# Patient Record
Sex: MALE | Race: WHITE | NOT HISPANIC OR LATINO | Employment: PART TIME | URBAN - NONMETROPOLITAN AREA
[De-identification: names, ages, dates, MRNs, and addresses within clinical notes are randomized per-mention and may not be internally consistent; named-entity substitution may affect disease eponyms.]

---

## 2016-08-10 LAB
HBA1C MFR BLD: 7.6 % (ref 0–5.7)
LDLC SERPL CALC-MCNC: 59 MG/DL

## 2016-11-09 LAB — HBA1C MFR BLD: 7.1 % (ref 0–5.7)

## 2017-01-09 DIAGNOSIS — E10.65 TYPE 1 DIABETES MELLITUS WITH HYPERGLYCEMIA (H): Primary | ICD-10-CM

## 2017-01-09 NOTE — PROGRESS NOTES
Richard showed up to the clinic.      He needs a rx for his BG strips.  Rx sent.      Encouraged him to see me in the near future.      Macrina Dixon RN FNP-BC  Disease Management

## 2017-01-17 ENCOUNTER — TELEPHONE (OUTPATIENT)
Dept: WOUND CARE | Facility: OTHER | Age: 52
End: 2017-01-17

## 2017-01-17 DIAGNOSIS — E10.65 TYPE 1 DIABETES MELLITUS WITH HYPERGLYCEMIA (H): Primary | ICD-10-CM

## 2017-01-17 NOTE — TELEPHONE ENCOUNTER
Patient has an appointment with Diabetic Education. Needs a new referral, referral pending. Please sign.

## 2017-01-18 ENCOUNTER — OFFICE VISIT (OUTPATIENT)
Dept: WOUND CARE | Facility: OTHER | Age: 52
End: 2017-01-18
Attending: NURSE PRACTITIONER
Payer: COMMERCIAL

## 2017-01-18 VITALS
OXYGEN SATURATION: 92 % | WEIGHT: 203.2 LBS | DIASTOLIC BLOOD PRESSURE: 57 MMHG | HEIGHT: 62 IN | BODY MASS INDEX: 37.39 KG/M2 | SYSTOLIC BLOOD PRESSURE: 113 MMHG | HEART RATE: 78 BPM

## 2017-01-18 DIAGNOSIS — E10.65 TYPE 1 DIABETES MELLITUS WITH HYPERGLYCEMIA (H): ICD-10-CM

## 2017-01-18 DIAGNOSIS — E10.65 TYPE 1 DIABETES MELLITUS WITH HYPERGLYCEMIA (H): Primary | ICD-10-CM

## 2017-01-18 DIAGNOSIS — Z71.89 ACP (ADVANCE CARE PLANNING): Primary | Chronic | ICD-10-CM

## 2017-01-18 DIAGNOSIS — S90.821A BLISTER (NONTHERMAL), RIGHT FOOT, INITIAL ENCOUNTER: ICD-10-CM

## 2017-01-18 PROCEDURE — 99213 OFFICE O/P EST LOW 20 MIN: CPT | Performed by: NURSE PRACTITIONER

## 2017-01-18 ASSESSMENT — ANXIETY QUESTIONNAIRES
IF YOU CHECKED OFF ANY PROBLEMS ON THIS QUESTIONNAIRE, HOW DIFFICULT HAVE THESE PROBLEMS MADE IT FOR YOU TO DO YOUR WORK, TAKE CARE OF THINGS AT HOME, OR GET ALONG WITH OTHER PEOPLE: NOT DIFFICULT AT ALL
2. NOT BEING ABLE TO STOP OR CONTROL WORRYING: NOT AT ALL
7. FEELING AFRAID AS IF SOMETHING AWFUL MIGHT HAPPEN: NOT AT ALL
1. FEELING NERVOUS, ANXIOUS, OR ON EDGE: NOT AT ALL
6. BECOMING EASILY ANNOYED OR IRRITABLE: NOT AT ALL
GAD7 TOTAL SCORE: 0
5. BEING SO RESTLESS THAT IT IS HARD TO SIT STILL: NOT AT ALL
3. WORRYING TOO MUCH ABOUT DIFFERENT THINGS: NOT AT ALL

## 2017-01-18 ASSESSMENT — PAIN SCALES - GENERAL: PAINLEVEL: NO PAIN (0)

## 2017-01-18 ASSESSMENT — PATIENT HEALTH QUESTIONNAIRE - PHQ9: 5. POOR APPETITE OR OVEREATING: NOT AT ALL

## 2017-01-18 NOTE — NURSING NOTE
"Chief Complaint   Patient presents with     Diabetes     pump adjustment       Initial /57 mmHg  Pulse 78  Ht 5' 2\" (1.575 m)  Wt 203 lb 3.2 oz (92.171 kg)  BMI 37.16 kg/m2  SpO2 92% Estimated body mass index is 37.16 kg/(m^2) as calculated from the following:    Height as of this encounter: 5' 2\" (1.575 m).    Weight as of this encounter: 203 lb 3.2 oz (92.171 kg).  BP completed using cuff size: kelly Rocha      "

## 2017-01-18 NOTE — MR AVS SNAPSHOT
After Visit Summary   1/18/2017    Richard Harvey    MRN: [id number]           Patient Information     Date Of Birth          1965        Visit Information        Provider Department      1/18/2017 10:00 AM Macrina Dixon NP [facility]        Today's Diagnoses     ACP (advance care planning)    -  1     Type 1 diabetes mellitus with hyperglycemia (H)           Care Instructions    Continue working on healthy eating and exercising (start low and slow, work up to 30 min, 5x/week)    BG goals:  Fasting and before meals <130, >80  2 hour after eating <180    We only need 1/2 of these numbers to be within target then your A1c will be within target    Medication changes   Watch for continued lows    Follow up   One month    If needed, please use your temp basal at work    Call me sooner if any problems/concerns and/or questions develop including consistent low BGs <70 or consistent high BGs >200  [phone]    Wound care  Wash affected areas with mild soap and water, dry.    Cut to fit the duoderm and place on affected areas  You can leave it there for up to one week.  However, it might need to be changed after showering.    Please call if you need a prescription for duoderm.      Apply cream to calloused areas on right foot          Follow-ups after your visit        Who to contact     If you have questions or need follow up information about today's clinic visit or your schedule please contact [facility] directly at [phone].  Normal or non-critical lab and imaging results will be communicated to you by MyChart, letter or phone within 4 business days after the clinic has received the results. If you do not hear from us within 7 days, please contact the clinic through MyChart or phone. If you have a critical or abnormal lab result, we will notify you by phone as soon as possible.  Submit refill requests through [employer] or call your pharmacy and they will forward the  "refill request to us. Please allow 3 business days for your refill to be completed.          Additional Information About Your Visit        [employer] Information     [employer] lets you send messages to your doctor, view your test results, renew your prescriptions, schedule appointments and more. To sign up, go to [email or web address] . Click on \"Log in\" on the left side of the screen, which will take you to the Welcome page. Then click on \"Sign up Now\" on the right side of the page.     You will be asked to enter the access code listed below, as well as some personal information. Please follow the directions to create your username and password.     Your access code is: [email or web address]  Expires: 2017 10:48 AM     Your access code will  in 90 days. If you need help or a new code, please call your [place] clinic or [phone].        Care EveryWhere ID     This is your Care EveryWhere ID. This could be used by other organizations to access your [facility] medical records  [id number]        Your Vitals Were     Pulse Height BMI (Body Mass Index) Pulse Oximetry          78 5' 2\" (1.575 m) 37.16 kg/m2 92%         Blood Pressure from Last 3 Encounters:   17 113/57   16 133/74   12/07/15 114/72    Weight from Last 3 Encounters:   17 203 lb 3.2 oz (92.171 kg)   16 203 lb 8 oz (92.307 kg)   12/07/15 202 lb 14.4 oz (92.035 kg)              Today, you had the following     No orders found for display         Today's Medication Changes          These changes are accurate as of: 17 10:48 AM.  If you have any questions, ask your nurse or doctor.               These medicines have changed or have updated prescriptions.        Dose/Directions    * blood glucose monitoring test strip   Commonly known as:  [employer] CONTOUR NEXT   This may have changed:  Another medication with the same name was changed. Make sure you understand how and when to take each.   Used for:  Type 1 diabetes mellitus with " hyperglycemia (H)   Changed by:  Macrina Dixon NP        Use to test blood sugar 6 times daily or as directed. To be used with the Nanette linking meter and insulin pump   Quantity:  300 each   Refills:  3       * blood glucose monitoring test strip   Commonly known as:  NANETTE CONTOUR NEXT   This may have changed:  additional instructions   Used for:  Type 1 diabetes mellitus with hyperglycemia (H)   Changed by:  Macrina Dixon NP        6 times a day, to be used with the insulin pump   Quantity:  300 each   Refills:  11       * Notice:  This list has 2 medication(s) that are the same as other medications prescribed for you. Read the directions carefully, and ask your doctor or other care provider to review them with you.         Where to get your medicines      These medications were sent to [facility] [address] [address]     Phone:  [phone]    - blood glucose monitoring test strip             Primary Care Provider Office Phone # Fax #    Tony Obando [phone]       [address] [address]        Thank you!     Thank you for choosing [facility]  for your care. Our goal is always to provide you with excellent care. Hearing back from our patients is one way we can continue to improve our services. Please take a few minutes to complete the written survey that you may receive in the mail after your visit with us. Thank you!             Your Updated Medication List - Protect others around you: Learn how to safely use, store and throw away your medicines at www.disposemymeds.org.          This list is accurate as of: 1/18/17 10:48 AM.  Always use your most recent med list.                   Brand Name Dispense Instructions for use    acetone (Urine) test Strp     50 each    Use as directed       aspirin 325 MG tablet      Take by mouth daily       * blood glucose monitoring test  strip    NANETTE CONTOUR NEXT    300 each    Use to test blood sugar 6 times daily or as directed. To be used with the Nanette linking meter and insulin pump       * blood glucose monitoring test strip    NANETTE CONTOUR NEXT    300 each    6 times a day, to be used with the insulin pump       CALCIUM-VITAMIN D PO      Take  by mouth daily. 600 mg       glucagon 1 MG kit      1 mg once       GLUCOSAMINE-CHONDROITIN MAX ST PO      Take  by mouth daily.       insulin aspart 100 UNIT/ML injection    NovoLOG     See Admin Instructions. For use with insulin pump       insulin pump infusion      Date last updated:  6/23/15 [employer] Minimed: Model 751 BASAL RATES and times: 12   AM (midnight): 1.25 units/hour   2     AM: 1.3 units/hour  6     AM: 0.95 units/hour  6    PM: 1.2 units/hour  Basal Pattern A:  Richard Harvey will use when N/A. CARB RATIO and times: 12   AM (midnight): 12 1    PM:  10 6    PM:    9 Corection Factor (Sensitivity) and times: 12   AM (midnight): 40 mg/dL 6     PM: 45 mg/dL BLOOD GLUCOSE TARGET and times: 12   AM (midnight): 100 - 140 9     AM:  100 - 120 10   PM:  100 - 140 Active Insulin Time:  4 hours Sensor:  Yes: 12   AM (midnight): 70 - 240 Carelink / Diasend username:  jyoti ABA English / THE MELTsend Password:  [email or web address]       lisinopril 10 MG tablet    PRINIVIL/ZESTRIL     Take 10 mg by mouth daily       MOTRIN IB PO      Take 400 mg by mouth every 6 hours as needed for moderate pain       MULTI-VITAMIN DAILY PO      Take  by mouth daily.       NEURONTIN 300 MG capsule   Generic drug:  gabapentin      Take 300 mg by mouth 3 times daily       NITROSTAT 0.4 MG sublingual tablet   Generic drug:  nitroglycerin      Place under the tongue every 5 minutes as needed       omega-3 fatty acids 1200 MG capsule      Take 1 capsule by mouth daily.       simvastatin 40 MG tablet    ZOCOR     Take by mouth At Bedtime       URINE GLUCOSE-KETONES TEST VI          * Notice:  This list has 2 medication(s) that are  the same as other medications prescribed for you. Read the directions carefully, and ask your doctor or other care provider to review them with you.

## 2017-01-18 NOTE — PROGRESS NOTES
SUBJECTIVE:  Richard Harvey, 50 year old, male presents with the following Chief Complaint(s) with HPI to follow:  Chief Complaint   Patient presents with     Diabetes     pump adjustment        Diabetes Follow-up      Patient is checking blood sugars: 4.3x/day.  Results:  Overnight:   Breakfast:   Mid-mornin-361  Lunch: 77 and 313  Mid-afternoon: 59, 63, 67,   Dinner: 41,   Bedtime: 107-371      Symptoms of hypoglycemia (low blood sugar): yes, history of hypoglycemia unawareness    Paresthesias (numbness or burning in feet) or sores: Yes; [facility] at [facility] [employer]--got a new brace in Nov/Dec.; callous on right; nothing that's open or weeping    Diabetic eye exam within the last year: Yes ()    Breakfast eaten regularly: Yes    Patient counting carbs: trying       HPI: Richard's here today for the follow up regarding his Diabetes mellitus, Type 1.    A1C      7.1   2016  A1C      7.6   8/10/2016  A1C      7.2   2016  A1C      7.3   2015  A1C      6.9   8/15/2013    Current Diabetes medication:   1.  Insulin pump, uses Novolog; has a sensor, but hasn't been wearing it  ASA use: yes, 325 mg daily  Statin use: yes, simvastatin 40 mg at bedime  Denies any issues with the insulin pump.      Hasn't been wearing his sensor.    Continues to bolus before meals and snacks.      Having issues with low BGs.  Hasn't been setting a temp basal at work.    Richard is also having problems with blistering on his right foot from his brace.  He hasn't been using any topical treatments.    Has an appointment with orthotics to adjust it.           Patient Active Problem List   Diagnosis     DM type 1 (diabetes mellitus, type 1) (H)     HTN (hypertension)     Hyperlipidemia     ACP (advance care planning)       History reviewed. No pertinent past medical history.    History reviewed. No pertinent past surgical history.    History reviewed. No pertinent family history.    Social History    Substance Use Topics     Smoking status: Never Smoker      Smokeless tobacco: Never Used     Alcohol Use: No       Current Outpatient Prescriptions   Medication Sig Dispense Refill     blood glucose monitoring (NANETTE CONTOUR NEXT) test strip Use to test blood sugar 6 times daily or as directed.  To be used with the Nanette linking meter and insulin pump 300 each 3     URINE GLUCOSE-KETONES TEST VI        acetone, Urine, test STRP Use as directed 50 each 3     MOTRIN IB PO Take 400 mg by mouth every 6 hours as needed for moderate pain       INSULIN PUMP - OUTPATIENT Date last updated:  6/23/15  [employer] Minimed: Model 751  BASAL RATES and times:  12   AM (midnight): 1.25 units/hour    2     AM: 1.3 units/hour   6     AM: 0.95 units/hour   6    PM: 1.2 units/hour   Basal Pattern A:  Richard Harvey will use when N/A.  CARB RATIO and times:  12   AM (midnight): 12  1    PM:  10  6    PM:    9  Corection Factor (Sensitivity) and times:  12   AM (midnight): 40 mg/dL  6     PM: 45 mg/dL  BLOOD GLUCOSE TARGET and times:  12   AM (midnight): 100 - 140  9     AM:  100 - 120  10   PM:  100 - 140  Active Insulin Time:  4 hours  Sensor:  Yes: 12   AM (midnight): 70 - 240  Carelink / Diasend username:  jyoti  Carelink / Probiodrugsend Password:  [email or web address]       aspirin 325 MG tablet Take by mouth daily       gabapentin (NEURONTIN) 300 MG capsule Take 300 mg by mouth 3 times daily       glucagon 1 MG injection 1 mg once       Glucose Blood (NANETTE CONTOUR NEXT TEST VI) 4-6 times a day       lisinopril (PRINIVIL,ZESTRIL) 10 MG tablet Take 10 mg by mouth daily       nitroglycerin (NITROSTAT) 0.4 MG SL tablet Place under the tongue every 5 minutes as needed       simvastatin (ZOCOR) 40 MG tablet Take by mouth At Bedtime       CALCIUM-VITAMIN D PO Take  by mouth daily. 600 mg       omega-3 fatty acids (FISH OIL) 1200 MG capsule Take 1 capsule by mouth daily.       Glucosamine-Chondroit-Vit C-Mn (GLUCOSAMINE-CHONDROITIN MAX ST PO) Take   "by mouth daily.       Multiple Vitamin (MULTI-VITAMIN DAILY PO) Take  by mouth daily.       insulin aspart (NovoLOG) inject - to fill ambulatory pump See Admin Instructions. For use with insulin pump         No Known Allergies    REVIEW OF SYSTEMS  Skin: right foot   Eyes: negative  Ears/Nose/Throat: negative  Respiratory: No shortness of breath, dyspnea on exertion, cough, or hemoptysis  Cardiovascular: negative  Gastrointestinal: negative  Genitourinary: negative  Musculoskeletal: positive for arthritis (thumbs)  Neurologic: positive for numbness or tingling of hands (right hand--carpel tunnel) and numbness or tingling of feet  Psychiatric: negative  Hematologic/Lymphatic/Immunologic: negative  Endocrine: positive for diabetes    OBJECTIVE:  /57 mmHg  Pulse 78  Ht 5' 2\" (1.575 m)  Wt 203 lb 3.2 oz (92.171 kg)  BMI 37.16 kg/m2  SpO2 92%  Constitutional: healthy, alert and no distress  Neck: neck supple, no lymphadenopathy, trachea midline, thyroid symmetrical with out nodules noted. Carotid arteries without bruit  Cardiovascular: RRR. No murmurs, clicks gallops or rub  Respiratory: Good diaphragmatic excursion. Lungs clear  Skin: noted right foot has two deroofed blisters, dorsal and medial.    Washed with soap and water, dried.  Applied duoderm to these areas.    Gastrointestinal: Abdomen soft, non-tender. BS normal.   Psychiatric: mentation appears normal and affect normal/bright  Hematologic/Lymphatic/Immunologic: normal ant/post cervical nodes      ASSESSMENT / PLAN:  (Z71.89) ACP (advance care planning)  (primary encounter diagnosis)  Comment: noted  Plan: Please address with PCP    (E10.65) Type 1 diabetes mellitus with hyperglycemia (H)  Comment:   Richard is having a lot of low BGs.      Insulin pump information:   Average: 155 +/- 80  Basal/bolus ratio: 26.3/30   Testin.3x/day    Hypoglycemia: 6 (10%)    Adjustments:  Blood glucose target  0900 100-130 (new, 120)    Encouraged him to use his " temp basal at work.     (O44.696U) Blister (nonthermal), right foot, initial encounter  Comment: plan developed  Plan:   Please keep schedule appointment with orthotics to adjust brace.    Gave him sample of duoderm.    Encouraged to call if needs a rx.      Patient Instructions   Continue working on healthy eating and exercising (start low and slow, work up to 30 min, 5x/week)    BG goals:  Fasting and before meals <130, >80  2 hour after eating <180    We only need 1/2 of these numbers to be within target then your A1c will be within target    Medication changes   Watch for continued lows    Follow up   One month    If needed, please use your temp basal at work    Call me sooner if any problems/concerns and/or questions develop including consistent low BGs <70 or consistent high BGs >200  [phone]    Wound care  Wash affected areas with mild soap and water, dry.    Cut to fit the duoderm and place on affected areas  You can leave it there for up to one week.  However, it might need to be changed after showering.    Please call if you need a prescription for duoderm.      Apply cream to calloused areas on right foot        Time: 50 min  Barrier: none  Willingness to learn: accepting    Macrina Dixon RN FNP-BC  Disease Management    Cc: Dr. ALEJANDRA Obando      With the electronic record, we can now more quickly and easily track our patient diabetic goals. Our diabetes clinical review is in progress and these are the indicators we are monitoring for good diabetes health.     1.) HbA1C less than 7 (measurement of your average blood sugars)  2.) Blood Pressure less than 140/80  3.) LDL less than 100 (bad cholesterol)  4.) HbA1C is checked in the last 6 months and below 7% (more frequently if not at goal or adjusting medications)  5.) LDL is checked in the last 12 months (more frequently if not at goal or adjusting medications)  6.) Taking one baby aspirin daily (unless otherwise instructed)  7.) No tobacco use  8)  "Statin use     You have achieved 7 out of 8 of these and I am encouraging you to come in and get tuned up to achieve 8 out of 8!  Here is what you have achieved so far in my goals for you:  1.) HbA1C  less than 7:                              NO  Your last  HbA1C : A1C      7.1   11/9/2016  A1C      7.6   8/10/2016  A1C      7.2   2/2/2016  A1C      7.3   9/28/2015  A1C      6.9   8/15/2013  2.) Blood Pressure less than 140/80:       YES      Your last    BP Readings from Last 1 Encounters:       /57 mmHg  Pulse 78  Ht 5' 2\" (1.575 m)  Wt 203 lb 3.2 oz (92.171 kg)  BMI 37.16 kg/m2  SpO2 92%  3.) LDL less than 100:                              YES      Your last   LDL         LDL CHOLESTEROL CALCULATED   Date Value Ref Range Status   08/10/2016 59 mg/dL Final   ]  4.) Checked HbA1C in the past 6 months: YES      5.) Checked LDL in the past 12 months:    YES      6.) Taking one aspirin daily:                       YES     7.) No tobacco use:                                        YES      8.) Statin use      YES       "

## 2017-01-18 NOTE — PATIENT INSTRUCTIONS
Continue working on healthy eating and exercising (start low and slow, work up to 30 min, 5x/week)    BG goals:  Fasting and before meals <130, >80  2 hour after eating <180    We only need 1/2 of these numbers to be within target then your A1c will be within target    Medication changes   Watch for continued lows    Follow up   One month    If needed, please use your temp basal at work    Call me sooner if any problems/concerns and/or questions develop including consistent low BGs <70 or consistent high BGs >200  [phone]    Wound care  Wash affected areas with mild soap and water, dry.    Cut to fit the duoderm and place on affected areas  You can leave it there for up to one week.  However, it might need to be changed after showering.    Please call if you need a prescription for duoderm.      Apply cream to calloused areas on right foot

## 2017-01-18 NOTE — PROGRESS NOTES
Received a fax regarding the following:    He has new insurance.  His plan allows #200 per 30 days, needs a PA for #300.      Sent new rx for 200 to his pharmacy    Macrina Dixon RN FNP-BC  Disease Management

## 2017-01-19 ASSESSMENT — PATIENT HEALTH QUESTIONNAIRE - PHQ9: SUM OF ALL RESPONSES TO PHQ QUESTIONS 1-9: 1

## 2017-01-19 ASSESSMENT — ANXIETY QUESTIONNAIRES: GAD7 TOTAL SCORE: 0

## 2017-01-31 ENCOUNTER — TELEPHONE (OUTPATIENT)
Dept: WOUND CARE | Facility: OTHER | Age: 52
End: 2017-01-31

## 2017-01-31 NOTE — TELEPHONE ENCOUNTER
Patient is waiting on a prior auth for his contour next test strips and will run out. Samples given.

## 2017-02-10 ENCOUNTER — TELEPHONE (OUTPATIENT)
Dept: WOUND CARE | Facility: OTHER | Age: 52
End: 2017-02-10

## 2017-02-10 NOTE — TELEPHONE ENCOUNTER
Patient is waiting to hear back from Macrina regarding his Contour strips has she found out anything out and he is running out of strips.

## 2017-02-10 NOTE — TELEPHONE ENCOUNTER
Tried to reach patient by phone to find out how many test strips he has left. No answer. Will try again on Monday.

## 2017-02-10 NOTE — TELEPHONE ENCOUNTER
"Call [facility]fredy, who states that the \"correct\" information wasn't given.      Will dictate a letter.        Macrina Dixon RN FNP-BC  Disease Management    "

## 2017-02-13 ENCOUNTER — TELEPHONE (OUTPATIENT)
Dept: WOUND CARE | Facility: OTHER | Age: 52
End: 2017-02-13

## 2017-02-13 NOTE — TELEPHONE ENCOUNTER
"Called Richard.      Told him that we started the appeal process for his BG strips.  Letter dictated    He states that he just picked up one bottle of strips.  He should be \"good for a while'.      Told him to please let us know if he starts running out.      Macrina Dixon RN FNP-BC  Disease Management           "

## 2017-02-14 ENCOUNTER — OFFICE VISIT (OUTPATIENT)
Dept: WOUND CARE | Facility: OTHER | Age: 52
End: 2017-02-14
Attending: NURSE PRACTITIONER
Payer: COMMERCIAL

## 2017-02-14 VITALS
DIASTOLIC BLOOD PRESSURE: 58 MMHG | OXYGEN SATURATION: 90 % | WEIGHT: 201.5 LBS | SYSTOLIC BLOOD PRESSURE: 112 MMHG | HEIGHT: 62 IN | BODY MASS INDEX: 37.08 KG/M2 | HEART RATE: 77 BPM

## 2017-02-14 DIAGNOSIS — E10.65 TYPE 1 DIABETES MELLITUS WITH HYPERGLYCEMIA (H): Primary | ICD-10-CM

## 2017-02-14 DIAGNOSIS — S90.821A BLISTER (NONTHERMAL), RIGHT FOOT, INITIAL ENCOUNTER: ICD-10-CM

## 2017-02-14 PROCEDURE — 99213 OFFICE O/P EST LOW 20 MIN: CPT | Performed by: NURSE PRACTITIONER

## 2017-02-14 ASSESSMENT — PAIN SCALES - GENERAL: PAINLEVEL: NO PAIN (0)

## 2017-02-14 NOTE — MR AVS SNAPSHOT
After Visit Summary   2/14/2017    Richard Harvey    MRN: [id number]           Patient Information     Date Of Birth          1965        Visit Information        Provider Department      2/14/2017 3:30 PM Macrina Dixon NP [facility]        Today's Diagnoses     Type 1 diabetes mellitus with hyperglycemia (H)    -  1    Blister (nonthermal), right foot, initial encounter          Care Instructions    Continue working on healthy eating and exercising (start low and slow, work up to 30 min, 5x/week)    BG goals:  Fasting and before meals <130, >80  2 hour after eating <180    We only need 1/2 of these numbers to be within target then your A1c will be within target    Medication changes   Watch for continued lows    Follow up   One month    If needed, please use your temp basal at work  Please make sure to enter your bolus prior to eating.      Call me sooner if any problems/concerns and/or questions develop including consistent low BGs <70 or consistent high BGs >200  [phone] (Unit Coordinator)    [phone] (Nurse)  [phone] (Macrina's cell)    Wound care  Wash affected areas with mild soap and water, dry.    Cut to fit the duoderm and place on affected areas  You can leave it there for up to one week.  However, it might need to be changed after showering.    Please call if you need a prescription for duoderm.      Apply cream to calloused areas on right foot          Follow-ups after your visit        Follow-up notes from your care team     Return in about 4 weeks (around 3/14/2017), or if symptoms worsen or fail to improve.      Who to contact     If you have questions or need follow up information about today's clinic visit or your schedule please contact [facility] directly at [phone].  Normal or non-critical lab and imaging results will be communicated to you by MyChart, letter or phone within 4 business days after the clinic has received the results.  "If you do not hear from us within 7 days, please contact the clinic through [employer] or phone. If you have a critical or abnormal lab result, we will notify you by phone as soon as possible.  Submit refill requests through [employer] or call your pharmacy and they will forward the refill request to us. Please allow 3 business days for your refill to be completed.          Additional Information About Your Visit        [employer] Information     [employer] lets you send messages to your doctor, view your test results, renew your prescriptions, schedule appointments and more. To sign up, go to [email or web address] . Click on \"Log in\" on the left side of the screen, which will take you to the Welcome page. Then click on \"Sign up Now\" on the right side of the page.     You will be asked to enter the access code listed below, as well as some personal information. Please follow the directions to create your username and password.     Your access code is: [email or web address]  Expires: 2017 10:48 AM     Your access code will  in 90 days. If you need help or a new code, please call your [place] clinic or [phone].        Care EveryWhere ID     This is your Care EveryWhere ID. This could be used by other organizations to access your [place] medical records  [id number]        Your Vitals Were     Pulse Height Pulse Oximetry BMI (Body Mass Index)          77 5' 2\" (1.575 m) 90% 36.85 kg/m2         Blood Pressure from Last 3 Encounters:   17 112/58   17 113/57   16 133/74    Weight from Last 3 Encounters:   17 201 lb 8 oz (91.4 kg)   17 203 lb 3.2 oz (92.2 kg)   16 203 lb 8 oz (92.3 kg)              Today, you had the following     No orders found for display         Today's Medication Changes          These changes are accurate as of: 17  3:58 PM.  If you have any questions, ask your nurse or doctor.               Start taking these medicines.        Dose/Directions    order for DME "   Used for:  Blister (nonthermal), right foot, initial encounter   Started by:  Macrina Dixon, ERENDIRA        Equipment being ordered:   1.  Duoderm (4x4 in) Apply to affected area, change as needed Disp: 5 Refill: 5   Quantity:  5 each   Refills:  5            Where to get your medicines      Some of these will need a paper prescription and others can be bought over the counter.  Ask your nurse if you have questions.     Bring a paper prescription for each of these medications     order for DME                Primary Care Provider Office Phone # Fax #    Tony Obando [phone]       [facility] [address] [facility] [address]        Thank you!     Thank you for choosing [facility]  for your care. Our goal is always to provide you with excellent care. Hearing back from our patients is one way we can continue to improve our services. Please take a few minutes to complete the written survey that you may receive in the mail after your visit with us. Thank you!             Your Updated Medication List - Protect others around you: Learn how to safely use, store and throw away your medicines at www.disposemymeds.org.          This list is accurate as of: 2/14/17  3:58 PM.  Always use your most recent med list.                   Brand Name Dispense Instructions for use    acetone (Urine) test Strp     50 each    Use as directed       aspirin 325 MG tablet      Take by mouth daily       blood glucose monitoring test strip    MELISSA CONTOUR NEXT    200 each    Use to test blood sugar 6 times daily or as directed.       CALCIUM-VITAMIN D PO      Take  by mouth daily. 600 mg       glucagon 1 MG kit      1 mg once       GLUCOSAMINE-CHONDROITIN MAX ST PO      Take  by mouth daily.       insulin aspart 100 UNIT/ML injection    NovoLOG     See Admin Instructions. For use with insulin pump       insulin pump infusion      Date last updated:  6/23/15 [employer]: Model 751 BASAL RATES  and times: 12   AM (midnight): 1.25 units/hour   2     AM: 1.3 units/hour  6     AM: 0.95 units/hour  6    PM: 1.2 units/hour  Basal Pattern A:  Bailey Harvey will use when N/A. CARB RATIO and times: 12   AM (midnight): 12 1    PM:  10 6    PM:    9 Corection Factor (Sensitivity) and times: 12   AM (midnight): 40 mg/dL 6     PM: 45 mg/dL BLOOD GLUCOSE TARGET and times: 12   AM (midnight): 100 - 140 9     AM:  100 - 120 10   PM:  100 - 140 Active Insulin Time:  4 hours Sensor:  Yes: 12   AM (midnight): 70 - 240 Carelink / Diasend username:  [email or web address] Carelink / [email or web address] Password:  [email or web address]       lisinopril 10 MG tablet    PRINIVIL/ZESTRIL     Take 10 mg by mouth daily       MOTRIN IB PO      Take 400 mg by mouth every 6 hours as needed for moderate pain       MULTI-VITAMIN DAILY PO      Take  by mouth daily.       NEURONTIN 300 MG capsule   Generic drug:  gabapentin      Take 300 mg by mouth 3 times daily       NITROSTAT 0.4 MG sublingual tablet   Generic drug:  nitroglycerin      Place under the tongue every 5 minutes as needed       omega-3 fatty acids 1200 MG capsule      Take 1 capsule by mouth daily.       order for DME     5 each    Equipment being ordered:   1.  Duoderm (4x4 in) Apply to affected area, change as needed Disp: 5 Refill: 5       simvastatin 40 MG tablet    ZOCOR     Take by mouth At Bedtime       URINE GLUCOSE-KETONES TEST VI

## 2017-02-14 NOTE — PROGRESS NOTES
SUBJECTIVE:  Richard Harvey, 50 year old, male presents with the following Chief Complaint(s) with HPI to follow:  Chief Complaint   Patient presents with     Diabetes     pump adjustment        Diabetes Follow-up      Patient is checking blood sugars: 4.3x/day.  Results:  Overnight: 58,   Breakfast: 54, 65,   Mid-mornin  Lunch: no checks  Mid-afternoon: 55, 66, 73->400  Dinner:   Bedtime: 42,       Symptoms of hypoglycemia (low blood sugar): yes, history of hypoglycemia unawareness    Paresthesias (numbness or burning in feet) or sores: Yes; [facility] at [facility] [employer]--got a new brace in Nov/Dec.; callous on right; nothing that's open or weeping    Diabetic eye exam within the last year: Yes ()    Breakfast eaten regularly: Yes    Patient counting carbs: trying       HPI: Richard's here today for the follow up regarding his Diabetes mellitus, Type 1.    A1C      7.1   2016  A1C      7.6   8/10/2016  A1C      7.2   2016  A1C      7.3   2015  A1C      6.9   8/15/2013    Current Diabetes medication:   1.  Insulin pump, uses Novolog; has a sensor, but doesn't wear it  ASA use: yes, 325 mg daily  Statin use: yes, simvastatin 40 mg at bedime  Denies any issues with the insulin pump.      Continues not to wear his sensor.    Continues to bolus before meals and snacks.  Accidentally, Richard will miss a bolus or two       Can't explain the causes of his low BGS.  Hasn't been setting a temp basal at work.    Richard has been using the duoderm, which has been protecting his skin related to his brace.  He did have the brace recently adjusted.  Would like a refill of the duoderm.           Patient Active Problem List   Diagnosis     DM type 1 (diabetes mellitus, type 1) (H)     HTN (hypertension)     Hyperlipidemia     ACP (advance care planning)       History reviewed. No pertinent past medical history.    History reviewed. No pertinent past surgical history.    History reviewed. No  pertinent family history.    Social History   Substance Use Topics     Smoking status: Never Smoker     Smokeless tobacco: Never Used     Alcohol use No       Current Outpatient Prescriptions   Medication Sig Dispense Refill     blood glucose monitoring (MELISSA CONTOUR NEXT) test strip Use to test blood sugar 6 times daily or as directed. 200 each 11     URINE GLUCOSE-KETONES TEST VI        acetone, Urine, test STRP Use as directed 50 each 3     MOTRIN IB PO Take 400 mg by mouth every 6 hours as needed for moderate pain       INSULIN PUMP - OUTPATIENT Date last updated:  6/23/15  [employer] Min[employer]ed: Model 751  BASAL RATES and times:  12   AM (midnight): 1.25 units/hour    2     AM: 1.3 units/hour   6     AM: 0.95 units/hour   6    PM: 1.2 units/hour   Basal Pattern A:  Richard Harvey will use when N/A.  CARB RATIO and times:  12   AM (midnight): 12  1    PM:  10  6    PM:    9  Corection Factor (Sensitivity) and times:  12   AM (midnight): 40 mg/dL  6     PM: 45 mg/dL  BLOOD GLUCOSE TARGET and times:  12   AM (midnight): 100 - 140  9     AM:  100 - 120  10   PM:  100 - 140  Active Insulin Time:  4 hours  Sensor:  Yes: 12   AM (midnight): 70 - 240  Carelink / Diasend username:  [email or web address]  Carelink / Diasend Password:  [email or web address]       aspirin 325 MG tablet Take by mouth daily       gabapentin (NEURONTIN) 300 MG capsule Take 300 mg by mouth 3 times daily       glucagon 1 MG injection 1 mg once       lisinopril (PRINIVIL,ZESTRIL) 10 MG tablet Take 10 mg by mouth daily       nitroglycerin (NITROSTAT) 0.4 MG SL tablet Place under the tongue every 5 minutes as needed       simvastatin (ZOCOR) 40 MG tablet Take by mouth At Bedtime       CALCIUM-VITAMIN D PO Take  by mouth daily. 600 mg       omega-3 fatty acids (FISH OIL) 1200 MG capsule Take 1 capsule by mouth daily.       Glucosamine-Chondroit-Vit C-Mn (GLUCOSAMINE-CHONDROITIN MAX ST PO) Take  by mouth daily.       Multiple Vitamin (MULTI-VITAMIN DAILY PO) Take  by mouth  "daily.       insulin aspart (NovoLOG) inject - to fill ambulatory pump See Admin Instructions. For use with insulin pump         No Known Allergies    REVIEW OF SYSTEMS  Skin: right foot--improving   Eyes: negative  Ears/Nose/Throat: negative  Respiratory: No shortness of breath, dyspnea on exertion, cough, or hemoptysis  Cardiovascular: negative  Gastrointestinal: negative  Genitourinary: negative  Musculoskeletal: positive for arthritis (thumbs)--same  Neurologic: positive for numbness or tingling of hands (right hand--carpel tunnel) and numbness or tingling of feet--same  Psychiatric: negative  Hematologic/Lymphatic/Immunologic: negative  Endocrine: positive for diabetes    OBJECTIVE:  /58 (BP Location: Left arm, Patient Position: Chair, Cuff Size: Adult Large)  Pulse 77  Ht 5' 2\" (1.575 m)  Wt 201 lb 8 oz (91.4 kg)  SpO2 90%  BMI 36.85 kg/m2  Constitutional: healthy, alert and no distress  Neck: neck supple, no lymphadenopathy, trachea midline, thyroid symmetrical with out nodules noted. Carotid arteries without bruit  Cardiovascular: RRR. No murmurs, clicks gallops or rub  Respiratory: Good diaphragmatic excursion. Lungs clear  Skin: noted right foot has two deroofed blisters, dorsal and medial--resolving.    Gastrointestinal: Abdomen soft, non-tender. BS normal.   Psychiatric: mentation appears normal and affect normal/bright  Hematologic/Lymphatic/Immunologic: normal ant/post cervical nodes      ASSESSMENT / PLAN:  (E10.65) Type 1 diabetes mellitus with hyperglycemia (H)  (primary encounter diagnosis)  Comment: see below  Plan: Adjust carb ratio.    Friendly reminded, bolus before every carb consumption    (S90.821A) Blister (nonthermal), right foot, initial encounter  Comment: noted  Plan: order for DME        Order sent      Insulin pump information:   Average: 170 +/- 97  Basal/bolus ratio: 26.3 (47%)/29.9 (53%)   Testin.4x/day    Hypoglycemia: 6 (10%)    Adjustments:  Carb ratio  1300 11 (was " 12)  1700 9.5 (was 10)    Encouraged him to use his temp basal at work.     Patient Instructions   Continue working on healthy eating and exercising (start low and slow, work up to 30 min, 5x/week)    BG goals:  Fasting and before meals <130, >80  2 hour after eating <180    We only need 1/2 of these numbers to be within target then your A1c will be within target    Medication changes   Watch for continued lows    Follow up   One month    If needed, please use your temp basal at work  Please make sure to enter your bolus prior to eating.      Call me sooner if any problems/concerns and/or questions develop including consistent low BGs <70 or consistent high BGs >200  [phone] (Unit Coordinator)    [phone] (Nurse)  [phone] (Macrina's cell)    Wound care  Wash affected areas with mild soap and water, dry.    Cut to fit the duoderm and place on affected areas  You can leave it there for up to one week.  However, it might need to be changed after showering.    Please call if you need a prescription for duoderm.      Apply cream to calloused areas on right foot        Time: 40 min  Barrier: none  Willingness to learn: accepting    Macrina Dixon RN [facility]  Disease Management    Cc: Dr. ALEJANDRA Obando      With the electronic record, we can now more quickly and easily track our patient diabetic goals. Our diabetes clinical review is in progress and these are the indicators we are monitoring for good diabetes health.     1.) HbA1C less than 7 (measurement of your average blood sugars)  2.) Blood Pressure less than 140/80  3.) LDL less than 100 (bad cholesterol)  4.) HbA1C is checked in the last 6 months and below 7% (more frequently if not at goal or adjusting medications)  5.) LDL is checked in the last 12 months (more frequently if not at goal or adjusting medications)  6.) Taking one baby aspirin daily (unless otherwise instructed)  7.) No tobacco use  8) Statin use     You have achieved 7 out of 8 of these and  "I am encouraging you to come in and get tuned up to achieve 8 out of 8!  Here is what you have achieved so far in my goals for you:  1.) HbA1C  less than 7:                              NO  Your last  HbA1C : A1C      7.1   11/9/2016  A1C      7.6   8/10/2016  A1C      7.2   2/2/2016  A1C      7.3   9/28/2015  A1C      6.9   8/15/2013  2.) Blood Pressure less than 140/80:       YES      Your last    BP Readings from Last 1 Encounters:       /58 (BP Location: Left arm, Patient Position: Chair, Cuff Size: Adult Large)  Pulse 77  Ht 5' 2\" (1.575 m)  Wt 201 lb 8 oz (91.4 kg)  SpO2 90%  BMI 36.85 kg/m2  3.) LDL less than 100:                              YES      Your last   LDL         LDL Cholesterol Calculated   Date Value Ref Range Status   08/10/2016 59 mg/dL Final   ]  4.) Checked HbA1C in the past 6 months: YES      5.) Checked LDL in the past 12 months:    YES      6.) Taking one aspirin daily:                       YES     7.) No tobacco use:                                        YES      8.) Statin use      YES       "

## 2017-02-14 NOTE — PATIENT INSTRUCTIONS
Continue working on healthy eating and exercising (start low and slow, work up to 30 min, 5x/week)    BG goals:  Fasting and before meals <130, >80  2 hour after eating <180    We only need 1/2 of these numbers to be within target then your A1c will be within target    Medication changes   Watch for continued lows    Follow up   One month    If needed, please use your temp basal at work  Please make sure to enter your bolus prior to eating.      Call me sooner if any problems/concerns and/or questions develop including consistent low BGs <70 or consistent high BGs >200  [phone] (Unit Coordinator)    [phone] (Nurse)  [phone] (Macrina's cell)    Wound care  Wash affected areas with mild soap and water, dry.    Cut to fit the duoderm and place on affected areas  You can leave it there for up to one week.  However, it might need to be changed after showering.    Please call if you need a prescription for duoderm.      Apply cream to calloused areas on right foot

## 2017-03-13 ENCOUNTER — TRANSFERRED RECORDS (OUTPATIENT)
Dept: HEALTH INFORMATION MANAGEMENT | Facility: HOSPITAL | Age: 52
End: 2017-03-13

## 2017-03-13 LAB — HBA1C MFR BLD: 6.8 % (ref 0–5.7)

## 2017-03-14 ENCOUNTER — OFFICE VISIT (OUTPATIENT)
Dept: WOUND CARE | Facility: OTHER | Age: 52
End: 2017-03-14
Attending: NURSE PRACTITIONER
Payer: COMMERCIAL

## 2017-03-14 VITALS
HEIGHT: 62 IN | DIASTOLIC BLOOD PRESSURE: 60 MMHG | WEIGHT: 201.3 LBS | HEART RATE: 86 BPM | BODY MASS INDEX: 37.04 KG/M2 | SYSTOLIC BLOOD PRESSURE: 115 MMHG

## 2017-03-14 DIAGNOSIS — E10.65 TYPE 1 DIABETES MELLITUS WITH HYPERGLYCEMIA (H): Primary | ICD-10-CM

## 2017-03-14 PROCEDURE — 99213 OFFICE O/P EST LOW 20 MIN: CPT | Performed by: NURSE PRACTITIONER

## 2017-03-14 NOTE — MR AVS SNAPSHOT
After Visit Summary   3/14/2017    Richard Harvey    MRN: [id number]           Patient Information     Date Of Birth          1965        Visit Information        Provider Department      3/14/2017 3:00 PM Macrina Dixon NP [facility]        Today's Diagnoses     Type 1 diabetes mellitus with hyperglycemia (H)    -  1      Care Instructions    Continue working on healthy eating and exercising (start low and slow, work up to 30 min, 5x/week)    BG goals:  Fasting and before meals <130, >80  2 hour after eating <180    We only need 1/2 of these numbers to be within target then your A1c will be within target    Medication changes   Watch for continued lows    Please stop adding extra carbs to correct a high BGs.  Try using your temp basal (increase it to 150-200%, 2-3 hours)    Also, keep a food journal to see if there's any patterns with food and BGs.      Try adding handful of almonds when you eat your cereal or poptart      Follow up   Call me in one week.      Call me sooner if any problems/concerns and/or questions develop including consistent low BGs <70 or consistent high BGs >200  [phone] (Unit Coordinator)    [phone] (Nurse)  [phone] (Macrina's cell)    Wound care  Wash affected areas with mild soap and water, dry.    Cut to fit the duoderm and place on affected areas  You can leave it there for up to one week.  However, it might need to be changed after showering.    Please call if you need a prescription for duoderm.      Apply cream to calloused areas on right foot          Follow-ups after your visit        Who to contact     If you have questions or need follow up information about today's clinic visit or your schedule please contact [facility] directly at [phone].  Normal or non-critical lab and imaging results will be communicated to you by MyChart, letter or phone within 4 business days after the clinic has received the results. If you do  "not hear from us within 7 days, please contact the clinic through [employer] or phone. If you have a critical or abnormal lab result, we will notify you by phone as soon as possible.  Submit refill requests through [employer] or call your pharmacy and they will forward the refill request to us. Please allow 3 business days for your refill to be completed.          Additional Information About Your Visit        [employer] Information     [employer] lets you send messages to your doctor, view your test results, renew your prescriptions, schedule appointments and more. To sign up, go to [email or web address] . Click on \"Log in\" on the left side of the screen, which will take you to the Welcome page. Then click on \"Sign up Now\" on the right side of the page.     You will be asked to enter the access code listed below, as well as some personal information. Please follow the directions to create your username and password.     Your access code is: [email or web address]  Expires: 2017 11:48 AM     Your access code will  in 90 days. If you need help or a new code, please call your [facility] clinic or [phone].        Care EveryWhere ID     This is your Care EveryWhere ID. This could be used by other organizations to access your [facility] medical records  [id number]        Your Vitals Were     Pulse Height BMI (Body Mass Index)             86 5' 2\" (1.575 m) 36.82 kg/m2          Blood Pressure from Last 3 Encounters:   17 115/60   17 112/58   17 113/57    Weight from Last 3 Encounters:   17 201 lb 4.8 oz (91.3 kg)   17 201 lb 8 oz (91.4 kg)   17 203 lb 3.2 oz (92.2 kg)              We Performed the Following     Hemoglobin A1c        Primary Care Provider Office Phone # Fax #    Tony Obando [phone]       [facility] [address] [facility] [address]        Thank you!     Thank you for choosing [facility]  for your care. Our goal is always to provide " you with excellent care. Hearing back from our patients is one way we can continue to improve our services. Please take a few minutes to complete the written survey that you may receive in the mail after your visit with us. Thank you!             Your Updated Medication List - Protect others around you: Learn how to safely use, store and throw away your medicines at www.disposemymeds.org.          This list is accurate as of: 3/14/17  4:14 PM.  Always use your most recent med list.                   Brand Name Dispense Instructions for use    acetone (Urine) test Strp     50 each    Use as directed       aspirin 325 MG tablet      Take by mouth daily       blood glucose monitoring test strip    MELISSA CONTOUR NEXT    200 each    Use to test blood sugar 6 times daily or as directed.       CALCIUM-VITAMIN D PO      Take  by mouth daily. 600 mg       glucagon 1 MG kit      1 mg once       GLUCOSAMINE-CHONDROITIN MAX ST PO      Take  by mouth daily.       insulin aspart 100 UNIT/ML injection    NovoLOG     See Admin Instructions. For use with insulin pump       insulin pump infusion      Date last updated:  6/23/15 [employer]: Model 751 BASAL RATES and times: 12   AM (midnight): 1.25 units/hour   2     AM: 1.3 units/hour  6     AM: 0.95 units/hour  6    PM: 1.2 units/hour  Basal Pattern A:  Richard Harvey will use when N/A. CARB RATIO and times: 12   AM (midnight): 12 1    PM:  10 6    PM:    9 Corection Factor (Sensitivity) and times: 12   AM (midnight): 40 mg/dL 6     PM: 45 mg/dL BLOOD GLUCOSE TARGET and times: 12   AM (midnight): 100 - 140 9     AM:  100 - 120 10   PM:  100 - 140 Active Insulin Time:  4 hours Sensor:  Yes: 12   AM (midnight): 70 - 240 Carelink / Diasend username:  [email or web address] Carelink / [employer] Password:  [email or web address]       lisinopril 10 MG tablet    PRINIVIL/ZESTRIL     Take 10 mg by mouth daily       MOTRIN IB PO      Take 400 mg by mouth every 6 hours as needed for moderate pain        MULTI-VITAMIN DAILY PO      Take  by mouth daily.       NEURONTIN 300 MG capsule   Generic drug:  gabapentin      Take 300 mg by mouth 3 times daily       NITROSTAT 0.4 MG sublingual tablet   Generic drug:  nitroglycerin      Place under the tongue every 5 minutes as needed       omega-3 fatty acids 1200 MG capsule      Take 1 capsule by mouth daily.       order for DME     5 each    Equipment being ordered:   1.  Duoderm (4x4 in) Apply to affected area, change as needed Disp: 5 Refill: 5       simvastatin 40 MG tablet    ZOCOR     Take by mouth At Bedtime       URINE GLUCOSE-KETONES TEST VI

## 2017-03-14 NOTE — PATIENT INSTRUCTIONS
Continue working on healthy eating and exercising (start low and slow, work up to 30 min, 5x/week)    BG goals:  Fasting and before meals <130, >80  2 hour after eating <180    We only need 1/2 of these numbers to be within target then your A1c will be within target    Medication changes   Watch for continued lows    Please stop adding extra carbs to correct a high BGs.  Try using your temp basal (increase it to 150-200%, 2-3 hours)    Also, keep a food journal to see if there's any patterns with food and BGs.      Try adding handful of almonds when you eat your cereal or poptart      Follow up   Call me in one week.      Call me sooner if any problems/concerns and/or questions develop including consistent low BGs <70 or consistent high BGs >200  [phone] (Unit Coordinator)    [phone] (Nurse)  [phone] (Macrina's cell)    Wound care (if needed)  Wash affected areas with mild soap and water, dry.    Cut to fit the duoderm and place on affected areas  You can leave it there for up to one week.  However, it might need to be changed after showering.    Please call if you need a prescription for duoderm.      Apply cream to calloused areas on right foot

## 2017-03-14 NOTE — PROGRESS NOTES
"SUBJECTIVE:  Richard Harvey, 50 year old, male presents with the following Chief Complaint(s) with HPI to follow:  Chief Complaint   Patient presents with     Diabetes        Diabetes Follow-up      Patient is checking blood sugars: 4.9x/day.  Results:  Overnight: 59,   Breakfast:   Mid-morning: no checks  Lunch: 146-292  Mid-afternoon: 49, 55, 58, 58,   Dinner:   Bedtime: 60,       Symptoms of hypoglycemia (low blood sugar): yes, history of hypoglycemia unawareness    Paresthesias (numbness or burning in feet) or sores: Yes; [facility] at [facility]--got a new brace in Nov/Dec.; callous on right; nothing that's open or weeping    Diabetic eye exam within the last year: Yes (june/july)    Breakfast eaten regularly: Yes    Patient counting carbs: trying       HPI: Richard's here today for the follow up regarding his Diabetes mellitus, Type 1.    Lab Results   Component Value Date    A1C 6.8 03/13/2017    A1C 7.1 11/09/2016    A1C 7.6 08/10/2016    A1C 7.2 02/02/2016    A1C 7.3 09/28/2015     Current Diabetes medication:   1.  Insulin pump, uses Novolog; has a sensor, but doesn't wear it  ASA use: yes, 325 mg daily  Statin use: yes, simvastatin 40 mg at bedime  Denies any issues with the insulin pump.      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      Through conversations, it was discovered that Richard will add more carbs to his boluses to \"help\" bring his BGs within target.  This is probably why he's having these unexplained low BGs.       Denies any issues with his right foot after his brace was adjusted.            Patient Active Problem List   Diagnosis     DM type 1 (diabetes mellitus, type 1) (H)     HTN (hypertension)     Hyperlipidemia     ACP (advance care planning)       No past medical history on file.    No past surgical history on file.    No family history on file.    Social History   Substance Use Topics     Smoking " status: Never Smoker     Smokeless tobacco: Never Used     Alcohol use No       Current Outpatient Prescriptions   Medication Sig Dispense Refill     order for DME Equipment being ordered:     1.  Duoderm (4x4 in)  Apply to affected area, change as needed  Disp: 5  Refill: 5 5 each 5     blood glucose monitoring (MELISSA CONTOUR NEXT) test strip Use to test blood sugar 6 times daily or as directed. 200 each 11     URINE GLUCOSE-KETONES TEST VI        acetone, Urine, test STRP Use as directed 50 each 3     MOTRIN IB PO Take 400 mg by mouth every 6 hours as needed for moderate pain       INSULIN PUMP - OUTPATIENT Date last updated:  6/23/15  [employer]: Model 751  BASAL RATES and times:  12   AM (midnight): 1.25 units/hour    2     AM: 1.3 units/hour   6     AM: 0.95 units/hour   6    PM: 1.2 units/hour   Basal Pattern A:  Richard Harvey will use when N/A.  CARB RATIO and times:  12   AM (midnight): 12  1    PM:  10  6    PM:    9  Corection Factor (Sensitivity) and times:  12   AM (midnight): 40 mg/dL  6     PM: 45 mg/dL  BLOOD GLUCOSE TARGET and times:  12   AM (midnight): 100 - 140  9     AM:  100 - 120  10   PM:  100 - 140  Active Insulin Time:  4 hours  Sensor:  Yes: 12   AM (midnight): 70 - 240  Carelink / Diasend username:  [email or web address]  Carelink / Diasend Password:  [email or web address]       aspirin 325 MG tablet Take by mouth daily       gabapentin (NEURONTIN) 300 MG capsule Take 300 mg by mouth 3 times daily       glucagon 1 MG injection 1 mg once       lisinopril (PRINIVIL,ZESTRIL) 10 MG tablet Take 10 mg by mouth daily       nitroglycerin (NITROSTAT) 0.4 MG SL tablet Place under the tongue every 5 minutes as needed       simvastatin (ZOCOR) 40 MG tablet Take by mouth At Bedtime       CALCIUM-VITAMIN D PO Take  by mouth daily. 600 mg       omega-3 fatty acids (FISH OIL) 1200 MG capsule Take 1 capsule by mouth daily.       Glucosamine-Chondroit-Vit C-Mn (GLUCOSAMINE-CHONDROITIN MAX ST PO) Take  by mouth daily.    "    Multiple Vitamin (MULTI-VITAMIN DAILY PO) Take  by mouth daily.       insulin aspart (NovoLOG) inject - to fill ambulatory pump See Admin Instructions. For use with insulin pump         No Known Allergies    REVIEW OF SYSTEMS  Skin: negative  Eyes: negative  Ears/Nose/Throat: negative  Respiratory: No shortness of breath, dyspnea on exertion, cough, or hemoptysis  Cardiovascular: negative  Gastrointestinal: negative  Genitourinary: negative  Musculoskeletal: positive for arthritis (thumbs)--\"today's a good day\"  Neurologic: positive for numbness or tingling of hands (right hand--carpel tunnel surgery; left hand: carpel tunnel) and numbness or tingling of feet--same  Psychiatric: negative  Hematologic/Lymphatic/Immunologic: negative  Endocrine: positive for diabetes    OBJECTIVE:  /60  Pulse 86  Ht 5' 2\" (1.575 m)  Wt 201 lb 4.8 oz (91.3 kg)  BMI 36.82 kg/m2  Constitutional: healthy, alert and no distress  Neck: neck supple, no lymphadenopathy, trachea midline, thyroid symmetrical with out nodules noted. Carotid arteries without bruit  Cardiovascular: RRR. No murmurs, clicks gallops or rub  Respiratory: Good diaphragmatic excursion. Lungs clear  Gastrointestinal: Abdomen soft, non-tender. BS normal.   Psychiatric: mentation appears normal and affect normal/bright  Hematologic/Lymphatic/Immunologic: normal ant/post cervical nodes      ASSESSMENT / PLAN:  (E10.65) Type 1 diabetes mellitus with hyperglycemia (H)  (primary encounter diagnosis)  Comment:   As mentioned above, Richard will add more carbs to his boluses to help \"correct\" his BGs.      Insulin pump information:   Average: 162 +/- 81  Basal/bolus ratio: 26.2 (44%)/33.5 (56%)   Testin.9x/day    Hypoglycemia: 6 (9%)    Plan:   Had a long discussion about the hidden dangers of him \"adding more carbs\".    Highly encouraged him to use the insulin pump on how it was designed.      Adjustments:  none    Patient Instructions   Continue working on healthy " eating and exercising (start low and slow, work up to 30 min, 5x/week)    BG goals:  Fasting and before meals <130, >80  2 hour after eating <180    We only need 1/2 of these numbers to be within target then your A1c will be within target    Medication changes   Watch for continued lows    Please stop adding extra carbs to correct a high BGs.  Try using your temp basal (increase it to 150-200%, 2-3 hours)    Also, keep a food journal to see if there's any patterns with food and BGs.      Try adding handful of almonds when you eat your cereal or poptart      Follow up   Call me in one week.      Call me sooner if any problems/concerns and/or questions develop including consistent low BGs <70 or consistent high BGs >200  [phone] (Unit Coordinator)    [phone] (Nurse)  [phone] (Macrina's cell)    Wound care (if needed)  Wash affected areas with mild soap and water, dry.    Cut to fit the duoderm and place on affected areas  You can leave it there for up to one week.  However, it might need to be changed after showering.    Please call if you need a prescription for duoderm.      Apply cream to calloused areas on right foot        Time: 40 min  Barrier: none  Willingness to learn: accepting    Macrina Dixon RN [facility]  Disease Management    Cc: Dr. ALEJANDRA Obando    With the electronic record, we can now more quickly and easily track our patient diabetic goals. Our diabetes clinical review is in progress and these are the indicators we are monitoring for good diabetes health.     1.) HbA1C less than 7 (measurement of your average blood sugars)  2.) Blood Pressure less than 140/80  3.) LDL less than 100 (bad cholesterol)  4.) HbA1C is checked in the last 6 months and below 7% (more frequently if not at goal or adjusting medications)  5.) LDL is checked in the last 12 months (more frequently if not at goal or adjusting medications)  6.) Taking one baby aspirin daily (unless otherwise instructed)  7.) No tobacco  "use  8) Statin use     You have achieved 7 out of 8 of these and I am encouraging you to come in and get tuned up to achieve 8 out of 8!  Here is what you have achieved so far in my goals for you:  1.) HbA1C  less than 7:                              YES  Your last  HbA1C :   Lab Results   Component Value Date    A1C 6.8 03/13/2017    A1C 7.1 11/09/2016    A1C 7.6 08/10/2016    A1C 7.2 02/02/2016    A1C 7.3 09/28/2015       2.) Blood Pressure less than 140/80:       YES      Your last    BP Readings from Last 1 Encounters:       /60  Pulse 86  Ht 5' 2\" (1.575 m)  Wt 201 lb 4.8 oz (91.3 kg)  BMI 36.82 kg/m2  3.) LDL less than 100:                              YES      Your last   LDL         LDL Cholesterol Calculated   Date Value Ref Range Status   08/10/2016 59 mg/dL Final   ]  4.) Checked HbA1C in the past 6 months: YES      5.) Checked LDL in the past 12 months:    YES      6.) Taking one aspirin daily:                       YES     7.) No tobacco use:                                        YES      8.) Statin use      YES       "

## 2017-04-04 ENCOUNTER — TELEPHONE (OUTPATIENT)
Dept: SURGERY | Facility: OTHER | Age: 52
End: 2017-04-04

## 2017-04-04 NOTE — TELEPHONE ENCOUNTER
Patient has not returned any phone calls to schedule consult appointment with General surgery. Referral sent to scanning

## 2017-04-11 ENCOUNTER — OFFICE VISIT (OUTPATIENT)
Dept: WOUND CARE | Facility: OTHER | Age: 52
End: 2017-04-11
Attending: NURSE PRACTITIONER
Payer: MEDICAID

## 2017-04-11 VITALS
SYSTOLIC BLOOD PRESSURE: 107 MMHG | HEART RATE: 75 BPM | OXYGEN SATURATION: 93 % | BODY MASS INDEX: 37.34 KG/M2 | DIASTOLIC BLOOD PRESSURE: 57 MMHG | WEIGHT: 202.9 LBS | HEIGHT: 62 IN

## 2017-04-11 DIAGNOSIS — E10.65 TYPE 1 DIABETES MELLITUS WITH HYPERGLYCEMIA (H): Primary | ICD-10-CM

## 2017-04-11 PROCEDURE — 99213 OFFICE O/P EST LOW 20 MIN: CPT | Performed by: NURSE PRACTITIONER

## 2017-04-11 PROCEDURE — 99212 OFFICE O/P EST SF 10 MIN: CPT | Performed by: NURSE PRACTITIONER

## 2017-04-11 ASSESSMENT — PAIN SCALES - GENERAL: PAINLEVEL: NO PAIN (0)

## 2017-04-11 NOTE — MR AVS SNAPSHOT
After Visit Summary   4/11/2017    Richard Harvey    MRN: [id number]           Patient Information     Date Of Birth          1965        Visit Information        Provider Department      4/11/2017 4:00 PM Macrina Dixon NP [facility]        Today's Diagnoses     Type 1 diabetes mellitus with hyperglycemia (H)    -  1      Care Instructions    Continue working on healthy eating and exercising (start low and slow, work up to 30 min, 5x/week)    BG goals:  Fasting and before meals <130, >80  2 hour after eating <180    We only need 1/2 of these numbers to be within target then your A1c will be within target    Medication changes   Watch for continued lows    Keep eating almonds when you eat your cereal or poptart    Follow up   1 month    Call me sooner if any problems/concerns and/or questions develop including consistent low BGs <70 or consistent high BGs >200  [phone] (Unit Coordinator)    [phone] (Nurse)  [phone] (Macrina's cell)            Follow-ups after your visit        Who to contact     If you have questions or need follow up information about today's clinic visit or your schedule please contact [facility] directly at [phone].  Normal or non-critical lab and imaging results will be communicated to you by MyChart, letter or phone within 4 business days after the clinic has received the results. If you do not hear from us within 7 days, please contact the clinic through MyChart or phone. If you have a critical or abnormal lab result, we will notify you by phone as soon as possible.  Submit refill requests through [employer] or call your pharmacy and they will forward the refill request to us. Please allow 3 business days for your refill to be completed.          Additional Information About Your Visit        [facility] Information     [employer] lets you send messages to your doctor, view your test results, renew your prescriptions, schedule appointments  "and more. To sign up, go to [email or web address] . Click on \"Log in\" on the left side of the screen, which will take you to the Welcome page. Then click on \"Sign up Now\" on the right side of the page.     You will be asked to enter the access code listed below, as well as some personal information. Please follow the directions to create your username and password.     Your access code is: [email or web address]  Expires: 2017 11:48 AM     Your access code will  in 90 days. If you need help or a new code, please call your [place] clinic or [phone].        Care EveryWhere ID     This is your Care EveryWhere ID. This could be used by other organizations to access your [place] medical records  [id number]        Your Vitals Were     Pulse Height Pulse Oximetry BMI (Body Mass Index)          75 5' 2\" (1.575 m) 93% 37.11 kg/m2         Blood Pressure from Last 3 Encounters:   17 107/57   17 115/60   17 112/58    Weight from Last 3 Encounters:   17 202 lb 14.4 oz (92 kg)   17 201 lb 4.8 oz (91.3 kg)   17 201 lb 8 oz (91.4 kg)              Today, you had the following     No orders found for display       Primary Care Provider Office Phone # Fax #    Tony Obando [phone]       [place] [address] [place] [address]        Thank you!     Thank you for choosing [facility]  for your care. Our goal is always to provide you with excellent care. Hearing back from our patients is one way we can continue to improve our services. Please take a few minutes to complete the written survey that you may receive in the mail after your visit with us. Thank you!             Your Updated Medication List - Protect others around you: Learn how to safely use, store and throw away your medicines at www.disposemymeds.org.          This list is accurate as of: 17  4:33 PM.  Always use your most recent med list.                   Brand Name " Dispense Instructions for use    acetone (Urine) test Strp     50 each    Use as directed       aspirin 325 MG tablet      Take by mouth daily       blood glucose monitoring test strip    MELISSA CONTOUR NEXT    200 each    Use to test blood sugar 6 times daily or as directed.       CALCIUM-VITAMIN D PO      Take  by mouth daily. 600 mg       glucagon 1 MG kit      1 mg once       GLUCOSAMINE-CHONDROITIN MAX ST PO      Take  by mouth daily.       insulin aspart 100 UNIT/ML injection    NovoLOG     See Admin Instructions. For use with insulin pump       insulin pump infusion      Date last updated:  6/23/15 [employer]: Model 751 BASAL RATES and times: 12   AM (midnight): 1.25 units/hour   2     AM: 1.3 units/hour  6     AM: 0.95 units/hour  6    PM: 1.2 units/hour  Basal Pattern A:  Bailey Harvey will use when N/A. CARB RATIO and times: 12   AM (midnight): 12 1    PM:  10 6    PM:    9 Corection Factor (Sensitivity) and times: 12   AM (midnight): 40 mg/dL 6     PM: 45 mg/dL BLOOD GLUCOSE TARGET and times: 12   AM (midnight): 100 - 140 9     AM:  100 - 120 10   PM:  100 - 140 Active Insulin Time:  4 hours Sensor:  Yes: 12   AM (midnight): 70 - 240 Carelink / Diasend username:  [email or web address] Carelink / [employer]end Password:  [email or web address]       lisinopril 10 MG tablet    PRINIVIL/ZESTRIL     Take 10 mg by mouth daily       MOTRIN IB PO      Take 400 mg by mouth every 6 hours as needed for moderate pain       MULTI-VITAMIN DAILY PO      Take  by mouth daily.       NEURONTIN 300 MG capsule   Generic drug:  gabapentin      Take 300 mg by mouth 3 times daily       NITROSTAT 0.4 MG sublingual tablet   Generic drug:  nitroglycerin      Place under the tongue every 5 minutes as needed       omega-3 fatty acids 1200 MG capsule      Take 1 capsule by mouth daily.       order for DME     5 each    Equipment being ordered:   1.  Duoderm (4x4 in) Apply to affected area, change as needed Disp: 5 Refill: 5       simvastatin 40 MG tablet     ZOCOR     Take by mouth At Bedtime       URINE GLUCOSE-KETONES TEST VI

## 2017-04-11 NOTE — PROGRESS NOTES
SUBJECTIVE:  Richard Harvey, 51 year old, male presents with the following Chief Complaint(s) with HPI to follow:  Chief Complaint   Patient presents with     Diabetes     pump adjustment        Diabetes Follow-up      Patient is checking blood sugars: 4.8x/day.  Results:  Overnight:   Breakfast: 65,   Mid-morning: no checks  Lunch: 132-138  Mid-afternoon: 55, 81->400  Dinner: 55, 113-300  Bedtime:       Symptoms of hypoglycemia (low blood sugar): yes, history of hypoglycemia unawareness    Paresthesias (numbness or burning in feet) or sores: Yes (same); no    Diabetic eye exam within the last year: Yes (june/july)    Breakfast eaten regularly: Yes    Patient counting carbs: trying       HPI: Richard's here today for the follow up regarding his Diabetes mellitus, Type 1.    Lab Results   Component Value Date    A1C 6.8 03/13/2017    A1C 7.1 11/09/2016    A1C 7.6 08/10/2016    A1C 7.2 02/02/2016    A1C 7.3 09/28/2015     Current Diabetes medication:   1.  Insulin pump, uses Novolog; has a sensor, but doesn't wear it  ASA use: yes, 325 mg daily  Statin use: yes, simvastatin 40 mg at bedime  Denies any issues with the insulin pump.      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  He's tried various tapes and adhesive solutions.      Richard has been working on not entering more carbs to correct higher BGs.    Noted his highest BG (>400) was caused by a missed bolus. 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      Lower BGs (<70) are due to being at work and meals getting delayed.      Patient Active Problem List   Diagnosis     DM type 1 (diabetes mellitus, type 1) (H)     HTN (hypertension)     Hyperlipidemia     ACP (advance care planning)       History reviewed. No pertinent past medical history.    History reviewed. No pertinent surgical history.    History reviewed. No  pertinent family history.    Social History   Substance Use Topics     Smoking status: Never Smoker     Smokeless tobacco: Never Used     Alcohol use No       Current Outpatient Prescriptions   Medication Sig Dispense Refill     order for DME Equipment being ordered:     1.  Duoderm (4x4 in)  Apply to affected area, change as needed  Disp: 5  Refill: 5 5 each 5     blood glucose monitoring (MELISSA CONTOUR NEXT) test strip Use to test blood sugar 6 times daily or as directed. 200 each 11     URINE GLUCOSE-KETONES TEST VI        acetone, Urine, test STRP Use as directed 50 each 3     MOTRIN IB PO Take 400 mg by mouth every 6 hours as needed for moderate pain       INSULIN PUMP - OUTPATIENT Date last updated:  6/23/15  [email or web address] Minimed: Model 751  BASAL RATES and times:  12   AM (midnight): 1.25 units/hour    2     AM: 1.3 units/hour   6     AM: 0.95 units/hour   6    PM: 1.2 units/hour   Basal Pattern A:  Richard Harvey will use when N/A.  CARB RATIO and times:  12   AM (midnight): 12  1    PM:  10  6    PM:    9  Corection Factor (Sensitivity) and times:  12   AM (midnight): 40 mg/dL  6     PM: 45 mg/dL  BLOOD GLUCOSE TARGET and times:  12   AM (midnight): 100 - 140  9     AM:  100 - 120  10   PM:  100 - 140  Active Insulin Time:  4 hours  Sensor:  Yes: 12   AM (midnight): 70 - 240  Carelink / Diasend username:  [email or web address]  Carelink / Diasend Password:  [email or web address]       aspirin 325 MG tablet Take by mouth daily       gabapentin (NEURONTIN) 300 MG capsule Take 300 mg by mouth 3 times daily       glucagon 1 MG injection 1 mg once       lisinopril (PRINIVIL,ZESTRIL) 10 MG tablet Take 10 mg by mouth daily       nitroglycerin (NITROSTAT) 0.4 MG SL tablet Place under the tongue every 5 minutes as needed       simvastatin (ZOCOR) 40 MG tablet Take by mouth At Bedtime       CALCIUM-VITAMIN D PO Take  by mouth daily. 600 mg       omega-3 fatty acids (FISH OIL) 1200 MG capsule Take 1 capsule by mouth daily.        "Glucosamine-Chondroit-Vit C-Mn (GLUCOSAMINE-CHONDROITIN MAX ST PO) Take  by mouth daily.       Multiple Vitamin (MULTI-VITAMIN DAILY PO) Take  by mouth daily.       insulin aspart (NovoLOG) inject - to fill ambulatory pump See Admin Instructions. For use with insulin pump         No Known Allergies    REVIEW OF SYSTEMS  Skin: negative  Eyes: negative  Ears/Nose/Throat: negative  Respiratory: No shortness of breath, dyspnea on exertion, cough, or hemoptysis  Cardiovascular: negative  Gastrointestinal: negative  Genitourinary: negative  Musculoskeletal: positive for arthritis (thumbs)--\"worse with cold and damp\"  Neurologic: positive for numbness or tingling of hands (right hand--carpel tunnel surgery; left hand: carpel tunnel) and numbness or tingling of feet--same  Psychiatric: negative  Hematologic/Lymphatic/Immunologic: negative  Endocrine: positive for diabetes    OBJECTIVE:  /57 (BP Location: Left arm, Patient Position: Chair, Cuff Size: Adult Large)  Pulse 75  Ht 5' 2\" (1.575 m)  Wt 202 lb 14.4 oz (92 kg)  SpO2 93%  BMI 37.11 kg/m2  Constitutional: healthy, alert and no distress  Neck: neck supple, no lymphadenopathy, trachea midline, thyroid symmetrical with out nodules noted. Carotid arteries without bruit  Cardiovascular: RRR. No murmurs, clicks gallops or rub  Respiratory: Good diaphragmatic excursion. Lungs clear  Gastrointestinal: Abdomen soft, non-tender. BS normal.   Psychiatric: mentation appears normal and affect normal/bright  Hematologic/Lymphatic/Immunologic: normal ant/post cervical nodes      ASSESSMENT / PLAN:  (E10.65) Type 1 diabetes mellitus with hyperglycemia (H)  (primary encounter diagnosis)  Comment:   Insulin pump information:   Average: 156 +/- 78  Basal/bolus ratio: 26.3 (46%)/31.1 (54%)   Testin.8x/day    Hypoglycemia: 3 (5%)    Plan:   Discussed ways to prevent low BGs during increased activity:  1.  Eat a piece of fruit (without entering a bolus for it) prior to the " activity OR  2.  Only bolus for 1/2 the amount of carbs the meal before the activity OR  3.  Set a temp basal (decrease it)    Adjustments:  none    Patient Instructions   Continue working on healthy eating and exercising (start low and slow, work up to 30 min, 5x/week)    BG goals:  Fasting and before meals <130, >80  2 hour after eating <180    We only need 1/2 of these numbers to be within target then your A1c will be within target    Medication changes   Watch for continued lows    Keep eating almonds when you eat your cereal or poptart    Follow up   1 month    Call me sooner if any problems/concerns and/or questions develop including consistent low BGs <70 or consistent high BGs >200  [phone] (Unit Coordinator)    [phone] (Nurse)  [phone] (Macrina's cell)          Time: 40 min  Barrier: none  Willingness to learn: accepting    Macrina Dixon RN FN-BC  Disease Management    Cc: Dr. ALEJANDRA Obando    With the electronic record, we can now more quickly and easily track our patient diabetic goals. Our diabetes clinical review is in progress and these are the indicators we are monitoring for good diabetes health.     1.) HbA1C less than 7 (measurement of your average blood sugars)  2.) Blood Pressure less than 140/80  3.) LDL less than 100 (bad cholesterol)  4.) HbA1C is checked in the last 6 months and below 7% (more frequently if not at goal or adjusting medications)  5.) LDL is checked in the last 12 months (more frequently if not at goal or adjusting medications)  6.) Taking one baby aspirin daily (unless otherwise instructed)  7.) No tobacco use  8) Statin use     You have achieved 8 out of 8 of these and I am encouraging you to come in and get tuned up to achieve 8 out of 8!  Here is what you have achieved so far in my goals for you:  1.) HbA1C  less than 7:                              YES  Your last  HbA1C :   Lab Results   2Component Value Date    A1C 6.8 03/13/2017    A1C 7.1 11/09/2016    A1C  "7.6 08/10/2016    A1C 7.2 02/02/2016    A1C 7.3 09/28/2015       2.) Blood Pressure less than 140/80:       YES      Your last    BP Readings from Last 1 Encounters:       /57 (BP Location: Left arm, Patient Position: Chair, Cuff Size: Adult Large)  Pulse 75  Ht 5' 2\" (1.575 m)  Wt 202 lb 14.4 oz (92 kg)  SpO2 93%  BMI 37.11 kg/m2  3.) LDL less than 100:                              YES      Your last   LDL         LDL Cholesterol Calculated   Date Value Ref Range Status   08/10/2016 59 mg/dL Final   ]  4.) Checked HbA1C in the past 6 months: YES      5.) Checked LDL in the past 12 months:    YES      6.) Taking one aspirin daily:                       YES     7.) No tobacco use:                                        YES      8.) Statin use      YES       "

## 2017-04-11 NOTE — PATIENT INSTRUCTIONS
Continue working on healthy eating and exercising (start low and slow, work up to 30 min, 5x/week)    BG goals:  Fasting and before meals <130, >80  2 hour after eating <180    We only need 1/2 of these numbers to be within target then your A1c will be within target    Medication changes   Watch for continued lows    Keep eating almonds when you eat your cereal or poptart    Follow up   1 month    Call me sooner if any problems/concerns and/or questions develop including consistent low BGs <70 or consistent high BGs >200  [phone] (Unit Coordinator)    [phone] (Nurse)  [phone] (Macrina's cell)

## 2017-04-11 NOTE — PROGRESS NOTES
"Chief Complaint   Patient presents with     Diabetes     pump adjustment       Initial /57 (BP Location: Left arm, Patient Position: Chair, Cuff Size: Adult Large)  Pulse 75  Ht 5' 2\" (1.575 m)  Wt 202 lb 14.4 oz (92 kg)  SpO2 93%  BMI 37.11 kg/m2 Estimated body mass index is 37.11 kg/(m^2) as calculated from the following:    Height as of this encounter: 5' 2\" (1.575 m).    Weight as of this encounter: 202 lb 14.4 oz (92 kg).  Medication Reconciliation: complete   Janiya Rocha      "

## 2017-06-29 ENCOUNTER — TRANSFERRED RECORDS (OUTPATIENT)
Dept: HEALTH INFORMATION MANAGEMENT | Facility: HOSPITAL | Age: 52
End: 2017-06-29

## 2017-07-17 LAB
ALBUMIN URINE MG/G CR: 0.5 MG/G CREATININE
ALBUMIN URINE MG/SPEC: NORMAL
CREATININE URINE: NORMAL
HBA1C MFR BLD: 7.4 % (ref 0–5.7)

## 2017-08-10 LAB — LDLC SERPL CALC-MCNC: 59 MG/DL

## 2017-10-06 ENCOUNTER — OFFICE VISIT (OUTPATIENT)
Dept: WOUND CARE | Facility: OTHER | Age: 52
End: 2017-10-06
Attending: NURSE PRACTITIONER
Payer: COMMERCIAL

## 2017-10-06 VITALS
DIASTOLIC BLOOD PRESSURE: 66 MMHG | HEART RATE: 91 BPM | SYSTOLIC BLOOD PRESSURE: 111 MMHG | OXYGEN SATURATION: 92 % | HEIGHT: 62 IN | BODY MASS INDEX: 38.39 KG/M2 | WEIGHT: 208.6 LBS

## 2017-10-06 DIAGNOSIS — E10.65 TYPE 1 DIABETES MELLITUS WITH HYPERGLYCEMIA (H): Primary | ICD-10-CM

## 2017-10-06 PROCEDURE — 99213 OFFICE O/P EST LOW 20 MIN: CPT | Performed by: NURSE PRACTITIONER

## 2017-10-06 PROCEDURE — 95250 CONT GLUC MNTR PHYS/QHP EQP: CPT | Performed by: NURSE PRACTITIONER

## 2017-10-06 PROCEDURE — 99212 OFFICE O/P EST SF 10 MIN: CPT

## 2017-10-06 ASSESSMENT — PAIN SCALES - GENERAL: PAINLEVEL: NO PAIN (0)

## 2017-10-06 NOTE — PATIENT INSTRUCTIONS
Continue working on healthy eating and moving as best as you can (start low and slow, work up to 30 min, 5x/week)    BG goals:  Fasting and before meals <130, >80  2 hour after eating <180    We only need 1/2 of these numbers to be within target then your A1c will be within target    Medication changes   Watch for continued lows    Follow up   CGM study    Call me sooner if any problems/concerns and/or questions develop including consistent low BGs <70 or consistent high BGs >200  [phone] (Unit Coordinator)    [phone] (Nurse)  [phone] (Macrina's cell)

## 2017-10-06 NOTE — NURSING NOTE
"Chief Complaint   Patient presents with     Diabetes     Glucose Review       Initial /66 (BP Location: Left arm, Patient Position: Chair, Cuff Size: Adult Large)  Pulse 91  Ht 5' 2\" (1.575 m)  Wt 208 lb 9.6 oz (94.6 kg)  SpO2 92%  BMI 38.15 kg/m2 Estimated body mass index is 38.15 kg/(m^2) as calculated from the following:    Height as of this encounter: 5' 2\" (1.575 m).    Weight as of this encounter: 208 lb 9.6 oz (94.6 kg).  Medication Reconciliation: complete   Bessy Godoy LPN    "

## 2017-10-06 NOTE — MR AVS SNAPSHOT
"              After Visit Summary   10/6/2017    Richard Harvey    MRN: [id number]           Patient Information     Date Of Birth          1965        Visit Information        Provider Department      10/6/2017 3:00 PM Macrina Dixon NP [facility]        Care Instructions    Continue working on healthy eating and moving as best as you can (start low and slow, work up to 30 min, 5x/week)    BG goals:  Fasting and before meals <130, >80  2 hour after eating <180    We only need 1/2 of these numbers to be within target then your A1c will be within target    Medication changes   Watch for continued lows    Follow up   CGM study    Call me sooner if any problems/concerns and/or questions develop including consistent low BGs <70 or consistent high BGs >200  [phone] (Unit Coordinator)    [phone] (Nurse)  [phone] (Macrina's cell)              Follow-ups after your visit        Who to contact     If you have questions or need follow up information about today's clinic visit or your schedule please contact [facility] directly at [phone].  Normal or non-critical lab and imaging results will be communicated to you by [employer], letter or phone within 4 business days after the clinic has received the results. If you do not hear from us within 7 days, please contact the clinic through [employer] or phone. If you have a critical or abnormal lab result, we will notify you by phone as soon as possible.  Submit refill requests through [employer] or call your pharmacy and they will forward the refill request to us. Please allow 3 business days for your refill to be completed.          Additional Information About Your Visit        Sterio[employer]mehart Information     [employer] lets you send messages to your doctor, view your test results, renew your prescriptions, schedule appointments and more. To sign up, go to [email or web address] . Click on \"Log in\" on the left side of the screen, which will take " "you to the Welcome page. Then click on \"Sign up Now\" on the right side of the page.     You will be asked to enter the access code listed below, as well as some personal information. Please follow the directions to create your username and password.     Your access code is: [email or web address]  Expires: 2018  3:40 PM     Your access code will  in 90 days. If you need help or a new code, please call your [facility] clinic or [phone].        Care EveryWhere ID     This is your Care EveryWhere ID. This could be used by other organizations to access your [facility] medical records  [id number]        Your Vitals Were     Pulse Height Pulse Oximetry BMI (Body Mass Index)          91 5' 2\" (1.575 m) 92% 38.15 kg/m2         Blood Pressure from Last 3 Encounters:   10/06/17 111/66   17 107/57   17 115/60    Weight from Last 3 Encounters:   10/06/17 208 lb 9.6 oz (94.6 kg)   17 202 lb 14.4 oz (92 kg)   17 201 lb 4.8 oz (91.3 kg)              Today, you had the following     No orders found for display       Primary Care Provider Office Phone # Fax #    Tony Obando [phone]       [facility] [address] [facility] [address]        Equal Access to Services     [facility] AH: Hadii aad ku hadasho Soomaali, waaxda luqadaha, qaybta kaalmada adeegyada, sary hermosillo hayadriel rosario . So [facility] [phone].    ATENCIÓN: Si habla español, tiene a pendleton disposición servicios gratuitos de asistencia lingüística. Llame al [phone].    We comply with applicable federal civil rights laws and [place] laws. We do not discriminate on the basis of race, color, national origin, age, disability, sex, sexual orientation, or gender identity.            Thank you!     Thank you for choosing [facility]  for your care. Our goal is always to provide you with excellent care. Hearing back from our patients is one way we can continue to improve our services. Please " take a few minutes to complete the written survey that you may receive in the mail after your visit with us. Thank you!             Your Updated Medication List - Protect others around you: Learn how to safely use, store and throw away your medicines at www.disposemymeds.org.          This list is accurate as of: 10/6/17  3:40 PM.  Always use your most recent med list.                   Brand Name Dispense Instructions for use Diagnosis    acetone (Urine) test Strp     50 each    Use as directed    Type 1 diabetes mellitus with hyperglycemia (H)       aspirin 325 MG tablet      Take by mouth daily        blood glucose monitoring test strip    MELISSA CONTOUR NEXT    200 each    Use to test blood sugar 6 times daily or as directed.    Type 1 diabetes mellitus with hyperglycemia (H)       CALCIUM-VITAMIN D PO      Take  by mouth daily. 600 mg        glucagon 1 MG kit      1 mg once        GLUCOSAMINE-CHONDROITIN MAX ST PO      Take  by mouth daily.        insulin aspart 100 UNIT/ML injection    NovoLOG     See Admin Instructions. For use with insulin pump        insulin pump infusion      Date last updated:  6/23/15 [employer]: Model 751 BASAL RATES and times: 12   AM (midnight): 1.25 units/hour   2     AM: 1.3 units/hour  6     AM: 0.95 units/hour  6    PM: 1.2 units/hour  Basal Pattern A:  Richard Harvey will use when N/A. CARB RATIO and times: 12   AM (midnight): 12 1    PM:  10 6    PM:    9 Corection Factor (Sensitivity) and times: 12   AM (midnight): 40 mg/dL 6     PM: 45 mg/dL BLOOD GLUCOSE TARGET and times: 12   AM (midnight): 100 - 140 9     AM:  100 - 120 10   PM:  100 - 140 Active Insulin Time:  4 hours Sensor:  Yes: 12   AM (midnight): 70 - 240 Carelink / Diasend username:  [email or web address] Carelink / Diasend Password:  [email or web address]        lisinopril 10 MG tablet    PRINIVIL/ZESTRIL     Take 10 mg by mouth daily        MOTRIN IB PO      Take 400 mg by mouth every 6 hours as needed for moderate pain         MULTI-VITAMIN DAILY PO      Take  by mouth daily.        NEURONTIN 300 MG capsule   Generic drug:  gabapentin      Take 300 mg by mouth 3 times daily        NITROSTAT 0.4 MG sublingual tablet   Generic drug:  nitroGLYcerin      Place under the tongue every 5 minutes as needed        omega-3 fatty acids 1200 MG capsule      Take 1 capsule by mouth daily.        order for DME     5 each    Equipment being ordered:   1.  Duoderm (4x4 in) Apply to affected area, change as needed Disp: 5 Refill: 5    Blister (nonthermal), right foot, initial encounter       simvastatin 40 MG tablet    ZOCOR     Take by mouth At Bedtime        URINE GLUCOSE-KETONES TEST VI

## 2017-10-06 NOTE — PROGRESS NOTES
SUBJECTIVE:  Richard Harvey, 52 year old, male presents with the following Chief Complaint(s) with HPI to follow:  Chief Complaint   Patient presents with     Diabetes     Glucose Review        Diabetes Follow-up      Patient is checking blood sugars: 4.5x/day.  Results:  Overnight: 114-213  Breakfast: 45, 56,   Mid-mornin  Lunch: 68, 77->400  Mid-afternoon: 121 and 340  Dinner:   Bedtime: 135-335      Symptoms of hypoglycemia (low blood sugar): yes, history of hypoglycemia unawareness    Paresthesias (numbness or burning in feet) or sores: Yes (same); no    Diabetic eye exam within the last year: Yes ()    Breakfast eaten regularly: Yes    Patient counting carbs: trying       HPI: Richard's here today for the follow up regarding his Diabetes mellitus, Type 1.    Lab Results   Component Value Date    A1C 7.4 2017    A1C 6.8 2017    A1C 7.1 2016    A1C 7.6 08/10/2016    A1C 7.6 08/10/2016     Current Diabetes medication:   1.  Insulin pump, uses Novolog; has a sensor, but doesn't wear it  ASA use: yes, 325 mg daily  Statin use: yes, simvastatin 40 mg at bedime  Denies any issues with the insulin pump.      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  He's tried various tapes and adhesive solutions without much help.      Continue to enter carbs BEFORE he consumes them.       Denies any complaints    Patient Active Problem List   Diagnosis     DM type 1 (diabetes mellitus, type 1) (H)     HTN (hypertension)     Hyperlipidemia     ACP (advance care planning)       History reviewed. No pertinent past medical history.    History reviewed. No pertinent surgical history.    History reviewed. No pertinent family history.    Social History   Substance Use Topics     Smoking status: Never Smoker     Smokeless tobacco: Never Used     Alcohol use No       Current Outpatient  Prescriptions   Medication Sig Dispense Refill     order for DME Equipment being ordered:     1.  Duoderm (4x4 in)  Apply to affected area, change as needed  Disp: 5  Refill: 5 5 each 5     URINE GLUCOSE-KETONES TEST VI        acetone, Urine, test STRP Use as directed 50 each 3     MOTRIN IB PO Take 400 mg by mouth every 6 hours as needed for moderate pain       INSULIN PUMP - OUTPATIENT Date last updated:  6/23/15  Medtronic Minimed: Model 751  BASAL RATES and times:  12   AM (midnight): 1.25 units/hour    2     AM: 1.3 units/hour   6     AM: 0.95 units/hour   6    PM: 1.2 units/hour   Basal Pattern A:  Richard FLAHERTY Keanualie will use when N/A.  CARB RATIO and times:  12   AM (midnight): 12  1    PM:  10  6    PM:    9  Corection Factor (Sensitivity) and times:  12   AM (midnight): 40 mg/dL  6     PM: 45 mg/dL  BLOOD GLUCOSE TARGET and times:  12   AM (midnight): 100 - 140  9     AM:  100 - 120  10   PM:  100 - 140  Active Insulin Time:  4 hours  Sensor:  Yes: 12   AM (midnight): 70 - 240  Carelink / Diasend username:  [email or web address]  Carelink / Diasend Password:  [email or web address]       aspirin 325 MG tablet Take by mouth daily       gabapentin (NEURONTIN) 300 MG capsule Take 300 mg by mouth 3 times daily       glucagon 1 MG injection 1 mg once       lisinopril (PRINIVIL,ZESTRIL) 10 MG tablet Take 10 mg by mouth daily       nitroglycerin (NITROSTAT) 0.4 MG SL tablet Place under the tongue every 5 minutes as needed       simvastatin (ZOCOR) 40 MG tablet Take by mouth At Bedtime       CALCIUM-VITAMIN D PO Take  by mouth daily. 600 mg       omega-3 fatty acids (FISH OIL) 1200 MG capsule Take 1 capsule by mouth daily.       Glucosamine-Chondroit-Vit C-Mn (GLUCOSAMINE-CHONDROITIN MAX ST PO) Take  by mouth daily.       Multiple Vitamin (MULTI-VITAMIN DAILY PO) Take  by mouth daily.       insulin aspart (NovoLOG) inject - to fill ambulatory pump See Admin Instructions. For use with insulin pump       blood glucose monitoring (REH CONTOUR  "NEXT) test strip Use to test blood sugar 6 times daily or as directed. 200 each 11       No Known Allergies    REVIEW OF SYSTEMS  Skin: negative  Eyes: negative  Ears/Nose/Throat: negative  Respiratory: No shortness of breath, dyspnea on exertion, cough, or hemoptysis  Cardiovascular: negative  Gastrointestinal: negative  Genitourinary: negative  Musculoskeletal: positive for arthritis (thumbs)--same  Neurologic: positive for numbness or tingling of hands (right hand--carpel tunnel surgery; left hand: carpel tunnel) and numbness or tingling of feet--same  Psychiatric: negative  Hematologic/Lymphatic/Immunologic: negative  Endocrine: positive for diabetes    OBJECTIVE:  /66 (BP Location: Left arm, Patient Position: Chair, Cuff Size: Adult Large)  Pulse 91  Ht 5' 2\" (1.575 m)  Wt 208 lb 9.6 oz (94.6 kg)  SpO2 92%  BMI 38.15 kg/m2  Constitutional: healthy, alert and no distress  Neck: neck supple, no lymphadenopathy, trachea midline, thyroid symmetrical with out nodules noted. Cardiovascular: RRR. No murmurs, clicks gallops or rub  Respiratory: Good diaphragmatic excursion. Lungs clear  Psychiatric: mentation appears normal and affect normal/bright  Hematologic/Lymphatic/Immunologic: normal ant/post cervical nodes    LAB  Results for orders placed or performed in visit on 10/06/17   Hemoglobin A1c   Result Value Ref Range    Hemoglobin A1C 7.4 4 - 6 %   LDL cholesterol   Result Value Ref Range    LDL Cholesterol Calculated 59 mg/dL       ASSESSMENT / PLAN:  (E10.65) Type 1 diabetes mellitus with hyperglycemia (H)  (primary encounter diagnosis)  Comment:   Insulin pump information:   Average: 168 +/- 85  Basal/bolus ratio: 26.3 (46%)/30.5 (54%)   Testin.5x/day    Hypoglycemia: 3 (5%)    Plan: GLUCOSE MONITORING CONTINUOUS, >=72 HR  Adjustments:  Basal  0200 1.25 (was 1.3)    Please call me if you have continued low BGs.    Consider using the dual wave for meals that contain fat and carbs.    Highly " encouraged a CGM study in the near future.        Patient Instructions   Continue working on healthy eating and moving as best as you can (start low and slow, work up to 30 min, 5x/week)    BG goals:  Fasting and before meals <130, >80  2 hour after eating <180    We only need 1/2 of these numbers to be within target then your A1c will be within target    Medication changes   Watch for continued lows    Follow up   CGM study    Call me sooner if any problems/concerns and/or questions develop including consistent low BGs <70 or consistent high BGs >200  [phone] (Unit Coordinator)    [phone] (Nurse)  [phone] (Macrina's cell)          Time: 40 min  Barrier: none  Willingness to learn: accepting    Macrina Dixon RN FNP-BC  Disease Management    Cc: Dr. ALEJANDRA Obando    With the electronic record, we can now more quickly and easily track our patient diabetic goals. Our diabetes clinical review is in progress and these are the indicators we are monitoring for good diabetes health.     1.) HbA1C less than 7 (measurement of your average blood sugars)  2.) Blood Pressure less than 140/80  3.) LDL less than 100 (bad cholesterol)  4.) HbA1C is checked in the last 6 months and below 7% (more frequently if not at goal or adjusting medications)  5.) LDL is checked in the last 12 months (more frequently if not at goal or adjusting medications)  6.) Taking one baby aspirin daily (unless otherwise instructed)  7.) No tobacco use  8) Statin use     You have achieved 6 out of 8 of these and I am encouraging you to come in and get tuned up to achieve 8 out of 8!  Here is what you have achieved so far in my goals for you:  1.) HbA1C  less than 7:                              NO  Your last  HbA1C :   Lab Results   Component Value Date    A1C 7.4 07/17/2017    A1C 6.8 03/13/2017    A1C 7.1 11/09/2016    A1C 7.6 08/10/2016    A1C 7.6 08/10/2016     2.) Blood Pressure less than 140/80:       YES      Your last    /66 (BP  "Location: Left arm, Patient Position: Chair, Cuff Size: Adult Large)  Pulse 91  Ht 5' 2\" (1.575 m)  Wt 208 lb 9.6 oz (94.6 kg)  SpO2 92%  BMI 38.15 kg/m2    3.) LDL less than 100:                              YES      Your last   LDL         LDL Cholesterol Calculated   Date Value Ref Range Status   08/10/2016 59 mg/dL Final   08/10/2016 59 mg/dL Final   ]  4.) Checked HbA1C in the past 6 months: YES      5.) Checked LDL in the past 12 months:    NO      6.) Taking one aspirin daily:                       YES     7.) No tobacco use:                                        YES      8.) Statin use      YES       "

## 2017-10-20 ENCOUNTER — TELEPHONE (OUTPATIENT)
Dept: WOUND CARE | Facility: OTHER | Age: 52
End: 2017-10-20

## 2017-10-20 NOTE — TELEPHONE ENCOUNTER
----- Message from Macrina Dixon NP sent at 10/11/2017 11:25 PM CDT -----  Richard Tejada needs an appointment for a CGM study.      Thanks.      Macrina

## 2017-10-20 NOTE — TELEPHONE ENCOUNTER
Called patient to scheduled for a CGM hook-up per Macrina. No answer.  Per Macrina patient can come in anytime Qmm-[id number]-Lqx- or Thursday.

## 2017-10-24 ENCOUNTER — ALLIED HEALTH/NURSE VISIT (OUTPATIENT)
Dept: WOUND CARE | Facility: OTHER | Age: 52
End: 2017-10-24
Attending: NURSE PRACTITIONER
Payer: COMMERCIAL

## 2017-10-24 DIAGNOSIS — E10.9 DIABETES MELLITUS TYPE 1 (H): Primary | ICD-10-CM

## 2017-10-24 PROCEDURE — 95250 CONT GLUC MNTR PHYS/QHP EQP: CPT | Performed by: NURSE PRACTITIONER

## 2017-10-24 NOTE — MR AVS SNAPSHOT
"              After Visit Summary   10/24/2017    Richard Harvey    MRN: [id number]           Patient Information     Date Of Birth          1965        Visit Information        Provider Department      10/24/2017 4:15 PM Nurse, [facility]        Today's Diagnoses     Diabetes mellitus type 1 (H)    -  1       Follow-ups after your visit        Your next 10 appointments already scheduled     Oct 30, 2017  4:00 PM CDT   (Arrive by 3:45 PM)   Continuous Glucose Monitor Detach with Hc Dm Nurse   [facility] ([facility] )    [address] [id number] [phone]            Nov 03, 2017  3:00 PM CDT   (Arrive by 2:45 PM)   Continuous Glucose Monitor Evaluation with Macrina Dixon NP   [facility] ([facility] )    [address]  [facility] [address] [phone]              Who to contact     If you have questions or need follow up information about today's clinic visit or your schedule please contact [facility] directly at [phone].  Normal or non-critical lab and imaging results will be communicated to you by Yvolverhart, letter or phone within 4 business days after the clinic has received the results. If you do not hear from us within 7 days, please contact the clinic through Yvolverhart or phone. If you have a critical or abnormal lab result, we will notify you by phone as soon as possible.  Submit refill requests through [employer] or call your pharmacy and they will forward the refill request to us. Please allow 3 business days for your refill to be completed.          Additional Information About Your Visit        Yvolverhart Information     [employer] lets you send messages to your doctor, view your test results, renew your prescriptions, schedule appointments and more. To sign up, go to [email or web address] . Click on \"Log in\" on the left side of the screen, which will take you to the " "Welcome page. Then click on \"Sign up Now\" on the right side of the page.     You will be asked to enter the access code listed below, as well as some personal information. Please follow the directions to create your username and password.     Your access code is: [email or web address]  Expires: 2018  3:40 PM     Your access code will  in 90 days. If you need help or a new code, please call your [place] clinic or [phone].        Care EveryWhere ID     This is your Care EveryWhere ID. This could be used by other organizations to access your [place] medical records  [id number]         Blood Pressure from Last 3 Encounters:   10/06/17 111/66   17 107/57   17 115/60    Weight from Last 3 Encounters:   10/06/17 208 lb 9.6 oz (94.6 kg)   17 202 lb 14.4 oz (92 kg)   17 201 lb 4.8 oz (91.3 kg)              Today, you had the following     No orders found for display       Primary Care Provider Office Phone # Fax #    Tony Obando [phone]       [facility] [address] [facility] [address]        Equal Access to Services     [facility]: Hadii aad ku hadasho Soomaali, waaxda luqadaha, qaybta kaalmada adeegyada, waxay chevyin carmelina rosario . So [facility] [phone].    ATENCIÓN: Si habla español, tiene a pendleton disposición servicios gratuitos de asistencia lingüística. Llame al [phone].    We comply with applicable federal civil rights laws and [place] laws. We do not discriminate on the basis of race, color, national origin, age, disability, sex, sexual orientation, or gender identity.            Thank you!     Thank you for choosing [facility]  for your care. Our goal is always to provide you with excellent care. Hearing back from our patients is one way we can continue to improve our services. Please take a few minutes to complete the written survey that you may receive in the mail after your visit with us. Thank you!           "   Your Updated Medication List - Protect others around you: Learn how to safely use, store and throw away your medicines at www.disposemymeds.org.          This list is accurate as of: 10/24/17 11:59 PM.  Always use your most recent med list.                   Brand Name Dispense Instructions for use Diagnosis    acetone (Urine) test Strp     50 each    Use as directed    Type 1 diabetes mellitus with hyperglycemia (H)       aspirin 325 MG tablet      Take by mouth daily        blood glucose monitoring test strip    MELISSA CONTOUR NEXT    200 each    Use to test blood sugar 6 times daily or as directed.    Type 1 diabetes mellitus with hyperglycemia (H)       CALCIUM-VITAMIN D PO      Take  by mouth daily. 600 mg        glucagon 1 MG kit      1 mg once        GLUCOSAMINE-CHONDROITIN MAX ST PO      Take  by mouth daily.        insulin aspart 100 UNIT/ML injection    NovoLOG     See Admin Instructions. For use with insulin pump        insulin pump infusion      Date last updated:  6/23/15 "[employer]: Model 751 BASAL RATES and times: 12   AM (midnight): 1.25 units/hour   2     AM: 1.3 units/hour  6     AM: 0.95 units/hour  6    PM: 1.2 units/hour  Basal Pattern A:  Richard Harvey will use when N/A. CARB RATIO and times: 12   AM (midnight): 12 1    PM:  10 6    PM:    9 Corection Factor (Sensitivity) and times: 12   AM (midnight): 40 mg/dL 6     PM: 45 mg/dL BLOOD GLUCOSE TARGET and times: 12   AM (midnight): 100 - 140 9     AM:  100 - 120 10   PM:  100 - 140 Active Insulin Time:  4 hours Sensor:  Yes: 12   AM (midnight): 70 - 240 Carelink / Diasend username:  [email or web address] Carelink / Diasend Password:  [email or web address]        lisinopril 10 MG tablet    PRINIVIL/ZESTRIL     Take 10 mg by mouth daily        MOTRIN IB PO      Take 400 mg by mouth every 6 hours as needed for moderate pain        MULTI-VITAMIN DAILY PO      Take  by mouth daily.        NEURONTIN 300 MG capsule   Generic drug:  gabapentin      Take 300 mg by  mouth 3 times daily        NITROSTAT 0.4 MG sublingual tablet   Generic drug:  nitroGLYcerin      Place under the tongue every 5 minutes as needed        omega-3 fatty acids 1200 MG capsule      Take 1 capsule by mouth daily.        order for DME     5 each    Equipment being ordered:   1.  Duoderm (4x4 in) Apply to affected area, change as needed Disp: 5 Refill: 5    Blister (nonthermal), right foot, initial encounter       simvastatin 40 MG tablet    ZOCOR     Take by mouth At Bedtime        URINE GLUCOSE-KETONES TEST VI

## 2017-10-25 NOTE — PROGRESS NOTES
Richard Harvey is here for a glucose sensor insertion for a CGMS study. Sensor agreement signed.    Sensor attached to Right Flank. No redness, drainage or bleeding noted. Pt instructed on testing schedule, how to complete the patient log, restrictions during wear, and to remove if needs to have MRI, CT or x-ray. Pt will return on 10-30-17 for detach and 11-3-17 for evaluation.     Sensor Lot #: [id number]  Exp date: 10-  Transmitter S/N:  [id number]  Hook up time: 4:15 pm    First BG to be done at: 5:15 pm    Written instructions and patient log given to patient.  Janiya Rocha

## 2017-10-27 ENCOUNTER — APPOINTMENT (OUTPATIENT)
Dept: WOUND CARE | Facility: OTHER | Age: 52
End: 2017-10-27
Attending: NURSE PRACTITIONER
Payer: COMMERCIAL

## 2017-10-30 ENCOUNTER — ALLIED HEALTH/NURSE VISIT (OUTPATIENT)
Dept: WOUND CARE | Facility: OTHER | Age: 52
End: 2017-10-30
Attending: NURSE PRACTITIONER
Payer: COMMERCIAL

## 2017-10-30 DIAGNOSIS — E10.9 DIABETES MELLITUS TYPE 1 (H): Primary | ICD-10-CM

## 2017-10-30 NOTE — MR AVS SNAPSHOT
"              After Visit Summary   10/30/2017    Richard Harvey    MRN: [id number]           Patient Information     Date Of Birth          1965        Visit Information        Provider Department      10/30/2017 4:00 PM Nurse, Hc Dm [facility]        Today's Diagnoses     Diabetes mellitus type 1 (H)    -  1       Follow-ups after your visit        Your next 10 appointments already scheduled     Nov 03, 2017  3:00 PM CDT   (Arrive by 2:45 PM)   Continuous Glucose Monitor Evaluation with Macrina Dixon NP   [facility]    [address]  [place] [id number] [phone]              Who to contact     If you have questions or need follow up information about today's clinic visit or your schedule please contact [facility] directly at [phone].  Normal or non-critical lab and imaging results will be communicated to you by MyChart, letter or phone within 4 business days after the clinic has received the results. If you do not hear from us within 7 days, please contact the clinic through MyChart or phone. If you have a critical or abnormal lab result, we will notify you by phone as soon as possible.  Submit refill requests through [employer] or call your pharmacy and they will forward the refill request to us. Please allow 3 business days for your refill to be completed.          Additional Information About Your Visit        MyChart Information     [employer] lets you send messages to your doctor, view your test results, renew your prescriptions, schedule appointments and more. To sign up, go to [email or web address] . Click on \"Log in\" on the left side of the screen, which will take you to the Welcome page. Then click on \"Sign up Now\" on the right side of the page.     You will be asked to enter the access code listed below, as well as some personal information. Please follow the directions to create your username and password.   "   Your access code is: [email or web address]  Expires: 2018  3:40 PM     Your access code will  in 90 days. If you need help or a new code, please call your [place] clinic or [phone].        Care EveryWhere ID     This is your Care EveryWhere ID. This could be used by other organizations to access your [place] medical records  [id number]         Blood Pressure from Last 3 Encounters:   10/06/17 111/66   17 107/57   17 115/60    Weight from Last 3 Encounters:   10/06/17 208 lb 9.6 oz (94.6 kg)   17 202 lb 14.4 oz (92 kg)   17 201 lb 4.8 oz (91.3 kg)              Today, you had the following     No orders found for display       Primary Care Provider Office Phone # Fax #    Tony Obando [phone]       [facility] [address] [facility] [address]        Equal Access to Services     [facility]: Hadii aad ku hadasho Soomaali, waaxda luqadaha, qaybta kaalmada adeegyada, waxay jaimee rosario . So [facility] [phone].    ATENCIÓN: Si habla español, tiene a pendleton disposición servicios gratuitos de asistencia lingüística. Llame al [phone].    We comply with applicable federal civil rights laws and [place] laws. We do not discriminate on the basis of race, color, national origin, age, disability, sex, sexual orientation, or gender identity.            Thank you!     [facility]. Our goal is always to provide you with excellent care. Hearing back from our patients is one way we can continue to improve our services. Please take a few minutes to complete the written survey that you may receive in the mail after your visit with us. Thank you!             Your Updated Medication List - Protect others around you: Learn how to safely use, store and throw away your medicines at www.disposemymeds.org.          This list is accurate as of: 10/30/17 11:59 PM.  Always use your most recent med list.                    Brand Name Dispense Instructions for use Diagnosis    acetone (Urine) test Strp     50 each    Use as directed    Type 1 diabetes mellitus with hyperglycemia (H)       aspirin 325 MG tablet      Take by mouth daily        blood glucose monitoring test strip    MELISSA CONTOUR NEXT    200 each    Use to test blood sugar 6 times daily or as directed.    Type 1 diabetes mellitus with hyperglycemia (H)       CALCIUM-VITAMIN D PO      Take  by mouth daily. 600 mg        glucagon 1 MG kit      1 mg once        GLUCOSAMINE-CHONDROITIN MAX ST PO      Take  by mouth daily.        insulin aspart 100 UNIT/ML injection    NovoLOG     See Admin Instructions. For use with insulin pump        insulin pump infusion      Date last updated:  6/23/15 "[employer]: Model 751 BASAL RATES and times: 12   AM (midnight): 1.25 units/hour   2     AM: 1.3 units/hour  6     AM: 0.95 units/hour  6    PM: 1.2 units/hour  Basal Pattern A:  Richard Harvey will use when N/A. CARB RATIO and times: 12   AM (midnight): 12 1    PM:  10 6    PM:    9 Corection Factor (Sensitivity) and times: 12   AM (midnight): 40 mg/dL 6     PM: 45 mg/dL BLOOD GLUCOSE TARGET and times: 12   AM (midnight): 100 - 140 9     AM:  100 - 120 10   PM:  100 - 140 Active Insulin Time:  4 hours Sensor:  Yes: 12   AM (midnight): 70 - 240 Carelink / Diasend username:  jyoti Carelink / Diasend Password:  [email or web address]        lisinopril 10 MG tablet    PRINIVIL/ZESTRIL     Take 10 mg by mouth daily        MOTRIN IB PO      Take 400 mg by mouth every 6 hours as needed for moderate pain        MULTI-VITAMIN DAILY PO      Take  by mouth daily.        NEURONTIN 300 MG capsule   Generic drug:  gabapentin      Take 300 mg by mouth 3 times daily        NITROSTAT 0.4 MG sublingual tablet   Generic drug:  nitroGLYcerin      Place under the tongue every 5 minutes as needed        omega-3 fatty acids 1200 MG capsule      Take 1 capsule by mouth daily.        order for DME      5 each    Equipment being ordered:   1.  Duoderm (4x4 in) Apply to affected area, change as needed Disp: 5 Refill: 5    Blister (nonthermal), right foot, initial encounter       simvastatin 40 MG tablet    ZOCOR     Take by mouth At Bedtime        URINE GLUCOSE-KETONES TEST VI

## 2017-11-03 ENCOUNTER — ALLIED HEALTH/NURSE VISIT (OUTPATIENT)
Dept: WOUND CARE | Facility: OTHER | Age: 52
End: 2017-11-03
Attending: NURSE PRACTITIONER
Payer: COMMERCIAL

## 2017-11-03 VITALS
BODY MASS INDEX: 38.44 KG/M2 | HEIGHT: 62 IN | HEART RATE: 91 BPM | OXYGEN SATURATION: 94 % | SYSTOLIC BLOOD PRESSURE: 127 MMHG | DIASTOLIC BLOOD PRESSURE: 73 MMHG | WEIGHT: 208.9 LBS

## 2017-11-03 DIAGNOSIS — E10.65 TYPE 1 DIABETES MELLITUS WITH HYPERGLYCEMIA (H): Primary | ICD-10-CM

## 2017-11-03 PROCEDURE — 95250 CONT GLUC MNTR PHYS/QHP EQP: CPT | Performed by: NURSE PRACTITIONER

## 2017-11-03 PROCEDURE — 99214 OFFICE O/P EST MOD 30 MIN: CPT | Mod: 25 | Performed by: NURSE PRACTITIONER

## 2017-11-03 PROCEDURE — 95251 CONT GLUC MNTR ANALYSIS I&R: CPT | Performed by: NURSE PRACTITIONER

## 2017-11-03 PROCEDURE — 99212 OFFICE O/P EST SF 10 MIN: CPT

## 2017-11-03 ASSESSMENT — PAIN SCALES - GENERAL: PAINLEVEL: NO PAIN (0)

## 2017-11-03 NOTE — PROGRESS NOTES
SUBJECTIVE:  Richard Harvey, 52 year old, male presents with the following Chief Complaint(s) with HPI to follow:  Chief Complaint   Patient presents with     Diabetes     CGM results        Diabetes Follow-up      Patient is checking blood sugars: 5x/day.  Results:  Overnight: 47, 57,   Breakfast: 66, 68,   Mid-mornin-256  Lunch: 69, 103-310  Mid-afternoon: 61 and 240  Dinner: 58, 78->400  Bedtime: <40, 57, 58,        Symptoms of hypoglycemia (low blood sugar): yes, history of hypoglycemia unawareness    Paresthesias (numbness or burning in feet) or sores: Yes (same); no    Diabetic eye exam within the last year: Yes ()    Breakfast eaten regularly: Yes    Patient counting carbs: trying       HPI: Richard's here today for the follow up regarding his Diabetes mellitus, Type 1.    Lab Results   Component Value Date    A1C 7.4 2017    A1C 6.8 2017    A1C 7.1 2016    A1C 7.6 08/10/2016    A1C 7.6 08/10/2016     Current Diabetes medication:   1.  Insulin pump, uses Novolog; has a sensor, but doesn't wear it  ASA use: yes, 325 mg daily  Statin use: yes, simvastatin 40 mg at bedime  Denies any issues with the insulin pump.      Richard's here for the evaluation of his CGM study.    Continue to enter carbs BEFORE he consumes them.       Denies any complaints.      Patient Active Problem List   Diagnosis     DM type 1 (diabetes mellitus, type 1) (H)     HTN (hypertension)     Hyperlipidemia     ACP (advance care planning)       History reviewed. No pertinent past medical history.    History reviewed. No pertinent surgical history.    History reviewed. No pertinent family history.    Social History   Substance Use Topics     Smoking status: Never Smoker     Smokeless tobacco: Never Used     Alcohol use No       Current Outpatient Prescriptions   Medication Sig Dispense Refill     order for DME Equipment being ordered:     1.  Duoderm (4x4 in)  Apply to affected area, change as  needed  Disp: 5  Refill: 5 5 each 5     blood glucose monitoring (MELISSA CONTOUR NEXT) test strip Use to test blood sugar 6 times daily or as directed. 200 each 11     URINE GLUCOSE-KETONES TEST VI        acetone, Urine, test STRP Use as directed 50 each 3     MOTRIN IB PO Take 400 mg by mouth every 6 hours as needed for moderate pain       INSULIN PUMP - OUTPATIENT Date last updated:  6/23/15  Medtronic Minimed: Model 751  BASAL RATES and times:  12   AM (midnight): 1.25 units/hour    2     AM: 1.3 units/hour   6     AM: 0.95 units/hour   6    PM: 1.2 units/hour   Basal Pattern A:  Richard Harvey will use when N/A.  CARB RATIO and times:  12   AM (midnight): 12  1    PM:  10  6    PM:    9  Corection Factor (Sensitivity) and times:  12   AM (midnight): 40 mg/dL  6     PM: 45 mg/dL  BLOOD GLUCOSE TARGET and times:  12   AM (midnight): 100 - 140  9     AM:  100 - 120  10   PM:  100 - 140  Active Insulin Time:  4 hours  Sensor:  Yes: 12   AM (midnight): 70 - 240  Carelink / Diasend username:  [email or web address]  Carelink / Diasend Password:  [email or web address]       aspirin 325 MG tablet Take by mouth daily       gabapentin (NEURONTIN) 300 MG capsule Take 300 mg by mouth 3 times daily       glucagon 1 MG injection 1 mg once       lisinopril (PRINIVIL,ZESTRIL) 10 MG tablet Take 10 mg by mouth daily       nitroglycerin (NITROSTAT) 0.4 MG SL tablet Place under the tongue every 5 minutes as needed       simvastatin (ZOCOR) 40 MG tablet Take by mouth At Bedtime       CALCIUM-VITAMIN D PO Take  by mouth daily. 600 mg       omega-3 fatty acids (FISH OIL) 1200 MG capsule Take 1 capsule by mouth daily.       Glucosamine-Chondroit-Vit C-Mn (GLUCOSAMINE-CHONDROITIN MAX ST PO) Take  by mouth daily.       Multiple Vitamin (MULTI-VITAMIN DAILY PO) Take  by mouth daily.       insulin aspart (NovoLOG) inject - to fill ambulatory pump See Admin Instructions. For use with insulin pump         No Known Allergies    REVIEW OF SYSTEMS  Skin:  "negative  Eyes: negative  Ears/Nose/Throat: negative  Respiratory: No shortness of breath, dyspnea on exertion, cough, or hemoptysis  Cardiovascular: negative  Gastrointestinal: negative  Genitourinary: negative  Musculoskeletal: positive for arthritis (thumbs)--same  Neurologic: positive for numbness or tingling of hands (right hand--carpel tunnel surgery; left hand: carpel tunnel) and numbness or tingling of feet--same  Psychiatric: negative  Hematologic/Lymphatic/Immunologic: negative  Endocrine: positive for diabetes    OBJECTIVE:  /73  Pulse 91  Ht 5' 2\" (1.575 m)  Wt 208 lb 14.4 oz (94.8 kg)  SpO2 94%  BMI 38.21 kg/m2  Constitutional: healthy, alert and no distress  Neck: neck supple, no lymphadenopathy, trachea midline, thyroid symmetrical with out nodules noted. Cardiovascular: RRR. No murmurs, clicks gallops or rub  Respiratory: Good diaphragmatic excursion. Lungs clear  Psychiatric: mentation appears normal and affect normal/bright  Hematologic/Lymphatic/Immunologic: normal ant/post cervical nodes    LAB  Results for orders placed or performed in visit on 10/06/17   Hemoglobin A1c   Result Value Ref Range    Hemoglobin A1C 7.4 4 - 6 %   LDL cholesterol   Result Value Ref Range    LDL Cholesterol Calculated 59 mg/dL       ASSESSMENT / PLAN:  (E10.65) Type 1 diabetes mellitus with hyperglycemia (H)  (primary encounter diagnosis)  Comment:   Findings:  Ave. S  Est. A1c: 7.9%  Date: 10/24/17 Lowest: 160 Highest: 400  Date: 10/25/17 Lowest: 40 Highest: 398  Date: 10/26/17 Lowest: 91 Highest: 312  Date: 10/27/17 Lowest: 70 Highest: 294  Date: 10/28/17 Lowest: 49 Highest: 336  Date: 10/29/17 Lowest: 48 Highest: 341  Date: 10/30/17 Lowest: 50 Highest: 397    Distribution Data:  Percentage above 140: 52%  Percentage within : 34%  Percentage below 70: 14%    Nocturnal glucose control:  Lowest: 40  Highest: 397    Post-prandial glucose control:  After breakfast:  Lowest: 117  Highest: " 398    After lunch:  Lowest: 131  Highest 381    After supper:  Lowest: 77  Highest: 400    Hypoglycemia incidence:  Yes, Richard's BGs are EVERYWHERE    Food choices/Carbohydrate counting:  Breakfast:  poptarts with peanut butter  Banana bread    Lunch:  Pizza   Stromboli    Dinner  varies    Exercise:  Works as a  Monday through Friday--very active job    Recommendations:  See note    Insulin pump information:   Average: 178 +/- 107  Basal/bolus ratio: 26.1 (45%)/31.6 (55%)   Testinx/day    Hypoglycemia: 10 (15%)    Plan:   Adjustments:  Carb ratio  1700 10 (was 9.5)    Please call me if you have continued low BGs.    Consider using the dual wave for meals that contain fat and carbs--even the poptarts.    Remember to enter the bolus BEFORE consuming carbs.    Try not to override the insulin pump--it can lead to overcorrections which can lead to low BGs.          Patient Instructions   Continue working on healthy eating and moving as best as you can (start low and slow, work up to 30 min, 5x/week)    BG goals:  Fasting and before meals <130, >80  2 hour after eating <180    We only need 1/2 of these numbers to be within target then your A1c will be within target    Medication changes   Watch for continued lows    Start working on way to prevent low BGs after working    Follow up   One month    Call me sooner if any problems/concerns and/or questions develop including consistent low BGs <70 or consistent high BGs >200  [phone] (Unit Coordinator)    [phone] (Nurse)  [phone] (Macrina's cell)        Time: 55 min  Barrier: none  Willingness to learn: accepting    Macrina Dixon RN [facility]  Disease Management    Cc: Dr. ALEJANDRA Obando    55 minutes was spent with patient.    Over 50%  of this time was spent on counseling patient regarding illness, medication and/or treatment options, coordinating further cares and follow ups that are needed along with resource material that will be helpful in the  "treatment of these issues.       With the electronic record, we can now more quickly and easily track our patient diabetic goals. Our diabetes clinical review is in progress and these are the indicators we are monitoring for good diabetes health.     1.) HbA1C less than 7 (measurement of your average blood sugars)  2.) Blood Pressure less than 140/80  3.) LDL less than 100 (bad cholesterol)  4.) HbA1C is checked in the last 6 months and below 7% (more frequently if not at goal or adjusting medications)  5.) LDL is checked in the last 12 months (more frequently if not at goal or adjusting medications)  6.) Taking one baby aspirin daily (unless otherwise instructed)  7.) No tobacco use  8) Statin use     You have achieved 6 out of 8 of these and I am encouraging you to come in and get tuned up to achieve 8 out of 8!  Here is what you have achieved so far in my goals for you:  1.) HbA1C  less than 7:                              NO  Your last  HbA1C :   Lab Results   Component Value Date    A1C 7.4 07/17/2017    A1C 6.8 03/13/2017    A1C 7.1 11/09/2016    A1C 7.6 08/10/2016    A1C 7.6 08/10/2016     2.) Blood Pressure less than 140/80:       YES      Your last    /73  Pulse 91  Ht 5' 2\" (1.575 m)  Wt 208 lb 14.4 oz (94.8 kg)  SpO2 94%  BMI 38.21 kg/m2    3.) LDL less than 100:                              YES      Your last   LDL         LDL Cholesterol Calculated   Date Value Ref Range Status   08/10/2016 59 mg/dL Final   08/10/2016 59 mg/dL Final   ]  4.) Checked HbA1C in the past 6 months: YES      5.) Checked LDL in the past 12 months:    NO      6.) Taking one aspirin daily:                       YES     7.) No tobacco use:                                        YES      8.) Statin use      YES             "

## 2017-11-03 NOTE — MR AVS SNAPSHOT
After Visit Summary   11/3/2017    Richard Harvey    MRN: [id number]           Patient Information     Date Of Birth          1965        Visit Information        Provider Department      11/3/2017 3:00 PM Macrina Dixon NP [facility]        Today's Diagnoses     Type 1 diabetes mellitus with hyperglycemia (H)    -  1      Care Instructions    Continue working on healthy eating and moving as best as you can (start low and slow, work up to 30 min, 5x/week)    BG goals:  Fasting and before meals <130, >80  2 hour after eating <180    We only need 1/2 of these numbers to be within target then your A1c will be within target    Medication changes   Watch for continued lows    Start working on way to prevent low BGs after working    Follow up   One month    Call me sooner if any problems/concerns and/or questions develop including consistent low BGs <70 or consistent high BGs >200  [phone] (Unit Coordinator)    [phone] (Nurse)  [phone] (Macrina's cell)              Follow-ups after your visit        Your next 10 appointments already scheduled     Dec 06, 2017  3:00 PM CST   (Arrive by 2:45 PM)   Continuous Glucose Monitor Evaluation with Macrina Dixon NP   [facility] [place] ([facility] )    [address]  [place] [address] [phone]              Who to contact     If you have questions or need follow up information about today's clinic visit or your schedule please contact [facility] directly at [phone].  Normal or non-critical lab and imaging results will be communicated to you by MyChart, letter or phone within 4 business days after the clinic has received the results. If you do not hear from us within 7 days, please contact the clinic through MyChart or phone. If you have a critical or abnormal lab result, we will notify you by phone as soon as possible.  Submit refill requests through [employer] or call your  "pharmacy and they will forward the refill request to us. Please allow 3 business days for your refill to be completed.          Additional Information About Your Visit        MyChart Information     [employer] lets you send messages to your doctor, view your test results, renew your prescriptions, schedule appointments and more. To sign up, go to [email or web address] . Click on \"Log in\" on the left side of the screen, which will take you to the Welcome page. Then click on \"Sign up Now\" on the right side of the page.     You will be asked to enter the access code listed below, as well as some personal information. Please follow the directions to create your username and password.     Your access code is: [email or web address]  Expires: 2018  2:40 PM     Your access code will  in 90 days. If you need help or a new code, please call your [place] clinic or [phone].        Care EveryWhere ID     This is your Care EveryWhere ID. This could be used by other organizations to access your [place] medical records  [id number]        Your Vitals Were     Pulse Height Pulse Oximetry BMI (Body Mass Index)          91 5' 2\" (1.575 m) 94% 38.21 kg/m2         Blood Pressure from Last 3 Encounters:   17 127/73   10/06/17 111/66   17 107/57    Weight from Last 3 Encounters:   17 208 lb 14.4 oz (94.8 kg)   10/06/17 208 lb 9.6 oz (94.6 kg)   17 202 lb 14.4 oz (92 kg)              Today, you had the following     No orders found for display       Primary Care Provider Office Phone # Fax #    Tony Obando [phone]       [facility] [address] [facility] [address]        Equal Access to Services     [facility] Center[facility]ANNY : Leno Davis, walivia luqadaha, qaybta kaalmada vargas, sary vega. [facility] [phone].    ATENCIÓN: Si habla español, tiene a pendleton disposición servicios gratuitos de asistencia lingüística. Llame al " [phone].    We comply with applicable federal civil rights laws and [place] laws. We do not discriminate on the basis of race, color, national origin, age, disability, sex, sexual orientation, or gender identity.            Thank you!     Thank you for choosing [facility]  for your care. Our goal is always to provide you with excellent care. Hearing back from our patients is one way we can continue to improve our services. Please take a few minutes to complete the written survey that you may receive in the mail after your visit with us. Thank you!             Your Updated Medication List - Protect others around you: Learn how to safely use, store and throw away your medicines at www.disposemymeds.org.          This list is accurate as of: 11/3/17 11:59 PM.  Always use your most recent med list.                   Brand Name Dispense Instructions for use Diagnosis    acetone (Urine) test Strp     50 each    Use as directed    Type 1 diabetes mellitus with hyperglycemia (H)       aspirin 325 MG tablet      Take by mouth daily        blood glucose monitoring test strip    MELISSA CONTOUR NEXT    200 each    Use to test blood sugar 6 times daily or as directed.    Type 1 diabetes mellitus with hyperglycemia (H)       CALCIUM-VITAMIN D PO      Take  by mouth daily. 600 mg        glucagon 1 MG kit      1 mg once        GLUCOSAMINE-CHONDROITIN MAX ST PO      Take  by mouth daily.        insulin aspart 100 UNIT/ML injection    NovoLOG     See Admin Instructions. For use with insulin pump        insulin pump infusion      Date last updated:  6/23/15 [employer] Minimed: Model 751 BASAL RATES and times: 12   AM (midnight): 1.25 units/hour   2     AM: 1.3 units/hour  6     AM: 0.95 units/hour  6    PM: 1.2 units/hour  Basal Pattern A:  Richard Harvey will use when N/A. CARB RATIO and times: 12   AM (midnight): 12 1    PM:  10 6    PM:    9 Corection Factor (Sensitivity) and times: 12   AM (midnight): 40 mg/dL 6      PM: 45 mg/dL BLOOD GLUCOSE TARGET and times: 12   AM (midnight): 100 - 140 9     AM:  100 - 120 10   PM:  100 - 140 Active Insulin Time:  4 hours Sensor:  Yes: 12   AM (midnight): 70 - 240 Carelink / Diasend username:  [email or web address] Carelink / Diasend Password:  [email or web address]        lisinopril 10 MG tablet    PRINIVIL/ZESTRIL     Take 10 mg by mouth daily        MOTRIN IB PO      Take 400 mg by mouth every 6 hours as needed for moderate pain        MULTI-VITAMIN DAILY PO      Take  by mouth daily.        NEURONTIN 300 MG capsule   Generic drug:  gabapentin      Take 300 mg by mouth 3 times daily        NITROSTAT 0.4 MG sublingual tablet   Generic drug:  nitroGLYcerin      Place under the tongue every 5 minutes as needed        omega-3 fatty acids 1200 MG capsule      Take 1 capsule by mouth daily.        order for DME     5 each    Equipment being ordered:   1.  Duoderm (4x4 in) Apply to affected area, change as needed Disp: 5 Refill: 5    Blister (nonthermal), right foot, initial encounter       simvastatin 40 MG tablet    ZOCOR     Take by mouth At Bedtime        URINE GLUCOSE-KETONES TEST VI

## 2017-11-03 NOTE — PROGRESS NOTES
"Chief Complaint   Patient presents with     Diabetes     CGM results       Initial /73  Pulse 91  Ht 5' 2\" (1.575 m)  Wt 208 lb 14.4 oz (94.8 kg)  SpO2 94%  BMI 38.21 kg/m2 Estimated body mass index is 38.21 kg/(m^2) as calculated from the following:    Height as of this encounter: 5' 2\" (1.575 m).    Weight as of this encounter: 208 lb 14.4 oz (94.8 kg).  Medication Reconciliation: complete   Janiya Rocha      "

## 2017-11-05 NOTE — PATIENT INSTRUCTIONS
Continue working on healthy eating and moving as best as you can (start low and slow, work up to 30 min, 5x/week)    BG goals:  Fasting and before meals <130, >80  2 hour after eating <180    We only need 1/2 of these numbers to be within target then your A1c will be within target    Medication changes   Watch for continued lows    Start working on way to prevent low BGs after working    Follow up   One month    Call me sooner if any problems/concerns and/or questions develop including consistent low BGs <70 or consistent high BGs >200  [phone] (Unit Coordinator)    [phone] (Nurse)  [phone] (Macrina's cell)

## 2017-11-24 ENCOUNTER — TRANSFERRED RECORDS (OUTPATIENT)
Dept: HEALTH INFORMATION MANAGEMENT | Facility: CLINIC | Age: 52
End: 2017-11-24

## 2017-11-24 LAB
CREAT SERPL-MCNC: 1.05 MG/DL (ref 0.7–1.2)
GLUCOSE SERPL-MCNC: 181 MG/DL (ref 70–100)
HBA1C MFR BLD: 7.2 % (ref 0–5.7)
LDLC SERPL CALC-MCNC: 62 MG/DL
POTASSIUM SERPL-SCNC: 4.4 MEQ/L (ref 3.4–5.1)

## 2017-12-06 ENCOUNTER — ALLIED HEALTH/NURSE VISIT (OUTPATIENT)
Dept: WOUND CARE | Facility: OTHER | Age: 52
End: 2017-12-06
Attending: NURSE PRACTITIONER
Payer: COMMERCIAL

## 2017-12-06 DIAGNOSIS — E10.65 TYPE 1 DIABETES MELLITUS WITH HYPERGLYCEMIA (H): Primary | ICD-10-CM

## 2017-12-06 LAB — TSH SERPL DL<=0.005 MIU/L-ACNC: 2 MU/L (ref 0.4–4)

## 2017-12-06 PROCEDURE — 84443 ASSAY THYROID STIM HORMONE: CPT | Mod: ZL | Performed by: NURSE PRACTITIONER

## 2017-12-06 PROCEDURE — 36415 COLL VENOUS BLD VENIPUNCTURE: CPT | Mod: ZL | Performed by: NURSE PRACTITIONER

## 2017-12-06 PROCEDURE — 99212 OFFICE O/P EST SF 10 MIN: CPT | Mod: 25 | Performed by: NURSE PRACTITIONER

## 2017-12-06 PROCEDURE — 99213 OFFICE O/P EST LOW 20 MIN: CPT | Performed by: NURSE PRACTITIONER

## 2017-12-06 NOTE — PROGRESS NOTES
SUBJECTIVE:  Richard Harvey, 52 year old, male presents with the following Chief Complaint(s) with HPI to follow:  Chief Complaint   Patient presents with     Diabetes     insulin pump download        Diabetes Follow-up      Patient is checking blood sugars: 5x/day.  Results:  Overnight:   Breakfast:   Mid-mornin  Lunch:   Mid-afternoon:   Dinner: 60, 68,   Bedtime: 67,        Symptoms of hypoglycemia (low blood sugar): yes, history of hypoglycemia unawareness    Paresthesias (numbness or burning in feet) or sores: Yes (same); no    Diabetic eye exam within the last year: Yes ()    Breakfast eaten regularly: Yes    Patient counting carbs: trying       HPI: Richard's here today for the follow up regarding his Diabetes mellitus, Type 1.    Lab Results   Component Value Date    A1C 7.2 2017    A1C 7.4 2017    A1C 6.8 2017    A1C 7.1 2016    A1C 7.6 08/10/2016    A1C 7.6 08/10/2016     Current Diabetes medication:   1.  Insulin pump, uses Novolog; has a sensor, but doesn't wear it  ASA use: yes, 325 mg daily  Statin use: yes, simvastatin 40 mg at bedime  Denies any issues with the insulin pump.      Richard's here for the follow up from recent insulin adjust.   Denies any complaints.    Noted last A1c with his PCP, Dr. Obando.       Patient Active Problem List   Diagnosis     DM type 1 (diabetes mellitus, type 1) (H)     HTN (hypertension)     Hyperlipidemia     ACP (advance care planning)       No past medical history on file.    No past surgical history on file.    No family history on file.    Social History   Substance Use Topics     Smoking status: Never Smoker     Smokeless tobacco: Never Used     Alcohol use No       Current Outpatient Prescriptions   Medication Sig Dispense Refill     order for DME Equipment being ordered:     1.  Duoderm (4x4 in)  Apply to affected area, change as needed  Disp: 5  Refill: 5 5 each 5     blood glucose  monitoring (MELISSA CONTOUR NEXT) test strip Use to test blood sugar 6 times daily or as directed. 200 each 11     URINE GLUCOSE-KETONES TEST VI        acetone, Urine, test STRP Use as directed 50 each 3     MOTRIN IB PO Take 400 mg by mouth every 6 hours as needed for moderate pain       INSULIN PUMP - OUTPATIENT Date last updated:  6/23/15  Medtronic Minimed: Model 751  BASAL RATES and times:  12   AM (midnight): 1.25 units/hour    2     AM: 1.3 units/hour   6     AM: 0.95 units/hour   6    PM: 1.2 units/hour   Basal Pattern A:  Richard Colungaalie will use when N/A.  CARB RATIO and times:  12   AM (midnight): 12  1    PM:  10  6    PM:    9  Corection Factor (Sensitivity) and times:  12   AM (midnight): 40 mg/dL  6     PM: 45 mg/dL  BLOOD GLUCOSE TARGET and times:  12   AM (midnight): 100 - 140  9     AM:  100 - 120  10   PM:  100 - 140  Active Insulin Time:  4 hours  Sensor:  Yes: 12   AM (midnight): 70 - 240  Carelink / Diasend username:  [email or web address]  Carelink / SensibleSelfsend Password:  [email or web address]       aspirin 325 MG tablet Take by mouth daily       gabapentin (NEURONTIN) 300 MG capsule Take 300 mg by mouth 3 times daily       glucagon 1 MG injection 1 mg once       lisinopril (PRINIVIL,ZESTRIL) 10 MG tablet Take 10 mg by mouth daily       nitroglycerin (NITROSTAT) 0.4 MG SL tablet Place under the tongue every 5 minutes as needed       simvastatin (ZOCOR) 40 MG tablet Take by mouth At Bedtime       CALCIUM-VITAMIN D PO Take  by mouth daily. 600 mg       omega-3 fatty acids (FISH OIL) 1200 MG capsule Take 1 capsule by mouth daily.       Glucosamine-Chondroit-Vit C-Mn (GLUCOSAMINE-CHONDROITIN MAX ST PO) Take  by mouth daily.       Multiple Vitamin (MULTI-VITAMIN DAILY PO) Take  by mouth daily.       insulin aspart (NovoLOG) inject - to fill ambulatory pump See Admin Instructions. For use with insulin pump         No Known Allergies    REVIEW OF SYSTEMS  Skin: negative  Eyes: negative  Ears/Nose/Throat: negative  Respiratory:  No shortness of breath, dyspnea on exertion, cough, or hemoptysis  Cardiovascular: negative  Gastrointestinal: negative  Genitourinary: negative  Musculoskeletal: positive for arthritis (thumbs)--same  Neurologic: positive for numbness or tingling of hands (right hand--carpel tunnel surgery; left hand: carpel tunnel) and numbness or tingling of feet--same  Psychiatric: negative  Hematologic/Lymphatic/Immunologic: negative  Endocrine: positive for diabetes    OBJECTIVE:  /74  Constitutional: healthy, alert and no distress  Neck: neck supple, no lymphadenopathy, trachea midline, thyroid symmetrical with out nodules noted. Cardiovascular: RRR. No murmurs, clicks gallops or rub  Respiratory: Good diaphragmatic excursion. Lungs clear  Psychiatric: mentation appears normal and affect normal/bright  Hematologic/Lymphatic/Immunologic: normal ant/post cervical nodes  Diabetic foot exam: normal DP and PT pulses, no trophic changes or ulcerative lesions.  Left foot: normal monofilament exam; right foot: unable to feel monofilament due to history of accident as a kid, wears a brace      LAB  Results for orders placed or performed in visit on 12/06/17   C FOOT EXAM  NO CHARGE    Narrative    Diabetic foot exam: normal DP and PT pulses, no trophic changes or ulcerative lesions.  Left foot: normal monofilament exam; right foot: unable to feel monofilament due to history of accident as a kid, wears a brace   TSH with free T4 reflex   Result Value Ref Range    TSH 2.00 0.40 - 4.00 mU/L   Hemoglobin A1c   Result Value Ref Range    Hemoglobin A1C 7.2 4 - 6 %   Albumin Random Urine Quantitative with Creat Ratio   Result Value Ref Range    Albumin Urine mg/spec      Albumin Urine mg/g Cr 0.5 MG/G Creatinine    Creatinine Urine     LDL cholesterol   Result Value Ref Range    LDL Cholesterol Calculated 62 <100 mg/dL       ASSESSMENT / PLAN:.  (E10.65) Type 1 diabetes mellitus with hyperglycemia (H)  (primary encounter  diagnosis)  Comment:   Noted last A1c--good.    Noted low BGs due to increase activity     Insulin pump information:   Average: 158 +/- 70 (last visit: 178 +/- 107)  Basal/bolus ratio: 26.1 (46%)/30.2 (54%)   Testinx/day    Hypoglycemia: 4 (6%) (last visit: 10 (15%))    Plan: TSH with free T4 reflex, C FOOT EXAM  NO CHARGE          Noted no pattern.    Discussed way to prevent afternoon low BGs due to increase activity at work.      Please call me if you have continued low BGs.      Reminded Richard of the followin.  use the dual wave for meals that contain fat and carbs--even the poptarts.    2.  enter the bolus BEFORE consuming carbs.            Patient Instructions   Continue working on healthy eating and moving as best as you can (start low and slow, work up to 30 min, 5x/week)    BG goals:  Fasting and before meals <130, >80  2 hour after eating <180    We only need 1/2 of these numbers to be within target then your A1c will be within target    Medication changes   Watch for continued lows    Try using the dual wave with supper (especially if you are consuming fats and carbs)    If continued low BG mid-afternoon, try reducing your carb amount before lunch    Follow up   Dexcom CGM study mid-January     Call me sooner if any problems/concerns and/or questions develop including consistent low BGs <70 or consistent high BGs >200  [phone] (Unit Coordinator)    [phone] (Nurse)  [phone] (Macrina's cell)        Time: 40 min  Barrier: none  Willingness to learn: accepting    Macrina Dixon RN [facility]  Disease Management    Cc: Dr. ALEJANDRA Obando    With the electronic record, we can now more quickly and easily track our patient diabetic goals. Our diabetes clinical review is in progress and these are the indicators we are monitoring for good diabetes health.     1.) HbA1C less than 7 (measurement of your average blood sugars)  2.) Blood Pressure less than 140/80  3.) LDL less than 100 (bad  cholesterol)  4.) HbA1C is checked in the last 6 months and below 7% (more frequently if not at goal or adjusting medications)  5.) LDL is checked in the last 12 months (more frequently if not at goal or adjusting medications)  6.) Taking one baby aspirin daily (unless otherwise instructed)  7.) No tobacco use  8) Statin use     You have achieved 7 out of 8 of these and I am encouraging you to come in and get tuned up to achieve 8 out of 8!  Here is what you have achieved so far in my goals for you:  1.) HbA1C  less than 7:                              NO  Your last  HbA1C :   Lab Results   Component Value Date    A1C 7.2 11/24/2017    A1C 7.4 07/17/2017    A1C 6.8 03/13/2017    A1C 7.1 11/09/2016    A1C 7.6 08/10/2016    A1C 7.6 08/10/2016       2.) Blood Pressure less than 140/80:       YES      Your last    /74    3.) LDL less than 100:                              YES      Your last   LDL         LDL Cholesterol Calculated   Date Value Ref Range Status   11/24/2017 62 <100 mg/dL Final   ]    4.) Checked HbA1C in the past 6 months: YES      5.) Checked LDL in the past 12 months:    YES      6.) Taking one aspirin daily:                       YES     7.) No tobacco use:                                        YES      8.) Statin use      YES

## 2017-12-06 NOTE — MR AVS SNAPSHOT
After Visit Summary   12/6/2017    Richard Harvey    MRN: [id number]           Patient Information     Date Of Birth          1965        Visit Information        Provider Department      12/6/2017 3:00 PM Macrina Dixon NP [facility]        Today's Diagnoses     Type 1 diabetes mellitus with hyperglycemia (H)    -  1      Care Instructions    Continue working on healthy eating and moving as best as you can (start low and slow, work up to 30 min, 5x/week)    BG goals:  Fasting and before meals <130, >80  2 hour after eating <180    We only need 1/2 of these numbers to be within target then your A1c will be within target    Medication changes   Watch for continued lows    Try using the dual wave with supper (especially if you are consuming fats and carbs)    If continued low BG mid-afternoon, try reducing your carb amount before lunch    Follow up   Dexcom CGM study mid-January     Call me sooner if any problems/concerns and/or questions develop including consistent low BGs <70 or consistent high BGs >200  [phone] (Unit Coordinator)    [phone] (Nurse)  [phone] (Macrina's cell)              Follow-ups after your visit        Who to contact     If you have questions or need follow up information about today's clinic visit or your schedule please contact [facility] directly at [phone].  Normal or non-critical lab and imaging results will be communicated to you by MyChart, letter or phone within 4 business days after the clinic has received the results. If you do not hear from us within 7 days, please contact the clinic through MyChart or phone. If you have a critical or abnormal lab result, we will notify you by phone as soon as possible.  Submit refill requests through [employer] or call your pharmacy and they will forward the refill request to us. Please allow 3 business days for your refill to be completed.          Additional Information About Your Visit    "     MyChart Information     [employer] lets you send messages to your doctor, view your test results, renew your prescriptions, schedule appointments and more. To sign up, go to [email or web address] . Click on \"Log in\" on the left side of the screen, which will take you to the Welcome page. Then click on \"Sign up Now\" on the right side of the page.     You will be asked to enter the access code listed below, as well as some personal information. Please follow the directions to create your username and password.     Your access code is: [email or web address]  Expires: 2018  2:40 PM     Your access code will  in 90 days. If you need help or a new code, please call your [facility] clinic or [phone].        Care EveryWhere ID     This is your Care EveryWhere ID. This could be used by other organizations to access your [facility] medical records  [id number]         Blood Pressure from Last 3 Encounters:   17 127/73   10/06/17 111/66   17 107/57    Weight from Last 3 Encounters:   17 208 lb 14.4 oz (94.8 kg)   10/06/17 208 lb 9.6 oz (94.6 kg)   17 202 lb 14.4 oz (92 kg)              We Performed the Following     TSH with free T4 reflex        Primary Care Provider Office Phone # Fax #    Tony Obando [phone]       [facility] [address] [facility] [address]        Equal Access to Services     [facility] AH: Hadii aad ku hadasho Soomaali, waaxda luqadaha, qaybta kaalmada adeegyada, sary vega. So [facility] [phone].    ATENCIÓN: Si habla español, tiene a pendleton disposición servicios gratuitos de asistencia lingüística. Llame al [phone].    We comply with applicable federal civil rights laws and [place] laws. We do not discriminate on the basis of race, color, national origin, age, disability, sex, sexual orientation, or gender identity.            Thank you!     Thank you for choosing [facility]  for your care. Our " goal is always to provide you with excellent care. Hearing back from our patients is one way we can continue to improve our services. Please take a few minutes to complete the written survey that you may receive in the mail after your visit with us. Thank you!             Your Updated Medication List - Protect others around you: Learn how to safely use, store and throw away your medicines at www.disposemymeds.org.          This list is accurate as of: 12/6/17  3:49 PM.  Always use your most recent med list.                   Brand Name Dispense Instructions for use Diagnosis    acetone (Urine) test Strp     50 each    Use as directed    Type 1 diabetes mellitus with hyperglycemia (H)       aspirin 325 MG tablet      Take by mouth daily        blood glucose monitoring test strip    MELISSA CONTOUR NEXT    200 each    Use to test blood sugar 6 times daily or as directed.    Type 1 diabetes mellitus with hyperglycemia (H)       CALCIUM-VITAMIN D PO      Take  by mouth daily. 600 mg        glucagon 1 MG kit      1 mg once        GLUCOSAMINE-CHONDROITIN MAX ST PO      Take  by mouth daily.        insulin aspart 100 UNIT/ML injection    NovoLOG     See Admin Instructions. For use with insulin pump        insulin pump infusion      Date last updated:  6/23/15 [employer] Minimed: Model 751 BASAL RATES and times: 12   AM (midnight): 1.25 units/hour   2     AM: 1.3 units/hour  6     AM: 0.95 units/hour  6    PM: 1.2 units/hour  Basal Pattern A:  Richard Harvey will use when N/A. CARB RATIO and times: 12   AM (midnight): 12 1    PM:  10 6    PM:    9 Corection Factor (Sensitivity) and times: 12   AM (midnight): 40 mg/dL 6     PM: 45 mg/dL BLOOD GLUCOSE TARGET and times: 12   AM (midnight): 100 - 140 9     AM:  100 - 120 10   PM:  100 - 140 Active Insulin Time:  4 hours Sensor:  Yes: 12   AM (midnight): 70 - 240 Carelink / Diasend username:  [email or web address] Carelink / Diasend Password:  [email or web address]        lisinopril 10 MG tablet     PRINIVIL/ZESTRIL     Take 10 mg by mouth daily        MOTRIN IB PO      Take 400 mg by mouth every 6 hours as needed for moderate pain        MULTI-VITAMIN DAILY PO      Take  by mouth daily.        NEURONTIN 300 MG capsule   Generic drug:  gabapentin      Take 300 mg by mouth 3 times daily        NITROSTAT 0.4 MG sublingual tablet   Generic drug:  nitroGLYcerin      Place under the tongue every 5 minutes as needed        omega-3 fatty acids 1200 MG capsule      Take 1 capsule by mouth daily.        order for DME     5 each    Equipment being ordered:   1.  Duoderm (4x4 in) Apply to affected area, change as needed Disp: 5 Refill: 5    Blister (nonthermal), right foot, initial encounter       simvastatin 40 MG tablet    ZOCOR     Take by mouth At Bedtime        URINE GLUCOSE-KETONES TEST VI

## 2017-12-06 NOTE — PATIENT INSTRUCTIONS
Continue working on healthy eating and moving as best as you can (start low and slow, work up to 30 min, 5x/week)    BG goals:  Fasting and before meals <130, >80  2 hour after eating <180    We only need 1/2 of these numbers to be within target then your A1c will be within target    Medication changes   Watch for continued lows    Try using the dual wave with supper (especially if you are consuming fats and carbs)    If continued low BG mid-afternoon, try reducing your carb amount before lunch    Follow up   Dexcom CGM study mid-January     Call me sooner if any problems/concerns and/or questions develop including consistent low BGs <70 or consistent high BGs >200  [phone] (Unit Coordinator)    [phone] (Nurse)  [phone] (Macrina's cell)

## 2017-12-12 VITALS — SYSTOLIC BLOOD PRESSURE: 118 MMHG | DIASTOLIC BLOOD PRESSURE: 74 MMHG

## 2018-01-26 DIAGNOSIS — E10.65 TYPE 1 DIABETES MELLITUS WITH HYPERGLYCEMIA (H): ICD-10-CM

## 2018-02-06 ENCOUNTER — OFFICE VISIT (OUTPATIENT)
Dept: WOUND CARE | Facility: OTHER | Age: 53
End: 2018-02-06
Attending: NURSE PRACTITIONER
Payer: COMMERCIAL

## 2018-02-06 ENCOUNTER — TELEPHONE (OUTPATIENT)
Dept: EDUCATION SERVICES | Facility: HOSPITAL | Age: 53
End: 2018-02-06

## 2018-02-06 VITALS
SYSTOLIC BLOOD PRESSURE: 122 MMHG | HEIGHT: 62 IN | BODY MASS INDEX: 38.22 KG/M2 | DIASTOLIC BLOOD PRESSURE: 71 MMHG | HEART RATE: 73 BPM | WEIGHT: 207.7 LBS | OXYGEN SATURATION: 96 %

## 2018-02-06 DIAGNOSIS — E10.65 TYPE 1 DIABETES MELLITUS WITH HYPERGLYCEMIA (H): ICD-10-CM

## 2018-02-06 DIAGNOSIS — E10.9 DM TYPE 1 (DIABETES MELLITUS, TYPE 1) (H): Primary | ICD-10-CM

## 2018-02-06 DIAGNOSIS — S91.101A OPEN WOUND OF GREAT TOE, RIGHT, INITIAL ENCOUNTER: Primary | ICD-10-CM

## 2018-02-06 PROCEDURE — G0463 HOSPITAL OUTPT CLINIC VISIT: HCPCS

## 2018-02-06 PROCEDURE — G0463 HOSPITAL OUTPT CLINIC VISIT: HCPCS | Mod: 25

## 2018-02-06 PROCEDURE — 97597 DBRDMT OPN WND 1ST 20 CM/<: CPT | Performed by: NURSE PRACTITIONER

## 2018-02-06 PROCEDURE — 99214 OFFICE O/P EST MOD 30 MIN: CPT | Mod: 25 | Performed by: NURSE PRACTITIONER

## 2018-02-06 ASSESSMENT — PAIN SCALES - GENERAL: PAINLEVEL: SEVERE PAIN (6)

## 2018-02-06 NOTE — PATIENT INSTRUCTIONS
Continue working on healthy eating and moving as best as you can (start low and slow, work up to 30 min, 5x/week)    BG goals:  Fasting and before meals <130, >80  2 hour after eating <180    We only need 1/2 of these numbers to be within target then your A1c will be within target    Medication changes   Watch for continued lows    Try using the dual wave with supper (especially if you are consuming fats and carbs)    If continued low BG mid-afternoon, try reducing your carb amount before lunch    Follow up   One week    Call me sooner if any problems/concerns and/or questions develop including consistent low BGs <70 or consistent high BGs >200  [phone] (Unit Coordinator)    [phone] (Nurse)  [phone] (Macrina's cell)        Wound Care  Wash hands prior to dressing change.   Clean instruments as appropriate    Wash wound with mild soap and water, dry  Apply silver foam (cut to fit, remove plastic backing)--sticky side down over wound  Secure with medipore tape (up and down, not around)  Change every day or as needed    Please call if any questions/concerns and/or problems ([phone])

## 2018-02-06 NOTE — PROGRESS NOTES
"SUBJECTIVE:  Richard Harvey, 52 year old, male presents with the following Chief Complaint(s) with HPI to follow:  Chief Complaint   Patient presents with     Diabetes     pump adjustment        Diabetes Follow-up      Patient is checking blood sugars: 5.7x/day.  Results:  Overnight:   Breakfast: 60,   Mid-mornin  Lunch:   Mid-afternoon: no checks  Dinner: 40-62,   Bedtime: 145-294      Symptoms of hypoglycemia (low blood sugar): yes, history of hypoglycemia unawareness    Paresthesias (numbness or burning in feet) or sores: Yes (same); no    Diabetic eye exam within the last year: Yes ()    Breakfast eaten regularly: Yes    Patient counting carbs: trying       HPI: Richard's here today for the follow up regarding his Diabetes mellitus, Type 1.    Lab Results   Component Value Date    A1C 7.2 2017    A1C 7.4 2017    A1C 6.8 2017    A1C 7.1 2016    A1C 7.6 08/10/2016    A1C 7.6 08/10/2016     Current Diabetes medication:   1.  Insulin pump, uses Novolog; has a sensor, but doesn't wear it  ASA use: yes, 325 mg daily  Statin use: yes, simvastatin 40 mg at bedime  Denies any issues with the insulin pump.      Richard's here for the follow up from recent insulin adjust.   Continues to have low BGs.    Hasn't been using his temp basal after work or exercising.     Richard does report the following:  Right great toe hurts.  Started about 1-2 weeks ago.   No open wound, area is just sore.   Reports it feels \"rough\" at the end of it.  He can't visualize it.    No drainage.   No fevers and/or chills.   Wears a brace on that foot, but it has an open toe.    Denies another concerns.      Patient Active Problem List   Diagnosis     DM type 1 (diabetes mellitus, type 1) (H)     HTN (hypertension)     Hyperlipidemia     ACP (advance care planning)       History reviewed. No pertinent past medical history.    History reviewed. No pertinent surgical history.    History reviewed. " No pertinent family history.    Social History   Substance Use Topics     Smoking status: Never Smoker     Smokeless tobacco: Never Used     Alcohol use No       Current Outpatient Prescriptions   Medication Sig Dispense Refill     blood glucose monitoring (MELISSA CONTOUR NEXT) test strip Use to test blood sugar 6 times daily or as directed. 200 each 11     order for DME Equipment being ordered:     1.  Duoderm (4x4 in)  Apply to affected area, change as needed  Disp: 5  Refill: 5 5 each 5     URINE GLUCOSE-KETONES TEST VI        acetone, Urine, test STRP Use as directed 50 each 3     MOTRIN IB PO Take 400 mg by mouth every 6 hours as needed for moderate pain       INSULIN PUMP - OUTPATIENT Date last updated:  6/23/15  MedVolar Video Minimed: Model 751  BASAL RATES and times:  12   AM (midnight): 1.25 units/hour    2     AM: 1.3 units/hour   6     AM: 0.95 units/hour   6    PM: 1.2 units/hour   Basal Pattern A:  Richard Harvey will use when N/A.  CARB RATIO and times:  12   AM (midnight): 12  1    PM:  10  6    PM:    9  Corection Factor (Sensitivity) and times:  12   AM (midnight): 40 mg/dL  6     PM: 45 mg/dL  BLOOD GLUCOSE TARGET and times:  12   AM (midnight): 100 - 140  9     AM:  100 - 120  10   PM:  100 - 140  Active Insulin Time:  4 hours  Sensor:  Yes: 12   AM (midnight): 70 - 240  Carelink / Diasend username:  [email or web address]  Carelink / Diasend Password:  [email or web address]       aspirin 325 MG tablet Take by mouth daily       gabapentin (NEURONTIN) 300 MG capsule Take 300 mg by mouth 3 times daily       glucagon 1 MG injection 1 mg once       lisinopril (PRINIVIL,ZESTRIL) 10 MG tablet Take 10 mg by mouth daily       nitroglycerin (NITROSTAT) 0.4 MG SL tablet Place under the tongue every 5 minutes as needed       simvastatin (ZOCOR) 40 MG tablet Take by mouth At Bedtime       CALCIUM-VITAMIN D PO Take  by mouth daily. 600 mg       omega-3 fatty acids (FISH OIL) 1200 MG capsule Take 1 capsule by mouth daily.        "Glucosamine-Chondroit-Vit C-Mn (GLUCOSAMINE-CHONDROITIN MAX ST PO) Take  by mouth daily.       Multiple Vitamin (MULTI-VITAMIN DAILY PO) Take  by mouth daily.       insulin aspart (NovoLOG) inject - to fill ambulatory pump See Admin Instructions. For use with insulin pump         No Known Allergies    REVIEW OF SYSTEMS  Skin: as noted above  Eyes: negative  Ears/Nose/Throat: negative  Respiratory: No shortness of breath, dyspnea on exertion, cough, or hemoptysis  Cardiovascular: negative  Gastrointestinal: negative  Genitourinary: negative  Musculoskeletal: positive for upper back \"tightness\" that changes with position; history of arthritis (thumbs)--same  Neurologic: positive for numbness or tingling of hands (right hand--carpel tunnel surgery; left hand: carpel tunnel) and numbness or tingling of feet--same  Psychiatric: negative  Hematologic/Lymphatic/Immunologic: negative  Endocrine: positive for diabetes    OBJECTIVE:  /71  Pulse 73  Ht 5' 2\" (1.575 m)  Wt 207 lb 11.2 oz (94.2 kg)  SpO2 96%  BMI 37.99 kg/m2  Constitutional: healthy, alert and no distress  Neck: neck supple, no lymphadenopathy, trachea midline, thyroid symmetrical with out nodules noted.   Cardiovascular: RRR. No murmurs, clicks gallops or rub  Respiratory: Good diaphragmatic excursion. Lungs clear  Skin:   Wound description: Type of Wound- diabetic toe ulcer; Location- right foot, great toe (dorsal/tip), Drainage amount-none, Drainage color-none, Odor- nond; Wound bed-calloused cap (after debridement, 100% pink), Surrounding skin-callouse.  Measurements: 0.2 x 0.2 cm  Dressing change: Wound cleansed with soap and water, dried, debrided, dressed with silver foam + medipore tape.     Psychiatric: mentation appears normal and affect normal/bright  Hematologic/Lymphatic/Immunologic: normal ant/post cervical nodes    Washed wound with soap and water.  Dried.    Noted as mentioned above   Conservative sharps debridement:  2 patient " identifiers used and betadine applied prior to debridement.  Explained risks and benefits of procedure.  Patient consents to continue.    I removed <20 sqcm of nonviable tissue from on top of wound using a sharp, sterile scalpel (10).    No bleeding, no pain.  Rinsed with normal saline.    Wound care includes as noted above    LAB  Results for orders placed or performed in visit on 17   C FOOT EXAM  NO CHARGE    Narrative    Diabetic foot exam: normal DP and PT pulses, no trophic changes or ulcerative lesions.  Left foot: normal monofilament exam; right foot: unable to feel monofilament due to history of accident as a kid, wears a brace   TSH with free T4 reflex   Result Value Ref Range    TSH 2.00 0.40 - 4.00 mU/L   Hemoglobin A1c   Result Value Ref Range    Hemoglobin A1C 7.2 4 - 6 %   Albumin Random Urine Quantitative with Creat Ratio   Result Value Ref Range    Albumin Urine mg/spec      Albumin Urine mg/g Cr 0.5 MG/G Creatinine    Creatinine Urine     LDL cholesterol   Result Value Ref Range    LDL Cholesterol Calculated 62 <100 mg/dL       ASSESSMENT / PLAN:.  (S91.101A) Open wound of great toe, right, initial encounter  (primary encounter diagnosis)  Comment: noted and plan developed  Plan: daily dressing changes using silver foam; follow up in one week    (E10.9) DM type 1 (diabetes mellitus, type 1) (H)  Comment:   Continues to have low BGs due to increase activity     Insulin pump information:   Average: 149 +/- 85 (last visit: 158 +/- 70)  Basal/bolus ratio: 26.1 (46%)/30.5 (54%)   Testin.7x/day    Hypoglycemia: 11 (14%)    Plan:   Adjustments:  Bolus   1300 12 (was 11)  1700 11 (was 10)    Insulin sensitivity  1200 (new) 45 (was 43)    Review ways to prevent afternoon low BGs due to increase activity at work.      Please call me if you have continued low BGs.      Reminded Richard of the followin.  Work on using those recommendations to help prevent low BGs after exercising/work  2.  Keep  using the dual wave for meals that contain fat and carbs--even the poptarts.    3.  Keep working on entering the bolus BEFORE consuming carbs.            Patient Instructions   Continue working on healthy eating and moving as best as you can (start low and slow, work up to 30 min, 5x/week)    BG goals:  Fasting and before meals <130, >80  2 hour after eating <180    We only need 1/2 of these numbers to be within target then your A1c will be within target    Medication changes   Watch for continued lows    Try using the dual wave with supper (especially if you are consuming fats and carbs)    If continued low BG mid-afternoon, try reducing your carb amount before lunch    Follow up   One week    Call me sooner if any problems/concerns and/or questions develop including consistent low BGs <70 or consistent high BGs >200  [phone] (Unit Coordinator)    [phone] (Nurse)  [phone] (Macrina's cell)        Wound Care  Wash hands prior to dressing change.   Clean instruments as appropriate    Wash wound with mild soap and water, dry  Apply silver foam (cut to fit, remove plastic backing)--sticky side down over wound  Secure with medipore tape (up and down, not around)  Change every day or as needed    Please call if any questions/concerns and/or problems ([phone])           Time: 45 min  Barrier: none  Willingness to learn: accepting    Macrina Dixon RN [facility]  Disease Management    Cc: Dr. ALEJANDRA Obando    With the electronic record, we can now more quickly and easily track our patient diabetic goals. Our diabetes clinical review is in progress and these are the indicators we are monitoring for good diabetes health.     1.) HbA1C less than 7 (measurement of your average blood sugars)  2.) Blood Pressure less than 140/80  3.) LDL less than 100 (bad cholesterol)  4.) HbA1C is checked in the last 6 months and below 7% (more frequently if not at goal or adjusting medications)  5.) LDL is checked in the last 12  "months (more frequently if not at goal or adjusting medications)  6.) Taking one baby aspirin daily (unless otherwise instructed)  7.) No tobacco use  8) Statin use     You have achieved 7 out of 8 of these and I am encouraging you to come in and get tuned up to achieve 8 out of 8!  Here is what you have achieved so far in my goals for you:  1.) HbA1C  less than 7:                              NO  Your last  HbA1C :   Lab Results   Component Value Date    A1C 7.2 11/24/2017    A1C 7.4 07/17/2017    A1C 6.8 03/13/2017    A1C 7.1 11/09/2016    A1C 7.6 08/10/2016    A1C 7.6 08/10/2016       2.) Blood Pressure less than 140/80:       YES      Your last    /71  Pulse 73  Ht 5' 2\" (1.575 m)  Wt 207 lb 11.2 oz (94.2 kg)  SpO2 96%  BMI 37.99 kg/m2    3.) LDL less than 100:                              YES      Your last   LDL         LDL Cholesterol Calculated   Date Value Ref Range Status   11/24/2017 62 <100 mg/dL Final   ]    4.) Checked HbA1C in the past 6 months: YES      5.) Checked LDL in the past 12 months:    YES      6.) Taking one aspirin daily:                       YES     7.) No tobacco use:                                        YES      8.) Statin use      YES             "

## 2018-02-06 NOTE — MR AVS SNAPSHOT
After Visit Summary   2/6/2018    Richard Harvey    MRN: [id number]           Patient Information     Date Of Birth          1965        Visit Information        Provider Department      2/6/2018 3:30 PM Macrina Dixon NP [facility]        Today's Diagnoses     Open wound of great toe, right, initial encounter    -  1    DM type 1 (diabetes mellitus, type 1) (H)          Care Instructions    Continue working on healthy eating and moving as best as you can (start low and slow, work up to 30 min, 5x/week)    BG goals:  Fasting and before meals <130, >80  2 hour after eating <180    We only need 1/2 of these numbers to be within target then your A1c will be within target    Medication changes   Watch for continued lows    Try using the dual wave with supper (especially if you are consuming fats and carbs)    If continued low BG mid-afternoon, try reducing your carb amount before lunch    Follow up   One week    Call me sooner if any problems/concerns and/or questions develop including consistent low BGs <70 or consistent high BGs >200  [phone] (Unit Coordinator)    [phone] (Nurse)  [phone] (Marcina's cell)        Wound Care  Wash hands prior to dressing change.   Clean instruments as appropriate    Wash wound with mild soap and water, dry  Apply silver foam (cut to fit, remove plastic backing)--sticky side down over wound  Secure with medipore tape (up and down, not around)  Change every day or as needed    Please call if any questions/concerns and/or problems ([phone])                 Follow-ups after your visit        Who to contact     If you have questions or need follow up information about today's clinic visit or your schedule please contact [facility] directly at [phone].  Normal or non-critical lab and imaging results will be communicated to you by MyChart, letter or phone within 4 business days after the clinic has received the results. If  "you do not hear from us within 7 days, please contact the clinic through [employer] or phone. If you have a critical or abnormal lab result, we will notify you by phone as soon as possible.  Submit refill requests through [employer] or call your pharmacy and they will forward the refill request to us. Please allow 3 business days for your refill to be completed.          Additional Information About Your Visit        Third Screen [address] Information     [employer] lets you send messages to your doctor, view your test results, renew your prescriptions, schedule appointments and more. To sign up, go to [email or web address] . Click on \"Log in\" on the left side of the screen, which will take you to the Welcome page. Then click on \"Sign up Now\" on the right side of the page.     You will be asked to enter the access code listed below, as well as some personal information. Please follow the directions to create your username and password.     Your access code is: [email or web address]  Expires: 2018  4:34 PM     Your access code will  in 90 days. If you need help or a new code, please call your [facility] clinic or [phone].        Care EveryWhere ID     This is your Care EveryWhere ID. This could be used by other organizations to access your [facility] medical records  [id number]        Your Vitals Were     Pulse Height Pulse Oximetry BMI (Body Mass Index)          73 5' 2\" (1.575 m) 96% 37.99 kg/m2         Blood Pressure from Last 3 Encounters:   18 122/71   17 118/74   17 127/73    Weight from Last 3 Encounters:   18 207 lb 11.2 oz (94.2 kg)   17 208 lb 14.4 oz (94.8 kg)   10/06/17 208 lb 9.6 oz (94.6 kg)              Today, you had the following     No orders found for display       Primary Care Provider Office Phone # Fax #    Tony Obando [phone]       [facility] [address] [facility] [address]        Equal Access to Services     CHELSY BARILLAS AH: Leno hi " Kimberleelyudmila, he lualfonsotara, giana kaisabela kaye, sary sidhu paolaina cuetofortino gary. So [facility] [place] [facility] [phone].    ATENCIÓN: Si pato crowe, tiene a pendleton disposición servicios gratuitos de asistencia lingüística. Eliseo al [phone].    We comply with applicable federal civil rights laws and [place] laws. We do not discriminate on the basis of race, color, national origin, age, disability, sex, sexual orientation, or gender identity.            Thank you!     [facility] you [facility]  [facility]. Our goal is always to provide you with excellent care. Hearing back from our patients is one way we can continue to improve our services. Please take a few minutes to complete the written survey that you may receive in the mail after your visit with us. Thank you!             Your Updated Medication List - Protect others around you: Learn how to safely use, store and throw away your medicines at www.disposemymeds.org.          This list is accurate as of 2/6/18  4:34 PM.  Always use your most recent med list.                   Brand Name Dispense Instructions for use Diagnosis    acetone (Urine) test Strp     50 each    Use as directed    Type 1 diabetes mellitus with hyperglycemia (H)       aspirin 325 MG tablet      Take by mouth daily        blood glucose monitoring test strip    MELISSA CONTOUR NEXT    200 each    Use to test blood sugar 6 times daily or as directed.    Type 1 diabetes mellitus with hyperglycemia (H)       CALCIUM-VITAMIN D PO      Take  by mouth daily. 600 mg        glucagon 1 MG kit      1 mg once        GLUCOSAMINE-CHONDROITIN MAX ST PO      Take  by mouth daily.        insulin aspart 100 UNIT/ML injection    NovoLOG     See Admin Instructions. For use with insulin pump        insulin pump infusion      Date last updated:  6/23/15 MedSyndiant: Model 751 BASAL RATES and times: 12   AM (midnight): 1.25 units/hour   2     AM: 1.3 units/hour  6     AM: 0.95  units/hour  6    PM: 1.2 units/hour  Basal Pattern A:  Richard Harvey will use when N/A. CARB RATIO and times: 12   AM (midnight): 12 1    PM:  10 6    PM:    9 Corection Factor (Sensitivity) and times: 12   AM (midnight): 40 mg/dL 6     PM: 45 mg/dL BLOOD GLUCOSE TARGET and times: 12   AM (midnight): 100 - 140 9     AM:  100 - 120 10   PM:  100 - 140 Active Insulin Time:  4 hours Sensor:  Yes: 12   AM (midnight): 70 - 240 Carelink / Diasend username:  [email or web address] Carelink / Diasend Password:  [email or web address]        lisinopril 10 MG tablet    PRINIVIL/ZESTRIL     Take 10 mg by mouth daily        MOTRIN IB PO      Take 400 mg by mouth every 6 hours as needed for moderate pain        MULTI-VITAMIN DAILY PO      Take  by mouth daily.        NEURONTIN 300 MG capsule   Generic drug:  gabapentin      Take 300 mg by mouth 3 times daily        NITROSTAT 0.4 MG sublingual tablet   Generic drug:  nitroGLYcerin      Place under the tongue every 5 minutes as needed        omega-3 fatty acids 1200 MG capsule      Take 1 capsule by mouth daily.        order for DME     5 each    Equipment being ordered:   1.  Duoderm (4x4 in) Apply to affected area, change as needed Disp: 5 Refill: 5    Blister (nonthermal), right foot, initial encounter       simvastatin 40 MG tablet    ZOCOR     Take by mouth At Bedtime        URINE GLUCOSE-KETONES TEST VI

## 2018-02-13 ENCOUNTER — OFFICE VISIT (OUTPATIENT)
Dept: WOUND CARE | Facility: OTHER | Age: 53
End: 2018-02-13
Attending: NURSE PRACTITIONER
Payer: COMMERCIAL

## 2018-02-13 VITALS
HEIGHT: 62 IN | SYSTOLIC BLOOD PRESSURE: 128 MMHG | OXYGEN SATURATION: 95 % | HEART RATE: 66 BPM | BODY MASS INDEX: 38.28 KG/M2 | WEIGHT: 208 LBS | DIASTOLIC BLOOD PRESSURE: 67 MMHG

## 2018-02-13 DIAGNOSIS — S91.101D OPEN WOUND OF GREAT TOE, RIGHT, SUBSEQUENT ENCOUNTER: ICD-10-CM

## 2018-02-13 DIAGNOSIS — E10.65 TYPE 1 DIABETES MELLITUS WITH HYPERGLYCEMIA (H): Primary | ICD-10-CM

## 2018-02-13 PROCEDURE — G0463 HOSPITAL OUTPT CLINIC VISIT: HCPCS

## 2018-02-13 PROCEDURE — 99214 OFFICE O/P EST MOD 30 MIN: CPT | Performed by: NURSE PRACTITIONER

## 2018-02-13 ASSESSMENT — PAIN SCALES - GENERAL: PAINLEVEL: NO PAIN (0)

## 2018-02-13 NOTE — NURSING NOTE
"Chief Complaint   Patient presents with     Diabetes     follow up       Initial /67  Pulse 66  Ht 5' 2\" (1.575 m)  Wt 208 lb (94.3 kg)  SpO2 95%  BMI 38.04 kg/m2 Estimated body mass index is 38.04 kg/(m^2) as calculated from the following:    Height as of this encounter: 5' 2\" (1.575 m).    Weight as of this encounter: 208 lb (94.3 kg).  Medication Reconciliation: complete   Janiya Rocha      "

## 2018-02-13 NOTE — MR AVS SNAPSHOT
After Visit Summary   2/13/2018    Richard Harvey    MRN: [id number]           Patient Information     Date Of Birth          1965        Visit Information        Provider Department      2/13/2018 3:30 PM Macrina Dixon NP [facility]        Today's Diagnoses     Type 1 diabetes mellitus with hyperglycemia (H)    -  1    Open wound of great toe, right, subsequent encounter          Care Instructions    Continue working on healthy eating and moving as best as you can (start low and slow, work up to 30 min, 5x/week)    BG goals:  Fasting and before meals <130, >80  2 hour after eating <180    We only need 1/2 of these numbers to be within target then your A1c will be within target    Medication changes   Watch for continued lows    Try using the dual wave with supper (especially if you are consuming fats and carbs)    If continued low BG mid-afternoon, try reducing your carb amount before lunch    Follow up   3 weeks    Call me sooner if any problems/concerns and/or questions develop including consistent low BGs <70 or consistent high BGs >200  [phone] (Unit Coordinator)    [phone] (Nurse)  [phone] (Macrina's cell)        Wound Care  Wash hands prior to dressing change.   Clean instruments as appropriate    Wash wound with mild soap and water, dry  Apply silver foam (cut to fit, remove plastic backing)--sticky side down over wound  Secure with medipore tape (up and down, not around)  Change every day or as needed    Please call if any questions/concerns and/or problems ([phone])                 Follow-ups after your visit        Follow-up notes from your care team     Return in about 3 weeks (around 3/6/2018).      Your next 10 appointments already scheduled     Mar 06, 2018  4:00 PM CST   (Arrive by 3:30 PM)   Return Visit with Macrina Dixon NP   [facility] ([facility] )    [address] [place] [address] [phone]  "             Who to contact     If you have questions or need follow up information about today's clinic visit or your schedule please contact [facility] directly at [phone].  Normal or non-critical lab and imaging results will be communicated to you by MyChart, letter or phone within 4 business days after the clinic has received the results. If you do not hear from us within 7 days, please contact the clinic through MyChart or phone. If you have a critical or abnormal lab result, we will notify you by phone as soon as possible.  Submit refill requests through [employer] or call your pharmacy and they will forward the refill request to us. Please allow 3 business days for your refill to be completed.          Additional Information About Your Visit        [facility] [employer] Information     [employer] lets you send messages to your doctor, view your test results, renew your prescriptions, schedule appointments and more. To sign up, go to [email or web address] . Click on \"Log in\" on the left side of the screen, which will take you to the Welcome page. Then click on \"Sign up Now\" on the right side of the page.     You will be asked to enter the access code listed below, as well as some personal information. Please follow the directions to create your username and password.     Your access code is: [email or web address]  Expires: 2018  4:34 PM     Your access code will  in 90 days. If you need help or a new code, please call your [facility] clinic or [phone].        Care EveryWhere ID     This is your Care EveryWhere ID. This could be used by other organizations to access your [facility] medical records  [id number]        Your Vitals Were     Pulse Height Pulse Oximetry BMI (Body Mass Index)          66 5' 2\" (1.575 m) 95% 38.04 kg/m2         Blood Pressure from Last 3 Encounters:   18 128/67   18 122/71   17 118/74    Weight from Last 3 Encounters:   18 208 lb (94.3 kg)   18 207 lb " 11.2 oz (94.2 kg)   11/03/17 208 lb 14.4 oz (94.8 kg)              Today, you had the following     No orders found for display       Primary Care Provider Office Phone # Fax #    Tony Obando [phone]       [facility] [address] [facility] [address]        Equal Access to Services     [facility]: Hadii aad ku hadasho Soomaali, waaxda luqadaha, qaybta kaalmada adeegyada, waxay idiin hayaan adeeg kharash laLamaraan . So [facility] [phone].    ATENCIÓN: Si habla español, tiene a pendleton disposición servicios gratuitos de asistencia lingüística. Llame al [phone].    We comply with applicable federal civil rights laws and [place] laws. We do not discriminate on the basis of race, color, national origin, age, disability, sex, sexual orientation, or gender identity.            Thank you!     Thank you for choosing [facility]  for your care. Our goal is always to provide you with excellent care. Hearing back from our patients is one way we can continue to improve our services. Please take a few minutes to complete the written survey that you may receive in the mail after your visit with us. Thank you!             Your Updated Medication List - Protect others around you: Learn how to safely use, store and throw away your medicines at www.disposemymeds.org.          This list is accurate as of 2/13/18 11:59 PM.  Always use your most recent med list.                   Brand Name Dispense Instructions for use Diagnosis    acetone (Urine) test Strp     50 each    Use as directed    Type 1 diabetes mellitus with hyperglycemia (H)       aspirin 325 MG tablet      Take by mouth daily        blood glucose monitoring test strip    MELISSA CONTOUR NEXT    200 each    Use to test blood sugar 6 times daily or as directed.    Type 1 diabetes mellitus with hyperglycemia (H)       CALCIUM-VITAMIN D PO      Take  by mouth daily. 600 mg        glucagon 1 MG kit      1 mg once         GLUCOSAMINE-CHONDROITIN MAX ST PO      Take  by mouth daily.        insulin aspart 100 UNIT/ML injection    NovoLOG     See Admin Instructions. For use with insulin pump        insulin pump infusion      Date last updated:  6/23/15 Medtronic Minimed: Model 751 BASAL RATES and times: 12   AM (midnight): 1.25 units/hour   2     AM: 1.3 units/hour  6     AM: 0.95 units/hour  6    PM: 1.2 units/hour  Basal Pattern A:  Richard Harvey will use when N/A. CARB RATIO and times: 12   AM (midnight): 12 1    PM:  10 6    PM:    9 Corection Factor (Sensitivity) and times: 12   AM (midnight): 40 mg/dL 6     PM: 45 mg/dL BLOOD GLUCOSE TARGET and times: 12   AM (midnight): 100 - 140 9     AM:  100 - 120 10   PM:  100 - 140 Active Insulin Time:  4 hours Sensor:  Yes: 12   AM (midnight): 70 - 240 Carelink / Diasend username:  [email or web address] Carelink / Diasend Password:  [email or web address]        lisinopril 10 MG tablet    PRINIVIL/ZESTRIL     Take 10 mg by mouth daily        MOTRIN IB PO      Take 400 mg by mouth every 6 hours as needed for moderate pain        MULTI-VITAMIN DAILY PO      Take  by mouth daily.        NEURONTIN 300 MG capsule   Generic drug:  gabapentin      Take 300 mg by mouth 3 times daily        NITROSTAT 0.4 MG sublingual tablet   Generic drug:  nitroGLYcerin      Place under the tongue every 5 minutes as needed        omega-3 fatty acids 1200 MG capsule      Take 1 capsule by mouth daily.        order for DME     5 each    Equipment being ordered:   1.  Duoderm (4x4 in) Apply to affected area, change as needed Disp: 5 Refill: 5    Blister (nonthermal), right foot, initial encounter       simvastatin 40 MG tablet    ZOCOR     Take by mouth At Bedtime        URINE GLUCOSE-KETONES TEST VI

## 2018-02-13 NOTE — PROGRESS NOTES
"SUBJECTIVE:  Richard Harvey, 52 year old, male presents with the following Chief Complaint(s) with HPI to follow:  Chief Complaint   Patient presents with     Diabetes     follow up        Diabetes Follow-up      Patient is checking blood sugars: 5.5x/day.  Results:  Overnight: 45,   Breakfast: 60,   Mid-morning: no checks  Lunch:   Mid-afternoon:   Dinner: <40-62,   Bedtime:       Symptoms of hypoglycemia (low blood sugar): yes--not sure what happened, history of hypoglycemia unawareness    Paresthesias (numbness or burning in feet) or sores: Yes (same); no    Diabetic eye exam within the last year: Yes (July/August)    Breakfast eaten regularly: Yes    Patient counting carbs: trying       HPI: Richard's here today for the follow up regarding his Diabetes mellitus, Type 1.    Lab Results   Component Value Date    A1C 7.2 11/24/2017    A1C 7.4 07/17/2017    A1C 6.8 03/13/2017    A1C 7.1 11/09/2016    A1C 7.6 08/10/2016    A1C 7.6 08/10/2016     Current Diabetes medication:   1.  Insulin pump, uses Novolog; has a sensor, but doesn't wear it  ASA use: yes, 325 mg daily  Statin use: yes, simvastatin 40 mg at bedime  Denies any issues with the insulin pump.      Richard's here for the follow up from recent insulin adjust.   Continues to have low BGs.    Hasn't been using his temp basal after work or exercising.     Richard's also here for follow up regarding his right great toe.    Back story:   Started about 2-3 weeks ago.   Reports it feels \"rough\" at the end of it.  He can't visualize it.    Not sure how it started.   Denies trauma  No fevers and/or chills.   Wears a brace on that foot, but it has an open toe.    Denies another concerns.      Patient Active Problem List   Diagnosis     DM type 1 (diabetes mellitus, type 1) (H)     HTN (hypertension)     Hyperlipidemia     ACP (advance care planning)       History reviewed. No pertinent past medical history.    History reviewed. No pertinent " surgical history.    History reviewed. No pertinent family history.    Social History   Substance Use Topics     Smoking status: Never Smoker     Smokeless tobacco: Never Used     Alcohol use No       Current Outpatient Prescriptions   Medication Sig Dispense Refill     blood glucose monitoring (MELISSA CONTOUR NEXT) test strip Use to test blood sugar 6 times daily or as directed. 200 each 11     order for DME Equipment being ordered:     1.  Duoderm (4x4 in)  Apply to affected area, change as needed  Disp: 5  Refill: 5 5 each 5     URINE GLUCOSE-KETONES TEST VI        acetone, Urine, test STRP Use as directed 50 each 3     MOTRIN IB PO Take 400 mg by mouth every 6 hours as needed for moderate pain       INSULIN PUMP - OUTPATIENT Date last updated:  6/23/15  Med[employer] Minimed: Model 751  BASAL RATES and times:  12   AM (midnight): 1.25 units/hour    2     AM: 1.3 units/hour   6     AM: 0.95 units/hour   6    PM: 1.2 units/hour   Basal Pattern A:  Richard Harvey will use when N/A.  CARB RATIO and times:  12   AM (midnight): 12  1    PM:  10  6    PM:    9  Corection Factor (Sensitivity) and times:  12   AM (midnight): 40 mg/dL  6     PM: 45 mg/dL  BLOOD GLUCOSE TARGET and times:  12   AM (midnight): 100 - 140  9     AM:  100 - 120  10   PM:  100 - 140  Active Insulin Time:  4 hours  Sensor:  Yes: 12   AM (midnight): 70 - 240  Carelink / Diasend username:  [email or web address]  Carelink / Diasend Password:  [email or web address]       aspirin 325 MG tablet Take by mouth daily       gabapentin (NEURONTIN) 300 MG capsule Take 300 mg by mouth 3 times daily       glucagon 1 MG injection 1 mg once       lisinopril (PRINIVIL,ZESTRIL) 10 MG tablet Take 10 mg by mouth daily       nitroglycerin (NITROSTAT) 0.4 MG SL tablet Place under the tongue every 5 minutes as needed       simvastatin (ZOCOR) 40 MG tablet Take by mouth At Bedtime       CALCIUM-VITAMIN D PO Take  by mouth daily. 600 mg       omega-3 fatty acids (FISH OIL) 1200 MG capsule Take 1  "capsule by mouth daily.       Glucosamine-Chondroit-Vit C-Mn (GLUCOSAMINE-CHONDROITIN MAX ST PO) Take  by mouth daily.       Multiple Vitamin (MULTI-VITAMIN DAILY PO) Take  by mouth daily.       insulin aspart (NovoLOG) inject - to fill ambulatory pump See Admin Instructions. For use with insulin pump         No Known Allergies    REVIEW OF SYSTEMS  Skin: as noted above  Eyes: negative  Ears/Nose/Throat: negative  Respiratory: No shortness of breath, dyspnea on exertion, cough, or hemoptysis  Cardiovascular: negative  Gastrointestinal: negative  Genitourinary: negative  Musculoskeletal: positive for upper back \"tightness\" that changes with position; history of arthritis (thumbs)--same  Neurologic: positive for numbness or tingling of hands (right hand--carpel tunnel surgery; left hand: carpel tunnel) and numbness or tingling of feet--same  Psychiatric: negative  Hematologic/Lymphatic/Immunologic: negative  Endocrine: positive for diabetes    OBJECTIVE:  /67  Pulse 66  Ht 5' 2\" (1.575 m)  Wt 208 lb (94.3 kg)  SpO2 95%  BMI 38.04 kg/m2  Constitutional: healthy, alert and no distress  Neck: neck supple, no lymphadenopathy, trachea midline, thyroid symmetrical with out nodules noted.   Cardiovascular: RRR. No murmurs, clicks gallops or rub  Respiratory: Good diaphragmatic excursion. Lungs clear  Skin:   Wound description: Type of Wound- diabetic toe ulcer; Location- right foot, great toe (dorsal/tip), Drainage amount-none, Drainage color-none, Odor- nond; Wound bed-calloused cap (after debridement, 100% pink), Surrounding skin-calloused.  Measurements: 0.1 x 0.1 cm  Dressing change: Wound cleansed with soap and water, dried, debrided, dressed with silver foam + medipore tape.     Psychiatric: mentation appears normal and affect normal/bright  Hematologic/Lymphatic/Immunologic: normal ant/post cervical nodes    Washed wound with soap and water.  Dried.    Noted as mentioned above   Conservative sharps " debridement:  2 patient identifiers used and betadine applied prior to debridement.  Explained risks and benefits of procedure.  Patient consents to continue.    I removed <20 sqcm of nonviable tissue from on top of wound using a sharp, sterile scalpel (10).    No bleeding, no pain.  Rinsed with normal saline.    Wound care includes as noted above    LAB  Results for orders placed or performed in visit on 17   C FOOT EXAM  NO CHARGE    Narrative    Diabetic foot exam: normal DP and PT pulses, no trophic changes or ulcerative lesions.  Left foot: normal monofilament exam; right foot: unable to feel monofilament due to history of accident as a kid, wears a brace   TSH with free T4 reflex   Result Value Ref Range    TSH 2.00 0.40 - 4.00 mU/L   Hemoglobin A1c   Result Value Ref Range    Hemoglobin A1C 7.2 4 - 6 %   Albumin Random Urine Quantitative with Creat Ratio   Result Value Ref Range    Albumin Urine mg/spec      Albumin Urine mg/g Cr 0.5 MG/G Creatinine    Creatinine Urine     LDL cholesterol   Result Value Ref Range    LDL Cholesterol Calculated 62 <100 mg/dL       ASSESSMENT / PLAN:.  (S91.101D) Open wound of great toe, right, subsequent encounter  Comment: improving  Plan: continue with current wound care plan    Treatment goal: prevent infection, moisture balance    (E10.65) Type 1 diabetes mellitus with hyperglycemia (H)  (primary encounter diagnosis)  Comment:   Insulin pump information:   Average: 159 +/- 89 (last visit: 149 +/- 85)  Basal/bolus ratio: 26.1 (48%)/28.2 (52%)   Testin.5x/day    Hypoglycemia: 10 (14%)    Plan:   Adjustments:  Bolus   1700 12 (was 11)    Insulin sensitivity  1800 50 (was 48)    Recommendations:  1.  Work on using the temp basal and/or entering less carbs the meal before to help prevent low BGs after exercising/work  2.  Keep using the dual wave for meals that contain fat and carbs--even the poptarts.    3.  Keep working on entering the bolus BEFORE consuming carbs.             Patient Instructions   Continue working on healthy eating and moving as best as you can (start low and slow, work up to 30 min, 5x/week)    BG goals:  Fasting and before meals <130, >80  2 hour after eating <180    We only need 1/2 of these numbers to be within target then your A1c will be within target    Medication changes   Watch for continued lows    Try using the dual wave with supper (especially if you are consuming fats and carbs)    If continued low BG mid-afternoon, try reducing your carb amount before lunch    Follow up   3 weeks    Call me sooner if any problems/concerns and/or questions develop including consistent low BGs <70 or consistent high BGs >200  [phone] (Unit Coordinator)    [phone] (Nurse)  [phone] (Macrina's cell)        Wound Care  Wash hands prior to dressing change.   Clean instruments as appropriate    Wash wound with mild soap and water, dry  Apply silver foam (cut to fit, remove plastic backing)--sticky side down over wound  Secure with medipore tape (up and down, not around)  Change every day or as needed    Please call if any questions/concerns and/or problems ([phone])           Time: 45 min  Barrier: none  Willingness to learn: accepting    Macrina Dixon RN FNP-BC  Disease Management    Cc: Dr. ALEJANDRA Obando    With the electronic record, we can now more quickly and easily track our patient diabetic goals. Our diabetes clinical review is in progress and these are the indicators we are monitoring for good diabetes health.     1.) HbA1C less than 7 (measurement of your average blood sugars)  2.) Blood Pressure less than 140/80  3.) LDL less than 100 (bad cholesterol)  4.) HbA1C is checked in the last 6 months and below 7% (more frequently if not at goal or adjusting medications)  5.) LDL is checked in the last 12 months (more frequently if not at goal or adjusting medications)  6.) Taking one baby aspirin daily (unless otherwise instructed)  7.) No tobacco  "use  8) Statin use     You have achieved 7 out of 8 of these and I am encouraging you to come in and get tuned up to achieve 8 out of 8!  Here is what you have achieved so far in my goals for you:  1.) HbA1C  less than 7:                              NO  Your last  HbA1C :   Lab Results   Component Value Date    A1C 7.2 11/24/2017    A1C 7.4 07/17/2017    A1C 6.8 03/13/2017    A1C 7.1 11/09/2016    A1C 7.6 08/10/2016    A1C 7.6 08/10/2016       2.) Blood Pressure less than 140/80:       YES      Your last    /67  Pulse 66  Ht 5' 2\" (1.575 m)  Wt 208 lb (94.3 kg)  SpO2 95%  BMI 38.04 kg/m2    3.) LDL less than 100:                              YES      Your last   LDL         LDL Cholesterol Calculated   Date Value Ref Range Status   11/24/2017 62 <100 mg/dL Final   ]    4.) Checked HbA1C in the past 6 months: YES      5.) Checked LDL in the past 12 months:    YES      6.) Taking one aspirin daily:                       YES     7.) No tobacco use:                                        YES      8.) Statin use      YES              "

## 2018-02-14 NOTE — PATIENT INSTRUCTIONS
Continue working on healthy eating and moving as best as you can (start low and slow, work up to 30 min, 5x/week)    BG goals:  Fasting and before meals <130, >80  2 hour after eating <180    We only need 1/2 of these numbers to be within target then your A1c will be within target    Medication changes   Watch for continued lows    Try using the dual wave with supper (especially if you are consuming fats and carbs)    If continued low BG mid-afternoon, try reducing your carb amount before lunch    Follow up   3 weeks    Call me sooner if any problems/concerns and/or questions develop including consistent low BGs <70 or consistent high BGs >200  [phone] (Unit Coordinator)    [phone] (Nurse)  [phone] (Macrina's cell)        Wound Care  Wash hands prior to dressing change.   Clean instruments as appropriate    Wash wound with mild soap and water, dry  Apply silver foam (cut to fit, remove plastic backing)--sticky side down over wound  Secure with medipore tape (up and down, not around)  Change every day or as needed    Please call if any questions/concerns and/or problems ([phone])

## 2018-03-06 ENCOUNTER — OFFICE VISIT (OUTPATIENT)
Dept: WOUND CARE | Facility: OTHER | Age: 53
End: 2018-03-06
Attending: NURSE PRACTITIONER
Payer: COMMERCIAL

## 2018-03-06 VITALS
DIASTOLIC BLOOD PRESSURE: 79 MMHG | OXYGEN SATURATION: 95 % | RESPIRATION RATE: 12 BRPM | BODY MASS INDEX: 38.28 KG/M2 | WEIGHT: 208 LBS | HEART RATE: 82 BPM | SYSTOLIC BLOOD PRESSURE: 125 MMHG | HEIGHT: 62 IN

## 2018-03-06 DIAGNOSIS — S91.101D OPEN WOUND OF GREAT TOE, RIGHT, SUBSEQUENT ENCOUNTER: Primary | ICD-10-CM

## 2018-03-06 DIAGNOSIS — E10.65 TYPE 1 DIABETES MELLITUS WITH HYPERGLYCEMIA (H): ICD-10-CM

## 2018-03-06 PROCEDURE — 97597 DBRDMT OPN WND 1ST 20 CM/<: CPT | Performed by: NURSE PRACTITIONER

## 2018-03-06 PROCEDURE — 99213 OFFICE O/P EST LOW 20 MIN: CPT | Mod: 25 | Performed by: NURSE PRACTITIONER

## 2018-03-06 PROCEDURE — G0463 HOSPITAL OUTPT CLINIC VISIT: HCPCS | Mod: 25

## 2018-03-06 PROCEDURE — G0463 HOSPITAL OUTPT CLINIC VISIT: HCPCS

## 2018-03-06 RX ORDER — UREA 8.5 G/85G
CREAM TOPICAL PRN
Qty: 142 G | Refills: 3 | Status: SHIPPED | OUTPATIENT
Start: 2018-03-06 | End: 2018-09-19

## 2018-03-06 ASSESSMENT — PAIN SCALES - GENERAL: PAINLEVEL: NO PAIN (0)

## 2018-03-06 NOTE — PROGRESS NOTES
"SUBJECTIVE:  Richard Harvey, 52 year old, male presents with the following Chief Complaint(s) with HPI to follow:  Chief Complaint   Patient presents with     Diabetes     follow up        Diabetes Follow-up      Patient is checking blood sugars: 4.5x/day.  Results:  Overnight: 62, 232  Breakfast: 63-69,   Mid-mornin  Lunch: 47, 51, 58, 108->400  Mid-afternoon: 121  Dinner: 52-66, 127-316  Bedtime:       Symptoms of hypoglycemia (low blood sugar): yes--not sure what happened, history of hypoglycemia unawareness    Paresthesias (numbness or burning in feet) or sores: Yes (same); yes--right great toe    Diabetic eye exam within the last year: Yes ()    Breakfast eaten regularly: Yes    Patient counting carbs: trying       HPI: Richard's here today for the follow up regarding his Diabetes mellitus, Type 1.    Lab Results   Component Value Date    A1C 7.2 2017    A1C 7.4 2017    A1C 6.8 2017    A1C 7.1 2016    A1C 7.6 08/10/2016    A1C 7.6 08/10/2016     Current Diabetes medication:   1.  Insulin pump, uses Novolog; has a sensor, but doesn't wear it  ASA use: yes, 325 mg daily  Statin use: yes, simvastatin 40 mg at bedime  Denies any issues with the insulin pump.      Richard's here for the follow up from recent insulin adjust.   Continues to have a lot low BGs.    Hasn't been using his temp basal after work or exercising.     Richard's also here for follow up regarding his right great toe.    Back story:   Started about a month ago.   Reports it feels \"rough\" at the end of it.  He can't visualize it.    Not sure how it started.   Denies trauma  No fevers and/or chills.   Wears a brace on that foot, but it has an open toe.    Current treatment: silver foam + medipore tape, changed daily      Patient Active Problem List   Diagnosis     DM type 1 (diabetes mellitus, type 1) (H)     HTN (hypertension)     Hyperlipidemia     ACP (advance care planning)       History reviewed. No " pertinent past medical history.    History reviewed. No pertinent surgical history.    History reviewed. No pertinent family history.    Social History   Substance Use Topics     Smoking status: Never Smoker     Smokeless tobacco: Never Used     Alcohol use No       Current Outpatient Prescriptions   Medication Sig Dispense Refill     blood glucose monitoring (MELISSA CONTOUR NEXT MONITOR) meter device kit 1 each       Foot Care Products (ROLLING FOOT MASSAGER) MISC        blood glucose monitoring (MELISSA CONTOUR NEXT) test strip TEST 5-6 TIMES DAILY AS DIRECTED.       urea (CARMOL) 10 % cream Apply topically as needed for dry skin 142 g 3     blood glucose monitoring (MELISSA CONTOUR NEXT) test strip Use to test blood sugar 6 times daily or as directed. 200 each 11     order for DME Equipment being ordered:     1.  Duoderm (4x4 in)  Apply to affected area, change as needed  Disp: 5  Refill: 5 5 each 5     URINE GLUCOSE-KETONES TEST VI        acetone, Urine, test STRP Use as directed 50 each 3     MOTRIN IB PO Take 400 mg by mouth every 6 hours as needed for moderate pain       INSULIN PUMP - OUTPATIENT Date last updated:  6/23/15  Medtronic Minimed: Model 751  BASAL RATES and times:  12   AM (midnight): 1.25 units/hour    2     AM: 1.3 units/hour   6     AM: 0.95 units/hour   6    PM: 1.2 units/hour   Basal Pattern A:  Richard Harvey will use when N/A.  CARB RATIO and times:  12   AM (midnight): 12  1    PM:  10  6    PM:    9  Corection Factor (Sensitivity) and times:  12   AM (midnight): 40 mg/dL  6     PM: 45 mg/dL  BLOOD GLUCOSE TARGET and times:  12   AM (midnight): 100 - 140  9     AM:  100 - 120  10   PM:  100 - 140  Active Insulin Time:  4 hours  Sensor:  Yes: 12   AM (midnight): 70 - 240  Carelink / Diasend username:  [email or web address]  Carelink / Diasend Password:  [email or web address]       aspirin 325 MG tablet Take by mouth daily       gabapentin (NEURONTIN) 300 MG capsule Take 300 mg by mouth 3 times daily       glucagon 1  "MG injection 1 mg once       lisinopril (PRINIVIL,ZESTRIL) 10 MG tablet Take 10 mg by mouth daily       nitroglycerin (NITROSTAT) 0.4 MG SL tablet Place under the tongue every 5 minutes as needed       simvastatin (ZOCOR) 40 MG tablet Take by mouth At Bedtime       CALCIUM-VITAMIN D PO Take  by mouth daily. 600 mg       omega-3 fatty acids (FISH OIL) 1200 MG capsule Take 1 capsule by mouth daily.       Glucosamine-Chondroit-Vit C-Mn (GLUCOSAMINE-CHONDROITIN MAX ST PO) Take  by mouth daily.       Multiple Vitamin (MULTI-VITAMIN DAILY PO) Take  by mouth daily.       insulin aspart (NovoLOG) inject - to fill ambulatory pump See Admin Instructions. For use with insulin pump         No Known Allergies    REVIEW OF SYSTEMS  Skin: as noted above  Eyes: negative  Ears/Nose/Throat: negative  Respiratory: No shortness of breath, dyspnea on exertion, cough, or hemoptysis  Cardiovascular: negative  Gastrointestinal: negative  Genitourinary: negative  Musculoskeletal: positive for upper back \"tightness\" that changes with position; history of arthritis (thumbs)--same  Neurologic: positive for numbness or tingling of hands (right hand--carpel tunnel surgery; left hand: carpel tunnel) and numbness or tingling of feet--same  Psychiatric: negative  Hematologic/Lymphatic/Immunologic: negative  Endocrine: positive for diabetes    OBJECTIVE:  /79 (BP Location: Right arm, Patient Position: Sitting, Cuff Size: Adult Large)  Pulse 82  Resp 12  Ht 5' 2\" (1.575 m)  Wt 208 lb (94.3 kg)  SpO2 95%  BMI 38.04 kg/m2  Constitutional: healthy, alert and no distress  Neck: neck supple, no lymphadenopathy, trachea midline, thyroid symmetrical with out nodules noted.   Cardiovascular: RRR. No murmurs, clicks gallops or rub  Respiratory: Good diaphragmatic excursion. Lungs clear  Skin:   Wound description: Type of Wound- diabetic toe ulcer; Location- right foot, great toe (dorsal/tip), Drainage amount-none, Drainage color-none, Odor- none; " Wound bed-calloused cap (after debridement, 100% healed), Surrounding skin-calloused.  Measurements: 100% epithelialized   Dressing change: Wound cleansed with soap and water, dried, debrided and left open to air.     Psychiatric: mentation appears normal and affect normal/bright  Hematologic/Lymphatic/Immunologic: normal ant/post cervical nodes    Washed wound with soap and water.  Dried.    Noted as mentioned above   Conservative sharps debridement:  2 patient identifiers used and betadine applied prior to debridement.  Explained risks and benefits of procedure.  Patient consents to continue.    I removed <20 sqcm of nonviable tissue from on top of wound using a sharp, sterile scalpel (10).    No bleeding, no pain.  Rinsed with normal saline.    Wound care includes as noted above    LAB  Results for orders placed or performed in visit on 12/06/17   C FOOT EXAM  NO CHARGE    Narrative    Diabetic foot exam: normal DP and PT pulses, no trophic changes or ulcerative lesions.  Left foot: normal monofilament exam; right foot: unable to feel monofilament due to history of accident as a kid, wears a brace   TSH with free T4 reflex   Result Value Ref Range    TSH 2.00 0.40 - 4.00 mU/L   Hemoglobin A1c   Result Value Ref Range    Hemoglobin A1C 7.2 4 - 6 %   Albumin Random Urine Quantitative with Creat Ratio   Result Value Ref Range    Albumin Urine mg/spec      Albumin Urine mg/g Cr 0.5 MG/G Creatinine    Creatinine Urine     LDL cholesterol   Result Value Ref Range    LDL Cholesterol Calculated 62 <100 mg/dL       ASSESSMENT / PLAN:.  (S91.101D) Open wound of great toe, right, subsequent encounter  (primary encounter diagnosis)  Comment: 100% healed   Plan: urea (CARMOL) 10 % cream          Ordered cream to help keep calloused areas soft.      (E10.65) Type 1 diabetes mellitus with hyperglycemia (H)  Comment:   Insulin pump information:   Average: 175 +/- 96 (last visit: 159 +/- 89)  Basal/bolus ratio: 26.1 (47%)/28.9  (53%)   Testin.5x/day    Hypoglycemia: 13 (13%)    Plan:   Adjustments:  Basal  0200 1.2 (was 1.25)  0600 0.925 (was 0.95)    Richard's aware that he might need to start using the dual wave for supper as he does have a combination of both low and high BGs upon waking.      Recommendations:  1.  Work on using the temp basal and/or entering less carbs the meal before to help prevent low BGs after exercising/work  2.  Start using the dual wave for supper.    3.  Keep working on entering the bolus BEFORE consuming carbs.            Patient Instructions   Continue working on healthy eating and moving as best as you can (start low and slow, work up to 30 min, 5x/week)    BG goals:  Fasting and before meals <130, >80  2 hour after eating <180    We only need 1/2 of these numbers to be within target then your A1c will be within target    Medication changes   Watch for continued lows    Try using the dual wave with supper, set duration for at least 2-3 hours    Follow up   4 weeks    Call me sooner if any problems/concerns and/or questions develop including consistent low BGs <70 or consistent high BGs >200  [phone] (Unit Coordinator)    [phone] (Nurse)  [phone] (Macrina's cell)        Time: 40 min  Barrier: none  Willingness to learn: accepting    Macrina Dixon RN [facility]  Disease Management    Cc: Dr. ALEJANDRA Obando    With the electronic record, we can now more quickly and easily track our patient diabetic goals. Our diabetes clinical review is in progress and these are the indicators we are monitoring for good diabetes health.     1.) HbA1C less than 7 (measurement of your average blood sugars)  2.) Blood Pressure less than 140/80  3.) LDL less than 100 (bad cholesterol)  4.) HbA1C is checked in the last 6 months and below 7% (more frequently if not at goal or adjusting medications)  5.) LDL is checked in the last 12 months (more frequently if not at goal or adjusting medications)  6.) Taking one baby aspirin  "daily (unless otherwise instructed)  7.) No tobacco use  8) Statin use     You have achieved 7 out of 8 of these and I am encouraging you to come in and get tuned up to achieve 8 out of 8!  Here is what you have achieved so far in my goals for you:  1.) HbA1C  less than 7:                              NO  Your last  HbA1C :   Lab Results   Component Value Date    A1C 7.2 11/24/2017    A1C 7.4 07/17/2017    A1C 6.8 03/13/2017    A1C 7.1 11/09/2016    A1C 7.6 08/10/2016    A1C 7.6 08/10/2016     2.) Blood Pressure less than 140/80:       YES      Your last    /79 (BP Location: Right arm, Patient Position: Sitting, Cuff Size: Adult Large)  Pulse 82  Resp 12  Ht 5' 2\" (1.575 m)  Wt 208 lb (94.3 kg)  SpO2 95%  BMI 38.04 kg/m2    3.) LDL less than 100:                              YES      Your last   LDL         LDL Cholesterol Calculated   Date Value Ref Range Status   11/24/2017 62 <100 mg/dL Final   ]    4.) Checked HbA1C in the past 6 months: YES      5.) Checked LDL in the past 12 months:    YES      6.) Taking one aspirin daily:                       YES     7.) No tobacco use:                                        YES      8.) Statin use      YES              "

## 2018-03-06 NOTE — NURSING NOTE
"Chief Complaint   Patient presents with     Diabetes     follow up       Initial /79 (BP Location: Right arm, Patient Position: Sitting, Cuff Size: Adult Large)  Pulse 82  Resp 12  Ht 5' 2\" (1.575 m)  Wt 208 lb (94.3 kg)  SpO2 95%  BMI 38.04 kg/m2 Estimated body mass index is 38.04 kg/(m^2) as calculated from the following:    Height as of this encounter: 5' 2\" (1.575 m).    Weight as of this encounter: 208 lb (94.3 kg).  Medication Reconciliation: complete   Veronica Araya    "

## 2018-03-06 NOTE — PATIENT INSTRUCTIONS
Continue working on healthy eating and moving as best as you can (start low and slow, work up to 30 min, 5x/week)    BG goals:  Fasting and before meals <130, >80  2 hour after eating <180    We only need 1/2 of these numbers to be within target then your A1c will be within target    Medication changes   Watch for continued lows    Try using the dual wave with supper, set duration for at least 2-3 hours    Follow up   4 weeks    Call me sooner if any problems/concerns and/or questions develop including consistent low BGs <70 or consistent high BGs >200  [phone] (Unit Coordinator)    [phone] (Nurse)  [phone] (Marcina's cell)

## 2018-03-06 NOTE — MR AVS SNAPSHOT
After Visit Summary   3/6/2018    Richard Harvey    MRN: [id number]           Patient Information     Date Of Birth          1965        Visit Information        Provider Department      3/6/2018 4:00 PM Macrina Dixon NP [facility]        Today's Diagnoses     Open wound of great toe, right, subsequent encounter    -  1    Type 1 diabetes mellitus with hyperglycemia (H)          Care Instructions    Continue working on healthy eating and moving as best as you can (start low and slow, work up to 30 min, 5x/week)    BG goals:  Fasting and before meals <130, >80  2 hour after eating <180    We only need 1/2 of these numbers to be within target then your A1c will be within target    Medication changes   Watch for continued lows    Try using the dual wave with supper, set duration for at least 2-3 hours    Follow up   4 weeks    Call me sooner if any problems/concerns and/or questions develop including consistent low BGs <70 or consistent high BGs >200  [phone] (Unit Coordinator)    [phone] (Nurse)  [phone] (Macrina's cell)              Follow-ups after your visit        Who to contact     If you have questions or need follow up information about today's clinic visit or your schedule please contact [facility] directly at [phone].  Normal or non-critical lab and imaging results will be communicated to you by SmartStarthart, letter or phone within 4 business days after the clinic has received the results. If you do not hear from us within 7 days, please contact the clinic through SmartStarthart or phone. If you have a critical or abnormal lab result, we will notify you by phone as soon as possible.  Submit refill requests through [employer] [place] or call your pharmacy and they will forward the refill request to us. Please allow 3 business days for your refill to be completed.          Additional Information About Your Visit        SmartStartharBuyosphere Information     [employer] [place] lets you send  "messages to your doctor, view your test results, renew your prescriptions, schedule appointments and more. To sign up, go to [email or web address] . Click on \"Log in\" on the left side of the screen, which will take you to the Welcome page. Then click on \"Sign up Now\" on the right side of the page.     You will be asked to enter the access code listed below, as well as some personal information. Please follow the directions to create your username and password.     Your access code is: [email or web address]  Expires: 2018  4:34 PM     Your access code will  in 90 days. If you need help or a new code, please call your [facility] clinic or [phone].        Care EveryWhere ID     This is your Care EveryWhere ID. This could be used by other organizations to access your [facility] medical records  [id number]        Your Vitals Were     Pulse Respirations Height Pulse Oximetry BMI (Body Mass Index)       82 12 5' 2\" (1.575 m) 95% 38.04 kg/m2        Blood Pressure from Last 3 Encounters:   18 125/79   18 128/67   18 122/71    Weight from Last 3 Encounters:   18 208 lb (94.3 kg)   18 208 lb (94.3 kg)   18 207 lb 11.2 oz (94.2 kg)              Today, you had the following     No orders found for display         Today's Medication Changes          These changes are accurate as of 3/6/18  4:55 PM.  If you have any questions, ask your nurse or doctor.               Start taking these medicines.        Dose/Directions    urea 10 % cream   Commonly known as:  CARMOL   Used for:  Open wound of great toe, right, subsequent encounter, Type 1 diabetes mellitus with hyperglycemia (H)   Started by:  Macrina Dixon NP        Apply topically as needed for dry skin   Quantity:  142 g   Refills:  3            Where to get your medicines      These medications were sent to [facility] - [place], [place] - [address]  [id number] , [place] [address]     Phone:  [phone]     urea 10 % " cream                Primary Care Provider Office Phone # Fax #    Tnoy Obando [phone]       [facility] [address] [facility] [address]        Equal Access to Services     CHELSY BARILLAS : Hadii tyrese castillo arabellao Solyudmila, waaxda luqadaha, qaybta kaalmada vargas, sary park laLamaradriel vega. So [facility] [phone].    ATENCIÓN: Si habla español, tiene a pendleton disposición servicios gratuitos de asistencia lingüística. Llame al [phone].    We comply with applicable federal civil rights laws and [place] laws. We do not discriminate on the basis of race, color, national origin, age, disability, sex, sexual orientation, or gender identity.            Thank you!     Thank you for choosing [facility]  for your care. Our goal is always to provide you with excellent care. Hearing back from our patients is one way we can continue to improve our services. Please take a few minutes to complete the written survey that you may receive in the mail after your visit with us. Thank you!             Your Updated Medication List - Protect others around you: Learn how to safely use, store and throw away your medicines at www.disposemymeds.org.          This list is accurate as of 3/6/18  4:55 PM.  Always use your most recent med list.                   Brand Name Dispense Instructions for use Diagnosis    acetone (Urine) test Strp     50 each    Use as directed    Type 1 diabetes mellitus with hyperglycemia (H)       aspirin 325 MG tablet      Take by mouth daily        blood glucose monitoring meter device kit      1 each        * MELISSA CONTOUR NEXT test strip   Generic drug:  blood glucose monitoring      TEST 5-6 TIMES DAILY AS DIRECTED.        * blood glucose monitoring test strip    MELISSA CONTOUR NEXT    200 each    Use to test blood sugar 6 times daily or as directed.    Type 1 diabetes mellitus with hyperglycemia (H)       CALCIUM-VITAMIN D PO      Take  by mouth  daily. 600 mg        glucagon 1 MG kit      1 mg once        GLUCOSAMINE-CHONDROITIN MAX ST PO      Take  by mouth daily.        insulin aspart 100 UNIT/ML injection    NovoLOG     See Admin Instructions. For use with insulin pump        insulin pump infusion      Date last updated:  6/23/15 [email or web address] Minimed: Model 751 BASAL RATES and times: 12   AM (midnight): 1.25 units/hour   2     AM: 1.3 units/hour  6     AM: 0.95 units/hour  6    PM: 1.2 units/hour  Basal Pattern A:  Richard Harvey will use when N/A. CARB RATIO and times: 12   AM (midnight): 12 1    PM:  10 6    PM:    9 Corection Factor (Sensitivity) and times: 12   AM (midnight): 40 mg/dL 6     PM: 45 mg/dL BLOOD GLUCOSE TARGET and times: 12   AM (midnight): 100 - 140 9     AM:  100 - 120 10   PM:  100 - 140 Active Insulin Time:  4 hours Sensor:  Yes: 12   AM (midnight): 70 - 240 Carelink / Diasend username:  [email or web address] [employer] Password:  [email or web address]        lisinopril 10 MG tablet    PRINIVIL/ZESTRIL     Take 10 mg by mouth daily        MOTRIN IB PO      Take 400 mg by mouth every 6 hours as needed for moderate pain        MULTI-VITAMIN DAILY PO      Take  by mouth daily.        NEURONTIN 300 MG capsule   Generic drug:  gabapentin      Take 300 mg by mouth 3 times daily        NITROSTAT 0.4 MG sublingual tablet   Generic drug:  nitroGLYcerin      Place under the tongue every 5 minutes as needed        omega-3 fatty acids 1200 MG capsule      Take 1 capsule by mouth daily.        order for DME     5 each    Equipment being ordered:   1.  Duoderm (4x4 in) Apply to affected area, change as needed Disp: 5 Refill: 5    Blister (nonthermal), right foot, initial encounter       Rolling Foot Massager Misc           simvastatin 40 MG tablet    ZOCOR     Take by mouth At Bedtime        urea 10 % cream    CARMOL    142 g    Apply topically as needed for dry skin    Open wound of great toe, right, subsequent encounter, Type 1 diabetes mellitus with  hyperglycemia (H)       URINE GLUCOSE-KETONES TEST VI           * Notice:  This list has 2 medication(s) that are the same as other medications prescribed for you. Read the directions carefully, and ask your doctor or other care provider to review them with you.

## 2018-07-12 ENCOUNTER — TRANSFERRED RECORDS (OUTPATIENT)
Dept: HEALTH INFORMATION MANAGEMENT | Facility: CLINIC | Age: 53
End: 2018-07-12

## 2018-07-25 LAB — HBA1C MFR BLD: 7.7 % (ref 0–5.6)

## 2018-09-19 DIAGNOSIS — S91.101D OPEN WOUND OF GREAT TOE, RIGHT, SUBSEQUENT ENCOUNTER: ICD-10-CM

## 2018-09-19 DIAGNOSIS — E10.65 TYPE 1 DIABETES MELLITUS WITH HYPERGLYCEMIA (H): ICD-10-CM

## 2018-09-19 NOTE — TELEPHONE ENCOUNTER
carmol      Last Written Prescription Date:  3/6/18  Last Fill Quantity: 142 g,   # refills: 3  Last Office Visit: 3/6/18

## 2018-09-24 RX ORDER — UREA 8.5 G/85G
CREAM TOPICAL
Qty: 57 G | Refills: 3 | Status: SHIPPED | OUTPATIENT
Start: 2018-09-24 | End: 2019-01-03

## 2018-12-18 ENCOUNTER — HOSPITAL ENCOUNTER (OUTPATIENT)
Dept: MRI IMAGING | Facility: HOSPITAL | Age: 53
Discharge: HOME | End: 2018-12-18
Attending: FAMILY MEDICINE | Admitting: FAMILY MEDICINE
Payer: OTHER MISCELLANEOUS

## 2018-12-18 DIAGNOSIS — M25.561 ACUTE PAIN OF RIGHT KNEE: ICD-10-CM

## 2018-12-18 PROCEDURE — 73721 MRI JNT OF LWR EXTRE W/O DYE: CPT | Mod: TC,RT

## 2018-12-26 VITALS — DIASTOLIC BLOOD PRESSURE: 64 MMHG | SYSTOLIC BLOOD PRESSURE: 120 MMHG

## 2019-01-03 ENCOUNTER — ALLIED HEALTH/NURSE VISIT (OUTPATIENT)
Dept: EDUCATION SERVICES | Facility: OTHER | Age: 54
End: 2019-01-03
Attending: NURSE PRACTITIONER
Payer: COMMERCIAL

## 2019-01-03 ENCOUNTER — TELEPHONE (OUTPATIENT)
Dept: EDUCATION SERVICES | Facility: OTHER | Age: 54
End: 2019-01-03

## 2019-01-03 VITALS — SYSTOLIC BLOOD PRESSURE: 118 MMHG | HEART RATE: 87 BPM | DIASTOLIC BLOOD PRESSURE: 64 MMHG

## 2019-01-03 DIAGNOSIS — E10.65 TYPE 1 DIABETES MELLITUS WITH HYPERGLYCEMIA (H): Primary | ICD-10-CM

## 2019-01-03 PROCEDURE — 99213 OFFICE O/P EST LOW 20 MIN: CPT | Performed by: NURSE PRACTITIONER

## 2019-01-03 PROCEDURE — G0463 HOSPITAL OUTPT CLINIC VISIT: HCPCS

## 2019-01-03 RX ORDER — UREA 8.5 G/85G
CREAM TOPICAL
Qty: 85 G | Refills: 3 | Status: SHIPPED | OUTPATIENT
Start: 2019-01-03 | End: 2019-08-01

## 2019-01-03 ASSESSMENT — PAIN SCALES - GENERAL: PAINLEVEL: EXTREME PAIN (8)

## 2019-01-03 NOTE — PROGRESS NOTES
"SUBJECTIVE:  Richard Harvey, 53 year old, male presents with the following Chief Complaint(s) with HPI to follow:  Chief Complaint   Patient presents with     Diabetes     Pump adjust        Diabetes Follow-up      Patient is checking blood sugars: 4.1x/day.  Results:  Overnight:   Breakfast: 105, 283  Mid-mornin-172  Lunch: 117->400  Mid-afternoon: 206-365  Dinner: 112->400  Bedtime:       Symptoms of hypoglycemia (low blood sugar): none, history of hypoglycemia unawareness    Paresthesias (numbness or burning in feet) or sores: Yes (\"same\"); no    Diabetic eye exam within the last year: Yes ()    Breakfast eaten regularly: Yes    Patient counting carbs: trying       HPI: Richard's here today for the follow up regarding his Diabetes mellitus, Type 1.    Lab Results   Component Value Date    A1C 7.7 2018    A1C 7.2 2017    A1C 7.4 2017    A1C 6.8 2017    A1C 7.1 2016     Current Diabetes medication:   1.  Insulin pump, uses Novolog; has a sensor, but doesn't wear it  ASA use: yes, 325 mg daily  Statin use: yes, simvastatin 40 mg at bedime  Denies any issues with the insulin pump.      Richard's here for insulin pump download and possible adjust.  He reports the following:  He fractured his knee cap on 18.   Hasn't an appointment at orthopedics tomorrow.    Because of this, he isn't working or moving much.   Weight gain is unknown today.      Richard reports he's been adding more carbs to his insulin pump during carb boluses--5 to 10 more     Denies any other health concerns besides what was mentioned.         19 0739   General   Presents for Follow-up   Accompanied by Self   Diabetes education in the past 24mo Yes   Focus of Visit Taking Medication;Monitoring;Problem Solving   Diabetes type Type 1   Disease course Worsening   How confident are you filling out medical forms by yourself: Extremely   Transportation concerns No   Other concerns: None "   Symptoms   Blurred vision No   Fatigue No   Neuropathy No   Foot ulcerations No   Polydipsia No   Polyphagia No   Polyuria No   Visual change No   Weakness No   Weight loss No   Slow healing wounds No   Recent Infection(s) No   Other Yes  (broken knee cap)   Symptom course Worsening   Weight trend Fluctuating minimally   Diabetic Complications   Autonomic neuropathy No   CVA No   Heart disease Yes   Nephropathy No   Peripheral neuropathy Yes   Peripheral Vascular Disease No   Retinopathy No        01/07/19 0743   Healthy Eating   Healthy Eating Assessed Today Yes   Cultural/Samaritan diet restrictions? No   Patient on a regular basis Eats 3 meals a day;Counts carbohydrates;Has an inconsistent intake of carbohydrates   Meal planning Carbohydrate counting   Meals include Breakfast;Lunch;Dinner;Snacks   Beverages Water;Juice;Diet soda   Has patient met with a dietitian in the past? Yes   Being Active   Being Active Assessed Today No   Exercise: Unable to exercise   Barrier to exercise Physical limitation   Monitoring   Monitoring Assessed Today Yes   Did patient bring glucose meter to appointment?  Yes   Blood Glucose Meter ContourNext   Home Glucose (Sugar) Monitoring 3-4 times per day   Blood glucose trend Fluctuating dramtically   Low Glucose Range (mg/dL) 70-90   High Glucose Range (mg/dL) >200   Overall Range (mg/dL) >200   Taking Medication   Taking Medication Assessed Today Yes   Current Treatments Insulin Pump   Dose schedule pre-breakfast;pre-lunch;pre-dinner   Given by Patient   Injection/Infusion sites Abdomen   Diabetes medication side effects? No   Treatment Compliance Most of the time   Problem Solving   Problem Solving Assessed Today Yes   Hypoglycemia Frequency Rarely   Hypoglycemia Treatment Glucose (tablets or gel)   Patient carries a carbohydrate source Yes   DKA prevention plan? Yes   Hypoglycemia symptoms   Dizziness or Light-Headedness Yes   Tremors Yes   Hypoglycemia Complications   Blackouts  No   Hospitalization No   Nocturnal hypoglycemia No   Required assistance No   Required glucagon injection No   Seizures No   Reducing Risks   Reducing Risks Assessed Today Yes   Diabetes Risks Age over 45 years;Hypertriglyceridemia;Hyperlipidemia   CAD Risks Diabetes Mellitus;Dyslipidemia;Male sex;Sedentary lifestyle;Obesity   Has dilated eye exam at least once a year? Yes   Healthy Coping   Healthy Coping Assessed Today Yes   Emotional response to diabetes Ready to learn   Informal Support system: None   Difficulty affording diabetes management supplies? No   Support resources In-person Offerings        01/07/19 0740   Insulin Pump   Insulin Pump Type Medtronic Minimed 530G without sensor   Insulin Pump Review   Taking other diabetes medications? No   Patient has glucagon emergency kit Yes   Patient understands DKA prevention Yes   Patient has ketone test strips Yes   Patient has an insulin multiple daily injection back-up plan Yes   Patient would benefit from Change in carbohydrate ratio(s)   Changes made to pump settings Carb ratio   Education specific to insulin pump provided today Importance of changing infusion set every 3 days;How to use a dual/square wave or extended bolus;Importance of bolusing before meals;Benefits of post-meal blood glucose testing     Patient Active Problem List   Diagnosis     DM type 1 (diabetes mellitus, type 1) (H)     HTN (hypertension)     Hyperlipidemia     ACP (advance care planning)       No past medical history on file.    Past Surgical History:   Procedure Laterality Date     COLONOSCOPY - HIM SCAN N/A 09/19/2016    Procedure: COLONOSCOPY SCREENING; Surgeon: Rudy Rojo MD; Location: [facility]       No family history on file.    Social History     Tobacco Use     Smoking status: Never Smoker     Smokeless tobacco: Never Used   Substance Use Topics     Alcohol use: No     Alcohol/week: 0.0 oz       Current Outpatient Medications   Medication Sig Dispense Refill      acetone, Urine, test STRP Use as directed 50 each 3     aspirin 325 MG tablet Take by mouth daily       blood glucose monitoring (MELISSA CONTOUR NEXT MONITOR) meter device kit 1 each       blood glucose monitoring (MELISSA CONTOUR NEXT) test strip TEST 5-6 TIMES DAILY AS DIRECTED.       blood glucose monitoring (MELISSA CONTOUR NEXT) test strip Use to test blood sugar 6 times daily or as directed. 200 each 11     CALCIUM-VITAMIN D PO Take  by mouth daily. 600 mg       Foot Care Products (ROLLING FOOT MASSAGER) MISC        gabapentin (NEURONTIN) 300 MG capsule Take 300 mg by mouth 3 times daily       glucagon 1 MG injection 1 mg once       Glucosamine-Chondroit-Vit C-Mn (GLUCOSAMINE-CHONDROITIN MAX ST PO) Take  by mouth daily.       insulin aspart (NovoLOG) inject - to fill ambulatory pump See Admin Instructions. For use with insulin pump       INSULIN PUMP - OUTPATIENT Date last updated:  6/23/15  Medtronic Minimed: Model 751  BASAL RATES and times:  12   AM (midnight): 1.25 units/hour    2     AM: 1.3 units/hour   6     AM: 0.95 units/hour   6    PM: 1.2 units/hour   Basal Pattern A:  Richard Harvey will use when N/A.  CARB RATIO and times:  12   AM (midnight): 12  1    PM:  10  6    PM:    9  Corection Factor (Sensitivity) and times:  12   AM (midnight): 40 mg/dL  6     PM: 45 mg/dL  BLOOD GLUCOSE TARGET and times:  12   AM (midnight): 100 - 140  9     AM:  100 - 120  10   PM:  100 - 140  Active Insulin Time:  4 hours  Sensor:  Yes: 12   AM (midnight): 70 - 240  Carelink / Diasend username:  [email or web address]  Carelink / Diasend Password:  [email or web address]       lisinopril (PRINIVIL,ZESTRIL) 10 MG tablet Take 10 mg by mouth daily       MOTRIN IB PO Take 400 mg by mouth every 6 hours as needed for moderate pain       Multiple Vitamin (MULTI-VITAMIN DAILY PO) Take  by mouth daily.       nitroglycerin (NITROSTAT) 0.4 MG SL tablet Place under the tongue every 5 minutes as needed       omega-3 fatty acids (FISH OIL) 1200 MG capsule Take  "1 capsule by mouth daily.       order for DME Equipment being ordered:     1.  Duoderm (4x4 in)  Apply to affected area, change as needed  Disp: 5  Refill: 5 5 each 5     simvastatin (ZOCOR) 40 MG tablet Take by mouth At Bedtime       urea (CARMOL) 10 % external cream Apply cream topically as needed for calloused skin 85 g 3     URINE GLUCOSE-KETONES TEST VI          No Known Allergies    REVIEW OF SYSTEMS  Skin: negative  Eyes: negative  Ears/Nose/Throat: negative  Respiratory: No shortness of breath, dyspnea on exertion, cough, or hemoptysis  Cardiovascular: negative  Gastrointestinal: negative  Genitourinary: negative  Musculoskeletal: right knee fractured--sees ortho tomorrow; positive for upper back \"tightness\" that changes with position; history of arthritis (thumbs)--same  Neurologic: positive for numbness or tingling of hands (right hand--carpel tunnel surgery; left hand: carpel tunnel) and numbness or tingling of feet--same  Psychiatric: negative  Hematologic/Lymphatic/Immunologic: negative  Endocrine: positive for diabetes    OBJECTIVE:  /64   Pulse 87   Constitutional: healthy, alert and no distress  Neck: neck supple, no lymphadenopathy, trachea midline, thyroid symmetrical with out nodules noted.   Cardiovascular: RRR. No murmurs, clicks gallops or rub  Respiratory: Good diaphragmatic excursion. Lungs clear  Psychiatric: mentation appears normal and affect normal/bright  Hematologic/Lymphatic/Immunologic: normal ant/post cervical nodes      LAB  Results for orders placed or performed in visit on 19   Hemoglobin A1c   Result Value Ref Range    Hemoglobin A1C 7.7 (A) 0 - 5.6 %       ASSESSMENT / PLAN:.  (E10.65) Type 1 diabetes mellitus with hyperglycemia (H)  (primary encounter diagnosis)  Comment:   BG trend does increase after breakfast    Insulin pump information:   Average: 226 +/- 84  Basal/bolus ratio: 25.7 (45%)/31.2 (55%)   Testin.1x/day    Hypoglycemia: 0    Plan: urea (CARMOL) " 10 % external cream, Hemoglobin         A1c, Basic metabolic panel, Albumin Random         Urine Quantitative with Creat Ratio, Lipid         Profile (Chol, Trig, HDL, LDL calc)                Adjustments:   Bolus: 10    Recommendations:  1.  Start using the dual wave before every meal  2.  Change out insertion set every other day    3.  Keep working on entering the bolus BEFORE consuming carbs.            Patient Instructions   Continue working on healthy eating and moving as best as you can (start low and slow, work up to 30 min, 5x/week)    BG goals:  Fasting and before meals <130, >70  2 hour after eating <180    We only need 1/2 of these numbers to be within target then your A1c will be within target    Medication changes   Watch for lows BG due to changes    Recommendations:  1.  Change the insertion set every 3 days  2.  Dual wave (change duration to at least 2 hours) before every meal  3.  No more adding extra carbs  4.  After 3-4 days of the same insulin pump settings, if your BG is still spiking after meals--please stop in for a nurse only insulin pump adjustment (call first)    Follow up   2 weeks    Call me sooner if any problems/concerns and/or questions develop including consistent low BGs <70 or consistent high BGs >200  [phone] (Unit Coordinator)    [phone] (Nurse)  [phone] (Macrina's cell)        Time: 40 min  Barrier: none  Willingness to learn: accepting    Macrina Dixon RN [facility]  Disease Management    Cc: Dr. ALEJANDRA Obando    With the electronic record, we can now more quickly and easily track our patient diabetic goals. Our diabetes clinical review is in progress and these are the indicators we are monitoring for good diabetes health.     1.) HbA1C less than 7 (measurement of your average blood sugars)  2.) Blood Pressure less than 140/80  3.) LDL less than 100 (bad cholesterol)  4.) HbA1C is checked in the last 6 months and below 7% (more frequently if not at goal or adjusting  medications)  5.) LDL is checked in the last 12 months (more frequently if not at goal or adjusting medications)  6.) Taking one baby aspirin daily (unless otherwise instructed)  7.) No tobacco use  8) Statin use     You have achieved 6 out of 8 of these and I am encouraging you to come in and get tuned up to achieve 8 out of 8!  Here is what you have achieved so far in my goals for you:  1.) HbA1C  less than 7:                              NO  Your last  HbA1C :   Lab Results   Component Value Date    A1C 7.7 07/25/2018    A1C 7.2 11/24/2017    A1C 7.4 07/17/2017    A1C 6.8 03/13/2017    A1C 7.1 11/09/2016     2.) Blood Pressure less than 140/80:       YES      Your last    /64   Pulse 87     3.) LDL less than 100:                              YES      Your last   LDL         LDL Cholesterol Calculated   Date Value Ref Range Status   11/24/2017 62 <100 mg/dL Final   ]    4.) Checked HbA1C in the past 6 months: YES      5.) Checked LDL in the past 12 months:    NO      6.) Taking one aspirin daily:                       YES     7.) No tobacco use:                                        YES      8.) Statin use      YES

## 2019-01-03 NOTE — TELEPHONE ENCOUNTER
Received a PA through [facility] for Urea Cream. This was immediately DENIED. The medication is excluded from the patient's benefit plan. Drug is not covered. No alternatives were provided. Form scanned to Epic.

## 2019-01-03 NOTE — PATIENT INSTRUCTIONS
Continue working on healthy eating and moving as best as you can (start low and slow, work up to 30 min, 5x/week)    BG goals:  Fasting and before meals <130, >70  2 hour after eating <180    We only need 1/2 of these numbers to be within target then your A1c will be within target    Medication changes   Watch for lows BG due to changes    Recommendations:  1.  Change the insertion set every 3 days  2.  Dual wave (change duration to at least 2 hours) before every meal  3.  No more adding extra carbs  4.  After 3-4 days of the same insulin pump settings, if your BG is still spiking after meals--please stop in for a nurse only insulin pump adjustment (call first)    Follow up   2 weeks    Call me sooner if any problems/concerns and/or questions develop including consistent low BGs <70 or consistent high BGs >200  [phone] (Unit Coordinator)    [phone] (Nurse)  [phone] (Macrina's cell)

## 2019-01-21 LAB — HBA1C MFR BLD: 8 % (ref 0–5.6)

## 2019-01-23 ENCOUNTER — DOCUMENTATION ONLY (OUTPATIENT)
Dept: PODIATRY | Facility: OTHER | Age: 54
End: 2019-01-23

## 2019-01-23 ENCOUNTER — OFFICE VISIT (OUTPATIENT)
Dept: WOUND CARE | Facility: OTHER | Age: 54
End: 2019-01-23
Attending: NURSE PRACTITIONER
Payer: COMMERCIAL

## 2019-01-23 ENCOUNTER — OFFICE VISIT (OUTPATIENT)
Dept: PODIATRY | Facility: OTHER | Age: 54
End: 2019-01-23
Attending: NURSE PRACTITIONER
Payer: COMMERCIAL

## 2019-01-23 VITALS — DIASTOLIC BLOOD PRESSURE: 68 MMHG | TEMPERATURE: 98.3 F | SYSTOLIC BLOOD PRESSURE: 118 MMHG | HEART RATE: 75 BPM

## 2019-01-23 VITALS — TEMPERATURE: 98.3 F | SYSTOLIC BLOOD PRESSURE: 118 MMHG | DIASTOLIC BLOOD PRESSURE: 68 MMHG | HEART RATE: 75 BPM

## 2019-01-23 DIAGNOSIS — L08.9 BLISTER OF FOOT WITH INFECTION, RIGHT, INITIAL ENCOUNTER: Primary | ICD-10-CM

## 2019-01-23 DIAGNOSIS — E10.649 TYPE 1 DIABETES MELLITUS WITH HYPOGLYCEMIA AND WITHOUT COMA (H): Primary | ICD-10-CM

## 2019-01-23 DIAGNOSIS — E10.65 UNCONTROLLED TYPE 1 DIABETES MELLITUS WITH HYPERGLYCEMIA (H): ICD-10-CM

## 2019-01-23 DIAGNOSIS — S91.101D OPEN WOUND OF GREAT TOE, RIGHT, SUBSEQUENT ENCOUNTER: ICD-10-CM

## 2019-01-23 DIAGNOSIS — S90.821A BLISTER OF FOOT WITH INFECTION, RIGHT, INITIAL ENCOUNTER: Primary | ICD-10-CM

## 2019-01-23 DIAGNOSIS — E10.42 DIABETIC POLYNEUROPATHY ASSOCIATED WITH TYPE 1 DIABETES MELLITUS (H): ICD-10-CM

## 2019-01-23 DIAGNOSIS — L60.3 ONYCHODYSTROPHY: ICD-10-CM

## 2019-01-23 DIAGNOSIS — S90.121A: ICD-10-CM

## 2019-01-23 DIAGNOSIS — M21.371 FOOT DROP, RIGHT FOOT: ICD-10-CM

## 2019-01-23 PROCEDURE — G0463 HOSPITAL OUTPT CLINIC VISIT: HCPCS | Mod: 25

## 2019-01-23 PROCEDURE — 11721 DEBRIDE NAIL 6 OR MORE: CPT | Performed by: PODIATRIST

## 2019-01-23 PROCEDURE — G0463 HOSPITAL OUTPT CLINIC VISIT: HCPCS

## 2019-01-23 PROCEDURE — G0463 HOSPITAL OUTPT CLINIC VISIT: HCPCS | Mod: 25,27

## 2019-01-23 PROCEDURE — 99203 OFFICE O/P NEW LOW 30 MIN: CPT | Mod: 25 | Performed by: PODIATRIST

## 2019-01-23 PROCEDURE — 99214 OFFICE O/P EST MOD 30 MIN: CPT | Performed by: NURSE PRACTITIONER

## 2019-01-23 RX ORDER — AMOXICILLIN AND CLAVULANATE POTASSIUM 500; 125 MG/1; MG/1
1 TABLET, FILM COATED ORAL 2 TIMES DAILY
Qty: 20 TABLET | Refills: 0 | Status: SHIPPED | OUTPATIENT
Start: 2019-01-23 | End: 2019-02-28

## 2019-01-23 ASSESSMENT — PAIN SCALES - GENERAL
PAINLEVEL: EXTREME PAIN (8)
PAINLEVEL: EXTREME PAIN (8)
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
## 2019-01-23 NOTE — PATIENT INSTRUCTIONS
-Diabetic Foot Education provided. This includes checking the feet daily looking for new new blisters or wounds, wearing shoes at all times when walking including around the house, and avoiding lotion application between the toes. If you notice any signs of infection (redness, swelling, pain, purulence, fever, chills, nausea, vomiting) then return to podiatry or go to the Emergency Department as soon as possible.  -Apply a dry gauze over the right big toe every day with a bandage. Come to the clinic sooner if the blister opens.  -Wear your diabetic shoes.  -Call [facility] in [place] for a shoe adjustment.

## 2019-01-23 NOTE — PROGRESS NOTES
Called patient to let him know, an antibiotic was ordered for him to  at his pharmacy. [phone]
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
## 2019-01-25 RX ORDER — FLASH GLUCOSE SENSOR
1 KIT MISCELLANEOUS
Qty: 3 EACH | Refills: 11 | Status: SHIPPED | OUTPATIENT
Start: 2019-01-25 | End: 2019-07-11

## 2019-01-25 RX ORDER — FLASH GLUCOSE SCANNING READER
1 EACH MISCELLANEOUS
Qty: 1 DEVICE | Refills: 3 | Status: SHIPPED | OUTPATIENT
Start: 2019-01-25 | End: 2019-07-11

## 2019-01-29 DIAGNOSIS — E10.65 TYPE 1 DIABETES MELLITUS WITH HYPERGLYCEMIA (H): Primary | ICD-10-CM

## 2019-01-29 NOTE — TELEPHONE ENCOUNTER
Contour Next Strips      Last Written Prescription Date:  1/26/18  Last Fill Quantity: 200,   # refills: 1  Last Office Visit: 1/3/19  Future Office visit:    Next 5 appointments (look out 90 days)    Feb 01, 2019  1:30 PM CST  (Arrive by 1:15 PM)  Return Visit with Juliette Jarrett DPM  [facility] ([facility] ) [address] [place] [id number] [phone]           Routing refill request to provider for review/approval because:

## 2019-02-01 ENCOUNTER — OFFICE VISIT (OUTPATIENT)
Dept: PODIATRY | Facility: OTHER | Age: 54
End: 2019-02-01
Attending: PODIATRIST
Payer: COMMERCIAL

## 2019-02-01 VITALS
SYSTOLIC BLOOD PRESSURE: 111 MMHG | WEIGHT: 200 LBS | OXYGEN SATURATION: 97 % | DIASTOLIC BLOOD PRESSURE: 60 MMHG | HEART RATE: 84 BPM | TEMPERATURE: 97.7 F | BODY MASS INDEX: 36.58 KG/M2

## 2019-02-01 DIAGNOSIS — S90.821D: Primary | ICD-10-CM

## 2019-02-01 DIAGNOSIS — M21.371 FOOT DROP, RIGHT FOOT: ICD-10-CM

## 2019-02-01 DIAGNOSIS — L60.3 ONYCHODYSTROPHY: ICD-10-CM

## 2019-02-01 DIAGNOSIS — S90.121D: ICD-10-CM

## 2019-02-01 DIAGNOSIS — E10.65 UNCONTROLLED TYPE 1 DIABETES MELLITUS WITH HYPERGLYCEMIA (H): ICD-10-CM

## 2019-02-01 DIAGNOSIS — E10.42 DIABETIC POLYNEUROPATHY ASSOCIATED WITH TYPE 1 DIABETES MELLITUS (H): ICD-10-CM

## 2019-02-01 PROCEDURE — G0463 HOSPITAL OUTPT CLINIC VISIT: HCPCS

## 2019-02-01 PROCEDURE — 99213 OFFICE O/P EST LOW 20 MIN: CPT | Performed by: PODIATRIST

## 2019-02-01 ASSESSMENT — PAIN SCALES - GENERAL: PAINLEVEL: NO PAIN (0)

## 2019-02-01 NOTE — NURSING NOTE
"Chief Complaint   Patient presents with     Wound Check     right foot, great toe ulcer        Initial There were no vitals taken for this visit. Estimated body mass index is 38.04 kg/m  as calculated from the following:    Height as of 3/6/18: 1.575 m (5' 2\").    Weight as of 3/6/18: 94.3 kg (208 lb).  Medication Reconciliation: complete    Radha Richter LPN  "
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
## 2019-02-02 NOTE — PROGRESS NOTES
Called Richard.      He's to stop by on Tuesday at noon to set up his CGM    Macrina CHAMBERLAIN FNP-BC  Diabetes and Wound Care

## 2019-02-12 ENCOUNTER — ALLIED HEALTH/NURSE VISIT (OUTPATIENT)
Dept: EDUCATION SERVICES | Facility: OTHER | Age: 54
End: 2019-02-12
Attending: NURSE PRACTITIONER
Payer: COMMERCIAL

## 2019-02-12 NOTE — PROGRESS NOTES
Pt's instructions reviewed on pt's Jaren home application kit. Meter applied and started. Pt states he feels comfortable doing this at home and advised to come back when re-application due.  Lianna Brewster

## 2019-02-15 ENCOUNTER — OFFICE VISIT (OUTPATIENT)
Dept: PODIATRY | Facility: OTHER | Age: 54
End: 2019-02-15
Attending: PODIATRIST
Payer: COMMERCIAL

## 2019-02-15 VITALS
DIASTOLIC BLOOD PRESSURE: 64 MMHG | WEIGHT: 200 LBS | HEART RATE: 90 BPM | BODY MASS INDEX: 36.8 KG/M2 | TEMPERATURE: 98.6 F | HEIGHT: 62 IN | OXYGEN SATURATION: 98 % | SYSTOLIC BLOOD PRESSURE: 128 MMHG

## 2019-02-15 DIAGNOSIS — M21.371 FOOT DROP, RIGHT FOOT: ICD-10-CM

## 2019-02-15 DIAGNOSIS — L97.519 TYPE 1 DIABETES MELLITUS WITH RIGHT DIABETIC FOOT ULCER (H): Primary | ICD-10-CM

## 2019-02-15 DIAGNOSIS — E10.42 DIABETIC POLYNEUROPATHY ASSOCIATED WITH TYPE 1 DIABETES MELLITUS (H): ICD-10-CM

## 2019-02-15 DIAGNOSIS — L60.3 ONYCHODYSTROPHY: ICD-10-CM

## 2019-02-15 DIAGNOSIS — E10.621 TYPE 1 DIABETES MELLITUS WITH RIGHT DIABETIC FOOT ULCER (H): Primary | ICD-10-CM

## 2019-02-15 DIAGNOSIS — E10.65 UNCONTROLLED TYPE 1 DIABETES MELLITUS WITH HYPERGLYCEMIA (H): ICD-10-CM

## 2019-02-15 PROCEDURE — G0463 HOSPITAL OUTPT CLINIC VISIT: HCPCS

## 2019-02-15 PROCEDURE — 97597 DBRDMT OPN WND 1ST 20 CM/<: CPT | Performed by: PODIATRIST

## 2019-02-15 ASSESSMENT — PAIN SCALES - GENERAL: PAINLEVEL: SEVERE PAIN (6)

## 2019-02-15 ASSESSMENT — MIFFLIN-ST. JEOR: SCORE: 1626.44

## 2019-02-15 NOTE — NURSING NOTE
"Chief Complaint   Patient presents with     WOUND CARE     right foot great toe ulcer       Initial There were no vitals taken for this visit. Estimated body mass index is 36.58 kg/m  as calculated from the following:    Height as of 3/6/18: 1.575 m (5' 2\").    Weight as of 2/1/19: 90.7 kg (200 lb).  Medication Reconciliation: complete    Radha Richter LPN  "
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
## 2019-02-18 ENCOUNTER — TELEPHONE (OUTPATIENT)
Dept: EDUCATION SERVICES | Facility: OTHER | Age: 54
End: 2019-02-18

## 2019-02-18 NOTE — TELEPHONE ENCOUNTER
APPROVAL 2/18/19 Received PA request from [employer] for Contour Next Test Strips. Submitted through [facility] and received immediate approval. Approval dates 1/19/19 through 2/18/20. Pharmacy advised. Forms scanned to [employer].

## 2019-02-28 ENCOUNTER — ALLIED HEALTH/NURSE VISIT (OUTPATIENT)
Dept: EDUCATION SERVICES | Facility: OTHER | Age: 54
End: 2019-02-28
Attending: NURSE PRACTITIONER
Payer: COMMERCIAL

## 2019-02-28 ENCOUNTER — OFFICE VISIT (OUTPATIENT)
Dept: PODIATRY | Facility: OTHER | Age: 54
End: 2019-02-28
Attending: PODIATRIST
Payer: COMMERCIAL

## 2019-02-28 VITALS
HEIGHT: 60 IN | RESPIRATION RATE: 16 BRPM | BODY MASS INDEX: 40.05 KG/M2 | WEIGHT: 204 LBS | HEART RATE: 68 BPM | DIASTOLIC BLOOD PRESSURE: 63 MMHG | SYSTOLIC BLOOD PRESSURE: 118 MMHG | OXYGEN SATURATION: 95 %

## 2019-02-28 VITALS
HEART RATE: 80 BPM | TEMPERATURE: 97.2 F | BODY MASS INDEX: 36.58 KG/M2 | WEIGHT: 200 LBS | SYSTOLIC BLOOD PRESSURE: 119 MMHG | OXYGEN SATURATION: 95 % | DIASTOLIC BLOOD PRESSURE: 65 MMHG

## 2019-02-28 DIAGNOSIS — E10.42 DIABETIC POLYNEUROPATHY ASSOCIATED WITH TYPE 1 DIABETES MELLITUS (H): ICD-10-CM

## 2019-02-28 DIAGNOSIS — E10.621 TYPE 1 DIABETES MELLITUS WITH RIGHT DIABETIC FOOT ULCER (H): Primary | ICD-10-CM

## 2019-02-28 DIAGNOSIS — L97.519 TYPE 1 DIABETES MELLITUS WITH RIGHT DIABETIC FOOT ULCER (H): Primary | ICD-10-CM

## 2019-02-28 DIAGNOSIS — M21.371 FOOT DROP, RIGHT FOOT: ICD-10-CM

## 2019-02-28 DIAGNOSIS — L60.3 ONYCHODYSTROPHY: ICD-10-CM

## 2019-02-28 DIAGNOSIS — E10.65 TYPE 1 DIABETES MELLITUS WITH HYPERGLYCEMIA (H): Primary | ICD-10-CM

## 2019-02-28 PROCEDURE — G0463 HOSPITAL OUTPT CLINIC VISIT: HCPCS | Mod: 25

## 2019-02-28 PROCEDURE — 95251 CONT GLUC MNTR ANALYSIS I&R: CPT | Performed by: NURSE PRACTITIONER

## 2019-02-28 PROCEDURE — 99213 OFFICE O/P EST LOW 20 MIN: CPT | Mod: 25 | Performed by: PODIATRIST

## 2019-02-28 PROCEDURE — 97597 DBRDMT OPN WND 1ST 20 CM/<: CPT | Performed by: PODIATRIST

## 2019-02-28 PROCEDURE — 99213 OFFICE O/P EST LOW 20 MIN: CPT | Mod: 25 | Performed by: NURSE PRACTITIONER

## 2019-02-28 PROCEDURE — G0463 HOSPITAL OUTPT CLINIC VISIT: HCPCS

## 2019-02-28 ASSESSMENT — ANXIETY QUESTIONNAIRES
5. BEING SO RESTLESS THAT IT IS HARD TO SIT STILL: NOT AT ALL
3. WORRYING TOO MUCH ABOUT DIFFERENT THINGS: NOT AT ALL
2. NOT BEING ABLE TO STOP OR CONTROL WORRYING: NOT AT ALL
7. FEELING AFRAID AS IF SOMETHING AWFUL MIGHT HAPPEN: NOT AT ALL
1. FEELING NERVOUS, ANXIOUS, OR ON EDGE: NOT AT ALL
IF YOU CHECKED OFF ANY PROBLEMS ON THIS QUESTIONNAIRE, HOW DIFFICULT HAVE THESE PROBLEMS MADE IT FOR YOU TO DO YOUR WORK, TAKE CARE OF THINGS AT HOME, OR GET ALONG WITH OTHER PEOPLE: NOT DIFFICULT AT ALL
GAD7 TOTAL SCORE: 1
6. BECOMING EASILY ANNOYED OR IRRITABLE: SEVERAL DAYS

## 2019-02-28 ASSESSMENT — PATIENT HEALTH QUESTIONNAIRE - PHQ9
SUM OF ALL RESPONSES TO PHQ QUESTIONS 1-9: 0
5. POOR APPETITE OR OVEREATING: NOT AT ALL

## 2019-02-28 ASSESSMENT — PAIN SCALES - GENERAL
PAINLEVEL: SEVERE PAIN (7)
PAINLEVEL: MILD PAIN (3)

## 2019-02-28 ASSESSMENT — MIFFLIN-ST. JEOR: SCORE: 1616.81
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
## 2019-02-28 NOTE — PROGRESS NOTES
"Chief Complaint   Patient presents with     Diabetes       Initial Pulse 68   Resp 16   Wt 92.5 kg (204 lb)   SpO2 95%   BMI 37.31 kg/m   Estimated body mass index is 37.31 kg/m  as calculated from the following:    Height as of 2/15/19: 1.575 m (5' 2\").    Weight as of this encounter: 92.5 kg (204 lb).  Medication Reconciliation: complete    Lianna Brewster LPN    "

## 2019-02-28 NOTE — PROGRESS NOTES
"SUBJECTIVE:  Richard Harvey, 53 year old, male presents with the following Chief Complaint(s) with HPI to follow:  Chief Complaint   Patient presents with     Diabetes        Diabetes Follow-up      Patient is checking blood sugars: 5.2x/day.  Results:  Overnight: 48, 49, 185-259  Breakfast: 62,   Mid-morning: no checks  Lunch: 61, 93->400  Mid-afternoon: 62, 75, and 250  Dinner: <40-66, 72->400   Bedtime:       Symptoms of hypoglycemia (low blood sugar): yes, \"drunk feeling\", history of hypoglycemia unawareness    Paresthesias (numbness or burning in feet) or sores: Yes (\"same\"); yes--right great toe--followed by Dr. Jarrett    Diabetic eye exam within the last year: Yes (July/August)    Breakfast eaten regularly: Yes    Patient counting carbs: trying       HPI: Richard's here today for the follow up regarding his Diabetes mellitus, Type 1.    Lab Results   Component Value Date    A1C 8 01/21/2019    A1C 7.7 07/25/2018    A1C 7.2 11/24/2017    A1C 7.4 07/17/2017    A1C 6.8 03/13/2017     Current Diabetes medication:   1.  Insulin pump, uses Novolog; wearing his Freestyle william sensor  ASA use: yes, 325 mg daily  Statin use: yes, simvastatin 40 mg at bedime  Denies any issues with the insulin pump.      Richard's here for insulin pump download and possible adjust.  He reports the following:  Followed by Dr. Jarrett for his right great toe wound.   He's back to work, so he's noticing more low BGs due to the increase activity  Has an appointment tomorrow with his orthopedic specialist regarding his knee.      Denies any other health concerns besides what was mentioned.      Patient Active Problem List   Diagnosis     DM type 1 (diabetes mellitus, type 1) (H)     HTN (hypertension)     Hyperlipidemia     ACP (advance care planning)       No past medical history on file.    Past Surgical History:   Procedure Laterality Date     COLONOSCOPY - HIM SCAN N/A 09/19/2016    Procedure: COLONOSCOPY SCREENING; Surgeon: " Rudy Rojo MD; Location: [facility] OR       No family history on file.    Social History     Tobacco Use     Smoking status: Never Smoker     Smokeless tobacco: Never Used   Substance Use Topics     Alcohol use: No     Alcohol/week: 0.0 oz       Current Outpatient Medications   Medication Sig Dispense Refill     acetone, Urine, test STRP Use as directed 50 each 3     aspirin 325 MG tablet Take by mouth daily       blood glucose (MELISSA CONTOUR NEXT) test strip TEST 5-6 TIMES DAILY AS DIRECTED. 100 each 3     blood glucose monitoring (MELISSA CONTOUR NEXT MONITOR) meter device kit 1 each       blood glucose monitoring (MELISSA CONTOUR NEXT) test strip Use to test blood sugar 6 times daily or as directed. 200 each 11     CALCIUM-VITAMIN D PO Take  by mouth daily. 600 mg       Continuous Blood Gluc  (FREESTYLE CORNELIUS 14 DAY READER) DAYAN 1 each 4 times daily (before meals and nightly) 1 Device 3     Continuous Blood Gluc Sensor (FREESTYLE CORNELIUS 14 DAY SENSOR) MISC 1 each 4 times daily (before meals and nightly) 3 each 11     gabapentin (NEURONTIN) 300 MG capsule Take 300 mg by mouth 3 times daily       glucagon 1 MG injection 1 mg once       Glucosamine-Chondroit-Vit C-Mn (GLUCOSAMINE-CHONDROITIN MAX ST PO) Take  by mouth daily.       insulin aspart (NovoLOG) inject - to fill ambulatory pump See Admin Instructions. For use with insulin pump       INSULIN PUMP - OUTPATIENT Date last updated:  6/23/15  Kast Minimed: Model 751  BASAL RATES and times:  12   AM (midnight): 1.25 units/hour    2     AM: 1.3 units/hour   6     AM: 0.95 units/hour   6    PM: 1.2 units/hour   Basal Pattern A:  Richard Harvey will use when N/A.  CARB RATIO and times:  12   AM (midnight): 12  1    PM:  10  6    PM:    9  Corection Factor (Sensitivity) and times:  12   AM (midnight): 40 mg/dL  6     PM: 45 mg/dL  BLOOD GLUCOSE TARGET and times:  12   AM (midnight): 100 - 140  9     AM:  100 - 120  10   PM:  100 - 140  Active Insulin Time:   "4 hours  Sensor:  Yes: 12   AM (midnight): 70 - 240  Carelink / Nutriniasend username:  [email or web address]  Conatix / iQVCloudd Password:  [email or web address]       lisinopril (PRINIVIL,ZESTRIL) 10 MG tablet Take 10 mg by mouth daily       MOTRIN IB PO Take 400 mg by mouth every 6 hours as needed for moderate pain       Multiple Vitamin (MULTI-VITAMIN DAILY PO) Take  by mouth daily.       nitroglycerin (NITROSTAT) 0.4 MG SL tablet Place under the tongue every 5 minutes as needed       omega-3 fatty acids (FISH OIL) 1200 MG capsule Take 1 capsule by mouth daily.       simvastatin (ZOCOR) 40 MG tablet Take by mouth At Bedtime       urea (CARMOL) 10 % external cream Apply cream topically as needed for calloused skin 85 g 3     URINE GLUCOSE-KETONES TEST VI          No Known Allergies    REVIEW OF SYSTEMS  Skin: right great toe wound  Eyes: negative  Ears/Nose/Throat: negative  Respiratory: No shortness of breath, dyspnea on exertion, cough, or hemoptysis  Cardiovascular: negative  Gastrointestinal: negative  Genitourinary: negative  Musculoskeletal: right knee fractured--\"better\"; positive for upper back \"tightness\"--same; history of arthritis (thumbs)--same  Neurologic: positive for numbness or tingling of hands (right hand--carpel tunnel surgery; left hand: carpel tunnel) and numbness or tingling of feet--same  Psychiatric: negative  Hematologic/Lymphatic/Immunologic: negative  Endocrine: positive for diabetes    OBJECTIVE:  /63   Pulse 68   Resp 16   Ht 1.53 m (5' 0.25\")   Wt 92.5 kg (204 lb)   SpO2 95%   BMI 39.51 kg/m    Constitutional: healthy, alert and no distress  Neck: neck supple, no lymphadenopathy, trachea midline, thyroid symmetrical with out nodules noted.   Cardiovascular: RRR. No murmurs, clicks gallops or rub  Respiratory: Good diaphragmatic excursion. Lungs clear  Psychiatric: mentation appears normal and affect normal/bright  Hematologic/Lymphatic/Immunologic: normal ant/post cervical nodes      LAB  Results " for orders placed or performed in visit on 19   Hemoglobin A1c   Result Value Ref Range    Hemoglobin A1C 7.7 (A) 0 - 5.6 %       ASSESSMENT / PLAN:.  (E10.65) Type 1 diabetes mellitus with hyperglycemia (H)  (primary encounter diagnosis)  Comment:   Findings:  Ave. 170     Date: 2/15    Ave. Glucose: 414    Date:     Ave. Glucose: 335     Date:     Ave. Glucose: 146     Date:     Ave. Glucose: 142     Date:     Ave. Glucose: 249    Date:     Ave. Glucose: 148     Date:     Ave. Glucose: 170     Date:     Ave. Glucose: 104     Date:     Ave. Glucose: 166     Date:     Ave. Glucose: 147    Date:     Ave. Glucose: 145    Date:     Ave. Glucose: 118     Date:   Ave. Glucose: no data     Date:     Ave. Glucose: 98    Insulin pump information:   Average: 154 +/- 111  Basal/bolus ratio: 25.7 (41%)/37.6 (59%)   Testin.2x/day    Hypoglycemia: 13 (19%)    Plan:   Adjustments:   Basal  0000 1.2  0200 1.1    Carb ratio  0000 10  0800 8  1200 10    Insulin sensitivity   1200 46    Recommendations:  1.  Start using the dual wave before every meal  2.  Change out insertion set every other day    3.  Keep working on entering the bolus BEFORE consuming carbs.    Follow up as scheduled  Call me sooner if continues low BGs        Patient Instructions   Continue working on healthy eating and moving as best as you can (start low and slow, work up to 30 min, 5x/week)    BG goals:  Fasting and before meals <130, >70  2 hour after eating <180    We only need 1/2 of these numbers to be within target then your A1c will be within target    Medication changes   Watch for lows BG due to changes    Recommendations:  1.  Change the insertion set every 3 days  2.  Dual wave (change duration to at least 2 hours) before every meal    Follow up   3/15/19--pump and CGM download    Call me sooner if any problems/concerns and/or questions develop including consistent low BGs <70 or  "consistent high BGs >200  [phone] (Unit Coordinator)    [phone] (Nurse)  [phone] (Macrina's cell)        Time: 40 min  Barrier: none  Willingness to learn: accepting    Macrina Dixon RN [facility]  Disease Management    Cc: Dr. ALEJANDRA Obando    With the electronic record, we can now more quickly and easily track our patient diabetic goals. Our diabetes clinical review is in progress and these are the indicators we are monitoring for good diabetes health.     1.) HbA1C less than 7 (measurement of your average blood sugars)  2.) Blood Pressure less than 140/80  3.) LDL less than 100 (bad cholesterol)  4.) HbA1C is checked in the last 6 months and below 7% (more frequently if not at goal or adjusting medications)  5.) LDL is checked in the last 12 months (more frequently if not at goal or adjusting medications)  6.) Taking one baby aspirin daily (unless otherwise instructed)  7.) No tobacco use  8) Statin use     You have achieved 6 out of 8 of these and I am encouraging you to come in and get tuned up to achieve 8 out of 8!  Here is what you have achieved so far in my goals for you:  1.) HbA1C  less than 7:                              NO  Your last  HbA1C :   Lab Results   Component Value Date    A1C 8 01/21/2019    A1C 7.7 07/25/2018    A1C 7.2 11/24/2017    A1C 7.4 07/17/2017    A1C 6.8 03/13/2017     2.) Blood Pressure less than 140/80:       YES      Your last    /63   Pulse 68   Resp 16   Ht 1.53 m (5' 0.25\")   Wt 92.5 kg (204 lb)   SpO2 95%   BMI 39.51 kg/m      3.) LDL less than 100:                              YES      Your last   LDL         LDL Cholesterol Calculated   Date Value Ref Range Status   11/24/2017 62 <100 mg/dL Final   ]    4.) Checked HbA1C in the past 6 months: YES      5.) Checked LDL in the past 12 months:    NO      6.) Taking one aspirin daily:                       YES     7.) No tobacco use:                                        YES      8.) Statin use      YES   "

## 2019-02-28 NOTE — NURSING NOTE
"Chief Complaint   Patient presents with     Wound Check     right foot great toe ulcer       Initial There were no vitals taken for this visit. Estimated body mass index is 36.58 kg/m  as calculated from the following:    Height as of 2/15/19: 1.575 m (5' 2\").    Weight as of 2/15/19: 90.7 kg (200 lb).  Medication Reconciliation: complete    Radha Richter LPN  "

## 2019-03-01 ENCOUNTER — TELEPHONE (OUTPATIENT)
Dept: EDUCATION SERVICES | Facility: OTHER | Age: 54
End: 2019-03-01

## 2019-03-01 ASSESSMENT — ANXIETY QUESTIONNAIRES: GAD7 TOTAL SCORE: 1

## 2019-03-03 NOTE — PATIENT INSTRUCTIONS
Continue working on healthy eating and moving as best as you can (start low and slow, work up to 30 min, 5x/week)    BG goals:  Fasting and before meals <130, >70  2 hour after eating <180    We only need 1/2 of these numbers to be within target then your A1c will be within target    Medication changes   Watch for lows BG due to changes    Recommendations:  1.  Change the insertion set every 3 days  2.  Dual wave (change duration to at least 2 hours) before every meal    Follow up   3/15/19--pump and CGM download    Call me sooner if any problems/concerns and/or questions develop including consistent low BGs <70 or consistent high BGs >200  [phone] (Unit Coordinator)    [phone] (Nurse)  [phone] (Macrina's cell)

## 2019-03-05 NOTE — PATIENT INSTRUCTIONS
Continue working on healthy eating and moving as best as you can (start low and slow, work up to 30 min, 5x/week)    BG goals:  Fasting and before meals <130, >70  2 hour after eating <180    We only need 1/2 of these numbers to be within target then your A1c will be within target    Medication changes   Watch for lows BG due to changes    Recommendations:  1.  Change the insertion set every 3 days  2.  Dual wave (change duration to at least 2 hours) before every meal  3.  No more adding extra carbs  4.  After 3-4 days of the same insulin pump settings, if your BG is still spiking after meals--please stop in for a nurse only insulin pump adjustment (call first)    Follow up   2 weeks    Call me sooner if any problems/concerns and/or questions develop including consistent low BGs <70 or consistent high BGs >200  [phone] (Unit Coordinator)    [phone] (Nurse)  [phone] (Macrina's cell)    Wound Care  As directed by Dr. Jarrett

## 2019-03-07 ENCOUNTER — OFFICE VISIT (OUTPATIENT)
Dept: WOUND CARE | Facility: OTHER | Age: 54
End: 2019-03-07
Attending: NURSE PRACTITIONER
Payer: COMMERCIAL

## 2019-03-07 VITALS
RESPIRATION RATE: 16 BRPM | SYSTOLIC BLOOD PRESSURE: 123 MMHG | HEART RATE: 86 BPM | TEMPERATURE: 98.8 F | DIASTOLIC BLOOD PRESSURE: 65 MMHG

## 2019-03-07 DIAGNOSIS — L97.512 NEUROPATHIC ULCER OF TOE OF RIGHT FOOT WITH FAT LAYER EXPOSED (H): Primary | ICD-10-CM

## 2019-03-07 PROCEDURE — G0463 HOSPITAL OUTPT CLINIC VISIT: HCPCS | Mod: 25

## 2019-03-07 PROCEDURE — 99214 OFFICE O/P EST MOD 30 MIN: CPT | Performed by: NURSE PRACTITIONER

## 2019-03-07 PROCEDURE — G0463 HOSPITAL OUTPT CLINIC VISIT: HCPCS

## 2019-03-07 ASSESSMENT — PAIN SCALES - GENERAL: PAINLEVEL: SEVERE PAIN (7)

## 2019-03-07 NOTE — PATIENT INSTRUCTIONS
Wound Care Instructions:  1) Wash your hands and work space  2) Gather all your supplies: ointment, foam, and tape  3) Cleanse wound with (shower first)  4) Dress wound with some Iodosorb ointment in the wound bed, cover with a piece of foam and tape in place with Medipore tape   5) Change dressing daily after shower  6) Dispose of all dirty supplies  7) Wash hands and equipment    Please report any increase in pain, fevers, chills, changes in the drainage odor.   Please call if you have any questions or concerns or if any problems develop.   Follow up as scheduled with Dr. Burger

## 2019-03-07 NOTE — NURSING NOTE
"Chief Complaint   Patient presents with     WOUND CARE       Initial /65 (BP Location: Left arm, Patient Position: Sitting, Cuff Size: Adult Regular)   Pulse 86   Temp 98.8  F (37.1  C) (Tympanic)   Resp 16  Estimated body mass index is 39.51 kg/m  as calculated from the following:    Height as of 2/28/19: 1.53 m (5' 0.25\").    Weight as of 2/28/19: 92.5 kg (204 lb).  Medication Reconciliation: complete    Ernestina Whiting LPN  "

## 2019-03-07 NOTE — PROGRESS NOTES
SUBJECTIVE:  Richard Harvey, 53 year old, male presents with the following Chief Complaint(s) with HPI to follow:   Chief Complaint   Patient presents with     WOUND CARE      HPI:  Patient has been seeing Dr. Burger for an ulcer on his right hallux.  She last saw him on 2/28/19 and is scheduled to see her again on 3/15/19.  However, he noted what he thought was unusual prurulent drainage from his toe yesterday, along with redness of the toe.  He soaked his foot in a warm water bath with hydrogen peroxide added to it.  He denies fevers.   He was supposed to be dressing this ulcer with silver impregnated foam, but apparently did not understand this and instead has been spraying it with betadine and covering it with gauze.  His mother assists with the dressings.  Past hx: Patient first noticed his ulcer in the beginning of February.  His healing is compromised by his diabetes.      Patient Active Problem List   Diagnosis     DM type 1 (diabetes mellitus, type 1) (H)     HTN (hypertension)     Hyperlipidemia     ACP (advance care planning)       History reviewed. No pertinent past medical history.    Past Surgical History:   Procedure Laterality Date     COLONOSCOPY - HIM SCAN N/A 09/19/2016    Procedure: COLONOSCOPY SCREENING; Surgeon: Rudy Rojo MD; Location: Loc[facility] within [facility] OR       History reviewed. No pertinent family history.    Social History     Tobacco Use     Smoking status: Never Smoker     Smokeless tobacco: Never Used   Substance Use Topics     Alcohol use: No     Alcohol/week: 0.0 oz       Current Outpatient Medications   Medication Sig Dispense Refill     acetone, Urine, test STRP Use as directed 50 each 3     aspirin 325 MG tablet Take by mouth daily       blood glucose (MELISSA CONTOUR NEXT) test strip TEST 5-6 TIMES DAILY AS DIRECTED. 100 each 3     blood glucose monitoring (MELISSA CONTOUR NEXT MONITOR) meter device kit 1 each       blood glucose monitoring (MELISSA CONTOUR NEXT) test strip Use to test blood  sugar 6 times daily or as directed. 200 each 11     CALCIUM-VITAMIN D PO Take  by mouth daily. 600 mg       Continuous Blood Gluc  (FREESTYLE CORNELIUS 14 DAY READER) DAYAN 1 each 4 times daily (before meals and nightly) 1 Device 3     Continuous Blood Gluc Sensor (FREESTYLE CORNELIUS 14 DAY SENSOR) MISC 1 each 4 times daily (before meals and nightly) 3 each 11     gabapentin (NEURONTIN) 300 MG capsule Take 300 mg by mouth 3 times daily       glucagon 1 MG injection 1 mg once       Glucosamine-Chondroit-Vit C-Mn (GLUCOSAMINE-CHONDROITIN MAX ST PO) Take  by mouth daily.       insulin aspart (NovoLOG) inject - to fill ambulatory pump See Admin Instructions. For use with insulin pump       INSULIN PUMP - OUTPATIENT Date last updated:  6/23/15  MedAdRoll: Model 751  BASAL RATES and times:  12   AM (midnight): 1.25 units/hour    2     AM: 1.3 units/hour   6     AM: 0.95 units/hour   6    PM: 1.2 units/hour   Basal Pattern A:  Richard Harvey will use when N/A.  CARB RATIO and times:  12   AM (midnight): 12  1    PM:  10  6    PM:    9  Corection Factor (Sensitivity) and times:  12   AM (midnight): 40 mg/dL  6     PM: 45 mg/dL  BLOOD GLUCOSE TARGET and times:  12   AM (midnight): 100 - 140  9     AM:  100 - 120  10   PM:  100 - 140  Active Insulin Time:  4 hours  Sensor:  Yes: 12   AM (midnight): 70 - 240  Carelink / Diasend username:  [email or web address]  Carelink / Diasend Password:  [email or web address]       lisinopril (PRINIVIL,ZESTRIL) 10 MG tablet Take 10 mg by mouth daily       MOTRIN IB PO Take 400 mg by mouth every 6 hours as needed for moderate pain       Multiple Vitamin (MULTI-VITAMIN DAILY PO) Take  by mouth daily.       nitroglycerin (NITROSTAT) 0.4 MG SL tablet Place under the tongue every 5 minutes as needed       omega-3 fatty acids (FISH OIL) 1200 MG capsule Take 1 capsule by mouth daily.       simvastatin (ZOCOR) 40 MG tablet Take by mouth At Bedtime       urea (CARMOL) 10 % external cream Apply cream topically as  needed for calloused skin 85 g 3     URINE GLUCOSE-KETONES TEST VI          No Known Allergies    REVIEW OF SYSTEMS  Skin: as above  Eyes: glasses  Ears/Nose/Throat: negative  Respiratory: No shortness of breath, dyspnea on exertion, cough, or hemoptysis  Cardiovascular: negative  Gastrointestinal: negative  Genitourinary: negative  Musculoskeletal: arthritis and joint pain  Neurologic: numbness or tingling of right foot  Psychiatric: negative  Hematologic/Lymphatic/Immunologic: negative  Endocrine: diabetes    OBJECTIVE:    B/P: 123/65, T: 98.8, P: 86, R: 16, W: 0 lbs 0 oz, BMI: There is no height or weight on file to calculate BMI.  Constitutional: healthy, alert and no distress  Head: Normocephalic. No masses, lesions, tenderness or abnormalities  Cardiovascular: RRR. No murmurs, clicks gallops or rub  Respiratory:  Good diaphragmatic excursion. Lungs clear  Gastrointestinal: Abdomen soft, non-tender. BS normal. No masses, organomegaly  : Deferred  Musculoskeletal: extremities normal- no gross deformities noted, gait normal and normal muscle tone  Skin:        Wound description:  Type of Wound- Diabetic foot ulcer; Location- right great toe- tip of toe, Drainage amount-small, Drainage color-serosanguinous,  Odor- none; Wound bed-covered with whitish tissue, Surrounding skin-callused.       Measurements: 0.7 X 0.3 X 0.3.  (measures smaller)       Dressing change:  Wound cleansed with soap and water, dressed with Iodosorb gel and plain foam.    Neurologic: Gait uneven due to post op shoe. Sensation decreased in right foot, ,he also has neuropathic pain.  Psychiatric: mentation appears normal and affect normal/bright    Conservative Sharp Debridement -  Verbal consent obtained.  Verified name,  and site as part of time out done prior to procedure. Patient was informed of the risks and benefits and consented to the procedure.  Two identifiers were used and a time out was completed.    Conservative debridement  was completed with sterile pick ups and a sterile #15 scalpel  to remove callus from the tissue surrounding the wound bed, as well as white fibrin and slough from on the wound bed. (< 20 sq cm)   Patient response: good  Pain: none  Bleeding:scant      THERAPY GOAL: Heal  To closure.    ASSESSMENT / PLAN:  Patient will switch to Iodosorb gel and plain foam daily to wound.  I gave him written instructions and sent him home with some Iodosorb and foam.    Follow-up with Dr. Burger as previously scheduled,  or as needed for acute concerns.  I have explained that at this time his toe does not appear to be infected and an antibiotic is not warranted.    Tita Conroy  CNP, CWOCN  Wound Care

## 2019-03-07 NOTE — Clinical Note
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  I did add Iodosorb gel and plain foam because he really wants to use betadine, it was safer and I thought was not a bad choice.  He is scheduled to see you again on March 15.Thanks, Prince"

## 2019-03-07 NOTE — PROGRESS NOTES
Called Richard.     Unable to leave message.       Macrina CHAMBERLAIN FNP-BC  Diabetes and Wound Care

## 2019-03-08 NOTE — PROGRESS NOTES
Finally, got a hold of Richard.     He saw Prince Conroy NP yesterday (thank you)    Follow up as scheduled.     Macrina CHAMBERLAIN P-BC  Diabetes and Wound Care

## 2019-03-15 ENCOUNTER — OFFICE VISIT (OUTPATIENT)
Dept: PODIATRY | Facility: OTHER | Age: 54
End: 2019-03-15
Attending: PODIATRIST
Payer: COMMERCIAL

## 2019-03-15 ENCOUNTER — ALLIED HEALTH/NURSE VISIT (OUTPATIENT)
Dept: EDUCATION SERVICES | Facility: OTHER | Age: 54
End: 2019-03-15
Attending: NURSE PRACTITIONER
Payer: COMMERCIAL

## 2019-03-15 VITALS
TEMPERATURE: 98.6 F | OXYGEN SATURATION: 98 % | DIASTOLIC BLOOD PRESSURE: 64 MMHG | SYSTOLIC BLOOD PRESSURE: 122 MMHG | HEART RATE: 98 BPM

## 2019-03-15 DIAGNOSIS — E10.42 DIABETIC POLYNEUROPATHY ASSOCIATED WITH TYPE 1 DIABETES MELLITUS (H): ICD-10-CM

## 2019-03-15 DIAGNOSIS — M20.21 HALLUX RIGIDUS OF RIGHT FOOT: ICD-10-CM

## 2019-03-15 DIAGNOSIS — L97.522 DIABETIC ULCER OF TOE OF LEFT FOOT ASSOCIATED WITH TYPE 1 DIABETES MELLITUS, WITH FAT LAYER EXPOSED (H): ICD-10-CM

## 2019-03-15 DIAGNOSIS — E10.65 TYPE 1 DIABETES MELLITUS WITH HYPERGLYCEMIA (H): Primary | ICD-10-CM

## 2019-03-15 DIAGNOSIS — E10.621 DIABETIC ULCER OF TOE OF LEFT FOOT ASSOCIATED WITH TYPE 1 DIABETES MELLITUS, WITH FAT LAYER EXPOSED (H): ICD-10-CM

## 2019-03-15 DIAGNOSIS — M21.371 FOOT DROP, RIGHT FOOT: ICD-10-CM

## 2019-03-15 DIAGNOSIS — L60.3 ONYCHODYSTROPHY: ICD-10-CM

## 2019-03-15 PROCEDURE — 99213 OFFICE O/P EST LOW 20 MIN: CPT | Mod: 25 | Performed by: PODIATRIST

## 2019-03-15 PROCEDURE — G0463 HOSPITAL OUTPT CLINIC VISIT: HCPCS | Mod: 25

## 2019-03-15 PROCEDURE — G0463 HOSPITAL OUTPT CLINIC VISIT: HCPCS

## 2019-03-15 PROCEDURE — 97597 DBRDMT OPN WND 1ST 20 CM/<: CPT | Performed by: PODIATRIST
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
## 2019-03-15 NOTE — PROGRESS NOTES
Richard's here to see Dr. Jarrett for a podiatry appointment.      CGM glucose download    Average glucose: 142  Time in range:  >180: 26%  : 47%  <70: 27%    Findings:  Ave. 142     Date: 3/1    Ave. Glucose: 287    Date: 3/2    Ave. Glucose: 139     Date: 3/3    Ave. Glucose: 182     Date: 3/4    Ave. Glucose: 177     Date: 3/5    Ave. Glucose: 97    Date: 3/6    Ave. Glucose: 140     Date: 3/7    Ave. Glucose: 114     Date: 3/8    Ave. Glucose: 119     Date: 3/9    Ave. Glucose: 89     Date: 3/10    Ave. Glucose: no data    Date: 3/11    Ave. Glucose: 274    Date: 3/12    Ave. Glucose: 128     Date: 3/13  Ave. Glucose: 114     Date: 3/14    Ave. Glucose: 99      Plan:   Adjustments:   Basal  0000 1.1 (d)  0200 1.05 (d)  0600 0.975 (I)  1800 1.15 (d)     Carb ratio  0000 7.5 (tighten)  1200 10     Insulin sensitivity   0000 45 (backed off)  1200 50 (backed off)  1800 55 (backed off)    Follow up in 2 weeks when he sees Dr. Jarrett    Call sooner if any issues.      Will order c-peptide and FBS for new insulin pump  Told him to tell Katalina to send the rx to me for the new insuln pump.      Macrina CHAMBERLAIN [facility]  Diabetes and Wound Care

## 2019-03-21 ENCOUNTER — TRANSFERRED RECORDS (OUTPATIENT)
Dept: HEALTH INFORMATION MANAGEMENT | Facility: CLINIC | Age: 54
End: 2019-03-21

## 2019-03-21 ENCOUNTER — TRANSFERRED RECORDS (OUTPATIENT)
Dept: HEALTH INFORMATION MANAGEMENT | Facility: HOSPITAL | Age: 54
End: 2019-03-21

## 2019-03-21 LAB
ALBUMIN URINE MG/G CR: <6 MG/G CREATININE
ALBUMIN URINE MG/SPEC: NORMAL
C PEPTIDE: <0.1
CHOLEST SERPL-MCNC: 121 MG/DL (ref 114–200)
CREAT SERPL-MCNC: 0.98 MG/DL
CREATININE URINE: NORMAL
GFR SERPL CREATININE-BSD FRML MDRD: >60 ML/MIN/1.73M2
HBA1C MFR BLD: 7.3 % (ref 0–5.6)
HDLC SERPL-MCNC: 56 MG/DL (ref 40–60)
LDLC SERPL CALC-MCNC: 56 MG/DL
LDLC SERPL CALC-MCNC: 56 MG/DL
TRIGL SERPL-MCNC: 46 MG/DL (ref 10–200)

## 2019-03-28 ENCOUNTER — OFFICE VISIT (OUTPATIENT)
Dept: PODIATRY | Facility: OTHER | Age: 54
End: 2019-03-28
Attending: PODIATRIST
Payer: COMMERCIAL

## 2019-03-28 VITALS
DIASTOLIC BLOOD PRESSURE: 60 MMHG | SYSTOLIC BLOOD PRESSURE: 115 MMHG | HEART RATE: 76 BPM | WEIGHT: 200 LBS | TEMPERATURE: 98 F | BODY MASS INDEX: 38.74 KG/M2

## 2019-03-28 DIAGNOSIS — E10.42 DIABETIC POLYNEUROPATHY ASSOCIATED WITH TYPE 1 DIABETES MELLITUS (H): ICD-10-CM

## 2019-03-28 DIAGNOSIS — M21.371 FOOT DROP, RIGHT FOOT: ICD-10-CM

## 2019-03-28 DIAGNOSIS — L84 CALLUS OF HEEL: ICD-10-CM

## 2019-03-28 DIAGNOSIS — L97.522 DIABETIC ULCER OF TOE OF LEFT FOOT ASSOCIATED WITH TYPE 1 DIABETES MELLITUS, WITH FAT LAYER EXPOSED (H): Primary | ICD-10-CM

## 2019-03-28 DIAGNOSIS — L60.3 ONYCHODYSTROPHY: ICD-10-CM

## 2019-03-28 DIAGNOSIS — E10.621 DIABETIC ULCER OF TOE OF LEFT FOOT ASSOCIATED WITH TYPE 1 DIABETES MELLITUS, WITH FAT LAYER EXPOSED (H): Primary | ICD-10-CM

## 2019-03-28 PROCEDURE — 11055 PARING/CUTG B9 HYPRKER LES 1: CPT | Performed by: PODIATRIST

## 2019-03-28 PROCEDURE — 99212 OFFICE O/P EST SF 10 MIN: CPT | Mod: 25 | Performed by: PODIATRIST

## 2019-03-28 PROCEDURE — 97597 DBRDMT OPN WND 1ST 20 CM/<: CPT | Mod: 59 | Performed by: PODIATRIST

## 2019-03-28 PROCEDURE — G0463 HOSPITAL OUTPT CLINIC VISIT: HCPCS | Mod: 25

## 2019-03-28 ASSESSMENT — PAIN SCALES - GENERAL: PAINLEVEL: MODERATE PAIN (5)

## 2019-03-28 NOTE — NURSING NOTE
"Chief Complaint   Patient presents with     Follow Up     right foot ulcer       Initial /60 (BP Location: Left arm, Patient Position: Sitting, Cuff Size: Adult Large)   Pulse 76   Temp 98  F (36.7  C) (Tympanic)   Wt 90.7 kg (200 lb)   BMI 38.74 kg/m   Estimated body mass index is 38.74 kg/m  as calculated from the following:    Height as of 2/28/19: 1.53 m (5' 0.25\").    Weight as of this encounter: 90.7 kg (200 lb).  Medication Reconciliation: complete    ALPHONSO GEORGES LPN  "
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
## 2019-04-02 ENCOUNTER — TELEPHONE (OUTPATIENT)
Dept: EDUCATION SERVICES | Facility: OTHER | Age: 54
End: 2019-04-02

## 2019-04-02 NOTE — TELEPHONE ENCOUNTER
Did Macrina get a hold of Katalina at [employer] and got the numbers sent over? Please advise.

## 2019-04-03 NOTE — TELEPHONE ENCOUNTER
[facility] called [facility] at [facility] she need the result of the pt's C-peptide test. This was not done at this clinic. I advised her to contact the pt's primary clinic. I called the pt and notified the status.

## 2019-04-04 ENCOUNTER — HOSPITAL ENCOUNTER (OUTPATIENT)
Dept: WOUND CARE | Facility: HOSPITAL | Age: 54
Discharge: HOME | End: 2019-04-04
Attending: FAMILY MEDICINE | Admitting: FAMILY MEDICINE
Payer: COMMERCIAL

## 2019-04-04 VITALS
SYSTOLIC BLOOD PRESSURE: 120 MMHG | HEART RATE: 84 BPM | RESPIRATION RATE: 16 BRPM | DIASTOLIC BLOOD PRESSURE: 65 MMHG | TEMPERATURE: 98.2 F

## 2019-04-04 DIAGNOSIS — L89.892 PRESSURE ULCER OF RIGHT LEG, STAGE 2 (H): ICD-10-CM

## 2019-04-04 DIAGNOSIS — L97.519: Primary | ICD-10-CM

## 2019-04-04 DIAGNOSIS — E11.621: Primary | ICD-10-CM

## 2019-04-04 PROCEDURE — 97597 DBRDMT OPN WND 1ST 20 CM/<: CPT

## 2019-04-04 PROCEDURE — 97598 DBRDMT OPN WND ADDL 20CM/<: CPT

## 2019-04-04 NOTE — PROGRESS NOTES
Richard Harvey, 1965  Chief Complaint   Patient presents with     WOUND CARE     right foot ulcer; pressure injury right tibia       Patient Active Problem List   Diagnosis     DM type 1 (diabetes mellitus, type 1) (H)     HTN (hypertension)     Hyperlipidemia     ACP (advance care planning)       Concerns/Comments since last visits: Richard Harvey presents for assessment and treatment of right great toe diabetic foot ulcer and newly noticed pressure injury right shin.  TORB for orders from JESSICA Conroy CNP.  Richard has been seen previously by MARYSE Coffey, and JESSICA Conroy.    The history of his wound is documented in Dr Jarrett's 3/28/19 progress note.      Richard is waiting for new diabetic shoes.  He is wearing a CAM boot on his right foot.  I noticed an area on his right anterior shin that appears to be friction/pressure related where the patient tells me the boot rubs.  He is using a sock and tubigrip to try to pad the area.    No fever, chills, malodor, or increasing pain or redness.  Pain today is at a 5 - this is his norm.    Monitors blood glucose 5 - 6 X per day - running 90 - 140,      Current treatment - Mepilex AG to right great toe - change daily.  Richard needs a new Rx today.    Objective:   Location/Type:  Plantar right great toe, diabetic foot ulcer  Drainage - appearance/amount:  Scant, serosanguinous.  Odor:  none  Measurement:  0.7 x 0.3 x 0.3 cm post debridement  Undermining:  There was an area at 9:00 of stringy devitalized tissue causing approx 0.2 cm undermining.  This was removed with debridement  Edges:  Open, not attached   Base:  Pale stringy fibrin removed with debridement revealing pink base  Periwound skin/tissue: slight callous    Left plantar heel - slight callous that was debrided; no open area noted    Location/Type:  Pressure injury right anterior shin  Drainage - appearance/amount:  none  Odor:  none  Measurement:  Not measured - pea sized  Undermining:  none  Edges:  crusted  Base:   crusted  Periwound skin/tissue: blanchable erythema    Procedures:   Cleansed and assessed. Povidone iodine to right great toe and plantar heel  prior to debridement.  Lidocaine 4% to right great toe wound.    Conservative Sharp Debridement -  Verified name,  and site as part of time out done prior to procedure.  Conservative debridement - right great toe - iris scissors, pickups and scalpel  to remove fibrin, stringy devitalized tissue alone edge and rj wound callous.  Right plantar heel - scalpel to remove light callous to evaluate for open areas; none noted.  (Total area  (< 20 sq cm); scant bleeding that stopped spontaneously; no c/o pain after application of lidocaine    Treatment per plan of care    Assessments  Right great toe - slightly larger post debridement, there was undermining at 9 o'clock side,  Minimal callous buildup    Right plantar foot - minimal callous, no open area observed    Right shin - stage II pressure injury     Plan of care:   Continue with Mepilex AG to right great toe changed daily  Skin barrier prep and small exuderm hydrocolloid to anterior RLE pressure injury  Continue wearing CAM boot to offload  Appt with Dr Jarrett in 2 weeks    Treatment Goals:  Offloading, prevent infection,manage drainage    Supplies provided:  Rx to [facility] per Pt choice of vendor    Education:  Wound care instructions/education provided. Patient verbalized understanding of instruction/education.      CC: Tony Obando/Kolby Jean Baptiste

## 2019-04-04 NOTE — PROGRESS NOTES
"Chief Complaint   Patient presents with     WOUND CARE       Initial /65 (BP Location: Left arm)   Pulse 84   Temp 98.2  F (36.8  C) (Tympanic)   Resp 16  Estimated body mass index is 38.74 kg/m  as calculated from the following:    Height as of 2/28/19: 1.53 m (5' 0.25\").    Weight as of 3/28/19: 90.7 kg (200 lb).  Medication Reconciliation: complete    Ernestina Whiting LPN    "

## 2019-04-09 ENCOUNTER — ALLIED HEALTH/NURSE VISIT (OUTPATIENT)
Dept: EDUCATION SERVICES | Facility: OTHER | Age: 54
End: 2019-04-09
Attending: NURSE PRACTITIONER
Payer: COMMERCIAL

## 2019-04-09 DIAGNOSIS — E10.65 TYPE 1 DIABETES MELLITUS WITH HYPERGLYCEMIA (H): Primary | ICD-10-CM

## 2019-04-09 NOTE — PROGRESS NOTES
Pt came in for a pump download today. This was completed and given to Macrina Dixon as well as MATTHIEU to send to [employer].    Lianna Brewster

## 2019-04-09 NOTE — PROGRESS NOTES
Richard's here for a CGM download, which is as followed:    Insulin pump information:   Average: 148 +/- 91  Basal/bolus ratio: 24.5 (41%)/35 (59%)   Testin.2x/day    Hypoglycemia: 10(18%)    Richard left before I could adjust his insulin pump    He's been checking his BG 4x/day for 30 days    LM to use his temp basal until I can see him again      Macrina Dixon APRN FNP-BC  Diabetes and Wound Care

## 2019-04-10 ENCOUNTER — TELEPHONE (OUTPATIENT)
Dept: EDUCATION SERVICES | Facility: OTHER | Age: 54
End: 2019-04-10

## 2019-04-10 NOTE — TELEPHONE ENCOUNTER
Received [employer] pump downloads and a signed release from Tracey Brewster and sent the downloads and the office visit from 4/9/2019 with Macrina Dixon to [phone] natalie Zhu. Phone number for her is [phone] ext [phone]

## 2019-04-18 ENCOUNTER — OFFICE VISIT (OUTPATIENT)
Dept: PODIATRY | Facility: OTHER | Age: 54
End: 2019-04-18
Attending: PODIATRIST
Payer: COMMERCIAL

## 2019-04-18 VITALS
HEART RATE: 70 BPM | OXYGEN SATURATION: 95 % | RESPIRATION RATE: 16 BRPM | DIASTOLIC BLOOD PRESSURE: 64 MMHG | TEMPERATURE: 97.6 F | SYSTOLIC BLOOD PRESSURE: 126 MMHG

## 2019-04-18 DIAGNOSIS — M20.21 HALLUX RIGIDUS OF RIGHT FOOT: ICD-10-CM

## 2019-04-18 DIAGNOSIS — L84 CALLUS OF HEEL: ICD-10-CM

## 2019-04-18 DIAGNOSIS — E10.621 DIABETIC ULCER OF TOE OF LEFT FOOT ASSOCIATED WITH TYPE 1 DIABETES MELLITUS, WITH FAT LAYER EXPOSED (H): Primary | ICD-10-CM

## 2019-04-18 DIAGNOSIS — M21.371 FOOT DROP, RIGHT FOOT: ICD-10-CM

## 2019-04-18 DIAGNOSIS — L60.3 ONYCHODYSTROPHY: ICD-10-CM

## 2019-04-18 DIAGNOSIS — E10.42 DIABETIC POLYNEUROPATHY ASSOCIATED WITH TYPE 1 DIABETES MELLITUS (H): ICD-10-CM

## 2019-04-18 DIAGNOSIS — L97.522 DIABETIC ULCER OF TOE OF LEFT FOOT ASSOCIATED WITH TYPE 1 DIABETES MELLITUS, WITH FAT LAYER EXPOSED (H): Primary | ICD-10-CM

## 2019-04-18 PROCEDURE — 11055 PARING/CUTG B9 HYPRKER LES 1: CPT | Performed by: PODIATRIST

## 2019-04-18 PROCEDURE — G0463 HOSPITAL OUTPT CLINIC VISIT: HCPCS | Mod: 25

## 2019-04-18 PROCEDURE — 99213 OFFICE O/P EST LOW 20 MIN: CPT | Mod: 25 | Performed by: PODIATRIST

## 2019-04-18 PROCEDURE — 97597 DBRDMT OPN WND 1ST 20 CM/<: CPT | Mod: 59 | Performed by: PODIATRIST

## 2019-04-18 ASSESSMENT — PAIN SCALES - GENERAL: PAINLEVEL: MODERATE PAIN (5)

## 2019-04-18 NOTE — NURSING NOTE
"Chief Complaint   Patient presents with     Wound Check     diabetic foot ulcer, right foot       Initial /64 (BP Location: Left arm, Patient Position: Sitting, Cuff Size: Adult Regular)   Pulse 70   Temp 97.6  F (36.4  C) (Tympanic)   Resp 16   SpO2 95%  Estimated body mass index is 38.74 kg/m  as calculated from the following:    Height as of 2/28/19: 1.53 m (5' 0.25\").    Weight as of 3/28/19: 90.7 kg (200 lb).  Medication Reconciliation: complete    Radha Richter LPN  "
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
## 2019-04-25 ENCOUNTER — ALLIED HEALTH/NURSE VISIT (OUTPATIENT)
Dept: EDUCATION SERVICES | Facility: OTHER | Age: 54
End: 2019-04-25
Attending: NURSE PRACTITIONER
Payer: COMMERCIAL

## 2019-04-25 VITALS
OXYGEN SATURATION: 95 % | WEIGHT: 207.3 LBS | SYSTOLIC BLOOD PRESSURE: 117 MMHG | HEIGHT: 62 IN | BODY MASS INDEX: 38.15 KG/M2 | HEART RATE: 66 BPM | DIASTOLIC BLOOD PRESSURE: 68 MMHG | RESPIRATION RATE: 16 BRPM

## 2019-04-25 DIAGNOSIS — E10.65 TYPE 1 DIABETES MELLITUS WITH HYPERGLYCEMIA (H): Primary | ICD-10-CM

## 2019-04-25 PROCEDURE — 95251 CONT GLUC MNTR ANALYSIS I&R: CPT | Performed by: NURSE PRACTITIONER

## 2019-04-25 PROCEDURE — G0463 HOSPITAL OUTPT CLINIC VISIT: HCPCS | Mod: 25

## 2019-04-25 PROCEDURE — 99213 OFFICE O/P EST LOW 20 MIN: CPT | Mod: 25 | Performed by: NURSE PRACTITIONER

## 2019-04-25 PROCEDURE — G0463 HOSPITAL OUTPT CLINIC VISIT: HCPCS

## 2019-04-25 RX ORDER — ASPIRIN 81 MG/1
81 TABLET ORAL DAILY
COMMUNITY
End: 2020-11-30

## 2019-04-25 ASSESSMENT — PAIN SCALES - GENERAL: PAINLEVEL: MODERATE PAIN (5)

## 2019-04-25 ASSESSMENT — MIFFLIN-ST. JEOR: SCORE: 1659.56

## 2019-04-25 NOTE — PROGRESS NOTES
"SUBJECTIVE:  Richard Harvey, 53 year old, male presents with the following Chief Complaint(s) with HPI to follow:  Chief Complaint   Patient presents with     Diabetes     insulin pump        Diabetes Follow-up      Patient is checking blood sugars: 4.4x/day + personal CGM for at least 30 days.  Results:  Ave. S    Distribution Data:  Percentage above 180: 38%  Percentage within : 45%  Percentage below 70: 17%      Symptoms of hypoglycemia (low blood sugar): yes, SB <70, history of hypoglycemia unawareness    Paresthesias (numbness or burning in feet) or sores: Yes (\"same\"); yes--right great toe--followed by Dr. Jarrett    Diabetic eye exam within the last year: Yes ()    Breakfast eaten regularly: Yes    Patient counting carbs: trying       HPI: Richard's here today for the follow up regarding his Diabetes mellitus, Type 1.    Lab Results   Component Value Date    A1C 7.3 2019    A1C 8 2019    A1C 7.7 2018    A1C 7.2 2017    A1C 7.4 2017     Current Diabetes medication:   1.  Insulin pump, uses Novolog; wearing his personal Freestyle william sensor  ASA use: yes, 325 mg daily  Statin use: yes, simvastatin 40 mg at bedime  Denies any issues with the insulin pump.      Richard's here for insulin pump download and possible adjust. He reports the following:  Continued issues with low BGs.   Hasn't been using the temp basal, but has been using the dual wave with every meal.   Continues to eat poptarts majority of the time for breakfast--no added protein  Continues to see Dr. Jarrett every other week for his right great toe wound    Denies any other health concerns besides what was mentioned.      Needs paperwork completed so he can get his new insulin pump    Noted labs    Patient Active Problem List   Diagnosis     DM type 1 (diabetes mellitus, type 1) (H)     HTN (hypertension)     Hyperlipidemia     ACP (advance care planning)       No past medical history on file.    Past " Surgical History:   Procedure Laterality Date     COLONOSCOPY - HIM SCAN N/A 09/19/2016    Procedure: COLONOSCOPY SCREENING; Surgeon: Rudy Rojo MD; Location: [facility] OR       No family history on file.    Social History     Tobacco Use     Smoking status: Never Smoker     Smokeless tobacco: Never Used   Substance Use Topics     Alcohol use: No     Alcohol/week: 0.0 oz       Current Outpatient Medications   Medication Sig Dispense Refill     acetone, Urine, test STRP Use as directed 50 each 3     aspirin 81 MG EC tablet Take 81 mg by mouth daily       blood glucose (MELISSA CONTOUR NEXT) test strip TEST 5-6 TIMES DAILY AS DIRECTED. 100 each 3     blood glucose monitoring (MELISSA CONTOUR NEXT MONITOR) meter device kit 1 each       blood glucose monitoring (MELISSA CONTOUR NEXT) test strip Use to test blood sugar 6 times daily or as directed. 200 each 11     CALCIUM-VITAMIN D PO Take  by mouth daily. 600 mg       Continuous Blood Gluc  (FREESTYLE CORNELIUS 14 DAY READER) DAYAN 1 each 4 times daily (before meals and nightly) 1 Device 3     Continuous Blood Gluc Sensor (M2GSTYLE CORNELIUS 14 DAY SENSOR) MISC 1 each 4 times daily (before meals and nightly) 3 each 11     gabapentin (NEURONTIN) 300 MG capsule Take 300 mg by mouth 3 times daily       glucagon 1 MG injection 1 mg once       Glucosamine-Chondroit-Vit C-Mn (GLUCOSAMINE-CHONDROITIN MAX ST PO) Take  by mouth daily.       insulin aspart (NovoLOG) inject - to fill ambulatory pump See Admin Instructions. For use with insulin pump       INSULIN PUMP - OUTPATIENT Date last updated:  6/23/15  Medtronic Minimed: Model 751  BASAL RATES and times:  12   AM (midnight): 1.25 units/hour    2     AM: 1.3 units/hour   6     AM: 0.95 units/hour   6    PM: 1.2 units/hour   Basal Pattern A:  Richard Harvey will use when N/A.  CARB RATIO and times:  12   AM (midnight): 12  1    PM:  10  6    PM:    9  Corection Factor (Sensitivity) and times:  12   AM (midnight): 40 mg/dL  6      "PM: 45 mg/dL  BLOOD GLUCOSE TARGET and times:  12   AM (midnight): 100 - 140  9     AM:  100 - 120  10   PM:  100 - 140  Active Insulin Time:  4 hours  Sensor:  Yes: 12   AM (midnight): 70 - 240  Carelink / Diasend username:  [email or web address]  Carelink / Diasend Password:  [email or web address]       lisinopril (PRINIVIL,ZESTRIL) 10 MG tablet Take 10 mg by mouth daily       MOTRIN IB PO Take 400 mg by mouth every 6 hours as needed for moderate pain       Multiple Vitamin (MULTI-VITAMIN DAILY PO) Take  by mouth daily.       nitroglycerin (NITROSTAT) 0.4 MG SL tablet Place under the tongue every 5 minutes as needed       omega-3 fatty acids (FISH OIL) 1200 MG capsule Take 1 capsule by mouth daily.       order for DME Equipment being ordered: Mepilex AG non-bordered foam.  Do NOT substitute 2 each 1     order for DME Equipment being ordered: Please dispense a short leg CAM walker boot to the patient 1 Device 0     order for DME Equipment being ordered: Please dispense dressing supplies to patient:  -Two sheets of Mepilex Ag  -30 gauze 4x4\"  -2 rolls of Medipore tape 1 Device 0     simvastatin (ZOCOR) 40 MG tablet Take by mouth At Bedtime       urea (CARMOL) 10 % external cream Apply cream topically as needed for calloused skin 85 g 3     URINE GLUCOSE-KETONES TEST VI          No Known Allergies    REVIEW OF SYSTEMS  Skin: right great toe wound  Eyes: negative  Ears/Nose/Throat: negative  Respiratory: No shortness of breath, dyspnea on exertion, cough, or hemoptysis  Cardiovascular: negative  Gastrointestinal: negative  Genitourinary: negative  Musculoskeletal: right knee fractured--\"good\"; positive for upper back \"tightness\"--comes and goes; history of arthritis (thumbs)--same  Neurologic: positive for numbness or tingling of hands (right hand--carpel tunnel surgery; left hand: carpel tunnel) and numbness or tingling of feet--same  Psychiatric: negative  Hematologic/Lymphatic/Immunologic: negative  Endocrine: positive for " "diabetes    OBJECTIVE:  /68   Pulse 66   Resp 16   Ht 1.575 m (5' 2\")   Wt 94 kg (207 lb 4.8 oz)   SpO2 95%   BMI 37.92 kg/m    Constitutional: healthy, alert and no distress  Cardiovascular: RRR. No murmurs, clicks gallops or rub  Respiratory: Good diaphragmatic excursion. Lungs clear  Psychiatric: mentation appears normal and affect normal/bright      LAB  Results for orders placed or performed in visit on 19   Hemoglobin A1c   Result Value Ref Range    Hemoglobin A1C 7.3 (A) 0 - 5.6 %   LDL cholesterol   Result Value Ref Range    LDL Cholesterol Calculated 56 mg/dL   C-peptide   Result Value Ref Range    C Peptide <0.1    Creatinine   Result Value Ref Range    Creatinine 0.98 mg/dL    GFR Estimate >60 ml/min/1.73m2    GFR Estimate If Black  ml/min/1.73m2   Albumin Random Urine Quantitative with Creat Ratio   Result Value Ref Range    Albumin Urine mg/spec      Albumin Urine mg/g Cr <6 MG/G Creatinine    Creatinine Urine         ASSESSMENT / PLAN:.  (E10.65) Type 1 diabetes mellitus with hyperglycemia (H)  (primary encounter diagnosis)  Comment:   Findings:  Ave. 157     Date:     Ave. Glucose: 115    Date:     Ave. Glucose: 187     Date:     Ave. Glucose: 127     Date: 4/15    Ave. Glucose: 90     Date:     Ave. Glucose: no data    Date:     Ave. Glucose: 142     Date:     Ave. Glucose: 223     Date:     Ave. Glucose: 91     Date:     Ave. Glucose: 104     Date:     Ave. Glucose: 183    Date:     Ave. Glucose: 226    Date:     Ave. Glucose: 183     Date:   Ave. Glucose: 171     Date:     Ave. Glucose: 168    Insulin pump information:   Average: 133 +/- 70  Basal/bolus ratio: 24.5 (43%)/33.1 (57%)   Testin.4x/day    Hypoglycemia: 12 (21%)    Plan: GLUCOSE MONITOR, 72 HOUR, PHYS INTERP         Ideally, if bailey could find another option for breakfast besides poptarts--that would be great.  However, he's been doing this since I meet him 5 years " ago.    No adjustment today as his BGs are everywhere.  He needs to start using his insulin pumps advanced features to help manage his diabetes.      Recommendations  1.  Breakfast (and every meal + snack)  Add protein  If this doesn't help the noonish low BG, then still add protein but change the dual wave to a Higher percentage up front (60-70%) and a Lower percentage on the back side (40-30%)   If that doesn't help, then let me know    2.  Supper dual wave  If it's pasta, pizza or bigger meal (buffet), change that duration to at least 3-4 hours    Follow up as scheduled  Call me sooner if continues low BGs        Patient Instructions   Continue working on healthy eating and moving as best as you can (start low and slow, work up to 30 min, 5x/week)    BG goals:  Fasting and before meals <130, >70  2 hour after eating <180    We only need 1/2 of these numbers to be within target then your A1c will be within target    Medication changes   Watch for lows BG due to changes    Recommendations:  1.  Breakfast (and every meal)  Add protein  If this doesn't help the noonish low BG, then still add protein but change the dual wave to a Higher percentage up front (60-70%) and a Lower percentage on the back side (40-30%)   If that doesn't help, then let me know    2.  Supper dual wave  If it's pasta, pizza or bigger meal (buffet), change that duration to at least 3-4 hours    Follow up   As discussed      Call me sooner if any problems/concerns and/or questions develop including consistent low BGs <70 or consistent high BGs >200  [phone] (Unit Coordinator)    [phone] (Nurse)  [phone] (Macrina's cell)        Time: 40 min  Barrier: none  Willingness to learn: accepting    Macrina Dixon RN FNP-BC  Disease Management    Cc: Dr. ALEJANDRA Obando    With the electronic record, we can now more quickly and easily track our patient diabetic goals. Our diabetes clinical review is in progress and these are the indicators we are  "monitoring for good diabetes health.     1.) HbA1C less than 7 (measurement of your average blood sugars)  2.) Blood Pressure less than 140/80  3.) LDL less than 100 (bad cholesterol)  4.) HbA1C is checked in the last 6 months and below 7% (more frequently if not at goal or adjusting medications)  5.) LDL is checked in the last 12 months (more frequently if not at goal or adjusting medications)  6.) Taking one baby aspirin daily (unless otherwise instructed)  7.) No tobacco use  8) Statin use     You have achieved 7 out of 8 of these and I am encouraging you to come in and get tuned up to achieve 8 out of 8!  Here is what you have achieved so far in my goals for you:  1.) HbA1C  less than 7:                              NO  Your last  HbA1C :   Lab Results   Component Value Date    A1C 7.3 03/21/2019    A1C 8 01/21/2019    A1C 7.7 07/25/2018    A1C 7.2 11/24/2017    A1C 7.4 07/17/2017       2.) Blood Pressure less than 140/80:       YES      Your last    /68   Pulse 66   Resp 16   Ht 1.575 m (5' 2\")   Wt 94 kg (207 lb 4.8 oz)   SpO2 95%   BMI 37.92 kg/m      3.) LDL less than 100:                              YES      Your last   LDL         LDL Cholesterol Calculated   Date Value Ref Range Status   03/21/2019 56 mg/dL Final   ]    4.) Checked HbA1C in the past 6 months: YES      5.) Checked LDL in the past 12 months:    YES     6.) Taking one aspirin daily:                       YES     7.) No tobacco use:                                        YES      8.) Statin use      YES              "

## 2019-04-25 NOTE — PATIENT INSTRUCTIONS
Continue working on healthy eating and moving as best as you can (start low and slow, work up to 30 min, 5x/week)    BG goals:  Fasting and before meals <130, >70  2 hour after eating <180    We only need 1/2 of these numbers to be within target then your A1c will be within target    Medication changes   Watch for lows BG due to changes    Recommendations:  1.  Breakfast (and every meal)  Add protein  If this doesn't help the noonish low BG, then still add protein but change the dual wave to a Higher percentage up front (60-70%) and a Lower percentage on the back side (40-30%)   If that doesn't help, then let me know    2.  Supper dual wave  If it's pasta, pizza or bigger meal (buffet), change that duration to at least 3-4 hours    Follow up   As discussed      Call me sooner if any problems/concerns and/or questions develop including consistent low BGs <70 or consistent high BGs >200  [phone] (Unit Coordinator)    [phone] (Nurse)  [phone] (Macrina's cell)

## 2019-04-25 NOTE — PROGRESS NOTES
"Chief Complaint   Patient presents with     Diabetes       Initial /68   Pulse 66   Resp 16   Ht 1.575 m (5' 2\")   Wt 94 kg (207 lb 4.8 oz)   SpO2 95%   BMI 37.92 kg/m   Estimated body mass index is 37.92 kg/m  as calculated from the following:    Height as of this encounter: 1.575 m (5' 2\").    Weight as of this encounter: 94 kg (207 lb 4.8 oz).  Medication Reconciliation: complete    Lianna Brewster LPN    "

## 2019-04-29 ENCOUNTER — TELEPHONE (OUTPATIENT)
Dept: EDUCATION SERVICES | Facility: OTHER | Age: 54
End: 2019-04-29

## 2019-04-29 NOTE — TELEPHONE ENCOUNTER
FAXED OFFICE NOTES FROM 4/25/2019 ALONG WITH MEDTRONIC PUMP DOWNLOADS AND CGM DOWNLOADS TO [employer] [phone]

## 2019-05-02 ENCOUNTER — OFFICE VISIT (OUTPATIENT)
Dept: PODIATRY | Facility: OTHER | Age: 54
End: 2019-05-02
Attending: PODIATRIST
Payer: COMMERCIAL

## 2019-05-02 VITALS
HEIGHT: 62 IN | WEIGHT: 207 LBS | HEART RATE: 84 BPM | SYSTOLIC BLOOD PRESSURE: 124 MMHG | BODY MASS INDEX: 38.09 KG/M2 | DIASTOLIC BLOOD PRESSURE: 79 MMHG | TEMPERATURE: 97.6 F

## 2019-05-02 DIAGNOSIS — L60.3 ONYCHODYSTROPHY: ICD-10-CM

## 2019-05-02 DIAGNOSIS — L84 CALLUS OF HEEL: ICD-10-CM

## 2019-05-02 DIAGNOSIS — M20.21 HALLUX RIGIDUS OF RIGHT FOOT: ICD-10-CM

## 2019-05-02 DIAGNOSIS — M21.371 FOOT DROP, RIGHT FOOT: ICD-10-CM

## 2019-05-02 DIAGNOSIS — L97.522 DIABETIC ULCER OF TOE OF LEFT FOOT ASSOCIATED WITH TYPE 1 DIABETES MELLITUS, WITH FAT LAYER EXPOSED (H): Primary | ICD-10-CM

## 2019-05-02 DIAGNOSIS — E10.42 DIABETIC POLYNEUROPATHY ASSOCIATED WITH TYPE 1 DIABETES MELLITUS (H): ICD-10-CM

## 2019-05-02 DIAGNOSIS — E10.621 DIABETIC ULCER OF TOE OF LEFT FOOT ASSOCIATED WITH TYPE 1 DIABETES MELLITUS, WITH FAT LAYER EXPOSED (H): Primary | ICD-10-CM

## 2019-05-02 PROCEDURE — G0463 HOSPITAL OUTPT CLINIC VISIT: HCPCS | Performed by: COUNSELOR

## 2019-05-02 PROCEDURE — 99213 OFFICE O/P EST LOW 20 MIN: CPT | Mod: 25 | Performed by: PODIATRIST

## 2019-05-02 PROCEDURE — G0463 HOSPITAL OUTPT CLINIC VISIT: HCPCS | Mod: 25 | Performed by: COUNSELOR

## 2019-05-02 PROCEDURE — 97597 DBRDMT OPN WND 1ST 20 CM/<: CPT | Performed by: PODIATRIST

## 2019-05-02 ASSESSMENT — MIFFLIN-ST. JEOR: SCORE: 1653.2

## 2019-05-02 ASSESSMENT — PAIN SCALES - GENERAL: PAINLEVEL: NO PAIN (0)

## 2019-05-02 NOTE — PROGRESS NOTES
Richard's here for his personal CGM and insulin pump download, which is as followed:    Insulin pump information:   Average: 160 +/- 85  Basal/bolus ratio: 24.5 (41%)/34.8 (59%)   Testin.2x/day    Hypoglycemia: 7 (13%)    Personal CGM  Average glucose: 167  Time in range  >180: 43%  : 42%  <70: 15%    Plan:  Insulin pump adjustments as followed  1. Bolus  Carb ratio  0000 7--as he keeps spiking with breakfast postprandial.  Encouraged him to add protein to his meals    Insulin sensitivity  0000 50  1200 55  1800 60    Backed off his correction due to issues with low BGs      Macrina Dixon APRN FNP-BC  Diabetes and Wound Care
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
## 2019-05-02 NOTE — NURSING NOTE
"Chief Complaint   Patient presents with     WOUND CARE     Follow up left foot ulcer       Initial /79 (BP Location: Left arm, Patient Position: Sitting, Cuff Size: Adult Regular)   Pulse 84   Temp 97.6  F (36.4  C) (Tympanic)   Ht 1.575 m (5' 2\")   Wt 93.9 kg (207 lb)   BMI 37.86 kg/m   Estimated body mass index is 37.86 kg/m  as calculated from the following:    Height as of this encounter: 1.575 m (5' 2\").    Weight as of this encounter: 93.9 kg (207 lb).  Medication Reconciliation: complete    Mallory Motta LPN    "

## 2019-05-16 ENCOUNTER — MEDICAL CORRESPONDENCE (OUTPATIENT)
Dept: HEALTH INFORMATION MANAGEMENT | Facility: CLINIC | Age: 54
End: 2019-05-16

## 2019-05-20 ENCOUNTER — TELEPHONE (OUTPATIENT)
Dept: EDUCATION SERVICES | Facility: OTHER | Age: 54
End: 2019-05-20

## 2019-05-20 NOTE — TELEPHONE ENCOUNTER
Faxed office visit notes from 04/09/19 and 04/25/19 along with the certification for medical necessity form to [address] at [phone]

## 2019-06-04 ENCOUNTER — OFFICE VISIT (OUTPATIENT)
Dept: PODIATRY | Facility: OTHER | Age: 54
End: 2019-06-04
Attending: PODIATRIST
Payer: COMMERCIAL

## 2019-06-04 VITALS
SYSTOLIC BLOOD PRESSURE: 110 MMHG | HEART RATE: 76 BPM | HEIGHT: 62 IN | WEIGHT: 203 LBS | DIASTOLIC BLOOD PRESSURE: 68 MMHG | BODY MASS INDEX: 37.36 KG/M2 | TEMPERATURE: 98.3 F

## 2019-06-04 DIAGNOSIS — M21.371 FOOT DROP, RIGHT FOOT: ICD-10-CM

## 2019-06-04 DIAGNOSIS — L60.3 ONYCHODYSTROPHY: ICD-10-CM

## 2019-06-04 DIAGNOSIS — E10.621 DIABETIC ULCER OF TOE OF LEFT FOOT ASSOCIATED WITH TYPE 1 DIABETES MELLITUS, WITH FAT LAYER EXPOSED (H): Primary | ICD-10-CM

## 2019-06-04 DIAGNOSIS — M20.21 HALLUX RIGIDUS OF RIGHT FOOT: ICD-10-CM

## 2019-06-04 DIAGNOSIS — E10.42 DIABETIC POLYNEUROPATHY ASSOCIATED WITH TYPE 1 DIABETES MELLITUS (H): ICD-10-CM

## 2019-06-04 DIAGNOSIS — L84 CALLUS OF HEEL: ICD-10-CM

## 2019-06-04 DIAGNOSIS — L97.522 DIABETIC ULCER OF TOE OF LEFT FOOT ASSOCIATED WITH TYPE 1 DIABETES MELLITUS, WITH FAT LAYER EXPOSED (H): Primary | ICD-10-CM

## 2019-06-04 PROCEDURE — 99213 OFFICE O/P EST LOW 20 MIN: CPT | Mod: 25 | Performed by: PODIATRIST

## 2019-06-04 PROCEDURE — G0463 HOSPITAL OUTPT CLINIC VISIT: HCPCS | Mod: 25

## 2019-06-04 PROCEDURE — 11055 PARING/CUTG B9 HYPRKER LES 1: CPT | Performed by: PODIATRIST

## 2019-06-04 ASSESSMENT — PAIN SCALES - GENERAL: PAINLEVEL: NO PAIN (0)

## 2019-06-04 ASSESSMENT — MIFFLIN-ST. JEOR: SCORE: 1635.05

## 2019-06-04 NOTE — NURSING NOTE
"Chief Complaint   Patient presents with     Wound Check       Initial /68   Pulse 76   Temp 98.3  F (36.8  C)   Ht 1.575 m (5' 2\")   Wt 92.1 kg (203 lb)   BMI 37.13 kg/m   Estimated body mass index is 37.13 kg/m  as calculated from the following:    Height as of this encounter: 1.575 m (5' 2\").    Weight as of this encounter: 92.1 kg (203 lb).  Medication Reconciliation: complete    Katalina Perez LPN    "
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
## 2019-06-05 DIAGNOSIS — E10.65 TYPE 1 DIABETES MELLITUS WITH HYPERGLYCEMIA (H): Primary | ICD-10-CM

## 2019-06-05 PROBLEM — E10.42 DIABETIC POLYNEUROPATHY ASSOCIATED WITH TYPE 1 DIABETES MELLITUS (H): Status: ACTIVE | Noted: 2019-06-05

## 2019-06-05 PROBLEM — E66.812 CLASS 2 OBESITY IN ADULT: Status: ACTIVE | Noted: 2019-06-05

## 2019-06-06 ENCOUNTER — ALLIED HEALTH/NURSE VISIT (OUTPATIENT)
Dept: EDUCATION SERVICES | Facility: OTHER | Age: 54
End: 2019-06-06
Attending: NURSE PRACTITIONER
Payer: COMMERCIAL

## 2019-06-06 VITALS
OXYGEN SATURATION: 95 % | WEIGHT: 208.9 LBS | HEIGHT: 62 IN | RESPIRATION RATE: 16 BRPM | HEART RATE: 83 BPM | BODY MASS INDEX: 38.44 KG/M2 | SYSTOLIC BLOOD PRESSURE: 120 MMHG | DIASTOLIC BLOOD PRESSURE: 76 MMHG

## 2019-06-06 DIAGNOSIS — E10.65 TYPE 1 DIABETES MELLITUS WITH HYPERGLYCEMIA (H): ICD-10-CM

## 2019-06-06 DIAGNOSIS — R10.32 LEFT GROIN PAIN: Primary | ICD-10-CM

## 2019-06-06 PROCEDURE — 99213 OFFICE O/P EST LOW 20 MIN: CPT | Mod: 25 | Performed by: NURSE PRACTITIONER

## 2019-06-06 PROCEDURE — G0463 HOSPITAL OUTPT CLINIC VISIT: HCPCS

## 2019-06-06 PROCEDURE — 95251 CONT GLUC MNTR ANALYSIS I&R: CPT | Performed by: NURSE PRACTITIONER

## 2019-06-06 ASSESSMENT — PAIN SCALES - GENERAL: PAINLEVEL: MODERATE PAIN (4)

## 2019-06-06 ASSESSMENT — MIFFLIN-ST. JEOR: SCORE: 1661.81

## 2019-06-06 NOTE — PATIENT INSTRUCTIONS
Continue working on healthy eating and moving as best as you can (start low and slow, work up to 30 min, 5x/week)    BG goals:  Fasting and before meals <130, >70  2 hour after eating <180    We only need 1/2 of these numbers to be within target then your A1c will be within target    Medication changes   Watch for lows BG due to changes    Recommendations:  1.  Breakfast   Add the correct amount of carbs    2.  Lunch  If working, try entering only 1/2 the amount of carbs consumed to see if that helps with low BGs in the afternoon    2.  Supper dual wave  If it's pasta, pizza or bigger meal (buffet), change that duration to at least 3-4 hours    Follow up   One month  Stop in sooner if BGs are still spiking with breakfast and/or continued issues with low BGs (nurse only for an insulin pump download)      Call me sooner if any problems/concerns and/or questions develop including consistent low BGs <70 or consistent high BGs >200  [phone] (Unit Coordinator)    [phone] (Nurse)  [phone] (Macrina's cell)

## 2019-06-07 NOTE — PROGRESS NOTES
"SUBJECTIVE:  Richard Harvey, 54 year old, male presents with the following Chief Complaint(s) with HPI to follow:  Chief Complaint   Patient presents with     Diabetes     insulin pump and CGM download        Diabetes Follow-up      Patient is checking blood sugars: 6x/day + personal CGM for at least 30 days.  Results:  Ave. S    Distribution Data:  Percentage above 180: 50%  Percentage within : 43%  Percentage below 70: 7%      Symptoms of hypoglycemia (low blood sugar): yes, SB <70, history of hypoglycemia unawareness    Paresthesias (numbness or burning in feet) or sores: Yes (\"same\"); almost healed--followed by Dr. Jarrett    Diabetic eye exam within the last year: Yes ()    Breakfast eaten regularly: Yes    Patient counting carbs: trying       HPI: Richard's here today for the follow up regarding his Diabetes mellitus, Type 1.    Lab Results   Component Value Date    A1C 7.3 2019    A1C 8 2019    A1C 7.7 2018    A1C 7.2 2017    A1C 7.4 2017     Current Diabetes medication:   1.  Insulin pump, uses Novolog; wearing his personal Freestyle william sensor  ASA use: yes, 325 mg daily  Statin use: yes, simvastatin 40 mg at bedime  Denies any issues with the insulin pump.      Richard's here for insulin pump download and possible adjust. He reports the following:  Recently had training on his MiniMed 670G.    Continues to have issues with low BGs.   Hasn't been using the temp basal.    Asking if I can turn on his dual wave.   Has an appointment with Dr. Jarrett (podiatry) in . Continues to wear his boot  A1c check with Dr. Obando at the end of   He has an appointment with Richard, from [facility]    Richard does report pain in left groin area  Started when he was wearing his prescribed boot for his right foot wounds. States his legs are uneven.   Describes pain as being \"sore\", intermittent  Pain is worse when walking  Denies any associated symptoms  No lump.    OTC pain " medications help      Patient Active Problem List   Diagnosis     DM type 1 (diabetes mellitus, type 1) (H)     HTN (hypertension)     Hyperlipidemia     ACP (advance care planning)     Diabetic polyneuropathy associated with type 1 diabetes mellitus (H)     Class 2 obesity in adult       No past medical history on file.    Past Surgical History:   Procedure Laterality Date     COLONOSCOPY - HIM SCAN N/A 09/19/2016    Procedure: COLONOSCOPY SCREENING; Surgeon: Rudy Rojo MD; Location: [facility] OR       No family history on file.    Social History     Tobacco Use     Smoking status: Never Smoker     Smokeless tobacco: Never Used   Substance Use Topics     Alcohol use: No     Alcohol/week: 0.0 oz       Current Outpatient Medications   Medication Sig Dispense Refill     acetone, Urine, test STRP Use as directed 50 each 3     aspirin 81 MG EC tablet Take 81 mg by mouth daily       blood glucose (MELISSA CONTOUR NEXT) test strip TEST 6 TIMES DAILY AS DIRECTED. 300 each 11     blood glucose monitoring (Encoding.com CONTOUR NEXT MONITOR) meter device kit 1 each       blood glucose monitoring (MELISSA CONTOUR NEXT) test strip Use to test blood sugar 6 times daily or as directed. 200 each 11     CALCIUM-VITAMIN D PO Take  by mouth daily. 600 mg       Continuous Blood Gluc  (FREESTYLE CORNELIUS 14 DAY READER) DAYAN 1 each 4 times daily (before meals and nightly) 1 Device 3     Continuous Blood Gluc Sensor (FREESTYLE CORNELIUS 14 DAY SENSOR) MISC 1 each 4 times daily (before meals and nightly) 3 each 11     gabapentin (NEURONTIN) 300 MG capsule Take 300 mg by mouth 3 times daily       glucagon 1 MG injection 1 mg once       Glucosamine-Chondroit-Vit C-Mn (GLUCOSAMINE-CHONDROITIN MAX ST PO) Take  by mouth daily.       insulin aspart (NovoLOG) inject - to fill ambulatory pump See Admin Instructions. For use with insulin pump       INSULIN PUMP - OUTPATIENT Date last updated:  6/23/15  Medtronic Minimed: Model 751  BASAL RATES and  "times:  12   AM (midnight): 1.25 units/hour    2     AM: 1.3 units/hour   6     AM: 0.95 units/hour   6    PM: 1.2 units/hour   Basal Pattern A:  Richard Harvey will use when N/A.  CARB RATIO and times:  12   AM (midnight): 12  1    PM:  10  6    PM:    9  Corection Factor (Sensitivity) and times:  12   AM (midnight): 40 mg/dL  6     PM: 45 mg/dL  BLOOD GLUCOSE TARGET and times:  12   AM (midnight): 100 - 140  9     AM:  100 - 120  10   PM:  100 - 140  Active Insulin Time:  4 hours  Sensor:  Yes: 12   AM (midnight): 70 - 240  Carelink / Diasend username:  [email or web address]  Carelink / Diasend Password:  [email or web address]       lisinopril (PRINIVIL,ZESTRIL) 10 MG tablet Take 10 mg by mouth daily       MOTRIN IB PO Take 400 mg by mouth every 6 hours as needed for moderate pain       Multiple Vitamin (MULTI-VITAMIN DAILY PO) Take  by mouth daily.       nitroglycerin (NITROSTAT) 0.4 MG SL tablet Place under the tongue every 5 minutes as needed       order for DME Equipment being ordered:     1.  Medtronic insulin pump supplies--insertion sets and reserviors  Disp: 1 month  Refill: 11 months 30 days 11     order for DME Equipment being ordered: Mepilex AG non-bordered foam.  Do NOT substitute 2 each 1     order for DME Equipment being ordered: Please dispense a short leg CAM walker boot to the patient 1 Device 0     order for DME Equipment being ordered: Please dispense dressing supplies to patient:  -Two sheets of Mepilex Ag  -30 gauze 4x4\"  -2 rolls of Medipore tape 1 Device 0     simvastatin (ZOCOR) 40 MG tablet Take by mouth At Bedtime       urea (CARMOL) 10 % external cream Apply cream topically as needed for calloused skin 85 g 3     URINE GLUCOSE-KETONES TEST VI          No Known Allergies    REVIEW OF SYSTEMS  Skin: right great toe wound  Eyes: negative  Ears/Nose/Throat: negative  Respiratory: No shortness of breath, dyspnea on exertion, cough, or hemoptysis  Cardiovascular: negative  Gastrointestinal: negative  Genitourinary: " "negative  Musculoskeletal: left groin pain;  history of arthritis--back and thumbs  Neurologic: positive for numbness or tingling of hands (right hand--carpel tunnel surgery; left hand: carpel tunnel) and numbness or tingling of feet--same  Psychiatric: negative  Hematologic/Lymphatic/Immunologic: negative  Endocrine: positive for diabetes    OBJECTIVE:  /76   Pulse 83   Resp 16   Ht 1.575 m (5' 2\")   Wt 94.8 kg (208 lb 14.4 oz)   SpO2 95%   BMI 38.21 kg/m    Constitutional: healthy, alert and no distress  Cardiovascular: RRR. No murmurs, clicks gallops or rub  Respiratory: Good diaphragmatic excursion. Lungs clear  Psychiatric: mentation appears normal and affect normal/bright      LAB  Results for orders placed or performed in visit on 04/25/19   GLUCOSE MONITOR, 72 HOUR, PHYS INTERP    Narrative    Findings:  Ave. 157     Date: 4/12    Ave. Glucose: 115    Date: 4/13    Ave. Glucose: 187     Date: 4/14    Ave. Glucose: 127     Date: 4/15    Ave. Glucose: 90     Date: 4/16    Ave. Glucose: no data    Date: 4/17    Ave. Glucose: 142     Date: 4/18    Ave. Glucose: 223     Date: 4/19    Ave. Glucose: 91     Date: 4/20    Ave. Glucose: 104     Date: 4/21    Ave. Glucose: 183    Date: 4/22    Ave. Glucose: 226    Date: 4/23    Ave. Glucose: 183     Date: 4/24  Ave. Glucose: 171     Date: 4/25    Ave. Glucose: 168   Hemoglobin A1c   Result Value Ref Range    Hemoglobin A1C 7.3 (A) 0 - 5.6 %   LDL cholesterol   Result Value Ref Range    LDL Cholesterol Calculated 56 mg/dL   C-peptide   Result Value Ref Range    C Peptide <0.1    Creatinine   Result Value Ref Range    Creatinine 0.98 mg/dL    GFR Estimate >60 ml/min/1.73m2    GFR Estimate If Black  ml/min/1.73m2   Albumin Random Urine Quantitative with Creat Ratio   Result Value Ref Range    Albumin Urine mg/spec      Albumin Urine mg/g Cr <6 MG/G Creatinine    Creatinine Urine         ASSESSMENT / PLAN:.  (R10.32) Left groin pain  (primary encounter " diagnosis)  Comment: noted  Plan:   Can try adding heat and ice  Recommended talking to Richard to see if there's something to offset the unevenness of his feet.    If symptoms worsen, please follow up with PCP    (E10.65) Type 1 diabetes mellitus with hyperglycemia (H)  Comment:   BGs still spiking after breakfast. Will adjust accordingly    Insulin pump information:   SG average: 162 +/- 64  BG Average: 195 +/- 103  Basal/bolus ratio: 22 (37%)/37 (63%)   Testinx/day    Hypoglycemia: yes    Findings:  Ave. 195     Date:     Ave. Glucose: 176    Date:     Ave. Glucose: 172     Date:     Ave. Glucose: 162     Date:     Ave. Glucose: 181     Date:     Ave. Glucose: 188    Date:     Ave. Glucose: 145     Date:     Ave. Glucose: 176     Date:     Ave. Glucose: 181     Date:     Ave. Glucose: 172     Date:     Ave. Glucose: 283    Date: 6/3    Ave. Glucose: 292    Date:     Ave. Glucose: 209     Date:   Ave. Glucose: 225     Date:     Ave. Glucose: 106    Plan: blood glucose (MELISSA CONTOUR NEXT) test strip,         GLUCOSE MONITOR, 72 HOUR, PHYS INTERP          Insulin pump adjust  Bolus  0000 6.5         As mentioned in past notes, if Richard could find another option for breakfast besides poptarts--that would be great.  He's been doing this since I meet him 5 years ago.    Recommendations:  1.  Breakfast   Add the correct amount of carbs    2.  Lunch  If working, try entering only 1/2 the amount of carbs consumed to see if that helps with low BGs in the afternoon    2.  Supper dual wave  If it's pasta, pizza or bigger meal (buffet), change that duration to at least 3-4 hours      Follow up as scheduled  Call me sooner if continues low BGs      Keep scheduled training for the automode of his 670G pump. Reminded him to try to calibrate with BGs are stable AND between 100-300    Patient Instructions   Continue working on healthy eating and moving as best as you can (start low  and slow, work up to 30 min, 5x/week)    BG goals:  Fasting and before meals <130, >70  2 hour after eating <180    We only need 1/2 of these numbers to be within target then your A1c will be within target    Medication changes   Watch for lows BG due to changes    Recommendations:  1.  Breakfast   Add the correct amount of carbs    2.  Lunch  If working, try entering only 1/2 the amount of carbs consumed to see if that helps with low BGs in the afternoon    2.  Supper dual wave  If it's pasta, pizza or bigger meal (buffet), change that duration to at least 3-4 hours    Follow up   One month  Stop in sooner if BGs are still spiking with breakfast and/or continued issues with low BGs (nurse only for an insulin pump download)      Call me sooner if any problems/concerns and/or questions develop including consistent low BGs <70 or consistent high BGs >200  [phone] (Unit Coordinator)    [phone] (Nurse)  [phone] (Macrina's cell)        Time: 40 min  Barrier: none  Willingness to learn: accepting    Macrina Dixon RN [facility]  Disease Management    Cc: Dr. ALEJANDRA Obando    With the electronic record, we can now more quickly and easily track our patient diabetic goals. Our diabetes clinical review is in progress and these are the indicators we are monitoring for good diabetes health.     1.) HbA1C less than 7 (measurement of your average blood sugars)  2.) Blood Pressure less than 140/80  3.) LDL less than 100 (bad cholesterol)  4.) HbA1C is checked in the last 6 months and below 7% (more frequently if not at goal or adjusting medications)  5.) LDL is checked in the last 12 months (more frequently if not at goal or adjusting medications)  6.) Taking one baby aspirin daily (unless otherwise instructed)  7.) No tobacco use  8) Statin use     You have achieved 7 out of 8 of these and I am encouraging you to come in and get tuned up to achieve 8 out of 8!  Here is what you have achieved so far in my goals for you:  1.)  "HbA1C  less than 7:                              NO  Your last  HbA1C :   Lab Results   Component Value Date    A1C 7.3 03/21/2019    A1C 8 01/21/2019    A1C 7.7 07/25/2018    A1C 7.2 11/24/2017    A1C 7.4 07/17/2017       2.) Blood Pressure less than 140/80:       YES      Your last    /76   Pulse 83   Resp 16   Ht 1.575 m (5' 2\")   Wt 94.8 kg (208 lb 14.4 oz)   SpO2 95%   BMI 38.21 kg/m      3.) LDL less than 100:                              YES      Your last   LDL         LDL Cholesterol Calculated   Date Value Ref Range Status   03/21/2019 56 mg/dL Final   ]    4.) Checked HbA1C in the past 6 months: YES      5.) Checked LDL in the past 12 months:    YES     6.) Taking one aspirin daily:                       YES     7.) No tobacco use:                                        YES      8.) Statin use      YES              "

## 2019-06-25 ENCOUNTER — OFFICE VISIT (OUTPATIENT)
Dept: PODIATRY | Facility: OTHER | Age: 54
End: 2019-06-25
Attending: PODIATRIST
Payer: COMMERCIAL

## 2019-06-25 VITALS
HEIGHT: 62 IN | OXYGEN SATURATION: 93 % | SYSTOLIC BLOOD PRESSURE: 112 MMHG | DIASTOLIC BLOOD PRESSURE: 68 MMHG | BODY MASS INDEX: 37.73 KG/M2 | HEART RATE: 87 BPM | TEMPERATURE: 97.8 F | WEIGHT: 205 LBS

## 2019-06-25 DIAGNOSIS — M21.371 FOOT DROP, RIGHT FOOT: ICD-10-CM

## 2019-06-25 DIAGNOSIS — M62.462 GASTROCNEMIUS EQUINUS OF LEFT LOWER EXTREMITY: ICD-10-CM

## 2019-06-25 DIAGNOSIS — M20.21 HALLUX RIGIDUS OF RIGHT FOOT: ICD-10-CM

## 2019-06-25 DIAGNOSIS — L97.511 DIABETIC ULCER OF TOE OF RIGHT FOOT ASSOCIATED WITH TYPE 2 DIABETES MELLITUS, LIMITED TO BREAKDOWN OF SKIN (H): Primary | ICD-10-CM

## 2019-06-25 DIAGNOSIS — E10.42 DIABETIC POLYNEUROPATHY ASSOCIATED WITH TYPE 1 DIABETES MELLITUS (H): ICD-10-CM

## 2019-06-25 DIAGNOSIS — M62.461 GASTROCNEMIUS EQUINUS OF RIGHT LOWER EXTREMITY: ICD-10-CM

## 2019-06-25 DIAGNOSIS — L84 CALLUS OF FOOT: ICD-10-CM

## 2019-06-25 DIAGNOSIS — E11.621 DIABETIC ULCER OF TOE OF RIGHT FOOT ASSOCIATED WITH TYPE 2 DIABETES MELLITUS, LIMITED TO BREAKDOWN OF SKIN (H): Primary | ICD-10-CM

## 2019-06-25 DIAGNOSIS — L60.3 ONYCHODYSTROPHY: ICD-10-CM

## 2019-06-25 PROCEDURE — G0463 HOSPITAL OUTPT CLINIC VISIT: HCPCS | Mod: 25

## 2019-06-25 PROCEDURE — 99212 OFFICE O/P EST SF 10 MIN: CPT | Mod: 25 | Performed by: PODIATRIST

## 2019-06-25 PROCEDURE — 11055 PARING/CUTG B9 HYPRKER LES 1: CPT | Performed by: PODIATRIST

## 2019-06-25 ASSESSMENT — MIFFLIN-ST. JEOR: SCORE: 1644.12

## 2019-06-25 ASSESSMENT — PAIN SCALES - GENERAL: PAINLEVEL: MODERATE PAIN (4)
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
## 2019-06-25 NOTE — NURSING NOTE
"Chief Complaint   Patient presents with     Foot Pain       Initial /68 (BP Location: Left arm, Patient Position: Sitting, Cuff Size: Adult Regular)   Pulse 87   Temp 97.8  F (36.6  C) (Tympanic)   Ht 1.575 m (5' 2\")   Wt 93 kg (205 lb)   SpO2 93%   BMI 37.49 kg/m   Estimated body mass index is 37.49 kg/m  as calculated from the following:    Height as of this encounter: 1.575 m (5' 2\").    Weight as of this encounter: 93 kg (205 lb).  Medication Reconciliation: complete    Ashley A. Lechevalier, LPN       "

## 2019-07-03 ENCOUNTER — HOSPITAL ENCOUNTER (EMERGENCY)
Facility: HOSPITAL | Age: 54
Discharge: HOME | End: 2019-07-04
Attending: INTERNAL MEDICINE | Admitting: INTERNAL MEDICINE
Payer: COMMERCIAL

## 2019-07-03 DIAGNOSIS — K52.9 GASTROENTERITIS: ICD-10-CM

## 2019-07-03 LAB
ALBUMIN SERPL-MCNC: 3.6 G/DL (ref 3.4–5)
ALBUMIN UR-MCNC: 10 MG/DL
ALP SERPL-CCNC: 115 U/L (ref 40–150)
ALT SERPL W P-5'-P-CCNC: 35 U/L (ref 0–70)
ANION GAP SERPL CALCULATED.3IONS-SCNC: 5 MMOL/L (ref 3–14)
APPEARANCE UR: CLEAR
AST SERPL W P-5'-P-CCNC: 36 U/L (ref 0–45)
BACTERIA #/AREA URNS HPF: ABNORMAL /HPF
BASOPHILS # BLD AUTO: 0 10E9/L (ref 0–0.2)
BASOPHILS NFR BLD AUTO: 0.5 %
BILIRUB SERPL-MCNC: 0.3 MG/DL (ref 0.2–1.3)
BILIRUB UR QL STRIP: NEGATIVE
BUN SERPL-MCNC: 19 MG/DL (ref 7–30)
CALCIUM SERPL-MCNC: 9.1 MG/DL (ref 8.5–10.1)
CHLORIDE SERPL-SCNC: 102 MMOL/L (ref 94–109)
CO2 SERPL-SCNC: 29 MMOL/L (ref 20–32)
COLOR UR AUTO: ABNORMAL
CREAT SERPL-MCNC: 1.05 MG/DL (ref 0.66–1.25)
DIFFERENTIAL METHOD BLD: NORMAL
EOSINOPHIL # BLD AUTO: 0 10E9/L (ref 0–0.7)
EOSINOPHIL NFR BLD AUTO: 0.6 %
ERYTHROCYTE [DISTWIDTH] IN BLOOD BY AUTOMATED COUNT: 11.8 % (ref 10–15)
GFR SERPL CREATININE-BSD FRML MDRD: 79 ML/MIN/{1.73_M2}
GLUCOSE SERPL-MCNC: 180 MG/DL (ref 70–99)
GLUCOSE UR STRIP-MCNC: 300 MG/DL
HCT VFR BLD AUTO: 40 % (ref 40–53)
HGB BLD-MCNC: 13.6 G/DL (ref 13.3–17.7)
HGB UR QL STRIP: ABNORMAL
IMM GRANULOCYTES # BLD: 0 10E9/L (ref 0–0.4)
IMM GRANULOCYTES NFR BLD: 0.3 %
KETONES UR STRIP-MCNC: 5 MG/DL
LEUKOCYTE ESTERASE UR QL STRIP: NEGATIVE
LIPASE SERPL-CCNC: 61 U/L (ref 73–393)
LYMPHOCYTES # BLD AUTO: 1.1 10E9/L (ref 0.8–5.3)
LYMPHOCYTES NFR BLD AUTO: 17.3 %
MCH RBC QN AUTO: 30 PG (ref 26.5–33)
MCHC RBC AUTO-ENTMCNC: 34 G/DL (ref 31.5–36.5)
MCV RBC AUTO: 88 FL (ref 78–100)
MONOCYTES # BLD AUTO: 0.8 10E9/L (ref 0–1.3)
MONOCYTES NFR BLD AUTO: 11.7 %
MUCOUS THREADS #/AREA URNS LPF: PRESENT /LPF
NEUTROPHILS # BLD AUTO: 4.5 10E9/L (ref 1.6–8.3)
NEUTROPHILS NFR BLD AUTO: 69.6 %
NITRATE UR QL: NEGATIVE
NRBC # BLD AUTO: 0 10*3/UL
NRBC BLD AUTO-RTO: 0 /100
PH UR STRIP: 6 PH (ref 4.7–8)
PLATELET # BLD AUTO: 276 10E9/L (ref 150–450)
POTASSIUM SERPL-SCNC: 4 MMOL/L (ref 3.4–5.3)
PROT SERPL-MCNC: 7.5 G/DL (ref 6.8–8.8)
RBC # BLD AUTO: 4.54 10E12/L (ref 4.4–5.9)
RBC #/AREA URNS AUTO: 2 /HPF (ref 0–2)
SODIUM SERPL-SCNC: 136 MMOL/L (ref 133–144)
SOURCE: ABNORMAL
SP GR UR STRIP: 1.01 (ref 1–1.03)
UROBILINOGEN UR STRIP-MCNC: NORMAL MG/DL (ref 0–2)
WBC # BLD AUTO: 6.4 10E9/L (ref 4–11)
WBC #/AREA URNS AUTO: <1 /HPF (ref 0–5)

## 2019-07-03 PROCEDURE — 85025 COMPLETE CBC W/AUTO DIFF WBC: CPT | Performed by: INTERNAL MEDICINE

## 2019-07-03 PROCEDURE — 99284 EMERGENCY DEPT VISIT MOD MDM: CPT | Mod: Z6 | Performed by: INTERNAL MEDICINE

## 2019-07-03 PROCEDURE — 96361 HYDRATE IV INFUSION ADD-ON: CPT

## 2019-07-03 PROCEDURE — 25000128 H RX IP 250 OP 636: Performed by: INTERNAL MEDICINE

## 2019-07-03 PROCEDURE — 99284 EMERGENCY DEPT VISIT MOD MDM: CPT | Mod: 25

## 2019-07-03 PROCEDURE — 36415 COLL VENOUS BLD VENIPUNCTURE: CPT | Performed by: INTERNAL MEDICINE

## 2019-07-03 PROCEDURE — 80053 COMPREHEN METABOLIC PANEL: CPT | Performed by: INTERNAL MEDICINE

## 2019-07-03 PROCEDURE — 96374 THER/PROPH/DIAG INJ IV PUSH: CPT

## 2019-07-03 PROCEDURE — 83690 ASSAY OF LIPASE: CPT | Performed by: INTERNAL MEDICINE

## 2019-07-03 PROCEDURE — 81001 URINALYSIS AUTO W/SCOPE: CPT | Performed by: INTERNAL MEDICINE

## 2019-07-03 RX ORDER — ONDANSETRON 2 MG/ML
4 INJECTION INTRAMUSCULAR; INTRAVENOUS ONCE
Status: COMPLETED | OUTPATIENT
Start: 2019-07-03 | End: 2019-07-03

## 2019-07-03 RX ADMIN — SODIUM CHLORIDE 1000 ML: 9 INJECTION, SOLUTION INTRAVENOUS at 23:12

## 2019-07-03 RX ADMIN — ONDANSETRON 4 MG: 2 INJECTION, SOLUTION INTRAMUSCULAR; INTRAVENOUS at 23:12

## 2019-07-03 NOTE — ED AVS SNAPSHOT
[facility]  [address] [place] [id number]  Phone:  [phone]                                    Richard Harvey   MRN: [id number]    Department:  [facility]   Date of Visit:  7/3/2019           After Visit Summary Signature Page    I have received my discharge instructions, and my questions have been answered. I have discussed any challenges I see with this plan with the nurse or doctor.    ..........................................................................................................................................  Patient/Patient Representative Signature      ..........................................................................................................................................  Patient Representative Print Name and Relationship to Patient    ..................................................               ................................................  Date                                   Time    ..........................................................................................................................................  Reviewed by Signature/Title    ...................................................              ..............................................  Date                                               Time          22EPIC Rev 08/18

## 2019-07-04 VITALS
TEMPERATURE: 98 F | SYSTOLIC BLOOD PRESSURE: 120 MMHG | RESPIRATION RATE: 18 BRPM | OXYGEN SATURATION: 94 % | DIASTOLIC BLOOD PRESSURE: 68 MMHG

## 2019-07-05 ASSESSMENT — ENCOUNTER SYMPTOMS
DIFFICULTY URINATING: 0
SHORTNESS OF BREATH: 0
NAUSEA: 1
CONFUSION: 0
COLOR CHANGE: 0
FEVER: 0
DIARRHEA: 1
EYE REDNESS: 0
HEADACHES: 0
ABDOMINAL PAIN: 1
ARTHRALGIAS: 0
NECK STIFFNESS: 0

## 2019-07-05 NOTE — ED PROVIDER NOTES
History     Chief Complaint   Patient presents with     Abdominal Pain     c/o abd pain and diarrhea x 2 days     The history is provided by the patient.   Abdominal Pain   Pain location:  LLQ and RLQ  Pain quality: cramping    Pain radiates to:  Does not radiate  Pain severity:  Mild  Onset quality:  Gradual  Duration:  1 day  Chronicity:  New  Associated symptoms: diarrhea and nausea    Associated symptoms: no chest pain, no fever and no shortness of breath      Richard Harvey is a 54 year old male who     Allergies:  No Known Allergies    Problem List:    Patient Active Problem List    Diagnosis Date Noted     Diabetic polyneuropathy associated with type 1 diabetes mellitus (H) 06/05/2019     Priority: Medium     Class 2 obesity in adult 06/05/2019     Priority: Medium     ACP (advance care planning) 01/18/2017     Priority: Medium     Advance Care Planning 1/18/2017: ACP Review of Chart / Resources Provided:  Reviewed chart for advance care plan.  Richard Harvey has no plan or code status on file. Discussed available resources and provided with information. Confirmed code status reflects current choices pending further ACP discussions.  Confirmed/documented legally designated decision makers.  Added by Janiya Rocha           DM type 1 (diabetes mellitus, type 1) (H) 08/15/2013     Priority: Medium     HTN (hypertension) 08/15/2013     Priority: Medium     Hyperlipidemia 08/15/2013     Priority: Medium        Past Medical History:    No past medical history on file.    Past Surgical History:    Past Surgical History:   Procedure Laterality Date     COLONOSCOPY - HIM SCAN N/A 09/19/2016    Procedure: COLONOSCOPY SCREENING; Surgeon: Rudy Rojo MD; Location: [facility] OR       Family History:    No family history on file.    Social History:  Marital Status:  Single [1]  Social History     Tobacco Use     Smoking status: Never Smoker     Smokeless tobacco: Never Used   Substance Use Topics     Alcohol use: No      Alcohol/week: 0.0 oz     Drug use: No        Medications:      aspirin 81 MG EC tablet   blood glucose (MELISSA CONTOUR NEXT) test strip   Continuous Blood Gluc  (FREESTYLE CORNELIUS 14 DAY READER) DAYAN   gabapentin (NEURONTIN) 300 MG capsule   Glucosamine-Chondroit-Vit C-Mn (GLUCOSAMINE-CHONDROITIN MAX ST PO)   insulin aspart (NovoLOG) inject - to fill ambulatory pump   INSULIN PUMP - OUTPATIENT   lisinopril (PRINIVIL,ZESTRIL) 10 MG tablet   Multiple Vitamin (MULTI-VITAMIN DAILY PO)   simvastatin (ZOCOR) 40 MG tablet   acetone, Urine, test STRP   blood glucose monitoring (MELISSA CONTOUR NEXT MONITOR) meter device kit   blood glucose monitoring (MELISSA CONTOUR NEXT) test strip   CALCIUM-VITAMIN D PO   Continuous Blood Gluc Sensor (FREESTYLE CORNELIUS 14 DAY SENSOR) MISC   glucagon 1 MG injection   MOTRIN IB PO   nitroglycerin (NITROSTAT) 0.4 MG SL tablet   order for DME   order for DME   order for DME   order for DME   urea (CARMOL) 10 % external cream   URINE GLUCOSE-KETONES TEST VI         Review of Systems   Constitutional: Negative for fever.   HENT: Negative for congestion.    Eyes: Negative for redness.   Respiratory: Negative for shortness of breath.    Cardiovascular: Negative for chest pain.   Gastrointestinal: Positive for abdominal pain, diarrhea and nausea.   Genitourinary: Negative for difficulty urinating.   Musculoskeletal: Negative for arthralgias and neck stiffness.   Skin: Negative for color change.   Neurological: Negative for headaches.   Psychiatric/Behavioral: Negative for confusion.   All other systems reviewed and are negative.      Physical Exam   BP: 133/77  Heart Rate: 86  Temp: 98.9  F (37.2  C)  Resp: 16  SpO2: 94 %      Physical Exam   Constitutional: He is oriented to person, place, and time. He appears well-developed and well-nourished.   HENT:   Head: Normocephalic and atraumatic.   Eyes: Pupils are equal, round, and reactive to light. Conjunctivae are normal.   Neck: Normal range of  motion. Neck supple. No JVD present. No tracheal deviation present. No thyromegaly present.   Cardiovascular: Normal rate, regular rhythm, normal heart sounds and intact distal pulses. Exam reveals no gallop.   No murmur heard.  Pulmonary/Chest: Effort normal and breath sounds normal. No stridor. No respiratory distress. He has no wheezes. He has no rales. He exhibits no tenderness.   Abdominal: Soft. Bowel sounds are normal. He exhibits no distension and no mass. There is no tenderness. There is no rebound and no guarding.   Musculoskeletal: Normal range of motion. He exhibits no edema or tenderness.   Lymphadenopathy:     He has no cervical adenopathy.   Neurological: He is alert and oriented to person, place, and time.   Skin: Skin is warm. No rash noted. No erythema. No pallor.   Psychiatric: His behavior is normal.   Nursing note and vitals reviewed.      ED Course        Procedures                   No results found for this or any previous visit (from the past 24 hour(s)).    Medications   0.9% sodium chloride BOLUS (0 mLs Intravenous Stopped 7/3/19 5127)   ondansetron (ZOFRAN) injection 4 mg (4 mg Intravenous Given 7/3/19 2312)       Assessments & Plan (with Medical Decision Making)   Lower abdominal cramps, watery diarrhea( about 4 times since yesterday)  Feels nauseated  After receiving IVF, zofran his symptoms resolved   Labs reviewed: not significant  Pt tolerated po intake in Er, I advised oral hydration at home, if symptoms got worse return to ER  I have reviewed the nursing notes.    I have reviewed the findings, diagnosis, plan and need for follow up with the patient.         Medication List      There are no discharge medications for this visit.         Final diagnoses:   Gastroenteritis       7/3/2019   [facility]     Arturo Arredondo MD  07/05/19 7762

## 2019-07-11 ENCOUNTER — ALLIED HEALTH/NURSE VISIT (OUTPATIENT)
Dept: EDUCATION SERVICES | Facility: OTHER | Age: 54
End: 2019-07-11
Attending: NURSE PRACTITIONER
Payer: COMMERCIAL

## 2019-07-11 VITALS — WEIGHT: 200.7 LBS | BODY MASS INDEX: 36.93 KG/M2 | HEIGHT: 62 IN | RESPIRATION RATE: 16 BRPM

## 2019-07-11 DIAGNOSIS — E10.65 TYPE 1 DIABETES MELLITUS WITH HYPERGLYCEMIA (H): Primary | ICD-10-CM

## 2019-07-11 DIAGNOSIS — K31.84 GASTROPARESIS: ICD-10-CM

## 2019-07-11 PROCEDURE — 99213 OFFICE O/P EST LOW 20 MIN: CPT | Performed by: NURSE PRACTITIONER

## 2019-07-11 PROCEDURE — G0463 HOSPITAL OUTPT CLINIC VISIT: HCPCS

## 2019-07-11 RX ORDER — FLASH GLUCOSE SENSOR
1 KIT MISCELLANEOUS
Qty: 2 EACH | Refills: 11 | Status: SHIPPED | OUTPATIENT
Start: 2019-07-11 | End: 2020-01-16

## 2019-07-11 ASSESSMENT — PAIN SCALES - GENERAL: PAINLEVEL: NO PAIN (0)

## 2019-07-11 ASSESSMENT — MIFFLIN-ST. JEOR: SCORE: 1624.62

## 2019-07-11 NOTE — PATIENT INSTRUCTIONS
Patient Education     Gastroparesis     Gastroparesis means that food and fluids move too slowly out of the stomach into the duodenum.   Gastroparesis (also called delayed gastric emptying) happens when the stomach takes longer than normal to empty of food. This is due to a problem with motility (the movement of the muscles in the digestive tract). For many people, gastroparesis is a lifelong condition. But treatment can help relieve symptoms and prevent complications. Read on to learn more about gastroparesis and how it can be managed.  How gastroparesis develops  With normal motility, signals from nerves tell the stomach muscles when to contract. These muscles move food from the stomach into the duodenum (the first part of the small bowel). With gastroparesis, the nerves or muscles are damaged. This causes motility to slow down or stop completely. As a result, food cannot move from the stomach properly. This delayed emptying can cause nausea, vomiting, and other symptoms. Malnutrition can result. Bezoars (hardened lumps of food) can form in the stomach and cause other complications as well.  Causes of gastroparesis  Gastroparesis can be caused by any of the following:    Diabetes    Surgery involving any of the digestive organs, such as the stomach and bowels    Certain medicines, such as strong pain medicines (narcotics)    Certain conditions, such as systemic scleroderma, Parkinson disease, and thyroid disease    After a viral illness  In many cases, the cause of gastroparesis cannot be found.  Signs and symptoms of gastroparesis  These can include:    Nausea and vomiting    Feeling full quickly when eating    Belly pain    Heartburn    Belly bloating    Weight loss    Loss of appetite    High and low blood sugar levels (in people with diabetes)  Diagnosing gastroparesis  Your healthcare provider will ask about your symptoms and health history. You ll also be examined. In addition, blood tests and X-rays are  often done to check your health and rule out other problems. To confirm the problem, you may need other tests as well. These can include:    Upper endoscopy. This is done to see inside the stomach and duodenum. For the test, an endoscope is used. This is a thin, flexible tube with a tiny camera on the end. It s inserted through the mouth and down into the stomach and duodenum.    Upper gastrointestinal (GI) series. This is done to take X-rays of the upper GI tract from the mouth to the small bowel. For the test, a substance called barium is used. The barium coats the upper GI tract so that it will show up clearly on X-rays.    Gastric emptying scan. This is done to measure how quickly food leaves the stomach. For the test, a meal containing a harmless radioactive substance (tracer) is eaten. Then scans of the stomach are done. The tracer shows up clearly on the scans and shows the movement of the food through the stomach.    Antroduodenal manometry. This test gives pressure measurements of the stomach and small intestine to check how the contractions are working.    Newer tests. These are being created and include breath tests and wireless capsule studies   Treating gastroparesis  The goal of treatment is to help you manage your condition. Treatment may include one or more of the following:    Dietary changes. You may need to make changes to your eating habits and daily diet. For instance, your healthcare provider may instruct you to eat small meals throughout the day. Doing this can keep you from feeling full too quickly. You may be placed on a liquid or  soft  diet. This means you ll eat liquid foods or foods that are mashed or put through a . In addition, you may need to avoid foods high in fats and fiber. These can slow digestion. For more help with your diet, your healthcare provider can refer you to a dietitian. In severe cases, you may need a feeding tube. This sends liquid food or medicine directly to  your small bowel, bypassing the stomach.    Treating diabetes. If you are diabetic, it is important to control your blood sugar. High sugar levels worsen gastroparesis.     Medicines. These can help manage symptoms, such as nausea and vomiting. They can also improve motility. Each medicine has specific risks and side effects. Your doctor can tell you more about any medicine that is prescribed for you.    Surgery. You may need to have a tube surgically inserted into the stomach. The tube removes excess air and fluid. This can relieve severe symptoms of nausea and vomiting. In rare cases, other surgery may be needed on the stomach or small bowel. This is to create a new passageway for food to be emptied from the stomach.    Gastric electrical stimulation. This treatment is done less often and may not be available. Your healthcare provider can tell you more about this treatment if it is a choice for you.  Diabetes and gastroparesis  If you have diabetes, gastroparesis can make it harder to manage your blood sugar level. You ll need to take extra steps in your treatment to prevent complications. Work with your healthcare provider to learn what you can do to protect your health. For more information, contact the American Diabetes Association, www.diabetes.org.   Long-term concerns   With treatment, most people can manage their symptoms and maintain their usual routines. If your symptoms are moderate to severe, you may need to see your healthcare provider more often for checkups. Also, other treatments will likely be needed.  Date Last Reviewed: 7/14/2016 2000-2018 [employer]. [address] [place] [address], [place] [address]. All rights reserved. This information is not intended as a substitute for professional medical care. Always follow your healthcare professional's instructions.           Meal and Food Changes  Changing your eating habits can help you manage symptoms of gastroparesis. Your doctor or  dietitian may give you specific advice to improve your symptoms. It can be helpful to eat less food at one time. Other helpful tips are to eat slowly, sit upright after eating, and take a walk after meals.  The doctor may also recommend that you avoid high-fat and high-fiber foods. High fat foods take longer to digest and can increase symptoms from slowed digestion. Fiber can be difficult to digest and it may be possible that the undigested fiber can form bezoars (a solid mass of undigested food that gets stuck in the digestive tract (or gut) causing a blockage). Talk to your doctor or registered dietitian for more guidance that will meet your personal lifestyle and needs.      Continue working on healthy eating and moving as best as you can (start low and slow, work up to 30 min, 5x/week)    BG goals:  Fasting and before meals <130, >70  2 hour after eating <180    We only need 1/2 of these numbers to be within target then your A1c will be within target    Medication changes   Watch for lows BG due to changes    Recommendations:  1.  Dual wave EVERY TIME you eat   2.  Meet with LYUDMILA Cruz to discuss diabetic and gastroparesis diets  3.  Start wearing your Jaren sensor until you can focus on your insulin pump sensor    Follow up   Catherine--bring your pump plus your Jaren reader      Call me sooner if any problems/concerns and/or questions develop including consistent low BGs <70 or consistent high BGs >200  [phone] (Unit Coordinator)    [phone] (Nurse)  [phone] (Macrina's cell)

## 2019-07-11 NOTE — PROGRESS NOTES
"Chief Complaint   Patient presents with     Diabetes       Initial Resp 16   Ht 1.575 m (5' 2\")   Wt 91 kg (200 lb 11.2 oz)   BMI 36.71 kg/m   Estimated body mass index is 36.71 kg/m  as calculated from the following:    Height as of this encounter: 1.575 m (5' 2\").    Weight as of this encounter: 91 kg (200 lb 11.2 oz).  Medication Reconciliation: complete    "

## 2019-07-11 NOTE — PROGRESS NOTES
"SUBJECTIVE:  Richard Harvey, 54 year old, male presents with the following Chief Complaint(s) with HPI to follow:  Chief Complaint   Patient presents with     Diabetes        Diabetes Follow-up      Patient is checking blood sugars: 3.8x/day + personal CGM for at least 30 days.  Results:  Overnight: 155-329  Breakfast:   Mid-mornin and 223  Lunch:  Mid-afternoon: 309  Dinner:   Overnight: 54, 59, 61, 67,       Symptoms of hypoglycemia (low blood sugar): yes, SB <70, history of hypoglycemia unawareness    Paresthesias (numbness or burning in feet) or sores: Yes (\"same\"); almost healed--followed by Dr. Jarrett    Diabetic eye exam within the last year: Yes ()    Breakfast eaten regularly: Yes    Patient counting carbs: trying       HPI: Richard's here today for the follow up regarding his Diabetes mellitus, Type 1.    Lab Results   Component Value Date    A1C 7.3 2019    A1C 8 2019    A1C 7.7 2018    A1C 7.2 2017    A1C 7.4 2017     Current Diabetes medication:   1.  Insulin pump, uses Novolog; wearing his personal Freestyle william sensor  ASA use: yes, 325 mg daily  Statin use: yes, simvastatin 40 mg at bedime  Denies any issues with the insulin pump.      Richard's here for insulin pump download and possible adjust. He reports the following:  Hasn't been feeling well the past 2 weeks--nausea with some vomiting.  He saw his PCP, Dr. Obando, who prescribed him Zofran and GERD medication.   No gastroparesis studies.   He has a follow up next week with this PCP to address how the medications are working.      Richard reports because he hasn't been feeling well, he hasn't been wearing his personal CGM that connects to the insulin pump.  States \"it's too much, right now.\"    No other health concerns noted      Patient Active Problem List   Diagnosis     DM type 1 (diabetes mellitus, type 1) (H)     HTN (hypertension)     Hyperlipidemia     ACP (advance care " planning)     Diabetic polyneuropathy associated with type 1 diabetes mellitus (H)     Class 2 obesity in adult       No past medical history on file.    Past Surgical History:   Procedure Laterality Date     COLONOSCOPY - HIM SCAN N/A 09/19/2016    Procedure: COLONOSCOPY SCREENING; Surgeon: Rudy Rojo MD; Location: [facility] OR       No family history on file.    Social History     Tobacco Use     Smoking status: Never Smoker     Smokeless tobacco: Never Used   Substance Use Topics     Alcohol use: No     Alcohol/week: 0.0 oz       Current Outpatient Medications   Medication Sig Dispense Refill     acetone, Urine, test STRP Use as directed 50 each 3     aspirin 81 MG EC tablet Take 81 mg by mouth daily       blood glucose (MELISSA CONTOUR NEXT) test strip TEST 6 TIMES DAILY AS DIRECTED. 300 each 11     blood glucose monitoring (MELISSA CONTOUR NEXT MONITOR) meter device kit 1 each       blood glucose monitoring (MELISSA CONTOUR NEXT) test strip Use to test blood sugar 6 times daily or as directed. 200 each 11     CALCIUM-VITAMIN D PO Take  by mouth daily. 600 mg       Continuous Blood Gluc Sensor (LilyMediaSTYLE CORNELIUS 14 DAY SENSOR) MISC 1 each 4 times daily (before meals and nightly) 2 each 11     gabapentin (NEURONTIN) 300 MG capsule Take 300 mg by mouth 3 times daily       glucagon 1 MG injection 1 mg once       Glucosamine-Chondroit-Vit C-Mn (GLUCOSAMINE-CHONDROITIN MAX ST PO) Take  by mouth daily.       insulin aspart (NovoLOG) inject - to fill ambulatory pump See Admin Instructions. For use with insulin pump       INSULIN PUMP - OUTPATIENT Date last updated:  6/23/15  [employer] Minimed: Model 751  BASAL RATES and times:  12   AM (midnight): 1.25 units/hour    2     AM: 1.3 units/hour   6     AM: 0.95 units/hour   6    PM: 1.2 units/hour   Basal Pattern A:  Richard Harvey will use when N/A.  CARB RATIO and times:  12   AM (midnight): 12  1    PM:  10  6    PM:    9  Corection Factor (Sensitivity) and times:  12   AM  "(midnight): 40 mg/dL  6     PM: 45 mg/dL  BLOOD GLUCOSE TARGET and times:  12   AM (midnight): 100 - 140  9     AM:  100 - 120  10   PM:  100 - 140  Active Insulin Time:  4 hours  Sensor:  Yes: 12   AM (midnight): 70 - 240  Carelink / Diasend username:  [email or web address]  Carelink / Diasend Password:  [email or web address]       lisinopril (PRINIVIL,ZESTRIL) 10 MG tablet Take 10 mg by mouth daily       MOTRIN IB PO Take 400 mg by mouth every 6 hours as needed for moderate pain       Multiple Vitamin (MULTI-VITAMIN DAILY PO) Take  by mouth daily.       nitroglycerin (NITROSTAT) 0.4 MG SL tablet Place under the tongue every 5 minutes as needed       order for DME Equipment being ordered:     1.  "[employer] insulin pump supplies--insertion sets and reserviors  Disp: 1 month  Refill: 11 months 30 days 11     order for DME Equipment being ordered: Mepilex AG non-bordered foam.  Do NOT substitute 2 each 1     order for DME Equipment being ordered: Please dispense a short leg CAM walker boot to the patient 1 Device 0     order for DME Equipment being ordered: Please dispense dressing supplies to patient:  -Two sheets of Mepilex Ag  -30 gauze 4x4\"  -2 rolls of Medipore tape 1 Device 0     simvastatin (ZOCOR) 40 MG tablet Take by mouth At Bedtime       urea (CARMOL) 10 % external cream Apply cream topically as needed for calloused skin 85 g 3     URINE GLUCOSE-KETONES TEST VI          No Known Allergies    REVIEW OF SYSTEMS  Skin: right great toe wound--followed by Dr. Jarrett  Eyes: negative  Ears/Nose/Throat: negative  Respiratory: No shortness of breath, dyspnea on exertion, cough, or hemoptysis  Cardiovascular: negative  Gastrointestinal: as noted above  Genitourinary: negative  Musculoskeletal: left groin pain;  history of arthritis--back and thumbs  Neurologic: positive for numbness or tingling of hands (right hand--carpel tunnel surgery; left hand: carpel tunnel) and numbness or tingling of feet--same  Psychiatric: " "negative  Hematologic/Lymphatic/Immunologic: negative  Endocrine: positive for diabetes    OBJECTIVE:  Resp 16   Ht 1.575 m (5' 2\")   Wt 91 kg (200 lb 11.2 oz)   BMI 36.71 kg/m    Constitutional: healthy, alert and no distress  Cardiovascular: RRR. No murmurs, clicks gallops or rub  Respiratory: Good diaphragmatic excursion. Lungs clear  Psychiatric: mentation appears normal and affect normal/bright      LAB  Results for orders placed or performed during the hospital encounter of 07/03/19   CBC with platelets differential   Result Value Ref Range    WBC 6.4 4.0 - 11.0 10e9/L    RBC Count 4.54 4.4 - 5.9 10e12/L    Hemoglobin 13.6 13.3 - 17.7 g/dL    Hematocrit 40.0 40.0 - 53.0 %    MCV 88 78 - 100 fl    MCH 30.0 26.5 - 33.0 pg    MCHC 34.0 31.5 - 36.5 g/dL    RDW 11.8 10.0 - 15.0 %    Platelet Count 276 150 - 450 10e9/L    Diff Method Automated Method     % Neutrophils 69.6 %    % Lymphocytes 17.3 %    % Monocytes 11.7 %    % Eosinophils 0.6 %    % Basophils 0.5 %    % Immature Granulocytes 0.3 %    Nucleated RBCs 0 0 /100    Absolute Neutrophil 4.5 1.6 - 8.3 10e9/L    Absolute Lymphocytes 1.1 0.8 - 5.3 10e9/L    Absolute Monocytes 0.8 0.0 - 1.3 10e9/L    Absolute Eosinophils 0.0 0.0 - 0.7 10e9/L    Absolute Basophils 0.0 0.0 - 0.2 10e9/L    Abs Immature Granulocytes 0.0 0 - 0.4 10e9/L    Absolute Nucleated RBC 0.0    Comprehensive metabolic panel   Result Value Ref Range    Sodium 136 133 - 144 mmol/L    Potassium 4.0 3.4 - 5.3 mmol/L    Chloride 102 94 - 109 mmol/L    Carbon Dioxide 29 20 - 32 mmol/L    Anion Gap 5 3 - 14 mmol/L    Glucose 180 (H) 70 - 99 mg/dL    Urea Nitrogen 19 7 - 30 mg/dL    Creatinine 1.05 0.66 - 1.25 mg/dL    GFR Estimate 79 >60 mL/min/[1.73_m2]    GFR Estimate If Black >90 >60 mL/min/[1.73_m2]    Calcium 9.1 8.5 - 10.1 mg/dL    Bilirubin Total 0.3 0.2 - 1.3 mg/dL    Albumin 3.6 3.4 - 5.0 g/dL    Protein Total 7.5 6.8 - 8.8 g/dL    Alkaline Phosphatase 115 40 - 150 U/L    ALT 35 0 - 70 " U/L    AST 36 0 - 45 U/L   Lipase   Result Value Ref Range    Lipase 61 (L) 73 - 393 U/L   UA with Microscopic reflex to Culture   Result Value Ref Range    Color Urine Light Yellow     Appearance Urine Clear     Glucose Urine 300 (A) NEG^Negative mg/dL    Bilirubin Urine Negative NEG^Negative    Ketones Urine 5 (A) NEG^Negative mg/dL    Specific Gravity Urine 1.015 1.003 - 1.035    Blood Urine Trace (A) NEG^Negative    pH Urine 6.0 4.7 - 8.0 pH    Protein Albumin Urine 10 (A) NEG^Negative mg/dL    Urobilinogen mg/dL Normal 0.0 - 2.0 mg/dL    Nitrite Urine Negative NEG^Negative    Leukocyte Esterase Urine Negative NEG^Negative    Source Midstream Urine     WBC Urine <1 0 - 5 /HPF    RBC Urine 2 0 - 2 /HPF    Bacteria Urine None (A) NEG^Negative /HPF    Mucous Urine Present (A) NEG^Negative /LPF       ASSESSMENT / PLAN:.  (E10.65) Type 1 diabetes mellitus with hyperglycemia (H)  (primary encounter diagnosis)  Comment: noted and a lot of low BGs    Insulin pump information:   SG average: 165 +/- 76  BG Average: 172 +/- 85  Basal/bolus ratio: 23 (46%)/27 (54%)   Testing: 3.8/day    Hypoglycemia: yes      Plan: Continuous Blood Gluc Sensor (FREESTYLE CORNELIUS         14 DAY SENSOR) MISC    Due to his current stomach issues, Richard's appetite has been poor.   Will adjust his insulin pump accordingly    Highly encouraged him to wear his Cornelius CGM      Carb ratio  1600 12    Insulin sensitivity  1800 55           (K31.84) Gastroparesis  Comment: due to his symptoms and having diabetes for 40+ years, I'm assuming this is gastroparesis    Plan:   Highly encourage him to talk to Dr. Obando about getting a gastric emptying test.     Will have Richard meet with the dietician to discuss gastroparesis and diabetic diet    Follow up as discussed  Call if continue issues with low BGs  Start using the dual wave with every meal           Patient Instructions     Patient Education     Gastroparesis     Gastroparesis means that food and  fluids move too slowly out of the stomach into the duodenum.   Gastroparesis (also called delayed gastric emptying) happens when the stomach takes longer than normal to empty of food. This is due to a problem with motility (the movement of the muscles in the digestive tract). For many people, gastroparesis is a lifelong condition. But treatment can help relieve symptoms and prevent complications. Read on to learn more about gastroparesis and how it can be managed.  How gastroparesis develops  With normal motility, signals from nerves tell the stomach muscles when to contract. These muscles move food from the stomach into the duodenum (the first part of the small bowel). With gastroparesis, the nerves or muscles are damaged. This causes motility to slow down or stop completely. As a result, food cannot move from the stomach properly. This delayed emptying can cause nausea, vomiting, and other symptoms. Malnutrition can result. Bezoars (hardened lumps of food) can form in the stomach and cause other complications as well.  Causes of gastroparesis  Gastroparesis can be caused by any of the following:    Diabetes    Surgery involving any of the digestive organs, such as the stomach and bowels    Certain medicines, such as strong pain medicines (narcotics)    Certain conditions, such as systemic scleroderma, Parkinson disease, and thyroid disease    After a viral illness  In many cases, the cause of gastroparesis cannot be found.  Signs and symptoms of gastroparesis  These can include:    Nausea and vomiting    Feeling full quickly when eating    Belly pain    Heartburn    Belly bloating    Weight loss    Loss of appetite    High and low blood sugar levels (in people with diabetes)  Diagnosing gastroparesis  Your healthcare provider will ask about your symptoms and health history. You ll also be examined. In addition, blood tests and X-rays are often done to check your health and rule out other problems. To confirm the  problem, you may need other tests as well. These can include:    Upper endoscopy. This is done to see inside the stomach and duodenum. For the test, an endoscope is used. This is a thin, flexible tube with a tiny camera on the end. It s inserted through the mouth and down into the stomach and duodenum.    Upper gastrointestinal (GI) series. This is done to take X-rays of the upper GI tract from the mouth to the small bowel. For the test, a substance called barium is used. The barium coats the upper GI tract so that it will show up clearly on X-rays.    Gastric emptying scan. This is done to measure how quickly food leaves the stomach. For the test, a meal containing a harmless radioactive substance (tracer) is eaten. Then scans of the stomach are done. The tracer shows up clearly on the scans and shows the movement of the food through the stomach.    Antroduodenal manometry. This test gives pressure measurements of the stomach and small intestine to check how the contractions are working.    Newer tests. These are being created and include breath tests and wireless capsule studies   Treating gastroparesis  The goal of treatment is to help you manage your condition. Treatment may include one or more of the following:    Dietary changes. You may need to make changes to your eating habits and daily diet. For instance, your healthcare provider may instruct you to eat small meals throughout the day. Doing this can keep you from feeling full too quickly. You may be placed on a liquid or  soft  diet. This means you ll eat liquid foods or foods that are mashed or put through a . In addition, you may need to avoid foods high in fats and fiber. These can slow digestion. For more help with your diet, your healthcare provider can refer you to a dietitian. In severe cases, you may need a feeding tube. This sends liquid food or medicine directly to your small bowel, bypassing the stomach.    Treating diabetes. If you are  diabetic, it is important to control your blood sugar. High sugar levels worsen gastroparesis.     Medicines. These can help manage symptoms, such as nausea and vomiting. They can also improve motility. Each medicine has specific risks and side effects. Your doctor can tell you more about any medicine that is prescribed for you.    Surgery. You may need to have a tube surgically inserted into the stomach. The tube removes excess air and fluid. This can relieve severe symptoms of nausea and vomiting. In rare cases, other surgery may be needed on the stomach or small bowel. This is to create a new passageway for food to be emptied from the stomach.    Gastric electrical stimulation. This treatment is done less often and may not be available. Your healthcare provider can tell you more about this treatment if it is a choice for you.  Diabetes and gastroparesis  If you have diabetes, gastroparesis can make it harder to manage your blood sugar level. You ll need to take extra steps in your treatment to prevent complications. Work with your healthcare provider to learn what you can do to protect your health. For more information, contact the American Diabetes Association, www.diabetes.org.   Long-term concerns   With treatment, most people can manage their symptoms and maintain their usual routines. If your symptoms are moderate to severe, you may need to see your healthcare provider more often for checkups. Also, other treatments will likely be needed.  Date Last Reviewed: 7/14/2016 2000-2018 [employer]. [address] [place] [address], [place] [address]. All rights reserved. This information is not intended as a substitute for professional medical care. Always follow your healthcare professional's instructions.           Meal and Food Changes  Changing your eating habits can help you manage symptoms of gastroparesis. Your doctor or dietitian may give you specific advice to improve your symptoms. It can be  helpful to eat less food at one time. Other helpful tips are to eat slowly, sit upright after eating, and take a walk after meals.  The doctor may also recommend that you avoid high-fat and high-fiber foods. High fat foods take longer to digest and can increase symptoms from slowed digestion. Fiber can be difficult to digest and it may be possible that the undigested fiber can form bezoars (a solid mass of undigested food that gets stuck in the digestive tract (or gut) causing a blockage). Talk to your doctor or registered dietitian for more guidance that will meet your personal lifestyle and needs.      Continue working on healthy eating and moving as best as you can (start low and slow, work up to 30 min, 5x/week)    BG goals:  Fasting and before meals <130, >70  2 hour after eating <180    We only need 1/2 of these numbers to be within target then your A1c will be within target    Medication changes   Watch for lows BG due to changes    Recommendations:  1.  Dual wave EVERY TIME you eat   2.  Meet with LYUDMILA Cruz to discuss diabetic and gastroparesis diets  3.  Start wearing your Jaren sensor until you can focus on your insulin pump sensor    Follow up   Catherine--bring your pump plus your Jaren reader      Call me sooner if any problems/concerns and/or questions develop including consistent low BGs <70 or consistent high BGs >200  [phone] (Unit Coordinator)    [phone] (Nurse)  [phone] (Macrina's cell)        Time: 40 min  Barrier: none  Willingness to learn: accepting    Macrina Dixon RN [facility]  Disease Management    Cc: Dr. ALEJANDRA Obando    With the electronic record, we can now more quickly and easily track our patient diabetic goals. Our diabetes clinical review is in progress and these are the indicators we are monitoring for good diabetes health.     1.) HbA1C less than 7 (measurement of your average blood sugars)  2.) Blood Pressure less than 140/80  3.) LDL less than 100 (bad  "cholesterol)  4.) HbA1C is checked in the last 6 months and below 7% (more frequently if not at goal or adjusting medications)  5.) LDL is checked in the last 12 months (more frequently if not at goal or adjusting medications)  6.) Taking one baby aspirin daily (unless otherwise instructed)  7.) No tobacco use  8) Statin use     You have achieved 7 out of 8 of these and I am encouraging you to come in and get tuned up to achieve 8 out of 8!  Here is what you have achieved so far in my goals for you:  1.) HbA1C  less than 7:                              NO  Your last  HbA1C :   Lab Results   Component Value Date    A1C 7.3 03/21/2019    A1C 8 01/21/2019    A1C 7.7 07/25/2018    A1C 7.2 11/24/2017    A1C 7.4 07/17/2017       2.) Blood Pressure less than 140/80:       YES      Your last    Resp 16   Ht 1.575 m (5' 2\")   Wt 91 kg (200 lb 11.2 oz)   BMI 36.71 kg/m      3.) LDL less than 100:                              YES      Your last   LDL         LDL Cholesterol Calculated   Date Value Ref Range Status   03/21/2019 56 mg/dL Final   03/21/2019 56 <100 mg/dL Final   ]    4.) Checked HbA1C in the past 6 months: YES      5.) Checked LDL in the past 12 months:    YES     6.) Taking one aspirin daily:                       YES     7.) No tobacco use:                                        YES      8.) Statin use      YES              "

## 2019-07-17 ENCOUNTER — TRANSFERRED RECORDS (OUTPATIENT)
Dept: HEALTH INFORMATION MANAGEMENT | Facility: CLINIC | Age: 54
End: 2019-07-17

## 2019-07-17 LAB
RETINOPATHY: NEGATIVE
RETINOPATHY: NEGATIVE

## 2019-07-25 RX ORDER — ONDANSETRON 4 MG/1
4 TABLET, ORALLY DISINTEGRATING ORAL EVERY 8 HOURS PRN
OUTPATIENT
Start: 2019-07-25

## 2019-07-25 NOTE — TELEPHONE ENCOUNTER
Zofran  Last visit date with prescribing provider: no primary   Last refill date:  given 1 dose in ER 7-3-2019  Quantity: 1, Refills: 0    Has only seen  in our facility 6-    Natacha Poole      Next 5 appointments (look out 90 days)    Sep 25, 2019  8:30 AM CDT  (Arrive by 8:15 AM)  Return Visit with Juliette Jarrett DPM  [facility] ([facility] [place] ) [address] [place] [id number] [phone]

## 2019-08-01 DIAGNOSIS — E10.65 TYPE 1 DIABETES MELLITUS WITH HYPERGLYCEMIA (H): ICD-10-CM

## 2019-08-01 RX ORDER — UREA 10 %
LOTION (ML) TOPICAL
Qty: 85 G | Refills: 3 | Status: SHIPPED | OUTPATIENT
Start: 2019-08-01 | End: 2020-04-27

## 2019-08-01 NOTE — TELEPHONE ENCOUNTER
Urea 10%      Last Written Prescription Date:  1/3/2019  Last Fill Quantity: 85g,   # refills: 3  Last Office Visit: 7/11/2019  Future Office visit:    Next 5 appointments (look out 90 days)    Sep 25, 2019  8:30 AM CDT  (Arrive by 8:15 AM)  Return Visit with Juliette Jarrett DPM  [facility] ([facility] ) [address] [place] [id number] [phone]           Routing refill request to provider for review/approval because:  Drug not on the [facility], [facility] or  Health refill protocol or controlled substance

## 2019-08-08 DIAGNOSIS — K21.00 GASTROESOPHAGEAL REFLUX DISEASE WITH ESOPHAGITIS: Primary | ICD-10-CM

## 2019-08-08 DIAGNOSIS — E10.649 TYPE 1 DIABETES MELLITUS WITH HYPOGLYCEMIA AND WITHOUT COMA (H): ICD-10-CM

## 2019-08-08 RX ORDER — OMEPRAZOLE 40 MG/1
40 CAPSULE, DELAYED RELEASE ORAL DAILY
Qty: 90 CAPSULE | Refills: 1 | Status: SHIPPED | OUTPATIENT
Start: 2019-08-08 | End: 2020-02-03

## 2019-08-08 NOTE — PROGRESS NOTES
Order sent for omeprazole and insulin per Richard's request    Macrina Dixon APRN FNP-BC  Diabetes and Wound Care

## 2019-08-09 ENCOUNTER — ALLIED HEALTH/NURSE VISIT (OUTPATIENT)
Dept: EDUCATION SERVICES | Facility: OTHER | Age: 54
End: 2019-08-09
Attending: NURSE PRACTITIONER
Payer: COMMERCIAL

## 2019-08-09 VITALS
SYSTOLIC BLOOD PRESSURE: 119 MMHG | HEART RATE: 85 BPM | WEIGHT: 202.2 LBS | DIASTOLIC BLOOD PRESSURE: 75 MMHG | BODY MASS INDEX: 36.98 KG/M2

## 2019-08-09 DIAGNOSIS — R11.0 NAUSEA: ICD-10-CM

## 2019-08-09 DIAGNOSIS — E10.649 TYPE 1 DIABETES MELLITUS WITH HYPOGLYCEMIA AND WITHOUT COMA (H): Primary | ICD-10-CM

## 2019-08-09 PROCEDURE — 99213 OFFICE O/P EST LOW 20 MIN: CPT | Performed by: NURSE PRACTITIONER

## 2019-08-09 PROCEDURE — G0463 HOSPITAL OUTPT CLINIC VISIT: HCPCS | Performed by: NURSE PRACTITIONER

## 2019-08-09 RX ORDER — ONDANSETRON 4 MG/1
4 TABLET, FILM COATED ORAL EVERY 4 HOURS PRN
Qty: 30 TABLET | Refills: 3 | Status: SHIPPED | OUTPATIENT
Start: 2019-08-09 | End: 2019-11-03

## 2019-08-09 NOTE — PROGRESS NOTES
"SUBJECTIVE:  Richard Harvey, 54 year old, male presents with the following Chief Complaint(s) with HPI to follow:  Chief Complaint   Patient presents with     Diabetes     pump download        Diabetes Follow-up      Patient is checking blood sugars: 3.8x/day + personal CGM for at least 30 days.  Results:  Overnight: 155-329  Breakfast:   Mid-mornin and 223  Lunch:  Mid-afternoon: 309  Dinner:   Overnight: 54, 59, 61, 67,       Symptoms of hypoglycemia (low blood sugar): yes, SB <70, history of hypoglycemia unawareness    Paresthesias (numbness or burning in feet) or sores: Yes (\"same\"); almost healed--followed by Dr. Jarrett    Diabetic eye exam within the last year: Yes ()    Breakfast eaten regularly: Yes    Patient counting carbs: trying       HPI: Richard's here today for the follow up regarding his Diabetes mellitus, Type 1.    Lab Results   Component Value Date    A1C 7.3 2019    A1C 8 2019    A1C 7.7 2018    A1C 7.2 2017    A1C 7.4 2017     Current Diabetes medication:   1.  Insulin pump, uses Novolog; wearing his personal Freestyle william sensor  ASA use: yes, 325 mg daily  Statin use: yes, simvastatin 40 mg at bedime  Denies any issues with the insulin pump.      Richard's here for insulin pump download and possible adjust. He reports the following:        Patient Active Problem List   Diagnosis     DM type 1 (diabetes mellitus, type 1) (H)     HTN (hypertension)     Hyperlipidemia     ACP (advance care planning)     Diabetic polyneuropathy associated with type 1 diabetes mellitus (H)     Class 2 obesity in adult       No past medical history on file.    Past Surgical History:   Procedure Laterality Date     COLONOSCOPY - HIM SCAN N/A 2016    Procedure: COLONOSCOPY SCREENING; Surgeon: Rudy Rojo MD; Location: [facility] OR       No family history on file.    Social History     Tobacco Use     Smoking status: Never Smoker     Smokeless " tobacco: Never Used   Substance Use Topics     Alcohol use: No     Alcohol/week: 0.0 oz       Current Outpatient Medications   Medication Sig Dispense Refill     ondansetron (ZOFRAN) 4 MG tablet Take 1 tablet (4 mg) by mouth every 4 hours as needed for nausea 30 tablet 3     acetone, Urine, test STRP Use as directed 50 each 3     aspirin 81 MG EC tablet Take 81 mg by mouth daily       blood glucose (MELISSA CONTOUR NEXT) test strip TEST 6 TIMES DAILY AS DIRECTED. 300 each 11     blood glucose monitoring (MELISSA CONTOUR NEXT MONITOR) meter device kit 1 each       blood glucose monitoring (MELISSA CONTOUR NEXT) test strip Use to test blood sugar 6 times daily or as directed. 200 each 11     CALCIUM-VITAMIN D PO Take  by mouth daily. 600 mg       Continuous Blood Gluc Sensor (FREESTYLE CORNELIUS 14 DAY SENSOR) MISC 1 each 4 times daily (before meals and nightly) 2 each 11     gabapentin (NEURONTIN) 300 MG capsule Take 300 mg by mouth 3 times daily       glucagon 1 MG injection 1 mg once       Glucosamine-Chondroit-Vit C-Mn (GLUCOSAMINE-CHONDROITIN MAX ST PO) Take  by mouth daily.       insulin aspart (NovoLOG) inject - to fill ambulatory pump For use with insulin pump.  TDD: 70 units 30 mL 3     INSULIN PUMP - OUTPATIENT Date last updated:  6/23/15  Medtronic Minimed: Model 751  BASAL RATES and times:  12   AM (midnight): 1.25 units/hour    2     AM: 1.3 units/hour   6     AM: 0.95 units/hour   6    PM: 1.2 units/hour   Basal Pattern A:  Richard Harvey will use when N/A.  CARB RATIO and times:  12   AM (midnight): 12  1    PM:  10  6    PM:    9  Corection Factor (Sensitivity) and times:  12   AM (midnight): 40 mg/dL  6     PM: 45 mg/dL  BLOOD GLUCOSE TARGET and times:  12   AM (midnight): 100 - 140  9     AM:  100 - 120  10   PM:  100 - 140  Active Insulin Time:  4 hours  Sensor:  Yes: 12   AM (midnight): 70 - 240  Carelink / Diasend username:  [email or web address]  Carelink / Diasend Password:  [email or web address]       lisinopril  "(PRINIVIL,ZESTRIL) 10 MG tablet Take 10 mg by mouth daily       MOTRIN IB PO Take 400 mg by mouth every 6 hours as needed for moderate pain       Multiple Vitamin (MULTI-VITAMIN DAILY PO) Take  by mouth daily.       nitroglycerin (NITROSTAT) 0.4 MG SL tablet Place under the tongue every 5 minutes as needed       omeprazole (PRILOSEC) 40 MG DR capsule Take 1 capsule (40 mg) by mouth daily 90 capsule 1     order for DME Equipment being ordered:     1.  DeRev insulin pump supplies--insertion sets and reserviors  Disp: 1 month  Refill: 11 months 30 days 11     order for DME Equipment being ordered: Mepilex AG non-bordered foam.  Do NOT substitute 2 each 1     order for DME Equipment being ordered: Please dispense a short leg CAM walker boot to the patient 1 Device 0     order for DME Equipment being ordered: Please dispense dressing supplies to patient:  -Two sheets of Mepilex Ag  -30 gauze 4x4\"  -2 rolls of Medipore tape 1 Device 0     simvastatin (ZOCOR) 40 MG tablet Take by mouth At Bedtime       UREA 10 HYDRATING 10 % external cream APPLY CREAM TOPICALLY AS NEEDED FOR CALLOUSED SKIN 85 g 3     URINE GLUCOSE-KETONES TEST VI          No Known Allergies    REVIEW OF SYSTEMS  Skin: right great toe wound--followed by Dr. Jarrett  Eyes: negative  Ears/Nose/Throat: negative  Respiratory: No shortness of breath, dyspnea on exertion, cough, or hemoptysis  Cardiovascular: negative  Gastrointestinal: as noted above  Genitourinary: negative  Musculoskeletal: left groin pain;  history of arthritis--back and thumbs  Neurologic: positive for numbness or tingling of hands (right hand--carpel tunnel surgery; left hand: carpel tunnel) and numbness or tingling of feet--same  Psychiatric: negative  Hematologic/Lymphatic/Immunologic: negative  Endocrine: positive for diabetes    OBJECTIVE:  /75   Pulse 85   Constitutional: healthy, alert and no distress  Cardiovascular: RRR. No murmurs, clicks gallops or rub  Respiratory: " Good diaphragmatic excursion. Lungs clear  Psychiatric: mentation appears normal and affect normal/bright      LAB  Results for orders placed or performed during the hospital encounter of 07/03/19   CBC with platelets differential   Result Value Ref Range    WBC 6.4 4.0 - 11.0 10e9/L    RBC Count 4.54 4.4 - 5.9 10e12/L    Hemoglobin 13.6 13.3 - 17.7 g/dL    Hematocrit 40.0 40.0 - 53.0 %    MCV 88 78 - 100 fl    MCH 30.0 26.5 - 33.0 pg    MCHC 34.0 31.5 - 36.5 g/dL    RDW 11.8 10.0 - 15.0 %    Platelet Count 276 150 - 450 10e9/L    Diff Method Automated Method     % Neutrophils 69.6 %    % Lymphocytes 17.3 %    % Monocytes 11.7 %    % Eosinophils 0.6 %    % Basophils 0.5 %    % Immature Granulocytes 0.3 %    Nucleated RBCs 0 0 /100    Absolute Neutrophil 4.5 1.6 - 8.3 10e9/L    Absolute Lymphocytes 1.1 0.8 - 5.3 10e9/L    Absolute Monocytes 0.8 0.0 - 1.3 10e9/L    Absolute Eosinophils 0.0 0.0 - 0.7 10e9/L    Absolute Basophils 0.0 0.0 - 0.2 10e9/L    Abs Immature Granulocytes 0.0 0 - 0.4 10e9/L    Absolute Nucleated RBC 0.0    Comprehensive metabolic panel   Result Value Ref Range    Sodium 136 133 - 144 mmol/L    Potassium 4.0 3.4 - 5.3 mmol/L    Chloride 102 94 - 109 mmol/L    Carbon Dioxide 29 20 - 32 mmol/L    Anion Gap 5 3 - 14 mmol/L    Glucose 180 (H) 70 - 99 mg/dL    Urea Nitrogen 19 7 - 30 mg/dL    Creatinine 1.05 0.66 - 1.25 mg/dL    GFR Estimate 79 >60 mL/min/[1.73_m2]    GFR Estimate If Black >90 >60 mL/min/[1.73_m2]    Calcium 9.1 8.5 - 10.1 mg/dL    Bilirubin Total 0.3 0.2 - 1.3 mg/dL    Albumin 3.6 3.4 - 5.0 g/dL    Protein Total 7.5 6.8 - 8.8 g/dL    Alkaline Phosphatase 115 40 - 150 U/L    ALT 35 0 - 70 U/L    AST 36 0 - 45 U/L   Lipase   Result Value Ref Range    Lipase 61 (L) 73 - 393 U/L   UA with Microscopic reflex to Culture   Result Value Ref Range    Color Urine Light Yellow     Appearance Urine Clear     Glucose Urine 300 (A) NEG^Negative mg/dL    Bilirubin Urine Negative NEG^Negative     Ketones Urine 5 (A) NEG^Negative mg/dL    Specific Gravity Urine 1.015 1.003 - 1.035    Blood Urine Trace (A) NEG^Negative    pH Urine 6.0 4.7 - 8.0 pH    Protein Albumin Urine 10 (A) NEG^Negative mg/dL    Urobilinogen mg/dL Normal 0.0 - 2.0 mg/dL    Nitrite Urine Negative NEG^Negative    Leukocyte Esterase Urine Negative NEG^Negative    Source Midstream Urine     WBC Urine <1 0 - 5 /HPF    RBC Urine 2 0 - 2 /HPF    Bacteria Urine None (A) NEG^Negative /HPF    Mucous Urine Present (A) NEG^Negative /LPF       ASSESSMENT / PLAN:.  (E10.65) Type 1 diabetes mellitus with hyperglycemia (H)  (primary encounter diagnosis)  Comment: noted and a lot of low BGs    Insulin pump information:   SG average: 165 +/- 76  BG Average: 172 +/- 85  Basal/bolus ratio: 23 (46%)/27 (54%)   Testing: 3.8/day    Hypoglycemia: yes      Plan: Continuous Blood Gluc Sensor (FREESTYLE JAREN         14 DAY SENSOR) MISC    Due to his current stomach issues, Richard's appetite has been poor.   Will adjust his insulin pump accordingly    Highly encouraged him to wear his Jaren CGM      Carb ratio  1600 12    Insulin sensitivity  1800 55           (K31.84) Gastroparesis  Comment: due to his symptoms and having diabetes for 40+ years, I'm assuming this is gastroparesis    Plan:   Highly encourage him to talk to Dr. Obando about getting a gastric emptying test.     Will have Richard meet with the dietician to discuss gastroparesis and diabetic diet    Follow up as discussed  Call if continue issues with low BGs  Start using the dual wave with every meal           There are no Patient Instructions on file for this visit.  Time: 40 min  Barrier: none  Willingness to learn: accepting    Macrina Dixon RN FNP-BC  Disease Management    Cc: Dr. ALEJANDRA Obando    With the electronic record, we can now more quickly and easily track our patient diabetic goals. Our diabetes clinical review is in progress and these are the indicators we are monitoring for good diabetes  health.     1.) HbA1C less than 7 (measurement of your average blood sugars)  2.) Blood Pressure less than 140/80  3.) LDL less than 100 (bad cholesterol)  4.) HbA1C is checked in the last 6 months and below 7% (more frequently if not at goal or adjusting medications)  5.) LDL is checked in the last 12 months (more frequently if not at goal or adjusting medications)  6.) Taking one baby aspirin daily (unless otherwise instructed)  7.) No tobacco use  8) Statin use     You have achieved 7 out of 8 of these and I am encouraging you to come in and get tuned up to achieve 8 out of 8!  Here is what you have achieved so far in my goals for you:  1.) HbA1C  less than 7:                              NO  Your last  HbA1C :   Lab Results   Component Value Date    A1C 7.3 03/21/2019    A1C 8 01/21/2019    A1C 7.7 07/25/2018    A1C 7.2 11/24/2017    A1C 7.4 07/17/2017       2.) Blood Pressure less than 140/80:       YES      Your last    /75   Pulse 85     3.) LDL less than 100:                              YES      Your last   LDL         LDL Cholesterol Calculated   Date Value Ref Range Status   03/21/2019 56 mg/dL Final   03/21/2019 56 <100 mg/dL Final   ]    4.) Checked HbA1C in the past 6 months: YES      5.) Checked LDL in the past 12 months:    YES     6.) Taking one aspirin daily:                       YES     7.) No tobacco use:                                        YES      8.) Statin use      YES

## 2019-08-15 ENCOUNTER — ALLIED HEALTH/NURSE VISIT (OUTPATIENT)
Dept: EDUCATION SERVICES | Facility: OTHER | Age: 54
End: 2019-08-15
Attending: NURSE PRACTITIONER
Payer: COMMERCIAL

## 2019-08-15 ENCOUNTER — OFFICE VISIT (OUTPATIENT)
Dept: FAMILY MEDICINE | Facility: OTHER | Age: 54
End: 2019-08-15
Attending: FAMILY MEDICINE
Payer: COMMERCIAL

## 2019-08-15 VITALS
WEIGHT: 201 LBS | TEMPERATURE: 97.9 F | HEIGHT: 62 IN | BODY MASS INDEX: 36.99 KG/M2 | RESPIRATION RATE: 20 BRPM | OXYGEN SATURATION: 93 %

## 2019-08-15 DIAGNOSIS — Z00.00 ENCOUNTER FOR ROUTINE ADULT HEALTH EXAMINATION WITHOUT ABNORMAL FINDINGS: ICD-10-CM

## 2019-08-15 DIAGNOSIS — E66.09 CLASS 2 OBESITY DUE TO EXCESS CALORIES WITH BODY MASS INDEX (BMI) OF 36.0 TO 36.9 IN ADULT, UNSPECIFIED WHETHER SERIOUS COMORBIDITY PRESENT: Primary | ICD-10-CM

## 2019-08-15 DIAGNOSIS — I15.9 SECONDARY HYPERTENSION: ICD-10-CM

## 2019-08-15 DIAGNOSIS — E10.65 TYPE 1 DIABETES MELLITUS WITH HYPERGLYCEMIA (H): ICD-10-CM

## 2019-08-15 DIAGNOSIS — E10.65 TYPE 1 DIABETES MELLITUS WITH HYPERGLYCEMIA (H): Primary | ICD-10-CM

## 2019-08-15 DIAGNOSIS — E10.649 TYPE 1 DIABETES MELLITUS WITH HYPOGLYCEMIA AND WITHOUT COMA (H): Primary | ICD-10-CM

## 2019-08-15 DIAGNOSIS — E78.5 HYPERLIPIDEMIA, UNSPECIFIED HYPERLIPIDEMIA TYPE: ICD-10-CM

## 2019-08-15 DIAGNOSIS — E66.01 MORBID OBESITY (H): ICD-10-CM

## 2019-08-15 DIAGNOSIS — E10.42 DIABETIC POLYNEUROPATHY ASSOCIATED WITH TYPE 1 DIABETES MELLITUS (H): ICD-10-CM

## 2019-08-15 DIAGNOSIS — E66.812 CLASS 2 OBESITY DUE TO EXCESS CALORIES WITH BODY MASS INDEX (BMI) OF 36.0 TO 36.9 IN ADULT, UNSPECIFIED WHETHER SERIOUS COMORBIDITY PRESENT: Primary | ICD-10-CM

## 2019-08-15 LAB
EST. AVERAGE GLUCOSE BLD GHB EST-MCNC: 163 MG/DL
HBA1C MFR BLD: 7.3 % (ref 0–5.6)
PSA SERPL-MCNC: 0.8 UG/L (ref 0–4)
TSH SERPL DL<=0.005 MIU/L-ACNC: 1.86 MU/L (ref 0.4–4)

## 2019-08-15 PROCEDURE — 36415 COLL VENOUS BLD VENIPUNCTURE: CPT | Mod: ZL | Performed by: FAMILY MEDICINE

## 2019-08-15 PROCEDURE — 83036 HEMOGLOBIN GLYCOSYLATED A1C: CPT | Mod: ZL | Performed by: FAMILY MEDICINE

## 2019-08-15 PROCEDURE — 99214 OFFICE O/P EST MOD 30 MIN: CPT | Performed by: FAMILY MEDICINE

## 2019-08-15 PROCEDURE — 84443 ASSAY THYROID STIM HORMONE: CPT | Mod: ZL | Performed by: FAMILY MEDICINE

## 2019-08-15 PROCEDURE — 84153 ASSAY OF PSA TOTAL: CPT | Mod: ZL | Performed by: FAMILY MEDICINE

## 2019-08-15 PROCEDURE — 40000788 ZZHCL STATISTIC ESTIMATED AVERAGE GLUCOSE: Mod: ZL | Performed by: FAMILY MEDICINE

## 2019-08-15 PROCEDURE — G0463 HOSPITAL OUTPT CLINIC VISIT: HCPCS

## 2019-08-15 ASSESSMENT — PATIENT HEALTH QUESTIONNAIRE - PHQ9
5. POOR APPETITE OR OVEREATING: NOT AT ALL
SUM OF ALL RESPONSES TO PHQ QUESTIONS 1-9: 1

## 2019-08-15 ASSESSMENT — ENCOUNTER SYMPTOMS
DIZZINESS: 0
CHILLS: 0
NAUSEA: 0
COUGH: 0
PALPITATIONS: 0
MYALGIAS: 0
JOINT SWELLING: 0
CONSTIPATION: 0
HEADACHES: 0
DYSURIA: 0
HEMATOCHEZIA: 0
HEMATURIA: 0
NERVOUS/ANXIOUS: 0
EYE PAIN: 0
PARESTHESIAS: 0
DIARRHEA: 0
FREQUENCY: 0
ARTHRALGIAS: 0
HEARTBURN: 0
WEAKNESS: 0
SORE THROAT: 0
FEVER: 0
ABDOMINAL PAIN: 0
SHORTNESS OF BREATH: 0

## 2019-08-15 ASSESSMENT — ANXIETY QUESTIONNAIRES
GAD7 TOTAL SCORE: 0
7. FEELING AFRAID AS IF SOMETHING AWFUL MIGHT HAPPEN: NOT AT ALL
5. BEING SO RESTLESS THAT IT IS HARD TO SIT STILL: NOT AT ALL
1. FEELING NERVOUS, ANXIOUS, OR ON EDGE: NOT AT ALL
IF YOU CHECKED OFF ANY PROBLEMS ON THIS QUESTIONNAIRE, HOW DIFFICULT HAVE THESE PROBLEMS MADE IT FOR YOU TO DO YOUR WORK, TAKE CARE OF THINGS AT HOME, OR GET ALONG WITH OTHER PEOPLE: NOT DIFFICULT AT ALL
6. BECOMING EASILY ANNOYED OR IRRITABLE: NOT AT ALL
3. WORRYING TOO MUCH ABOUT DIFFERENT THINGS: NOT AT ALL
2. NOT BEING ABLE TO STOP OR CONTROL WORRYING: NOT AT ALL

## 2019-08-15 ASSESSMENT — PAIN SCALES - GENERAL: PAINLEVEL: NO PAIN (0)

## 2019-08-15 ASSESSMENT — MIFFLIN-ST. JEOR: SCORE: 1625.98

## 2019-08-15 NOTE — PATIENT INSTRUCTIONS
We will call you with your lab results of celiac panel, thyroid function, and PSA.  Return to clinic in 3 months for recheck

## 2019-08-15 NOTE — PROGRESS NOTES
Pt came in for a pump and Jaren monitor download today. This was completed and given to Macrina Dixon.    Lianna Brewster

## 2019-08-15 NOTE — PROGRESS NOTES
"Subjective     Richard KRYSTINA Harvey is a 54 year old male who presents to clinic today for the following health issues:    HPI   New Patient/Transfer of Care  Diabetes Follow-up      How often are you checking your blood sugar? Four or more times daily    What time of day are you checking your blood sugars (select all that apply)?  Before and after meals    Have you had any blood sugars above 200?  Yes     Have you had any blood sugars below 70?  Yes     What symptoms do you notice when your blood sugar is low?  Shaky, Dizzy, Weak, Lethargy, Blurred vision and Confusion    What concerns do you have today about your diabetes? None     Do you have any of these symptoms? (Select all that apply)  Numbness in feet     Have you had a diabetic eye exam in the last 12 months? Yes- Date of last eye exam: Julyl 2019    Diabetes Management Resources    Blood sugars are liable. Highest , Lowest 40s   Appetite decreased d/t \"stomach issues\"  Prilosec is helping, has been on it for 1 to 1.5 months  Macrina (NP for insulin pumps) will download pump today and make adjustments  Dx at age 11  Richard inquired regarding his EGD pathology results. Discussed one of the bx is concerning for celiac    Hyperlipidemia Follow-Up      Are you having any of the following symptoms? (Select all that apply)  No complaints of shortness of breath, chest pain or pressure.  No increased sweating or nausea with activity.  No left-sided neck or arm pain.  No complaints of pain in calves when walking 1-2 blocks.    Are you regularly taking any medication or supplement to lower your cholesterol?   No    Are you having muscle aches or other side effects that you think could be caused by your cholesterol lowering medication?  No    Hypertension Follow-up      Do you check your blood pressure regularly outside of the clinic? No     Are you following a low salt diet? No    Are your blood pressures ever more than 140 on the top number (systolic) OR more   than 90 on " the bottom number (diastolic), for example 140/90? No    BP Readings from Last 2 Encounters:   08/09/19 119/75   07/04/19 120/68     Hemoglobin A1C (%)   Date Value   08/15/2019 7.3 (H)   03/21/2019 7.3 (A)     LDL Cholesterol Calculated (mg/dL)   Date Value   03/21/2019 56   03/21/2019 56       Discussed risks and benefits of PSA testing and prostate exam. Richard would like to proceed.    Patient Active Problem List   Diagnosis     DM type 1 (diabetes mellitus, type 1) (H)     HTN (hypertension)     Hyperlipidemia     ACP (advance care planning)     Diabetic polyneuropathy associated with type 1 diabetes mellitus (H)     Class 2 obesity in adult     Obesity (BMI 35.0-39.9) with comorbidity (H)     Past Surgical History:   Procedure Laterality Date     COLONOSCOPY - HIM SCAN N/A 09/19/2016    Procedure: COLONOSCOPY SCREENING; Surgeon: Rudy Rojo MD; Location: Loc[facility] within [facility] OR     ESOPHAGOSCOPY, GASTROSCOPY, DUODENOSCOPY (EGD), COMBINED  08/13/2019       Social History     Tobacco Use     Smoking status: Never Smoker     Smokeless tobacco: Never Used   Substance Use Topics     Alcohol use: No     Alcohol/week: 0.0 oz     Family History   Problem Relation Age of Onset     No Known Problems Mother      Hypertension Father      Hypertension Brother          Current Outpatient Medications   Medication Sig Dispense Refill     acetone, Urine, test STRP Use as directed 50 each 3     aspirin 81 MG EC tablet Take 81 mg by mouth daily       blood glucose (MELISSA CONTOUR NEXT) test strip TEST 6 TIMES DAILY AS DIRECTED. 300 each 11     blood glucose monitoring (MELISSA CONTOUR NEXT MONITOR) meter device kit 1 each       blood glucose monitoring (MELISSA CONTOUR NEXT) test strip Use to test blood sugar 6 times daily or as directed. 200 each 11     CALCIUM-VITAMIN D PO Take  by mouth daily. 600 mg       Continuous Blood Gluc Sensor (FREESTYLE CORNELIUS 14 DAY SENSOR) MISC 1 each 4 times daily (before meals and nightly) 2 each 11      gabapentin (NEURONTIN) 300 MG capsule Take 300 mg by mouth 3 times daily       glucagon 1 MG injection 1 mg once       Glucosamine-Chondroit-Vit C-Mn (GLUCOSAMINE-CHONDROITIN MAX ST PO) Take  by mouth daily.       insulin aspart (NOVOLOG VIAL) 100 UNITS/ML vial  UNITS IN PUMP EVERY 3 DAYS       insulin aspart (NovoLOG) inject - to fill ambulatory pump For use with insulin pump.  TDD: 70 units 30 mL 3     INSULIN PUMP - OUTPATIENT Date last updated:  6/23/15  MedSnipd Minimed: Model 751  BASAL RATES and times:  12   AM (midnight): 1.25 units/hour    2     AM: 1.3 units/hour   6     AM: 0.95 units/hour   6    PM: 1.2 units/hour   Basal Pattern A:  Richard Harvey will use when N/A.  CARB RATIO and times:  12   AM (midnight): 12  1    PM:  10  6    PM:    9  Corection Factor (Sensitivity) and times:  12   AM (midnight): 40 mg/dL  6     PM: 45 mg/dL  BLOOD GLUCOSE TARGET and times:  12   AM (midnight): 100 - 140  9     AM:  100 - 120  10   PM:  100 - 140  Active Insulin Time:  4 hours  Sensor:  Yes: 12   AM (midnight): 70 - 240  Carelink / Diasend username:  jyoti  Carelink / Diasend Password:  [email or web address]       lisinopril (PRINIVIL,ZESTRIL) 10 MG tablet Take 10 mg by mouth daily       MOTRIN IB PO Take 400 mg by mouth every 6 hours as needed for moderate pain       Multiple Vitamin (MULTI-VITAMIN DAILY PO) Take  by mouth daily.       nitroglycerin (NITROSTAT) 0.4 MG SL tablet Place under the tongue every 5 minutes as needed       omeprazole (PRILOSEC) 40 MG DR capsule Take 1 capsule (40 mg) by mouth daily 90 capsule 1     ondansetron (ZOFRAN) 4 MG tablet Take 1 tablet (4 mg) by mouth every 4 hours as needed for nausea 30 tablet 3     order for DME Equipment being ordered:     1.  Medtronic insulin pump supplies--insertion sets and reserviors  Disp: 1 month  Refill: 11 months 30 days 11     order for DME Equipment being ordered: Mepilex AG non-bordered foam.  Do NOT substitute 2 each 1     order for DME  "Equipment being ordered: Please dispense a short leg CAM walker boot to the patient 1 Device 0     order for DME Equipment being ordered: Please dispense dressing supplies to patient:  -Two sheets of Mepilex Ag  -30 gauze 4x4\"  -2 rolls of Medipore tape 1 Device 0     simvastatin (ZOCOR) 40 MG tablet Take by mouth At Bedtime       UREA 10 HYDRATING 10 % external cream APPLY CREAM TOPICALLY AS NEEDED FOR CALLOUSED SKIN 85 g 3     URINE GLUCOSE-KETONES TEST VI        No Known Allergies  Recent Labs   Lab Test 08/15/19  0950 08/15/19  0845 07/03/19  2305 03/21/19 01/21/19 12/06/17  1551 11/24/17  08/10/16   A1C  --  7.3*  --  7.3* 8*   < >  --  7.2   < > 7.6*  7.6   LDL  --   --   --  56  56  --   --   --  62  --  59  59   HDL  --   --   --  56  --   --   --   --   --   --    TRIG  --   --   --  46  --   --   --   --   --   --    ALT  --   --  35  --   --   --   --   --   --  25   CR  --   --  1.05 0.98  0.98  --   --   --  1.05  --  1.13   GFRESTIMATED  --   --  79 >60  >60  --   --   --   --   --   --    GFRESTBLACK  --   --  >90  --   --   --   --   --   --   --    POTASSIUM  --   --  4.0 4.5  --   --   --  4.4  --  4.4   TSH 1.86  --   --   --   --   --  2.00  --   --   --     < > = values in this interval not displayed.      BP Readings from Last 3 Encounters:   08/09/19 119/75   07/04/19 120/68   06/25/19 112/68    Wt Readings from Last 3 Encounters:   08/15/19 91.2 kg (201 lb)   08/09/19 91.7 kg (202 lb 3.2 oz)   07/11/19 91 kg (200 lb 11.2 oz)          EGD pathology results (8/13/2019)   A.  Mucosa, duodenum, biopsies:  -Increased surface epithelial lymphocytes, without villous blunting (see comment).  B.  Mucosa, stomach, biopsies:  -Fragments of unremarkable antral and fundic/body-type gastric mucosa.  -No evidence of gastritis.  C.  Mucosa, esophagus at 36 cm, biopsies:  -Unremarkable squamous mucosa.  -Adjacent columnar cell-lined mucosa with chronic inflammation.  -No specialized intestinal " "metaplasia identified.     Comments Increased numbers of intraepithelial lymphocytes in architecturally normal small bowel may indicate celiac disease/sprue, but this feature is not entirely specific. The differential diagnosis of this histologic pattern also includes: peptic disease, viral gastroenteritis, allergy to non-gluten proteins, autoimmune disorders, and other rarer causes. Correlation with clinical features and serologic studies is suggested.        Reviewed and updated as needed this visit by Provider  Tobacco  Allergies  Med Hx  Surg Hx  Fam Hx  Soc Hx        Review of Systems   Constitutional: Negative for chills and fever.   HENT: Negative for congestion, ear pain, hearing loss and sore throat.    Eyes: Negative for pain and visual disturbance.   Respiratory: Negative for cough and shortness of breath.    Cardiovascular: Negative for chest pain, palpitations and peripheral edema.   Gastrointestinal: Negative for abdominal pain, constipation, diarrhea, heartburn, hematochezia and nausea.   Genitourinary: Negative for discharge, dysuria, frequency, genital sores, hematuria, impotence and urgency.   Musculoskeletal: Negative for arthralgias, joint swelling and myalgias.   Skin: Negative for rash.   Neurological: Negative for dizziness, weakness, headaches and paresthesias.   Psychiatric/Behavioral: Negative for mood changes. The patient is not nervous/anxious.           Objective    Temp 97.9  F (36.6  C) (Tympanic)   Resp 20   Ht 1.575 m (5' 2\")   Wt 91.2 kg (201 lb)   SpO2 93%   BMI 36.76 kg/m    Body mass index is 36.76 kg/m .  Physical Exam   Constitutional: He appears well-developed and well-nourished. No distress.   HENT:   Head: Normocephalic and atraumatic.   Right Ear: Tympanic membrane normal.   Left Ear: Tympanic membrane normal.   Mouth/Throat: Oropharynx is clear and moist.   Eyes: Pupils are equal, round, and reactive to light. Conjunctivae and EOM are normal.   Neck: Normal range " "of motion. Neck supple. No thyromegaly present.   Cardiovascular: Normal rate, regular rhythm and intact distal pulses.   No murmur heard.  Pulmonary/Chest: Effort normal. No respiratory distress. He has no wheezes. He has no rales.   Abdominal: Soft. Bowel sounds are normal. He exhibits no distension. There is no tenderness. There is no guarding.   Genitourinary: Rectum normal and prostate normal.   Musculoskeletal: Normal range of motion. He exhibits no edema.   Lymphadenopathy:     He has no cervical adenopathy.   Neurological: He is alert.   Skin: Skin is warm and dry.   Psychiatric: He has a normal mood and affect.   Exam chaperone by nursing     Diagnostic Test Results:  Results for orders placed or performed in visit on 08/15/19 (from the past 24 hour(s))   PSA Diagnostic   Result Value Ref Range    PSA 0.80 0 - 4 ug/L   TSH   Result Value Ref Range    TSH 1.86 0.40 - 4.00 mU/L           Assessment & Plan     # DM1 w/ neuropathy: on insulin pump. A1c 7.3  - pump managed by NP  - c/w gabapentin  - RTC in 3 months for diabetic visit  - d/t EGD pathology findings concerning for celiac, will obtain celiac panel along with TSH (d/t also autoimmune)    # HTN:  - c/w lisinopril    # HLD: lipid panel (3/21/2019) cholesterol 121, HDL 56, LDL 56, TG 46  - c/w simvastatin    # Decreased appetite: ddx includes GERD, celiac, gastroparesis d/t long h/o DM  - c/w omeprazole     # Wellness:  - PSA today wnl  - colonoscopy 2016 w/ repeat in 10 years     BMI:   Estimated body mass index is 36.76 kg/m  as calculated from the following:    Height as of this encounter: 1.575 m (5' 2\").    Weight as of this encounter: 91.2 kg (201 lb).   Weight management plan: Discussed healthy diet and exercise guidelines    See Patient Instructions    Return in about 3 months (around 11/15/2019) for Diabetic Visit.    Natacha Alvarez MD  [facility] - [place]        "

## 2019-08-15 NOTE — NURSING NOTE
"Chief Complaint   Patient presents with     [facility]       Initial Temp 97.9  F (36.6  C) (Tympanic)   Resp 20   Ht 1.575 m (5' 2\")   Wt 91.2 kg (201 lb)   SpO2 93%   BMI 36.76 kg/m   Estimated body mass index is 36.76 kg/m  as calculated from the following:    Height as of this encounter: 1.575 m (5' 2\").    Weight as of this encounter: 91.2 kg (201 lb).  Medication Reconciliation: complete   Kavita Pineda    "

## 2019-08-16 ASSESSMENT — ANXIETY QUESTIONNAIRES: GAD7 TOTAL SCORE: 0

## 2019-08-24 NOTE — PATIENT INSTRUCTIONS
Continue working on healthy eating and moving as best as you can (start low and slow, work up to 30 min, 5x/week)    BG goals:  Fasting and before meals <130, >70  2 hour after eating <180    We only need 1/2 of these numbers to be within target then your A1c will be within target    Medication changes   Watch for lows BG due to changes    Recommendations  Keep using advanced features of the insulin pump--dual wave with every meal BEFORE consuming carbs  Keep follow up with Dr. Alvarez    Follow up   One week--if continued low BGs  Stop in sooner if BGs are still spiking with breakfast and/or continued issues with low BGs (nurse only for an insulin pump download)    Call me sooner if any problems/concerns and/or questions develop including consistent low BGs <70 or consistent high BGs >200  [phone] (Unit Coordinator)    [phone] (Nurse)

## 2019-08-24 NOTE — PROGRESS NOTES
"SUBJECTIVE:  Richard Harvey, 54 year old, male presents with the following Chief Complaint(s) with HPI to follow:  Chief Complaint   Patient presents with     Diabetes     pump download        Diabetes Follow-up      Patient is checking blood sugars: 4.2x/day + personal CGM for at least 30 days.  Results:  Overnight: 174-284  Breakfast: 69,   Mid-mornin, 232  Lunch: <40-62 (5),   Mid-afternoon: 254  Dinner: 52, 67,   Overnight: 118-336      Symptoms of hypoglycemia (low blood sugar): yes, SB <70--due to nausea during eating;  history of hypoglycemia unawareness    Paresthesias (numbness or burning in feet) or sores: Yes (\"same\"); almost healed--followed by Dr. Jarrett    Diabetic eye exam within the last year: Yes ()    Breakfast eaten regularly: Yes    Patient counting carbs: trying       HPI: Richard's here today for the follow up regarding his Diabetes mellitus, Type 1.    Lab Results   Component Value Date    A1C 7.3 2019    A1C 8 2019    A1C 7.7 2018    A1C 7.2 2017    A1C 7.4 2017     Current Diabetes medication:   1.  Insulin pump, uses Novolog; wearing his personal Freestyle william sensor  ASA use: yes, 325 mg daily  Statin use: yes, simvastatin 40 mg at bedime  Denies any issues with the insulin pump.      Richard's here for insulin pump download and possible adjust. He reports the following:  Continues to have issues with low BGs due to his stomach problem.  Has an EGD next Tuesday.   Richard tells me that his PCP, Dr. Obando has left [facility].   When he called to ask what he should do, he was told that they are getting a new provider in September.      Denies any new health concerns besides, as mentioned, his stomach.    Due for his A1c    Asking for a refill on his Zofran.      Patient Active Problem List   Diagnosis     DM type 1 (diabetes mellitus, type 1) (H)     HTN (hypertension)     Hyperlipidemia     ACP (advance care planning)     Diabetic " polyneuropathy associated with type 1 diabetes mellitus (H)     Class 2 obesity in adult     Obesity (BMI 35.0-39.9) with comorbidity (H)       Past Medical History:   Diagnosis Date     Diabetes mellitus type 1, controlled (H)      HLD (hyperlipidemia)      HTN (hypertension)      Neuropathy      Type 1 diabetes (H)        Past Surgical History:   Procedure Laterality Date     COLONOSCOPY - HIM SCAN N/A 09/19/2016    Procedure: COLONOSCOPY SCREENING; Surgeon: Rudy Rojo MD; Location: [facility] OR     ESOPHAGOSCOPY, GASTROSCOPY, DUODENOSCOPY (EGD), COMBINED  08/13/2019       Family History   Problem Relation Age of Onset     No Known Problems Mother      Hypertension Father      Hypertension Brother        Social History     Tobacco Use     Smoking status: Never Smoker     Smokeless tobacco: Never Used   Substance Use Topics     Alcohol use: No     Alcohol/week: 0.0 oz       Current Outpatient Medications   Medication Sig Dispense Refill     ondansetron (ZOFRAN) 4 MG tablet Take 1 tablet (4 mg) by mouth every 4 hours as needed for nausea 30 tablet 3     acetone, Urine, test STRP Use as directed 50 each 3     aspirin 81 MG EC tablet Take 81 mg by mouth daily       blood glucose (MELISSA CONTOUR NEXT) test strip TEST 6 TIMES DAILY AS DIRECTED. 300 each 11     blood glucose monitoring (MELISSA CONTOUR NEXT MONITOR) meter device kit 1 each       blood glucose monitoring (MELISSA CONTOUR NEXT) test strip Use to test blood sugar 6 times daily or as directed. 200 each 11     CALCIUM-VITAMIN D PO Take  by mouth daily. 600 mg       Continuous Blood Gluc Sensor (FREESTYLE CORNELIUS 14 DAY SENSOR) MISC 1 each 4 times daily (before meals and nightly) 2 each 11     gabapentin (NEURONTIN) 300 MG capsule Take 300 mg by mouth 3 times daily       glucagon 1 MG injection 1 mg once       Glucosamine-Chondroit-Vit C-Mn (GLUCOSAMINE-CHONDROITIN MAX ST PO) Take  by mouth daily.       insulin aspart (NOVOLOG VIAL) 100 UNITS/ML vial   "UNITS IN PUMP EVERY 3 DAYS       insulin aspart (NovoLOG) inject - to fill ambulatory pump For use with insulin pump.  TDD: 70 units 30 mL 3     INSULIN PUMP - OUTPATIENT Date last updated:  6/23/15  Medtronic Minimed: Model 751  BASAL RATES and times:  12   AM (midnight): 1.25 units/hour    2     AM: 1.3 units/hour   6     AM: 0.95 units/hour   6    PM: 1.2 units/hour   Basal Pattern A:  Richard Harvey will use when N/A.  CARB RATIO and times:  12   AM (midnight): 12  1    PM:  10  6    PM:    9  Corection Factor (Sensitivity) and times:  12   AM (midnight): 40 mg/dL  6     PM: 45 mg/dL  BLOOD GLUCOSE TARGET and times:  12   AM (midnight): 100 - 140  9     AM:  100 - 120  10   PM:  100 - 140  Active Insulin Time:  4 hours  Sensor:  Yes: 12   AM (midnight): 70 - 240  Carelink / Diasend username:  [email or web address]  Carelink / Diasend Password:  [email or web address]       lisinopril (PRINIVIL,ZESTRIL) 10 MG tablet Take 10 mg by mouth daily       MOTRIN IB PO Take 400 mg by mouth every 6 hours as needed for moderate pain       Multiple Vitamin (MULTI-VITAMIN DAILY PO) Take  by mouth daily.       nitroglycerin (NITROSTAT) 0.4 MG SL tablet Place under the tongue every 5 minutes as needed       omeprazole (PRILOSEC) 40 MG DR capsule Take 1 capsule (40 mg) by mouth daily 90 capsule 1     order for DME Equipment being ordered:     1.  Medtronic insulin pump supplies--insertion sets and reserviors  Disp: 1 month  Refill: 11 months 30 days 11     order for DME Equipment being ordered: Mepilex AG non-bordered foam.  Do NOT substitute 2 each 1     order for DME Equipment being ordered: Please dispense a short leg CAM walker boot to the patient 1 Device 0     order for DME Equipment being ordered: Please dispense dressing supplies to patient:  -Two sheets of Mepilex Ag  -30 gauze 4x4\"  -2 rolls of Medipore tape 1 Device 0     simvastatin (ZOCOR) 40 MG tablet Take by mouth At Bedtime       UREA 10 HYDRATING 10 % external cream APPLY CREAM " TOPICALLY AS NEEDED FOR CALLOUSED SKIN 85 g 3     URINE GLUCOSE-KETONES TEST VI          No Known Allergies    REVIEW OF SYSTEMS  Skin: right great toe wound--followed by Dr. Jarrett  Eyes: negative  Ears/Nose/Throat: negative  Respiratory: No shortness of breath, dyspnea on exertion, cough, or hemoptysis  Cardiovascular: negative  Gastrointestinal: as noted above  Genitourinary: negative  Musculoskeletal: left groin pain;  history of arthritis--back and thumbs  Neurologic: positive for numbness or tingling of hands (right hand--carpel tunnel surgery; left hand: carpel tunnel) and numbness or tingling of feet--same  Psychiatric: negative  Hematologic/Lymphatic/Immunologic: negative  Endocrine: positive for diabetes    OBJECTIVE:  /75   Pulse 85   Wt 91.7 kg (202 lb 3.2 oz)   BMI 36.98 kg/m    Constitutional: healthy, alert and no distress  Cardiovascular: RRR. No murmurs, clicks gallops or rub  Respiratory: Good diaphragmatic excursion. Lungs clear  Psychiatric: mentation appears normal and affect normal/bright      LAB  Results for orders placed or performed during the hospital encounter of 07/03/19   CBC with platelets differential   Result Value Ref Range    WBC 6.4 4.0 - 11.0 10e9/L    RBC Count 4.54 4.4 - 5.9 10e12/L    Hemoglobin 13.6 13.3 - 17.7 g/dL    Hematocrit 40.0 40.0 - 53.0 %    MCV 88 78 - 100 fl    MCH 30.0 26.5 - 33.0 pg    MCHC 34.0 31.5 - 36.5 g/dL    RDW 11.8 10.0 - 15.0 %    Platelet Count 276 150 - 450 10e9/L    Diff Method Automated Method     % Neutrophils 69.6 %    % Lymphocytes 17.3 %    % Monocytes 11.7 %    % Eosinophils 0.6 %    % Basophils 0.5 %    % Immature Granulocytes 0.3 %    Nucleated RBCs 0 0 /100    Absolute Neutrophil 4.5 1.6 - 8.3 10e9/L    Absolute Lymphocytes 1.1 0.8 - 5.3 10e9/L    Absolute Monocytes 0.8 0.0 - 1.3 10e9/L    Absolute Eosinophils 0.0 0.0 - 0.7 10e9/L    Absolute Basophils 0.0 0.0 - 0.2 10e9/L    Abs Immature Granulocytes 0.0 0 - 0.4 10e9/L     Absolute Nucleated RBC 0.0    Comprehensive metabolic panel   Result Value Ref Range    Sodium 136 133 - 144 mmol/L    Potassium 4.0 3.4 - 5.3 mmol/L    Chloride 102 94 - 109 mmol/L    Carbon Dioxide 29 20 - 32 mmol/L    Anion Gap 5 3 - 14 mmol/L    Glucose 180 (H) 70 - 99 mg/dL    Urea Nitrogen 19 7 - 30 mg/dL    Creatinine 1.05 0.66 - 1.25 mg/dL    GFR Estimate 79 >60 mL/min/[1.73_m2]    GFR Estimate If Black >90 >60 mL/min/[1.73_m2]    Calcium 9.1 8.5 - 10.1 mg/dL    Bilirubin Total 0.3 0.2 - 1.3 mg/dL    Albumin 3.6 3.4 - 5.0 g/dL    Protein Total 7.5 6.8 - 8.8 g/dL    Alkaline Phosphatase 115 40 - 150 U/L    ALT 35 0 - 70 U/L    AST 36 0 - 45 U/L   Lipase   Result Value Ref Range    Lipase 61 (L) 73 - 393 U/L   UA with Microscopic reflex to Culture   Result Value Ref Range    Color Urine Light Yellow     Appearance Urine Clear     Glucose Urine 300 (A) NEG^Negative mg/dL    Bilirubin Urine Negative NEG^Negative    Ketones Urine 5 (A) NEG^Negative mg/dL    Specific Gravity Urine 1.015 1.003 - 1.035    Blood Urine Trace (A) NEG^Negative    pH Urine 6.0 4.7 - 8.0 pH    Protein Albumin Urine 10 (A) NEG^Negative mg/dL    Urobilinogen mg/dL Normal 0.0 - 2.0 mg/dL    Nitrite Urine Negative NEG^Negative    Leukocyte Esterase Urine Negative NEG^Negative    Source Midstream Urine     WBC Urine <1 0 - 5 /HPF    RBC Urine 2 0 - 2 /HPF    Bacteria Urine None (A) NEG^Negative /HPF    Mucous Urine Present (A) NEG^Negative /LPF       ASSESSMENT / PLAN:.  (E10.649) Type 1 diabetes mellitus with hypoglycemia and without coma (H)  (primary encounter diagnosis)  Comment:   Insulin pump information:   BG Average: 150 +/- 77  Basal/bolus ratio: 24 (44%)/31 (56%)   Testin.2/day    Hypoglycemia: yes    Plan:   Will adjust his insulin pump accordingly    Carb ratio  0000 71    Insulin sensitivity  0000 55   1200 60    (R11.0) Nausea  Comment: noted  Plan: ondansetron (ZOFRAN) 4 MG tablet          Rx sent.   Got him an appointment  with Dr. Alvarez to establish care    Follow up as discussed  Call if continue issues with low BGs  Start using the dual wave with every meal           Patient Instructions   Continue working on healthy eating and moving as best as you can (start low and slow, work up to 30 min, 5x/week)    BG goals:  Fasting and before meals <130, >70  2 hour after eating <180    We only need 1/2 of these numbers to be within target then your A1c will be within target    Medication changes   Watch for lows BG due to changes    Recommendations  Keep using advanced features of the insulin pump--dual wave with every meal BEFORE consuming carbs  Keep follow up with Dr. Alvarez    Follow up   One week--if continued low BGs  Stop in sooner if BGs are still spiking with breakfast and/or continued issues with low BGs (nurse only for an insulin pump download)    Call me sooner if any problems/concerns and/or questions develop including consistent low BGs <70 or consistent high BGs >200  [phone] (Unit Coordinator)    [phone] (Nurse)        Time: 40 min  Barrier: none  Willingness to learn: accepting    Macrina Dixon RN [facility]  Disease Management    With the electronic record, we can now more quickly and easily track our patient diabetic goals. Our diabetes clinical review is in progress and these are the indicators we are monitoring for good diabetes health.     1.) HbA1C less than 7 (measurement of your average blood sugars)  2.) Blood Pressure less than 140/80  3.) LDL less than 100 (bad cholesterol)  4.) HbA1C is checked in the last 6 months and below 7% (more frequently if not at goal or adjusting medications)  5.) LDL is checked in the last 12 months (more frequently if not at goal or adjusting medications)  6.) Taking one baby aspirin daily (unless otherwise instructed)  7.) No tobacco use  8) Statin use     You have achieved 7 out of 8 of these and I am encouraging you to come in and get tuned up to achieve 8 out of 8!  Here is  what you have achieved so far in my goals for you:  1.) HbA1C  less than 7:                              NO  Your last  HbA1C :   Lab Results   Component Value Date    A1C 7.3 03/21/2019    A1C 8 01/21/2019    A1C 7.7 07/25/2018    A1C 7.2 11/24/2017    A1C 7.4 07/17/2017       2.) Blood Pressure less than 140/80:       YES      Your last    /75   Pulse 85   Wt 91.7 kg (202 lb 3.2 oz)   BMI 36.98 kg/m      3.) LDL less than 100:                              YES      Your last   LDL         LDL Cholesterol Calculated   Date Value Ref Range Status   03/21/2019 56 mg/dL Final   03/21/2019 56 <100 mg/dL Final   ]    4.) Checked HbA1C in the past 6 months: YES      5.) Checked LDL in the past 12 months:    YES     6.) Taking one aspirin daily:                       YES     7.) No tobacco use:                                        YES      8.) Statin use      YES

## 2019-08-25 NOTE — PROGRESS NOTES
Insulin pump download:    Insulin pump information:   Average: 160 +/- 86  Basal/bolus ratio: 24 (45%)/ 29 (55%)   Testin.3x/day    Hypoglycemia: yes, 6.     Keep working on using your advanced features--temp basal for low BGs.     If continues low BGs--call sooner.     Macrina CHAMBERLAIN FNP-BC  Diabetes and Wound Care

## 2019-09-11 DIAGNOSIS — E10.42 DIABETIC POLYNEUROPATHY ASSOCIATED WITH TYPE 1 DIABETES MELLITUS (H): Primary | ICD-10-CM

## 2019-09-11 NOTE — TELEPHONE ENCOUNTER
gabapentin (NEURONTIN) 300 MG capsule  Last Written Prescription Date:  historical medication   Last Fill Quantity: 0,   # refills: 0  Last Office Visit: 8/15/19  Future Office visit:    Next 5 appointments (look out 90 days)    Sep 25, 2019  8:30 AM CDT  (Arrive by 8:15 AM)  Return Visit with Juliette Jarrett DPM  [facility] [address] [place] [phone]   Nov 15, 2019  8:50 AM CST  (Arrive by 8:35 AM)  SHORT with Natacha Alvarez MD  [facility] ([facility] ) [address] [place] [id number]  [phone]

## 2019-09-12 RX ORDER — GABAPENTIN 300 MG/1
300 CAPSULE ORAL 3 TIMES DAILY
Qty: 84 CAPSULE | Refills: 2 | Status: SHIPPED | OUTPATIENT
Start: 2019-10-10 | End: 2019-12-12

## 2019-09-12 NOTE — TELEPHONE ENCOUNTER
[place]  shows a fill of gabapentin on 9/10/2019 for 30 days. Will refill for start date of 10/10/2019 for 28 days, with 2 refills.    Natacha Alvarez MD

## 2019-10-14 ENCOUNTER — TELEPHONE (OUTPATIENT)
Dept: EDUCATION SERVICES | Facility: HOSPITAL | Age: 54
End: 2019-10-14

## 2019-10-15 ENCOUNTER — HOSPITAL ENCOUNTER (EMERGENCY)
Facility: HOSPITAL | Age: 54
Discharge: HOME | End: 2019-10-15
Attending: PHYSICIAN ASSISTANT | Admitting: PHYSICIAN ASSISTANT
Payer: COMMERCIAL

## 2019-10-15 ENCOUNTER — APPOINTMENT (OUTPATIENT)
Dept: CT IMAGING | Facility: HOSPITAL | Age: 54
End: 2019-10-15
Attending: PHYSICIAN ASSISTANT
Payer: COMMERCIAL

## 2019-10-15 VITALS
OXYGEN SATURATION: 94 % | SYSTOLIC BLOOD PRESSURE: 105 MMHG | HEART RATE: 81 BPM | DIASTOLIC BLOOD PRESSURE: 66 MMHG | RESPIRATION RATE: 18 BRPM | TEMPERATURE: 98.3 F

## 2019-10-15 DIAGNOSIS — R10.30 LOWER ABDOMINAL PAIN, UNSPECIFIED: ICD-10-CM

## 2019-10-15 DIAGNOSIS — K92.1 HEMATOCHEZIA: ICD-10-CM

## 2019-10-15 LAB
ALBUMIN SERPL-MCNC: 3.7 G/DL (ref 3.4–5)
ALP SERPL-CCNC: 94 U/L (ref 40–150)
ALT SERPL W P-5'-P-CCNC: 33 U/L (ref 0–70)
ANION GAP SERPL CALCULATED.3IONS-SCNC: 5 MMOL/L (ref 3–14)
AST SERPL W P-5'-P-CCNC: 23 U/L (ref 0–45)
BASOPHILS # BLD AUTO: 0 10E9/L (ref 0–0.2)
BASOPHILS NFR BLD AUTO: 0.4 %
BILIRUB SERPL-MCNC: 0.4 MG/DL (ref 0.2–1.3)
BUN SERPL-MCNC: 26 MG/DL (ref 7–30)
CALCIUM SERPL-MCNC: 8.8 MG/DL (ref 8.5–10.1)
CHLORIDE SERPL-SCNC: 104 MMOL/L (ref 94–109)
CO2 SERPL-SCNC: 27 MMOL/L (ref 20–32)
CREAT SERPL-MCNC: 1.06 MG/DL (ref 0.66–1.25)
DIFFERENTIAL METHOD BLD: ABNORMAL
EOSINOPHIL # BLD AUTO: 0 10E9/L (ref 0–0.7)
EOSINOPHIL NFR BLD AUTO: 0.4 %
ERYTHROCYTE [DISTWIDTH] IN BLOOD BY AUTOMATED COUNT: 12.7 % (ref 10–15)
GFR SERPL CREATININE-BSD FRML MDRD: 78 ML/MIN/{1.73_M2}
GLUCOSE SERPL-MCNC: 248 MG/DL (ref 70–99)
HCT VFR BLD AUTO: 39.5 % (ref 40–53)
HEMOCCULT SP1 STL QL: POSITIVE
HGB BLD-MCNC: 13 G/DL (ref 13.3–17.7)
IMM GRANULOCYTES # BLD: 0 10E9/L (ref 0–0.4)
IMM GRANULOCYTES NFR BLD: 0.3 %
LYMPHOCYTES # BLD AUTO: 1.4 10E9/L (ref 0.8–5.3)
LYMPHOCYTES NFR BLD AUTO: 14.6 %
MCH RBC QN AUTO: 29.1 PG (ref 26.5–33)
MCHC RBC AUTO-ENTMCNC: 32.9 G/DL (ref 31.5–36.5)
MCV RBC AUTO: 89 FL (ref 78–100)
MONOCYTES # BLD AUTO: 0.7 10E9/L (ref 0–1.3)
MONOCYTES NFR BLD AUTO: 7.1 %
NEUTROPHILS # BLD AUTO: 7.4 10E9/L (ref 1.6–8.3)
NEUTROPHILS NFR BLD AUTO: 77.2 %
NRBC # BLD AUTO: 0 10*3/UL
NRBC BLD AUTO-RTO: 0 /100
PLATELET # BLD AUTO: 307 10E9/L (ref 150–450)
POTASSIUM SERPL-SCNC: 4.2 MMOL/L (ref 3.4–5.3)
PROT SERPL-MCNC: 7 G/DL (ref 6.8–8.8)
RBC # BLD AUTO: 4.46 10E12/L (ref 4.4–5.9)
SODIUM SERPL-SCNC: 136 MMOL/L (ref 133–144)
WBC # BLD AUTO: 9.6 10E9/L (ref 4–11)

## 2019-10-15 PROCEDURE — 82274 ASSAY TEST FOR BLOOD FECAL: CPT | Performed by: PHYSICIAN ASSISTANT

## 2019-10-15 PROCEDURE — 99285 EMERGENCY DEPT VISIT HI MDM: CPT | Mod: 25

## 2019-10-15 PROCEDURE — 25500064 ZZH RX 255 OP 636: Performed by: PHYSICIAN ASSISTANT

## 2019-10-15 PROCEDURE — 85025 COMPLETE CBC W/AUTO DIFF WBC: CPT | Performed by: PHYSICIAN ASSISTANT

## 2019-10-15 PROCEDURE — 36415 COLL VENOUS BLD VENIPUNCTURE: CPT | Performed by: PHYSICIAN ASSISTANT

## 2019-10-15 PROCEDURE — 99284 EMERGENCY DEPT VISIT MOD MDM: CPT | Mod: Z6 | Performed by: PHYSICIAN ASSISTANT

## 2019-10-15 PROCEDURE — 74177 CT ABD & PELVIS W/CONTRAST: CPT | Mod: TC

## 2019-10-15 PROCEDURE — 80053 COMPREHEN METABOLIC PANEL: CPT | Performed by: PHYSICIAN ASSISTANT

## 2019-10-15 RX ORDER — IOPAMIDOL 612 MG/ML
100 INJECTION, SOLUTION INTRAVASCULAR ONCE
Status: COMPLETED | OUTPATIENT
Start: 2019-10-15 | End: 2019-10-15

## 2019-10-15 RX ADMIN — IOPAMIDOL 100 ML: 612 INJECTION, SOLUTION INTRAVENOUS at 16:58

## 2019-10-15 ASSESSMENT — ENCOUNTER SYMPTOMS
CHILLS: 0
LIGHT-HEADEDNESS: 0
ACTIVITY CHANGE: 0
WEAKNESS: 0
BRUISES/BLEEDS EASILY: 0
ABDOMINAL DISTENTION: 0
BLOOD IN STOOL: 1
SHORTNESS OF BREATH: 0
FEVER: 0
DIZZINESS: 0
FATIGUE: 0
DIARRHEA: 0
BACK PAIN: 0
APPETITE CHANGE: 0
ABDOMINAL PAIN: 1

## 2019-10-15 NOTE — ED PROVIDER NOTES
History     Chief Complaint   Patient presents with     Rectal Bleeding     throwing up yesterday and bright red blood in stool today. some abd pain. no dizziness or sob. no hematuria. denies hx of diverticulitis.      The history is provided by the patient.     Richard Harvey is a 54 year old male who presented to the emergency department ambulatory for evaluation of mild lower abdominal discomfort for 48 hours and new onset subjective hematochezia as well as nausea and vomiting yesterday.  Vomiting has resolved.  Had another episode of hematochezia today.  Denies any fevers or chills.  Denies any lower urinary tract symptoms.  Remote history of exploratory laparotomy secondary to trauma as a child.  He has all of his organs.  Colonoscopy in 2016.    Allergies:  No Known Allergies    Problem List:    Patient Active Problem List    Diagnosis Date Noted     Obesity (BMI 35.0-39.9) with comorbidity (H) 08/15/2019     Priority: Medium     Diabetic polyneuropathy associated with type 1 diabetes mellitus (H) 06/05/2019     Priority: Medium     Class 2 obesity in adult 06/05/2019     Priority: Medium     ACP (advance care planning) 01/18/2017     Priority: Medium     Advance Care Planning 1/18/2017: ACP Review of Chart / Resources Provided:  Reviewed chart for advance care plan.  Richard Harvey has no plan or code status on file. Discussed available resources and provided with information. Confirmed code status reflects current choices pending further ACP discussions.  Confirmed/documented legally designated decision makers.  Added by Janiya Rocha           DM type 1 (diabetes mellitus, type 1) (H) 08/15/2013     Priority: Medium     HTN (hypertension) 08/15/2013     Priority: Medium     Hyperlipidemia 08/15/2013     Priority: Medium        Past Medical History:    Past Medical History:   Diagnosis Date     Diabetes mellitus type 1, controlled (H)      HLD (hyperlipidemia)      HTN (hypertension)      Neuropathy       Type 1 diabetes (H)        Past Surgical History:    Past Surgical History:   Procedure Laterality Date     COLONOSCOPY - HIM SCAN N/A 09/19/2016    Procedure: COLONOSCOPY SCREENING; Surgeon: Rudy Rojo MD; Location: [facility] OR     ESOPHAGOSCOPY, GASTROSCOPY, DUODENOSCOPY (EGD), COMBINED  08/13/2019       Family History:    Family History   Problem Relation Age of Onset     No Known Problems Mother      Hypertension Father      Hypertension Brother        Social History:  Marital Status:  Single [1]  Social History     Tobacco Use     Smoking status: Never Smoker     Smokeless tobacco: Never Used   Substance Use Topics     Alcohol use: No     Alcohol/week: 0.0 standard drinks     Drug use: No        Medications:    acetone, Urine, test STRP  aspirin 81 MG EC tablet  blood glucose (MELISSA CONTOUR NEXT) test strip  blood glucose monitoring (MELISSA CONTOUR NEXT) test strip  CALCIUM-VITAMIN D PO  Continuous Blood Gluc Sensor (FREESTYLE CORNELIUS 14 DAY SENSOR) MISC  gabapentin (NEURONTIN) 300 MG capsule  glucagon 1 MG injection  Glucosamine-Chondroit-Vit C-Mn (GLUCOSAMINE-CHONDROITIN MAX ST PO)  insulin aspart (NOVOLOG VIAL) 100 UNITS/ML vial  insulin aspart (NovoLOG) inject - to fill ambulatory pump  INSULIN PUMP - OUTPATIENT  lisinopril (PRINIVIL,ZESTRIL) 10 MG tablet  MOTRIN IB PO  Multiple Vitamin (MULTI-VITAMIN DAILY PO)  omeprazole (PRILOSEC) 40 MG DR capsule  ondansetron (ZOFRAN) 4 MG tablet  simvastatin (ZOCOR) 40 MG tablet  blood glucose monitoring (MELISSA CONTOUR NEXT MONITOR) meter device kit  nitroglycerin (NITROSTAT) 0.4 MG SL tablet  order for DME  order for DME  order for DME  order for DME  UREA 10 HYDRATING 10 % external cream  URINE GLUCOSE-KETONES TEST VI          Review of Systems   Constitutional: Negative for activity change, appetite change, chills, fatigue and fever.   Respiratory: Negative for shortness of breath.    Cardiovascular: Negative for chest pain.   Gastrointestinal: Positive for  abdominal pain and blood in stool. Negative for abdominal distention and diarrhea.        Nausea and vomiting has resolved   Genitourinary: Negative.    Musculoskeletal: Negative for back pain.   Skin: Negative.    Neurological: Negative for dizziness, weakness and light-headedness.   Hematological: Does not bruise/bleed easily.       Physical Exam   BP: 110/66  Pulse: 81  Temp: 98.6  F (37  C)  Resp: 18  SpO2: 97 %      Physical Exam  Vitals signs and nursing note reviewed.   Constitutional:       General: He is not in acute distress.     Appearance: Normal appearance. He is obese. He is not ill-appearing, toxic-appearing or diaphoretic.      Comments: Pleasant and talkative   Cardiovascular:      Rate and Rhythm: Normal rate and regular rhythm.      Pulses: Normal pulses.   Pulmonary:      Effort: Pulmonary effort is normal.   Abdominal:      General: Abdomen is flat. There is no distension.      Tenderness: There is no tenderness. There is no guarding.      Comments: Palpation of the abdomen is relatively unremarkable.  There is some very mild increasing pain upon deep palpation of the suprapubic area.  No evidence of peritonitis.  There is no guarding.   Genitourinary:     Rectum: Normal.      Comments: Rectal exam shows no rl blood, pain, or evidence of masses.  Skin:     General: Skin is warm and dry.      Capillary Refill: Capillary refill takes less than 2 seconds.   Neurological:      General: No focal deficit present.      Mental Status: He is alert and oriented to person, place, and time.   Psychiatric:         Mood and Affect: Mood normal.         ED Course        Procedures               Critical Care time:  none               Results for orders placed or performed during the hospital encounter of 10/15/19 (from the past 24 hour(s))   Comprehensive metabolic panel   Result Value Ref Range    Sodium 136 133 - 144 mmol/L    Potassium 4.2 3.4 - 5.3 mmol/L    Chloride 104 94 - 109 mmol/L    Carbon  Dioxide 27 20 - 32 mmol/L    Anion Gap 5 3 - 14 mmol/L    Glucose 248 (H) 70 - 99 mg/dL    Urea Nitrogen 26 7 - 30 mg/dL    Creatinine 1.06 0.66 - 1.25 mg/dL    GFR Estimate 78 >60 mL/min/[1.73_m2]    GFR Estimate If Black >90 >60 mL/min/[1.73_m2]    Calcium 8.8 8.5 - 10.1 mg/dL    Bilirubin Total 0.4 0.2 - 1.3 mg/dL    Albumin 3.7 3.4 - 5.0 g/dL    Protein Total 7.0 6.8 - 8.8 g/dL    Alkaline Phosphatase 94 40 - 150 U/L    ALT 33 0 - 70 U/L    AST 23 0 - 45 U/L   CBC with platelets differential   Result Value Ref Range    WBC 9.6 4.0 - 11.0 10e9/L    RBC Count 4.46 4.4 - 5.9 10e12/L    Hemoglobin 13.0 (L) 13.3 - 17.7 g/dL    Hematocrit 39.5 (L) 40.0 - 53.0 %    MCV 89 78 - 100 fl    MCH 29.1 26.5 - 33.0 pg    MCHC 32.9 31.5 - 36.5 g/dL    RDW 12.7 10.0 - 15.0 %    Platelet Count 307 150 - 450 10e9/L    Diff Method Automated Method     % Neutrophils 77.2 %    % Lymphocytes 14.6 %    % Monocytes 7.1 %    % Eosinophils 0.4 %    % Basophils 0.4 %    % Immature Granulocytes 0.3 %    Nucleated RBCs 0 0 /100    Absolute Neutrophil 7.4 1.6 - 8.3 10e9/L    Absolute Lymphocytes 1.4 0.8 - 5.3 10e9/L    Absolute Monocytes 0.7 0.0 - 1.3 10e9/L    Absolute Eosinophils 0.0 0.0 - 0.7 10e9/L    Absolute Basophils 0.0 0.0 - 0.2 10e9/L    Abs Immature Granulocytes 0.0 0 - 0.4 10e9/L    Absolute Nucleated RBC 0.0    Immunos occult blood   Result Value Ref Range    Occult Blood Slide 1 Positive (A) NEG^Negative   CT Abdomen Pelvis w Contrast    Narrative    CT ABDOMEN PELVIS W CONTRAST    CLINICAL HISTORY: Male, age 54 years,  lower abdominal pain and  hematochezia;    Comparison:  None.    TECHNIQUE:  CT was performed of the abdomen and pelvis with IV  contrast. Sagittal, coronal and axial reconstructions were reviewed.     FINDINGS:    Abdomen/Pelvis CT:  Lung Bases:  Mild dependent and peripheral atelectasis.    Esophagus/stomach: Normal.    Liver:  Normal.  Portal venous system: Patent.  Gallbladder: Normal.    Spleen:  Normal.    Pancreas: Normal.    Adrenal Glands: Normal.    Kidneys: Normal.  Ureters: Normal.  Urinary bladder: Mild wall thickening with a number of small  diverticula.    Abdominal Aorta: Normal. Atherosclerotic calcifications in the left  internal iliac artery.  IVC: Normal.    Lymph Nodes: Mesenteric nodes are mildly numerous.   Large and Small Bowel: Mild mesenteric fat stranding involving a  number of loops of small bowel suggesting localized enteritis.  Questionable wall thickening of the distal colon, more likely related  to incomplete distention.    Appendix: Normal.    Pelvic Organs:  Prostate hypertrophy.  Peritoneum: Normal.  Bony structures: Healed fractures of the pelvis. Scattered  degenerative changes of the lumbar spine.    Other Findings:  Inguinal lymph nodes are normal.      Impression    IMPRESSION:   Findings suggesting localized enteritis involving a number the small  bowel loops in the left hemiabdomen.    Questionable localized colitis of the distal colon, felt to more  likely related to incomplete distention.    Heterogeneous wall thickening of the urinary bladder with a number of  small diverticula, likely related to prostate hypertrophy.    JANN TIJERINA MD       Medications   iopamidol (ISOVUE-300) IV solution 61% 100 mL (100 mLs Intravenous Given 10/15/19 1658)   sodium chloride (PF) 0.9% PF flush 60 mL (60 mLs Intravenous Given 10/15/19 1658)       Assessments & Plan (with Medical Decision Making)   Work-up as above.  He was scheduled to see Dr. Watkins tomorrow at 10:15 AM.  Rest and stay hydrated.  Return here for any other questions or concerns, or any worsening his condition whatsoever.    This document was prepared using a combination of typing and voice generated software.  While every attempt was made for accuracy, spelling and grammatical errors may exist.    I have reviewed the nursing notes.    I have reviewed the findings, diagnosis, plan and need for follow up with the  patient.       New Prescriptions    No medications on file       Final diagnoses:   Lower abdominal pain, unspecified   Hematochezia       10/15/2019   [facility]     La T[place]ano PA-C  10/15/19 2690

## 2019-10-15 NOTE — ED NOTES
Patient given verbal and written discharge instructions. Patient verbalized understanding of discharge instructions. Follow up appt with Dr. Watkins tomorrow. Rates abdominal pain 4/10.

## 2019-10-15 NOTE — ED AVS SNAPSHOT
[facility]  [address] [place] [id number]  Phone:  [phone]                                    Richard Harvey   MRN: [id number]    Department:  [facility]   Date of Visit:  10/15/2019           After Visit Summary Signature Page    I have received my discharge instructions, and my questions have been answered. I have discussed any challenges I see with this plan with the nurse or doctor.    ..........................................................................................................................................  Patient/Patient Representative Signature      ..........................................................................................................................................  Patient Representative Print Name and Relationship to Patient    ..................................................               ................................................  Date                                   Time    ..........................................................................................................................................  Reviewed by Signature/Title    ...................................................              ..............................................  Date                                               Time          22EPIC Rev 08/18

## 2019-10-15 NOTE — ED NOTES
Pt to the ED alone with N/V yesterday with no further nausea and and last night and today he had bright red blood in his stool.  States he took pepto bismal last night X1 dose.  Abd pain is constant surr umbilicus.  Last normal BM was Sunday morning. Assessment is complete.  Pt into gown and La PA at the bedside. Call light is given.

## 2019-10-15 NOTE — DISCHARGE INSTRUCTIONS
Rest and stay hydrated.     Please follow-up with Dr. Watkins in the general surgery clinic tomorrow 10/16 at 10:15 am.

## 2019-10-16 ENCOUNTER — OFFICE VISIT (OUTPATIENT)
Dept: SURGERY | Facility: OTHER | Age: 54
End: 2019-10-16
Attending: SURGERY
Payer: COMMERCIAL

## 2019-10-16 VITALS
TEMPERATURE: 97.7 F | SYSTOLIC BLOOD PRESSURE: 124 MMHG | DIASTOLIC BLOOD PRESSURE: 78 MMHG | HEART RATE: 84 BPM | BODY MASS INDEX: 37.17 KG/M2 | OXYGEN SATURATION: 96 % | WEIGHT: 202 LBS | HEIGHT: 62 IN

## 2019-10-16 DIAGNOSIS — K62.5 BRIGHT RED RECTAL BLEEDING: Primary | ICD-10-CM

## 2019-10-16 PROCEDURE — G0463 HOSPITAL OUTPT CLINIC VISIT: HCPCS

## 2019-10-16 PROCEDURE — 99203 OFFICE O/P NEW LOW 30 MIN: CPT | Performed by: SURGERY

## 2019-10-16 ASSESSMENT — MIFFLIN-ST. JEOR: SCORE: 1630.52

## 2019-10-16 NOTE — NURSING NOTE
"Chief Complaint   Patient presents with     Consult For     Rectal bleeding and abdominal pain.  ER referral from yesterday.  Last colonoscopy done in 2016-No polyps.  No family history of colon cancer.         Initial /78 (BP Location: Right arm, Patient Position: Chair, Cuff Size: Adult Regular)   Pulse 84   Temp 97.7  F (36.5  C) (Tympanic)   Ht 1.575 m (5' 2\")   Wt 91.6 kg (202 lb)   SpO2 96%   BMI 36.95 kg/m   Estimated body mass index is 36.95 kg/m  as calculated from the following:    Height as of this encounter: 1.575 m (5' 2\").    Weight as of this encounter: 91.6 kg (202 lb).  Medication Reconciliation: complete  DEANN KOWALSKI LPN    "

## 2019-10-16 NOTE — PATIENT INSTRUCTIONS
Thank you for allowing Dr. Watkins and our surgical team to participate in your care. Please call our health unit coordinator at [phone] with scheduling questions or the nurse at [phone] with any other questions or concerns.    Lab today.  Our office will contact you with results.

## 2019-10-17 DIAGNOSIS — K62.5 BRIGHT RED RECTAL BLEEDING: ICD-10-CM

## 2019-10-17 LAB — LACTOFERRIN STL QL IA: POSITIVE

## 2019-10-17 PROCEDURE — 87015 SPECIMEN INFECT AGNT CONCNTJ: CPT | Mod: ZL | Performed by: SURGERY

## 2019-10-17 PROCEDURE — 87177 OVA AND PARASITES SMEARS: CPT | Mod: ZL | Performed by: SURGERY

## 2019-10-17 PROCEDURE — 87046 STOOL CULTR AEROBIC BACT EA: CPT | Mod: ZL | Performed by: SURGERY

## 2019-10-17 PROCEDURE — 83630 LACTOFERRIN FECAL (QUAL): CPT | Mod: ZL | Performed by: SURGERY

## 2019-10-17 PROCEDURE — 87209 SMEAR COMPLEX STAIN: CPT | Mod: ZL | Performed by: SURGERY

## 2019-10-17 PROCEDURE — 87045 FECES CULTURE AEROBIC BACT: CPT | Mod: ZL | Performed by: SURGERY

## 2019-10-17 PROCEDURE — 87506 IADNA-DNA/RNA PROBE TQ 6-11: CPT | Mod: ZL | Performed by: SURGERY

## 2019-10-17 PROCEDURE — 87899 AGENT NOS ASSAY W/OPTIC: CPT | Mod: ZL | Performed by: SURGERY

## 2019-10-17 NOTE — PROGRESS NOTES
[facility] Surgery Consultation    CC:  Abdominal pain, rectal bleeding    HPI:  This 54 year old year old male is seen at the request of La Toscano for evaluation of abdominal pain/rectal bleeding.  The history is obtained from the patient, and reviewing the medical record.  He is good medical historian. He says that on Sunday he went out to eat at [employer] where he has fried rice. The following day he developed nausea, emesis and rectal bleeding. He says that the two episodes of rectal bleeding he has were small in amount and there was no passage of clots. He also had abdominal pain and cramping. He says that he has never had pain like this previously. He says that he has had no foreign travel recently. He had shrimp with the fried rice and no chicken or fish bones. He has no sick contacts at home. With the crampy colicky abdominal pain he came into the ER for evaluation. While in the ER he had a fecal occult positive study. His hemoglobin was stable. He had no tachycardia or hypotension. His CT scan demonstrated mild enteritis. With this he was sent to surgery for evaluation.    Past Medical History:   Diagnosis Date     Diabetes mellitus type 1, controlled (H)      HLD (hyperlipidemia)      HTN (hypertension)      Neuropathy      Type 1 diabetes (H)        Past Surgical History:   Procedure Laterality Date     COLONOSCOPY - HIM SCAN N/A 09/19/2016    Procedure: COLONOSCOPY SCREENING; Surgeon: Rudy Rojo MD; Location: [facility] OR     ESOPHAGOSCOPY, GASTROSCOPY, DUODENOSCOPY (EGD), COMBINED  08/13/2019       Family History   Problem Relation Age of Onset     No Known Problems Mother      Hypertension Father      Hypertension Brother        Social History     Tobacco Use     Smoking status: Never Smoker     Smokeless tobacco: Never Used   Substance Use Topics     Alcohol use: No     Alcohol/week: 0.0 standard drinks     Drug use: No       Prior to Admission medications    Medication Sig Start Date End  Date Taking? Authorizing Provider   acetone, Urine, test STRP Use as directed 3/3/16  Yes Macrina Dixon NP   aspirin 81 MG EC tablet Take 81 mg by mouth daily   Yes Reported, Patient   blood glucose (MELISSA CONTOUR NEXT) test strip TEST 6 TIMES DAILY AS DIRECTED. 6/6/19  Yes Macrina Dixon NP   blood glucose monitoring (MELISSA CONTOUR NEXT MONITOR) meter device kit 1 each 9/25/14  Yes Reported, Patient   blood glucose monitoring (MELISSA CONTOUR NEXT) test strip Use to test blood sugar 6 times daily or as directed. 1/26/18  Yes Macrina Dixon NP   CALCIUM-VITAMIN D PO Take  by mouth daily. 600 mg   Yes Reported, Patient   Continuous Blood Gluc Sensor (FREESTYLE CORNELIUS 14 DAY SENSOR) MISC 1 each 4 times daily (before meals and nightly) 7/11/19  Yes Macrina Dixon NP   gabapentin (NEURONTIN) 300 MG capsule Take 1 capsule (300 mg) by mouth 3 times daily for 28 days 10/10/19 11/7/19 Yes Natacha Alvarez MD   glucagon 1 MG injection 1 mg once   Yes Reported, Patient   Glucosamine-Chondroit-Vit C-Mn (GLUCOSAMINE-CHONDROITIN MAX ST PO) Take  by mouth daily.   Yes Reported, Patient   insulin aspart (NOVOLOG VIAL) 100 UNITS/ML vial  UNITS IN PUMP EVERY 3 DAYS 7/3/19  Yes Reported, Patient   insulin aspart (NovoLOG) inject - to fill ambulatory pump For use with insulin pump.  TDD: 70 units 8/8/19  Yes Macrina Dixon NP   INSULIN PUMP - OUTPATIENT Date last updated:  6/23/15  [employer] Minimed: Model 751  BASAL RATES and times:  12   AM (midnight): 1.25 units/hour    2     AM: 1.3 units/hour   6     AM: 0.95 units/hour   6    PM: 1.2 units/hour   Basal Pattern A:  Richard Harvey will use when N/A.  CARB RATIO and times:  12   AM (midnight): 12  1    PM:  10  6    PM:    9  Corection Factor (Sensitivity) and times:  12   AM (midnight): 40 mg/dL  6     PM: 45 mg/dL  BLOOD GLUCOSE TARGET and times:  12   AM (midnight): 100 - 140  9     AM:  100 - 120  10   PM:  100 - 140  Active Insulin Time:  4 hours  Sensor:   "Yes: 12   AM (midnight): 70 - 240  Carelink / Diasend username:  [email or web address]  Carelink / Diasend Password:  [email or web address]   Yes Reported, Patient   lisinopril (PRINIVIL,ZESTRIL) 10 MG tablet Take 10 mg by mouth daily   Yes Reported, Patient   MOTRIN IB PO Take 400 mg by mouth every 6 hours as needed for moderate pain   Yes Reported, Patient   Multiple Vitamin (MULTI-VITAMIN DAILY PO) Take  by mouth daily.   Yes Reported, Patient   nitroglycerin (NITROSTAT) 0.4 MG SL tablet Place under the tongue every 5 minutes as needed   Yes Reported, Patient   omeprazole (PRILOSEC) 40 MG DR capsule Take 1 capsule (40 mg) by mouth daily 8/8/19  Yes Macrina Dixon NP   ondansetron (ZOFRAN) 4 MG tablet Take 1 tablet (4 mg) by mouth every 4 hours as needed for nausea 8/9/19  Yes Macrina Dixon NP   order for DME Equipment being ordered:     1.  [employer] insulin pump supplies--insertion sets and reserviors  Disp: 1 month  Refill: 11 months 4/25/19  Yes Macrina Dixon NP   order for DME Equipment being ordered: Mepilex AG non-bordered foam.  Do NOT substitute 4/4/19  Yes Tita Conroy NP   order for DME Equipment being ordered: Please dispense a short leg CAM walker boot to the patient 3/15/19  Yes Juliette Jarrett DPM   order for DME Equipment being ordered: Please dispense dressing supplies to patient:  -Two sheets of Mepilex Ag  -30 gauze 4x4\"  -2 rolls of Medipore tape 3/15/19  Yes Juliette Jarrett DPM   simvastatin (ZOCOR) 40 MG tablet Take by mouth At Bedtime   Yes Reported, Patient   UREA 10 HYDRATING 10 % external cream APPLY CREAM TOPICALLY AS NEEDED FOR CALLOUSED SKIN 8/1/19  Yes Macrina Dixon NP   URINE GLUCOSE-KETONES TEST VI    Yes Reported, Patient       Pt denied problems with bleeding or anesthesia  No mood altering drug use.     No Known Allergies    REVIEW OF SYSTEMS:  Ten point review of systems negative except those mentioned in the HPI.     The patient denies sleep apnea, latex allergies or " "MRSA    OBJECTIVE:    /78 (BP Location: Right arm, Patient Position: Chair, Cuff Size: Adult Regular)   Pulse 84   Temp 97.7  F (36.5  C) (Tympanic)   Ht 1.575 m (5' 2\")   Wt 91.6 kg (202 lb)   SpO2 96%   BMI 36.95 kg/m      GENERAL: Generally appears well, in no distress with appropriate affect.  HEENT:   Sclerae anicteric - No cervical, supra/infraclavicular lymphadenopathy  Respiratory:  Lungs clear to ausculation bilaterally with good air excursion  Cardiovascular:  Regular Rate and Rhythm with no murmurs gallops or rubs, normal   Abdomen: soft, non-tender, non-distended, no rebound or guarding  :  deferred  Extremities:  Extremities normal. No deformities, edema, or skin discoloration.  Skin:  no suspicious lesions or rashes  Neurological: grossly intact  Psych:  Alert, oriented, affect appropriate with normal decision making ability.      IMPRESSION:  55 yo male with enteritis and rectal bleeding    PLAN:  I discussed with the patient that with his new onset rectal bleeding with associated enteritis we will evaluate for any bacterial enteritis. As he recently had fried rice he may have a bacterial enteritis. He has had no further episodes of rectal bleeding. As he has had a recent colonoscopy I suggest that we perform stool studies rather than performing a colonoscopy at this time. With his symptoms resolved this may also be a self limited viral gastroenteritis, however typically you may not see associated hematochezia. When we get the results of his stool studies I will discuss with the patient about any further treatment or observation. All questions and concerns were addressed with adequate time spent answering all concerns.        Thank you for allowing me to participate in the care of your patient.       Richard Watkins MD     10/17/2019  9:40 AM    cc:  Natacha Alvarez    "

## 2019-10-18 LAB
C COLI+JEJUNI+LARI FUSA STL QL NAA+PROBE: NOT DETECTED
EC STX1 GENE STL QL NAA+PROBE: NOT DETECTED
EC STX2 GENE STL QL NAA+PROBE: NOT DETECTED
ENTERIC PATHOGEN COMMENT: NORMAL
NOROV GI+II ORF1-ORF2 JNC STL QL NAA+PR: NOT DETECTED
O+P STL MICRO: NORMAL
O+P STL MICRO: NORMAL
RVA NSP5 STL QL NAA+PROBE: NOT DETECTED
SALMONELLA SP RPOD STL QL NAA+PROBE: NOT DETECTED
SHIGELLA SP+EIEC IPAH STL QL NAA+PROBE: NOT DETECTED
SPECIMEN SOURCE: NORMAL
V CHOL+PARA RFBL+TRKH+TNAA STL QL NAA+PR: NOT DETECTED
Y ENTERO RECN STL QL NAA+PROBE: NOT DETECTED

## 2019-10-20 LAB
BACTERIA SPEC CULT: NORMAL
E COLI SXT1+2 STL IA: NORMAL
SPECIMEN SOURCE: NORMAL

## 2019-11-03 DIAGNOSIS — R11.0 NAUSEA: ICD-10-CM

## 2019-11-05 RX ORDER — ONDANSETRON 4 MG/1
TABLET, FILM COATED ORAL
Qty: 30 TABLET | Refills: 3 | Status: SHIPPED | OUTPATIENT
Start: 2019-11-05 | End: 2019-12-19

## 2019-12-06 NOTE — PROGRESS NOTES
Subjective     Richard Harvey is a 54 year old male who presents to clinic today for the following health issues:    HPI     Richard presented to [facility] on 10/15/2019 with c/o rectal bleeding. He recently ate at a [employer] where he had fried rice. He was guaiac positive in the ED. Surgery evaluated him the following day, and d/t his recent colonoscopy, recommended stool studies instead of repeat colonoscopy. Stool studies were negative except for lactoferrin which would be consistent with gastroenteritis.   - no further episodes of diarrhea or blood    - previous EGD was concerning for celiac, will obtain labs today    Diabetes Follow-up    How often are you checking your blood sugar? Four or more times daily  Blood sugar testing frequency justification:  Uncontrolled diabetes  What time of day are you checking your blood sugars (select all that apply)?  Before meals and At bedtime  Have you had any blood sugars above 200?  Yes 300  Have you had any blood sugars below 70?  Yes 43    What symptoms do you notice when your blood sugar is low?  Shaky, Dizzy, Weak, Lethargy, Blurred vision and Confusion    What concerns do you have today about your diabetes? None and Low blood sugar     Do you have any of these symptoms? (Select all that apply)  Numbness in feet and Burning in feet     Have you had a diabetic eye exam in the last 12 months? Yes- Date of last eye exam: july 2019    BP Readings from Last 2 Encounters:   12/10/19 126/64   10/16/19 124/78     Hemoglobin A1C (%)   Date Value   08/15/2019 7.3 (H)   03/21/2019 7.3 (A)     LDL Cholesterol Calculated (mg/dL)   Date Value   03/21/2019 56   03/21/2019 56       Diabetes Management Resources    - Blood sugars: qAM 120s, noon 130s w/ lowest 43 at noon (working at the time), supper (working) lowest 60, avg 125, bedtime 130 to 140  - Diet: does not watch diet  - Exercise: working, active, walking dogs  - Insulin pump: no issues  - Currently on ASA, ACE, statin  -  gabapentin 300mg tid  - diabetic education on 12/12/2019      Hypertension Follow-up      Do you check your blood pressure regularly outside of the clinic? No     Are you following a low salt diet? No    Are your blood pressures ever more than 140 on the top number (systolic) OR more   than 90 on the bottom number (diastolic), for example 140/90? No      How many servings of fruits and vegetables do you eat daily?  2-3    On average, how many sweetened beverages do you drink each day (soda, juice, sweet tea, etc)?   1    How many days per week do you miss taking your medication? 0    - lisinopril 10mg  - no issues w/ lightheadness/dizziness    # HLD: no issues w/ medication  - simvastatin 40mg     # GERD: well controlled w/ prilosec. Will have symptoms if he does not take his medication   - prilosec    # Wellness  - Immunizations: will bring UTD today      Patient Active Problem List   Diagnosis     DM type 1 (diabetes mellitus, type 1) (H)     HTN (hypertension)     Hyperlipidemia     ACP (advance care planning)     Diabetic polyneuropathy associated with type 1 diabetes mellitus (H)     Class 2 obesity in adult     Obesity (BMI 35.0-39.9) with comorbidity (H)     Past Surgical History:   Procedure Laterality Date     COLONOSCOPY - HIM SCAN N/A 09/19/2016    Procedure: COLONOSCOPY SCREENING; Surgeon: Rudy Rojo MD; Location: [facility] OR     ESOPHAGOSCOPY, GASTROSCOPY, DUODENOSCOPY (EGD), COMBINED  08/13/2019       Social History     Tobacco Use     Smoking status: Never Smoker     Smokeless tobacco: Never Used   Substance Use Topics     Alcohol use: No     Alcohol/week: 0.0 standard drinks     Family History   Problem Relation Age of Onset     No Known Problems Mother      Hypertension Father      Hypertension Brother          Current Outpatient Medications   Medication Sig Dispense Refill     acetone, Urine, test STRP Use as directed 50 each 3     aspirin 81 MG EC tablet Take 81 mg by mouth daily       blood  glucose (MELISSA CONTOUR NEXT) test strip TEST 6 TIMES DAILY AS DIRECTED. 300 each 11     blood glucose monitoring (MELISSA CONTOUR NEXT MONITOR) meter device kit 1 each       blood glucose monitoring (MELISSA CONTOUR NEXT) test strip Use to test blood sugar 6 times daily or as directed. 200 each 11     CALCIUM-VITAMIN D PO Take  by mouth daily. 600 mg       Continuous Blood Gluc Sensor (FREESTYLE CORNELIUS 14 DAY SENSOR) MISC 1 each 4 times daily (before meals and nightly) 2 each 11     gabapentin (NEURONTIN) 300 MG capsule        glucagon 1 MG injection 1 mg once       Glucosamine-Chondroit-Vit C-Mn (GLUCOSAMINE-CHONDROITIN MAX ST PO) Take  by mouth daily.       insulin aspart (NOVOLOG VIAL) 100 UNITS/ML vial  UNITS IN PUMP EVERY 3 DAYS       insulin aspart (NovoLOG) inject - to fill ambulatory pump For use with insulin pump.  TDD: 70 units 30 mL 3     INSULIN PUMP - OUTPATIENT Date last updated:  6/23/15  Medtronic Minimed: Model 751  BASAL RATES and times:  12   AM (midnight): 1.25 units/hour    2     AM: 1.3 units/hour   6     AM: 0.95 units/hour   6    PM: 1.2 units/hour   Basal Pattern A:  Richard Harvey will use when N/A.  CARB RATIO and times:  12   AM (midnight): 12  1    PM:  10  6    PM:    9  Corection Factor (Sensitivity) and times:  12   AM (midnight): 40 mg/dL  6     PM: 45 mg/dL  BLOOD GLUCOSE TARGET and times:  12   AM (midnight): 100 - 140  9     AM:  100 - 120  10   PM:  100 - 140  Active Insulin Time:  4 hours  Sensor:  Yes: 12   AM (midnight): 70 - 240  Carelink / Diasend username:  [email or web address]  Carelink / Diasend Password:  [email or web address]       lisinopril (PRINIVIL,ZESTRIL) 10 MG tablet Take 10 mg by mouth daily       MOTRIN IB PO Take 400 mg by mouth every 6 hours as needed for moderate pain       Multiple Vitamin (MULTI-VITAMIN DAILY PO) Take  by mouth daily.       nitroglycerin (NITROSTAT) 0.4 MG SL tablet Place under the tongue every 5 minutes as needed       omeprazole (PRILOSEC) 40 MG DR capsule  "Take 1 capsule (40 mg) by mouth daily 90 capsule 1     ondansetron (ZOFRAN) 4 MG tablet TAKE 1 TABLET BY MOUTH EVERY 4 HOURS AS NEEDED FOR NAUSEA 30 tablet 3     order for DME Equipment being ordered:     1.  Medtronic insulin pump supplies--insertion sets and reserviors  Disp: 1 month  Refill: 11 months 30 days 11     order for DME Equipment being ordered: Mepilex AG non-bordered foam.  Do NOT substitute 2 each 1     order for DME Equipment being ordered: Please dispense a short leg CAM walker boot to the patient 1 Device 0     order for DME Equipment being ordered: Please dispense dressing supplies to patient:  -Two sheets of Mepilex Ag  -30 gauze 4x4\"  -2 rolls of Medipore tape 1 Device 0     simvastatin (ZOCOR) 40 MG tablet Take by mouth At Bedtime       UREA 10 HYDRATING 10 % external cream APPLY CREAM TOPICALLY AS NEEDED FOR CALLOUSED SKIN 85 g 3     URINE GLUCOSE-KETONES TEST VI        gabapentin (NEURONTIN) 300 MG capsule Take 1 capsule (300 mg) by mouth 3 times daily for 28 days 84 capsule 2     No Known Allergies  Recent Labs   Lab Test 10/15/19  1630 08/15/19  0950 08/15/19  0845 07/03/19  2305 03/21/19 01/21/19 12/06/17  1551 11/24/17  08/10/16   A1C  --   --  7.3*  --  7.3*  --  8*   < >  --  7.2   < > 7.6*  7.6   LDL  --   --   --   --  56  56  --   --   --   --  62  --  59  59   HDL  --   --   --   --  56  --   --   --   --   --   --   --    TRIG  --   --   --   --  46  --   --   --   --   --   --   --    ALT 33  --   --  35  --   --   --   --   --   --   --  25   CR 1.06  --   --  1.05 0.98  0.98  --   --   --   --  1.05  --  1.13   GFRESTIMATED 78  --   --  79 >60  >60   < >  --   --   --   --   --   --    GFRESTBLACK >90  --   --  >90  --   --   --   --   --   --   --   --    POTASSIUM 4.2  --   --  4.0 4.5  --   --   --   --  4.4  --  4.4   TSH  --  1.86  --   --   --   --   --   --  2.00  --   --   --     < > = values in this interval not displayed.      BP Readings from Last 3 Encounters: "   12/10/19 126/64   10/16/19 124/78   10/15/19 105/66    Wt Readings from Last 3 Encounters:   12/10/19 93.2 kg (205 lb 6.4 oz)   10/16/19 91.6 kg (202 lb)   08/15/19 91.2 kg (201 lb)                    Reviewed and updated as needed this visit by Provider  Tobacco  Allergies  Meds  Problems  Med Hx  Surg Hx  Fam Hx         Review of Systems   Constitutional: Negative for chills and fever.   HENT: Negative for congestion, ear pain, hearing loss and sore throat.    Eyes: Negative for pain and visual disturbance.   Respiratory: Negative for cough and shortness of breath.    Cardiovascular: Negative for chest pain, palpitations and peripheral edema.   Gastrointestinal: Negative for abdominal pain, constipation, diarrhea, heartburn, hematochezia and nausea.   Genitourinary: Negative for discharge, dysuria, frequency, genital sores, hematuria, impotence and urgency.   Musculoskeletal: Negative for arthralgias, joint swelling and myalgias.   Skin: Negative for rash.   Neurological: Negative for dizziness, weakness, headaches and paresthesias.   Psychiatric/Behavioral: Negative for mood changes. The patient is not nervous/anxious.       Objective    /64 (BP Location: Left arm, Patient Position: Chair, Cuff Size: Adult Regular)   Pulse 71   Temp 97.2  F (36.2  C) (Tympanic)   Resp 20   Wt 93.2 kg (205 lb 6.4 oz)   SpO2 94%   BMI 37.57 kg/m    Body mass index is 37.57 kg/m .  Physical Exam   Constitutional: He appears well-developed and well-nourished. No distress.   Cardiovascular: Normal rate, regular rhythm and intact distal pulses.   No murmur heard.  Pulmonary/Chest: Effort normal. No respiratory distress. He has no wheezes. He has no rales.   Abdominal: Soft. Bowel sounds are normal. He exhibits no distension. There is no tenderness. There is no guarding.   Musculoskeletal: Normal range of motion. He exhibits no edema.   Neurological: He is alert.   Skin: Skin is warm and dry.   Psychiatric: He has a  "normal mood and affect.     Diagnostic Test Results:  Hepatitis C pending  Celiac panel pending    A1c will be obtained with his diabetic visit.     Assessment & Plan     1. Diabetic polyneuropathy associated with type 1 diabetes mellitus (H)  A1c pending  - Insulin pump: no issues  - Currently on ASA, ACE, statin  - gabapentin 300mg tid  - diabetic education on 12/12/2019    2. Hyperlipidemia, unspecified hyperlipidemia type  - c/w simvastatin 40mg     3. Essential hypertension  Well controlled  - c/w lisinopril 10mg     4. Need for prophylactic vaccination and inoculation against influenza and pneumonia d/t DM1  - INFLUENZA VACCINE IM > 6 MONTHS VALENT IIV4 [72505]  - Vaccine Administration, Initial [89298]  - PPSV 23    5. Encounter for routine adult health examination without abnormal findings  - Hepatitis C Screen Reflex to HCV RNA Quant and Genotype     BMI:   Estimated body mass index is 36.95 kg/m  as calculated from the following:    Height as of 10/16/19: 1.575 m (5' 2\").    Weight as of 10/16/19: 91.6 kg (202 lb).   Weight management plan: Discussed healthy diet and exercise guidelines        See Patient Instructions    Return in about 3 months (around 3/10/2020).    Natacha Alvarez MD  [facility] [place]    "

## 2019-12-10 ENCOUNTER — OFFICE VISIT (OUTPATIENT)
Dept: FAMILY MEDICINE | Facility: OTHER | Age: 54
End: 2019-12-10
Attending: FAMILY MEDICINE
Payer: COMMERCIAL

## 2019-12-10 VITALS
DIASTOLIC BLOOD PRESSURE: 64 MMHG | SYSTOLIC BLOOD PRESSURE: 126 MMHG | OXYGEN SATURATION: 94 % | HEART RATE: 71 BPM | TEMPERATURE: 97.2 F | BODY MASS INDEX: 37.57 KG/M2 | WEIGHT: 205.4 LBS | RESPIRATION RATE: 20 BRPM

## 2019-12-10 DIAGNOSIS — Z23 NEED FOR PROPHYLACTIC VACCINATION AND INOCULATION AGAINST INFLUENZA: ICD-10-CM

## 2019-12-10 DIAGNOSIS — Z00.00 ENCOUNTER FOR ROUTINE ADULT HEALTH EXAMINATION WITHOUT ABNORMAL FINDINGS: ICD-10-CM

## 2019-12-10 DIAGNOSIS — E78.5 HYPERLIPIDEMIA, UNSPECIFIED HYPERLIPIDEMIA TYPE: ICD-10-CM

## 2019-12-10 DIAGNOSIS — I10 ESSENTIAL HYPERTENSION: ICD-10-CM

## 2019-12-10 DIAGNOSIS — E10.42 DIABETIC POLYNEUROPATHY ASSOCIATED WITH TYPE 1 DIABETES MELLITUS (H): Primary | ICD-10-CM

## 2019-12-10 PROCEDURE — G0463 HOSPITAL OUTPT CLINIC VISIT: HCPCS | Mod: 25

## 2019-12-10 PROCEDURE — 36415 COLL VENOUS BLD VENIPUNCTURE: CPT | Mod: ZL | Performed by: FAMILY MEDICINE

## 2019-12-10 PROCEDURE — G0463 HOSPITAL OUTPT CLINIC VISIT: HCPCS

## 2019-12-10 PROCEDURE — 83516 IMMUNOASSAY NONANTIBODY: CPT | Mod: ZL | Performed by: FAMILY MEDICINE

## 2019-12-10 PROCEDURE — 90732 PPSV23 VACC 2 YRS+ SUBQ/IM: CPT

## 2019-12-10 PROCEDURE — 99214 OFFICE O/P EST MOD 30 MIN: CPT | Performed by: FAMILY MEDICINE

## 2019-12-10 PROCEDURE — 90686 IIV4 VACC NO PRSV 0.5 ML IM: CPT

## 2019-12-10 PROCEDURE — 90472 IMMUNIZATION ADMIN EACH ADD: CPT | Performed by: FAMILY MEDICINE

## 2019-12-10 PROCEDURE — 86803 HEPATITIS C AB TEST: CPT | Mod: ZL | Performed by: FAMILY MEDICINE

## 2019-12-10 RX ORDER — GABAPENTIN 300 MG/1
300 CAPSULE ORAL 3 TIMES DAILY
COMMUNITY
Start: 2019-12-08 | End: 2020-01-16

## 2019-12-10 ASSESSMENT — PAIN SCALES - GENERAL: PAINLEVEL: NO PAIN (0)

## 2019-12-10 NOTE — NURSING NOTE
"Chief Complaint   Patient presents with     Diabetes       Initial /64 (BP Location: Left arm, Patient Position: Chair, Cuff Size: Adult Regular)   Pulse 71   Temp 97.2  F (36.2  C) (Tympanic)   Resp 20   Wt 93.2 kg (205 lb 6.4 oz)   SpO2 94%   BMI 37.57 kg/m   Estimated body mass index is 37.57 kg/m  as calculated from the following:    Height as of 10/16/19: 1.575 m (5' 2\").    Weight as of this encounter: 93.2 kg (205 lb 6.4 oz).  Medication Reconciliation: complete  Rosy Smith LPN    "

## 2019-12-10 NOTE — PATIENT INSTRUCTIONS
"We will call you with your lab results.     Check with your insurance or pharmacist about the new shingles vaccine (Shingrix) - if it is covered and you wish to return for it you can make a nurse only visit. Remember it is 2 shots, the first at time \"Zero\" and the second 2-6 months later.     "

## 2019-12-10 NOTE — LETTER
December 11, 2019      Richard Harvey  [address] [facility] [address]        Dear ,    We are writing to inform you of your test results.    Your test results fall within the expected range(s) or remain unchanged from previous results.  Please continue with current treatment plan.    Resulted Orders   Hepatitis C Screen Reflex to HCV RNA Quant and Genotype   Result Value Ref Range    Hepatitis C Antibody Nonreactive NR^Nonreactive      Comment:      Assay performance characteristics have not been established for newborns,   infants, and children     Tissue transglutaminase emerson IgA and IgG   Result Value Ref Range    Tissue Transglutaminase Antibody IgA <1 <7 U/mL      Comment:      Negative  The tTG-IgA assay has limited utility for patients with decreased levels of   IgA. Screening for celiac disease should include IgA testing to rule out   selective IgA deficiency and to guide selection and interpretation of   serological testing. tTG-IgG testing may be positive in celiac disease   patients with IgA deficiency.      Tissue Transglutaminase Emerson IgG 1 <7 U/mL      Comment:      Negative       If you have any questions or concerns, please call the clinic at the number listed above.       Sincerely,        Natacha Alvarez MD

## 2019-12-11 LAB
HCV AB SERPL QL IA: NONREACTIVE
TTG IGA SER-ACNC: <1 U/ML
TTG IGG SER-ACNC: 1 U/ML

## 2019-12-12 ENCOUNTER — ALLIED HEALTH/NURSE VISIT (OUTPATIENT)
Dept: EDUCATION SERVICES | Facility: OTHER | Age: 54
End: 2019-12-12
Attending: NURSE PRACTITIONER
Payer: COMMERCIAL

## 2019-12-12 VITALS
BODY MASS INDEX: 40.21 KG/M2 | WEIGHT: 204.8 LBS | SYSTOLIC BLOOD PRESSURE: 120 MMHG | HEART RATE: 85 BPM | RESPIRATION RATE: 16 BRPM | DIASTOLIC BLOOD PRESSURE: 59 MMHG | HEIGHT: 60 IN | OXYGEN SATURATION: 94 %

## 2019-12-12 DIAGNOSIS — E10.649 TYPE 1 DIABETES MELLITUS WITH HYPOGLYCEMIA UNAWARENESS (H): ICD-10-CM

## 2019-12-12 DIAGNOSIS — E10.649 TYPE 1 DIABETES MELLITUS WITH HYPOGLYCEMIA AND WITHOUT COMA (H): Primary | ICD-10-CM

## 2019-12-12 LAB — HBA1C MFR BLD: 7.2 % (ref 0–5.6)

## 2019-12-12 PROCEDURE — 83036 HEMOGLOBIN GLYCOSYLATED A1C: CPT | Mod: ZL | Performed by: NURSE PRACTITIONER

## 2019-12-12 PROCEDURE — 99213 OFFICE O/P EST LOW 20 MIN: CPT | Performed by: NURSE PRACTITIONER

## 2019-12-12 PROCEDURE — G0463 HOSPITAL OUTPT CLINIC VISIT: HCPCS

## 2019-12-12 RX ORDER — PROCHLORPERAZINE 25 MG/1
1 SUPPOSITORY RECTAL CONTINUOUS
Qty: 1 DEVICE | Refills: 0 | Status: SHIPPED | OUTPATIENT
Start: 2019-12-12 | End: 2021-07-15

## 2019-12-12 RX ORDER — PROCHLORPERAZINE 25 MG/1
1 SUPPOSITORY RECTAL CONTINUOUS
Qty: 1 EACH | Refills: 4 | Status: SHIPPED | OUTPATIENT
Start: 2019-12-12 | End: 2021-01-13

## 2019-12-12 RX ORDER — PROCHLORPERAZINE 25 MG/1
1 SUPPOSITORY RECTAL CONTINUOUS
Qty: 3 EACH | Refills: 11 | Status: SHIPPED | OUTPATIENT
Start: 2019-12-12 | End: 2021-01-13

## 2019-12-12 ASSESSMENT — MIFFLIN-ST. JEOR: SCORE: 1613.38

## 2019-12-12 ASSESSMENT — PAIN SCALES - GENERAL: PAINLEVEL: MODERATE PAIN (4)

## 2019-12-12 NOTE — PROGRESS NOTES
"SUBJECTIVE:  Richard Harvey, 54 year old, male presents with the following Chief Complaint(s) with HPI to follow:  Chief Complaint   Patient presents with     Diabetes        Diabetes Follow-up      Patient is checking blood sugars: 4x/day For at least 30 days.  Results:  Overnight:   Breakfast: 185-269  Mid-mornin-210  Lunch: 51, 63,   Mid-afternoon: 202  Dinner: 54, 55,   Overnight:       Symptoms of hypoglycemia (low blood sugar): yes, not sure why; history of hypoglycemia unawareness    Paresthesias (numbness or burning in feet) or sores: Yes (\"same\"); healed    Diabetic eye exam within the last year: Yes ()    Breakfast eaten regularly: Yes    Patient counting carbs: trying       HPI: Richard's here today for the follow up regarding his Diabetes mellitus, Type 1.    Lab Results   Component Value Date    A1C 7.2 2019    A1C 7.3 08/15/2019    A1C 7.3 2019    A1C 8 2019    A1C 7.7 2018     Current Diabetes medication:   1.  Insulin pump, uses Novolog; wearing his personal Freestyle william sensor  ASA use: yes, 325 mg daily  Statin use: yes, simvastatin 40 mg at bedime  Denies any issues with the insulin pump.      Richard's here for insulin pump download and possible adjust. He reports the following:  Continues to have issues with his stomach.  Has been taking zofran before every meal, which has helped.    Noted last A1c  Noted 4 low BGs.    Hasn't been wearing his personal CGM as he ran out.    Recently had labs for Dr. Alvarez--noted A1c was missed    Due for A1c  Denies any new health concerns.       Patient Active Problem List   Diagnosis     DM type 1 (diabetes mellitus, type 1) (H)     HTN (hypertension)     Hyperlipidemia     ACP (advance care planning)     Diabetic polyneuropathy associated with type 1 diabetes mellitus (H)     Class 2 obesity in adult     Obesity (BMI 35.0-39.9) with comorbidity (H)       Past Medical History:   Diagnosis Date     " Diabetes mellitus type 1, controlled (H)      HLD (hyperlipidemia)      HTN (hypertension)      Neuropathy      Type 1 diabetes (H)        Past Surgical History:   Procedure Laterality Date     COLONOSCOPY - HIM SCAN N/A 09/19/2016    Procedure: COLONOSCOPY SCREENING; Surgeon: Rudy Rojo MD; Location: [facility] OR     ESOPHAGOSCOPY, GASTROSCOPY, DUODENOSCOPY (EGD), COMBINED  08/13/2019       Family History   Problem Relation Age of Onset     No Known Problems Mother      Hypertension Father      Hypertension Brother        Social History     Tobacco Use     Smoking status: Never Smoker     Smokeless tobacco: Never Used   Substance Use Topics     Alcohol use: No     Alcohol/week: 0.0 standard drinks       Current Outpatient Medications   Medication Sig Dispense Refill     acetone, Urine, test STRP Use as directed 50 each 3     aspirin 81 MG EC tablet Take 81 mg by mouth daily       blood glucose (MELISSA CONTOUR NEXT) test strip TEST 6 TIMES DAILY AS DIRECTED. 300 each 11     blood glucose monitoring (MELISSA CONTOUR NEXT MONITOR) meter device kit 1 each       blood glucose monitoring (MELISSA CONTOUR NEXT) test strip Use to test blood sugar 6 times daily or as directed. 200 each 11     CALCIUM-VITAMIN D PO Take  by mouth daily. 600 mg       Continuous Blood Gluc  (DEXCOM G6 ) DAYAN 1 each continuous 1 Device 0     Continuous Blood Gluc Sensor (DEXCOM G6 SENSOR) MISC 1 each continuous 3 each 11     Continuous Blood Gluc Sensor (FREESTYLE CORNELIUS 14 DAY SENSOR) MISC 1 each 4 times daily (before meals and nightly) 2 each 11     Continuous Blood Gluc Transmit (DEXCOM G6 TRANSMITTER) MISC 1 each continuous 1 each 4     gabapentin (NEURONTIN) 300 MG capsule Take 300 mg by mouth 3 times daily        glucagon 1 MG injection 1 mg once       Glucosamine-Chondroit-Vit C-Mn (GLUCOSAMINE-CHONDROITIN MAX ST PO) Take  by mouth daily.       insulin aspart (NOVOLOG VIAL) 100 UNITS/ML vial  UNITS IN PUMP EVERY 3  DAYS       insulin aspart (NovoLOG) inject - to fill ambulatory pump For use with insulin pump.  TDD: 70 units 30 mL 3     INSULIN PUMP - OUTPATIENT Date last updated:  6/23/15  Medtronic Minimed: Model 751  BASAL RATES and times:  12   AM (midnight): 1.25 units/hour    2     AM: 1.3 units/hour   6     AM: 0.95 units/hour   6    PM: 1.2 units/hour   Basal Pattern A:  Richard Harvey will use when N/A.  CARB RATIO and times:  12   AM (midnight): 12  1    PM:  10  6    PM:    9  Corection Factor (Sensitivity) and times:  12   AM (midnight): 40 mg/dL  6     PM: 45 mg/dL  BLOOD GLUCOSE TARGET and times:  12   AM (midnight): 100 - 140  9     AM:  100 - 120  10   PM:  100 - 140  Active Insulin Time:  4 hours  Sensor:  Yes: 12   AM (midnight): 70 - 240  Carelink / Diasend username:  [email or web address]  Carelink / Diasend Password:  [email or web address]       lisinopril (PRINIVIL,ZESTRIL) 10 MG tablet Take 10 mg by mouth daily       MOTRIN IB PO Take 400 mg by mouth every 6 hours as needed for moderate pain       Multiple Vitamin (MULTI-VITAMIN DAILY PO) Take  by mouth daily.       nitroglycerin (NITROSTAT) 0.4 MG SL tablet Place under the tongue every 5 minutes as needed       omeprazole (PRILOSEC) 40 MG DR capsule Take 1 capsule (40 mg) by mouth daily 90 capsule 1     ondansetron (ZOFRAN) 4 MG tablet TAKE 1 TABLET BY MOUTH EVERY 4 HOURS AS NEEDED FOR NAUSEA 30 tablet 3     order for DME Equipment being ordered:     1.  Medtronic insulin pump supplies--insertion sets and reserviors  Disp: 1 month  Refill: 11 months 30 days 11     simvastatin (ZOCOR) 40 MG tablet Take by mouth At Bedtime       UREA 10 HYDRATING 10 % external cream APPLY CREAM TOPICALLY AS NEEDED FOR CALLOUSED SKIN 85 g 3     URINE GLUCOSE-KETONES TEST VI        order for DME Equipment being ordered: Please dispense a short leg CAM walker boot to the patient 1 Device 0       No Known Allergies    REVIEW OF SYSTEMS  Skin: healed, no new wounds  Eyes: negative  Ears/Nose/Throat:  "negative  Respiratory: No shortness of breath, dyspnea on exertion, cough, or hemoptysis  Cardiovascular: negative  Gastrointestinal: better--taking Zofran with meals  Genitourinary: negative  Musculoskeletal: history of arthritis--hands, shoulders, knees  Neurologic: positive for numbness or tingling of hands and numbness or tingling of feet--worse  Psychiatric: negative  Hematologic/Lymphatic/Immunologic: negative  Endocrine: positive for diabetes    OBJECTIVE:  /59   Pulse 85   Resp 16   Ht 1.527 m (5' 0.12\")   Wt 92.9 kg (204 lb 12.8 oz)   SpO2 94%   BMI 39.84 kg/m    Constitutional: healthy, alert and no distress  Cardiovascular: RRR. No murmurs, clicks gallops or rub  Respiratory: Good diaphragmatic excursion. Lungs clear  Psychiatric: mentation appears normal and affect normal/bright      LAB  Results for orders placed or performed in visit on 19   Hemoglobin A1c POCT     Status: Abnormal   Result Value Ref Range    Hemoglobin A1C 7.2 (A) 0 - 5.6 %       ASSESSMENT / PLAN:.  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)  Comment: noted    Insulin pump information:   Average: 156 +/- 73  Basal/bolus ratio: 29 (56%)/23 (44%)   Testinx/day    Hypoglycemia: 4    Plan: Continuous Blood Gluc  (DEXCOM G6         ) DAYAN, Continuous Blood Gluc Sensor         (DEXCOM G6 SENSOR) MISC, Continuous Blood Gluc         Transmit (DEXCOM G6 TRANSMITTER) MISC,         Hemoglobin A1c POCT          Will adjust his insulin pump " accordingly    Carb ratio:  0000 8    Insulin sensitivity  0000 60    Keep using your dual wave with every meal  Follow up as discussed--Dexcom G6 set up  Call if continue issues with low BGs      Patient Instructions     Continue working on healthy eating and moving as best as you can (start low and slow, work up to 30 min, 5x/week)    BG goals:  Fasting and before meals <130, >70  2 hour after eating <180    We only need 1/2 of these numbers to be within target then your A1c will be within target    Medication changes   Watch for lows BG due to changes    Follow up   When you get your Dexcom G6    Call me sooner if any problems/concerns and/or questions develop including consistent low BGs <70 or consistent high BGs >200  [phone] (Unit Coordinator)    [phone] (Nurse)    If you haven't heard from them in one week, please call  [facility] [address]   [phone]    Lab Results   Component Value Date    A1C 7.2 12/12/2019    A1C 7.3 08/15/2019    A1C 7.3 03/21/2019    A1C 8 01/21/2019    A1C 7.7 07/25/2018         Time: 40 min  Barrier: none  Willingness to learn: accepting    Macrina Dixon RN P-BC  Disease Management    With the electronic record, we can now more quickly and easily track our patient diabetic goals. Our diabetes clinical review is in progress and these are the indicators we are monitoring for good diabetes health.     1.) HbA1C less than 7 (measurement of your average blood sugars)  2.) Blood Pressure less than 140/80  3.) LDL less than 100 (bad cholesterol)  4.) HbA1C is checked in the last 6 months and below 7% (more frequently if not at goal or adjusting medications)  5.) LDL is checked in the last 12 months (more frequently if not at goal or adjusting medications)  6.) Taking one baby aspirin daily (unless otherwise instructed)  7.) No tobacco use  8) Statin use     You have achieved 7 out of 8 of these and I am encouraging you to  "come in and get tuned up to achieve 8 out of 8!  Here is what you have achieved so far in my goals for you:  1.) HbA1C  less than 7:                              NO  Your last  HbA1C :   Lab Results   Component Value Date    A1C 7.3 03/21/2019    A1C 8 01/21/2019    A1C 7.7 07/25/2018    A1C 7.2 11/24/2017    A1C 7.4 07/17/2017       2.) Blood Pressure less than 140/80:       YES      Your last    /59   Pulse 85   Resp 16   Ht 1.527 m (5' 0.12\")   Wt 92.9 kg (204 lb 12.8 oz)   SpO2 94%   BMI 39.84 kg/m      3.) LDL less than 100:                              YES      Your last   LDL         LDL Cholesterol Calculated   Date Value Ref Range Status   03/21/2019 56 mg/dL Final   03/21/2019 56 <100 mg/dL Final   ]    4.) Checked HbA1C in the past 6 months: YES      5.) Checked LDL in the past 12 months:    YES     6.) Taking one aspirin daily:                       YES     7.) No tobacco use:                                        YES      8.) Statin use      YES              "

## 2019-12-12 NOTE — PATIENT INSTRUCTIONS
Continue working on healthy eating and moving as best as you can (start low and slow, work up to 30 min, 5x/week)    BG goals:  Fasting and before meals <130, >70  2 hour after eating <180    We only need 1/2 of these numbers to be within target then your A1c will be within target    Medication changes   Watch for lows BG due to changes    Follow up   When you get your Dexcom G6    Call me sooner if any problems/concerns and/or questions develop including consistent low BGs <70 or consistent high BGs >200  [phone] (Unit Coordinator)    [phone] (Nurse)    If you haven't heard from them in one week, please call  [facility] - [place], [place] - [address]  [phone]    Lab Results   Component Value Date    A1C 7.2 12/12/2019    A1C 7.3 08/15/2019    A1C 7.3 03/21/2019    A1C 8 01/21/2019    A1C 7.7 07/25/2018

## 2019-12-12 NOTE — PROGRESS NOTES
"Estimated body mass index is 39.84 kg/m  as calculated from the following:    Height as of this encounter: 1.527 m (5' 0.12\").    Weight as of this encounter: 92.9 kg (204 lb 12.8 oz).  BP Readings from Last 1 Encounters:   12/12/19 120/59   ]  BP cuff size:  large  Do you feel safe in your environment?  Yes  Does the patient need any medication refills today? No  Lianna Brewster LPN      "

## 2019-12-20 ENCOUNTER — TELEPHONE (OUTPATIENT)
Dept: EDUCATION SERVICES | Facility: OTHER | Age: 54
End: 2019-12-20

## 2019-12-20 NOTE — TELEPHONE ENCOUNTER
Fax received that the pt's Dexcom needs a PA completed through [employer], I completed this on line and system was approved.Pharmacy notified.

## 2019-12-31 DIAGNOSIS — R11.0 NAUSEA: ICD-10-CM

## 2020-01-03 RX ORDER — ONDANSETRON 4 MG/1
TABLET, FILM COATED ORAL
Qty: 30 TABLET | Refills: 1 | Status: SHIPPED | OUTPATIENT
Start: 2020-01-03 | End: 2020-02-03

## 2020-01-16 ENCOUNTER — ALLIED HEALTH/NURSE VISIT (OUTPATIENT)
Dept: EDUCATION SERVICES | Facility: OTHER | Age: 55
End: 2020-01-16
Attending: NURSE PRACTITIONER
Payer: COMMERCIAL

## 2020-01-16 VITALS
RESPIRATION RATE: 16 BRPM | OXYGEN SATURATION: 94 % | WEIGHT: 204.9 LBS | HEIGHT: 60 IN | SYSTOLIC BLOOD PRESSURE: 124 MMHG | DIASTOLIC BLOOD PRESSURE: 63 MMHG | HEART RATE: 84 BPM | BODY MASS INDEX: 40.23 KG/M2

## 2020-01-16 DIAGNOSIS — E10.649 TYPE 1 DIABETES MELLITUS WITH HYPOGLYCEMIA UNAWARENESS (H): Primary | ICD-10-CM

## 2020-01-16 PROCEDURE — G0463 HOSPITAL OUTPT CLINIC VISIT: HCPCS

## 2020-01-16 PROCEDURE — 99213 OFFICE O/P EST LOW 20 MIN: CPT | Performed by: NURSE PRACTITIONER

## 2020-01-16 RX ORDER — GABAPENTIN 300 MG/1
300 CAPSULE ORAL 3 TIMES DAILY
Qty: 90 CAPSULE | Refills: 3 | Status: SHIPPED | OUTPATIENT
Start: 2020-01-16 | End: 2020-10-13

## 2020-01-16 ASSESSMENT — PAIN SCALES - GENERAL: PAINLEVEL: MODERATE PAIN (5)

## 2020-01-16 ASSESSMENT — MIFFLIN-ST. JEOR: SCORE: 1613.83

## 2020-01-16 NOTE — PROGRESS NOTES
"SUBJECTIVE:  Richard Harvey, 54 year old, male presents with the following Chief Complaint(s) with HPI to follow:  Chief Complaint   Patient presents with     Diabetes        Diabetes Follow-up      Patient is checking blood sugars: 4x/day For at least 30 days.  Results:  Overnight: 106-364  Breakfast: 114-279  Mid-mornin, 67,   Lunch: 47, 60,   Mid-afternoon: no checks  Dinner: 66, 66, 67,   Overnight: 62,       Symptoms of hypoglycemia (low blood sugar): yes--increased activity; history of hypoglycemia unawareness    Paresthesias (numbness or burning in feet) or sores: Yes (\"same\"); healed    Diabetic eye exam within the last year: Yes ()    Breakfast eaten regularly: Yes    Patient counting carbs: trying       HPI: Richard's here today for the follow up regarding his Diabetes mellitus, Type 1.    Lab Results   Component Value Date    A1C 7.2 2019    A1C 7.3 08/15/2019    A1C 7.3 2019    A1C 8 2019    A1C 7.7 2018     Current Diabetes medication:   1.  Insulin pump, uses Novolog; wearing his personal Freestyle william sensor  ASA use: yes, 325 mg daily  Statin use: yes, simvastatin 40 mg at bedime  Denies any issues with the insulin pump.      Richard's here for insulin pump download and possible adjust.  He's also here for a Dexcom start:  Continued issues with low BGs even when reducing his carb bolus before work.    Sometimes he feels it, sometimes not  Still working 10:45 am-4:30 pm as a  at [employer], Monday through Friday  Denies any new health concerns.       Patient Active Problem List   Diagnosis     DM type 1 (diabetes mellitus, type 1) (H)     HTN (hypertension)     Hyperlipidemia     ACP (advance care planning)     Diabetic polyneuropathy associated with type 1 diabetes mellitus (H)     Class 2 obesity in adult     Obesity (BMI 35.0-39.9) with comorbidity (H)       Past Medical History:   Diagnosis Date     Diabetes mellitus type 1, " controlled (H)      HLD (hyperlipidemia)      HTN (hypertension)      Neuropathy      Type 1 diabetes (H)        Past Surgical History:   Procedure Laterality Date     COLONOSCOPY - HIM SCAN N/A 09/19/2016    Procedure: COLONOSCOPY SCREENING; Surgeon: Rudy Rojo MD; Location: [facility] OR     ESOPHAGOSCOPY, GASTROSCOPY, DUODENOSCOPY (EGD), COMBINED  08/13/2019       Family History   Problem Relation Age of Onset     No Known Problems Mother      Hypertension Father      Hypertension Brother        Social History     Tobacco Use     Smoking status: Never Smoker     Smokeless tobacco: Never Used   Substance Use Topics     Alcohol use: No     Alcohol/week: 0.0 standard drinks       Current Outpatient Medications   Medication Sig Dispense Refill     acetone, Urine, test STRP Use as directed 50 each 3     aspirin 81 MG EC tablet Take 81 mg by mouth daily       blood glucose (MELISSA CONTOUR NEXT) test strip TEST 6 TIMES DAILY AS DIRECTED. 300 each 11     blood glucose monitoring (MELISSA CONTOUR NEXT MONITOR) meter device kit 1 each       blood glucose monitoring (MELISSA CONTOUR NEXT) test strip Use to test blood sugar 6 times daily or as directed. 200 each 11     CALCIUM-VITAMIN D PO Take  by mouth daily. 600 mg       Continuous Blood Gluc  (DEXCOM G6 ) DAYAN 1 each continuous 1 Device 0     Continuous Blood Gluc Sensor (DEXCOM G6 SENSOR) MISC 1 each continuous 3 each 11     Continuous Blood Gluc Transmit (DEXCOM G6 TRANSMITTER) MISC 1 each continuous 1 each 4     gabapentin (NEURONTIN) 300 MG capsule Take 1 capsule (300 mg) by mouth 3 times daily 90 capsule 3     glucagon 1 MG injection 1 mg once       Glucosamine-Chondroit-Vit C-Mn (GLUCOSAMINE-CHONDROITIN MAX ST PO) Take  by mouth daily.       insulin aspart (NOVOLOG VIAL) 100 UNITS/ML vial  UNITS IN PUMP EVERY 3 DAYS       insulin aspart (NovoLOG) inject - to fill ambulatory pump For use with insulin pump.  TDD: 70 units 30 mL 3     INSULIN PUMP  - OUTPATIENT Date last updated:  6/23/15  Medtronic Minimed: Model 751  BASAL RATES and times:  12   AM (midnight): 1.25 units/hour    2     AM: 1.3 units/hour   6     AM: 0.95 units/hour   6    PM: 1.2 units/hour   Basal Pattern A:  Richard KRYSTINA Harvey will use when N/A.  CARB RATIO and times:  12   AM (midnight): 12  1    PM:  10  6    PM:    9  Corection Factor (Sensitivity) and times:  12   AM (midnight): 40 mg/dL  6     PM: 45 mg/dL  BLOOD GLUCOSE TARGET and times:  12   AM (midnight): 100 - 140  9     AM:  100 - 120  10   PM:  100 - 140  Active Insulin Time:  4 hours  Sensor:  Yes: 12   AM (midnight): 70 - 240  Carelink / Diasend username:  [email or web address]  Carelink / Diasend Password:  [email or web address]       lisinopril (PRINIVIL,ZESTRIL) 10 MG tablet Take 10 mg by mouth daily       MOTRIN IB PO Take 400 mg by mouth every 6 hours as needed for moderate pain       Multiple Vitamin (MULTI-VITAMIN DAILY PO) Take  by mouth daily.       nitroglycerin (NITROSTAT) 0.4 MG SL tablet Place under the tongue every 5 minutes as needed       omeprazole (PRILOSEC) 40 MG DR capsule Take 1 capsule (40 mg) by mouth daily 90 capsule 1     ondansetron (ZOFRAN) 4 MG tablet TAKE 1 TABLET BY MOUTH EVERY 8 HOURS AS NEEDED FOR NAUSEA 30 tablet 1     order for DME Equipment being ordered:     1.  Medtronic insulin pump supplies--insertion sets and reserviors  Disp: 1 month  Refill: 11 months 30 days 11     order for DME Equipment being ordered: Please dispense a short leg CAM walker boot to the patient 1 Device 0     simvastatin (ZOCOR) 40 MG tablet Take by mouth At Bedtime       UREA 10 HYDRATING 10 % external cream APPLY CREAM TOPICALLY AS NEEDED FOR CALLOUSED SKIN 85 g 3     URINE GLUCOSE-KETONES TEST VI          No Known Allergies    REVIEW OF SYSTEMS  Skin: negative  Eyes: negative  Ears/Nose/Throat: negative  Respiratory: No shortness of breath, dyspnea on exertion, cough, or hemoptysis  Cardiovascular: negative  Gastrointestinal: better--taking  "Zofran with meals  Genitourinary: negative  Musculoskeletal: history of arthritis--hands, shoulders, knees  Neurologic: positive for numbness or tingling of hands and numbness or tingling of feet--worse  Psychiatric: negative  Hematologic/Lymphatic/Immunologic: negative  Endocrine: positive for diabetes    OBJECTIVE:  /63   Pulse 84   Resp 16   Ht 1.527 m (5' 0.12\")   Wt 92.9 kg (204 lb 14.4 oz)   SpO2 94%   BMI 39.86 kg/m    Constitutional: healthy, alert and no distress  Musculoskeletal: extremities normal- no gross deformities noted and gait normal  Skin: no suspicious lesions or rashes to visible skin  Psychiatric: mentation appears normal and affect normal/bright      LAB  No results found for any visits on 20.    ASSESSMENT / PLAN:.  (E10.649) 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)  (primary encounter diagnosis)  Comment:   Insulin pump information:   Average: 154 +/- 86  Basal/bolus ratio: 27 (54%)/23 (46%)   Testinx/day    Hypoglycemia: 8    Plan: gabapentin (NEURONTIN) 300 MG capsule          Will adjust his insulin pump accordingly    Carb ratio:  0000 8.5  1200 11  1600 13    Education focused on Dexcom start  Started a new sensor    Follow up in 3-4 weeks  Call sooner if needed      Patient Instructions   Continue working on healthy eating and moving as best as you can (start low and slow, work up to 30 min, 5x/week)    BG goals:  Fasting and before meals <130, >70  2 hour after eating <180    We only need 1/2 of these numbers to be within target then your A1c will be within target    Medication changes   Watch for lows BG due to changes    Follow up   3-4 weeks    Call me sooner if any problems/concerns and/or questions develop including consistent low BGs <70 or consistent high BGs >200  [phone] (Unit Coordinator)    [phone] (Nurse)      Time: 40 min  Barrier: none  Willingness to learn: accepting    Macirna CHAMBERLAIN FNP-BC  Diabetes and Wound " "Care    40 minutes was spent with patient.    Over 50%  of this time was spent on counseling patient regarding illness, medication and/or treatment options, coordinating further cares and follow ups that are needed along with resource material that will be helpful in the treatment of these issues.       With the electronic record, we can now more quickly and easily track our patient diabetic goals. Our diabetes clinical review is in progress and these are the indicators we are monitoring for good diabetes health.     1.) HbA1C less than 7 (measurement of your average blood sugars)  2.) Blood Pressure less than 140/80  3.) LDL less than 100 (bad cholesterol)  4.) HbA1C is checked in the last 6 months and below 7% (more frequently if not at goal or adjusting medications)  5.) LDL is checked in the last 12 months (more frequently if not at goal or adjusting medications)  6.) Taking one baby aspirin daily (unless otherwise instructed)  7.) No tobacco use  8) Statin use     You have achieved 7 out of 8 of these and I am encouraging you to come in and get tuned up to achieve 8 out of 8!  Here is what you have achieved so far in my goals for you:  1.) HbA1C  less than 7:                              NO  Your last  HbA1C :   Lab Results   Component Value Date    A1C 7.2 12/12/2019    A1C 7.3 08/15/2019    A1C 7.3 03/21/2019    A1C 8 01/21/2019    A1C 7.7 07/25/2018       2.) Blood Pressure less than 140/80:       YES      Your last    /63   Pulse 84   Resp 16   Ht 1.527 m (5' 0.12\")   Wt 92.9 kg (204 lb 14.4 oz)   SpO2 94%   BMI 39.86 kg/m      3.) LDL less than 100:                              YES      Your last   LDL         LDL Cholesterol Calculated   Date Value Ref Range Status   03/21/2019 56 mg/dL Final   03/21/2019 56 <100 mg/dL Final   ]    4.) Checked HbA1C in the past 6 months: YES      5.) Checked LDL in the past 12 months:    YES     6.) Taking one aspirin daily:                       YES     7.) " No tobacco use:                                        YES      8.) Statin use      YES

## 2020-01-16 NOTE — PROGRESS NOTES
"Chief Complaint   Patient presents with     Diabetes       Initial /63   Pulse 84   Resp 16   Ht 1.527 m (5' 0.12\")   Wt 92.9 kg (204 lb 14.4 oz)   SpO2 94%   BMI 39.86 kg/m   Estimated body mass index is 39.86 kg/m  as calculated from the following:    Height as of this encounter: 1.527 m (5' 0.12\").    Weight as of this encounter: 92.9 kg (204 lb 14.4 oz).  Medication Reconciliation: complete  Lianna Brewster LPN    "

## 2020-01-16 NOTE — PATIENT INSTRUCTIONS
Continue working on healthy eating and moving as best as you can (start low and slow, work up to 30 min, 5x/week)    BG goals:  Fasting and before meals <130, >70  2 hour after eating <180    We only need 1/2 of these numbers to be within target then your A1c will be within target    Medication changes   Watch for lows BG due to changes    Follow up   3-4 weeks    Call me sooner if any problems/concerns and/or questions develop including consistent low BGs <70 or consistent high BGs >200  [phone] (Unit Coordinator)    [phone] (Nurse)

## 2020-01-30 DIAGNOSIS — K21.00 GASTROESOPHAGEAL REFLUX DISEASE WITH ESOPHAGITIS: ICD-10-CM

## 2020-01-30 DIAGNOSIS — R11.0 NAUSEA: ICD-10-CM

## 2020-02-03 RX ORDER — OMEPRAZOLE 40 MG/1
CAPSULE, DELAYED RELEASE ORAL
Qty: 90 CAPSULE | Refills: 0 | Status: SHIPPED | OUTPATIENT
Start: 2020-02-03 | End: 2020-05-04

## 2020-02-03 RX ORDER — ONDANSETRON 4 MG/1
TABLET, FILM COATED ORAL
Qty: 30 TABLET | Refills: 0 | Status: SHIPPED | OUTPATIENT
Start: 2020-02-03 | End: 2020-02-14

## 2020-03-05 DIAGNOSIS — E78.5 HYPERLIPIDEMIA, UNSPECIFIED HYPERLIPIDEMIA TYPE: Primary | ICD-10-CM

## 2020-03-10 RX ORDER — SIMVASTATIN 40 MG
40 TABLET ORAL AT BEDTIME
Qty: 90 TABLET | Refills: 1 | Status: SHIPPED | OUTPATIENT
Start: 2020-03-10 | End: 2020-05-04

## 2020-03-15 DIAGNOSIS — R11.0 NAUSEA: ICD-10-CM

## 2020-03-17 ENCOUNTER — TELEPHONE (OUTPATIENT)
Dept: FAMILY MEDICINE | Facility: OTHER | Age: 55
End: 2020-03-17

## 2020-03-17 DIAGNOSIS — R11.0 NAUSEA: ICD-10-CM

## 2020-03-17 RX ORDER — ONDANSETRON 4 MG/1
TABLET, FILM COATED ORAL
Qty: 30 TABLET | Refills: 1 | Status: SHIPPED | OUTPATIENT
Start: 2020-03-17 | End: 2020-04-14

## 2020-03-17 NOTE — TELEPHONE ENCOUNTER
4:52 PM    Patient is requesting a refill of his ondansetron prescription.  Any question please call [phone].  Thank you.    Pamella Stone

## 2020-03-24 ENCOUNTER — ALLIED HEALTH/NURSE VISIT (OUTPATIENT)
Dept: EDUCATION SERVICES | Facility: OTHER | Age: 55
End: 2020-03-24
Attending: NURSE PRACTITIONER
Payer: COMMERCIAL

## 2020-03-24 VITALS
SYSTOLIC BLOOD PRESSURE: 118 MMHG | OXYGEN SATURATION: 96 % | HEIGHT: 60 IN | RESPIRATION RATE: 16 BRPM | WEIGHT: 204 LBS | HEART RATE: 80 BPM | BODY MASS INDEX: 40.05 KG/M2 | DIASTOLIC BLOOD PRESSURE: 74 MMHG

## 2020-03-24 DIAGNOSIS — E10.65 TYPE 1 DIABETES MELLITUS WITH HYPERGLYCEMIA (H): Primary | ICD-10-CM

## 2020-03-24 LAB
CHOLEST SERPL-MCNC: 100 MG/DL
CREAT UR-MCNC: 127 MG/DL
EST. AVERAGE GLUCOSE BLD GHB EST-MCNC: 186 MG/DL
HBA1C MFR BLD: 8.1 % (ref 0–5.6)
HDLC SERPL-MCNC: 48 MG/DL
LDLC SERPL CALC-MCNC: 40 MG/DL
MICROALBUMIN UR-MCNC: 7 MG/L
MICROALBUMIN/CREAT UR: 5.9 MG/G CR (ref 0–17)
NONHDLC SERPL-MCNC: 52 MG/DL
TRIGL SERPL-MCNC: 59 MG/DL

## 2020-03-24 PROCEDURE — G0463 HOSPITAL OUTPT CLINIC VISIT: HCPCS

## 2020-03-24 PROCEDURE — 80061 LIPID PANEL: CPT | Mod: ZL | Performed by: NURSE PRACTITIONER

## 2020-03-24 PROCEDURE — 83036 HEMOGLOBIN GLYCOSYLATED A1C: CPT | Mod: ZL | Performed by: NURSE PRACTITIONER

## 2020-03-24 PROCEDURE — 99213 OFFICE O/P EST LOW 20 MIN: CPT | Mod: 25 | Performed by: NURSE PRACTITIONER

## 2020-03-24 PROCEDURE — 40000788 ZZHCL STATISTIC ESTIMATED AVERAGE GLUCOSE: Mod: ZL | Performed by: NURSE PRACTITIONER

## 2020-03-24 PROCEDURE — 82043 UR ALBUMIN QUANTITATIVE: CPT | Mod: ZL | Performed by: NURSE PRACTITIONER

## 2020-03-24 PROCEDURE — 95251 CONT GLUC MNTR ANALYSIS I&R: CPT | Performed by: NURSE PRACTITIONER

## 2020-03-24 PROCEDURE — 36415 COLL VENOUS BLD VENIPUNCTURE: CPT | Mod: ZL | Performed by: NURSE PRACTITIONER

## 2020-03-24 ASSESSMENT — PAIN SCALES - GENERAL: PAINLEVEL: MILD PAIN (3)

## 2020-03-24 ASSESSMENT — MIFFLIN-ST. JEOR: SCORE: 1609.75

## 2020-03-24 NOTE — PROGRESS NOTES
"SUBJECTIVE:  Richard Harvey, 54 year old, male presents with the following Chief Complaint(s) with HPI to follow:  Chief Complaint   Patient presents with     Diabetes        Diabetes Follow-up      Patient is checking blood sugars: 4x/day For at least 60 days.  Results:  Overnight: 273- >400  Breakfast: 63,   Mid-mornin  Lunch: 56,   Mid-afternoon: 126  Dinner: 52, 87->400  Overnight: 97->400      Symptoms of hypoglycemia (low blood sugar): yes--?not sure why; history of hypoglycemia unawareness    Paresthesias (numbness or burning in feet) or sores: Yes (\"same\"); no sores    Diabetic eye exam within the last year: Yes ()    Breakfast eaten regularly: Yes    Patient counting carbs: trying       HPI: Richard's here today for the follow up regarding his Diabetes mellitus, Type 1.    Lab Results   Component Value Date    A1C 7.2 2019    A1C 7.3 08/15/2019    A1C 7.3 2019    A1C 8 2019    A1C 7.7 2018     Current Diabetes medication:   1.  Insulin pump, uses Novolog; wearing his personal CGM--Dexcom  ASA use: yes, 325 mg daily  Statin use: yes, simvastatin 40 mg at bedime  Denies any issues with the insulin pump or the Dexcom.      Richard's here for insulin pump and Dexcom G6 download with possible adjust.  He reports the following:   Still working 10:45 am-4:30 pm as a  at [employer], Monday through Friday.   Reports some issues with low BGs--not sure why.      Denies any new health concerns.       Due for labs    Patient Active Problem List   Diagnosis     DM type 1 (diabetes mellitus, type 1) (H)     HTN (hypertension)     Hyperlipidemia     ACP (advance care planning)     Diabetic polyneuropathy associated with type 1 diabetes mellitus (H)     Class 2 obesity in adult     Obesity (BMI 35.0-39.9) with comorbidity (H)       Past Medical History:   Diagnosis Date     Diabetes mellitus type 1, controlled (H)      HLD (hyperlipidemia)      HTN (hypertension)  "     Neuropathy      Type 1 diabetes (H)        Past Surgical History:   Procedure Laterality Date     COLONOSCOPY - HIM SCAN N/A 09/19/2016    Procedure: COLONOSCOPY SCREENING; Surgeon: Rudy Rojo MD; Location: [facility] OR     ESOPHAGOSCOPY, GASTROSCOPY, DUODENOSCOPY (EGD), COMBINED  08/13/2019       Family History   Problem Relation Age of Onset     No Known Problems Mother      Hypertension Father      Hypertension Brother        Social History     Tobacco Use     Smoking status: Never Smoker     Smokeless tobacco: Never Used   Substance Use Topics     Alcohol use: No     Alcohol/week: 0.0 standard drinks       Current Outpatient Medications   Medication Sig Dispense Refill     acetone, Urine, test STRP Use as directed 50 each 3     aspirin 81 MG EC tablet Take 81 mg by mouth daily       blood glucose (MELISSA CONTOUR NEXT) test strip TEST 6 TIMES DAILY AS DIRECTED. 300 each 11     blood glucose monitoring (MELISSA CONTOUR NEXT MONITOR) meter device kit 1 each       blood glucose monitoring (MELISSA CONTOUR NEXT) test strip Use to test blood sugar 6 times daily or as directed. 200 each 11     CALCIUM-VITAMIN D PO Take  by mouth daily. 600 mg       Continuous Blood Gluc  (DEXCOM G6 ) DAYAN 1 each continuous 1 Device 0     Continuous Blood Gluc Sensor (DEXCOM G6 SENSOR) MISC 1 each continuous 3 each 11     Continuous Blood Gluc Transmit (DEXCOM G6 TRANSMITTER) MISC 1 each continuous 1 each 4     gabapentin (NEURONTIN) 300 MG capsule Take 1 capsule (300 mg) by mouth 3 times daily 90 capsule 3     glucagon 1 MG injection 1 mg once       Glucosamine-Chondroit-Vit C-Mn (GLUCOSAMINE-CHONDROITIN MAX ST PO) Take  by mouth daily.       insulin aspart (NOVOLOG VIAL) 100 UNITS/ML vial USE WITH INSULIN PUMP FOR A TOTAL DAILY USAGE OF 70 UNITS 20 mL 1     insulin aspart (NOVOLOG VIAL) 100 UNITS/ML vial  UNITS IN PUMP EVERY 3 DAYS       INSULIN PUMP - OUTPATIENT Date last updated:  6/23/15  Personal Style Finder  Minimed: Model 751  BASAL RATES and times:  12   AM (midnight): 1.25 units/hour    2     AM: 1.3 units/hour   6     AM: 0.95 units/hour   6    PM: 1.2 units/hour   Basal Pattern A:  Richard Harvey will use when N/A.  CARB RATIO and times:  12   AM (midnight): 12  1    PM:  10  6    PM:    9  Corection Factor (Sensitivity) and times:  12   AM (midnight): 40 mg/dL  6     PM: 45 mg/dL  BLOOD GLUCOSE TARGET and times:  12   AM (midnight): 100 - 140  9     AM:  100 - 120  10   PM:  100 - 140  Active Insulin Time:  4 hours  Sensor:  Yes: 12   AM (midnight): 70 - 240  Carelink / Diasend username:  [email or web address]  Carelink / Diasend Password:  [email or web address]       lisinopril (PRINIVIL,ZESTRIL) 10 MG tablet Take 10 mg by mouth daily       MOTRIN IB PO Take 400 mg by mouth every 6 hours as needed for moderate pain       Multiple Vitamin (MULTI-VITAMIN DAILY PO) Take  by mouth daily.       nitroglycerin (NITROSTAT) 0.4 MG SL tablet Place under the tongue every 5 minutes as needed       omeprazole (PRILOSEC) 40 MG DR capsule TAKE 1 CAPSULE BY MOUTH ONCE DAILY 90 capsule 0     ondansetron (ZOFRAN) 4 MG tablet TAKE 1 TABLET BY MOUTH EVERY 8 HOURS AS NEEDED FOR NAUSEA 30 tablet 1     order for DME Equipment being ordered:     1.  Medtronic insulin pump supplies--insertion sets and reserviors  Disp: 1 month  Refill: 11 months 30 days 11     order for DME Equipment being ordered: Please dispense a short leg CAM walker boot to the patient 1 Device 0     simvastatin (ZOCOR) 40 MG tablet Take 1 tablet (40 mg) by mouth At Bedtime 90 tablet 1     UREA 10 HYDRATING 10 % external cream APPLY CREAM TOPICALLY AS NEEDED FOR CALLOUSED SKIN 85 g 3     URINE GLUCOSE-KETONES TEST VI          No Known Allergies    REVIEW OF SYSTEMS  Skin: negative  Eyes: negative  Ears/Nose/Throat: negative  Respiratory: No shortness of breath, dyspnea on exertion, cough, or hemoptysis  Cardiovascular: negative  Gastrointestinal: okay--taking Zofran with  "meals  Genitourinary: negative  Musculoskeletal: history of arthritis--hands, shoulders, knees  Neurologic: positive for numbness or tingling of hands and numbness or tingling of feet--same  Psychiatric: stress  Hematologic/Lymphatic/Immunologic: negative  Endocrine: positive for diabetes    OBJECTIVE:  /74   Pulse 80   Resp 16   Ht 1.527 m (5' 0.12\")   Wt 92.5 kg (204 lb)   SpO2 96%   BMI 39.68 kg/m    Constitutional: healthy, alert and no distress  Musculoskeletal: extremities normal- no gross deformities noted and gait normal  Skin: no suspicious lesions or rashes to visible skin  Psychiatric: mentation appears normal and affect normal/bright      LAB  Results for orders placed or performed in visit on 20   Hemoglobin A1c     Status: Abnormal   Result Value Ref Range    Hemoglobin A1C 8.1 (H) 0 - 5.6 %   Lipid Profile (Chol, Trig, HDL, LDL calc)     Status: None   Result Value Ref Range    Cholesterol 100 <200 mg/dL    Triglycerides 59 <150 mg/dL    HDL Cholesterol 48 >39 mg/dL    LDL Cholesterol Calculated 40 <100 mg/dL    Non HDL Cholesterol 52 <130 mg/dL       ASSESSMENT / PLAN:.  (E10.65) Type 1 diabetes mellitus with hyperglycemia (H)  (primary encounter diagnosis)  Comment:     Personal CGM data as followed:  Date:  3/2/2020 to 3/15/2020  Glucose management indicator: no data    Average glucose: 203 +/- 88    Glucose range  Very low (<54): 0  low (<70): 1.4%  In target range (): 43.1%  High (>180): 55.5%  Very high (>250): 28.6%    Daily statistics  Monday: 203 +/- 60  Tuesday: 151 +/- 52  Wednesday: 125 +/- 41  Thursday: 165 +/- 52  Friday: 240 +/- 72  Saturday: 268 +/- 95  : 208 +/- 104    Insulin pump information:   Average: 191 +/- 117  Basal/bolus ratio: 30 (56%)/24 (44%)   Testinx/day    Hypoglycemia: 3 (was 8)    Plan: Hemoglobin A1c, Lipid Profile (Chol, Trig, HDL,        LDL calc), Albumin Random Urine Quantitative         with Creat Ratio, GLUCOSE MONITOR, 72 " HOUR,         LYDIA BONILLA                Will adjust his insulin pump accordingly    Carb ratio:  0000 8  1200 11  1600 13    Insulin sensitivity  0000 65    Follow up in 1 weeks via telephone  Call sooner if needed      Patient Instructions   Continue working on healthy eating and moving as best as you can (start low and slow, work up to 30 min, 5x/week)    BG goals:  Fasting and before meals <130, >70  2 hour after eating <180    We only need 1/2 of these numbers to be within target then your A1c will be within target    Medication changes   Watch for lows BG due to changes    Follow up   1 week via telephone    Call me sooner if any problems/concerns and/or questions develop including consistent low BGs <70 or consistent high BGs >200  [phone] (Unit Coordinator)    [phone] (Nurse)      Time: 30 min  Barrier: none  Willingness to learn: accepting    Macrina CHAMBERLAIN [facility]  Diabetes and Wound Care    With the electronic record, we can now more quickly and easily track our patient diabetic goals. Our diabetes clinical review is in progress and these are the indicators we are monitoring for good diabetes health.     1.) HbA1C less than 7 (measurement of your average blood sugars)  2.) Blood Pressure less than 140/80  3.) LDL less than 100 (bad cholesterol)  4.) HbA1C is checked in the last 6 months and below 7% (more frequently if not at goal or adjusting medications)  5.) LDL is checked in the last 12 months (more frequently if not at goal or adjusting medications)  6.) Taking one baby aspirin daily (unless otherwise instructed)  7.) No tobacco use  8) Statin use     You have achieved 7 out of 8 of these and I am encouraging you to come in and get tuned up to achieve 8 out of 8!  Here is what you have achieved so far in my goals for you:  1.) HbA1C  less than 7:                              NO  Your last  HbA1C :   Lab Results   Component Value Date    A1C 8.1 03/24/2020    A1C 7.2 12/12/2019    A1C  "7.3 08/15/2019    A1C 7.3 03/21/2019    A1C 8 01/21/2019     2.) Blood Pressure less than 140/80:       YES      Your last    /74   Pulse 80   Resp 16   Ht 1.527 m (5' 0.12\")   Wt 92.5 kg (204 lb)   SpO2 96%   BMI 39.68 kg/m      3.) LDL less than 100:                              YES      Your last   LDL         LDL Cholesterol Calculated   Date Value Ref Range Status   03/24/2020 40 <100 mg/dL Final     Comment:     Desirable:       <100 mg/dl   ]    4.) Checked HbA1C in the past 6 months: YES      5.) Checked LDL in the past 12 months:    YES     6.) Taking one aspirin daily:                       YES     7.) No tobacco use:                                        YES      8.) Statin use      YES              "

## 2020-03-24 NOTE — PROGRESS NOTES
"Chief Complaint   Patient presents with     Diabetes       Initial /74   Pulse 80   Resp 16   Ht 1.527 m (5' 0.12\")   Wt 92.5 kg (204 lb)   SpO2 96%   BMI 39.68 kg/m   Estimated body mass index is 39.68 kg/m  as calculated from the following:    Height as of this encounter: 1.527 m (5' 0.12\").    Weight as of this encounter: 92.5 kg (204 lb).  Medication Reconciliation: complete  Lianna Brewster LPN    "

## 2020-03-24 NOTE — PATIENT INSTRUCTIONS
Continue working on healthy eating and moving as best as you can (start low and slow, work up to 30 min, 5x/week)    BG goals:  Fasting and before meals <130, >70  2 hour after eating <180    We only need 1/2 of these numbers to be within target then your A1c will be within target    Medication changes   Watch for lows BG due to changes    Follow up   1 week via telephone    Call me sooner if any problems/concerns and/or questions develop including consistent low BGs <70 or consistent high BGs >200  [phone] (Unit Coordinator)    [phone] (Nurse)

## 2020-03-26 DIAGNOSIS — E10.649 TYPE 1 DIABETES MELLITUS WITH HYPOGLYCEMIA AND WITHOUT COMA (H): Primary | ICD-10-CM

## 2020-03-26 NOTE — TELEPHONE ENCOUNTER
Novolog Vial      Last Written Prescription Date:  07/03/2019  Last Fill Quantity: 100,   # refills: 0  Last Office Visit: 12/10/2019  Future Office visit:       Routing refill request to provider for review/approval because:  Drug not on the [facility], [facility] or [facility] refill protocol or controlled substance

## 2020-04-26 DIAGNOSIS — E10.65 TYPE 1 DIABETES MELLITUS WITH HYPERGLYCEMIA (H): ICD-10-CM

## 2020-04-27 DIAGNOSIS — R11.0 NAUSEA: ICD-10-CM

## 2020-04-27 RX ORDER — ONDANSETRON 4 MG/1
TABLET, FILM COATED ORAL
Qty: 30 TABLET | Refills: 0 | OUTPATIENT
Start: 2020-04-27

## 2020-04-27 RX ORDER — UREA 10 %
LOTION (ML) TOPICAL
Qty: 85 G | Refills: 0 | Status: SHIPPED | OUTPATIENT
Start: 2020-04-27 | End: 2020-07-27

## 2020-04-27 NOTE — TELEPHONE ENCOUNTER
urea      Last Written Prescription Date:  8/1/19  Last Fill Quantity: 85,   # refills: 3  Last Office Visit: 12/10/19  Future Office visit:       Routing refill request to provider for review/approval because:  Drug not on the [facility], [facility] or [facility] refill protocol or controlled substance

## 2020-04-28 ENCOUNTER — MEDICAL CORRESPONDENCE (OUTPATIENT)
Dept: HEALTH INFORMATION MANAGEMENT | Facility: CLINIC | Age: 55
End: 2020-04-28

## 2020-05-01 DIAGNOSIS — K21.00 GASTROESOPHAGEAL REFLUX DISEASE WITH ESOPHAGITIS: ICD-10-CM

## 2020-05-01 NOTE — TELEPHONE ENCOUNTER
omeprazole (PRILOSEC) 40 MG DR capsule       Last Written Prescription Date:  2/3/20  Last Fill Quantity: 90,   # refills: 0  Last Office Visit: 12/10/19  Future Office visit:       Routing refill request to provider for review/approval because:  Drug not on the [facility], P or [facility] refill protocol or controlled substance

## 2020-05-04 DIAGNOSIS — E78.5 HYPERLIPIDEMIA, UNSPECIFIED HYPERLIPIDEMIA TYPE: ICD-10-CM

## 2020-05-04 DIAGNOSIS — R11.0 NAUSEA: ICD-10-CM

## 2020-05-04 DIAGNOSIS — K21.00 GASTROESOPHAGEAL REFLUX DISEASE WITH ESOPHAGITIS: ICD-10-CM

## 2020-05-04 RX ORDER — SIMVASTATIN 40 MG
40 TABLET ORAL AT BEDTIME
Qty: 90 TABLET | Refills: 0 | Status: SHIPPED | OUTPATIENT
Start: 2020-05-04 | End: 2020-11-30

## 2020-05-04 RX ORDER — ONDANSETRON 4 MG/1
TABLET, FILM COATED ORAL
Qty: 30 TABLET | Refills: 0 | OUTPATIENT
Start: 2020-05-04

## 2020-05-04 RX ORDER — OMEPRAZOLE 40 MG/1
CAPSULE, DELAYED RELEASE ORAL
Qty: 90 CAPSULE | Refills: 0 | Status: SHIPPED | OUTPATIENT
Start: 2020-05-04 | End: 2020-07-21

## 2020-05-04 NOTE — TELEPHONE ENCOUNTER
ondansetron (ZOFRAN) 4 MG tablet         Last Written Prescription Date:  4/14/20  Last Fill Quantity: 30,   # refills: 0  Last Office Visit: 12/10/19  Future Office visit:       Routing refill request to provider for review/approval because:        simvastatin (ZOCOR) 40 MG tablet         Last Written Prescription Date:  3/10/20  Last Fill Quantity: 90,   # refills: 1 (unsure if pharmacy has the refills on file)   Last Office Visit: 12/10/19  Future Office visit:       Routing refill request to provider for review/approval because:

## 2020-05-05 DIAGNOSIS — R11.0 NAUSEA: ICD-10-CM

## 2020-05-05 RX ORDER — ONDANSETRON 4 MG/1
TABLET, FILM COATED ORAL
Qty: 30 TABLET | Refills: 0 | Status: SHIPPED | OUTPATIENT
Start: 2020-05-05 | End: 2020-05-20

## 2020-05-05 NOTE — TELEPHONE ENCOUNTER
ondansetron (ZOFRAN) 4 MG tablet      Last Written Prescription Date:  4/14/20  Last Fill Quantity: 30,   # refills: 0  Last Office Visit: 12/10/19  Future Office visit:       Routing refill request to provider for review/approval because:  Drug not on the [facility], [facility] or [facility] refill protocol or controlled substance

## 2020-05-11 NOTE — PROGRESS NOTES
"Richard Harvey is a 55 year old male who is being evaluated via a billable telephone visit.      The patient has been notified of following:     \"This telephone visit will be conducted via a call between you and your physician/provider. We have found that certain health care needs can be provided without the need for a physical exam.  This service lets us provide the care you need with a short phone conversation.  If a prescription is necessary we can send it directly to your pharmacy.  If lab work is needed we can place an order for that and you can then stop by our lab to have the test done at a later time.    Telephone visits are billed at different rates depending on your insurance coverage. During this emergency period, for some insurers they may be billed the same as an in-person visit.  Please reach out to your insurance provider with any questions.    If during the course of the call the physician/provider feels a telephone visit is not appropriate, you will not be charged for this service.\"    Patient has given verbal consent for Telephone visit?  Yes    What phone number would you like to be contacted at? [phone]    How would you like to obtain your AVS?    Subjective     Richard Harvey is a 55 year old male who presents to clinic today for the following health issues:    HPI  Diabetes Follow-up    How often are you checking your blood sugar? Four or more times daily  Blood sugar testing frequency justification:  habit  What time of day are you checking your blood sugars (select all that apply)?  Before meals  Have you had any blood sugars above 200?  Yes - 245  Have you had any blood sugars below 70?  Yes - 59    What symptoms do you notice when your blood sugar is low?  Shaky, Dizzy, Weak, Lethargy, Blurred vision and Confusion    What concerns do you have today about your diabetes? None     Do you have any of these symptoms? (Select all that apply)  No numbness or tingling in feet.  No redness, sores " or blisters on feet.  No complaints of excessive thirst.  No reports of blurry vision.  No significant changes to weight.  - last A1c (3/24/2020): 8.1  - BG: morning 150s, lunch 100, supper 80 to 90s, bedtime 130 to 140.  Lows generally during work, sometime knows he is getting low and sometimes he does not  - Diet: ok  - Exercise: Working  - on insulin pump  - on ASA, ACE, statin  - has not talked with diabetic education since March    BP Readings from Last 2 Encounters:   03/24/20 118/74   01/16/20 124/63     Hemoglobin A1C (%)   Date Value   03/24/2020 8.1 (H)   12/12/2019 7.2 (A)     LDL Cholesterol Calculated (mg/dL)   Date Value   03/24/2020 40   03/21/2019 56   03/21/2019 56       Hyperlipidemia Follow-Up      Are you regularly taking any medication or supplement to lower your cholesterol?   Yes- Simvastatin    Are you having muscle aches or other side effects that you think could be caused by your cholesterol lowering medication?  No    - last LDL (3/24/2020): 40  - simvastatin 40mg, no issues    Hypertension Follow-up      Do you check your blood pressure regularly outside of the clinic? No     Are you following a low salt diet? No    Are your blood pressures ever more than 140 on the top number (systolic) OR more   than 90 on the bottom number (diastolic), for example 140/90? No    - lisinopril 10mg   - occasional lightheadness or dizziness when standing up too fast, but not during normal daily activities       How many servings of fruits and vegetables do you eat daily?  2-3    On average, how many sweetened beverages do you drink each day (Examples: soda, juice, sweet tea, etc.  Do NOT count diet or artificially sweetened beverages)?   0    How many days per week do you exercise enough to make your heart beat faster? 7    How many minutes a day do you exercise enough to make your heart beat faster? 60 or more    How many days per week do you miss taking your medication? 0      # GERD:  - omeprazole once  per day with good results as long as he takes his medication        Reviewed and updated as needed this visit by Provider  Tobacco  Allergies  Meds  Problems  Med Hx  Surg Hx  Fam Hx         Review of Systems   Constitutional, HEENT, cardiovascular, pulmonary, gi and gu systems are negative, except as otherwise noted.       Objective   Reported vitals:  There were no vitals taken for this visit.   healthy, alert and no distress  PSYCH: Alert and oriented times 3; coherent speech, normal   rate and volume, able to articulate logical thoughts, able   to abstract reason, no tangential thoughts, no hallucinations   or delusions  His affect is normal  RESP: No cough, no audible wheezing, able to talk in full sentences  Remainder of exam unable to be completed due to telephone visits    Diagnostic Test Results: Labs reviewed in [facility]        Assessment/Plan:  1. Diabetic polyneuropathy associated with type 1 diabetes mellitus (H)  Richard did not follow up with diabetic education.  - follow up with diabetic education  - c/w insulin pump  - on ASA, statin, ACE    2. Hyperlipidemia, unspecified hyperlipidemia type  No issues.  - last LDL (3/24/2020): 40    3. Essential hypertension  Not able to take blood pressure at home. Occasional issues with dizziness upon standing too quickly, otherwise no issues.   - c/w lisinopril 10mg      Return in about 3 months (around 8/12/2020) for Diabetic Visit.      Phone call duration:  6 minutes    Natacha Alvarez MD

## 2020-05-12 ENCOUNTER — VIRTUAL VISIT (OUTPATIENT)
Dept: FAMILY MEDICINE | Facility: OTHER | Age: 55
End: 2020-05-12
Attending: FAMILY MEDICINE
Payer: COMMERCIAL

## 2020-05-12 DIAGNOSIS — E10.42 DIABETIC POLYNEUROPATHY ASSOCIATED WITH TYPE 1 DIABETES MELLITUS (H): Primary | ICD-10-CM

## 2020-05-12 DIAGNOSIS — E78.5 HYPERLIPIDEMIA, UNSPECIFIED HYPERLIPIDEMIA TYPE: ICD-10-CM

## 2020-05-12 DIAGNOSIS — I10 ESSENTIAL HYPERTENSION: ICD-10-CM

## 2020-05-12 PROCEDURE — 99213 OFFICE O/P EST LOW 20 MIN: CPT | Mod: 95 | Performed by: FAMILY MEDICINE

## 2020-05-12 NOTE — NURSING NOTE
"Chief Complaint   Patient presents with     Diabetes     Hypertension     Hyperlipidemia       Initial There were no vitals taken for this visit. Estimated body mass index is 39.68 kg/m  as calculated from the following:    Height as of 3/24/20: 1.527 m (5' 0.12\").    Weight as of 3/24/20: 92.5 kg (204 lb).  Medication Reconciliation: complete  Rosy Smith LPN  "

## 2020-05-12 NOTE — PATIENT INSTRUCTIONS
It was a pleasure to talk with you today.    I will reach out to Macrina regarding your low blood sugars while working

## 2020-05-13 ENCOUNTER — PATIENT OUTREACH (OUTPATIENT)
Dept: EDUCATION SERVICES | Facility: OTHER | Age: 55
End: 2020-05-13
Attending: NURSE PRACTITIONER
Payer: COMMERCIAL

## 2020-05-13 DIAGNOSIS — E10.649 TYPE 1 DIABETES MELLITUS WITH HYPOGLYCEMIA AND WITHOUT COMA (H): Primary | ICD-10-CM

## 2020-05-13 PROCEDURE — 99213 OFFICE O/P EST LOW 20 MIN: CPT | Mod: 95 | Performed by: NURSE PRACTITIONER

## 2020-05-13 NOTE — PROGRESS NOTES
"Richard Harvey is a 55 year old male who is being evaluated via a billable telephone visit.      The patient has been notified of following:     \"This telephone visit will be conducted via a call between you and your physician/provider. We have found that certain health care needs can be provided without the need for a physical exam.  This service lets us provide the care you need with a short phone conversation.  If a prescription is necessary we can send it directly to your pharmacy.  If lab work is needed we can place an order for that and you can then stop by our lab to have the test done at a later time.    Telephone visits are billed at different rates depending on your insurance coverage. During this emergency period, for some insurers they may be billed the same as an in-person visit.  Please reach out to your insurance provider with any questions.    If during the course of the call the physician/provider feels a telephone visit is not appropriate, you will not be charged for this service.\"    Patient has given verbal consent for Telephone visit?  Yes    What phone number would you like to be contacted at? [phone]    How would you like to obtain your AVS? Mail a copy    Subjective     Richard Harvey is a 55 year old male who presents to clinic today for the following health issues:    HPI  Diabetes Follow-up    How often are you checking your blood sugar? Four or more times daily  Blood sugar testing frequency justification:  none  What time of day are you checking your blood sugars (select all that apply)?  Before and after meals and At bedtime  Have you had any blood sugars above 200?  Yes 245  Have you had any blood sugars below 70?  Yes 59    What symptoms do you notice when your blood sugar is low?  Shaky, Dizzy, Weak, Lethargy, Blurred vision and Confusion    What concerns do you have today about your diabetes? None     Do you have any of these symptoms? (Select all that apply)  Numbness in " feet      BP Readings from Last 2 Encounters:   03/24/20 118/74   01/16/20 124/63     Hemoglobin A1C (%)   Date Value   03/24/2020 8.1 (H)   12/12/2019 7.2 (A)     LDL Cholesterol Calculated (mg/dL)   Date Value   03/24/2020 40   03/21/2019 56   03/21/2019 56                 How many servings of fruits and vegetables do you eat daily?  2-3    On average, how many sweetened beverages do you drink each day (Examples: soda, juice, sweet tea, etc.  Do NOT count diet or artificially sweetened beverages)?   1    How many days per week do you exercise enough to make your heart beat faster? 7    How many minutes a day do you exercise enough to make your heart beat faster? 60 or more    How many days per week do you miss taking your medication? 0    Telephone visit with Richard gomez regarding his Diabetes mellitus, Type 1.      Lab Results   Component Value Date    A1C 8.1 03/24/2020    A1C 7.2 12/12/2019    A1C 7.3 08/15/2019    A1C 7.3 03/21/2019    A1C 8 01/21/2019     Current Diabetes medication:   1.  Insulin pump, uses Novolog; wearing his personal CGM--Dexcom  ASA use: yes, 325 mg daily  Statin use: yes, simvastatin 40 mg at bedime  Denies any issues with the insulin pump or the Dexcom.     Reports issues with low BGs--after work and in the morning.   No pattern  States with the warmer weather, he gets sweaty and then has low BGs.       Patient Active Problem List   Diagnosis     DM type 1 (diabetes mellitus, type 1) (H)     HTN (hypertension)     Hyperlipidemia     ACP (advance care planning)     Diabetic polyneuropathy associated with type 1 diabetes mellitus (H)     Class 2 obesity in adult     Obesity (BMI 35.0-39.9) with comorbidity (H)     Past Surgical History:   Procedure Laterality Date     COLONOSCOPY - HIM SCAN N/A 09/19/2016    Procedure: COLONOSCOPY SCREENING; Surgeon: Rudy Rojo MD; Location: [facility] OR     ESOPHAGOSCOPY, GASTROSCOPY, DUODENOSCOPY (EGD), COMBINED  08/13/2019       Social History      Tobacco Use     Smoking status: Never Smoker     Smokeless tobacco: Never Used   Substance Use Topics     Alcohol use: No     Alcohol/week: 0.0 standard drinks     Family History   Problem Relation Age of Onset     No Known Problems Mother      Hypertension Father      Hypertension Brother            Reviewed and updated as needed this visit by Provider         Review of Systems   Skin: negative  Eyes: negative  Ears/Nose/Throat: negative  Respiratory: No shortness of breath, dyspnea on exertion, cough, or hemoptysis  Cardiovascular: negative  Gastrointestinal: okay--taking Zofran with meals  Genitourinary: negative  Musculoskeletal: history of arthritis--hands, shoulders, knees  Neurologic: positive for numbness or tingling of hands and numbness or tingling of feet--same  Psychiatric: stress  Hematologic/Lymphatic/Immunologic: negative  Endocrine: positive for diabetes       Objective   Reported vitals:  There were no vitals taken for this visit.   alert and no distress  PSYCH: Alert and oriented times 3; coherent speech, normal   rate and volume, able to articulate logical thoughts, able   to abstract reason, no tangential thoughts, no hallucinations   or delusions  His affect is normal  RESP: No cough, no audible wheezing, able to talk in full sentences  Remainder of exam unable to be completed due to telephone visits    Diagnostic Test Results:  Labs reviewed in Epic        Assessment/Plan:  1. Type 1 diabetes mellitus with hypoglycemia and without coma (H)  I need more information    Discussed ways to prevent low BGs using his advanced features on his insulin pump    Can try Extend bars or Diabetic protein drinks too on days when he's super sweaty at work    No follow-ups on file.    Patient Instructions   Continue working on healthy eating and moving as best as you can (start low and slow, work up to 30 min, 5x/week)    BG goals:  Fasting and before meals <130, >70  2 hour after eating <180    We only need  1/2 of these numbers to be within target then your A1c will be within target    Medication changes   I need more information    Follow up   Tomorrow for a curbside download    In the meantime,   Try using the temp basal down to 80% when super busy at work  Can try either Boost Diabetic drink (low carb, high protein) or Extend Bars    Call me sooner if any problems/concerns and/or questions develop including consistent low BGs <70 or consistent high BGs >200  [phone] (Unit Coordinator)    [phone] (Nurse)          Phone call duration:  22 minutes    Macrina iDxon, CINTIA CNP  May 13, 2020  4:51 PM    Insurance requirement  1.  Patient has been seen in the clinic within the last 6 month  2. Diagnosis of Type 1 diabetes  3. Has been testing their BGs 4x/day using the Dexcom  4. Uses an insulin pump  5. Requires frequent adjustments to their treatment regimen based on BGM and/or CGM testing results.

## 2020-05-13 NOTE — PATIENT INSTRUCTIONS
Continue working on healthy eating and moving as best as you can (start low and slow, work up to 30 min, 5x/week)    BG goals:  Fasting and before meals <130, >70  2 hour after eating <180    We only need 1/2 of these numbers to be within target then your A1c will be within target    Medication changes   I need more information    Follow up   Tomorrow for a curbside download    In the meantime,   Try using the temp basal down to 80% when super busy at work  Can try either Boost Diabetic drink (low carb, high protein) or Extend Bars    Call me sooner if any problems/concerns and/or questions develop including consistent low BGs <70 or consistent high BGs >200  [phone] (Unit Coordinator)    [phone] (Nurse)

## 2020-05-29 DIAGNOSIS — R11.0 NAUSEA: ICD-10-CM

## 2020-05-29 NOTE — TELEPHONE ENCOUNTER
zofran      Last Written Prescription Date:  5/20/2020  Last Fill Quantity: 30,   # refills: 0  Last Office Visit: 5/12/2020

## 2020-06-02 RX ORDER — ONDANSETRON 4 MG/1
TABLET, FILM COATED ORAL
Qty: 30 TABLET | Refills: 0 | OUTPATIENT
Start: 2020-06-02

## 2020-06-04 ENCOUNTER — TELEPHONE (OUTPATIENT)
Dept: FAMILY MEDICINE | Facility: OTHER | Age: 55
End: 2020-06-04

## 2020-06-04 DIAGNOSIS — R11.0 NAUSEA: ICD-10-CM

## 2020-06-04 DIAGNOSIS — I10 ESSENTIAL HYPERTENSION: Primary | ICD-10-CM

## 2020-06-04 RX ORDER — ONDANSETRON 4 MG/1
4 TABLET, FILM COATED ORAL EVERY 8 HOURS PRN
Qty: 30 TABLET | Refills: 0 | Status: SHIPPED | OUTPATIENT
Start: 2020-06-04 | End: 2020-06-18

## 2020-06-04 RX ORDER — LISINOPRIL 10 MG/1
10 TABLET ORAL DAILY
Qty: 90 TABLET | Refills: 1 | Status: SHIPPED | OUTPATIENT
Start: 2020-06-04 | End: 2020-11-30

## 2020-06-04 NOTE — TELEPHONE ENCOUNTER
Patient calling back reporting he has received a voicemail to call 's nurse back.     Patient will be at [phone].

## 2020-06-04 NOTE — TELEPHONE ENCOUNTER
Patient called for refills.  Med is historical    lisinopril (PRINIVIL,ZESTRIL) 10 MG tablet       Last Written Prescription Date:    Last Fill Quantity: ,   # refills:   Last Office Visit: 5/12/20  Future Office visit:    Next 5 appointments (look out 90 days)    Aug 12, 2020  8:10 AM CDT  (Arrive by 7:55 AM)  [facility] [address] [facility] [address]  [phone]           Routing refill request to provider for review/approval because:  Drug not on the G, P or  Health refill protocol or controlled substance      ondansetron (ZOFRAN) 4 MG tablet       Last Written Prescription Date:  5/20/20  Last Fill Quantity: 30,   # refills: 0  Last Office Visit: 5/12/20  Future Office visit:    Next 5 appointments (look out 90 days)    Aug 12, 2020  8:10 AM CDT  (Arrive by 7:55 AM)  [facility] [address] [facility] [address]  [phone]           Routing refill request to provider for review/approval because:  Drug not on the G, P or  Health refill protocol or controlled substance

## 2020-06-05 NOTE — TELEPHONE ENCOUNTER
I don't see anything in the chart as to why someone would have called him.  Is there anything you need the pt to know.  Next appointment is scheduled for 08/12/20.  Thank you.

## 2020-06-17 ENCOUNTER — TELEPHONE (OUTPATIENT)
Dept: FAMILY MEDICINE | Facility: OTHER | Age: 55
End: 2020-06-17

## 2020-06-17 DIAGNOSIS — R11.0 NAUSEA: ICD-10-CM

## 2020-06-17 NOTE — TELEPHONE ENCOUNTER
Patient calling and would like to know if Dr. Alvarez had filled out his form for him to drive due to diabetes. Patient stated he dropped it off on 06/11. Patient would like a call back if form is or is not completed. Thank you.    Patient's phone number is [phone]

## 2020-06-18 RX ORDER — ONDANSETRON 4 MG/1
TABLET, FILM COATED ORAL
Qty: 30 TABLET | Refills: 0 | Status: SHIPPED | OUTPATIENT
Start: 2020-06-18 | End: 2020-07-01

## 2020-06-24 DIAGNOSIS — E10.65 TYPE 1 DIABETES MELLITUS WITH HYPERGLYCEMIA (H): ICD-10-CM

## 2020-06-24 NOTE — TELEPHONE ENCOUNTER
Contour Test Strips       Last Written Prescription Date:  6/06/2019  Last Fill Quantity: 300,   # refills: 11  Last Office Visit: 5/12/2020  Future Office visit:    Next 5 appointments (look out 90 days)    Aug 12, 2020  8:10 AM CDT  (Arrive by 7:55 AM)  SHORT with Natacha Alvarez MD  [facility] ([facility] [place] ) [address]  [place] [address]  [phone]

## 2020-06-25 NOTE — TELEPHONE ENCOUNTER
CONTOUR NEXT STRIPS     Diabetic Supplies Protocol Failed    Recent (6 mo) or future (30 days) visit within the authorizing provider's specialty

## 2020-07-01 DIAGNOSIS — R11.0 NAUSEA: ICD-10-CM

## 2020-07-01 RX ORDER — ONDANSETRON 4 MG/1
TABLET, FILM COATED ORAL
Qty: 30 TABLET | Refills: 0 | Status: SHIPPED | OUTPATIENT
Start: 2020-07-01 | End: 2020-07-16

## 2020-07-02 ENCOUNTER — TELEPHONE (OUTPATIENT)
Dept: FAMILY MEDICINE | Facility: OTHER | Age: 55
End: 2020-07-02

## 2020-07-02 NOTE — TELEPHONE ENCOUNTER
Form received [place] [employer],placed in provider's wall bin.   After form is completed patient would like form to be faxed and patient notified when this is done

## 2020-07-15 DIAGNOSIS — R11.0 NAUSEA: ICD-10-CM

## 2020-07-16 RX ORDER — ONDANSETRON 4 MG/1
TABLET, FILM COATED ORAL
Qty: 30 TABLET | Refills: 1 | Status: SHIPPED | OUTPATIENT
Start: 2020-07-16 | End: 2020-08-13

## 2020-07-20 DIAGNOSIS — K21.00 GASTROESOPHAGEAL REFLUX DISEASE WITH ESOPHAGITIS: ICD-10-CM

## 2020-07-21 RX ORDER — OMEPRAZOLE 40 MG/1
CAPSULE, DELAYED RELEASE ORAL
Qty: 90 CAPSULE | Refills: 0 | Status: SHIPPED | OUTPATIENT
Start: 2020-07-21 | End: 2020-10-13

## 2020-07-22 ENCOUNTER — TRANSFERRED RECORDS (OUTPATIENT)
Dept: HEALTH INFORMATION MANAGEMENT | Facility: HOSPITAL | Age: 55
End: 2020-07-22

## 2020-07-22 LAB — RETINOPATHY: NEGATIVE

## 2020-07-23 DIAGNOSIS — E10.65 TYPE 1 DIABETES MELLITUS WITH HYPERGLYCEMIA (H): ICD-10-CM

## 2020-07-27 RX ORDER — UREA 10 %
LOTION (ML) TOPICAL
Qty: 85 G | Refills: 0 | Status: SHIPPED | OUTPATIENT
Start: 2020-07-27 | End: 2020-10-09

## 2020-08-10 DIAGNOSIS — E10.42 DIABETIC POLYNEUROPATHY ASSOCIATED WITH TYPE 1 DIABETES MELLITUS (H): ICD-10-CM

## 2020-08-10 DIAGNOSIS — I10 ESSENTIAL HYPERTENSION: Primary | ICD-10-CM

## 2020-08-13 DIAGNOSIS — R11.0 NAUSEA: ICD-10-CM

## 2020-08-13 RX ORDER — ONDANSETRON 4 MG/1
TABLET, FILM COATED ORAL
Qty: 30 TABLET | Refills: 1 | Status: SHIPPED | OUTPATIENT
Start: 2020-08-13 | End: 2020-09-14

## 2020-08-18 NOTE — PROGRESS NOTES
Subjective     Richard Harvey is a 55 year old male who presents to clinic today for the following health issues:    HPI       Diabetes Follow-up    How often are you checking your blood sugar? Four or more times daily  Blood sugar testing frequency justification:  Uncontrolled diabetes  What time of day are you checking your blood sugars (select all that apply)?  Before meals and At bedtime  Have you had any blood sugars above 200?  Yes 228  Have you had any blood sugars below 70?  Yes 45    What symptoms do you notice when your blood sugar is low?  Shaky, Dizzy, Weak, Lethargy, Blurred vision and Confusion    What concerns do you have today about your diabetes? Blood sugar is often over 200 and Low blood sugar     Do you have any of these symptoms? (Select all that apply)  Numbness in feet and Burning in feet    Have you had a diabetic eye exam in the last 12 months? Yes- Date of last eye exam: july 2020    - last A1c (3/24/2020): 8.1  - BG: getting lows 3 to 4 times since visit. Does not always feel the lows.   - Diet: no changes  - exercise: active person  - on insulin pump  - on ACE, ASA, statin  - requesting an appointment with diabetic education   - no alarm on pump for low BG    Hyperlipidemia Follow-Up      Are you regularly taking any medication or supplement to lower your cholesterol?   Yes- Simvastatin    Are you having muscle aches or other side effects that you think could be caused by your cholesterol lowering medication?  No  - LDL (3/24/2020): 40  - simvastatin 40mg      Hypertension Follow-up      Do you check your blood pressure regularly outside of the clinic? No     Are you following a low salt diet? Yes    Are your blood pressures ever more than 140 on the top number (systolic) OR more   than 90 on the bottom number (diastolic), for example 140/90? No    - lisinopril 10mg   - lightheadness only when standing up too quick    BP Readings from Last 2 Encounters:   09/01/20 100/71   03/24/20 118/74      Hemoglobin A1C (%)   Date Value   09/01/2020 7.7 (H)   03/24/2020 8.1 (H)     LDL Cholesterol Calculated (mg/dL)   Date Value   03/24/2020 40   03/21/2019 56   03/21/2019 56         Depression and Anxiety Follow-Up    How are you doing with your depression since your last visit? No change    How are you doing with your anxiety since your last visit?  No change    Are you having other symptoms that might be associated with depression or anxiety? No    Have you had a significant life event? No     Do you have any concerns with your use of alcohol or other drugs? No    Social History     Tobacco Use     Smoking status: Never Smoker     Smokeless tobacco: Never Used   Substance Use Topics     Alcohol use: No     Alcohol/week: 0.0 standard drinks     Drug use: No     PHQ 2/28/2019 8/15/2019 9/1/2020   PHQ-9 Total Score 0 1 1   Q9: Thoughts of better off dead/self-harm past 2 weeks Not at all Not at all Not at all     FIONA-7 SCORE 2/28/2019 8/15/2019 9/1/2020   Total Score 1 0 0     Last PHQ-9 9/1/2020   1.  Little interest or pleasure in doing things 0   2.  Feeling down, depressed, or hopeless 0   3.  Trouble falling or staying asleep, or sleeping too much 1   4.  Feeling tired or having little energy 0   5.  Poor appetite or overeating 0   6.  Feeling bad about yourself 0   7.  Trouble concentrating 0   8.  Moving slowly or restless 0   Q9: Thoughts of better off dead/self-harm past 2 weeks 0   PHQ-9 Total Score 1   Difficulty at work, home, or with people Not difficult at all     FIONA-7  9/1/2020   1. Feeling nervous, anxious, or on edge 0   2. Not being able to stop or control worrying 0   3. Worrying too much about different things 0   4. Trouble relaxing 0   5. Being so restless that it is hard to sit still 0   6. Becoming easily annoyed or irritable 0   7. Feeling afraid, as if something awful might happen 0   FIONA-7 Total Score 0   If you checked any problems, how difficult have they made it for you to do your  work, take care of things at home, or get along with other people? Not difficult at all       # Wellness:  - Immunizations: Due for Tdap, Shingrex updated today  - Lipids (3/24/2020): LDL 40   - Colon cancer screening (9/19/2016, [facility]): Hemorrhoid, return in 10 years    - PSA (8/15/2019): 0.80  - Lung cancer screening: N/A  - AAA screening: N/A    Patient Active Problem List   Diagnosis     DM type 1 (diabetes mellitus, type 1) (H)     HTN (hypertension)     Hyperlipidemia     ACP (advance care planning)     Diabetic polyneuropathy associated with type 1 diabetes mellitus (H)     Class 2 obesity in adult     Obesity (BMI 35.0-39.9) with comorbidity (H)     Past Surgical History:   Procedure Laterality Date     COLONOSCOPY - HIM SCAN N/A 09/19/2016    Procedure: COLONOSCOPY SCREENING; Surgeon: Rudy Rojo MD; Location: [facility] OR     ESOPHAGOSCOPY, GASTROSCOPY, DUODENOSCOPY (EGD), COMBINED  08/13/2019       Social History     Tobacco Use     Smoking status: Never Smoker     Smokeless tobacco: Never Used   Substance Use Topics     Alcohol use: No     Alcohol/week: 0.0 standard drinks     Family History   Problem Relation Age of Onset     No Known Problems Mother      Hypertension Father      Hypertension Brother          Current Outpatient Medications   Medication Sig Dispense Refill     acetone, Urine, test STRP Use as directed 50 each 3     aspirin 81 MG EC tablet Take 81 mg by mouth daily       blood glucose monitoring (MELISSA CONTOUR NEXT MONITOR) meter device kit 1 each       blood glucose monitoring (MELISSA CONTOUR NEXT) test strip Use to test blood sugar 6 times daily or as directed. 200 each 11     CALCIUM-VITAMIN D PO Take  by mouth daily. 600 mg       Continuous Blood Gluc  (DEXCOM G6 ) DAYAN 1 each continuous 1 Device 0     Continuous Blood Gluc Sensor (DEXCOM G6 SENSOR) MISC 1 each continuous 3 each 11     Continuous Blood Gluc Transmit (DEXCOM G6 TRANSMITTER) MISC 1 each continuous 1  each 4     CONTOUR NEXT TEST test strip TEST 6 TIMES DAILY, OR AS DIRECTED. 200 strip 3     gabapentin (NEURONTIN) 300 MG capsule Take 1 capsule (300 mg) by mouth 3 times daily 90 capsule 3     glucagon 1 MG injection 1 mg once       Glucosamine-Chondroit-Vit C-Mn (GLUCOSAMINE-CHONDROITIN MAX ST PO) Take  by mouth daily.       insulin aspart (NOVOLOG VIAL) 100 UNITS/ML vial USE WITH INSULIN PUMP FOR A TOTAL DAILY USAGE OF 70 UNITS 20 mL 1     insulin aspart (NOVOLOG VIAL) 100 UNITS/ML vial  UNITS IN PUMP EVERY 3 DAYS       INSULIN PUMP - OUTPATIENT Date last updated:  6/23/15  Medtronic Minimed: Model 751  BASAL RATES and times:  12   AM (midnight): 1.25 units/hour    2     AM: 1.3 units/hour   6     AM: 0.95 units/hour   6    PM: 1.2 units/hour   Basal Pattern A:  Richard Harvey will use when N/A.  CARB RATIO and times:  12   AM (midnight): 12  1    PM:  10  6    PM:    9  Corection Factor (Sensitivity) and times:  12   AM (midnight): 40 mg/dL  6     PM: 45 mg/dL  BLOOD GLUCOSE TARGET and times:  12   AM (midnight): 100 - 140  9     AM:  100 - 120  10   PM:  100 - 140  Active Insulin Time:  4 hours  Sensor:  Yes: 12   AM (midnight): 70 - 240  Carelink / Diasend username:  [email or web address]  Carelink / Diasend Password:  [email or web address]       lisinopril (ZESTRIL) 10 MG tablet Take 1 tablet (10 mg) by mouth daily 90 tablet 1     MOTRIN IB PO Take 400 mg by mouth every 6 hours as needed for moderate pain       Multiple Vitamin (MULTI-VITAMIN DAILY PO) Take  by mouth daily.       nitroglycerin (NITROSTAT) 0.4 MG SL tablet Place under the tongue every 5 minutes as needed       NOVOLOG VIAL 100 UNIT/ML soln USE WITH INSULIN PUMP FOR A TOTAL DAILY USAGE OF 70 UNITS 20 mL 3     omeprazole (PRILOSEC) 40 MG DR capsule Take 1 capsule by mouth once daily 90 capsule 0     ondansetron (ZOFRAN) 4 MG tablet TAKE 1 TABLET BY MOUTH EVERY 8 HOURS AS NEEDED FOR NAUSEA 30 tablet 1     order for DME Equipment being ordered:     1.  [employer]  insulin pump supplies--insertion sets and reserviors  Disp: 1 month  Refill: 11 months 30 days 11     order for DME Equipment being ordered: Please dispense a short leg CAM walker boot to the patient 1 Device 0     simvastatin (ZOCOR) 40 MG tablet Take 1 tablet (40 mg) by mouth At Bedtime 90 tablet 0     UREA 10 HYDRATING 10 % external cream APPLY CREAM TOPICALLY TO AFFECTED AREA AS NEEDED FOR CALLOUSED SKIN 85 g 0     URINE GLUCOSE-KETONES TEST VI        No Known Allergies  BP Readings from Last 3 Encounters:   09/01/20 100/71   03/24/20 118/74   01/16/20 124/63    Wt Readings from Last 3 Encounters:   09/01/20 93 kg (205 lb)   03/24/20 92.5 kg (204 lb)   01/16/20 92.9 kg (204 lb 14.4 oz)                    Reviewed and updated as needed this visit by Provider  Tobacco  Allergies  Meds  Problems  Med Hx  Surg Hx  Fam Hx         Review of Systems   Constitutional: Negative for chills and fever.   HENT: Negative for congestion.    Respiratory: Negative for shortness of breath and wheezing.    Cardiovascular: Negative for chest pain and palpitations.   Gastrointestinal: Negative for abdominal pain.   Neurological: Positive for light-headedness.          Objective    /71 (BP Location: Left arm, Patient Position: Chair, Cuff Size: Adult Regular)   Pulse 85   Temp 97.5  F (36.4  C) (Tympanic)   Wt 93 kg (205 lb)   SpO2 93%   BMI 39.88 kg/m    Body mass index is 39.88 kg/m .  Physical Exam  Constitutional:       General: He is not in acute distress.     Appearance: He is not ill-appearing.   Cardiovascular:      Rate and Rhythm: Normal rate.      Pulses: Normal pulses.      Heart sounds: No murmur.   Pulmonary:      Effort: Pulmonary effort is normal. No respiratory distress.      Breath sounds: No wheezing or rales.   Feet:      Right foot:      Protective Sensation: 10 sites tested. 3 sites sensed.      Toenail Condition: Right toenails are abnormally thick.      Left foot:      Protective Sensation:  10 sites tested. 10 sites sensed.      Toenail Condition: Left toenails are abnormally thick.   Neurological:      Mental Status: He is alert.          Diagnostic Test Results:  No results found for this or any previous visit (from the past 24 hour(s)).        Assessment & Plan     Need for vaccination    - ZOSTER VACCINE RECOMBINANT ADJUVANTED IM NJX  - ADMIN 1st VACCINE  - TDAP, IM (10 - 64 YRS) - Adacel  - EA ADD'L VACCINE    Diabetic polyneuropathy associated with type 1 diabetes mellitus (H)  A1c of 7.7 today from 8.1  - on insulin pump  - diabetic education appt, ?alarm capabilities with his current pump  - on ASA, statin, ACE    Hyperlipidemia, unspecified hyperlipidemia type  - LDL (3/24/2020): 40  - simvastatin 40mg      Essential hypertension  BP at goal   - lisinopril 10mg          See Patient Instructions    Return in about 3 months (around 12/1/2020) for Diabetic Visit, Lab Work.    Natacha Alvarez MD  [facility]

## 2020-09-01 ENCOUNTER — OFFICE VISIT (OUTPATIENT)
Dept: FAMILY MEDICINE | Facility: OTHER | Age: 55
End: 2020-09-01
Attending: FAMILY MEDICINE
Payer: COMMERCIAL

## 2020-09-01 VITALS
WEIGHT: 205 LBS | HEART RATE: 85 BPM | SYSTOLIC BLOOD PRESSURE: 100 MMHG | DIASTOLIC BLOOD PRESSURE: 71 MMHG | OXYGEN SATURATION: 93 % | TEMPERATURE: 97.5 F | BODY MASS INDEX: 39.88 KG/M2

## 2020-09-01 DIAGNOSIS — Z23 NEED FOR VACCINATION: Primary | ICD-10-CM

## 2020-09-01 DIAGNOSIS — E10.42 DIABETIC POLYNEUROPATHY ASSOCIATED WITH TYPE 1 DIABETES MELLITUS (H): ICD-10-CM

## 2020-09-01 DIAGNOSIS — I10 ESSENTIAL HYPERTENSION: ICD-10-CM

## 2020-09-01 DIAGNOSIS — E78.5 HYPERLIPIDEMIA, UNSPECIFIED HYPERLIPIDEMIA TYPE: ICD-10-CM

## 2020-09-01 LAB
ANION GAP SERPL CALCULATED.3IONS-SCNC: 5 MMOL/L (ref 3–14)
BUN SERPL-MCNC: 19 MG/DL (ref 7–30)
CALCIUM SERPL-MCNC: 8.4 MG/DL (ref 8.5–10.1)
CHLORIDE SERPL-SCNC: 107 MMOL/L (ref 94–109)
CO2 SERPL-SCNC: 27 MMOL/L (ref 20–32)
CREAT SERPL-MCNC: 0.98 MG/DL (ref 0.66–1.25)
ERYTHROCYTE [DISTWIDTH] IN BLOOD BY AUTOMATED COUNT: 12.1 % (ref 10–15)
EST. AVERAGE GLUCOSE BLD GHB EST-MCNC: 174 MG/DL
GFR SERPL CREATININE-BSD FRML MDRD: 86 ML/MIN/{1.73_M2}
GLUCOSE SERPL-MCNC: 126 MG/DL (ref 70–99)
HBA1C MFR BLD: 7.7 % (ref 0–5.6)
HCT VFR BLD AUTO: 41.5 % (ref 40–53)
HGB BLD-MCNC: 13.6 G/DL (ref 13.3–17.7)
MCH RBC QN AUTO: 30 PG (ref 26.5–33)
MCHC RBC AUTO-ENTMCNC: 32.8 G/DL (ref 31.5–36.5)
MCV RBC AUTO: 91 FL (ref 78–100)
PLATELET # BLD AUTO: 283 10E9/L (ref 150–450)
POTASSIUM SERPL-SCNC: 4 MMOL/L (ref 3.4–5.3)
RBC # BLD AUTO: 4.54 10E12/L (ref 4.4–5.9)
SODIUM SERPL-SCNC: 139 MMOL/L (ref 133–144)
WBC # BLD AUTO: 7.8 10E9/L (ref 4–11)

## 2020-09-01 PROCEDURE — 83036 HEMOGLOBIN GLYCOSYLATED A1C: CPT | Mod: ZL | Performed by: FAMILY MEDICINE

## 2020-09-01 PROCEDURE — 90715 TDAP VACCINE 7 YRS/> IM: CPT

## 2020-09-01 PROCEDURE — 36415 COLL VENOUS BLD VENIPUNCTURE: CPT | Mod: ZL | Performed by: FAMILY MEDICINE

## 2020-09-01 PROCEDURE — 99213 OFFICE O/P EST LOW 20 MIN: CPT | Performed by: FAMILY MEDICINE

## 2020-09-01 PROCEDURE — 90750 HZV VACC RECOMBINANT IM: CPT

## 2020-09-01 PROCEDURE — 90472 IMMUNIZATION ADMIN EACH ADD: CPT | Performed by: FAMILY MEDICINE

## 2020-09-01 PROCEDURE — 40000788 ZZHCL STATISTIC ESTIMATED AVERAGE GLUCOSE: Mod: ZL | Performed by: FAMILY MEDICINE

## 2020-09-01 PROCEDURE — 85027 COMPLETE CBC AUTOMATED: CPT | Mod: ZL | Performed by: FAMILY MEDICINE

## 2020-09-01 PROCEDURE — 80048 BASIC METABOLIC PNL TOTAL CA: CPT | Mod: ZL | Performed by: FAMILY MEDICINE

## 2020-09-01 PROCEDURE — G0463 HOSPITAL OUTPT CLINIC VISIT: HCPCS | Mod: 25

## 2020-09-01 PROCEDURE — 90471 IMMUNIZATION ADMIN: CPT | Performed by: FAMILY MEDICINE

## 2020-09-01 ASSESSMENT — ANXIETY QUESTIONNAIRES
IF YOU CHECKED OFF ANY PROBLEMS ON THIS QUESTIONNAIRE, HOW DIFFICULT HAVE THESE PROBLEMS MADE IT FOR YOU TO DO YOUR WORK, TAKE CARE OF THINGS AT HOME, OR GET ALONG WITH OTHER PEOPLE: NOT DIFFICULT AT ALL
6. BECOMING EASILY ANNOYED OR IRRITABLE: NOT AT ALL
1. FEELING NERVOUS, ANXIOUS, OR ON EDGE: NOT AT ALL
3. WORRYING TOO MUCH ABOUT DIFFERENT THINGS: NOT AT ALL
5. BEING SO RESTLESS THAT IT IS HARD TO SIT STILL: NOT AT ALL
GAD7 TOTAL SCORE: 0
2. NOT BEING ABLE TO STOP OR CONTROL WORRYING: NOT AT ALL
7. FEELING AFRAID AS IF SOMETHING AWFUL MIGHT HAPPEN: NOT AT ALL

## 2020-09-01 ASSESSMENT — PAIN SCALES - GENERAL: PAINLEVEL: NO PAIN (0)

## 2020-09-01 ASSESSMENT — PATIENT HEALTH QUESTIONNAIRE - PHQ9
5. POOR APPETITE OR OVEREATING: NOT AT ALL
SUM OF ALL RESPONSES TO PHQ QUESTIONS 1-9: 1

## 2020-09-01 ASSESSMENT — ENCOUNTER SYMPTOMS
SHORTNESS OF BREATH: 0
FEVER: 0
ABDOMINAL PAIN: 0
LIGHT-HEADEDNESS: 1
WHEEZING: 0
PALPITATIONS: 0
CHILLS: 0

## 2020-09-01 NOTE — NURSING NOTE
"Chief Complaint   Patient presents with     Diabetes     Lipids     Hypertension     Depression     Anxiety       Initial /71 (BP Location: Left arm, Patient Position: Chair, Cuff Size: Adult Regular)   Pulse 85   Temp 97.5  F (36.4  C) (Tympanic)   Wt 93 kg (205 lb)   SpO2 93%   BMI 39.88 kg/m   Estimated body mass index is 39.88 kg/m  as calculated from the following:    Height as of 3/24/20: 1.527 m (5' 0.12\").    Weight as of this encounter: 93 kg (205 lb).  Medication Reconciliation: complete  Rosy Smith LPN  "

## 2020-09-02 ASSESSMENT — ANXIETY QUESTIONNAIRES: GAD7 TOTAL SCORE: 0

## 2020-09-03 ENCOUNTER — ALLIED HEALTH/NURSE VISIT (OUTPATIENT)
Dept: EDUCATION SERVICES | Facility: OTHER | Age: 55
End: 2020-09-03
Attending: NURSE PRACTITIONER
Payer: COMMERCIAL

## 2020-09-03 VITALS
BODY MASS INDEX: 38.34 KG/M2 | TEMPERATURE: 97.4 F | OXYGEN SATURATION: 93 % | DIASTOLIC BLOOD PRESSURE: 62 MMHG | WEIGHT: 203.1 LBS | RESPIRATION RATE: 18 BRPM | HEART RATE: 84 BPM | SYSTOLIC BLOOD PRESSURE: 114 MMHG | HEIGHT: 61 IN

## 2020-09-03 DIAGNOSIS — K90.0 CELIAC DISEASE: ICD-10-CM

## 2020-09-03 DIAGNOSIS — E10.65 TYPE 1 DIABETES MELLITUS WITH HYPERGLYCEMIA (H): Primary | ICD-10-CM

## 2020-09-03 PROCEDURE — 99213 OFFICE O/P EST LOW 20 MIN: CPT | Performed by: NURSE PRACTITIONER

## 2020-09-03 PROCEDURE — G0463 HOSPITAL OUTPT CLINIC VISIT: HCPCS

## 2020-09-03 ASSESSMENT — PAIN SCALES - GENERAL: PAINLEVEL: NO PAIN (0)

## 2020-09-03 ASSESSMENT — MIFFLIN-ST. JEOR: SCORE: 1614.64

## 2020-09-03 NOTE — PATIENT INSTRUCTIONS
[employer] customer service:    [phone]        Continue working on healthy eating and moving as best as you can (start low and slow, work up to 30 min, 5x/week)    BG goals:  Fasting and before meals <130, >70  2 hour after eating <180    We only need 1/2 of these numbers to be within target then your A1c will be within target    Medication changes   I need more information    Follow up   Tomorrow for a curbside download    In the meantime,   Try using the temp basal down to 80% when super busy at work  Can try either Boost Diabetic drink (low carb, high protein) or Extend Bars    Call me sooner if any problems/concerns and/or questions develop including consistent low BGs <70 or consistent high BGs >200  [phone] (Unit Coordinator)    [phone] (Nurse)          Patient Education     Celiac Disease     With celiac disease, villi inside the small intestine become damaged and cannot absorb nutrients properly.     Celiac disease is caused by a sensitivity or allergy to gluten. This is a protein found in many grains such as wheat, barley, and rye. Celiac disease affects villi (tiny, fingerlike stalks) in the small bowel (intestine). Normally, the villi make it possible for the small bowel to absorb nutrients from the food you eat. But celiac disease damages the villi. As a result, you can t absorb the nutrients you need, even if you eat plenty of food. Celiac disease is an autoimmune disease. You can manage the disease by removing gluten from your diet. This relieves your symptoms. It also reverses the damage to your small bowel. Celiac disease is sometimes called celiac sprue.  Causes of celiac disease  Celiac disease may have a genetic component. This means it can be passed down in families. If your healthcare provider thinks that you have celiac disease, he or she may advise that other members of your family be checked for it as well.  Signs and symptoms of celiac disease  The symptoms of celiac disease  can vary for each person. Some people have no symptoms at all. If symptoms do happen, they can include:    Diarrhea, constipation, or both    Light colored, foul-smelling or fatty stool    Belly pain and cramping    Belly swelling or bloating    Weight loss    Bone or joint pain    Iron deficiency    Headaches    Tiredness and loss of energy    Mood changes, irritability, and depression    Infertility    Unexplained elevated liver tests    Canker sores    Skin rash    Tooth enamel problems  Diagnosing celiac disease  Your healthcare provider will ask about your symptoms and health history. You ll also have a physical exam. Tests are then done to confirm the problem. These can include:    Blood tests. These help check for specific proteins in the blood that are present with celiac disease. They also check for anemia and help rule out other problems. The tests are done by taking a blood sample.    Upper endoscopy with biopsy. This is done to see inside the stomach and duodenum (first part of the small bowel). For the test, an endoscope is used. This is a thin, flexible tube with a tiny camera on the end. It s inserted through the mouth and down into the stomach and duodenum. Tools are passed through the endoscope to remove tiny tissue samples (biopsy). The tissue samples are taken to a lab and looked at under a microscope. This is to check the tiny villi for damage. This test must be done while you are still eating food with gluten. This is the only way to see whether the presence of gluten is damaging the villi.    Genetic tests. These check for problems with specific genes linked to celiac disease. They are done by taking blood samples.  Treating celiac disease  To treat celiac disease, you must remove all sources of gluten from your diet. This will allow the villi to heal, so that nutrients can be absorbed normally. It s important to follow a strict, gluten-free diet daily, even if you don t have symptoms. If you  don t do this, the small bowel can become permanently damaged, which can lead to serious health problems. These include bone disease, cancer of the small bowel, and various nervous system disorders.  Sources of gluten  Gluten is found in wheat, barley, and rye. The most common foods with gluten are those made with wheat flour. These include bread, pasta, cake, and cereal. Gluten is also often found in beer, gravies, salad dressings, and most packaged foods. It is even found in some nonfood products, such as certain medicines and cosmetics. Your healthcare provider can refer you to a dietitian to  you about what you should avoid. The resources below will also give you lists of food and products that contain gluten.  Follow-up  You ll meet with your healthcare provider periodically to monitor your health. During these visits, routine blood tests are often done to make sure your condition is under control. Your healthcare provider can also refer you to other healthcare providers or support and advocacy groups to help you cope with your condition.  Learning more about celiac disease  The following resources can help you learn more about celiac disease and how to manage it:    Celiac Disease Foundation, www.celiac.org    Celiac Sprue Association, www.csaceliacs.org    Gluten Intolerance Group, www.gluten.net    National [place] of Diabetes and Digestive and Kidney Diseases, www2.niddk.nih.gov   Date Last Reviewed: 7/1/2016 2000-2019 [employer]. [address] [place] [address], [place] [address]. All rights reserved. This information is not intended as a substitute for professional medical care. Always follow your healthcare professional's instructions.           Patient Education     Gluten-Free Diet for Celiac Disease  Celiac disease means that you are sensitive to a protein called gluten. Gluten is found in certain grains. When you eat gluten, your immune system causes harm to your small intestines.  "The treatment for celiac disease is to stay away from foods and products that contain gluten. You will need to do this for the rest of your life. Resist the temptation to  cheat,  because even a small amount of gluten can cause symptoms to return. And it can harm your body. This sheet gives you the basics about a gluten-free diet. If you need help, a registered dietitian can teach you what foods and other products have gluten and how to keep away from them.  Always read labels!  Many foods may contain gluten, even if you think they don't. Get into the habit of reading ingredient labels before you eat.   Choosing foods  The most common source of gluten is wheat flour (this includes \"white\" flour). Wheat flour is used to make many baked goods, including breads, pastas, cereals, pastries, and pizza dough. Gluten is also found in many foods that you might not think would have it. You will need to read food labels to look for gluten in everything you eat. But your diet does not need to be boring. Many foods are naturally gluten-free. And many foods commonly made with wheat flour now come in gluten-free forms.   Foods to stay away from Foods you can eat   Bread, cereals, pasta, pastries, couscous, or pizza dough made with wheat flour (including white flour, farina, farro, emmer, durum, ada, and semolina) Bread, cereals, pasta, pastries, or pizza dough made with rice flour, almond flour, beans, potatoes, and other gluten-free substitutes   Foods containing rye, barley (including malt), spelt, kamut, triticale, kerr's yeast, and bulgur Foods containing corn, cassava, rice, amaranth, buckwheat, millet, quinoa, arrowroot, teff, soy, and tapioca   Processed meats Fresh meats and seafood (beef, chicken, turkey, lamb, pork, fish, shellfish), beans, and tofu   Some dairy products with additives Many plain dairy products   Many sauces, gravies, dressings, and condiments, including traditional soy sauce Vinegar, oils, and " "gluten-free substitutes   Some granola bars and energy bars Granola bars and energy bars labeled \"gluten-free\"   Some beers and spirits Wine, and gluten-free beers and distilled spirits   Some soups Gluten-free soups   Fruits and vegetables that are fried or breaded Fresh fruits and vegetables   Many packaged foods Packaged foods labeled \"gluten-free\"   Oats (check with your healthcare provider) Gluten-free oats   Communion wafers Gluten-free communion wafers   If you are exposed to gluten by accident  Staying gluten-free means always being aware. Even if you are very careful, mistakes can happen. The food you eat can't come into contact with gluten. Your meals must be made with utensils that have not touched foods that contain gluten. Shared knives, cutting boards, toasters, and storage containers are risks for gluten exposure. Shared condiments may have crumbs that contain gluten. At restaurants, parties, and other places where you eat food prepared by others, ask how the food was made. Gluten can also be found in some non-food items. Some medicines contain gluten. So do some vitamin supplements. Ask your pharmacist before taking a medicine or supplement. Also, some shampoos, lotions, toothpastes, makeup, lipsticks as well as lip gloss and lip balm, glues, soaps, and other products contain gluten. It can be possible to ingest some gluten when using these projects. This is called cross-contamination. For example, this can happen if you use a lotion that has gluten and then touch food you eat. Children's wade and similar products have gluten. Any adult or child with celiac disease should wash their hands after handling these.  Coping with gluten-free living  Living gluten-free can be hard. But it can be done. While there are many gluten-free foods now that you can buy, it is still a big change for many people. You may be upset that you can't eat your favorite foods, eat freely at restaurants, parties, or over the " holidays. Household members may also be upset by the strict controls over food. If you face problems like these, think about joining a celiac disease support group. Support groups offer tips on how to make a gluten-free lifestyle easier on you and the people you live with. You can find ways to involve the people in your household. There are many ways to make gluten-free group meals. See  More Resources  below for help in finding a group.  Bring safe foods that you enjoy to parties and school or work events. This can help you avoid the urge to grab something you shouldn t eat.   Following up with your healthcare provider  You should see your healthcare provider at least once a year for a checkup. A simple blood test can show if your celiac disease is under control. If you are having symptoms, your healthcare provider can help you find sources of gluten you may have missed.  More resources  To learn more about managing celiac disease, go to:    Celiac Disease Foundation: www.celiac.org    Academy of Nutrition and Dietetics: www.eatright.org    National Digestive Diseases Information Clearinghouse: www.digestive.niddk.nih.gov  Date Last Reviewed: 6/1/2017 2000-2019 [employer]. [address] [place] [address], [place] [address]. All rights reserved. This information is not intended as a substitute for professional medical care. Always follow your healthcare professional's instructions.

## 2020-09-03 NOTE — PROGRESS NOTES
"SUBJECTIVE:  Richard Harvey, 55 year old, male presents with the following Chief Complaint(s) with HPI to follow:  Chief Complaint   Patient presents with     Diabetes Education     Pump adjust        Diabetes Follow-up      Patient is checking blood sugars: continuous using Dexcom--4x/day for > 60 days.  Results:  Issues with the Dexcom not connecting    Overnight: 122 and 209  Breakfast: 65,   Mid-mornin and 193  Lunch: 45,   Mid-afternoon: no checks  Dinner: 47, 49, 62,   Overnight: 102-283      Symptoms of hypoglycemia (low blood sugar): yes--not sure why; history of hypoglycemia unawareness    Paresthesias (numbness or burning in feet) or sores: Yes--hand and feet--\"same\"; no sores    Diabetic eye exam within the last year: Yes    Breakfast eaten regularly: Yes    Patient counting carbs: trying       HPI: Richard's here today for the follow up regarding his Diabetes mellitus, Type 1.    Lab Results   Component Value Date    A1C 7.7 2020    A1C 8.1 2020    A1C 7.2 2019    A1C 7.3 08/15/2019    A1C 7.3 2019     Current Diabetes medication:   1.  Insulin pump, uses Novolog; not wearing his personal CGM--Dexcom  ASA use: yes, 325 mg daily  Statin use: yes, simvastatin 40 mg at bedime  Denies any issues with the insulin pump.      Richard's here for insulin pump and Dexcom G6 download with possible adjust.  He reports the following:   Unfortunately, his Dexcom isn't connecting to the . He hasn't called Dexcom and forgot to bring his  today.    Still working 10:45 am-4:30 pm as a  at [employer], Monday through Friday.   Reports issues with low BGs--not sure why. Hx of hypoglycemia unawareness      Denies any new health concerns.  Continues to have issues with nausea after eating.  He's been taking his zofran which helps.          Patient Active Problem List   Diagnosis     DM type 1 (diabetes mellitus, type 1) (H)     HTN (hypertension)     " Hyperlipidemia     ACP (advance care planning)     Diabetic polyneuropathy associated with type 1 diabetes mellitus (H)     Class 2 obesity in adult     Obesity (BMI 35.0-39.9) with comorbidity (H)       Past Medical History:   Diagnosis Date     Diabetes mellitus type 1, controlled (H)      HLD (hyperlipidemia)      HTN (hypertension)      Neuropathy      Type 1 diabetes (H)        Past Surgical History:   Procedure Laterality Date     COLONOSCOPY - HIM SCAN N/A 09/19/2016    Procedure: COLONOSCOPY SCREENING; Surgeon: Rudy Rojo MD; Location: [facility] OR     ESOPHAGOSCOPY, GASTROSCOPY, DUODENOSCOPY (EGD), COMBINED  08/13/2019       Family History   Problem Relation Age of Onset     No Known Problems Mother      Hypertension Father      Hypertension Brother        Social History     Tobacco Use     Smoking status: Never Smoker     Smokeless tobacco: Never Used   Substance Use Topics     Alcohol use: No     Alcohol/week: 0.0 standard drinks       Current Outpatient Medications   Medication Sig Dispense Refill     acetone, Urine, test STRP Use as directed 50 each 3     aspirin 81 MG EC tablet Take 81 mg by mouth daily       blood glucose monitoring (MELISSA CONTOUR NEXT MONITOR) meter device kit 1 each       blood glucose monitoring (MELISSA CONTOUR NEXT) test strip Use to test blood sugar 6 times daily or as directed. 200 each 11     CALCIUM-VITAMIN D PO Take  by mouth daily. 600 mg       Continuous Blood Gluc  (DEXCOM G6 ) DAYAN 1 each continuous 1 Device 0     Continuous Blood Gluc Sensor (DEXCOM G6 SENSOR) MISC 1 each continuous 3 each 11     Continuous Blood Gluc Transmit (DEXCOM G6 TRANSMITTER) MISC 1 each continuous 1 each 4     CONTOUR NEXT TEST test strip TEST 6 TIMES DAILY, OR AS DIRECTED. 200 strip 3     gabapentin (NEURONTIN) 300 MG capsule Take 1 capsule (300 mg) by mouth 3 times daily 90 capsule 3     Glucosamine-Chondroit-Vit C-Mn (GLUCOSAMINE-CHONDROITIN MAX ST PO) Take  by mouth daily.        insulin aspart (NOVOLOG VIAL) 100 UNITS/ML vial USE WITH INSULIN PUMP FOR A TOTAL DAILY USAGE OF 70 UNITS 20 mL 1     insulin aspart (NOVOLOG VIAL) 100 UNITS/ML vial  UNITS IN PUMP EVERY 3 DAYS       INSULIN PUMP - OUTPATIENT Date last updated:  6/23/15  [employer] Minimed: Model 751  BASAL RATES and times:  12   AM (midnight): 1.25 units/hour    2     AM: 1.3 units/hour   6     AM: 0.95 units/hour   6    PM: 1.2 units/hour   Basal Pattern A:  Richard Harvey will use when N/A.  CARB RATIO and times:  12   AM (midnight): 12  1    PM:  10  6    PM:    9  Corection Factor (Sensitivity) and times:  12   AM (midnight): 40 mg/dL  6     PM: 45 mg/dL  BLOOD GLUCOSE TARGET and times:  12   AM (midnight): 100 - 140  9     AM:  100 - 120  10   PM:  100 - 140  Active Insulin Time:  4 hours  Sensor:  Yes: 12   AM (midnight): 70 - 240  Carelink / Diasend username:  [email or web address]  Carelink / Diasend Password:  [email or web address]       lisinopril (ZESTRIL) 10 MG tablet Take 1 tablet (10 mg) by mouth daily 90 tablet 1     MOTRIN IB PO Take 400 mg by mouth every 6 hours as needed for moderate pain       Multiple Vitamin (MULTI-VITAMIN DAILY PO) Take  by mouth daily.       omeprazole (PRILOSEC) 40 MG DR capsule Take 1 capsule by mouth once daily 90 capsule 0     ondansetron (ZOFRAN) 4 MG tablet TAKE 1 TABLET BY MOUTH EVERY 8 HOURS AS NEEDED FOR NAUSEA 30 tablet 1     order for DME Equipment being ordered:     1.  Medtronic insulin pump supplies--insertion sets and reserviors  Disp: 1 month  Refill: 11 months 30 days 11     order for DME Equipment being ordered: Please dispense a short leg CAM walker boot to the patient 1 Device 0     simvastatin (ZOCOR) 40 MG tablet Take 1 tablet (40 mg) by mouth At Bedtime 90 tablet 0     UREA 10 HYDRATING 10 % external cream APPLY CREAM TOPICALLY TO AFFECTED AREA AS NEEDED FOR CALLOUSED SKIN 85 g 0     URINE GLUCOSE-KETONES TEST VI        glucagon 1 MG injection 1 mg once       nitroglycerin  "(NITROSTAT) 0.4 MG SL tablet Place under the tongue every 5 minutes as needed       NOVOLOG VIAL 100 UNIT/ML soln USE WITH INSULIN PUMP FOR A TOTAL DAILY USAGE OF 70 UNITS 20 mL 3       No Known Allergies    REVIEW OF SYSTEMS  Skin: negative  Eyes: negative  Ears/Nose/Throat: negative  Respiratory: No shortness of breath, dyspnea on exertion, cough, or hemoptysis  Cardiovascular: negative  Gastrointestinal: \"touchy\", still issues with nausea after eating--taking Zofran with meals  Genitourinary: negative  Musculoskeletal: history of arthritis--hands, shoulders, knees  Neurologic: positive for numbness or tingling of hands and numbness or tingling of feet--same  Psychiatric: stress  Hematologic/Lymphatic/Immunologic: negative  Endocrine: positive for diabetes    OBJECTIVE:  /62 (BP Location: Left arm, Patient Position: Sitting, Cuff Size: Adult Regular)   Pulse 84   Temp 97.4  F (36.3  C) (Tympanic)   Resp 18   Ht 1.549 m (5' 1\")   Wt 92.1 kg (203 lb 1.6 oz)   SpO2 93%   BMI 38.38 kg/m    Constitutional: healthy, alert and no distress  Musculoskeletal: extremities normal- no gross deformities noted and gait normal  Skin: no suspicious lesions or rashes to visible skin  Psychiatric: mentation appears normal and affect normal/bright      LAB  No results found for any visits on 20.    ASSESSMENT / PLAN:.  (E10.65) Type 1 diabetes mellitus with hyperglycemia (H)  (primary encounter diagnosis)  Comment:    Noted the following:    Insulin pump information:   Average: 149 +/- 73  Basal/bolus ratio: 24 (48%)/26 (52%)   Testinx/day    Hypoglycemia: yes,    Plan:   Told him to use his advance features to help manage his low BGs as there's not a pattern to these    (K90.0) Celiac disease  Comment: noted  Plan:   Will talk to Dr. Alvarez about a plan.   Follow up with dietician (Catherine DE LA ROSA RD CDE) to learn diet    Follow up  Download of insulin pump and Dexcom during dietician appt  Call sooner if continued " issues with low BGs      Patient Instructions   Continue working on healthy eating and moving as best as you can (start low and slow, work up to 30 min, 5x/week)    BG goals:  Fasting and before meals <130, >70  2 hour after eating <180    We only need 1/2 of these numbers to be within target then your A1c will be within target    Medication changes   I need more information    Follow up   Tomorrow for a curbside download    In the meantime,   Try using the temp basal down to 80% when super busy at work  Can try either Boost Diabetic drink (low carb, high protein) or Extend Bars    Call me sooner if any problems/concerns and/or questions develop including consistent low BGs <70 or consistent high BGs >200  [phone] (Unit Coordinator)    [phone] (Nurse)      [employer] customer service:    [phone]      Time: 40 min  Barrier: none  Willingness to learn: accepting    Macrina CHAMBERLAIN [facility]    With the electronic record, we can now more quickly and easily track our patient diabetic goals. Our diabetes clinical review is in progress and these are the indicators we are monitoring for good diabetes health.     1.) HbA1C less than 7 (measurement of your average blood sugars)  2.) Blood Pressure less than 140/80  3.) LDL less than 100 (bad cholesterol)  4.) HbA1C is checked in the last 6 months and below 7% (more frequently if not at goal or adjusting medications)  5.) LDL is checked in the last 12 months (more frequently if not at goal or adjusting medications)  6.) Taking one baby aspirin daily (unless otherwise instructed)  7.) No tobacco use  8) Statin use     You have achieved 7 out of 8 of these and I am encouraging you to come in and get tuned up to achieve 8 out of 8!  Here is what you have achieved so far in my goals for you:  1.) HbA1C  less than 7:                              NO  Your last  HbA1C :   Lab Results   Component Value Date    A1C 7.7 09/01/2020    A1C 8.1  "03/24/2020    A1C 7.2 12/12/2019    A1C 7.3 08/15/2019    A1C 7.3 03/21/2019       2.) Blood Pressure less than 140/80:       YES      Your last    /62 (BP Location: Left arm, Patient Position: Sitting, Cuff Size: Adult Regular)   Pulse 84   Temp 97.4  F (36.3  C) (Tympanic)   Resp 18   Ht 1.549 m (5' 1\")   Wt 92.1 kg (203 lb 1.6 oz)   SpO2 93%   BMI 38.38 kg/m      3.) LDL less than 100:                              YES      Your last   LDL         LDL Cholesterol Calculated   Date Value Ref Range Status   03/24/2020 40 <100 mg/dL Final     Comment:     Desirable:       <100 mg/dl   ]    4.) Checked HbA1C in the past 6 months: YES      5.) Checked LDL in the past 12 months:    YES     6.) Taking one aspirin daily:                       YES     7.) No tobacco use:                                        YES      8.) Statin use      YES              "

## 2020-09-08 DIAGNOSIS — R11.0 NAUSEA: Primary | ICD-10-CM

## 2020-09-14 ENCOUNTER — TELEPHONE (OUTPATIENT)
Dept: FAMILY MEDICINE | Facility: OTHER | Age: 55
End: 2020-09-14

## 2020-09-14 DIAGNOSIS — R11.0 NAUSEA: ICD-10-CM

## 2020-09-14 DIAGNOSIS — E10.65 TYPE 1 DIABETES MELLITUS WITH HYPERGLYCEMIA (H): Primary | ICD-10-CM

## 2020-09-14 RX ORDER — ONDANSETRON 4 MG/1
TABLET, FILM COATED ORAL
Qty: 30 TABLET | Refills: 0 | Status: SHIPPED | OUTPATIENT
Start: 2020-09-14 | End: 2020-09-29

## 2020-09-14 NOTE — TELEPHONE ENCOUNTER
Zofran       Last Written Prescription Date:  8/13/2020  Last Fill Quantity: 30,   # refills: 1  Last Office Visit: 9/1/2020  Future Office visit:    Next 5 appointments (look out 90 days)    Dec 01, 2020  8:45 AM CST  (Arrive by 8:30 AM)  SHORT with Natacha Alvarez MD  [facility] [place] ([facility] [place] ) [address]  [place] [address]  [phone]

## 2020-09-21 ENCOUNTER — HOSPITAL ENCOUNTER (OUTPATIENT)
Dept: EDUCATION SERVICES | Facility: HOSPITAL | Age: 55
Discharge: HOME | End: 2020-09-21
Attending: NURSE PRACTITIONER | Admitting: FAMILY MEDICINE
Payer: COMMERCIAL

## 2020-09-21 VITALS
SYSTOLIC BLOOD PRESSURE: 107 MMHG | WEIGHT: 201.4 LBS | BODY MASS INDEX: 39.54 KG/M2 | HEIGHT: 60 IN | RESPIRATION RATE: 16 BRPM | HEART RATE: 92 BPM | DIASTOLIC BLOOD PRESSURE: 71 MMHG | OXYGEN SATURATION: 94 %

## 2020-09-21 DIAGNOSIS — E10.65 TYPE 1 DIABETES MELLITUS WITH HYPERGLYCEMIA (H): Primary | ICD-10-CM

## 2020-09-21 PROCEDURE — 97802 MEDICAL NUTRITION INDIV IN: CPT | Performed by: DIETITIAN, REGISTERED

## 2020-09-21 ASSESSMENT — MIFFLIN-ST. JEOR: SCORE: 1592.95

## 2020-09-21 ASSESSMENT — PAIN SCALES - GENERAL: PAINLEVEL: MODERATE PAIN (5)

## 2020-09-21 NOTE — PROGRESS NOTES
"Pt is here today for medical nutrition therapy r/t possible dx of celiac disease.  Pt states dx is not confirmed.  Could not find confirming information in medical chart.  Note plans for gastric emptying study next week.     /71   Pulse 92   Resp 16   Ht 1.527 m (5' 0.12\")   Wt 91.4 kg (201 lb 6.4 oz)   SpO2 94%   BMI 39.18 kg/m     Pt reports stable weight.     Pt reports hx of having nausea and diarrhea after eating meals.  This has been ongoing for some time but is now being controlled with taking Prilosec 1x/day and Zofran prior to meals.      Verbal diet recall obtained.  Pt works at a local [employer].   Breakfast-poptart and diet coke  Lunch-eats a deli - burger and french fries or sandwich/wrap - Gatorade  Supper-may dine out or cook for self - pulled pork sandwich with fries, pizza, 2 corn dogs, pasta with meat/tomato sauce, scrambled eggs/toast - water  HS-pretzels or almond M & M's    Pt does wear an insulin pump and states he counts his carbohydrates.      Discussed that since dx does not seem to be confirmed he should try following gluten free diet x 4-6 weeks to see if symptoms improve.  Reviewed foods that contain gluten and foods that are allowed.  Discussed label reading and ingredients to avoid.  Written educational materials were provided along with name and number of RD.    Follow up scheduled with CNP for pump adjust and to review results of gastric emptying study and to see if symptoms improve with gluten free diet.  RD available as needed.      Total time spent with pt was 30 minutes.   "

## 2020-09-21 NOTE — LETTER
"    9/21/2020        RE: Richard Harvey  [address]  [facility] [address]        Pt is here today for medical nutrition therapy r/t possible dx of celiac disease.  Pt states dx is not confirmed.  Could not find confirming information in medical chart.  Note plans for gastric emptying study next week.     /71   Pulse 92   Resp 16   Ht 1.527 m (5' 0.12\")   Wt 91.4 kg (201 lb 6.4 oz)   SpO2 94%   BMI 39.18 kg/m     Pt reports stable weight.     Pt reports hx of having nausea and diarrhea after eating meals.  This has been ongoing for some time but is now being controlled with taking Prilosec 1x/day and Zofran prior to meals.      Verbal diet recall obtained.  Pt works at a local [employer].   Breakfast-poptart and diet coke  Lunch-eats a deli - burger and french fries or sandwich/wrap - Gatorade  Supper-may dine out or cook for self - pulled pork sandwich with fries, pizza, 2 corn dogs, pasta with meat/tomato sauce, scrambled eggs/toast - water  HS-pretzels or almond M & M's    Pt does wear an insulin pump and states he counts his carbohydrates.      Discussed that since dx does not seem to be confirmed he should try following gluten free diet x 4-6 weeks to see if symptoms improve.  Reviewed foods that contain gluten and foods that are allowed.  Discussed label reading and ingredients to avoid.  Written educational materials were provided along with name and number of RD.    Follow up scheduled with CNP for pump adjust and to review results of gastric emptying study and to see if symptoms improve with gluten free diet.  RD available as needed.      Total time spent with pt was 30 minutes.       Sincerely,        Catherine Wiseman RD    "

## 2020-09-25 ENCOUNTER — TELEPHONE (OUTPATIENT)
Dept: NUCLEAR MEDICINE | Facility: HOSPITAL | Age: 55
End: 2020-09-25

## 2020-09-25 NOTE — TELEPHONE ENCOUNTER
Good afternoon my name is Eleanor and I am calling from [employer] in [place].  This message is for Mr. Richard Harvey.   I am calling to remind you of your appointment on Monday, September 28th, with a registration time of 1015.   There is preparation for this exam.    I will be here today until about 1:15 and you can call me at [phone].  Otherwise our scheduling staff is available until 5 and you can reach them at [phone].  Have a wonderful weekend and see you on Monday.

## 2020-09-28 ENCOUNTER — HOSPITAL ENCOUNTER (OUTPATIENT)
Dept: NUCLEAR MEDICINE | Facility: HOSPITAL | Age: 55
End: 2020-09-28
Attending: FAMILY MEDICINE
Payer: COMMERCIAL

## 2020-09-28 ENCOUNTER — TELEPHONE (OUTPATIENT)
Dept: NUCLEAR MEDICINE | Facility: HOSPITAL | Age: 55
End: 2020-09-28

## 2020-09-28 DIAGNOSIS — R11.0 NAUSEA: ICD-10-CM

## 2020-09-28 PROCEDURE — 78264 GASTRIC EMPTYING IMG STUDY: CPT | Mod: TC

## 2020-09-28 PROCEDURE — 34300033 ZZH RX 343: Performed by: RADIOLOGY

## 2020-09-28 PROCEDURE — A9541 TC99M SULFUR COLLOID: HCPCS | Performed by: RADIOLOGY

## 2020-09-28 RX ADMIN — Medication 1.05 MILLICURIE: at 10:34

## 2020-09-28 NOTE — TELEPHONE ENCOUNTER
Good morning Richard.  My name is Eleanor and I am calling from [employer] in [place].   I am the technologist who will be working with you for your appointment today.    My morning patient was cancelled so if you are interested and available to come in early, we can start your procedure at any time.    Please return my call to [phone].  I will be in and out so please leave me a message if I cannot answer.    Otherwise, I will see you at your original appointment time with a registration time of 0031.    Thank you

## 2020-09-28 NOTE — TELEPHONE ENCOUNTER
Patient returned my voice message stating he needs to stay at his original time of registration of 1015.    I thanked him for returning my call and told him I am looking forward to seeing him in a couple hours.

## 2020-09-29 DIAGNOSIS — K31.84 GASTROPARESIS: Primary | ICD-10-CM

## 2020-09-29 RX ORDER — ONDANSETRON 4 MG/1
TABLET, FILM COATED ORAL
Qty: 30 TABLET | Refills: 0 | Status: SHIPPED | OUTPATIENT
Start: 2020-09-29 | End: 2020-10-13

## 2020-10-05 ENCOUNTER — TRANSFERRED RECORDS (OUTPATIENT)
Dept: HEALTH INFORMATION MANAGEMENT | Facility: CLINIC | Age: 55
End: 2020-10-05

## 2020-10-08 DIAGNOSIS — E10.65 TYPE 1 DIABETES MELLITUS WITH HYPERGLYCEMIA (H): ICD-10-CM

## 2020-10-09 RX ORDER — UREA 10 %
LOTION (ML) TOPICAL
Qty: 85 G | Refills: 0 | Status: SHIPPED | OUTPATIENT
Start: 2020-10-09 | End: 2020-12-08

## 2020-10-09 NOTE — TELEPHONE ENCOUNTER
Urea 10 hydrating cream       Last Written Prescription Date:  7-  Last Fill Quantity: 85g,   # refills: 0  Last Office Visit: 9-1-2020  Future Office visit:    Next 5 appointments (look out 90 days)    Dec 01, 2020  8:45 AM  (Arrive by 8:30 AM)  SHORT with Natacha Alvarez MD  [facility] ([facility] ) [address]  [place] [address]  [phone]           Routing refill request to provider for review/approval because:

## 2020-10-30 ENCOUNTER — ALLIED HEALTH/NURSE VISIT (OUTPATIENT)
Dept: FAMILY MEDICINE | Facility: OTHER | Age: 55
End: 2020-10-30
Attending: FAMILY MEDICINE
Payer: COMMERCIAL

## 2020-10-30 DIAGNOSIS — Z23 NEED FOR PROPHYLACTIC VACCINATION AND INOCULATION AGAINST INFLUENZA: Primary | ICD-10-CM

## 2020-10-30 PROCEDURE — G0008 ADMIN INFLUENZA VIRUS VAC: HCPCS

## 2020-11-30 ENCOUNTER — OFFICE VISIT (OUTPATIENT)
Dept: FAMILY MEDICINE | Facility: OTHER | Age: 55
End: 2020-11-30
Attending: FAMILY MEDICINE
Payer: COMMERCIAL

## 2020-11-30 ENCOUNTER — APPOINTMENT (OUTPATIENT)
Dept: LAB | Facility: OTHER | Age: 55
End: 2020-11-30
Attending: FAMILY MEDICINE
Payer: COMMERCIAL

## 2020-11-30 VITALS
BODY MASS INDEX: 38.98 KG/M2 | WEIGHT: 200.4 LBS | OXYGEN SATURATION: 94 % | HEART RATE: 85 BPM | TEMPERATURE: 97.9 F | DIASTOLIC BLOOD PRESSURE: 64 MMHG | RESPIRATION RATE: 18 BRPM | SYSTOLIC BLOOD PRESSURE: 124 MMHG

## 2020-11-30 DIAGNOSIS — I10 ESSENTIAL HYPERTENSION: ICD-10-CM

## 2020-11-30 DIAGNOSIS — R25.2 MUSCLE CRAMP: ICD-10-CM

## 2020-11-30 DIAGNOSIS — E78.5 HYPERLIPIDEMIA, UNSPECIFIED HYPERLIPIDEMIA TYPE: ICD-10-CM

## 2020-11-30 DIAGNOSIS — E10.42 DIABETIC POLYNEUROPATHY ASSOCIATED WITH TYPE 1 DIABETES MELLITUS (H): Primary | ICD-10-CM

## 2020-11-30 DIAGNOSIS — K21.9 GASTROESOPHAGEAL REFLUX DISEASE WITHOUT ESOPHAGITIS: ICD-10-CM

## 2020-11-30 DIAGNOSIS — K31.84 GASTROPARESIS: ICD-10-CM

## 2020-11-30 LAB
CK SERPL-CCNC: 287 U/L (ref 30–300)
EST. AVERAGE GLUCOSE BLD GHB EST-MCNC: 169 MG/DL
HBA1C MFR BLD: 7.5 % (ref 0–5.6)

## 2020-11-30 PROCEDURE — 36415 COLL VENOUS BLD VENIPUNCTURE: CPT | Mod: ZL | Performed by: FAMILY MEDICINE

## 2020-11-30 PROCEDURE — 999N001182 HC STATISTIC ESTIMATED AVERAGE GLUCOSE: Mod: ZL | Performed by: FAMILY MEDICINE

## 2020-11-30 PROCEDURE — 82550 ASSAY OF CK (CPK): CPT | Mod: ZL | Performed by: FAMILY MEDICINE

## 2020-11-30 PROCEDURE — G0463 HOSPITAL OUTPT CLINIC VISIT: HCPCS | Mod: 25

## 2020-11-30 PROCEDURE — 83036 HEMOGLOBIN GLYCOSYLATED A1C: CPT | Mod: ZL | Performed by: FAMILY MEDICINE

## 2020-11-30 PROCEDURE — 99214 OFFICE O/P EST MOD 30 MIN: CPT | Performed by: FAMILY MEDICINE

## 2020-11-30 PROCEDURE — 36416 COLLJ CAPILLARY BLOOD SPEC: CPT | Mod: ZL | Performed by: FAMILY MEDICINE

## 2020-11-30 RX ORDER — LISINOPRIL 10 MG/1
10 TABLET ORAL DAILY
Qty: 90 TABLET | Refills: 3 | Status: SHIPPED | OUTPATIENT
Start: 2020-11-30 | End: 2021-11-15

## 2020-11-30 RX ORDER — SIMVASTATIN 40 MG
40 TABLET ORAL AT BEDTIME
Qty: 90 TABLET | Refills: 3 | Status: SHIPPED | OUTPATIENT
Start: 2020-11-30 | End: 2022-01-20

## 2020-11-30 ASSESSMENT — ENCOUNTER SYMPTOMS
FEVER: 0
HEARTBURN: 1
CHILLS: 0
SHORTNESS OF BREATH: 0
PALPITATIONS: 0
ABDOMINAL PAIN: 0
MYALGIAS: 1
WHEEZING: 0

## 2020-11-30 ASSESSMENT — PAIN SCALES - GENERAL: PAINLEVEL: NO PAIN (0)

## 2020-11-30 NOTE — NURSING NOTE
"Chief Complaint   Patient presents with     Diabetes     Lipids     Hypertension       Initial /64 (BP Location: Left arm, Patient Position: Chair, Cuff Size: Adult Regular)   Pulse 85   Temp 97.9  F (36.6  C) (Tympanic)   Resp 18   Wt 90.9 kg (200 lb 6.4 oz)   SpO2 94%   BMI 38.98 kg/m   Estimated body mass index is 38.98 kg/m  as calculated from the following:    Height as of 9/21/20: 1.527 m (5' 0.12\").    Weight as of this encounter: 90.9 kg (200 lb 6.4 oz).  Medication Reconciliation: complete  Rosy Smith LPN  "

## 2020-11-30 NOTE — PROGRESS NOTES
Subjective     Richard Harvey is a 55 year old male who presents to clinic today for the following health issues:    HPI         Diabetes Follow-up    How often are you checking your blood sugar? Continuous glucose monitor  What time of day are you checking your blood sugars (select all that apply)?  Before meals and At bedtime  Have you had any blood sugars above 200?  Yes occasionnaly  Have you had any blood sugars below 70?  Yes. Low down to 53.     What symptoms do you notice when your blood sugar is low?  Shaky, Dizzy, Weak, Lethargy, Blurred vision and Confusion    What concerns do you have today about your diabetes? None     Do you have any of these symptoms? (Select all that apply)  Burning in feet    - BG: generally running 150 to 160s  - Diet: no changes  - Exercise: active job    Hyperlipidemia Follow-Up      Are you regularly taking any medication or supplement to lower your cholesterol?   Yes- Simvastatin    Are you having muscle aches or other side effects that you think could be caused by your cholesterol lowering medication?  Yes- Thinks maybe it is    - over 10 year h/o using simvastatin  - occasional muscle cramp in his legs, which has been chronic and managed by drinking regular gatorade at lunch, doing that for many years    Hypertension Follow-up      Do you check your blood pressure regularly outside of the clinic? No     Are you following a low salt diet? Yes    Are your blood pressures ever more than 140 on the top number (systolic) OR more   than 90 on the bottom number (diastolic), for example 140/90? No    BP Readings from Last 2 Encounters:   11/30/20 124/64   09/21/20 107/71     Hemoglobin A1C (%)   Date Value   09/01/2020 7.7 (H)   03/24/2020 8.1 (H)     LDL Cholesterol Calculated (mg/dL)   Date Value   03/24/2020 40   03/21/2019 56   03/21/2019 56         How many servings of fruits and vegetables do you eat daily?  2-3    On average, how many sweetened beverages do you drink each day  (Examples: soda, juice, sweet tea, etc.  Do NOT count diet or artificially sweetened beverages)?   1    How many days per week do you exercise enough to make your heart beat faster? 7    How many minutes a day do you exercise enough to make your heart beat faster? 60 or more    How many days per week do you miss taking your medication? 0    # GERD / gastroparesis: stable. Occasional issues with heartburn       Review of Systems   Constitutional: Negative for chills and fever.   HENT: Negative for congestion.    Respiratory: Negative for shortness of breath and wheezing.    Cardiovascular: Negative for chest pain and palpitations.   Gastrointestinal: Positive for heartburn. Negative for abdominal pain.   Musculoskeletal: Positive for myalgias.   Psychiatric/Behavioral: Negative for mood changes.          Objective    /64 (BP Location: Left arm, Patient Position: Chair, Cuff Size: Adult Regular)   Pulse 85   Temp 97.9  F (36.6  C) (Tympanic)   Resp 18   Wt 90.9 kg (200 lb 6.4 oz)   SpO2 94%   BMI 38.98 kg/m    Body mass index is 38.98 kg/m .  Physical Exam  Constitutional:       General: He is not in acute distress.     Appearance: He is not ill-appearing.   Cardiovascular:      Rate and Rhythm: Normal rate and regular rhythm.      Pulses: Normal pulses.      Heart sounds: No murmur.   Pulmonary:      Effort: Pulmonary effort is normal. No respiratory distress.      Breath sounds: No wheezing or rales.   Abdominal:      Tenderness: There is abdominal tenderness in the epigastric area.   Neurological:      Mental Status: He is alert.   Psychiatric:         Mood and Affect: Mood normal.          Results for orders placed or performed in visit on 11/30/20 (from the past 24 hour(s))   Hemoglobin A1c   Result Value Ref Range    Hemoglobin A1C 7.5 (H) 0 - 5.6 %   CK total   Result Value Ref Range    CK Total 287 30 - 300 U/L   Estimated Average Glucose   Result Value Ref Range    Estimated Average Glucose 169  mg/dL             Assessment & Plan     Diabetic polyneuropathy associated with type 1 diabetes mellitus (H)  A1c 7.5, improved from 7.7  - Hemoglobin A1c  - on insulin pump  - diabetic education follow up 12/10/2020  - on ACE, statin. Not on ASA d/t gastroparesis    Hyperlipidemia, unspecified hyperlipidemia type  LDL (3/24/2020): 40  - simvastatin (ZOCOR) 40 MG tablet; Take 1 tablet (40 mg) by mouth At Bedtime    Essential hypertension  BP at goal   - lisinopril (ZESTRIL) 10 MG tablet; Take 1 tablet (10 mg) by mouth daily    Muscle cramp  Many year h/o taking simvastatin, so unlikely etiology of muscle cramps. Most likely d/t dehydration   - CK total, negative    Gastroparesis / Gastroesophageal reflux disease without esophagitis  - s/p visit with [facility] (10/7/2020) with recommendation for small meals. Recommended against use of reglan d/t long term risk of CNS side effects  - avoid NSAIDs  - c/w omeprazole  - GI recommendation for discontinuation of ASA       See Patient Instructions    Return in about 6 months (around 5/30/2021) for Lab Work, Diabetic Visit, Physical Exam.    Natacha Alvarez MD  [facility]

## 2020-12-08 DIAGNOSIS — E10.65 TYPE 1 DIABETES MELLITUS WITH HYPERGLYCEMIA (H): ICD-10-CM

## 2020-12-08 RX ORDER — UREA 10 %
LOTION (ML) TOPICAL
Qty: 85 G | Refills: 0 | Status: SHIPPED | OUTPATIENT
Start: 2020-12-08 | End: 2021-02-19

## 2020-12-08 NOTE — TELEPHONE ENCOUNTER
Urea 10 Cream  Last Written Prescription Date: 10/9/20  Last Fill Quantity: 85 g # of Refills: 0  Last Office Visit: 11/30/20

## 2020-12-10 ENCOUNTER — ALLIED HEALTH/NURSE VISIT (OUTPATIENT)
Dept: EDUCATION SERVICES | Facility: OTHER | Age: 55
End: 2020-12-10
Attending: NURSE PRACTITIONER
Payer: COMMERCIAL

## 2020-12-10 VITALS
OXYGEN SATURATION: 94 % | TEMPERATURE: 97.5 F | HEIGHT: 60 IN | WEIGHT: 205.7 LBS | SYSTOLIC BLOOD PRESSURE: 118 MMHG | BODY MASS INDEX: 40.38 KG/M2 | HEART RATE: 81 BPM | DIASTOLIC BLOOD PRESSURE: 60 MMHG

## 2020-12-10 DIAGNOSIS — R39.15 URINARY URGENCY: Primary | ICD-10-CM

## 2020-12-10 DIAGNOSIS — E10.649 TYPE 1 DIABETES MELLITUS WITH HYPOGLYCEMIA AND WITHOUT COMA (H): ICD-10-CM

## 2020-12-10 DIAGNOSIS — R11.0 NAUSEA: ICD-10-CM

## 2020-12-10 PROCEDURE — 99213 OFFICE O/P EST LOW 20 MIN: CPT | Performed by: NURSE PRACTITIONER

## 2020-12-10 PROCEDURE — G0463 HOSPITAL OUTPT CLINIC VISIT: HCPCS

## 2020-12-10 PROCEDURE — G0463 HOSPITAL OUTPT CLINIC VISIT: HCPCS | Mod: 25

## 2020-12-10 RX ORDER — ONDANSETRON 4 MG/1
TABLET, FILM COATED ORAL
Qty: 30 TABLET | Refills: 0 | Status: SHIPPED | OUTPATIENT
Start: 2020-12-10 | End: 2020-12-23

## 2020-12-10 ASSESSMENT — PAIN SCALES - GENERAL: PAINLEVEL: NO PAIN (0)

## 2020-12-10 ASSESSMENT — MIFFLIN-ST. JEOR: SCORE: 1612.46

## 2020-12-10 NOTE — PROGRESS NOTES
"SUBJECTIVE:  Richard Harvey, 55 year old, male presents with the following Chief Complaint(s) with HPI to follow:  Chief Complaint   Patient presents with     Diabetes Education        Diabetes Follow-up      Patient is checking blood sugars: continuous using Dexcom--4x/day for > 60 days.  Results:  Issues with the Dexcom not connecting    Overnight am: 252->400  Breakfast: 135-202  Mid-mornin-311  Lunch: 59, 63,   Mid-afternoon: 178  Dinner: 51-69,   Overnight: 51 and 88      Symptoms of hypoglycemia (low blood sugar): yes--sometimes he feels them and sometimes he doesn't    Paresthesias (numbness or burning in feet) or sores: Yes--hand and feet--\"same\"; no sores    Diabetic eye exam within the last year: Yes    Breakfast eaten regularly: Yes    Patient counting carbs: trying       HPI: Richard's here today for the follow up regarding his Diabetes mellitus, Type 1.    Lab Results   Component Value Date    A1C 7.5 2020    A1C 7.7 2020    A1C 8.1 2020    A1C 7.2 2019    A1C 7.3 08/15/2019     Current Diabetes medication:   1.  Insulin pump, uses Novolog; not wearing his personal CGM--Dexcom  ASA use: yes, 325 mg daily  Statin use: yes, simvastatin 40 mg at bedime  Denies any issues with the insulin pump.      Richard's here for insulin pump and Dexcom G6 download with possible adjust.  He reports the following:   Unfortunately, his Dexcom isn't connecting to the . He hasn't called Dexcom and forgot to bring his  today.    Still working 10:45 am-4:30 pm as a  at [employer], Monday through Friday.   Reports issues with low BGs--not sure why. Hx of hypoglycemia unawareness      Denies any new health concerns.  Continues to have issues with nausea after eating.  He's been taking his zofran which helps.        Down to 200 lb    Richard does complain of the following:  Urinary urgency  No accidents  No fevers.   No urinary stream changes  Started a couple weeks " ago  No OTC treatments    Of note, Richard's insulin pump is on the recall list.   He notified them 2 months ago  Hasn't heard anything      Patient Active Problem List   Diagnosis     DM type 1 (diabetes mellitus, type 1) (H)     HTN (hypertension)     Hyperlipidemia     ACP (advance care planning)     Diabetic polyneuropathy associated with type 1 diabetes mellitus (H)     Class 2 obesity in adult     Obesity (BMI 35.0-39.9) with comorbidity (H)     Gastroparesis     Gastroesophageal reflux disease without esophagitis       Past Medical History:   Diagnosis Date     Diabetes mellitus type 1, controlled (H)      HLD (hyperlipidemia)      HTN (hypertension)      Neuropathy      Type 1 diabetes (H)        Past Surgical History:   Procedure Laterality Date     COLONOSCOPY - HIM SCAN N/A 09/19/2016    Procedure: COLONOSCOPY SCREENING; Surgeon: Rudy Rojo MD; Location: [facility] OR     ESOPHAGOSCOPY, GASTROSCOPY, DUODENOSCOPY (EGD), COMBINED  08/13/2019       Family History   Problem Relation Age of Onset     No Known Problems Mother      Hypertension Father      Hypertension Brother        Social History     Tobacco Use     Smoking status: Never Smoker     Smokeless tobacco: Never Used   Substance Use Topics     Alcohol use: No     Alcohol/week: 0.0 standard drinks       Current Outpatient Medications   Medication Sig Dispense Refill     acetone, Urine, test STRP Use as directed 50 each 3     blood glucose monitoring (MELISSA CONTOUR NEXT MONITOR) meter device kit 1 each       blood glucose monitoring (MELISSA CONTOUR NEXT) test strip Use to test blood sugar 6 times daily or as directed. 200 each 11     CALCIUM-VITAMIN D PO Take  by mouth daily. 600 mg       Continuous Blood Gluc  (DEXCOM G6 ) DAYAN 1 each continuous 1 Device 0     Continuous Blood Gluc Sensor (DEXCOM G6 SENSOR) MISC 1 each continuous 3 each 11     Continuous Blood Gluc Transmit (DEXCOM G6 TRANSMITTER) MISC 1 each continuous 1 each 4      CONTOUR NEXT TEST test strip TEST 6 TIMES DAILY, OR AS DIRECTED. 200 strip 3     gabapentin (NEURONTIN) 300 MG capsule TAKE 1 CAPSULE BY MOUTH THREE TIMES DAILY 90 capsule 3     glucagon 1 MG injection 1 mg once       insulin aspart (NOVOLOG VIAL) 100 UNITS/ML vial USE WITH INSULIN PUMP FOR A TOTAL DAILY USAGE OF 70 UNITS 20 mL 1     insulin aspart (NOVOLOG VIAL) 100 UNITS/ML vial  UNITS IN PUMP EVERY 3 DAYS       INSULIN PUMP - OUTPATIENT Date last updated:  6/23/15  [employer] Minimed: Model 751  BASAL RATES and times:  12   AM (midnight): 1.25 units/hour    2     AM: 1.3 units/hour   6     AM: 0.95 units/hour   6    PM: 1.2 units/hour   Basal Pattern A:  Richard Harvey will use when N/A.  CARB RATIO and times:  12   AM (midnight): 12  1    PM:  10  6    PM:    9  Corection Factor (Sensitivity) and times:  12   AM (midnight): 40 mg/dL  6     PM: 45 mg/dL  BLOOD GLUCOSE TARGET and times:  12   AM (midnight): 100 - 140  9     AM:  100 - 120  10   PM:  100 - 140  Active Insulin Time:  4 hours  Sensor:  Yes: 12   AM (midnight): 70 - 240  Carelink / Diasend username:  [email or web address]  Carelink / Diasend Password:  [email or web address]       lisinopril (ZESTRIL) 10 MG tablet Take 1 tablet (10 mg) by mouth daily 90 tablet 3     MOTRIN IB PO Take 400 mg by mouth every 6 hours as needed for moderate pain       Multiple Vitamin (MULTI-VITAMIN DAILY PO) Take  by mouth daily.       nitroglycerin (NITROSTAT) 0.4 MG SL tablet Place under the tongue every 5 minutes as needed       NOVOLOG VIAL 100 UNIT/ML soln USE WITH INSULIN PUMP FOR A TOTAL DAILY USAGE OF 70 UNITS 20 mL 3     omeprazole (PRILOSEC) 40 MG DR capsule Take 1 capsule by mouth once daily 90 capsule 3     order for DME Equipment being ordered:     1.  [employer]onic insulin pump supplies--insertion sets and reserviors  Disp: 1 month  Refill: 11 months 30 days 11     order for DME Equipment being ordered: Please dispense a short leg CAM walker boot to the patient 1 Device 0      "simvastatin (ZOCOR) 40 MG tablet Take 1 tablet (40 mg) by mouth At Bedtime 90 tablet 3     UREA 10 HYDRATING 10 % external cream APPLY  CREAM TOPICALLY TO AFFECTED AREA AS NEEDED FOR  CALLOUSED  SKIN 85 g 0     URINE GLUCOSE-KETONES TEST VI        ondansetron (ZOFRAN) 4 MG tablet TAKE 1 TABLET BY MOUTH EVERY 8 HOURS AS NEEDED FOR NAUSEA 30 tablet 1       No Known Allergies    REVIEW OF SYSTEMS  Skin: negative  Eyes: negative; glasses  Ears/Nose/Throat: negative  Respiratory: No shortness of breath, dyspnea on exertion, cough, or hemoptysis  Cardiovascular: negative  Gastrointestinal: still issues with nausea after eating--taking Zofran with meals  Genitourinary: urgency  Musculoskeletal: history of arthritis--hands, shoulders, knees--same  Neurologic: positive for numbness or tingling of hands and numbness or tingling of feet--same (good with gabapentin)  Psychiatric: okay  Hematologic/Lymphatic/Immunologic: negative  Endocrine: positive for diabetes    OBJECTIVE:  /60   Pulse 81   Temp 97.5  F (36.4  C) (Tympanic)   Ht 1.527 m (5' 0.12\")   Wt 93.3 kg (205 lb 11.2 oz)   SpO2 94%   BMI 40.01 kg/m    Constitutional: healthy, alert and no distress  Musculoskeletal: extremities normal- no gross deformities noted and gait normal  Skin: no suspicious lesions or rashes to visible skin  Psychiatric: mentation appears normal and affect normal/bright      LAB  No results found for any visits on 12/10/20.    ASSESSMENT / PLAN:.  (R39.15) Urinary urgency  (primary encounter diagnosis)  Comment: noted and sent to lab  Plan: UA with Microscopic reflex to Culture -         [place], CANCELED: UA with Microscopic reflex         to Culture - [place]     Will check UA          (E10.649) Type 1 diabetes mellitus with hypoglycemia and without coma (H)  Comment:   Noted his frequency of low BGs  Plan:   Sent a message to [facility] regarding his insulin pump    I did change his supper carb ratio    Insulin pump " information:   Average: 148 +/- 88  Basal/bolus ratio: 24 (49%)/25 (51%)   Testinx/day    Hypoglycemia: yes,    Follow up  Download of insulin pump and Dexcom--get the new insulin pump replacment  Call sooner if continued issues with low BGs      Patient Instructions   Continue working on healthy eating and moving as best as you can (start low and slow, work up to 30 min, 5x/week)    BG goals:  Fasting and before meals <130, >70  2 hour after eating <180    We only need 1/2 of these numbers to be within target then your A1c will be within target    Medication changes   Watch for continue low BGs    Follow up   When you get your replacement pump    Call me sooner if any problems/concerns and/or questions develop including consistent low BGs <70 or consistent high BGs >200  [phone] (Unit Coordinator)    [phone] (Nurse)        Time: 40 min  Barrier: none  Willingness to learn: accepting    Macrina CHAMBERLAIN [facility]  Diabetes and Wound Care    With the electronic record, we can now more quickly and easily track our patient diabetic goals. Our diabetes clinical review is in progress and these are the indicators we are monitoring for good diabetes health.     1.) HbA1C less than 7 (measurement of your average blood sugars)  2.) Blood Pressure less than 140/80  3.) LDL less than 100 (bad cholesterol)  4.) HbA1C is checked in the last 6 months and below 7% (more frequently if not at goal or adjusting medications)  5.) LDL is checked in the last 12 months (more frequently if not at goal or adjusting medications)  6.) Taking one baby aspirin daily (unless otherwise instructed)  7.) No tobacco use  8) Statin use     You have achieved 7 out of 8 of these and I am encouraging you to come in and get tuned up to achieve 8 out of 8!  Here is what you have achieved so far in my goals for you:  1.) HbA1C  less than 7:                              NO  Your last  HbA1C :   Lab Results   Component Value Date    A1C 7.5  "11/30/2020    A1C 7.7 09/01/2020    A1C 8.1 03/24/2020    A1C 7.2 12/12/2019    A1C 7.3 08/15/2019     2.) Blood Pressure less than 140/80:       YES      Your last    /60   Pulse 81   Temp 97.5  F (36.4  C) (Tympanic)   Ht 1.527 m (5' 0.12\")   Wt 93.3 kg (205 lb 11.2 oz)   SpO2 94%   BMI 40.01 kg/m      3.) LDL less than 100:                              YES      Your last   LDL         LDL Cholesterol Calculated   Date Value Ref Range Status   03/24/2020 40 <100 mg/dL Final     Comment:     Desirable:       <100 mg/dl   ]    4.) Checked HbA1C in the past 6 months: YES      5.) Checked LDL in the past 12 months:    YES     6.) Taking one aspirin daily:                       YES     7.) No tobacco use:                                        YES      8.) Statin use      YES              "

## 2020-12-10 NOTE — PROGRESS NOTES
"Chief Complaint   Patient presents with     Diabetes Education       Initial /60   Pulse 81   Temp 97.5  F (36.4  C) (Tympanic)   Ht 1.527 m (5' 0.12\")   Wt 93.3 kg (205 lb 11.2 oz)   SpO2 94%   BMI 40.01 kg/m   Estimated body mass index is 40.01 kg/m  as calculated from the following:    Height as of this encounter: 1.527 m (5' 0.12\").    Weight as of this encounter: 93.3 kg (205 lb 11.2 oz).  Medication Reconciliation: complete  Nadine Landers LPN    "

## 2020-12-10 NOTE — PATIENT INSTRUCTIONS
Continue working on healthy eating and moving as best as you can (start low and slow, work up to 30 min, 5x/week)    BG goals:  Fasting and before meals <130, >70  2 hour after eating <180    We only need 1/2 of these numbers to be within target then your A1c will be within target    Medication changes   Watch for continue low BGs    Follow up   When you get your replacement pump    Call me sooner if any problems/concerns and/or questions develop including consistent low BGs <70 or consistent high BGs >200  [phone] (Unit Coordinator)    [phone] (Nurse)

## 2020-12-31 ENCOUNTER — VIRTUAL VISIT (OUTPATIENT)
Dept: FAMILY MEDICINE | Facility: OTHER | Age: 55
End: 2020-12-31
Attending: FAMILY MEDICINE
Payer: COMMERCIAL

## 2020-12-31 DIAGNOSIS — K21.9 GASTROESOPHAGEAL REFLUX DISEASE WITHOUT ESOPHAGITIS: Primary | ICD-10-CM

## 2020-12-31 DIAGNOSIS — K31.84 GASTROPARESIS: ICD-10-CM

## 2020-12-31 PROCEDURE — G0463 HOSPITAL OUTPT CLINIC VISIT: HCPCS | Mod: 25,TEL

## 2020-12-31 PROCEDURE — 99213 OFFICE O/P EST LOW 20 MIN: CPT | Mod: 95 | Performed by: FAMILY MEDICINE

## 2020-12-31 RX ORDER — PANTOPRAZOLE SODIUM 40 MG/1
40 TABLET, DELAYED RELEASE ORAL 2 TIMES DAILY
Qty: 60 TABLET | Refills: 0 | Status: SHIPPED | OUTPATIENT
Start: 2020-12-31 | End: 2021-01-28

## 2020-12-31 ASSESSMENT — ENCOUNTER SYMPTOMS
WHEEZING: 0
CHILLS: 0
SHORTNESS OF BREATH: 0
NAUSEA: 1
PALPITATIONS: 0
HEARTBURN: 1
ABDOMINAL PAIN: 1
FEVER: 0

## 2020-12-31 NOTE — NURSING NOTE
"Chief Complaint   Patient presents with     Gastrophageal Reflux       Initial There were no vitals taken for this visit. Estimated body mass index is 40.01 kg/m  as calculated from the following:    Height as of 12/10/20: 1.527 m (5' 0.12\").    Weight as of 12/10/20: 93.3 kg (205 lb 11.2 oz).  Medication Reconciliation: complete  Rosy Smith LPN  "

## 2020-12-31 NOTE — PATIENT INSTRUCTIONS
Stop your omeprazole and start protonix.  We will do protonix twice per day for one month, then decrease to once per day.  Stop your ibuprofen and try Tylenol (regular).

## 2021-01-13 DIAGNOSIS — E10.649 TYPE 1 DIABETES MELLITUS WITH HYPOGLYCEMIA AND WITHOUT COMA (H): ICD-10-CM

## 2021-01-13 DIAGNOSIS — E10.649 TYPE 1 DIABETES MELLITUS WITH HYPOGLYCEMIA UNAWARENESS (H): ICD-10-CM

## 2021-01-13 RX ORDER — PROCHLORPERAZINE 25 MG/1
1 SUPPOSITORY RECTAL CONTINUOUS
Qty: 3 EACH | Refills: 11 | Status: SHIPPED | OUTPATIENT
Start: 2021-01-13 | End: 2022-01-31

## 2021-01-13 RX ORDER — PROCHLORPERAZINE 25 MG/1
1 SUPPOSITORY RECTAL CONTINUOUS
Qty: 1 EACH | Refills: 4 | Status: SHIPPED | OUTPATIENT
Start: 2021-01-13 | End: 2022-03-03

## 2021-01-13 NOTE — TELEPHONE ENCOUNTER
Continuous blood gluc sensor       Last Written Prescription Date:  12/12/2019  Last Fill Quantity: 3,   # refills: 11  Last Office Visit: 11/30/2020  Future Office visit:       Routing refill request to provider for review/approval because:      Continuous blood gluc transmit      Last Written Prescription Date:  12/12/2019  Last Fill Quantity: 1,   # refills: 4  Last Office Visit: 11/30/2020  Future Office visit:       Routing refill request to provider for review/approval because:     Patient is a 63 y.o. female who is here for a follow up of chronic conditions.  Chief Complaint   Patient presents with   • Hypertension   • Pain         HPI:    Here for f/u.  Back to smoking a pack a day.  Some SOB.  Has restarted the chantix in an attempt to quit.  Fell injuring her left lower back.  Energy level is good.  FSBS not being checked.  No dizziness or lightheadedness.     History:    Patient Active Problem List   Diagnosis   • Atopic rhinitis   • Restrictive ventilatory defect   • COPD (chronic obstructive pulmonary disease) (CMS/[facility])   • Osteoporosis   • Obstructive sleep apnea syndrome   • Abnormal liver enzymes   • Cholelithiasis   • Chronic back pain   • Mixed anxiety depressive disorder   • Type 2 diabetes mellitus (CMS/[facility])   • Dyslipidemia   • Essential tremor   • Fibromyalgia   • Gastroesophageal reflux disease without esophagitis   • Hypertension   • Hypothyroidism   • Insomnia   • Osteoarthritis   • Psoriasis   • Branch retinal vein occlusion   • Cobalamin deficiency   • Obesity   • Vitamin D deficiency   • Fatty liver disease, nonalcoholic   • Sinus bradycardia   • NSTEMI (non-ST elevated myocardial infarction) (CMS/[facility])   • Tobacco abuse   • Peripheral vascular disease (CMS/[facility])   • Diabetic polyneuropathy associated with type 2 diabetes mellitus (CMS/[facility])   • Chronic pain   • Chronic, continuous use of opioids   • Chronic anxiety   • Chronically on benzodiazepine therapy   • Tubular adenoma   • Cellulitis of sacral region   • Metabolic encephalopathy   • Acute on chronic respiratory failure with hypoxia (CMS/[facility])   • Coronary artery disease involving native heart   • Takotsubo cardiomyopathy   • Chronic systolic heart failure (CMS/[facility])   • Chronic respiratory failure with hypoxia (CMS/[facility])   • Weakness       Past Medical History:   Diagnosis Date   • Acute on chronic respiratory failure with hypoxia (CMS/[facility]) 2/13/2018   • ELIF (acute kidney injury) (CMS/[facility]) 2/13/2018   • ELIF (acute  kidney injury) (CMS/H[facility]) 2/13/2018   • Altered mental status 2/13/2018   • Bronchitis    • Cellulitis of sacral region 2/13/2018   • Cervical cancer (CMS/HCC)    • Cholelithiasis 5/11/2016   • Chronic anxiety 2/27/2017   • Chronic bronchitis (CMS/HCC)    • Chronic respiratory failure with hypoxia (CMS/HCC) 3/8/2018   • Chronic systolic heart failure (CMS/HCC) 3/8/2018   • Chronically on benzodiazepine therapy 2/27/2017   • Degenerative arthritis    • Diabetes mellitus (CMS/[facility])    • Dyslipidemia 5/11/2016   • Dyspnea    • Elevated troponin level 2/13/2018   • Fatty liver disease, nonalcoholic 11/21/2016   • Fever 2/13/2018   • Fibromyalgia    • GERD (gastroesophageal reflux disease)    • H/O echocardiogram    • History of pneumonia    • Hypertension 5/11/2016    16. H/O echocardiogram (V15.89) (Z92.89)  · A.  Echocardiogram of 02/03/2015 reports an ejection fraction of 60-65%, mild concentric     LVH, trace mitral regurgitation, mild tricuspid and pulmonic regurgitation and calculated     RVSP of 35 mmHg, the main pulmonary artery is also mildly dilated.   • Hypothyroidism 5/11/2016    Description: A.  On replacement therapy.   • Infected wound 3/8/2018   • Nausea    • Obesity    • DINA (obstructive sleep apnea)     intolerant of CPAP therapy   • Osteoporosis    • Osteoporosis    • Polycythemia vera (CMS/[facility]) 5/11/2016   • Pulmonary emphysema (CMS/[facility])    • Restrictive ventilatory defect    • Rhinitis    • Sepsis (CMS/[facility]) 2/13/2018   • Uncontrolled diabetes mellitus (CMS/HCC) 5/11/2016   • Urine abnormality 2/13/2018   • Uterine cancer (CMS/[facility])    • Vitamin D deficiency 8/1/2016   • Weakness 3/8/2018       Past Surgical History:   Procedure Laterality Date   • AMPUTATION DIGIT Left 11/23/2016    Procedure: AMPUTATION TRANS METATARSAL - ray amutation of the left great toe;  Surgeon: Arsalan Feliciano MD;  Location: [facility];  Service:    • AMPUTATION DIGIT Left 3/2/2017    Procedure: LEFT FOOT TRANSMETATARSAL  AMPUTATION TOES 2,3,4,5;  Surgeon: Joye Guaman MD;  Location:  [facility];  Service:    • BACK SURGERY      lumbar fusions x5--multiple times; 1995, 1997, 1998, 1999 and 2008   • BELOW KNEE AMPUTATION Left 2/25/2017    Procedure: EXTENDED LEFT TOE AMPUTATION;  Surgeon: Arsalan Feliciano MD;  Location:  [facility];  Service:    • CARDIAC CATHETERIZATION N/A 11/26/2016    Procedure: Left Heart Cath;  Surgeon: Ash Nuñez MD;  Location:  [facility];  Service:    • CARDIAC CATHETERIZATION N/A 2/20/2018    Procedure: Left Heart Cath;  Surgeon: Lane Zamorano MD;  Location:  [facility];  Service:    • CARDIAC CATHETERIZATION N/A 2/20/2018    Procedure: Right Heart Cath;  Surgeon: Lane Zamorano MD;  Location:  [facility];  Service:    • HAND SURGERY Bilateral     x3   • HYSTERECTOMY      status post uterine and cervical cancer   • LUMBAR SPINE SURGERY      arthrodesis by anterior approach addit interspace   • TONSILLECTOMY         Current Outpatient Prescriptions on File Prior to Visit   Medication Sig   • acetaminophen (TYLENOL) 325 MG tablet Take 2 tablets by mouth Every 4 (Four) Hours As Needed for mild pain (1-3) or fever (temperature greater than 101F).   • albuterol (PROVENTIL) (2.5 MG/3ML) 0.083% nebulizer solution Take 2.5 mg by nebulization Every 6 (Six) Hours As Needed for Wheezing or Shortness of Air.   • aspirin 325 MG tablet Take 325 mg by mouth Daily.   • atorvastatin (LIPITOR) 80 MG tablet Take 1 tablet by mouth Every Night.   • baclofen (LIORESAL) 10 MG tablet Take 1 tablet by mouth 2 (Two) Times a Day As Needed for Muscle Spasms.   • busPIRone (BUSPAR) 10 MG tablet TAKE TWO TABLETS BY MOUTH TWICE A DAY   • cetirizine (ZyrTEC) 10 MG tablet Take 10 mg by mouth Every Night.   • clonazePAM (KlonoPIN) 0.5 MG tablet TAKE ONE-HALF TABLET BY MOUTH TWO TIMES A DAY AS NEEDED   • clopidogrel (PLAVIX) 75 MG tablet TAKE ONE TABLET BY MOUTH DAILY   • Cyanocobalamin  1000 MCG/ML kit Inject 1,000 mcg as directed Every 30 (Thirty) Days.   • ezetimibe (ZETIA) 10 MG tablet Take 1 tablet by mouth Daily.   • fentaNYL (DURAGESIC) 75 MCG/HR patch Place 1 patch on the skin as directed by provider Every Other Day.   • furosemide (LASIX) 40 MG tablet TAKE ONE TABLET BY MOUTH TWICE A DAY   • levothyroxine (SYNTHROID, LEVOTHROID) 112 MCG tablet TAKE ONE TABLET BY MOUTH DAILY   • lisinopril (PRINIVIL,ZESTRIL) 10 MG tablet Take 10 mg by mouth Daily.   • melatonin 5 MG sublingual tablet sublingual tablet Place 1 tablet under the tongue At Night As Needed (insomnia).   • metoprolol tartrate (LOPRESSOR) 25 MG tablet Take 25 mg by mouth Daily.   • omeprazole (priLOSEC) 20 MG capsule TAKE ONE CAPSULE BY MOUTH DAILY (Patient taking differently: TAKE TWO CAPSULES BY MOUTH DAILY)   • oxyCODONE-acetaminophen (PERCOCET) 7.5-325 MG per tablet Take 1 tablet by mouth Every 6 (Six) Hours As Needed for Moderate Pain .   • pregabalin (LYRICA) 100 MG capsule Take 1 capsule by mouth Every 8 (Eight) Hours.   • probiotic (CULTURELLE) capsule capsule Take 1 capsule by mouth Daily.   • SITagliptin (JANUVIA) 100 MG tablet Take 50 mg by mouth Daily.   • varenicline (CHANTIX CONTINUING MONTH SOCORRO) 1 MG tablet Take 1 tablet by mouth 2 (Two) Times a Day.     No current facility-administered medications on file prior to visit.        Family History   Problem Relation Age of Onset   • Arthritis Mother    • Diabetes Mother    • Colon polyps Mother    • Diverticulitis Mother    • Heart failure Mother    • Arthritis Father    • Bleeding Disorder Father    • Diabetes Father    • Kidney disease Father    • Heart disease Father    • COPD Sister         currently smokes   • Arthritis Brother    • Diabetes Brother    • Alcohol abuse Brother    • Heart murmur Daughter    • Arthritis Other    • Diabetes Other        Social History     Social History   • Marital status:      Spouse name: N/A   • Number of children: 1   • Years  "of education: N/A     Occupational History   •  Disabled     Social History Main Topics   • Smoking status: Former Smoker     Packs/day: 1.50     Years: 48.00     Types: Cigarettes     Quit date: 2/27/2017   • Smokeless tobacco: Never Used   • Alcohol use No   • Drug use: No   • Sexual activity: Defer     Other Topics Concern   • Not on file     Social History Narrative    Mrs. Langston is a 63 year old white  female. She lives with her spouse in [facility]. She states she does her own ADLs but appears disabled. They have one child and two grandchildren. She has a living will.    Caffeine: 2 serving per day. Pt lives at home with .             Review of Systems   Constitutional: Positive for fatigue. Negative for chills, diaphoresis, fever and unexpected weight change.   HENT: Negative for congestion, ear pain, hearing loss, nosebleeds, postnasal drip, sinus pressure and sore throat.    Eyes: Negative for pain, discharge and itching.   Respiratory: Positive for shortness of breath. Negative for cough, chest tightness and wheezing.    Cardiovascular: Positive for palpitations. Negative for chest pain and leg swelling.   Gastrointestinal: Negative for abdominal distention, blood in stool, constipation, diarrhea, nausea and vomiting.   Endocrine: Negative for heat intolerance, polydipsia and polyuria.   Genitourinary: Negative for difficulty urinating, dysuria, frequency and hematuria.   Musculoskeletal: Positive for arthralgias, back pain and gait problem. Negative for joint swelling and myalgias.   Neurological: Positive for tremors, weakness and light-headedness. Negative for dizziness, syncope and headaches.   Psychiatric/Behavioral: Positive for decreased concentration, dysphoric mood and sleep disturbance. The patient is nervous/anxious.        /74   Pulse 68   Ht 167.6 cm (65.98\")   LMP  (LMP Unknown)       Physical Exam   Constitutional: She is oriented to person, place, and time. She " appears well-developed and well-nourished.   HENT:   Head: Normocephalic and atraumatic.   Right Ear: External ear normal.   Left Ear: External ear normal.   Mouth/Throat: Oropharynx is clear and moist.   Eyes: Conjunctivae and EOM are normal.   Neck: Normal range of motion. Neck supple.   Cardiovascular: Normal rate and regular rhythm.    2/6 СЕРГЕЙ   Pulmonary/Chest: Effort normal. She has rales (mild).   Mildly diminished   Abdominal: Soft. Bowel sounds are normal.   Musculoskeletal: She exhibits tenderness (pain on back flexion past 30  ).   Using wheelchair   Lymphadenopathy:     She has no cervical adenopathy.   Neurological: She is alert and oriented to person, place, and time.   Skin: Skin is warm and dry. No rash noted.   Psychiatric: She has a normal mood and affect. Her behavior is normal. Thought content normal.       Procedure:    I advised Tamara of the risks of continuing to use tobacco, and I provided her with tobacco cessation educational materials in the After Visit Summary.     During this visit, I spent 4 minutes counseling the patient regarding tobacco cessation.      Discussion/Summary:    chronic back pain- refill patch and percocet as needed, advised need to reduce the dose as we are doing  htn-stable  fibromylagia- cont current tx  hyperlipidemia-cont lipitor and zetia, recheck today  hypothroid-cont replacement, recheck today  depression with anxiety-cont buspar and klonopine ,advised her of risk of addiction  polycythemia-cbc today  retinal vein occlusion- cont rf modification  b12 def-admin today and check labs  nausea-phenergan prn  DM-labs today, counseled on low carb, cont januvia  diaphoresis-better   Elevated lft/?autoimmune-f/u gastro recs , labs today  Eczema-cont prn steroids  FE def anemia-cont FE, recheck today  Tremor-cont topamax and BB  high risk meds-labs today      labs noted and dw patient, counseled on adequate hydration and diet/wt loss  Flu shot today    Current Outpatient  Prescriptions:   •  acetaminophen (TYLENOL) 325 MG tablet, Take 2 tablets by mouth Every 4 (Four) Hours As Needed for mild pain (1-3) or fever (temperature greater than 101F)., Disp: 100 tablet, Rfl: 0  •  albuterol (PROVENTIL) (2.5 MG/3ML) 0.083% nebulizer solution, Take 2.5 mg by nebulization Every 6 (Six) Hours As Needed for Wheezing or Shortness of Air., Disp: 120 vial, Rfl: 5  •  aspirin 325 MG tablet, Take 325 mg by mouth Daily., Disp: , Rfl:   •  atorvastatin (LIPITOR) 80 MG tablet, Take 1 tablet by mouth Every Night., Disp: , Rfl:   •  baclofen (LIORESAL) 10 MG tablet, Take 1 tablet by mouth 2 (Two) Times a Day As Needed for Muscle Spasms., Disp: 180 tablet, Rfl: 0  •  busPIRone (BUSPAR) 10 MG tablet, TAKE TWO TABLETS BY MOUTH TWICE A DAY, Disp: 360 tablet, Rfl: 0  •  cetirizine (ZyrTEC) 10 MG tablet, Take 10 mg by mouth Every Night., Disp: , Rfl:   •  clonazePAM (KlonoPIN) 0.5 MG tablet, TAKE ONE-HALF TABLET BY MOUTH TWO TIMES A DAY AS NEEDED, Disp: 90 tablet, Rfl: 0  •  clopidogrel (PLAVIX) 75 MG tablet, TAKE ONE TABLET BY MOUTH DAILY, Disp: 90 tablet, Rfl: 2  •  Cyanocobalamin 1000 MCG/ML kit, Inject 1,000 mcg as directed Every 30 (Thirty) Days., Disp: 1 kit, Rfl: 3  •  ezetimibe (ZETIA) 10 MG tablet, Take 1 tablet by mouth Daily., Disp: 90 tablet, Rfl: 3  •  fentaNYL (DURAGESIC) 75 MCG/HR patch, Place 1 patch on the skin as directed by provider Every Other Day., Disp: 15 patch, Rfl: 0  •  furosemide (LASIX) 40 MG tablet, TAKE ONE TABLET BY MOUTH TWICE A DAY, Disp: 180 tablet, Rfl: 1  •  levothyroxine (SYNTHROID, LEVOTHROID) 112 MCG tablet, TAKE ONE TABLET BY MOUTH DAILY, Disp: 90 tablet, Rfl: 1  •  lisinopril (PRINIVIL,ZESTRIL) 10 MG tablet, Take 10 mg by mouth Daily., Disp: , Rfl:   •  melatonin 5 MG sublingual tablet sublingual tablet, Place 1 tablet under the tongue At Night As Needed (insomnia)., Disp: 30 tablet, Rfl: 0  •  metoprolol tartrate (LOPRESSOR) 25 MG tablet, Take 25 mg by mouth Daily.,  Disp: , Rfl:   •  omeprazole (priLOSEC) 20 MG capsule, TAKE ONE CAPSULE BY MOUTH DAILY (Patient taking differently: TAKE TWO CAPSULES BY MOUTH DAILY), Disp: 90 capsule, Rfl: 1  •  oxyCODONE-acetaminophen (PERCOCET) 7.5-325 MG per tablet, Take 1 tablet by mouth Every 6 (Six) Hours As Needed for Moderate Pain ., Disp: 120 tablet, Rfl: 0  •  pregabalin (LYRICA) 100 MG capsule, Take 1 capsule by mouth Every 8 (Eight) Hours., Disp: 270 capsule, Rfl: 0  •  probiotic (CULTURELLE) capsule capsule, Take 1 capsule by mouth Daily., Disp: 60 capsule, Rfl: 0  •  SITagliptin (JANUVIA) 100 MG tablet, Take 50 mg by mouth Daily., Disp: , Rfl:   •  varenicline (CHANTIX CONTINUING MONTH SOCORRO) 1 MG tablet, Take 1 tablet by mouth 2 (Two) Times a Day., Disp: 60 tablet, Rfl: 2        Tamara was seen today for hypertension and pain.    Diagnoses and all orders for this visit:    Chronic systolic heart failure (CMS/HCC)    Coronary artery disease involving native coronary artery of native heart without angina pectoris  -     CBC (No Diff)    Essential hypertension    NSTEMI (non-ST elevated myocardial infarction) (CMS/HCC)    Peripheral vascular disease (CMS/HCC)    Seasonal allergic rhinitis due to pollen    Chronic respiratory failure with hypoxia (CMS/HCC)    Other emphysema (CMS/HCC)    Obstructive sleep apnea syndrome    Cobalamin deficiency  -     Vitamin B12    Fatty liver disease, nonalcoholic    Gastroesophageal reflux disease without esophagitis    Vitamin D deficiency  -     Vitamin D 25 Hydroxy    Other specified hypothyroidism  -     TSH    Type 2 diabetes mellitus with ketoacidosis without coma, without long-term current use of insulin (CMS/HCC)  -     Comprehensive Metabolic Panel  -     Hemoglobin A1c  -     Lipid Panel    Chronic pain syndrome    Essential tremor    Fibromyalgia    Primary osteoarthritis involving multiple joints    Localized osteoporosis without current pathological fracture    Psoriasis    Chronic  anxiety    Dyslipidemia    Insomnia, unspecified type    Mixed anxiety depressive disorder    Tobacco abuse    Tubular adenoma    Need for influenza vaccination  -     Fluzone High Dose =>65Years

## 2021-01-14 ENCOUNTER — TELEPHONE (OUTPATIENT)
Dept: FAMILY MEDICINE | Facility: OTHER | Age: 56
End: 2021-01-14

## 2021-01-14 NOTE — TELEPHONE ENCOUNTER
Left message to return call to clinic to schedule follow up visit for GI issues around 3/30/21 with Dr. Alvarez. May be in person or virtual.

## 2021-01-27 ENCOUNTER — ALLIED HEALTH/NURSE VISIT (OUTPATIENT)
Dept: EDUCATION SERVICES | Facility: OTHER | Age: 56
End: 2021-01-27
Attending: NURSE PRACTITIONER
Payer: COMMERCIAL

## 2021-01-27 VITALS
DIASTOLIC BLOOD PRESSURE: 76 MMHG | SYSTOLIC BLOOD PRESSURE: 132 MMHG | WEIGHT: 201 LBS | BODY MASS INDEX: 39.46 KG/M2 | RESPIRATION RATE: 16 BRPM | HEART RATE: 77 BPM | HEIGHT: 60 IN | OXYGEN SATURATION: 95 %

## 2021-01-27 DIAGNOSIS — E10.649 TYPE 1 DIABETES MELLITUS WITH HYPOGLYCEMIA AND WITHOUT COMA (H): Primary | ICD-10-CM

## 2021-01-27 PROCEDURE — 99214 OFFICE O/P EST MOD 30 MIN: CPT | Performed by: NURSE PRACTITIONER

## 2021-01-27 PROCEDURE — G0463 HOSPITAL OUTPT CLINIC VISIT: HCPCS

## 2021-01-27 RX ORDER — GLUCAGON INJECTION, SOLUTION 1 MG/.2ML
1 INJECTION, SOLUTION SUBCUTANEOUS PRN
Qty: 0.4 ML | Refills: 3 | Status: SHIPPED | OUTPATIENT
Start: 2021-01-27 | End: 2023-02-22

## 2021-01-27 ASSESSMENT — MIFFLIN-ST. JEOR: SCORE: 1589.23

## 2021-01-27 ASSESSMENT — PAIN SCALES - GENERAL: PAINLEVEL: MODERATE PAIN (5)

## 2021-01-27 NOTE — PATIENT INSTRUCTIONS
Continue working on healthy eating and moving as best as you can (start low and slow, work up to 30 min, 5x/week)    BG goals:  Fasting and before meals <130, >70  2 hour after eating <180    We only need 1/2 of these numbers to be within target then your A1c will be within target    Medication changes   None today    Follow up   Let me know if there's an issues with the Dexcom  6 weeks    Call me sooner if any problems/concerns and/or questions develop including consistent low BGs <70 or consistent high BGs >200  [phone] (Unit Coordinator)    [phone] (Nurse)

## 2021-01-27 NOTE — PROGRESS NOTES
Chief Complaint   Patient presents with     Diabetes       Initial BP (!) 142/75   Pulse 77   Resp 16   Ht 1.524 m (5')   Wt 91.2 kg (201 lb)   SpO2 95%   BMI 39.26 kg/m   Estimated body mass index is 39.26 kg/m  as calculated from the following:    Height as of this encounter: 1.524 m (5').    Weight as of this encounter: 91.2 kg (201 lb).  Medication Reconciliation: complete  Lianna Brewster LPN

## 2021-01-27 NOTE — PROGRESS NOTES
"SUBJECTIVE:  Richard Harvey, 55 year old, male presents with the following Chief Complaint(s) with HPI to follow:  Chief Complaint   Patient presents with     Diabetes        Diabetes Follow-up      Patient is checking blood sugars: 4x/day.  Results:    Overnight: no checks  Breakfast: 63,   Mid-mornin and 113  Lunch: 51,   Mid-afternoon: 171  Dinner: 59, 67,   Overnight:       Symptoms of hypoglycemia (low blood sugar): yes--sometimes he feels them and sometimes he doesn't    Paresthesias (numbness or burning in feet) or sores: Yes--hand and feet--\"same\"; no sores    Diabetic eye exam within the last year: Yes    Breakfast eaten regularly: Yes    Patient counting carbs: trying       HPI: Richard's here today for the follow up regarding his Diabetes mellitus, Type 1.    Lab Results   Component Value Date    A1C 7.5 2020    A1C 7.7 2020    A1C 8.1 2020    A1C 7.2 2019    A1C 7.3 08/15/2019     Current Diabetes medication:   1.  Insulin pump, uses Novolog; not wearing his personal CGM--Dexcom  ASA use: yes, 325 mg daily  Statin use: yes, simvastatin 40 mg at bedime  Denies any issues with the insulin pump.      Richard's here for his Dexcom G6 restart and insulin pump download with possible adjust.  He reports the following:   Stomach pain is better after medication adjustment    Denies any new health concerns.    Richard would like a refill on his glucagon pen    Has an appt with his PCP, Dr. Alvarez in April    Patient Active Problem List   Diagnosis     DM type 1 (diabetes mellitus, type 1) (H)     HTN (hypertension)     Hyperlipidemia     ACP (advance care planning)     Diabetic polyneuropathy associated with type 1 diabetes mellitus (H)     Class 2 obesity in adult     Obesity (BMI 35.0-39.9) with comorbidity (H)     Gastroparesis     Gastroesophageal reflux disease without esophagitis       Past Medical History:   Diagnosis Date     Diabetes mellitus type 1, controlled " (H)      HLD (hyperlipidemia)      HTN (hypertension)      Neuropathy      Type 1 diabetes (H)        Past Surgical History:   Procedure Laterality Date     COLONOSCOPY - HIM SCAN N/A 09/19/2016    Procedure: COLONOSCOPY SCREENING; Surgeon: Rudy Rojo MD; Location: [facility] OR     ESOPHAGOSCOPY, GASTROSCOPY, DUODENOSCOPY (EGD), COMBINED  08/13/2019       Family History   Problem Relation Age of Onset     No Known Problems Mother      Hypertension Father      Hypertension Brother        Social History     Tobacco Use     Smoking status: Never Smoker     Smokeless tobacco: Never Used   Substance Use Topics     Alcohol use: No     Alcohol/week: 0.0 standard drinks       Current Outpatient Medications   Medication Sig Dispense Refill     acetone, Urine, test STRP Use as directed 50 each 3     blood glucose monitoring (MELISSA CONTOUR NEXT MONITOR) meter device kit 1 each       blood glucose monitoring (MELISSA CONTOUR NEXT) test strip Use to test blood sugar 6 times daily or as directed. 200 each 11     CALCIUM-VITAMIN D PO Take  by mouth daily. 600 mg       Continuous Blood Gluc  (DEXCOM G6 ) DAYAN 1 each continuous 1 Device 0     Continuous Blood Gluc Sensor (DEXCOM G6 SENSOR) MISC 1 each continuous 3 each 11     Continuous Blood Gluc Transmit (DEXCOM G6 TRANSMITTER) MISC 1 each continuous 1 each 4     CONTOUR NEXT TEST test strip TEST 6 TIMES DAILY, OR AS DIRECTED. 200 strip 3     gabapentin (NEURONTIN) 300 MG capsule TAKE 1 CAPSULE BY MOUTH THREE TIMES DAILY 90 capsule 3     Glucagon (GVOKE HYPOPEN 2-PACK) 1 MG/0.2ML SOAJ Inject 1 mg Subcutaneous as needed (for low blood glucose) 0.4 mL 3     glucagon 1 MG injection 1 mg once       insulin aspart (NOVOLOG VIAL) 100 UNITS/ML vial USE WITH INSULIN PUMP FOR A TOTAL DAILY USAGE OF 70 UNITS 20 mL 1     insulin aspart (NOVOLOG VIAL) 100 UNITS/ML vial  UNITS IN PUMP EVERY 3 DAYS       INSULIN PUMP - OUTPATIENT Date last updated:  6/23/15  [employer]  Minimed: Model 751  BASAL RATES and times:  12   AM (midnight): 1.25 units/hour    2     AM: 1.3 units/hour   6     AM: 0.95 units/hour   6    PM: 1.2 units/hour   Basal Pattern A:  Richard Harvey will use when N/A.  CARB RATIO and times:  12   AM (midnight): 12  1    PM:  10  6    PM:    9  Corection Factor (Sensitivity) and times:  12   AM (midnight): 40 mg/dL  6     PM: 45 mg/dL  BLOOD GLUCOSE TARGET and times:  12   AM (midnight): 100 - 140  9     AM:  100 - 120  10   PM:  100 - 140  Active Insulin Time:  4 hours  Sensor:  Yes: 12   AM (midnight): 70 - 240  Carelink / Diasend username:  [email or web address]  Carelink / Diasend Password:  [email or web address]       lisinopril (ZESTRIL) 10 MG tablet Take 1 tablet (10 mg) by mouth daily 90 tablet 3     MOTRIN IB PO Take 400 mg by mouth every 6 hours as needed for moderate pain       Multiple Vitamin (MULTI-VITAMIN DAILY PO) Take  by mouth daily.       nitroglycerin (NITROSTAT) 0.4 MG SL tablet Place under the tongue every 5 minutes as needed       NOVOLOG VIAL 100 UNIT/ML soln USE WITH INSULIN PUMP FOR A TOTAL DAILY USAGE OF 70 UNITS 30 mL 3     ondansetron (ZOFRAN) 4 MG tablet TAKE 1 TABLET BY MOUTH EVERY 8 HOURS AS NEEDED FOR NAUSEA 30 tablet 1     order for DME Equipment being ordered:     1.  Medtronic insulin pump supplies--insertion sets and reserviors  Disp: 1 month  Refill: 11 months 30 days 11     pantoprazole (PROTONIX) 40 MG EC tablet Take 1 tablet (40 mg) by mouth 2 times daily 60 tablet 0     simvastatin (ZOCOR) 40 MG tablet Take 1 tablet (40 mg) by mouth At Bedtime 90 tablet 3     UREA 10 HYDRATING 10 % external cream APPLY  CREAM TOPICALLY TO AFFECTED AREA AS NEEDED FOR  CALLOUSED  SKIN 85 g 0     URINE GLUCOSE-KETONES TEST VI          No Known Allergies    REVIEW OF SYSTEMS  Skin: negative  Eyes: negative; glasses  Ears/Nose/Throat: negative  Respiratory: No shortness of breath, dyspnea on exertion, cough, or hemoptysis  Cardiovascular: negative  Gastrointestinal:  better with the new pill  Genitourinary: urgency  Musculoskeletal: history of arthritis--hands, shoulders, knees--same  Neurologic: positive for numbness or tingling of hands and numbness or tingling of feet--same (good with gabapentin)  Psychiatric: okay  Hematologic/Lymphatic/Immunologic: negative  Endocrine: positive for diabetes    OBJECTIVE:  /76   Pulse 77   Resp 16   Ht 1.524 m (5')   Wt 91.2 kg (201 lb)   SpO2 95%   BMI 39.26 kg/m    Constitutional: healthy, alert and no distress  Musculoskeletal: extremities normal- no gross deformities noted and gait normal  Skin: no suspicious lesions or rashes to visible skin  Psychiatric: mentation appears normal and affect normal/bright      LAB  No results found for any visits on 21.    ASSESSMENT / PLAN:.  (E10.649) Type 1 diabetes mellitus with hypoglycemia and without coma (H)  (primary encounter diagnosis)  Comment:   Insulin pump information:   Average: 158 +/- 68  Basal/bolus ratio: 24 (48%)/26 (52%)   Testinx/day    Hypoglycemia: yes,    Plan: Glucagon (GVOKE HYPOPEN 2-PACK) 1 MG/0.2ML SOAJ          No insulin pump adjustments  His dexcom should help with both his high BGs and low BGs (which are less than last visit)    Education focused on Dexcom start  Gave him samples of Dexcom skins    Follow up  Download of insulin pump and Dexcom--get the new insulin pump replacment  Call sooner if continued issues with low BGs      Patient Instructions   Continue working on healthy eating and moving as best as you can (start low and slow, work up to 30 min, 5x/week)    BG goals:  Fasting and before meals <130, >70  2 hour after eating <180    We only need 1/2 of these numbers to be within target then your A1c will be within target    Medication changes   None today    Follow up   Let me know if there's an issues with the Dexcom  6 weeks    Call me sooner if any problems/concerns and/or questions develop including consistent low BGs <70 or consistent  high BGs >200  [phone] (Unit Coordinator)    [phone] (Nurse)        Time: 40 min  Barrier: none  Willingness to learn: accepting    Macrina CHAMBERLAIN [facility]  Diabetes and Wound Care    40 minutes was spent with patient.    30 minutes of this time was spent on counseling patient regarding illness, medication and/or treatment options, coordinating further cares and follow ups that are needed along with resource material that will be helpful in the treatment of these issues.       With the electronic record, we can now more quickly and easily track our patient diabetic goals. Our diabetes clinical review is in progress and these are the indicators we are monitoring for good diabetes health.     1.) HbA1C less than 7 (measurement of your average blood sugars)  2.) Blood Pressure less than 140/80  3.) LDL less than 100 (bad cholesterol)  4.) HbA1C is checked in the last 6 months and below 7% (more frequently if not at goal or adjusting medications)  5.) LDL is checked in the last 12 months (more frequently if not at goal or adjusting medications)  6.) Taking one baby aspirin daily (unless otherwise instructed)  7.) No tobacco use  8) Statin use     You have achieved 7 out of 8 of these and I am encouraging you to come in and get tuned up to achieve 8 out of 8!  Here is what you have achieved so far in my goals for you:  1.) HbA1C  less than 7:                              NO  Your last  HbA1C :   Lab Results   Component Value Date    A1C 7.5 11/30/2020    A1C 7.7 09/01/2020    A1C 8.1 03/24/2020    A1C 7.2 12/12/2019    A1C 7.3 08/15/2019     2.) Blood Pressure less than 140/80:       YES      Your last    /76   Pulse 77   Resp 16   Ht 1.524 m (5')   Wt 91.2 kg (201 lb)   SpO2 95%   BMI 39.26 kg/m      3.) LDL less than 100:                              YES      Your last   LDL         LDL Cholesterol Calculated   Date Value Ref Range Status   03/24/2020 40 <100 mg/dL Final     Comment:      Desirable:       <100 mg/dl   ]    4.) Checked HbA1C in the past 6 months: YES      5.) Checked LDL in the past 12 months:    YES     6.) Taking one aspirin daily:                       YES     7.) No tobacco use:                                        YES      8.) Statin use      YES

## 2021-01-28 DIAGNOSIS — K31.84 GASTROPARESIS: ICD-10-CM

## 2021-01-28 DIAGNOSIS — K21.9 GASTROESOPHAGEAL REFLUX DISEASE WITHOUT ESOPHAGITIS: ICD-10-CM

## 2021-01-28 RX ORDER — PANTOPRAZOLE SODIUM 40 MG/1
TABLET, DELAYED RELEASE ORAL
Qty: 60 TABLET | Refills: 2 | Status: SHIPPED | OUTPATIENT
Start: 2021-01-28 | End: 2021-07-12

## 2021-02-15 ENCOUNTER — OFFICE VISIT (OUTPATIENT)
Dept: PODIATRY | Facility: OTHER | Age: 56
End: 2021-02-15
Attending: PODIATRIST
Payer: COMMERCIAL

## 2021-02-15 VITALS
SYSTOLIC BLOOD PRESSURE: 124 MMHG | TEMPERATURE: 97.5 F | DIASTOLIC BLOOD PRESSURE: 68 MMHG | HEART RATE: 86 BPM | WEIGHT: 201 LBS | OXYGEN SATURATION: 93 % | BODY MASS INDEX: 39.26 KG/M2

## 2021-02-15 DIAGNOSIS — M77.8 EXTENSOR TENDINITIS OF FOOT: ICD-10-CM

## 2021-02-15 DIAGNOSIS — R20.8 LOSS OF PROTECTIVE SENSATION OF SKIN OF DEFORMED FOOT: ICD-10-CM

## 2021-02-15 DIAGNOSIS — M62.462 GASTROCNEMIUS EQUINUS OF LEFT LOWER EXTREMITY: ICD-10-CM

## 2021-02-15 DIAGNOSIS — M20.21 HALLUX RIGIDUS OF RIGHT FOOT: ICD-10-CM

## 2021-02-15 DIAGNOSIS — M62.461 GASTROCNEMIUS EQUINUS OF RIGHT LOWER EXTREMITY: ICD-10-CM

## 2021-02-15 DIAGNOSIS — E10.42 DIABETIC POLYNEUROPATHY ASSOCIATED WITH TYPE 1 DIABETES MELLITUS (H): ICD-10-CM

## 2021-02-15 DIAGNOSIS — M25.571 SINUS TARSI SYNDROME OF RIGHT ANKLE: ICD-10-CM

## 2021-02-15 DIAGNOSIS — L84 CALLUS OF FOOT: Primary | ICD-10-CM

## 2021-02-15 DIAGNOSIS — M21.371 FOOT DROP, RIGHT FOOT: ICD-10-CM

## 2021-02-15 DIAGNOSIS — L60.3 ONYCHODYSTROPHY: ICD-10-CM

## 2021-02-15 DIAGNOSIS — M21.969 LOSS OF PROTECTIVE SENSATION OF SKIN OF DEFORMED FOOT: ICD-10-CM

## 2021-02-15 PROCEDURE — G0463 HOSPITAL OUTPT CLINIC VISIT: HCPCS | Mod: 25

## 2021-02-15 PROCEDURE — 99213 OFFICE O/P EST LOW 20 MIN: CPT | Mod: 25 | Performed by: PODIATRIST

## 2021-02-15 PROCEDURE — 11056 PARNG/CUTG B9 HYPRKR LES 2-4: CPT | Performed by: PODIATRIST

## 2021-02-15 PROCEDURE — 11721 DEBRIDE NAIL 6 OR MORE: CPT | Mod: 59 | Performed by: PODIATRIST

## 2021-02-15 ASSESSMENT — PAIN SCALES - GENERAL: PAINLEVEL: MODERATE PAIN (5)

## 2021-02-15 NOTE — PROGRESS NOTES
Chief complaint: Patient presents with:  Musculoskeletal Problem      History of Present Illness: This 55 year old IDDM type I male is seen for follow-up management of a diabetic foot exam and high risk nail debridement. The toenails are difficult to trim because of the thickness.     He gets burning, tingling, and numbness in his feet. He just received new DM shoes from [facility] at [facility] in [place], [place], around December, 2020. The shoes are comfortable.     His blood sugars are doing well and he is following with Macrina Dixon NP for blood sugar management.     No further pedal complaints today.    Last HbA1C was 7.5% on 11/30/2020.      Patient does not use tobacco products.       Additionally:  Patient returned to work on 02/04/2019 at the [employer] working 4-5 hour shifts. He was wearing AFO on his RIGHT foot for drop foot, but the brace caused too much pressure on the toe and he thinks it may have caused his last hallux ulcer on the RIGHT foot so he stopped wearing it (the wound opened around January, 2019.    He still has some pain across the anterior ankle. He wears a copper fit brace which helps decrease the pain when the pain worsens.       /68   Pulse 86   Temp 97.5  F (36.4  C) (Tympanic)   Wt 91.2 kg (201 lb)   SpO2 93%   BMI 39.26 kg/m      Patient Active Problem List   Diagnosis     DM type 1 (diabetes mellitus, type 1) (H)     HTN (hypertension)     Hyperlipidemia     ACP (advance care planning)     Diabetic polyneuropathy associated with type 1 diabetes mellitus (H)     Class 2 obesity in adult     Obesity (BMI 35.0-39.9) with comorbidity (H)     Gastroparesis     Gastroesophageal reflux disease without esophagitis       Past Surgical History:   Procedure Laterality Date     COLONOSCOPY - HIM SCAN N/A 09/19/2016    Procedure: COLONOSCOPY SCREENING; Surgeon: Rudy Rojo MD; Location: [facility] OR     ESOPHAGOSCOPY, GASTROSCOPY, DUODENOSCOPY (EGD),  COMBINED  08/13/2019       Current Outpatient Medications   Medication     acetone, Urine, test STRP     blood glucose monitoring (MELISSA CONTOUR NEXT MONITOR) meter device kit     blood glucose monitoring (MELISSA CONTOUR NEXT) test strip     CALCIUM-VITAMIN D PO     Continuous Blood Gluc  (DEXCOM G6 ) DAYAN     Continuous Blood Gluc Sensor (DEXCOM G6 SENSOR) MISC     Continuous Blood Gluc Transmit (DEXCOM G6 TRANSMITTER) MISC     CONTOUR NEXT TEST test strip     gabapentin (NEURONTIN) 300 MG capsule     Glucagon (GVOKE HYPOPEN 2-PACK) 1 MG/0.2ML SOAJ     glucagon 1 MG injection     insulin aspart (NOVOLOG VIAL) 100 UNITS/ML vial     insulin aspart (NOVOLOG VIAL) 100 UNITS/ML vial     INSULIN PUMP - OUTPATIENT     lisinopril (ZESTRIL) 10 MG tablet     MOTRIN IB PO     Multiple Vitamin (MULTI-VITAMIN DAILY PO)     nitroglycerin (NITROSTAT) 0.4 MG SL tablet     NOVOLOG VIAL 100 UNIT/ML soln     ondansetron (ZOFRAN) 4 MG tablet     order for DME     pantoprazole (PROTONIX) 40 MG EC tablet     simvastatin (ZOCOR) 40 MG tablet     UREA 10 HYDRATING 10 % external cream     URINE GLUCOSE-KETONES TEST VI     No current facility-administered medications for this visit.         No Known Allergies    Family History   Problem Relation Age of Onset     No Known Problems Mother      Hypertension Father      Hypertension Brother        Social History     Socioeconomic History     Marital status: Single     Spouse name: None     Number of children: None     Years of education: None     Highest education level: None   Occupational History     None   Social Needs     Financial resource strain: None     Food insecurity:     Worry: None     Inability: None     Transportation needs:     Medical: None     Non-medical: None   Tobacco Use     Smoking status: Never Smoker     Smokeless tobacco: Never Used   Substance and Sexual Activity     Alcohol use: No     Alcohol/week: 0.0 oz     Drug use: No     Sexual activity: None    Lifestyle     Physical activity:     Days per week: None     Minutes per session: None     Stress: None   Relationships     Social connections:     Talks on phone: None     Gets together: None     Attends Jainism service: None     Active member of club or organization: None     Attends meetings of clubs or organizations: None     Relationship status: None     Intimate partner violence:     Fear of current or ex partner: None     Emotionally abused: None     Physically abused: None     Forced sexual activity: None   Other Topics Concern     Parent/sibling w/ CABG, MI or angioplasty before 65F 55M? Not Asked   Social History Narrative     None       ROS: 10 point ROS neg other than the symptoms noted above in the HPI.  EXAM  Constitutional: healthy, alert and no distress     Psychiatric: mentation appears normal and affect normal/bright     VASCULAR:  -Dorsalis pedis pulse +2/4 RIGHT and +1/4 LEFT  ---Biphasic bilaterally on 01/23/2019  -Posterior tibial pulse +1/4 b/l   ---Monophasic on LEFT and biphasic on RIGHT on 01/23/2019    -Capillary refill time < 3 seconds to b/l hallux  -Hair growth Present to b/l anterior legs and ankles     NEURO:  -Protective sensation diminished with SWM +1/10 RIGHT and +5/10 LEFT on 02/15/2021  -Protective sensation diminished with SWM +3/10 RIGHT and +10/10 LEFT on 01/23/2019     DERM:  -Hyperkeratotic lesion to RIGHT plantar hallux, RIGHT plantar 5th metatarsal head and RIGHT plantar heel  ---No wounds post paring      Wound Location:  RIGHT distal hallux tip  02/15/2021: HEALED     06/25/2019  Measurement:  HEALED with mild-to-moderate overlying hyperkeratotic lesion     06/04/2019  Measurement:  HEALED with mild-to-moderate overlying hyperkeratotic lesion     05/02/2019  Measurement:  0.2cm x 0.2cm x 0.2cm to subcutaneous tissue  Drainage:  Minimal serous  Odor:  None  Undermining:  None  Edges:  Viable and intact  Base:  100% viable   Surrounding Skin: Intact and viable with  moderate hyperkeratotic lesion   -No severe erythema, no ascending erythema, no calor, no purulence, no malodor, no other SOI.    04/18/2019:  0.1cm x 0.5cm x 0.2cm through the skin  03/28/2019:  0.2cm x 0.4cm x 0.3cm to the subcutaneous tissues  03/15/2019:  0.9cm x 0.5cm x 0.6cm to the subcutaneous tissues  02/28/2019:  0.8cm x 1.1cm x 0.2cm to the subcutaneous tissues  02/15/2019:  0.8cm x 1.4cm x 0.2cm to the subcutaneous tissues with new epithelialization formin    -----------------------------------------------------------------------------  Wound Location:  RIGHT dorsal wound   02/15/2021: HEALED    06/25/2019  Measurement:  0.4cm x 0.3cm x 0.1 cm through the skin only  Drainage:  Mild serous  Odor:  None  Undermining:  None  Edges:  Fragile  Base:  Through skin only  Surrounding Skin: Mild erythema  No severe erythema, no ascending erythema, no calor, no purulence, no malodor, no other SOI.     -Toenails thickend, dystrophic and discolored x 10     MSK:  -Pain on palpation to RIGHT anterior ankle over the anterior tibial tendon, extensor tendons and sinus tarsi  -No tenderness on palpation to RIGHT distal plantar hyperkeratotic lesion   -RIGHT 1st MTPJ has 0 degrees of DORSIFLEXION and the hallux IPJ is mildly but rigidly plantarflexed   -DORSIFLEXION of bilateral ankle limited to between -5 short of vertical bilaterally   -Muscle strength of ankles for dorsiflexion, plantarflexion +0/5 RIGHT foot and +5/5 LEFT foot  -Muscle strength for ABDUction and ADDuction +5/5 LEFT and +4/5 RIGHT   ============================================================     ASSESSMENT:     (L84) Callus of heel (primary encounter diagnosis)     (M20.21) Hallux rigidus of right foot     (L60.3) Onychodystrophy     (M21.371) Foot drop, right foot     (E10.42) Diabetic polyneuropathy associated with type 1 diabetes mellitus (H)    (M21.6X1) Gastrocnemius equinus of right lower extremity    (M21.6X2) Gastrocnemius equinus of left lower  extremity          PLAN:  -Patient evaluated and examined. Treatment options discussed with no educational barriers noted.    -Physical therapy ordered through [facility] per patient request to work on active and passive ROM of the ankle, balance, proprioception and gait training and to decrease the gastrocnemius equinus.    -Callus pared x 3 to the RIGHT plantar hallux, 5th metatarsal head and RIGHT plantar heel without incident  ---Patient reminded that the callus will likely return due to the underlying, prominent bone causing the callus while the patient is walking.    -Nails debrided x 10 without incident    -Diabetic Foot Education provided. This included checking the feet daily looking for new new blisters or wounds, wearing shoes at all times when walking including around the house, and avoiding lotion application between the toes. If there are any signs of infection, the patient should present to the ED as soon as possible. Infections of the foot can be life threatening or lead to amputations of the foot or leg.    -DM shoes: Last received through [facility] in December, 2020. Shoes are comfortable.    -Orthotic referral through [facility] for a new ankle brace    -Patient in agreement with the above treatment plan and all of patient's questions were answered.        RTC three months for diabetic foot exam and high risk nail debridement        Juliette Jarrett DPM

## 2021-02-15 NOTE — NURSING NOTE
Chief Complaint   Patient presents with     Musculoskeletal Problem       Initial /68   Pulse 86   Temp 97.5  F (36.4  C) (Tympanic)   Wt 91.2 kg (201 lb)   SpO2 93%   BMI 39.26 kg/m   Estimated body mass index is 39.26 kg/m  as calculated from the following:    Height as of 1/27/21: 1.524 m (5').    Weight as of this encounter: 91.2 kg (201 lb).  Medication Reconciliation: complete  Nadine Landers LPN

## 2021-02-19 DIAGNOSIS — E10.65 TYPE 1 DIABETES MELLITUS WITH HYPERGLYCEMIA (H): ICD-10-CM

## 2021-02-19 RX ORDER — UREA 10 %
LOTION (ML) TOPICAL
Qty: 85 G | Refills: 0 | Status: SHIPPED | OUTPATIENT
Start: 2021-02-19 | End: 2021-04-12

## 2021-02-19 NOTE — TELEPHONE ENCOUNTER
Urea 10 hydrating 10% external cream      Last Written Prescription Date:  12/8/2020  Last Fill Quantity: 85g,   # refills: 0  Last Office Visit: 12/31/2020  Future Office visit:    Next 5 appointments (look out 90 days)    Apr 05, 2021  8:10 AM  (Arrive by 7:55 AM)  SHORT with Natacha Alvarez MD  [facility] ([facility] ) [address] [place] [address] [id number] [phone]   May 17, 2021  8:00 AM  (Arrive by 7:45 AM)  Return Visit with Julitete Jarrett DPM  [facility] ([facility] ) [address] [place] [address] [phone] [phone]           Routing refill request to provider for review/approval because:

## 2021-03-01 ENCOUNTER — HOSPITAL ENCOUNTER (OUTPATIENT)
Dept: PHYSICAL THERAPY | Facility: HOSPITAL | Age: 56
Setting detail: THERAPIES SERIES
End: 2021-03-01
Attending: PODIATRIST
Payer: COMMERCIAL

## 2021-03-01 DIAGNOSIS — M62.461 GASTROCNEMIUS EQUINUS OF RIGHT LOWER EXTREMITY: ICD-10-CM

## 2021-03-01 DIAGNOSIS — M77.8 EXTENSOR TENDINITIS OF FOOT: ICD-10-CM

## 2021-03-01 DIAGNOSIS — M25.571 SINUS TARSI SYNDROME OF RIGHT ANKLE: ICD-10-CM

## 2021-03-01 DIAGNOSIS — M62.462 GASTROCNEMIUS EQUINUS OF LEFT LOWER EXTREMITY: ICD-10-CM

## 2021-03-01 PROCEDURE — 97162 PT EVAL MOD COMPLEX 30 MIN: CPT | Mod: GP

## 2021-03-01 PROCEDURE — 97110 THERAPEUTIC EXERCISES: CPT | Mod: GP

## 2021-03-01 PROCEDURE — 97140 MANUAL THERAPY 1/> REGIONS: CPT | Mod: GP

## 2021-03-01 NOTE — PROGRESS NOTES
Initial Physical Therapy Evaluation      Name: Richard Harvey MRN# [id number]   Age: 55 year old YOB: 1965     Date of Consultation: March 1, 2021  Primary care provider: Natacha Alvarez    Referring Physician: Dr. Juliette Jarrett  Orders: Eval and Treat  Medical Diagnosis: B Gastroc Equinus, Sinus Tarsi Syndrome, Extensor Tendon  Onset of Illness/Injury: 3/1/2021     Reason for PT Visit: Patient is a 54 y/o male who presents with R foot pain, sinus tarsi syndrome.  Increased pain w/ work environment on hard concrete floor.  Richard currently works at the [employer] 4-6 hours per shift typically.  The pain starts fine and gets worse as the shift goes on.  History of diabetes, but all wounds are doing well at this time.  Patient does not do any stretching on his own, but keeps very busy at all times.    Prior Level of Function: Independent   Pain: 6/10  [place]    Community Support/Living Environment/Employment History: Works at the [employer]     Patient/Family Goal: to have less ankle pain    Fall Screen:   Have you fallen 2 or more times in the last year? No  Have you fallen and had an injury in the last year? No  Timed up & go: n/a  Is patient a fall risk? No    Past Medical History:   Past Medical History:   Diagnosis Date     Diabetes mellitus type 1, controlled (H)      HLD (hyperlipidemia)      HTN (hypertension)      Neuropathy      Type 1 diabetes (H)        Past Surgical History:  Past Surgical History:   Procedure Laterality Date     COLONOSCOPY - HIM SCAN N/A 09/19/2016    Procedure: COLONOSCOPY SCREENING; Surgeon: Rudy Rojo MD; Location: [facility] OR     ESOPHAGOSCOPY, GASTROSCOPY, DUODENOSCOPY (EGD), COMBINED  08/13/2019       Medications:   Current Outpatient Medications   Medication Sig     acetone, Urine, test STRP Use as directed     blood glucose monitoring (M[employer] CONTOUR NEXT MONITOR) meter device kit 1 each     blood glucose monitoring (MELISSA CONTOUR NEXT) test strip  Use to test blood sugar 6 times daily or as directed.     CALCIUM-VITAMIN D PO Take  by mouth daily. 600 mg     Continuous Blood Gluc  (DEXCOM G6 ) DAYAN 1 each continuous     Continuous Blood Gluc Sensor (DEXCOM G6 SENSOR) MISC 1 each continuous     Continuous Blood Gluc Transmit (DEXCOM G6 TRANSMITTER) MISC 1 each continuous     CONTOUR NEXT TEST test strip TEST 6 TIMES DAILY, OR AS DIRECTED.     gabapentin (NEURONTIN) 300 MG capsule TAKE 1 CAPSULE BY MOUTH THREE TIMES DAILY     Glucagon (GVOKE HYPOPEN 2-PACK) 1 MG/0.2ML SOAJ Inject 1 mg Subcutaneous as needed (for low blood glucose)     glucagon 1 MG injection 1 mg once     insulin aspart (NOVOLOG VIAL) 100 UNITS/ML vial USE WITH INSULIN PUMP FOR A TOTAL DAILY USAGE OF 70 UNITS     insulin aspart (NOVOLOG VIAL) 100 UNITS/ML vial  UNITS IN PUMP EVERY 3 DAYS     INSULIN PUMP - OUTPATIENT Date last updated:  6/23/15  Medtronic Minimed: Model 751  BASAL RATES and times:  12   AM (midnight): 1.25 units/hour    2     AM: 1.3 units/hour   6     AM: 0.95 units/hour   6    PM: 1.2 units/hour   Basal Pattern A:  Richard Harvey will use when N/A.  CARB RATIO and times:  12   AM (midnight): 12  1    PM:  10  6    PM:    9  Corection Factor (Sensitivity) and times:  12   AM (midnight): 40 mg/dL  6     PM: 45 mg/dL  BLOOD GLUCOSE TARGET and times:  12   AM (midnight): 100 - 140  9     AM:  100 - 120  10   PM:  100 - 140  Active Insulin Time:  4 hours  Sensor:  Yes: 12   AM (midnight): 70 - 240  Carelink / Diasend username:  [email or web address]  Carelink / Diasend Password:  [email or web address]     lisinopril (ZESTRIL) 10 MG tablet Take 1 tablet (10 mg) by mouth daily     MOTRIN IB PO Take 400 mg by mouth every 6 hours as needed for moderate pain     Multiple Vitamin (MULTI-VITAMIN DAILY PO) Take  by mouth daily.     nitroglycerin (NITROSTAT) 0.4 MG SL tablet Place under the tongue every 5 minutes as needed     NOVOLOG VIAL 100 UNIT/ML soln USE WITH INSULIN PUMP FOR A TOTAL  DAILY USAGE OF 70 UNITS     ondansetron (ZOFRAN) 4 MG tablet TAKE 1 TABLET BY MOUTH EVERY 8 HOURS AS NEEDED FOR NAUSEA     order for DME Equipment being ordered:     1.  Medtronic insulin pump supplies--insertion sets and reserviors  Disp: 1 month  Refill: 11 months     pantoprazole (PROTONIX) 40 MG EC tablet Take 1 tablet by mouth twice daily     simvastatin (ZOCOR) 40 MG tablet Take 1 tablet (40 mg) by mouth At Bedtime     UREA 10 HYDRATING 10 % external cream APPLY CREAM TOPICALLY TO AFFECTED AREA AS NEEDED FOR  CALLOUSED  SKIN     URINE GLUCOSE-KETONES TEST VI      No current facility-administered medications for this encounter.        Imaging:     Musculoskeletal Findings:     OBJECTIVE   Observation:   Patient appears to PT in no acute distress      Palpation: Tenderness on anterior portion lateral and medial at sinus tarsi - notable decrease in mass of R calf compared to R, long standing history of drop foot and loss of strength of R foot      Gait: Antalgic gait w/ decreased mobility of the foot on R side      Assistive devices:        Balance: Poor on R, Good on L able to hold SLS 10 sec      Functional mobility: Ambulates independently      Posture: Normal erect.      Neurological Assessment:  Reflexes - Right:    Achilles -    Patellar -        Reflexes - Left:    Achilles -   Patellar -     Myotomes: absent R foot   Dermatomes: protective sensation issues due to history of diabetes    Ankle Range of Motion  And Strength    R ankle - ROM              Dorsiflexion        -7*  PROM        Plantarflexion    20* PROM        Inversion             2-3* PROM       Eversion               2-3* PROM             Strength: (_/5)    Dorsiflexion    1/5  Plantarflexion 1/5  Inversion        1/5  Eversion        1/5       L ankle - Active ROM    Dorsiflexion: 3*                         Plantaflexion: WNL  Inversion: WNL                         Eversion: WNL                         Strength: (_/5)    Dorsiflexion     5/5  Plantarflexion 5/5  Inversion        5/5  Eversion        5/5    Great toe extension ROM:     Rearfoot mobility assessment:     Special Tests:  Ankle Special Tests:     Unable to assess special testing due to significant rigidity and history of drop foot/weakness on R side                     Hip Strength and ROM: WNL      Outcome Measures:   LEFS 60/80     Prognosis/Plan of Care: Patient has a fair prognosis   Appropriate for Physical Therapy Intervention: Yes     GOALS:   Short-term goals: 4-8 weeks  1. Patient will be consistent and compliant w/ HEP.   2. Patient will be able to improve SLS to 5 sec on R side to improve ankle proprioception and reduce pain.   3. Patient will be able to tolerate 4 hour shifts on R foot w/ pain less than 4/10 and manage swelling appropriately to reduce overall symptoms.      Discharge goals: Independent with use of HEP outside of clinic      Planned Interventions: Therapeutic exercise, manual therapy, therapeutic activity, patient education, neuro re-ed    Treatment Rendered/Intervention:  Evaluation completed as described above followed by discussion of exam findings and plan of care.    Therapeutic exercise: Patient instructed in and demonstrated proper performance of home exercise program consisting of:  Gastroc/soleus stretching on first session, w/ lots of joint mobilization which did reduce pain    Educated in posture principles and neutral spine positioning. Patient was instructed in home use of heat and/or ice for pain management    Clinical Impressions:  Criteria for Skilled Therapeutic Intervention Met: Yes  PT Diagnosis: R Ankle pain  Influenced by the following impairments: R ankle pain, decreased mobility, decreased R ankle strength  Functional limitations due to impairment: Walking, Standing, Uneven surfaces, Stairs  Clinical presentation: Stable/Uncomplicated  Clinical presentation rationale: therapist discretion  Clinical Decision making (complexity): Moderate  Complexity  Predicted Duration of Therapy Intervention (days/wks): 2x  Risks and Benefits of therapy have been explained: Yes  Patient, Family & other staff in agreement with plan of care: Yes  Comments: Patient presents w/ R ankle pain in the sinus tarsi area, worse w/ standing extended periods of time.  Patient does have decreased motion B into dorsiflexion, especially on R.  Significant rigidity present through all joints of the foot.  PT believe this is likely the reason patient able to walk so well, despite having significant weakness throughout R foot.  Will work on mobilization and modalities to reduce pain/edema to R foot.  Likely that compression stockings to reduce edema would be equally effective at reducing R ankle pain w/ standing throughout the day.    Frequency: 2x times/week  Date Range: 3/1/21 to 6/1/21       Total Evaluation Time: 15

## 2021-03-02 ENCOUNTER — ALLIED HEALTH/NURSE VISIT (OUTPATIENT)
Dept: EDUCATION SERVICES | Facility: OTHER | Age: 56
End: 2021-03-02
Attending: NURSE PRACTITIONER
Payer: COMMERCIAL

## 2021-03-02 VITALS
HEIGHT: 60 IN | RESPIRATION RATE: 16 BRPM | SYSTOLIC BLOOD PRESSURE: 125 MMHG | HEART RATE: 76 BPM | WEIGHT: 199.5 LBS | DIASTOLIC BLOOD PRESSURE: 66 MMHG | OXYGEN SATURATION: 96 % | BODY MASS INDEX: 39.17 KG/M2

## 2021-03-02 DIAGNOSIS — E10.65 TYPE 1 DIABETES MELLITUS WITH HYPERGLYCEMIA (H): Primary | ICD-10-CM

## 2021-03-02 PROCEDURE — 95251 CONT GLUC MNTR ANALYSIS I&R: CPT | Performed by: NURSE PRACTITIONER

## 2021-03-02 PROCEDURE — 99214 OFFICE O/P EST MOD 30 MIN: CPT | Mod: 25 | Performed by: NURSE PRACTITIONER

## 2021-03-02 PROCEDURE — G0463 HOSPITAL OUTPT CLINIC VISIT: HCPCS

## 2021-03-02 ASSESSMENT — MIFFLIN-ST. JEOR: SCORE: 1582.43

## 2021-03-02 ASSESSMENT — PAIN SCALES - GENERAL: PAINLEVEL: MODERATE PAIN (5)

## 2021-03-02 NOTE — PATIENT INSTRUCTIONS
Continue working on healthy eating and moving as best as you can (start low and slow, work up to 30 min, 5x/week)    BG goals:  Fasting and before meals <130, >70  2 hour after eating <180    We only need 1/2 of these numbers to be within target then your A1c will be within target    Medication changes   Let me if you have continued issues with low BGs    Follow up   1 month--nurse only download  3 months    Call me sooner if any problems/concerns and/or questions develop including consistent low BGs <70 or consistent high BGs >200  [phone] (Unit Coordinator)    [phone] (Nurse)

## 2021-03-02 NOTE — PROGRESS NOTES
"SUBJECTIVE:  Richard Harvey, 55 year old, male presents with the following Chief Complaint(s) with HPI to follow:  Chief Complaint   Patient presents with     Diabetes        Diabetes Follow-up      Patient is checking blood sugars: 4x/day.  Results:  Date: 2/16/2021 to 3/1/2021  Glucose management indicator: n/a    Average glucose: 184    Glucose range  Very low (<54): 2%  low (<70): 5%  In target range (): 49%  High (>180): 22%  Very high (>250): 22%      Symptoms of hypoglycemia (low blood sugar): yes--sometimes he feels them and sometimes he doesn't    Paresthesias (numbness or burning in feet) or sores: Yes--hand and feet--\"same\"; no sores    Diabetic eye exam within the last year: Yes    Breakfast eaten regularly: Yes    Patient counting carbs: trying       HPI: Richard's here today for the follow up regarding his Diabetes mellitus, Type 1.    Lab Results   Component Value Date    A1C 7.5 11/30/2020    A1C 7.7 09/01/2020    A1C 8.1 03/24/2020    A1C 7.2 12/12/2019    A1C 7.3 08/15/2019     Current Diabetes medication:   1.  Insulin pump, uses Novolog;   ASA use: yes, 325 mg daily  Statin use: yes, simvastatin 40 mg at bedime    Richard's here for his Dexcom G6 restart and insulin pump download with possible adjust.  He reports the following:   Denies any issues with his insulin pump  Denies any issues with his Dexcom    Denies any new health concerns.    Has an appt with his PCP, Dr. Alvarez in April    Reports issues with higher BGs that don't respond to corrections (even when he adds a carb bolus without eating).   Continued issues with low BGs, too.      Patient Active Problem List   Diagnosis     DM type 1 (diabetes mellitus, type 1) (H)     HTN (hypertension)     Hyperlipidemia     ACP (advance care planning)     Diabetic polyneuropathy associated with type 1 diabetes mellitus (H)     Class 2 obesity in adult     Obesity (BMI 35.0-39.9) with comorbidity (H)     Gastroparesis     Gastroesophageal reflux " disease without esophagitis       Past Medical History:   Diagnosis Date     Diabetes mellitus type 1, controlled (H)      HLD (hyperlipidemia)      HTN (hypertension)      Neuropathy      Type 1 diabetes (H)        Past Surgical History:   Procedure Laterality Date     COLONOSCOPY - HIM SCAN N/A 09/19/2016    Procedure: COLONOSCOPY SCREENING; Surgeon: Rudy Rojo MD; Location: [facility]     ESOPHAGOSCOPY, GASTROSCOPY, DUODENOSCOPY (EGD), COMBINED  08/13/2019       Family History   Problem Relation Age of Onset     No Known Problems Mother      Hypertension Father      Hypertension Brother        Social History     Tobacco Use     Smoking status: Never Smoker     Smokeless tobacco: Never Used   Substance Use Topics     Alcohol use: No     Alcohol/week: 0.0 standard drinks       Current Outpatient Medications   Medication Sig Dispense Refill     acetone, Urine, test STRP Use as directed 50 each 3     blood glucose monitoring ([employer] CONTOUR NEXT MONITOR) meter device kit 1 each       blood glucose monitoring ([employer] CONTOUR NEXT) test strip Use to test blood sugar 6 times daily or as directed. 200 each 11     CALCIUM-VITAMIN D PO Take  by mouth daily. 600 mg       Continuous Blood Gluc  (DEXCOM G6 ) DAYAN 1 each continuous 1 Device 0     Continuous Blood Gluc Sensor (DEXCOM G6 SENSOR) MISC 1 each continuous 3 each 11     Continuous Blood Gluc Transmit (DEXCOM G6 TRANSMITTER) MISC 1 each continuous 1 each 4     CONTOUR NEXT TEST test strip TEST 6 TIMES DAILY, OR AS DIRECTED. 200 strip 3     gabapentin (NEURONTIN) 300 MG capsule TAKE 1 CAPSULE BY MOUTH THREE TIMES DAILY 90 capsule 3     Glucagon (GVOKE HYPOPEN 2-PACK) 1 MG/0.2ML SOAJ Inject 1 mg Subcutaneous as needed (for low blood glucose) 0.4 mL 3     glucagon 1 MG injection 1 mg once       insulin aspart (NOVOLOG VIAL) 100 UNITS/ML vial USE WITH INSULIN PUMP FOR A TOTAL DAILY USAGE OF 70 UNITS 20 mL 1     insulin aspart (NOVOLOG VIAL) 100  UNITS/ML vial  UNITS IN PUMP EVERY 3 DAYS       INSULIN PUMP - OUTPATIENT Date last updated:  6/23/15  Medtronic Minimed: Model 751  BASAL RATES and times:  12   AM (midnight): 1.25 units/hour    2     AM: 1.3 units/hour   6     AM: 0.95 units/hour   6    PM: 1.2 units/hour   Basal Pattern A:  Richard Harvey will use when N/A.  CARB RATIO and times:  12   AM (midnight): 12  1    PM:  10  6    PM:    9  Corection Factor (Sensitivity) and times:  12   AM (midnight): 40 mg/dL  6     PM: 45 mg/dL  BLOOD GLUCOSE TARGET and times:  12   AM (midnight): 100 - 140  9     AM:  100 - 120  10   PM:  100 - 140  Active Insulin Time:  4 hours  Sensor:  Yes: 12   AM (midnight): 70 - 240  Carelink / Diasend username:  [email or web address]  Carelink / Diasend Password:  [email or web address]       lisinopril (ZESTRIL) 10 MG tablet Take 1 tablet (10 mg) by mouth daily 90 tablet 3     MOTRIN IB PO Take 400 mg by mouth every 6 hours as needed for moderate pain       Multiple Vitamin (MULTI-VITAMIN DAILY PO) Take  by mouth daily.       nitroglycerin (NITROSTAT) 0.4 MG SL tablet Place under the tongue every 5 minutes as needed       NOVOLOG VIAL 100 UNIT/ML soln USE WITH INSULIN PUMP FOR A TOTAL DAILY USAGE OF 70 UNITS 30 mL 3     order for DME Equipment being ordered:     1.  Medtronic insulin pump supplies--insertion sets and reserviors  Disp: 1 month  Refill: 11 months 30 days 11     pantoprazole (PROTONIX) 40 MG EC tablet Take 1 tablet by mouth twice daily 60 tablet 2     UREA 10 HYDRATING 10 % external cream APPLY CREAM TOPICALLY TO AFFECTED AREA AS NEEDED FOR  CALLOUSED  SKIN 85 g 0     URINE GLUCOSE-KETONES TEST VI        ondansetron (ZOFRAN) 4 MG tablet TAKE 1 TABLET BY MOUTH EVERY 8 HOURS AS NEEDED FOR NAUSEA 30 tablet 3     simvastatin (ZOCOR) 40 MG tablet Take 1 tablet (40 mg) by mouth At Bedtime 90 tablet 3       No Known Allergies    REVIEW OF SYSTEMS  Skin: negative  Eyes: negative; new glasses  Ears/Nose/Throat: negative  Respiratory: No  shortness of breath, dyspnea on exertion, cough, or hemoptysis  Cardiovascular: negative  Gastrointestinal: same  Genitourinary: negative  Musculoskeletal: history of arthritis--hands, shoulders, knees--worse   Neurologic: positive for numbness or tingling of hands and numbness or tingling of feet--left hand is worse  Psychiatric: okay  Hematologic/Lymphatic/Immunologic: negative  Endocrine: positive for diabetes    OBJECTIVE:  /66   Pulse 76   Resp 16   Ht 1.524 m (5')   Wt 90.5 kg (199 lb 8 oz)   SpO2 96%   BMI 38.96 kg/m    Constitutional: healthy, alert and no distress  Musculoskeletal: extremities normal- no gross deformities noted and gait normal  Skin: no suspicious lesions or rashes to visible skin  Psychiatric: mentation appears normal and affect normal/bright      LAB  Results for orders placed or performed in visit on 21   GLUCOSE MONITOR, 72 HOUR, PHYS INTERP     Status: None    Narrative    Date: 2021 to 3/1/2021  Glucose management indicator: n/a    Average glucose: 184    Glucose range  Very low (<54): 2%  low (<70): 5%  In target range (): 49%  High (>180): 22%  Very high (>250): 22%       ASSESSMENT / PLAN:.  (E10.65) Type 1 diabetes mellitus with hyperglycemia (H)  (primary encounter diagnosis)  Comment:   Noted CGM pattern    Insulin pump information:   Average: 175 +/- 89  Basal/bolus ratio: 23 (44%)/26 (52%)   Testinx/day    Hypoglycemia: yes,    Plan: GLUCOSE MONITOR, 72 HOUR, PHYS INTERP          Richard and I have talked about this before in the past, I highly encouraged him NOT to add carbs when he's just correcting a higher BGs.     Will adjust his correction to the following:  Carb ratio  0000 9  1200 12  1600 13    Continue to reduce the grams of carb consumed the meal prior to increase activity          Follow up  Download of insulin pump and Dexcom  Call sooner if continued issues with low BGs    Patient Instructions   Continue working on healthy eating and  moving as best as you can (start low and slow, work up to 30 min, 5x/week)    BG goals:  Fasting and before meals <130, >70  2 hour after eating <180    We only need 1/2 of these numbers to be within target then your A1c will be within target    Medication changes   Let me if you have continued issues with low BGs    Follow up   1 month--nurse only download  3 months    Call me sooner if any problems/concerns and/or questions develop including consistent low BGs <70 or consistent high BGs >200  [phone] (Unit Coordinator)    [phone] (Nurse)        Time: 35 min  Barrier: none  Willingness to learn: accepting    Macrina CHAMBERLAIN [facility]  Diabetes and Wound Care    35 minutes was spent with patient.    30 minutes of this time was spent on counseling patient regarding illness, medication and/or treatment options, coordinating further cares and follow ups that are needed along with resource material that will be helpful in the treatment of these issues.       With the electronic record, we can now more quickly and easily track our patient diabetic goals. Our diabetes clinical review is in progress and these are the indicators we are monitoring for good diabetes health.     1.) HbA1C less than 7 (measurement of your average blood sugars)  2.) Blood Pressure less than 140/80  3.) LDL less than 100 (bad cholesterol)  4.) HbA1C is checked in the last 6 months and below 7% (more frequently if not at goal or adjusting medications)  5.) LDL is checked in the last 12 months (more frequently if not at goal or adjusting medications)  6.) Taking one baby aspirin daily (unless otherwise instructed)  7.) No tobacco use  8) Statin use     You have achieved 7 out of 8 of these and I am encouraging you to come in and get tuned up to achieve 8 out of 8!  Here is what you have achieved so far in my goals for you:  1.) HbA1C  less than 7:                              NO  Your last  HbA1C :   Lab Results   Component Value Date     A1C 7.5 11/30/2020    A1C 7.7 09/01/2020    A1C 8.1 03/24/2020    A1C 7.2 12/12/2019    A1C 7.3 08/15/2019     2.) Blood Pressure less than 140/80:       YES      Your last    /66   Pulse 76   Resp 16   Ht 1.524 m (5')   Wt 90.5 kg (199 lb 8 oz)   SpO2 96%   BMI 38.96 kg/m      3.) LDL less than 100:                              YES      Your last   LDL         LDL Cholesterol Calculated   Date Value Ref Range Status   03/24/2020 40 <100 mg/dL Final     Comment:     Desirable:       <100 mg/dl   ]    4.) Checked HbA1C in the past 6 months: YES      5.) Checked LDL in the past 12 months:    YES     6.) Taking one aspirin daily:                       YES     7.) No tobacco use:                                        YES      8.) Statin use      YES

## 2021-03-02 NOTE — PROGRESS NOTES
Chief Complaint   Patient presents with     Diabetes       Initial /66   Pulse 76   Resp 16   Ht 1.524 m (5')   Wt 90.5 kg (199 lb 8 oz)   SpO2 96%   BMI 38.96 kg/m   Estimated body mass index is 38.96 kg/m  as calculated from the following:    Height as of this encounter: 1.524 m (5').    Weight as of this encounter: 90.5 kg (199 lb 8 oz).  Medication Reconciliation: complete  Lianna Brewster LPN

## 2021-03-04 ENCOUNTER — HOSPITAL ENCOUNTER (OUTPATIENT)
Dept: PHYSICAL THERAPY | Facility: HOSPITAL | Age: 56
Setting detail: THERAPIES SERIES
End: 2021-03-04
Attending: PODIATRIST
Payer: COMMERCIAL

## 2021-03-04 DIAGNOSIS — M77.8 EXTENSOR TENDINITIS OF FOOT: Primary | ICD-10-CM

## 2021-03-04 DIAGNOSIS — R20.8 LOSS OF PROTECTIVE SENSATION OF SKIN OF DEFORMED FOOT: ICD-10-CM

## 2021-03-04 DIAGNOSIS — E10.42 DIABETIC POLYNEUROPATHY ASSOCIATED WITH TYPE 1 DIABETES MELLITUS (H): ICD-10-CM

## 2021-03-04 DIAGNOSIS — M25.571 SINUS TARSI SYNDROME OF RIGHT ANKLE: ICD-10-CM

## 2021-03-04 DIAGNOSIS — M21.371 FOOT DROP, RIGHT FOOT: ICD-10-CM

## 2021-03-04 DIAGNOSIS — M21.969 LOSS OF PROTECTIVE SENSATION OF SKIN OF DEFORMED FOOT: ICD-10-CM

## 2021-03-04 PROCEDURE — 97110 THERAPEUTIC EXERCISES: CPT | Mod: GP

## 2021-03-04 PROCEDURE — 97140 MANUAL THERAPY 1/> REGIONS: CPT | Mod: GP

## 2021-03-08 ENCOUNTER — HOSPITAL ENCOUNTER (OUTPATIENT)
Dept: PHYSICAL THERAPY | Facility: HOSPITAL | Age: 56
Setting detail: THERAPIES SERIES
End: 2021-03-08
Attending: PODIATRIST
Payer: COMMERCIAL

## 2021-03-08 PROCEDURE — 97110 THERAPEUTIC EXERCISES: CPT | Mod: GP

## 2021-03-08 PROCEDURE — 97140 MANUAL THERAPY 1/> REGIONS: CPT | Mod: GP

## 2021-03-11 ENCOUNTER — HOSPITAL ENCOUNTER (OUTPATIENT)
Dept: PHYSICAL THERAPY | Facility: HOSPITAL | Age: 56
Setting detail: THERAPIES SERIES
End: 2021-03-11
Attending: PODIATRIST
Payer: COMMERCIAL

## 2021-03-11 PROCEDURE — 97140 MANUAL THERAPY 1/> REGIONS: CPT | Mod: GP

## 2021-03-11 PROCEDURE — 97110 THERAPEUTIC EXERCISES: CPT | Mod: GP

## 2021-03-18 ENCOUNTER — HOSPITAL ENCOUNTER (OUTPATIENT)
Dept: PHYSICAL THERAPY | Facility: HOSPITAL | Age: 56
Setting detail: THERAPIES SERIES
End: 2021-03-18
Attending: PODIATRIST
Payer: COMMERCIAL

## 2021-03-18 PROCEDURE — 97140 MANUAL THERAPY 1/> REGIONS: CPT | Mod: GP

## 2021-03-18 PROCEDURE — 97110 THERAPEUTIC EXERCISES: CPT | Mod: GP

## 2021-03-23 ENCOUNTER — HOSPITAL ENCOUNTER (OUTPATIENT)
Dept: PHYSICAL THERAPY | Facility: HOSPITAL | Age: 56
Setting detail: THERAPIES SERIES
End: 2021-03-23
Attending: PODIATRIST
Payer: COMMERCIAL

## 2021-03-23 PROCEDURE — 97140 MANUAL THERAPY 1/> REGIONS: CPT | Mod: GP

## 2021-03-23 PROCEDURE — 97110 THERAPEUTIC EXERCISES: CPT | Mod: GP

## 2021-03-31 NOTE — PROGRESS NOTES
Assessment & Plan     Gastroparesis / Right lower quadrant abdominal pain  Will r/o gallbladder etiology, otherwise with continue with gastroparesis management  WBC normal making appendicitis less likely.  LFTs wnl  - GASTROENTEROLOGY ADULT REF CONSULT ONLY; Future  - NUTRITION REFERRAL  - US Abdomen Limited; Future  - Comprehensive metabolic panel (BMP + Alb, Alk Phos, ALT, AST, Total. Bili, TP)  - Lipase  - CBC with platelets and differential  - low threshold for HIDA scan   - c/w protonix, zofran      See Patient Instructions    Return if symptoms worsen or fail to improve.    Natacha Alvarez MD  [facility] - SAURABH Ramos is a 55 year old who presents for the following health issues     HPI     Concern - GI Issues  Onset: 1 year   Description:  Stomach pains and nausea at times will throw up and have diarrhea   Intensity: moderate  Progression of Symptoms:  same and intermittent  Accompanying Signs & Symptoms: nausea , stomach pains , diarrhea   Previous history of similar problem: yes   Precipitating factors:        Worsened by: after meals   Alleviating factors:        Improved by: taking his Protonix does help   Therapies tried and outcome: Protonix 40 mg , smaller meals     - h/o gastroparesis   - evaluated by [facility] on 10/7/2020 with recommendations for small meals, avoid fat. Avoid reglan d/t long term risk of CNS side effects  - celiac panel negative  - improves with zofran with good results   - no changes with the smaller meals    Wt Readings from Last 4 Encounters:   04/05/21 91.4 kg (201 lb 6.4 oz)   03/02/21 90.5 kg (199 lb 8 oz)   02/15/21 91.2 kg (201 lb)   01/27/21 91.2 kg (201 lb)       Review of Systems   Constitutional: Negative for appetite change, chills, fever and unexpected weight change.   HENT: Negative for congestion.    Respiratory: Negative for shortness of breath and wheezing.    Cardiovascular: Negative for chest pain and palpitations.    Gastrointestinal: Positive for abdominal pain, diarrhea, nausea and vomiting. Negative for heartburn.            Objective    /64 (BP Location: Left arm, Patient Position: Chair, Cuff Size: Adult Large)   Pulse 77   Temp 97.9  F (36.6  C) (Tympanic)   Ht 1.524 m (5')   Wt 91.4 kg (201 lb 6.4 oz)   SpO2 97%   BMI 39.33 kg/m    Body mass index is 39.33 kg/m .  Physical Exam  Constitutional:       General: He is not in acute distress.     Appearance: He is not ill-appearing.   Cardiovascular:      Rate and Rhythm: Normal rate and regular rhythm.      Pulses: Normal pulses.      Heart sounds: No murmur.   Pulmonary:      Effort: Pulmonary effort is normal. No respiratory distress.      Breath sounds: No wheezing or rales.   Abdominal:      General: Bowel sounds are normal.      Palpations: Abdomen is soft.      Tenderness: There is abdominal tenderness in the right lower quadrant and epigastric area. Negative signs include Simpson's sign.   Neurological:      Mental Status: He is alert.          Results for orders placed or performed in visit on 04/05/21 (from the past 24 hour(s))   Comprehensive metabolic panel (BMP + Alb, Alk Phos, ALT, AST, Total. Bili, TP)   Result Value Ref Range    Sodium 135 133 - 144 mmol/L    Potassium 4.3 3.4 - 5.3 mmol/L    Chloride 103 94 - 109 mmol/L    Carbon Dioxide 27 20 - 32 mmol/L    Anion Gap 5 3 - 14 mmol/L    Glucose 306 (H) 70 - 99 mg/dL    Urea Nitrogen 24 7 - 30 mg/dL    Creatinine 0.92 0.66 - 1.25 mg/dL    GFR Estimate >90 >60 mL/min/[1.73_m2]    GFR Estimate If Black >90 >60 mL/min/[1.73_m2]    Calcium 8.5 8.5 - 10.1 mg/dL    Bilirubin Total 0.3 0.2 - 1.3 mg/dL    Albumin 3.6 3.4 - 5.0 g/dL    Protein Total 6.9 6.8 - 8.8 g/dL    Alkaline Phosphatase 117 40 - 150 U/L    ALT 30 0 - 70 U/L    AST 18 0 - 45 U/L   Lipase   Result Value Ref Range    Lipase 77 73 - 393 U/L   CBC with platelets and differential   Result Value Ref Range    WBC 8.4 4.0 - 11.0 10e9/L    RBC  Count 4.52 4.4 - 5.9 10e12/L    Hemoglobin 13.7 13.3 - 17.7 g/dL    Hematocrit 41.3 40.0 - 53.0 %    MCV 91 78 - 100 fl    MCH 30.3 26.5 - 33.0 pg    MCHC 33.2 31.5 - 36.5 g/dL    RDW 11.9 10.0 - 15.0 %    Platelet Count 294 150 - 450 10e9/L    Diff Method Automated Method     % Neutrophils 67.6 %    % Lymphocytes 22.9 %    % Monocytes 7.1 %    % Eosinophils 1.4 %    % Basophils 0.5 %    % Immature Granulocytes 0.5 %    Nucleated RBCs 0 0 /100    Absolute Neutrophil 5.7 1.6 - 8.3 10e9/L    Absolute Lymphocytes 1.9 0.8 - 5.3 10e9/L    Absolute Monocytes 0.6 0.0 - 1.3 10e9/L    Absolute Eosinophils 0.1 0.0 - 0.7 10e9/L    Absolute Basophils 0.0 0.0 - 0.2 10e9/L    Abs Immature Granulocytes 0.0 0 - 0.4 10e9/L    Absolute Nucleated RBC 0.0

## 2021-04-05 ENCOUNTER — HOSPITAL ENCOUNTER (OUTPATIENT)
Dept: PHYSICAL THERAPY | Facility: HOSPITAL | Age: 56
Setting detail: THERAPIES SERIES
End: 2021-04-05
Attending: PODIATRIST
Payer: COMMERCIAL

## 2021-04-05 ENCOUNTER — ALLIED HEALTH/NURSE VISIT (OUTPATIENT)
Dept: EDUCATION SERVICES | Facility: OTHER | Age: 56
End: 2021-04-05
Attending: NURSE PRACTITIONER
Payer: COMMERCIAL

## 2021-04-05 ENCOUNTER — OFFICE VISIT (OUTPATIENT)
Dept: FAMILY MEDICINE | Facility: OTHER | Age: 56
End: 2021-04-05
Attending: FAMILY MEDICINE
Payer: COMMERCIAL

## 2021-04-05 VITALS
BODY MASS INDEX: 39.54 KG/M2 | WEIGHT: 201.4 LBS | TEMPERATURE: 97.9 F | DIASTOLIC BLOOD PRESSURE: 64 MMHG | OXYGEN SATURATION: 97 % | SYSTOLIC BLOOD PRESSURE: 122 MMHG | HEART RATE: 77 BPM | HEIGHT: 60 IN

## 2021-04-05 DIAGNOSIS — K31.84 GASTROPARESIS: Primary | ICD-10-CM

## 2021-04-05 DIAGNOSIS — R10.31 RIGHT LOWER QUADRANT ABDOMINAL PAIN: ICD-10-CM

## 2021-04-05 DIAGNOSIS — E10.65 TYPE 1 DIABETES MELLITUS WITH HYPERGLYCEMIA (H): Primary | ICD-10-CM

## 2021-04-05 LAB
ALBUMIN SERPL-MCNC: 3.6 G/DL (ref 3.4–5)
ALP SERPL-CCNC: 117 U/L (ref 40–150)
ALT SERPL W P-5'-P-CCNC: 30 U/L (ref 0–70)
ANION GAP SERPL CALCULATED.3IONS-SCNC: 5 MMOL/L (ref 3–14)
AST SERPL W P-5'-P-CCNC: 18 U/L (ref 0–45)
BASOPHILS # BLD AUTO: 0 10E9/L (ref 0–0.2)
BASOPHILS NFR BLD AUTO: 0.5 %
BILIRUB SERPL-MCNC: 0.3 MG/DL (ref 0.2–1.3)
BUN SERPL-MCNC: 24 MG/DL (ref 7–30)
CALCIUM SERPL-MCNC: 8.5 MG/DL (ref 8.5–10.1)
CHLORIDE SERPL-SCNC: 103 MMOL/L (ref 94–109)
CO2 SERPL-SCNC: 27 MMOL/L (ref 20–32)
CREAT SERPL-MCNC: 0.92 MG/DL (ref 0.66–1.25)
DIFFERENTIAL METHOD BLD: NORMAL
EOSINOPHIL # BLD AUTO: 0.1 10E9/L (ref 0–0.7)
EOSINOPHIL NFR BLD AUTO: 1.4 %
ERYTHROCYTE [DISTWIDTH] IN BLOOD BY AUTOMATED COUNT: 11.9 % (ref 10–15)
GFR SERPL CREATININE-BSD FRML MDRD: >90 ML/MIN/{1.73_M2}
GLUCOSE SERPL-MCNC: 306 MG/DL (ref 70–99)
HCT VFR BLD AUTO: 41.3 % (ref 40–53)
HGB BLD-MCNC: 13.7 G/DL (ref 13.3–17.7)
IMM GRANULOCYTES # BLD: 0 10E9/L (ref 0–0.4)
IMM GRANULOCYTES NFR BLD: 0.5 %
LIPASE SERPL-CCNC: 77 U/L (ref 73–393)
LYMPHOCYTES # BLD AUTO: 1.9 10E9/L (ref 0.8–5.3)
LYMPHOCYTES NFR BLD AUTO: 22.9 %
MCH RBC QN AUTO: 30.3 PG (ref 26.5–33)
MCHC RBC AUTO-ENTMCNC: 33.2 G/DL (ref 31.5–36.5)
MCV RBC AUTO: 91 FL (ref 78–100)
MONOCYTES # BLD AUTO: 0.6 10E9/L (ref 0–1.3)
MONOCYTES NFR BLD AUTO: 7.1 %
NEUTROPHILS # BLD AUTO: 5.7 10E9/L (ref 1.6–8.3)
NEUTROPHILS NFR BLD AUTO: 67.6 %
NRBC # BLD AUTO: 0 10*3/UL
NRBC BLD AUTO-RTO: 0 /100
PLATELET # BLD AUTO: 294 10E9/L (ref 150–450)
POTASSIUM SERPL-SCNC: 4.3 MMOL/L (ref 3.4–5.3)
PROT SERPL-MCNC: 6.9 G/DL (ref 6.8–8.8)
RBC # BLD AUTO: 4.52 10E12/L (ref 4.4–5.9)
SODIUM SERPL-SCNC: 135 MMOL/L (ref 133–144)
WBC # BLD AUTO: 8.4 10E9/L (ref 4–11)

## 2021-04-05 PROCEDURE — G0463 HOSPITAL OUTPT CLINIC VISIT: HCPCS

## 2021-04-05 PROCEDURE — 99214 OFFICE O/P EST MOD 30 MIN: CPT | Performed by: FAMILY MEDICINE

## 2021-04-05 PROCEDURE — 83690 ASSAY OF LIPASE: CPT | Mod: ZL | Performed by: FAMILY MEDICINE

## 2021-04-05 PROCEDURE — 80053 COMPREHEN METABOLIC PANEL: CPT | Mod: ZL | Performed by: FAMILY MEDICINE

## 2021-04-05 PROCEDURE — 85025 COMPLETE CBC W/AUTO DIFF WBC: CPT | Mod: ZL | Performed by: FAMILY MEDICINE

## 2021-04-05 PROCEDURE — 999N000214 HC STATISTIC PT OUTPT VISIT

## 2021-04-05 PROCEDURE — 36415 COLL VENOUS BLD VENIPUNCTURE: CPT | Mod: ZL | Performed by: FAMILY MEDICINE

## 2021-04-05 ASSESSMENT — ENCOUNTER SYMPTOMS
CHILLS: 0
FEVER: 0
ABDOMINAL PAIN: 1
WHEEZING: 0
SHORTNESS OF BREATH: 0
VOMITING: 1
APPETITE CHANGE: 0
NAUSEA: 1
DIARRHEA: 1
HEARTBURN: 0
PALPITATIONS: 0
UNEXPECTED WEIGHT CHANGE: 0

## 2021-04-05 ASSESSMENT — ANXIETY QUESTIONNAIRES
2. NOT BEING ABLE TO STOP OR CONTROL WORRYING: NOT AT ALL
GAD7 TOTAL SCORE: 0
7. FEELING AFRAID AS IF SOMETHING AWFUL MIGHT HAPPEN: NOT AT ALL
1. FEELING NERVOUS, ANXIOUS, OR ON EDGE: NOT AT ALL
5. BEING SO RESTLESS THAT IT IS HARD TO SIT STILL: NOT AT ALL
IF YOU CHECKED OFF ANY PROBLEMS ON THIS QUESTIONNAIRE, HOW DIFFICULT HAVE THESE PROBLEMS MADE IT FOR YOU TO DO YOUR WORK, TAKE CARE OF THINGS AT HOME, OR GET ALONG WITH OTHER PEOPLE: NOT DIFFICULT AT ALL
6. BECOMING EASILY ANNOYED OR IRRITABLE: NOT AT ALL
4. TROUBLE RELAXING: NOT AT ALL
3. WORRYING TOO MUCH ABOUT DIFFERENT THINGS: NOT AT ALL

## 2021-04-05 ASSESSMENT — MIFFLIN-ST. JEOR: SCORE: 1591.04

## 2021-04-05 ASSESSMENT — PAIN SCALES - GENERAL: PAINLEVEL: SEVERE PAIN (6)

## 2021-04-05 ASSESSMENT — PATIENT HEALTH QUESTIONNAIRE - PHQ9: SUM OF ALL RESPONSES TO PHQ QUESTIONS 1-9: 3

## 2021-04-05 NOTE — PATIENT INSTRUCTIONS
We put in referral to [facility] and [place] [facility] dietary.     We put in an order for ultrasound of your gallbladder

## 2021-04-05 NOTE — PROGRESS NOTES
Pt came in for a pump and CGM download today. This was completed and given to Macrina Dixon.    Lianna Brewster

## 2021-04-05 NOTE — NURSING NOTE
Chief Complaint   Patient presents with     Gastrointestinal Problem       Initial /64 (BP Location: Left arm, Patient Position: Chair, Cuff Size: Adult Large)   Pulse 77   Temp 97.9  F (36.6  C) (Tympanic)   Ht 1.524 m (5')   Wt 91.4 kg (201 lb 6.4 oz)   SpO2 97%   BMI 39.33 kg/m   Estimated body mass index is 39.33 kg/m  as calculated from the following:    Height as of this encounter: 1.524 m (5').    Weight as of this encounter: 91.4 kg (201 lb 6.4 oz).  Medication Reconciliation: complete  Nay Justin LPN

## 2021-04-06 ASSESSMENT — ANXIETY QUESTIONNAIRES: GAD7 TOTAL SCORE: 0

## 2021-04-11 DIAGNOSIS — E10.649 TYPE 1 DIABETES MELLITUS WITH HYPOGLYCEMIA UNAWARENESS (H): ICD-10-CM

## 2021-04-11 NOTE — TELEPHONE ENCOUNTER
Gabapentin       Last Written Prescription Date:  10/13/2020  Last Fill Quantity: 90,   # refills: 3  Last Office Visit: 4/5/2021  Future Office visit:    Next 5 appointments (look out 90 days)    May 17, 2021  8:00 AM  (Arrive by 7:45 AM)  Return Visit with Juliette Jarrett DPM  [facility] ([facility] ) [address] [place] [address] [id number] [phone]   Jun 01, 2021  9:00 AM  (Arrive by 8:45 AM)  SHORT with Natacha Alvarez MD  [facility] ([facility] ) [address] [place] [id number]  [phone]

## 2021-04-12 RX ORDER — GABAPENTIN 300 MG/1
CAPSULE ORAL
Qty: 90 CAPSULE | Refills: 2 | Status: SHIPPED | OUTPATIENT
Start: 2021-04-12 | End: 2021-08-27

## 2021-04-13 ENCOUNTER — TELEPHONE (OUTPATIENT)
Dept: FAMILY MEDICINE | Facility: OTHER | Age: 56
End: 2021-04-13

## 2021-04-14 ENCOUNTER — HOSPITAL ENCOUNTER (OUTPATIENT)
Dept: ULTRASOUND IMAGING | Facility: HOSPITAL | Age: 56
Discharge: HOME | End: 2021-04-14
Attending: FAMILY MEDICINE | Admitting: FAMILY MEDICINE
Payer: COMMERCIAL

## 2021-04-14 DIAGNOSIS — R10.31 RIGHT LOWER QUADRANT ABDOMINAL PAIN: ICD-10-CM

## 2021-04-14 PROCEDURE — 76705 ECHO EXAM OF ABDOMEN: CPT

## 2021-04-26 NOTE — PROGRESS NOTES
Richard stopped for an insulin pump and CGM download    Insulin pump information:   Average: 214 +/- 91  Basal/bolus ratio: 23.3 (48%)/ 25.5 (52%)   Testing: continuous    Hypoglycemia: yes    Date: 3/7 to 3/20/21  Glucose management indicator: 6.9%    Average glucose: 149    Glucose range  Very low (<54): 1%  low (<70): 4%  In target range (): 67%  High (>180): 19%  Very high (>250): 9%    Date: 3/21 to 4/3/21  Glucose management indicator: 8.1%    Average glucose: 201    Glucose range  Very low (<54): 1%  low (<70): 2%  In target range (): 41%  High (>180): 29%  Very high (>250): 27%    Last A1c  Lab Results   Component Value Date    A1C 7.5 11/30/2020    A1C 7.7 09/01/2020    A1C 8.1 03/24/2020    A1C 7.2 12/12/2019    A1C 7.3 08/15/2019       No pattern    Will see him in May    Macrina [facility]BERLAIN [facility]-BC  Diabetes and Wound Care

## 2021-05-10 ENCOUNTER — TELEPHONE (OUTPATIENT)
Dept: EDUCATION SERVICES | Facility: OTHER | Age: 56
End: 2021-05-10

## 2021-05-10 DIAGNOSIS — E10.65 TYPE 1 DIABETES MELLITUS WITH HYPERGLYCEMIA (H): Primary | ICD-10-CM

## 2021-05-10 NOTE — TELEPHONE ENCOUNTER
Patient is requesting a refill of the Tac cloths (?), helps hold monitor to skin.    Please call patient at [phone]

## 2021-05-11 NOTE — TELEPHONE ENCOUNTER
Order sent for barrier skin prep and overlaying patches for the Dexcom G6    Message left for Richard.     Macrina Dixon APRN FNP-BC  Diabetes and Wound Care

## 2021-05-17 ENCOUNTER — OFFICE VISIT (OUTPATIENT)
Dept: PODIATRY | Facility: OTHER | Age: 56
End: 2021-05-17
Attending: PODIATRIST
Payer: COMMERCIAL

## 2021-05-17 VITALS
BODY MASS INDEX: 39.26 KG/M2 | HEART RATE: 83 BPM | WEIGHT: 201 LBS | DIASTOLIC BLOOD PRESSURE: 68 MMHG | OXYGEN SATURATION: 93 % | SYSTOLIC BLOOD PRESSURE: 110 MMHG | TEMPERATURE: 97.5 F

## 2021-05-17 DIAGNOSIS — M62.461 GASTROCNEMIUS EQUINUS OF RIGHT LOWER EXTREMITY: ICD-10-CM

## 2021-05-17 DIAGNOSIS — L60.3 ONYCHODYSTROPHY: ICD-10-CM

## 2021-05-17 DIAGNOSIS — M20.21 HALLUX RIGIDUS OF RIGHT FOOT: ICD-10-CM

## 2021-05-17 DIAGNOSIS — M21.371 FOOT DROP, RIGHT FOOT: ICD-10-CM

## 2021-05-17 DIAGNOSIS — R20.8 LOSS OF PROTECTIVE SENSATION OF SKIN OF DEFORMED FOOT: ICD-10-CM

## 2021-05-17 DIAGNOSIS — L84 CALLUS OF FOOT: Primary | ICD-10-CM

## 2021-05-17 DIAGNOSIS — M62.462 GASTROCNEMIUS EQUINUS OF LEFT LOWER EXTREMITY: ICD-10-CM

## 2021-05-17 DIAGNOSIS — E10.42 DIABETIC POLYNEUROPATHY ASSOCIATED WITH TYPE 1 DIABETES MELLITUS (H): ICD-10-CM

## 2021-05-17 DIAGNOSIS — M21.969 LOSS OF PROTECTIVE SENSATION OF SKIN OF DEFORMED FOOT: ICD-10-CM

## 2021-05-17 PROCEDURE — 11056 PARNG/CUTG B9 HYPRKR LES 2-4: CPT | Performed by: PODIATRIST

## 2021-05-17 PROCEDURE — 11721 DEBRIDE NAIL 6 OR MORE: CPT | Mod: 59 | Performed by: PODIATRIST

## 2021-05-17 PROCEDURE — G0463 HOSPITAL OUTPT CLINIC VISIT: HCPCS

## 2021-05-17 PROCEDURE — 99213 OFFICE O/P EST LOW 20 MIN: CPT | Mod: 25 | Performed by: PODIATRIST

## 2021-05-17 ASSESSMENT — PAIN SCALES - GENERAL: PAINLEVEL: MODERATE PAIN (5)

## 2021-05-17 NOTE — NURSING NOTE
Chief Complaint   Patient presents with     right ankle pain       Initial /68   Pulse 83   Temp 97.5  F (36.4  C) (Tympanic)   Wt 91.2 kg (201 lb)   SpO2 93%   BMI 39.26 kg/m   Estimated body mass index is 39.26 kg/m  as calculated from the following:    Height as of 4/5/21: 1.524 m (5').    Weight as of this encounter: 91.2 kg (201 lb).  Medication Reconciliation: complete  Nadine Landers LPN

## 2021-05-17 NOTE — PROGRESS NOTES
Chief complaint: Patient presents with:  right ankle pain      History of Present Illness: This 56 year old IDDM type I male is seen for follow-up management of a diabetic foot exam and high risk nail debridement. The toenails are difficult to trim because of the thickness.     Patient has had pain from the callus on the plantar RIGHT heel for a couple weeks. He would like the callus pared and the nails trimmed today.    He gets burning, tingling, and numbness in his feet.     He received new DM shoes from [facility] at [facility] in [place], [place], around December, 2020. The shoes are comfortable and the rocker bottom has decreased his heel pain.    His blood sugars are doing well and he is following with Macrina Dixon NP for blood sugar management.     No further pedal complaints today.      Last HbA1C was 7.5% on 11/30/2020.      Patient does not use tobacco products.       Additionally:  Patient returned to work on 02/04/2019 at the [employer] working 4-5 hour shifts. He was wearing AFO on his RIGHT foot for drop foot, but the brace caused too much pressure on the toe and he thinks it may have caused his last hallux ulcer on the RIGHT foot so he stopped wearing it (the wound opened around January, 2019.    He still has some pain across the anterior ankle. He wears a copper fit brace which helps decrease the pain when the pain worsens.       /68   Pulse 83   Temp 97.5  F (36.4  C) (Tympanic)   Wt 91.2 kg (201 lb)   SpO2 93%   BMI 39.26 kg/m      Patient Active Problem List   Diagnosis     DM type 1 (diabetes mellitus, type 1) (H)     HTN (hypertension)     Hyperlipidemia     ACP (advance care planning)     Diabetic polyneuropathy associated with type 1 diabetes mellitus (H)     Class 2 obesity in adult     Obesity (BMI 35.0-39.9) with comorbidity (H)     Gastroparesis     Gastroesophageal reflux disease without esophagitis       Past Surgical History:   Procedure Laterality Date      COLONOSCOPY - HIM SCAN N/A 09/19/2016    Procedure: COLONOSCOPY SCREENING; Surgeon: Rudy Rojo MD; Location: [facility] OR     ESOPHAGOSCOPY, GASTROSCOPY, DUODENOSCOPY (EGD), COMBINED  08/13/2019       Current Outpatient Medications   Medication     acetone, Urine, test STRP     blood glucose monitoring (MELISSA CONTOUR NEXT MONITOR) meter device kit     blood glucose monitoring (MELISSA CONTOUR NEXT) test strip     CALCIUM-VITAMIN D PO     Continuous Blood Gluc  (DEXCOM G6 ) DAYAN     Continuous Blood Gluc Sensor (DEXCOM G6 SENSOR) MISC     Continuous Blood Gluc Transmit (DEXCOM G6 TRANSMITTER) MISC     CONTOUR NEXT TEST test strip     gabapentin (NEURONTIN) 300 MG capsule     Glucagon (GVOKE HYPOPEN 2-PACK) 1 MG/0.2ML SOAJ     glucagon 1 MG injection     insulin aspart (NOVOLOG VIAL) 100 UNITS/ML vial     insulin aspart (NOVOLOG VIAL) 100 UNITS/ML vial     INSULIN PUMP - OUTPATIENT     lisinopril (ZESTRIL) 10 MG tablet     MOTRIN IB PO     Multiple Vitamin (MULTI-VITAMIN DAILY PO)     nitroglycerin (NITROSTAT) 0.4 MG SL tablet     NOVOLOG VIAL 100 UNIT/ML soln     ondansetron (ZOFRAN) 4 MG tablet     order for DME     pantoprazole (PROTONIX) 40 MG EC tablet     UREA 10 HYDRATING 10 % external cream     URINE GLUCOSE-KETONES TEST VI     simvastatin (ZOCOR) 40 MG tablet     No current facility-administered medications for this visit.         No Known Allergies    Family History   Problem Relation Age of Onset     No Known Problems Mother      Hypertension Father      Hypertension Brother        Social History     Socioeconomic History     Marital status: Single     Spouse name: None     Number of children: None     Years of education: None     Highest education level: None   Occupational History     None   Social Needs     Financial resource strain: None     Food insecurity:     Worry: None     Inability: None     Transportation needs:     Medical: None     Non-medical: None   Tobacco Use      Smoking status: Never Smoker     Smokeless tobacco: Never Used   Substance and Sexual Activity     Alcohol use: No     Alcohol/week: 0.0 oz     Drug use: No     Sexual activity: None   Lifestyle     Physical activity:     Days per week: None     Minutes per session: None     Stress: None   Relationships     Social connections:     Talks on phone: None     Gets together: None     Attends [employer] service: None     Active member of club or organization: None     Attends meetings of clubs or organizations: None     Relationship status: None     Intimate partner violence:     Fear of current or ex partner: None     Emotionally abused: None     Physically abused: None     Forced sexual activity: None   Other Topics Concern     Parent/sibling w/ CABG, MI or angioplasty before 65F 55M? Not Asked   Social History Narrative     None       ROS: 10 point ROS neg other than the symptoms noted above in the HPI.  EXAM  Constitutional: healthy, alert and no distress     Psychiatric: mentation appears normal and affect normal/bright     VASCULAR:  -Dorsalis pedis pulse +2/4 RIGHT and +1/4 LEFT  ---Biphasic bilaterally on 01/23/2019  -Posterior tibial pulse +1/4 b/l   ---Monophasic on LEFT and biphasic on RIGHT on 01/23/2019    -Capillary refill time < 3 seconds to b/l hallux  -Hair growth Present to b/l anterior legs and ankles     NEURO:  -Protective sensation diminished with SWM +9/10 RIGHT and +0/10 LEFT on 05/17/2021  -Protective sensation diminished with SWM +1/10 RIGHT and +5/10 LEFT on 02/15/2021  -Protective sensation diminished with SWM +3/10 RIGHT and +10/10 LEFT on 01/23/2019     DERM:  -Hyperkeratotic lesion to RIGHT plantar hallux, RIGHT plantar 5th metatarsal head and RIGHT plantar heel  ---No wounds post paring  -Toenails thickend, dystrophic and discolored x 10  -Skin mildly dry to bilateral plantar foot   -Skin temperature within normal limits to bilateral foot       MSK:  -RIGHT 1st MTPJ has 0 degrees of  DORSIFLEXION and the hallux IPJ is mildly but rigidly plantarflexed   -DORSIFLEXION of bilateral ankle limited to between -5 short of vertical bilaterally   -Muscle strength of ankles for dorsiflexion, plantarflexion +0/5 RIGHT foot and +5/5 LEFT foot  -Muscle strength for ABDUction and ADDuction +5/5 LEFT and +4/5 RIGHT   ============================================================     ASSESSMENT:     (L84) Callus of heel (primary encounter diagnosis)    (L60.3) Onychodystrophy     (M20.21) Hallux rigidus of right foot     (M21.371) Foot drop, right foot    (M21.969,  R20.8) Loss of protective sensation of skin of deformed foot    (M21.6X1) Gastrocnemius equinus of right lower extremity    (M21.6X2) Gastrocnemius equinus of left lower extremity          PLAN:  -Patient evaluated and examined. Treatment options discussed with no educational barriers noted.    -Callus pared x 3 to the RIGHT plantar hallux, RIGHT plantar 5th metatarsal head and RIGHT plantar central heel  without incident  ---Patient reminded that the callus will likely return due to the underlying, prominent bone causing the callus while the patient is walking.    -Nails debrided x 10 without incident    -Diabetic Foot Education provided. This included checking the feet daily looking for new new blisters or wounds, wearing shoes at all times when walking including around the house, and avoiding lotion application between the toes. If there are any signs of infection, the patient should present to the ED as soon as possible. Infections of the foot can be life threatening or lead to amputations of the foot or leg.    -DM shoes: Last received through [facility] in December, 2020. Shoes are comfortable.    -Ankle brace -- referral was placed on 03/04/2021. Patient did not receive an ankle brace but he did get the rocker shoe which did decrease his pain (dispensed through [facility]).  ---Patient  discontinued PT because it was not helping his ankle pain. He did not get an ankle brace. However, he says his pain is controlled in the ankle at this time.    -Patient in agreement with the above treatment plan and all of patient's questions were answered.        RTC 10 weeks for diabetic foot exam and high risk nail debridement and callus pariung  ---3 months was too long for the RIGHT plantar heel callus        Juliette Jarrett DPM

## 2021-06-03 ENCOUNTER — ALLIED HEALTH/NURSE VISIT (OUTPATIENT)
Dept: EDUCATION SERVICES | Facility: OTHER | Age: 56
End: 2021-06-03
Attending: NURSE PRACTITIONER
Payer: COMMERCIAL

## 2021-06-03 VITALS
HEART RATE: 70 BPM | DIASTOLIC BLOOD PRESSURE: 74 MMHG | WEIGHT: 203.7 LBS | OXYGEN SATURATION: 91 % | SYSTOLIC BLOOD PRESSURE: 118 MMHG | BODY MASS INDEX: 39.78 KG/M2 | TEMPERATURE: 96.9 F

## 2021-06-03 DIAGNOSIS — E10.65 TYPE 1 DIABETES MELLITUS WITH HYPERGLYCEMIA (H): Primary | ICD-10-CM

## 2021-06-03 LAB — HBA1C MFR BLD: 7.8 % (ref 0–5.7)

## 2021-06-03 PROCEDURE — 99213 OFFICE O/P EST LOW 20 MIN: CPT | Performed by: NURSE PRACTITIONER

## 2021-06-03 PROCEDURE — 95251 CONT GLUC MNTR ANALYSIS I&R: CPT | Performed by: NURSE PRACTITIONER

## 2021-06-03 PROCEDURE — 36416 COLLJ CAPILLARY BLOOD SPEC: CPT | Mod: ZL | Performed by: NURSE PRACTITIONER

## 2021-06-03 PROCEDURE — G0463 HOSPITAL OUTPT CLINIC VISIT: HCPCS

## 2021-06-03 ASSESSMENT — PAIN SCALES - GENERAL: PAINLEVEL: MODERATE PAIN (4)

## 2021-06-03 NOTE — PROGRESS NOTES
"SUBJECTIVE:  Richard Harvey, 56 year old, male presents with the following Chief Complaint(s) with HPI to follow:  Chief Complaint   Patient presents with     RECHECK     3 month follow up         Diabetes Follow-up      Patient is checking blood sugars: 4x/day.  Results:  Date: 5/7/21 to 5/20/21  Glucose management indicator: n/a    Average glucose: 176    Glucose range  Very low (<54): 1%  low (<70): 2%  In target range (): 54%  High (>180): 23%  Very high (>250): 20%    Date: 5/21 to 6/3/21  Glucose management indicator: n/a    Average glucose: 171    Glucose range  Very low (<54): 1%  low (<70): 2%  In target range (): 57%  High (>180): 27%  Very high (>250): 13%      Symptoms of hypoglycemia (low blood sugar): yes--sometimes he feels them and sometimes he doesn't    Paresthesias (numbness or burning in feet) or sores: Yes--hand and feet--\"same\"; no sores    Diabetic eye exam within the last year: Yes    Breakfast eaten regularly: Yes    Patient counting carbs: trying       HPI: Richard's here today for the follow up regarding his Diabetes mellitus, Type 1.    Lab Results   Component Value Date    A1C 7.5 11/30/2020    A1C 7.7 09/01/2020    A1C 8.1 03/24/2020    A1C 7.2 12/12/2019    A1C 7.3 08/15/2019     Current Diabetes medication:   1.  Insulin pump, uses Novolog;   ASA use: yes, 325 mg daily  Statin use: yes, simvastatin 40 mg at bedime    Richard's here for his Dexcom G6 and insulin pump download with possible adjust.  He reports the following:   Denies any issues with his insulin pump  Denies any issues with his Dexcom. Issues with adherence due to where he works ([employer])    Doesn't have an appt with his PCP, Dr. Alvarez  Working with her regarding his stomach issues--which remain the same    Continued issues low BGs when working  Adjusting the carb dose to 2/3 before work    Patient Active Problem List   Diagnosis     DM type 1 (diabetes mellitus, type 1) (H)     HTN (hypertension)     " Hyperlipidemia     ACP (advance care planning)     Diabetic polyneuropathy associated with type 1 diabetes mellitus (H)     Class 2 obesity in adult     Obesity (BMI 35.0-39.9) with comorbidity (H)     Gastroparesis     Gastroesophageal reflux disease without esophagitis       Past Medical History:   Diagnosis Date     Diabetes mellitus type 1, controlled (H)      HLD (hyperlipidemia)      HTN (hypertension)      Neuropathy      Type 1 diabetes (H)        Past Surgical History:   Procedure Laterality Date     COLONOSCOPY - HIM SCAN N/A 09/19/2016    Procedure: COLONOSCOPY SCREENING; Surgeon: Rudy Rojo MD; Location: [facility] OR     ESOPHAGOSCOPY, GASTROSCOPY, DUODENOSCOPY (EGD), COMBINED  08/13/2019       Family History   Problem Relation Age of Onset     No Known Problems Mother      Hypertension Father      Hypertension Brother        Social History     Tobacco Use     Smoking status: Never Smoker     Smokeless tobacco: Never Used   Substance Use Topics     Alcohol use: No     Alcohol/week: 0.0 standard drinks       Current Outpatient Medications   Medication Sig Dispense Refill     acetone, Urine, test STRP Use as directed 50 each 3     blood glucose monitoring (MELISSA CONTOUR NEXT MONITOR) meter device kit 1 each       blood glucose monitoring (MELISSA CONTOUR NEXT) test strip Use to test blood sugar 6 times daily or as directed. 200 each 11     CALCIUM-VITAMIN D PO Take  by mouth daily. 600 mg       Continuous Blood Gluc  (DEXCOM G6 ) DAYAN 1 each continuous 1 Device 0     Continuous Blood Gluc Sensor (DEXCOM G6 SENSOR) MISC 1 each continuous 3 each 11     Continuous Blood Gluc Transmit (DEXCOM G6 TRANSMITTER) MISC 1 each continuous 1 each 4     CONTOUR NEXT TEST test strip TEST 6 TIMES DAILY, OR AS DIRECTED. 200 strip 3     gabapentin (NEURONTIN) 300 MG capsule TAKE 1 CAPSULE BY MOUTH THREE TIMES DAILY 90 capsule 2     Glucagon (GVOKE HYPOPEN 2-PACK) 1 MG/0.2ML SOAJ Inject 1 mg Subcutaneous as  needed (for low blood glucose) 0.4 mL 3     glucagon 1 MG injection 1 mg once       insulin aspart (NOVOLOG VIAL) 100 UNITS/ML vial USE WITH INSULIN PUMP FOR A TOTAL DAILY USAGE OF 70 UNITS 20 mL 1     insulin aspart (NOVOLOG VIAL) 100 UNITS/ML vial  UNITS IN PUMP EVERY 3 DAYS       INSULIN PUMP - OUTPATIENT Date last updated:  6/23/15  [email or web address] Minimed: Model 751  BASAL RATES and times:  12   AM (midnight): 1.25 units/hour    2     AM: 1.3 units/hour   6     AM: 0.95 units/hour   6    PM: 1.2 units/hour   Basal Pattern A:  Richard Harvey will use when N/A.  CARB RATIO and times:  12   AM (midnight): 12  1    PM:  10  6    PM:    9  Corection Factor (Sensitivity) and times:  12   AM (midnight): 40 mg/dL  6     PM: 45 mg/dL  BLOOD GLUCOSE TARGET and times:  12   AM (midnight): 100 - 140  9     AM:  100 - 120  10   PM:  100 - 140  Active Insulin Time:  4 hours  Sensor:  Yes: 12   AM (midnight): 70 - 240  Carelink / Diasend username:  [email or web address]  Carelink / Diasend Password:  [email or web address]       lisinopril (ZESTRIL) 10 MG tablet Take 1 tablet (10 mg) by mouth daily 90 tablet 3     MOTRIN IB PO Take 400 mg by mouth every 6 hours as needed for moderate pain       Multiple Vitamin (MULTI-VITAMIN DAILY PO) Take  by mouth daily.       nitroglycerin (NITROSTAT) 0.4 MG SL tablet Place under the tongue every 5 minutes as needed       NOVOLOG VIAL 100 UNIT/ML soln USE WITH INSULIN PUMP FOR A TOTAL DAILY USAGE OF 70 UNITS 30 mL 3     ondansetron (ZOFRAN) 4 MG tablet TAKE 1 TABLET BY MOUTH EVERY 8 HOURS AS NEEDED FOR NAUSEA 30 tablet 3     order for DME Equipment being ordered:     1.  Medtronic insulin pump supplies--insertion sets and reserviors  Disp: 1 month  Refill: 11 months 30 days 11     pantoprazole (PROTONIX) 40 MG EC tablet Take 1 tablet by mouth twice daily 60 tablet 2     UREA 10 HYDRATING 10 % external cream APPLY CREAM TOPICALLY TO AFFECTED AREA AS NEEDED FOR  CALLOUSED  SKIN 85 g 3     URINE GLUCOSE-KETONES  TEST VI        simvastatin (ZOCOR) 40 MG tablet Take 1 tablet (40 mg) by mouth At Bedtime 90 tablet 3       No Known Allergies    REVIEW OF SYSTEMS  Skin: negative  Eyes: negative; new glasses  Ears/Nose/Throat: negative; hx of allergies  Respiratory: No shortness of breath, dyspnea on exertion, cough, or hemoptysis  Cardiovascular: negative  Gastrointestinal: same  Genitourinary: negative  Musculoskeletal: history of arthritis--hands--same, shoulders, knees--worse  Neurologic: positive for numbness or tingling of hands and numbness or tingling of feet--same (gabagentin helps)  Psychiatric: negative  Hematologic/Lymphatic/Immunologic: negative  Endocrine: positive for diabetes    OBJECTIVE:  /74 (BP Location: Left arm, Patient Position: Sitting, Cuff Size: Adult Regular)   Pulse 70   Temp 96.9  F (36.1  C)   Wt 92.4 kg (203 lb 11.2 oz)   SpO2 91%   BMI 39.78 kg/m    Constitutional: healthy, alert and no distress  Musculoskeletal: extremities normal- no gross deformities noted and gait normal  Skin: no suspicious lesions or rashes to visible skin  Psychiatric: mentation appears normal and affect normal/bright      LAB  Results for orders placed or performed in visit on 06/03/21   GLUCOSE MONITOR, 72 HOUR, PHYS INTERP     Status: None    Narrative    Date: 5/7/21 to 5/20/21  Glucose management indicator: n/a    Average glucose: 176    Glucose range  Very low (<54): 1%  low (<70): 2%  In target range (): 54%  High (>180): 23%  Very high (>250): 20%    Date: 5/21 to 6/3/21  Glucose management indicator: n/a    Average glucose: 171    Glucose range  Very low (<54): 1%  low (<70): 2%  In target range (): 57%  High (>180): 27%  Very high (>250): 13%   Hemoglobin A1c POCT     Status: Abnormal   Result Value Ref Range    Hemoglobin A1C 7.8 (A) 0.0 - 5.7 %       ASSESSMENT / PLAN:.  (E10.65) Type 1 diabetes mellitus with hyperglycemia (H)  (primary encounter diagnosis)  Comment:   Noted his CGM pattern  and Insulin pump information:     Average: 162 +/- 89 (was 175 +/- 89)  Basal/bolus ratio: 23.4 (47%)/26.3 (53%)   Testinx/day using his Dexcom  Hypoglycemia: yes    Plan: Hemoglobin A1c POCT, GLUCOSE MONITOR, 72 HOUR,         PHYS INTERP          Discussed reducing his carb amount to 1/2 before working to see if this helps with his low Bgs and his higher BG spike after correction.      If continued low or high BGs, let me know    Encouraged an appt with Dr. Alvarez  Due for labs    Follow up  Download of insulin pump and Dexcom during Dr. Alvarez's appt  3 month with me    Patient Instructions     Continue working on healthy eating and moving as best as you can (start low and slow, work up to 30 min, 5x/week)    BG goals:  Fasting and before meals <130, >70  2 hour after eating <180    We only need 1/2 of these numbers to be within target then your A1c will be within target    Medication changes   1/2 the amount of carb for lunch before work.    Let me if you have continued issues with low BGs    Follow up   Dr. Alvarez--download  3 months    Call me sooner if any problems/concerns and/or questions develop including consistent low BGs <70 or consistent high BGs >200  [phone] (Unit Coordinator)    [phone] (Nurse)    A1c:   Lab Results   Component Value Date    A1C 7.8 2021    A1C 7.5 2020    A1C 7.7 2020    A1C 8.1 2020    A1C 7.2 2019             Time: 35 min  Barrier: none  Willingness to learn: accepting    Macrina CHAMBERLAIN [facility]  Diabetes and Wound Care    With the electronic record, we can now more quickly and easily track our patient diabetic goals. Our diabetes clinical review is in progress and these are the indicators we are monitoring for good diabetes health.     1.) HbA1C less than 7 (measurement of your average blood sugars)  2.) Blood Pressure less than 140/80  3.) LDL less than 100 (bad cholesterol)  4.) HbA1C is checked in the last 6 months and below 7% (more  frequently if not at goal or adjusting medications)  5.) LDL is checked in the last 12 months (more frequently if not at goal or adjusting medications)  6.) Taking one baby aspirin daily (unless otherwise instructed)  7.) No tobacco use  8) Statin use     You have achieved 6 out of 8 of these and I am encouraging you to come in and get tuned up to achieve 8 out of 8!  Here is what you have achieved so far in my goals for you:  1.) HbA1C  less than 7:                              NO  Your last  HbA1C :   Lab Results   Component Value Date    A1C 7.8 06/03/2021    A1C 7.5 11/30/2020    A1C 7.7 09/01/2020    A1C 8.1 03/24/2020    A1C 7.2 12/12/2019     2.) Blood Pressure less than 140/80:       YES      Your last    /74 (BP Location: Left arm, Patient Position: Sitting, Cuff Size: Adult Regular)   Pulse 70   Temp 96.9  F (36.1  C)   Wt 92.4 kg (203 lb 11.2 oz)   SpO2 91%   BMI 39.78 kg/m      3.) LDL less than 100:                              YES      Your last   LDL         LDL Cholesterol Calculated   Date Value Ref Range Status   03/24/2020 40 <100 mg/dL Final     Comment:     Desirable:       <100 mg/dl       4.) Checked HbA1C in the past 6 months: YES      5.) Checked LDL in the past 12 months:    NO    6.) Taking one aspirin daily:                       YES     7.) No tobacco use:                                        YES      8.) Statin use      YES

## 2021-06-03 NOTE — PROGRESS NOTES
Chief Complaint   Patient presents with     RECHECK     3 month follow up        Initial Wt 92.4 kg (203 lb 11.2 oz)   BMI 39.78 kg/m   Estimated body mass index is 39.78 kg/m  as calculated from the following:    Height as of 4/5/21: 1.524 m (5').    Weight as of this encounter: 92.4 kg (203 lb 11.2 oz).  Medication Reconciliation: complete  Carolina Renner LPN

## 2021-06-10 NOTE — PROGRESS NOTES
Assessment & Plan     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)  A1c (6/3/2021): 7.8  - Albumin Random Urine Quantitative with Creat Ratio  - on insulin pump  - follows with diabetic education   - on ACE, statin   - not on ASA d/t GI issues    Gastroparesis / Gastroesophageal reflux disease without esophagitis  No changes  Will work on scheduling appt with [facility] GI    Hyperlipidemia, unspecified hyperlipidemia type  LDL (6/14/2021): 42  - Lipid Profile (Chol, Trig, HDL, LDL calc)  - JUST IN CASE  - c/w atorvastatin     Essential hypertension  BP at goal  - c/w lisinopril 10mg     Screening PSA (prostate specific antigen)  - PSA, screen. wnl    Medial epicondylitis of elbow, right  Exam consistent with medial epicondylitis d/t recent over use of vice   - Wrist/Arm/Hand Supplies Order for DME - ONLY FOR DME      See Patient Instructions    Return in about 3 months (around 9/14/2021) for Diabetic Visit.    Natacha Alvarez MD  [facility] - SAURABH Ramos is a 56 year old who presents for the following health issues     HPI     Diabetes type 1 Follow-up    How often are you checking your blood sugar? Continuous glucose monitor  What time of day are you checking your blood sugars (select all that apply)?  Before meals  Have you had any blood sugars above 200?  Yes   Have you had any blood sugars below 70?  Yes. Lowest in the 40s couple times per week    What symptoms do you notice when your blood sugar is low?  Shaky, Dizzy, Weak, Lethargy, Blurred vision and Confusion    What concerns do you have today about your diabetes? None     Do you have any of these symptoms? (Select all that apply)  Numbness in feet    - Last A1c (6/3/2021): 7.8  - BG:  - Diet: no changes  - Exercise:  - on insulin pump  - on ACE, statin. Not on ASA d/t gastroparesis issues and concern for GI upset  - last eye exam: will schedule  - Follows with [facility]rad[facility]  Education      Hyperlipidemia Follow-Up      Are you regularly taking any medication or supplement to lower your cholesterol?   Yes- Simvastatin 40 mg    Are you having muscle aches or other side effects that you think could be caused by your cholesterol lowering medication?  No  LDL (3/24/2020): 50  - simvastatin 40mg     Hypertension Follow-up      Do you check your blood pressure regularly outside of the clinic? No     Are you following a low salt diet? Yes    Are your blood pressures ever more than 140 on the top number (systolic) OR more   than 90 on the bottom number (diastolic), for example 140/90? No  - lisinopril 10mg     BP Readings from Last 2 Encounters:   06/14/21 117/66   06/03/21 118/74     Hemoglobin A1C (%)   Date Value   06/03/2021 7.8 (A)   11/30/2020 7.5 (H)     LDL Cholesterol Calculated (mg/dL)   Date Value   03/24/2020 40   03/21/2019 56   03/21/2019 56       # GI issues:L no changes. [facility]  - US RUQ normal  - positive gastric emptying study (9/28/2020)  - referral to GI and dietary      # right elbow  - using a vice  resulting in elbow pain  - duration of 10 days  - not getting better       Review of Systems   Constitutional: Negative for chills and fever.   HENT: Negative for congestion.    Respiratory: Negative for shortness of breath and wheezing.    Cardiovascular: Negative for chest pain and palpitations.   Gastrointestinal: Positive for abdominal pain.   Musculoskeletal: Positive for arthralgias (right elbow).            Objective    /66 (BP Location: Left arm, Patient Position: Chair, Cuff Size: Adult Regular)   Pulse 76   Temp 97.9  F (36.6  C) (Tympanic)   Resp 18   Wt 91.2 kg (201 lb)   SpO2 94%   BMI 39.26 kg/m    Body mass index is 39.26 kg/m .  Physical Exam  Constitutional:       General: He is not in acute distress.     Appearance: He is not ill-appearing.   Cardiovascular:      Rate and Rhythm: Normal rate and regular rhythm.       Pulses: Normal pulses.      Heart sounds: No murmur.   Pulmonary:      Effort: Pulmonary effort is normal. No respiratory distress.      Breath sounds: No wheezing or rales.   Musculoskeletal:      Comments: Right elbow: Full ROM, TTP over medial epicondyle. No TTP over lateral epicondyle.  strength full, but with discomfort.    Neurological:      Mental Status: He is alert.          Results for orders placed or performed in visit on 06/14/21 (from the past 24 hour(s))   Lipid Profile (Chol, Trig, HDL, LDL calc)   Result Value Ref Range    Cholesterol 104 <200 mg/dL    Triglycerides 73 <150 mg/dL    HDL Cholesterol 47 >39 mg/dL    LDL Cholesterol Calculated 42 <100 mg/dL    Non HDL Cholesterol 57 <130 mg/dL   PSA, screen   Result Value Ref Range    PSA 0.69 0 - 4 ug/L

## 2021-06-14 ENCOUNTER — APPOINTMENT (OUTPATIENT)
Dept: LAB | Facility: OTHER | Age: 56
End: 2021-06-14
Attending: FAMILY MEDICINE
Payer: COMMERCIAL

## 2021-06-14 ENCOUNTER — ALLIED HEALTH/NURSE VISIT (OUTPATIENT)
Dept: EDUCATION SERVICES | Facility: OTHER | Age: 56
End: 2021-06-14
Attending: FAMILY MEDICINE
Payer: COMMERCIAL

## 2021-06-14 ENCOUNTER — OFFICE VISIT (OUTPATIENT)
Dept: FAMILY MEDICINE | Facility: OTHER | Age: 56
End: 2021-06-14
Attending: FAMILY MEDICINE
Payer: COMMERCIAL

## 2021-06-14 VITALS
SYSTOLIC BLOOD PRESSURE: 117 MMHG | RESPIRATION RATE: 18 BRPM | HEART RATE: 76 BPM | TEMPERATURE: 97.9 F | WEIGHT: 201 LBS | BODY MASS INDEX: 39.26 KG/M2 | OXYGEN SATURATION: 94 % | DIASTOLIC BLOOD PRESSURE: 66 MMHG

## 2021-06-14 DIAGNOSIS — Z12.5 SCREENING PSA (PROSTATE SPECIFIC ANTIGEN): ICD-10-CM

## 2021-06-14 DIAGNOSIS — I10 ESSENTIAL HYPERTENSION: ICD-10-CM

## 2021-06-14 DIAGNOSIS — E10.649 TYPE 1 DIABETES MELLITUS WITH HYPOGLYCEMIA UNAWARENESS (H): Primary | ICD-10-CM

## 2021-06-14 DIAGNOSIS — M77.01 MEDIAL EPICONDYLITIS OF ELBOW, RIGHT: ICD-10-CM

## 2021-06-14 DIAGNOSIS — E78.5 HYPERLIPIDEMIA, UNSPECIFIED HYPERLIPIDEMIA TYPE: ICD-10-CM

## 2021-06-14 DIAGNOSIS — E10.65 TYPE 1 DIABETES MELLITUS WITH HYPERGLYCEMIA (H): Primary | ICD-10-CM

## 2021-06-14 DIAGNOSIS — K21.9 GASTROESOPHAGEAL REFLUX DISEASE WITHOUT ESOPHAGITIS: ICD-10-CM

## 2021-06-14 DIAGNOSIS — K31.84 GASTROPARESIS: ICD-10-CM

## 2021-06-14 LAB
CHOLEST SERPL-MCNC: 104 MG/DL
CREAT UR-MCNC: 102 MG/DL
HDLC SERPL-MCNC: 47 MG/DL
LDLC SERPL CALC-MCNC: 42 MG/DL
MICROALBUMIN UR-MCNC: 10 MG/L
MICROALBUMIN/CREAT UR: 9.49 MG/G CR (ref 0–17)
NONHDLC SERPL-MCNC: 57 MG/DL
PSA SERPL-ACNC: 0.69 UG/L (ref 0–4)
TRIGL SERPL-MCNC: 73 MG/DL

## 2021-06-14 PROCEDURE — 80061 LIPID PANEL: CPT | Mod: ZL | Performed by: FAMILY MEDICINE

## 2021-06-14 PROCEDURE — 99214 OFFICE O/P EST MOD 30 MIN: CPT | Performed by: FAMILY MEDICINE

## 2021-06-14 PROCEDURE — G0463 HOSPITAL OUTPT CLINIC VISIT: HCPCS

## 2021-06-14 PROCEDURE — 84153 ASSAY OF PSA TOTAL: CPT | Mod: ZL | Performed by: FAMILY MEDICINE

## 2021-06-14 PROCEDURE — G0103 PSA SCREENING: HCPCS | Mod: ZL | Performed by: FAMILY MEDICINE

## 2021-06-14 PROCEDURE — 36415 COLL VENOUS BLD VENIPUNCTURE: CPT | Mod: ZL | Performed by: FAMILY MEDICINE

## 2021-06-14 PROCEDURE — 82043 UR ALBUMIN QUANTITATIVE: CPT | Mod: ZL | Performed by: FAMILY MEDICINE

## 2021-06-14 ASSESSMENT — ENCOUNTER SYMPTOMS
CHILLS: 0
ARTHRALGIAS: 1
FEVER: 0
ABDOMINAL PAIN: 1
PALPITATIONS: 0
SHORTNESS OF BREATH: 0
WHEEZING: 0

## 2021-06-14 ASSESSMENT — PAIN SCALES - GENERAL: PAINLEVEL: SEVERE PAIN (6)

## 2021-06-14 NOTE — NURSING NOTE
Chief Complaint   Patient presents with     Diabetes     Lipids     Hypertension       Initial /66 (BP Location: Left arm, Patient Position: Chair, Cuff Size: Adult Regular)   Pulse 76   Temp 97.9  F (36.6  C) (Tympanic)   Resp 18   Wt 91.2 kg (201 lb)   SpO2 94%   BMI 39.26 kg/m   Estimated body mass index is 39.26 kg/m  as calculated from the following:    Height as of 4/5/21: 1.524 m (5').    Weight as of this encounter: 91.2 kg (201 lb).  Medication Reconciliation: complete  Rosy Smith LPN

## 2021-07-06 ENCOUNTER — TELEPHONE (OUTPATIENT)
Dept: FAMILY MEDICINE | Facility: OTHER | Age: 56
End: 2021-07-06

## 2021-07-06 NOTE — TELEPHONE ENCOUNTER
Form received diabetic statement, placed in provider's wall bin.   After form is completed patient would like form to be faxed [phone].

## 2021-07-12 DIAGNOSIS — K31.84 GASTROPARESIS: ICD-10-CM

## 2021-07-12 DIAGNOSIS — K21.9 GASTROESOPHAGEAL REFLUX DISEASE WITHOUT ESOPHAGITIS: ICD-10-CM

## 2021-07-12 RX ORDER — PANTOPRAZOLE SODIUM 40 MG/1
TABLET, DELAYED RELEASE ORAL
Qty: 60 TABLET | Refills: 5 | Status: SHIPPED | OUTPATIENT
Start: 2021-07-12 | End: 2022-07-12

## 2021-07-15 DIAGNOSIS — E10.649 TYPE 1 DIABETES MELLITUS WITH HYPOGLYCEMIA UNAWARENESS (H): ICD-10-CM

## 2021-07-15 DIAGNOSIS — E10.649 TYPE 1 DIABETES MELLITUS WITH HYPOGLYCEMIA AND WITHOUT COMA (H): ICD-10-CM

## 2021-07-15 RX ORDER — PROCHLORPERAZINE 25 MG/1
1 SUPPOSITORY RECTAL CONTINUOUS
Status: CANCELLED | OUTPATIENT
Start: 2021-07-15

## 2021-07-15 RX ORDER — PROCHLORPERAZINE 25 MG/1
1 SUPPOSITORY RECTAL CONTINUOUS
Qty: 1 EACH | Refills: 0 | Status: SHIPPED | OUTPATIENT
Start: 2021-07-15 | End: 2022-05-03

## 2021-07-15 NOTE — TELEPHONE ENCOUNTER
Continuous Blood Gluc  (DEXCOM G6 ) DAYAN   Last Written Prescription Date:  12/12/2019  Last Fill Quantity: 1 device,   # refills: 0  Last Office Visit: 6/14/21  Future Office visit:    Next 5 appointments (look out 90 days)    Aug 03, 2021  8:00 AM  (Arrive by 7:45 AM)  Return Visit with Juliette Jarrett DPM  [facility] ([facility] ) [address] [place] [id number] [phone]           Routing refill request to provider for review/approval because:

## 2021-07-21 ENCOUNTER — TRANSFERRED RECORDS (OUTPATIENT)
Dept: HEALTH INFORMATION MANAGEMENT | Facility: CLINIC | Age: 56
End: 2021-07-21

## 2021-07-21 LAB — RETINOPATHY: NEGATIVE

## 2021-07-27 NOTE — PROGRESS NOTES
Called Richard    He left a message about his Dexcom and getting the transmitter information yesterday.     He was able to get it.     Told him there's a way to release the transmitter using BG strips too.      He's to call if any other questions develop    Macrina CHAMBERLAIN [facility]  Diabetes and Wound Care

## 2021-08-03 ENCOUNTER — OFFICE VISIT (OUTPATIENT)
Dept: PODIATRY | Facility: OTHER | Age: 56
End: 2021-08-03
Attending: PODIATRIST
Payer: COMMERCIAL

## 2021-08-03 VITALS
WEIGHT: 200 LBS | DIASTOLIC BLOOD PRESSURE: 64 MMHG | HEART RATE: 76 BPM | TEMPERATURE: 97.5 F | RESPIRATION RATE: 16 BRPM | SYSTOLIC BLOOD PRESSURE: 121 MMHG | BODY MASS INDEX: 39.06 KG/M2 | OXYGEN SATURATION: 95 %

## 2021-08-03 DIAGNOSIS — R20.8 LOSS OF PROTECTIVE SENSATION OF SKIN OF DEFORMED FOOT: ICD-10-CM

## 2021-08-03 DIAGNOSIS — M21.969 LOSS OF PROTECTIVE SENSATION OF SKIN OF DEFORMED FOOT: ICD-10-CM

## 2021-08-03 DIAGNOSIS — E10.42 DIABETIC POLYNEUROPATHY ASSOCIATED WITH TYPE 1 DIABETES MELLITUS (H): ICD-10-CM

## 2021-08-03 DIAGNOSIS — M25.571 SINUS TARSI SYNDROME OF RIGHT ANKLE: ICD-10-CM

## 2021-08-03 DIAGNOSIS — L60.3 ONYCHODYSTROPHY: ICD-10-CM

## 2021-08-03 DIAGNOSIS — M62.462 GASTROCNEMIUS EQUINUS OF LEFT LOWER EXTREMITY: ICD-10-CM

## 2021-08-03 DIAGNOSIS — M62.461 GASTROCNEMIUS EQUINUS OF RIGHT LOWER EXTREMITY: ICD-10-CM

## 2021-08-03 DIAGNOSIS — M20.21 HALLUX RIGIDUS OF RIGHT FOOT: ICD-10-CM

## 2021-08-03 DIAGNOSIS — M79.671 RIGHT FOOT PAIN: ICD-10-CM

## 2021-08-03 DIAGNOSIS — L84 CALLUS OF FOOT: Primary | ICD-10-CM

## 2021-08-03 DIAGNOSIS — M21.371 FOOT DROP, RIGHT FOOT: ICD-10-CM

## 2021-08-03 PROCEDURE — 11056 PARNG/CUTG B9 HYPRKR LES 2-4: CPT | Performed by: PODIATRIST

## 2021-08-03 PROCEDURE — 20605 DRAIN/INJ JOINT/BURSA W/O US: CPT | Mod: RT | Performed by: PODIATRIST

## 2021-08-03 PROCEDURE — G0463 HOSPITAL OUTPT CLINIC VISIT: HCPCS | Mod: 25

## 2021-08-03 PROCEDURE — 11721 DEBRIDE NAIL 6 OR MORE: CPT | Performed by: PODIATRIST

## 2021-08-03 PROCEDURE — 99212 OFFICE O/P EST SF 10 MIN: CPT | Mod: 25 | Performed by: PODIATRIST

## 2021-08-03 RX ORDER — TRIAMCINOLONE ACETONIDE 40 MG/ML
40 INJECTION, SUSPENSION INTRA-ARTICULAR; INTRAMUSCULAR ONCE
Status: COMPLETED | OUTPATIENT
Start: 2021-08-03 | End: 2021-08-03

## 2021-08-03 RX ORDER — DEXAMETHASONE SODIUM PHOSPHATE 4 MG/ML
4 INJECTION, SOLUTION INTRA-ARTICULAR; INTRALESIONAL; INTRAMUSCULAR; INTRAVENOUS; SOFT TISSUE ONCE
Status: COMPLETED | OUTPATIENT
Start: 2021-08-03 | End: 2021-08-03

## 2021-08-03 RX ADMIN — DEXAMETHASONE SODIUM PHOSPHATE 4 MG: 4 INJECTION, SOLUTION INTRA-ARTICULAR; INTRALESIONAL; INTRAMUSCULAR; INTRAVENOUS; SOFT TISSUE at 08:36

## 2021-08-03 RX ADMIN — TRIAMCINOLONE ACETONIDE 40 MG: 40 INJECTION, SUSPENSION INTRA-ARTICULAR; INTRAMUSCULAR at 08:35

## 2021-08-03 ASSESSMENT — PAIN SCALES - GENERAL: PAINLEVEL: SEVERE PAIN (6)

## 2021-08-03 NOTE — NURSING NOTE
Chief Complaint   Patient presents with     Toenail     trimming       Initial /64 (BP Location: Left arm, Patient Position: Sitting, Cuff Size: Adult Regular)   Pulse 76   Temp 97.5  F (36.4  C) (Tympanic)   Resp 16   Wt 90.7 kg (200 lb)   SpO2 95%   BMI 39.06 kg/m   Estimated body mass index is 39.06 kg/m  as calculated from the following:    Height as of 4/5/21: 1.524 m (5').    Weight as of this encounter: 90.7 kg (200 lb).  Medication Reconciliation: complete  Radha Richter LPN

## 2021-08-03 NOTE — PROGRESS NOTES
Chief complaint: Patient presents with:  Toenail: trimming      History of Present Illness: This 56 year old IDDM type I male is seen for follow-up management of a diabetic foot exam and high risk nail debridement. The toenails are difficult to trim because of the thickness.     Patient has had pain from the callus on the plantar RIGHT heel. He also has pain from the callus on the RIGHT hallux. He would like the calluses pared today as well.    He gets burning, tingling, and numbness in his feet.     He received new DM shoes from Navdeep at [facility] in [place], [place], around December, 2020. The shoes are comfortable and the rocker bottom has decreased his heel pain.    His blood sugars are doing well and he is following with Macrina Dixon NP for blood sugar management.     Lastly, patient has had increased RIGHT lateral ankle pain and it is consistently tender. He has an ankle brace that helps but he still has pain.    No further pedal complaints today.      Last HbA1C was 7.8% on 06/03/2021.    ---Previously 7.5% on 11/30/2020.      Patient does not use tobacco products.       Additionally:  Patient returned to work on 02/04/2019 at the [employer] working 4-5 hour shifts. He was wearing AFO on his RIGHT foot for drop foot, but the brace caused too much pressure on the toe and he thinks it may have caused his last hallux ulcer on the RIGHT foot so he stopped wearing it (the wound opened around January, 2019.    He still has some pain across the anterior ankle. He wears a copper fit brace which helps decrease the pain when the pain worsens.       /64 (BP Location: Left arm, Patient Position: Sitting, Cuff Size: Adult Regular)   Pulse 76   Temp 97.5  F (36.4  C) (Tympanic)   Resp 16   Wt 90.7 kg (200 lb)   SpO2 95%   BMI 39.06 kg/m      Patient Active Problem List   Diagnosis     DM type 1 (diabetes mellitus, type 1) (H)     HTN (hypertension)     Hyperlipidemia     ACP  (advance care planning)     Diabetic polyneuropathy associated with type 1 diabetes mellitus (H)     Class 2 obesity in adult     Obesity (BMI 35.0-39.9) with comorbidity (H)     Gastroparesis     Gastroesophageal reflux disease without esophagitis       Past Surgical History:   Procedure Laterality Date     COLONOSCOPY - HIM SCAN N/A 09/19/2016    Procedure: COLONOSCOPY SCREENING; Surgeon: Rudy Rojo MD; Location: [facility] OR     ESOPHAGOSCOPY, GASTROSCOPY, DUODENOSCOPY (EGD), COMBINED  08/13/2019       Current Outpatient Medications   Medication     acetone, Urine, test STRP     blood glucose monitoring ([employer] CONTOUR NEXT MONITOR) meter device kit     blood glucose monitoring ([employer] CONTOUR NEXT) test strip     CALCIUM-VITAMIN D PO     Continuous Blood Gluc  (DEXCOM G6 ) DAYAN     Continuous Blood Gluc Sensor (DEXCOM G6 SENSOR) MISC     Continuous Blood Gluc Transmit (DEXCOM G6 TRANSMITTER) MISC     CONTOUR NEXT TEST test strip     gabapentin (NEURONTIN) 300 MG capsule     Glucagon (GVOKE HYPOPEN 2-PACK) 1 MG/0.2ML SOAJ     glucagon 1 MG injection     insulin aspart (NOVOLOG VIAL) 100 UNITS/ML vial     insulin aspart (NOVOLOG VIAL) 100 UNITS/ML vial     INSULIN PUMP - OUTPATIENT     lisinopril (ZESTRIL) 10 MG tablet     MOTRIN IB PO     Multiple Vitamin (MULTI-VITAMIN DAILY PO)     nitroglycerin (NITROSTAT) 0.4 MG SL tablet     NOVOLOG VIAL 100 UNIT/ML soln     ondansetron (ZOFRAN) 4 MG tablet     order for DME     pantoprazole (PROTONIX) 40 MG EC tablet     UREA 10 HYDRATING 10 % external cream     URINE GLUCOSE-KETONES TEST VI     simvastatin (ZOCOR) 40 MG tablet     No current facility-administered medications for this visit.        No Known Allergies    Family History   Problem Relation Age of Onset     No Known Problems Mother      Hypertension Father      Hypertension Brother        Social History     Socioeconomic History     Marital status: Single     Spouse name: None     Number of  children: None     Years of education: None     Highest education level: None   Occupational History     None   Social Needs     Financial resource strain: None     Food insecurity:     Worry: None     Inability: None     Transportation needs:     Medical: None     Non-medical: None   Tobacco Use     Smoking status: Never Smoker     Smokeless tobacco: Never Used   Substance and Sexual Activity     Alcohol use: No     Alcohol/week: 0.0 oz     Drug use: No     Sexual activity: None   Lifestyle     Physical activity:     Days per week: None     Minutes per session: None     Stress: None   Relationships     Social connections:     Talks on phone: None     Gets together: None     Attends Hindu service: None     Active member of club or organization: None     Attends meetings of clubs or organizations: None     Relationship status: None     Intimate partner violence:     Fear of current or ex partner: None     Emotionally abused: None     Physically abused: None     Forced sexual activity: None   Other Topics Concern     Parent/sibling w/ CABG, MI or angioplasty before 65F 55M? Not Asked   Social History Narrative     None       ROS: 10 point ROS neg other than the symptoms noted above in the HPI.  EXAM  Constitutional: healthy, alert and no distress     Psychiatric: mentation appears normal and affect normal/bright     VASCULAR:  -Dorsalis pedis pulse +2/4 RIGHT and +1/4 LEFT  ---Biphasic bilaterally on 01/23/2019  -Posterior tibial pulse +1/4 b/l   ---Monophasic on LEFT and biphasic on RIGHT on 01/23/2019    -Capillary refill time < 3 seconds to b/l hallux  -Hair growth Present to b/l anterior legs and ankles     NEURO:  -Protective sensation diminished with SWM +0/10 RIGHT and +10/10 LEFT on 08/03/2021  -Protective sensation diminished with SWM +0/10 RIGHT and +9/10 LEFT on 05/17/2021  -Protective sensation diminished with SWM +1/10 RIGHT and +5/10 LEFT on 02/15/2021  -Protective sensation diminished with SWM +3/10  RIGHT and +10/10 LEFT on 01/23/2019     DERM:  -Hyperkeratotic lesion to RIGHT plantar hallux, RIGHT plantar 5th metatarsal head and RIGHT plantar heel  ---No wounds post paring  -Toenails thickend, dystrophic and discolored x 10  -Skin mildly dry to bilateral plantar foot   -Skin temperature within normal limits to bilateral foot       MSK:  -Pain on palpation to RIGHT sinus tarsi  -RIGHT 1st MTPJ has 0 degrees of DORSIFLEXION and the hallux IPJ is mildly but rigidly plantarflexed   -DORSIFLEXION of bilateral ankle limited to between -5 short of vertical bilaterally   -Muscle strength of ankles for dorsiflexion, plantarflexion +0/5 RIGHT foot and +5/5 LEFT foot  -Muscle strength for ABDUction and ADDuction +5/5 LEFT and +4/5 RIGHT   ============================================================     ASSESSMENT:     (L84) Callus of heel (primary encounter diagnosis)    (L60.3) Onychodystrophy     (M20.21) Hallux rigidus of right foot     (M21.371) Foot drop, right foot    (M21.969,  R20.8) Loss of protective sensation of skin of deformed foot    (M21.6X1) Gastrocnemius equinus of right lower extremity    (M21.6X2) Gastrocnemius equinus of left lower extremity    (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 Treatment options discussed with no educational barriers noted.    -Callus pared x 3 to the RIGHT plantar hallux, RIGHT plantar 5th metatarsal head and RIGHT plantar central heel  without incident  ---Patient reminded that the callus will likely return due to the underlying, prominent bone causing the callus while the patient is walking.    -Nails debrided x 10 without incident    -Diabetic Foot Education provided. This included checking the feet daily looking for new new blisters or wounds, wearing shoes at all times when walking including around the house, and avoiding lotion application between the toes. If there are any signs of  infection, the patient should present to the ED as soon as possible. Infections of the foot can be life threatening or lead to amputations of the foot or leg.    -DM shoes: Last received through [facility] in December, 2020. Shoes are comfortable.    -Ankle brace -- referral was placed on 03/04/2021. Patient did not receive an ankle brace but he did get the rocker shoe which did decrease his pain (dispensed through [facility]).  ---Patient discontinued PT because it was not helping his ankle pain. He did not get an ankle brace. However, he says his pain is controlled in the ankle at this time.    Sinus tarsi pain:  -Discussed etiology and treatment options for sinus tarsi pain. Patient has a moderate arch collapse and a gastrocnemius equinus bilaterally. Explained to patient what this means and how it places additional stress on the sinus tarsi. This pain can often be treated with conservative treatment options, but this is more of a chronic condition so it may take time and persistence with treatment options before pain is moderately reduced. The following conservative treatment options were reviewed:  ---Continue ankle brace as needed  ---Patient would like to try a steroid injection. This could elevate his blood sugars but how much and for how long is unknown. It will less likely affect blood sugars as much as an oral steroid would elevate the sugars. -- he understands the risks and he will monitor his blood sugars.    -Injection x 1 to the RIGHT sinus tarsi: Obtained written and verbal consent from patient for a steroid injection into the sinus tarsi of the Right  foot. Reviewed risks and benefits of the injection. Informed patient that the injection may decrease pain long-term, short-term, or not at all. Patient in agreement with an injection today in an effort to slow or reverse the chronic inflammatory process and pain in the foot.   ---Patient signed informed  consent and a time-out was performed and there was verification of correct patient, procedure and laterality.  ---Prepped the injection site with an alcohol swab.  ---Injected a total of 3 mL of 1:1:1 of 4mg Dexamethasone, 40mg Kenalog, and 1mL of 2% Lidocaine plain. Patient tolerated the injection well.    -Patient in agreement with the above treatment plan and all of patient's questions were answered.        RTC 10 weeks for diabetic foot exam and high risk nail debridement and callus pariung  ---3 months was too long for the RIGHT plantar heel callus        Juliette Jarrett DPM

## 2021-08-03 NOTE — PROGRESS NOTES
Clinic Administered Medication Documentation    Administrations This Visit     dexamethasone (DECADRON) injection 4 mg     Admin Date  08/03/2021 Action  Given Dose  4 mg Route  INTRA-ARTICULAR Site   Administered By  Radha Richter LPN    Ordering Provider: Juliette Jarrett DPM    Patient Supplied?: No          triamcinolone (KENALOG-40) injection 40 mg     Admin Date  08/03/2021 Action  Given Dose  40 mg Route  INTRA-ARTICULAR Site   Administered By  Radha Richter LPN    Ordering Provider: Juliette Jarrett DPM    Patient Supplied?: No                RIGHT SINUS TARSI INJECTION OF FOOT

## 2021-08-03 NOTE — PATIENT INSTRUCTIONS
Thank you for allowing  and our Podiatry team to participate in your care. Please call our office at [phone] with scheduling questions or with any other questions or concerns.

## 2021-08-04 ENCOUNTER — TELEPHONE (OUTPATIENT)
Dept: FAMILY MEDICINE | Facility: OTHER | Age: 56
End: 2021-08-04

## 2021-08-04 DIAGNOSIS — E10.65 TYPE 1 DIABETES MELLITUS WITH HYPERGLYCEMIA (H): Primary | ICD-10-CM

## 2021-08-04 NOTE — TELEPHONE ENCOUNTER
Patient calling and states he was in to see Dr. Jarrett yesterday and was given a steroid injection. Patient states his blood sugar has increased due to this and would like to know what he is to do. States is blood sugar was 363. Please advise, thank you.  Patient's phone number is [phone]

## 2021-08-04 NOTE — TELEPHONE ENCOUNTER
Called Richard.      Reports the following:  BGs are have been higher since his injection yesterday morning    Lowest: 232  Highest: HI    Current B    Recommendation:  Start using your Temp basal  Walked him on how to set it--set one to 150% for 2 hours.   Recommended a correction as well.     Reviewed symptoms of DKA and go to the ED if they develop.     Macrina Dixon APRN FNP-BC  Diabetes and Wound Care

## 2021-08-26 DIAGNOSIS — E10.649 TYPE 1 DIABETES MELLITUS WITH HYPOGLYCEMIA UNAWARENESS (H): ICD-10-CM

## 2021-08-27 RX ORDER — GABAPENTIN 300 MG/1
CAPSULE ORAL
Qty: 90 CAPSULE | Refills: 2 | Status: SHIPPED | OUTPATIENT
Start: 2021-08-27 | End: 2022-01-10

## 2021-08-27 NOTE — TELEPHONE ENCOUNTER
gabapentin      Last Written Prescription Date:  4/12/21  Last Fill Quantity: 90,   # refills: 2  Last Office Visit: 6/14/21  Future Office visit:    Next 5 appointments (look out 90 days)    Oct 13, 2021  8:00 AM  (Arrive by 7:45 AM)  Return Visit with Juliette Jarrett DPM  [facility] ([facility] ) [address] [place] [id number] [phone]

## 2021-10-06 ENCOUNTER — TELEPHONE (OUTPATIENT)
Dept: FAMILY MEDICINE | Facility: OTHER | Age: 56
End: 2021-10-06

## 2021-10-06 DIAGNOSIS — E10.649 TYPE 1 DIABETES MELLITUS WITH HYPOGLYCEMIA AND WITHOUT COMA (H): Primary | ICD-10-CM

## 2021-10-11 ENCOUNTER — TELEPHONE (OUTPATIENT)
Dept: EDUCATION SERVICES | Facility: OTHER | Age: 56
End: 2021-10-11

## 2021-10-11 NOTE — TELEPHONE ENCOUNTER
Richard needs skin tacks and something to hold his sensor on his diabetic machine. Please advise. Thank You.

## 2021-10-12 NOTE — PROGRESS NOTES
Chief complaint: No chief complaint on file.      History of Present Illness: This 56 year old IDDM type I male is seen for follow-up management of a diabetic foot exam and high risk nail debridement. The toenails are difficult to trim because of the thickness.     Patient still has had pain from the callus on the plantar RIGHT heel. He also has pain from the callus on the RIGHT hallux. He would like the calluses pared today as well.    He gets burning, tingling, and numbness in his feet.     He received new DM shoes from Navdeep at [facility] in [place], [place], around December, 2020. The shoes are comfortable and the rocker bottom has decreased his heel pain. He would like a new referral for new shoes for this year.    His blood sugars are doing well and he is following with Macrina Dixon NP for blood sugar management. He will follow-up with her next week.    Lastly, patient's ankle pain has resolved.      No further pedal complaints today.      Last HbA1C was 7.8% on 06/03/2021.    ---Previously 7.5% on 11/30/2020.      Patient does not use tobacco products.       Additionally:  Patient returned to work on 02/04/2019 at the [employer] working 4-5 hour shifts. He was wearing AFO on his RIGHT foot for drop foot, but the brace caused too much pressure on the toe and he thinks it may have caused his last hallux ulcer on the RIGHT foot so he stopped wearing it (the wound opened around January, 2019.    He still has some pain across the anterior ankle. He wears a copper fit brace which helps decrease the pain when the pain worsens.       There were no vitals taken for this visit.    Patient Active Problem List   Diagnosis     DM type 1 (diabetes mellitus, type 1) (H)     HTN (hypertension)     Hyperlipidemia     ACP (advance care planning)     Diabetic polyneuropathy associated with type 1 diabetes mellitus (H)     Class 2 obesity in adult     Obesity (BMI 35.0-39.9) with comorbidity (H)      Gastroparesis     Gastroesophageal reflux disease without esophagitis       Past Surgical History:   Procedure Laterality Date     COLONOSCOPY - HIM SCAN N/A 09/19/2016    Procedure: COLONOSCOPY SCREENING; Surgeon: Rudy Rojo MD; Location: [facility] OR     ESOPHAGOSCOPY, GASTROSCOPY, DUODENOSCOPY (EGD), COMBINED  08/13/2019       Current Outpatient Medications   Medication     acetone, Urine, test STRP     blood glucose monitoring ([employer] CONTOUR NEXT MONITOR) meter device kit     blood glucose monitoring ([employer] CONTOUR NEXT) test strip     CALCIUM-VITAMIN D PO     Continuous Blood Gluc  (DEXCOM G6 ) DAYAN     Continuous Blood Gluc Sensor (DEXCOM G6 SENSOR) MISC     Continuous Blood Gluc Transmit (DEXCOM G6 TRANSMITTER) MISC     CONTOUR NEXT TEST test strip     gabapentin (NEURONTIN) 300 MG capsule     Glucagon (GVOKE HYPOPEN 2-PACK) 1 MG/0.2ML SOAJ     glucagon 1 MG injection     insulin aspart (NOVOLOG VIAL) 100 UNITS/ML vial     insulin aspart (NOVOLOG VIAL) 100 UNITS/ML vial     INSULIN PUMP - OUTPATIENT     lisinopril (ZESTRIL) 10 MG tablet     MOTRIN IB PO     Multiple Vitamin (MULTI-VITAMIN DAILY PO)     nitroglycerin (NITROSTAT) 0.4 MG SL tablet     NOVOLOG VIAL 100 UNIT/ML soln     ondansetron (ZOFRAN) 4 MG tablet     order for DME     pantoprazole (PROTONIX) 40 MG EC tablet     simvastatin (ZOCOR) 40 MG tablet     UREA 10 HYDRATING 10 % external cream     URINE GLUCOSE-KETONES TEST VI     No current facility-administered medications for this visit.        No Known Allergies    Family History   Problem Relation Age of Onset     No Known Problems Mother      Hypertension Father      Hypertension Brother        Social History     Socioeconomic History     Marital status: Single     Spouse name: None     Number of children: None     Years of education: None     Highest education level: None   Occupational History     None   Social Needs     Financial resource strain: None     Food insecurity:      Worry: None     Inability: None     Transportation needs:     Medical: None     Non-medical: None   Tobacco Use     Smoking status: Never Smoker     Smokeless tobacco: Never Used   Substance and Sexual Activity     Alcohol use: No     Alcohol/week: 0.0 oz     Drug use: No     Sexual activity: None   Lifestyle     Physical activity:     Days per week: None     Minutes per session: None     Stress: None   Relationships     Social connections:     Talks on phone: None     Gets together: None     Attends Yarsani service: None     Active member of club or organization: None     Attends meetings of clubs or organizations: None     Relationship status: None     Intimate partner violence:     Fear of current or ex partner: None     Emotionally abused: None     Physically abused: None     Forced sexual activity: None   Other Topics Concern     Parent/sibling w/ CABG, MI or angioplasty before 65F 55M? Not Asked   Social History Narrative     None       ROS: 10 point ROS neg other than the symptoms noted above in the HPI.  EXAM  Constitutional: healthy, alert and no distress     Psychiatric: mentation appears normal and affect normal/bright     VASCULAR:  -Dorsalis pedis pulse +2/4 RIGHT and +1/4 LEFT  -Posterior tibial pulse +1/4 b/l   -Capillary refill time < 3 seconds to b/l hallux  -Hair growth Present to b/l anterior legs and ankles     NEURO:  -Protective sensation diminished with SWM +0/10 RIGHT and +10/10 LEFT on 10/12/2021  -Protective sensation diminished with SWM +0/10 RIGHT and +10/10 LEFT on 08/03/2021  -Protective sensation diminished with SWM +0/10 RIGHT and +9/10 LEFT on 05/17/2021  -Protective sensation diminished with SWM +1/10 RIGHT and +5/10 LEFT on 02/15/2021  -Protective sensation diminished with SWM +3/10 RIGHT and +10/10 LEFT on 01/23/2019     DERM:  -Hyperkeratotic lesion to RIGHT plantar hallux, RIGHT plantar 5th metatarsal head and RIGHT plantar heel  ---No wounds post  paring  -Toenails thickend, dystrophic and discolored x 10  -Skin mildly dry to bilateral plantar foot   -Skin temperature within normal limits to bilateral foot       MSK:  -Pain on palpation to RIGHT sinus tarsi  -RIGHT 1st MTPJ has 0 degrees of DORSIFLEXION and the hallux IPJ is mildly but rigidly plantarflexed   -DORSIFLEXION of bilateral ankle limited to between -5 short of vertical bilaterally   -Muscle strength of ankles for dorsiflexion, plantarflexion +0/5 RIGHT foot and +5/5 LEFT foot  -Muscle strength for ABDUction and ADDuction +5/5 LEFT and +4/5 RIGHT   ============================================================     ASSESSMENT:     (L84) Callus of heel (primary encounter diagnosis)    (L60.3) Onychodystrophy     (M20.21) Hallux rigidus of right foot     (M21.371) Foot drop, right foot    (M21.6X1) Gastrocnemius equinus of right lower extremity    (M21.6X2) Gastrocnemius equinus of left lower extremity    (E10.65) Type 1 diabetes mellitus with hyperglycemia (H)    (E10.42) Diabetic polyneuropathy associated with type 1 diabetes mellitus (H)    (M21.969,  R20.8) Loss of protective sensation of skin of deformed foot            PLAN:  -Patient evaluated and examined. Treatment options discussed with no educational barriers noted.    -Callus pared x 3 to the RIGHT plantar hallux, RIGHT plantar 5th metatarsal head and RIGHT plantar central heel  without incident  ---Patient reminded that the callus will likely return due to the underlying, prominent bone causing the callus while the patient is walking.    -High risk toenail debridement x 10 toenails without incident    -Diabetic Foot Education provided. This included checking the feet daily looking for new new blisters or wounds, wearing shoes at all times when walking including around the house, and avoiding lotion application between the toes. If there are any signs of infection, the patient should present to the ED as soon as possible. Infections of the  foot can be life threatening or lead to amputations of the foot or leg.    -DM shoes: Last received through [facility] in December, 2020. Shoes are comfortable.  ---New referral placed through [facility] on 10/13/2021    -Ankle brace -- referral was placed on 03/04/2021. Patient did not receive an ankle brace but he did get the rocker shoe which did decrease his pain (dispensed through [facility]).  ---Patient discontinued PT because it was not helping his ankle pain. He did not get an ankle brace.  ---Ankle pain is now resolved.    -Patient in agreement with the above treatment plan and all of patient's questions were answered.        RTC 9 weeks for diabetic foot exam and high risk nail debridement and callus pariung  ---3 months was too long for the RIGHT plantar heel callus        Juliette Jarrett DPM

## 2021-10-13 ENCOUNTER — OFFICE VISIT (OUTPATIENT)
Dept: PODIATRY | Facility: OTHER | Age: 56
End: 2021-10-13
Attending: PODIATRIST
Payer: COMMERCIAL

## 2021-10-13 ENCOUNTER — TELEPHONE (OUTPATIENT)
Dept: EDUCATION SERVICES | Facility: OTHER | Age: 56
End: 2021-10-13

## 2021-10-13 VITALS
HEART RATE: 83 BPM | OXYGEN SATURATION: 97 % | SYSTOLIC BLOOD PRESSURE: 130 MMHG | TEMPERATURE: 97.1 F | DIASTOLIC BLOOD PRESSURE: 60 MMHG

## 2021-10-13 DIAGNOSIS — M21.969 LOSS OF PROTECTIVE SENSATION OF SKIN OF DEFORMED FOOT: ICD-10-CM

## 2021-10-13 DIAGNOSIS — E10.42 DIABETIC POLYNEUROPATHY ASSOCIATED WITH TYPE 1 DIABETES MELLITUS (H): ICD-10-CM

## 2021-10-13 DIAGNOSIS — E10.65 TYPE 1 DIABETES MELLITUS WITH HYPERGLYCEMIA (H): ICD-10-CM

## 2021-10-13 DIAGNOSIS — L84 CALLUS OF FOOT: Primary | ICD-10-CM

## 2021-10-13 DIAGNOSIS — M21.371 FOOT DROP, RIGHT FOOT: ICD-10-CM

## 2021-10-13 DIAGNOSIS — R20.8 LOSS OF PROTECTIVE SENSATION OF SKIN OF DEFORMED FOOT: ICD-10-CM

## 2021-10-13 DIAGNOSIS — M62.462 GASTROCNEMIUS EQUINUS OF LEFT LOWER EXTREMITY: ICD-10-CM

## 2021-10-13 DIAGNOSIS — M20.21 HALLUX RIGIDUS OF RIGHT FOOT: ICD-10-CM

## 2021-10-13 DIAGNOSIS — M62.461 GASTROCNEMIUS EQUINUS OF RIGHT LOWER EXTREMITY: ICD-10-CM

## 2021-10-13 DIAGNOSIS — L60.3 ONYCHODYSTROPHY: ICD-10-CM

## 2021-10-13 PROCEDURE — 11721 DEBRIDE NAIL 6 OR MORE: CPT | Performed by: PODIATRIST

## 2021-10-13 PROCEDURE — 11056 PARNG/CUTG B9 HYPRKR LES 2-4: CPT | Performed by: PODIATRIST

## 2021-10-13 PROCEDURE — G0463 HOSPITAL OUTPT CLINIC VISIT: HCPCS | Mod: 25

## 2021-10-13 ASSESSMENT — PAIN SCALES - GENERAL: PAINLEVEL: MODERATE PAIN (5)

## 2021-10-13 NOTE — NURSING NOTE
Chief Complaint   Patient presents with     Musculoskeletal Problem     callouses       Initial /60 (BP Location: Left arm, Patient Position: Sitting, Cuff Size: Adult Regular)   Pulse 83   Temp 97.1  F (36.2  C) (Tympanic)   SpO2 97%  Estimated body mass index is 39.06 kg/m  as calculated from the following:    Height as of 4/5/21: 1.524 m (5').    Weight as of 8/3/21: 90.7 kg (200 lb).  Medication Reconciliation: gerald Velez

## 2021-10-13 NOTE — TELEPHONE ENCOUNTER
8:30 AM    Reason for Call: Phone Call    Description: Pt needs skin tac wipes. Please call patient back to discuss. Thank you.    Was an appointment offered for this call? No  If yes : Appointment type              Date    Preferred method for responding to this message: Telephone Call  What is your phone number ? [phone]    If we cannot reach you directly, may we leave a detailed response at the number you provided? Yes    Can this message wait until your PCP/provider returns, if available today? Provider in today    Halima Dias

## 2021-10-15 ENCOUNTER — TELEPHONE (OUTPATIENT)
Dept: PODIATRY | Facility: OTHER | Age: 56
End: 2021-10-15

## 2021-10-15 NOTE — TELEPHONE ENCOUNTER
Faxed referral, demographics and note to [facility] in [place], [place]  Fax # [phone]  Phone # [phone]

## 2021-10-20 ENCOUNTER — ALLIED HEALTH/NURSE VISIT (OUTPATIENT)
Dept: EDUCATION SERVICES | Facility: OTHER | Age: 56
End: 2021-10-20
Attending: NURSE PRACTITIONER
Payer: COMMERCIAL

## 2021-10-20 VITALS
WEIGHT: 198.3 LBS | HEIGHT: 60 IN | HEART RATE: 70 BPM | BODY MASS INDEX: 38.93 KG/M2 | RESPIRATION RATE: 16 BRPM | SYSTOLIC BLOOD PRESSURE: 132 MMHG | OXYGEN SATURATION: 96 % | DIASTOLIC BLOOD PRESSURE: 75 MMHG

## 2021-10-20 DIAGNOSIS — E10.649 TYPE 1 DIABETES MELLITUS WITH HYPOGLYCEMIA AND WITHOUT COMA (H): Primary | ICD-10-CM

## 2021-10-20 LAB — HBA1C MFR BLD: 7.7 % (ref 0–5.7)

## 2021-10-20 PROCEDURE — 36416 COLLJ CAPILLARY BLOOD SPEC: CPT | Mod: ZL | Performed by: NURSE PRACTITIONER

## 2021-10-20 PROCEDURE — 99213 OFFICE O/P EST LOW 20 MIN: CPT | Mod: 25 | Performed by: NURSE PRACTITIONER

## 2021-10-20 PROCEDURE — 95251 CONT GLUC MNTR ANALYSIS I&R: CPT | Performed by: NURSE PRACTITIONER

## 2021-10-20 PROCEDURE — G0463 HOSPITAL OUTPT CLINIC VISIT: HCPCS

## 2021-10-20 ASSESSMENT — PAIN SCALES - GENERAL: PAINLEVEL: SEVERE PAIN (6)

## 2021-10-20 ASSESSMENT — MIFFLIN-ST. JEOR: SCORE: 1571.98

## 2021-10-20 NOTE — PROGRESS NOTES
Chief Complaint   Patient presents with     Diabetes       Initial /75   Pulse 70   Resp 16   Ht 1.524 m (5')   Wt 89.9 kg (198 lb 4.8 oz)   SpO2 96%   BMI 38.73 kg/m   Estimated body mass index is 38.73 kg/m  as calculated from the following:    Height as of this encounter: 1.524 m (5').    Weight as of this encounter: 89.9 kg (198 lb 4.8 oz).  Medication Reconciliation: complete  Lianna Brewster LPN

## 2021-10-21 NOTE — PROGRESS NOTES
Assessment & Plan     Type 1 diabetes mellitus with hypoglycemia and without coma (H)  Still having issues with liable sugars.   - CBC with platelets  - Basic metabolic panel  - Adult Endocrinology Referral; [facility]   - follows with diabetic education   - on insulin pump   - not on ASA d/t GI issues   - follows with Dr. Jarrett for foot cares     Hyperlipidemia, unspecified hyperlipidemia type  LDL (6/14/2021): 42  - c/w simvastatin 40mg    Primary hypertension  BP at goal  - c/w lisinopril 10mg     Need for influenza vaccination  - INFLUENZA QUAD, PF (RIV4) (FLUBLOK)      See Patient Instructions    Next visit 1/0/2022.    Natacha Alvarez MD  [facility] - SAURABH Ramos is a 56 year old who presents for the following health issues     HPI     Diabetes Follow-up    How often are you checking your blood sugar? Continuous glucose monitor  What time of day are you checking your blood sugars (select all that apply)?  throughout the day  Have you had any blood sugars above 200?  Yes   Have you had any blood sugars below 70?  Yes. Lows in the 40s. Not very often     What symptoms do you notice when your blood sugar is low?  Shaky, Dizzy, Weak, Lethargy, Blurred vision and Confusion    What concerns do you have today about your diabetes? None     Do you have any of these symptoms? (Select all that apply)  Redness, sores, or blisters on feet    Have you had a diabetic eye exam in the last 12 months? Yes- Date of last eye exam: July 2021    - Last A1c (10/20/201): 7.7  - BG: still up and down, chronic and unchanged.   - Diet:  - Exercise:  - on insulin pump  - not on ASA d/t GI issues  - on ACE, statin   - last eye exam  - working with [facility] to obtain orthotics   - follows with Dr. Jarrett for foot cares with last visit 10/13/2021    Hyperlipidemia Follow-Up      Are you regularly taking any medication or supplement to lower your cholesterol?   Yes- Simvastatin 40mg    Are you having muscle  aches or other side effects that you think could be caused by your cholesterol lowering medication?  No    Hypertension Follow-up      Do you check your blood pressure regularly outside of the clinic? No     Are you following a low salt diet? Yes    Are your blood pressures ever more than 140 on the top number (systolic) OR more   than 90 on the bottom number (diastolic), for example 140/90? No    BP Readings from Last 2 Encounters:   10/22/21 124/68   10/20/21 132/75     Hemoglobin A1C (%)   Date Value   10/20/2021 7.7 (A)   06/03/2021 7.8 (A)     LDL Cholesterol Calculated (mg/dL)   Date Value   06/14/2021 42   03/24/2020 40     # oxygen sats   - coughing for two months  - non smoker  - no shortness of breath     Review of Systems   Constitutional: Negative for chills and fever.   HENT: Negative for congestion.    Respiratory: Positive for cough (couple of months). Negative for shortness of breath and wheezing.    Cardiovascular: Negative for chest pain and palpitations.   Gastrointestinal: Negative for abdominal pain.   Psychiatric/Behavioral: Negative for dysphoric mood.          Objective    /68 (BP Location: Right arm, Patient Position: Chair, Cuff Size: Adult Regular)   Pulse 73   Temp 98.4  F (36.9  C) (Tympanic)   Resp 18   Wt 92.4 kg (203 lb 12.8 oz)   SpO2 93%   BMI 39.80 kg/m    Body mass index is 39.8 kg/m .  Physical Exam  Constitutional:       General: He is not in acute distress.     Appearance: Normal appearance. He is not ill-appearing.   Cardiovascular:      Rate and Rhythm: Normal rate and regular rhythm.      Pulses:           Dorsalis pedis pulses are 2+ on the right side and 2+ on the left side.      Heart sounds: S1 normal and S2 normal. No murmur heard.     Pulmonary:      Effort: Pulmonary effort is normal.      Breath sounds: Normal breath sounds. No stridor. No wheezing.   Feet:      Right foot:      Protective Sensation: 10 sites tested. 0 sites sensed.      Skin integrity:  Callus present. No ulcer, blister or skin breakdown.      Toenail Condition: Right toenails are normal.      Left foot:      Protective Sensation: 10 sites tested. 10 sites sensed.      Skin integrity: No ulcer, blister or skin breakdown.      Toenail Condition: Left toenails are normal.      Comments: Sensation noted to right ankle, no sensation below ankle. Consistent for patient  Neurological:      Mental Status: He is alert and oriented to person, place, and time.      GCS: GCS eye subscore is 4. GCS verbal subscore is 5. GCS motor subscore is 6.          Results for orders placed or performed in visit on 10/22/21 (from the past 24 hour(s))   CBC with platelets   Result Value Ref Range    WBC Count 8.4 4.0 - 11.0 10e3/uL    RBC Count 4.71 4.40 - 5.90 10e6/uL    Hemoglobin 14.3 13.3 - 17.7 g/dL    Hematocrit 43.3 40.0 - 53.0 %    MCV 92 78 - 100 fL    MCH 30.4 26.5 - 33.0 pg    MCHC 33.0 31.5 - 36.5 g/dL    RDW 11.9 10.0 - 15.0 %    Platelet Count 302 150 - 450 10e3/uL   Basic metabolic panel   Result Value Ref Range    Sodium 138 133 - 144 mmol/L    Potassium 4.2 3.4 - 5.3 mmol/L    Chloride 107 94 - 109 mmol/L    Carbon Dioxide (CO2) 27 20 - 32 mmol/L    Anion Gap 4 3 - 14 mmol/L    Urea Nitrogen 22 7 - 30 mg/dL    Creatinine 0.98 0.66 - 1.25 mg/dL    Calcium 8.5 8.5 - 10.1 mg/dL    Glucose 197 (H) 70 - 99 mg/dL    GFR Estimate 85 >60 mL/min/1.73m2

## 2021-10-22 ENCOUNTER — OFFICE VISIT (OUTPATIENT)
Dept: FAMILY MEDICINE | Facility: OTHER | Age: 56
End: 2021-10-22
Attending: FAMILY MEDICINE
Payer: COMMERCIAL

## 2021-10-22 ENCOUNTER — ALLIED HEALTH/NURSE VISIT (OUTPATIENT)
Dept: EDUCATION SERVICES | Facility: OTHER | Age: 56
End: 2021-10-22
Attending: NURSE PRACTITIONER
Payer: COMMERCIAL

## 2021-10-22 ENCOUNTER — APPOINTMENT (OUTPATIENT)
Dept: LAB | Facility: OTHER | Age: 56
End: 2021-10-22
Attending: FAMILY MEDICINE
Payer: COMMERCIAL

## 2021-10-22 VITALS
HEART RATE: 73 BPM | OXYGEN SATURATION: 93 % | WEIGHT: 203.8 LBS | TEMPERATURE: 98.4 F | SYSTOLIC BLOOD PRESSURE: 124 MMHG | RESPIRATION RATE: 18 BRPM | DIASTOLIC BLOOD PRESSURE: 68 MMHG | BODY MASS INDEX: 39.8 KG/M2

## 2021-10-22 DIAGNOSIS — E10.649 TYPE 1 DIABETES MELLITUS WITH HYPOGLYCEMIA AND WITHOUT COMA (H): Primary | ICD-10-CM

## 2021-10-22 DIAGNOSIS — E10.65 TYPE 1 DIABETES MELLITUS WITH HYPERGLYCEMIA (H): Primary | ICD-10-CM

## 2021-10-22 DIAGNOSIS — Z23 NEED FOR INFLUENZA VACCINATION: ICD-10-CM

## 2021-10-22 DIAGNOSIS — E78.5 HYPERLIPIDEMIA, UNSPECIFIED HYPERLIPIDEMIA TYPE: ICD-10-CM

## 2021-10-22 DIAGNOSIS — I10 PRIMARY HYPERTENSION: ICD-10-CM

## 2021-10-22 LAB
ANION GAP SERPL CALCULATED.3IONS-SCNC: 4 MMOL/L (ref 3–14)
BUN SERPL-MCNC: 22 MG/DL (ref 7–30)
CALCIUM SERPL-MCNC: 8.5 MG/DL (ref 8.5–10.1)
CHLORIDE BLD-SCNC: 107 MMOL/L (ref 94–109)
CO2 SERPL-SCNC: 27 MMOL/L (ref 20–32)
CREAT SERPL-MCNC: 0.98 MG/DL (ref 0.66–1.25)
ERYTHROCYTE [DISTWIDTH] IN BLOOD BY AUTOMATED COUNT: 11.9 % (ref 10–15)
GFR SERPL CREATININE-BSD FRML MDRD: 85 ML/MIN/1.73M2
GLUCOSE BLD-MCNC: 197 MG/DL (ref 70–99)
HCT VFR BLD AUTO: 43.3 % (ref 40–53)
HGB BLD-MCNC: 14.3 G/DL (ref 13.3–17.7)
MCH RBC QN AUTO: 30.4 PG (ref 26.5–33)
MCHC RBC AUTO-ENTMCNC: 33 G/DL (ref 31.5–36.5)
MCV RBC AUTO: 92 FL (ref 78–100)
PLATELET # BLD AUTO: 302 10E3/UL (ref 150–450)
POTASSIUM BLD-SCNC: 4.2 MMOL/L (ref 3.4–5.3)
RBC # BLD AUTO: 4.71 10E6/UL (ref 4.4–5.9)
SODIUM SERPL-SCNC: 138 MMOL/L (ref 133–144)
WBC # BLD AUTO: 8.4 10E3/UL (ref 4–11)

## 2021-10-22 PROCEDURE — 80048 BASIC METABOLIC PNL TOTAL CA: CPT | Mod: ZL | Performed by: FAMILY MEDICINE

## 2021-10-22 PROCEDURE — G0463 HOSPITAL OUTPT CLINIC VISIT: HCPCS | Mod: 25

## 2021-10-22 PROCEDURE — 85027 COMPLETE CBC AUTOMATED: CPT | Mod: ZL | Performed by: FAMILY MEDICINE

## 2021-10-22 PROCEDURE — 99214 OFFICE O/P EST MOD 30 MIN: CPT | Performed by: FAMILY MEDICINE

## 2021-10-22 PROCEDURE — G0008 ADMIN INFLUENZA VIRUS VAC: HCPCS

## 2021-10-22 ASSESSMENT — ENCOUNTER SYMPTOMS
PALPITATIONS: 0
DYSPHORIC MOOD: 0
CHILLS: 0
SHORTNESS OF BREATH: 0
COUGH: 1
WHEEZING: 0
ABDOMINAL PAIN: 0
FEVER: 0

## 2021-10-22 ASSESSMENT — PATIENT HEALTH QUESTIONNAIRE - PHQ9
SUM OF ALL RESPONSES TO PHQ QUESTIONS 1-9: 1
5. POOR APPETITE OR OVEREATING: NOT AT ALL

## 2021-10-22 ASSESSMENT — ANXIETY QUESTIONNAIRES
7. FEELING AFRAID AS IF SOMETHING AWFUL MIGHT HAPPEN: NOT AT ALL
5. BEING SO RESTLESS THAT IT IS HARD TO SIT STILL: NOT AT ALL
2. NOT BEING ABLE TO STOP OR CONTROL WORRYING: NOT AT ALL
IF YOU CHECKED OFF ANY PROBLEMS ON THIS QUESTIONNAIRE, HOW DIFFICULT HAVE THESE PROBLEMS MADE IT FOR YOU TO DO YOUR WORK, TAKE CARE OF THINGS AT HOME, OR GET ALONG WITH OTHER PEOPLE: NOT DIFFICULT AT ALL
1. FEELING NERVOUS, ANXIOUS, OR ON EDGE: NOT AT ALL
6. BECOMING EASILY ANNOYED OR IRRITABLE: NOT AT ALL
GAD7 TOTAL SCORE: 0
3. WORRYING TOO MUCH ABOUT DIFFERENT THINGS: NOT AT ALL

## 2021-10-22 ASSESSMENT — PAIN SCALES - GENERAL: PAINLEVEL: MODERATE PAIN (5)

## 2021-10-22 NOTE — LETTER
October 23, 2021      Richard Harvey  [address] [facility] [place] [address]        Dear ,    We are writing to inform you of your test results.    Your test results fall within the expected range(s) or remain unchanged from previous results.  Please continue with current treatment plan.    Resulted Orders   CBC with platelets   Result Value Ref Range    WBC Count 8.4 4.0 - 11.0 10e3/uL    RBC Count 4.71 4.40 - 5.90 10e6/uL    Hemoglobin 14.3 13.3 - 17.7 g/dL    Hematocrit 43.3 40.0 - 53.0 %    MCV 92 78 - 100 fL    MCH 30.4 26.5 - 33.0 pg    MCHC 33.0 31.5 - 36.5 g/dL    RDW 11.9 10.0 - 15.0 %    Platelet Count 302 150 - 450 10e3/uL   Basic metabolic panel   Result Value Ref Range    Sodium 138 133 - 144 mmol/L    Potassium 4.2 3.4 - 5.3 mmol/L    Chloride 107 94 - 109 mmol/L    Carbon Dioxide (CO2) 27 20 - 32 mmol/L    Anion Gap 4 3 - 14 mmol/L    Urea Nitrogen 22 7 - 30 mg/dL    Creatinine 0.98 0.66 - 1.25 mg/dL    Calcium 8.5 8.5 - 10.1 mg/dL    Glucose 197 (H) 70 - 99 mg/dL    GFR Estimate 85 >60 mL/min/1.73m2      Comment:      As of July 11, 2021, eGFR is calculated by the CKD-EPI creatinine equation, without race adjustment. eGFR can be influenced by muscle mass, exercise, and diet. The reported eGFR is an estimation only and is only applicable if the renal function is stable.       If you have any questions or concerns, please call the clinic at the number listed above.       Sincerely,      Natacha Alvarez MD

## 2021-10-22 NOTE — PROGRESS NOTES
Pt came in for a Dexcom CGM download today. This was completed and given to Dr Alvarez.    Lianna Brewster

## 2021-10-22 NOTE — NURSING NOTE
Chief Complaint   Patient presents with     Diabetes     Hypertension     Lipids       Initial /68 (BP Location: Right arm, Patient Position: Chair, Cuff Size: Adult Regular)   Pulse 73   Temp 98.4  F (36.9  C) (Tympanic)   Resp 18   Wt 92.4 kg (203 lb 12.8 oz)   SpO2 93%   BMI 39.80 kg/m   Estimated body mass index is 39.8 kg/m  as calculated from the following:    Height as of 10/20/21: 1.524 m (5').    Weight as of this encounter: 92.4 kg (203 lb 12.8 oz).  Medication Reconciliation: complete  Rosy Smith LPN

## 2021-10-22 NOTE — PATIENT INSTRUCTIONS
It was good to see you today.     Labs look good.    We put in a referral to endocrine for help with your diabetes

## 2021-10-23 ASSESSMENT — ANXIETY QUESTIONNAIRES: GAD7 TOTAL SCORE: 0

## 2021-10-28 NOTE — TELEPHONE ENCOUNTER
Discussed during his last appointment.     Macrina Dixon APRN FNP-BC  Diabetes and Wound Care

## 2021-11-15 DIAGNOSIS — I10 ESSENTIAL HYPERTENSION: ICD-10-CM

## 2021-11-15 RX ORDER — LISINOPRIL 10 MG/1
10 TABLET ORAL DAILY
Qty: 90 TABLET | Refills: 3 | Status: SHIPPED | OUTPATIENT
Start: 2021-11-15 | End: 2022-11-15

## 2021-11-15 NOTE — TELEPHONE ENCOUNTER
Lisinopril      Last Written Prescription Date:  11/30/20  Last Fill Quantity: 90,   # refills: 3  Last Office Visit: 10/22/21  Future Office visit:    Next 5 appointments (look out 90 days)    Dec 21, 2021  8:00 AM  (Arrive by 7:45 AM)  Return Visit with Juliette Jarrett DPM  [facility] ([facility] ) [address] [place] [id number] [phone]   Jan 20, 2022  8:00 AM  (Arrive by 7:45 AM)  SHORT with Natacha Alvarez MD  [facility] ([facility] ) [address] [place] [address]  [phone]           Routing refill request to provider for review/approval because:

## 2021-11-29 DIAGNOSIS — E10.649 TYPE 1 DIABETES MELLITUS WITH HYPOGLYCEMIA AND WITHOUT COMA (H): ICD-10-CM

## 2021-12-01 NOTE — TELEPHONE ENCOUNTER
Novolog      Last Written Prescription Date:  8/3/21  Last Fill Quantity: 20 mL,   # refills: 3  Last Office Visit: 10/22/21  Future Office visit:    Next 5 appointments (look out 90 days)    Dec 21, 2021  8:00 AM  (Arrive by 7:45 AM)  Return Visit with Juliette Jarrett DPM  [facility] ([facility] ) [address] [place] [id number] [phone]   Jan 20, 2022  8:00 AM  (Arrive by 7:45 AM)  SHORT with Natacha Alvarez MD  [facility] ([facility] ) [address] [place] [address]  [phone]           Routing refill request to provider for review/approval because:

## 2021-12-21 ENCOUNTER — OFFICE VISIT (OUTPATIENT)
Dept: PODIATRY | Facility: OTHER | Age: 56
End: 2021-12-21
Attending: PODIATRIST
Payer: COMMERCIAL

## 2021-12-21 VITALS
SYSTOLIC BLOOD PRESSURE: 125 MMHG | DIASTOLIC BLOOD PRESSURE: 60 MMHG | HEART RATE: 94 BPM | OXYGEN SATURATION: 95 % | TEMPERATURE: 97.3 F

## 2021-12-21 DIAGNOSIS — E10.42 DIABETIC POLYNEUROPATHY ASSOCIATED WITH TYPE 1 DIABETES MELLITUS (H): ICD-10-CM

## 2021-12-21 DIAGNOSIS — M21.371 FOOT DROP, RIGHT FOOT: ICD-10-CM

## 2021-12-21 DIAGNOSIS — M21.969 LOSS OF PROTECTIVE SENSATION OF SKIN OF DEFORMED FOOT: ICD-10-CM

## 2021-12-21 DIAGNOSIS — L60.3 ONYCHODYSTROPHY: Primary | ICD-10-CM

## 2021-12-21 DIAGNOSIS — E10.65 TYPE 1 DIABETES MELLITUS WITH HYPERGLYCEMIA (H): ICD-10-CM

## 2021-12-21 DIAGNOSIS — R20.8 LOSS OF PROTECTIVE SENSATION OF SKIN OF DEFORMED FOOT: ICD-10-CM

## 2021-12-21 DIAGNOSIS — M20.21 HALLUX RIGIDUS OF RIGHT FOOT: ICD-10-CM

## 2021-12-21 DIAGNOSIS — M62.461 GASTROCNEMIUS EQUINUS OF RIGHT LOWER EXTREMITY: ICD-10-CM

## 2021-12-21 DIAGNOSIS — L84 CALLUS OF FOOT: ICD-10-CM

## 2021-12-21 DIAGNOSIS — M62.462 GASTROCNEMIUS EQUINUS OF LEFT LOWER EXTREMITY: ICD-10-CM

## 2021-12-21 PROCEDURE — 11721 DEBRIDE NAIL 6 OR MORE: CPT | Mod: 59 | Performed by: PODIATRIST

## 2021-12-21 PROCEDURE — 11056 PARNG/CUTG B9 HYPRKR LES 2-4: CPT | Performed by: PODIATRIST

## 2021-12-21 PROCEDURE — G0463 HOSPITAL OUTPT CLINIC VISIT: HCPCS | Mod: 25

## 2021-12-21 ASSESSMENT — PAIN SCALES - GENERAL: PAINLEVEL: MODERATE PAIN (5)

## 2021-12-21 NOTE — NURSING NOTE
Chief Complaint   Patient presents with     Musculoskeletal Problem     calluses       Initial /60 (BP Location: Left arm, Patient Position: Sitting, Cuff Size: Adult Regular)   Pulse 94   Temp 97.3  F (36.3  C) (Tympanic)   SpO2 95%  Estimated body mass index is 39.8 kg/m  as calculated from the following:    Height as of 10/20/21: 1.524 m (5').    Weight as of 10/22/21: 92.4 kg (203 lb 12.8 oz).  Medication Reconciliation: gerald Velez

## 2021-12-21 NOTE — PROGRESS NOTES
Chief complaint: Patient presents with:  Musculoskeletal Problem: calluses      History of Present Illness: This 56 year old IDDM type I male is seen for follow-up management of a diabetic foot exam and high risk nail debridement. The toenails are difficult to trim because of the thickness.     Patient still has had pain from the callus on the plantar RIGHT heel. He also has pain from the callus on the RIGHT hallux. He would like the calluses pared today as well.    He gets burning, tingling, and numbness in his feet.     He received new DM shoes from [facility] at [facility] in [place], [place], around December, 2020. The shoes are comfortable and the rocker bottom has decreased his heel pain. He would like a new referral for new shoes.    His blood sugars are doing well and he is following with Macrina Dixon NP for blood sugar management.     Lastly, patient's ankle pain has resolved.      No further pedal complaints today.    Last HbA1C was 7.7% on 10/20/2021.    Last HbA1C was 7.8% on 06/03/2021.    ---Previously 7.5% on 11/30/2020.      Patient does not use tobacco products.       Additionally:  Patient returned to work on 02/04/2019 at the [employer] working 4-5 hour shifts. He was wearing AFO on his RIGHT foot for drop foot, but the brace caused too much pressure on the toe and he thinks it may have caused his last hallux ulcer on the RIGHT foot so he stopped wearing it (the wound opened around January, 2019.    He still has some pain across the anterior ankle. He wears a copper fit brace which helps decrease the pain when the pain worsens.       /60 (BP Location: Left arm, Patient Position: Sitting, Cuff Size: Adult Regular)   Pulse 94   Temp 97.3  F (36.3  C) (Tympanic)   SpO2 95%     Patient Active Problem List   Diagnosis     DM type 1 (diabetes mellitus, type 1) (H)     HTN (hypertension)     Hyperlipidemia     ACP (advance care planning)     Diabetic polyneuropathy  associated with type 1 diabetes mellitus (H)     Class 2 obesity in adult     Obesity (BMI 35.0-39.9) with comorbidity (H)     Gastroparesis     Gastroesophageal reflux disease without esophagitis       Past Surgical History:   Procedure Laterality Date     COLONOSCOPY - HIM SCAN N/A 09/19/2016    Procedure: COLONOSCOPY SCREENING; Surgeon: Rudy Rjoo MD; Location: [facility] OR     ESOPHAGOSCOPY, GASTROSCOPY, DUODENOSCOPY (EGD), COMBINED  08/13/2019       Current Outpatient Medications   Medication     acetone, Urine, test STRP     blood glucose monitoring (MELISSA CONTOUR NEXT MONITOR) meter device kit     blood glucose monitoring (MELISSA CONTOUR NEXT) test strip     CALCIUM-VITAMIN D PO     Continuous Blood Gluc  (DEXCOM G6 ) DAYAN     Continuous Blood Gluc Sensor (DEXCOM G6 SENSOR) MISC     Continuous Blood Gluc Transmit (DEXCOM G6 TRANSMITTER) MISC     CONTOUR NEXT TEST test strip     gabapentin (NEURONTIN) 300 MG capsule     Glucagon (GVOKE HYPOPEN 2-PACK) 1 MG/0.2ML SOAJ     insulin aspart (NOVOLOG VIAL) 100 UNITS/ML vial     insulin aspart (NOVOLOG VIAL) 100 UNITS/ML vial     INSULIN PUMP - OUTPATIENT     lisinopril (ZESTRIL) 10 MG tablet     Multiple Vitamin (MULTI-VITAMIN DAILY PO)     nitroglycerin (NITROSTAT) 0.4 MG SL tablet     NOVOLOG VIAL 100 UNIT/ML soln     ondansetron (ZOFRAN) 4 MG tablet     order for DME     pantoprazole (PROTONIX) 40 MG EC tablet     UREA 10 HYDRATING 10 % external cream     URINE GLUCOSE-KETONES TEST VI     simvastatin (ZOCOR) 40 MG tablet     No current facility-administered medications for this visit.        No Known Allergies    Family History   Problem Relation Age of Onset     No Known Problems Mother      Hypertension Father      Hypertension Brother        Social History     Socioeconomic History     Marital status: Single     Spouse name: None     Number of children: None     Years of education: None     Highest education level: None   Occupational  History     None   Social Needs     Financial resource strain: None     Food insecurity:     Worry: None     Inability: None     Transportation needs:     Medical: None     Non-medical: None   Tobacco Use     Smoking status: Never Smoker     Smokeless tobacco: Never Used   Substance and Sexual Activity     Alcohol use: No     Alcohol/week: 0.0 oz     Drug use: No     Sexual activity: None   Lifestyle     Physical activity:     Days per week: None     Minutes per session: None     Stress: None   Relationships     Social connections:     Talks on phone: None     Gets together: None     Attends Sabianism service: None     Active member of club or organization: None     Attends meetings of clubs or organizations: None     Relationship status: None     Intimate partner violence:     Fear of current or ex partner: None     Emotionally abused: None     Physically abused: None     Forced sexual activity: None   Other Topics Concern     Parent/sibling w/ CABG, MI or angioplasty before 65F 55M? Not Asked   Social History Narrative     None       ROS: 10 point ROS neg other than the symptoms noted above in the HPI.  EXAM  Constitutional: healthy, alert and no distress     Psychiatric: mentation appears normal and affect normal/bright     VASCULAR:  -Dorsalis pedis pulse +2/4 RIGHT and +1/4 LEFT  -Posterior tibial pulse +1/4 b/l   -Capillary refill time < 3 seconds to b/l hallux  -Hair growth Present to b/l anterior legs and ankles     NEURO:  -Protective sensation diminished with SWM +0/10 RIGHT and +10/10 LEFT on 12/21/2021  -Protective sensation diminished with SWM +0/10 RIGHT and +10/10 LEFT on 10/12/2021  -Protective sensation diminished with SWM +0/10 RIGHT and +10/10 LEFT on 08/03/2021  -Protective sensation diminished with SWM +0/10 RIGHT and +9/10 LEFT on 05/17/2021  -Protective sensation diminished with SWM +1/10 RIGHT and +5/10 LEFT on 02/15/2021  -Protective sensation diminished with SWM +3/10 RIGHT and +10/10  LEFT on 01/23/2019     DERM:  -Hyperkeratotic lesion to RIGHT plantar 5th metatarsal head and RIGHT plantar heel  ---No wounds post paring  -Toenails thickend, dystrophic and discolored x 10  ---RIGHT hallux toenail 90% detached and wit a minor wound through the skin only at the distal lateral aspect of the toenail.  ---No severe erythema, no ascending erythema, no calor, no purulence, no malodor, no other signs of infection.   -Skin mildly dry to bilateral plantar foot   -Skin temperature within normal limits to bilateral foot       MSK:  -RIGHT 1st MTPJ has 0 degrees of DORSIFLEXION and the hallux IPJ is mildly but rigidly plantarflexed   -DORSIFLEXION of bilateral ankle limited to between -5 short of vertical bilaterally   -Muscle strength of ankles for dorsiflexion, plantarflexion +0/5 RIGHT foot and +5/5 LEFT foot  -Muscle strength for ABDUction and ADDuction +5/5 LEFT and +4/5 RIGHT   ============================================================     ASSESSMENT:  (L60.3) Onychodystrophy  (primary encounter diagnosis)     (L84) Callus of heel     (M20.21) Hallux rigidus of right foot     (M21.371) Foot drop, right foot    (M21.6X1) Gastrocnemius equinus of right lower extremity    (M21.6X2) Gastrocnemius equinus of left lower extremity    (E10.65) Type 1 diabetes mellitus with hyperglycemia (H)    (E10.42) Diabetic polyneuropathy associated with type 1 diabetes mellitus (H)    (M21.969,  R20.8) Loss of protective sensation of skin of deformed foot            PLAN:  -Patient evaluated and examined. Treatment options discussed with no educational barriers noted.    -Callus pared x 2 to the RIGHT plantar 5th metatarsal head and RIGHT plantar central heel  without incident  ---Patient reminded that the callus will likely return due to the underlying, prominent bone causing the callus while the patient is walking.    -High risk toenail debridement x 10 toenails without incident on 12/21/2021  ---RIGHT hallux toenail  was 90% detached and was creating pressure at a small area of breakdown through the skin layer only on the distal lateral nail bed. The nail was easily removed with the nail nipper and a small distal lateral nail bed wound remained.  ---Applied triple antibiotic ointment, gauze and tape  ---Patient to do a daily mild dish soap soak at home for 20 minutes, let the toe air out and then apply a new antibiotic dressing until the wound appears healed. If the wound is no longer draining, he may apply a dry gauze dressing for two days and then discontinue the dressing if the dressing still appears dry.  ---No severe erythema, no ascending erythema, no calor, no purulence, no malodor, no other signs of infection.   ---Patient was instructed to look for signs of infection (redness, swelling, pain, purulence, fever, chills, nausea, vomiting) and to return to podiatry or the emergency department immediately if there are any signs of infection.     -Diabetic Foot Education provided. This included checking the feet daily looking for new new blisters or wounds, wearing shoes at all times when walking including around the house, and avoiding lotion application between the toes. If there are any signs of infection, the patient should present to the ED as soon as possible. Infections of the foot can be life threatening or lead to amputations of the foot or leg.    -DM shoes: Last received through [facility] in December, 2020. Shoes are comfortable.  ---New referral placed through [facility] on 10/13/2021 and again on 12/21/2021    -Ankle brace -- referral was placed on 03/04/2021. Patient did not receive an ankle brace but he did get the rocker shoe which did decrease his pain (dispensed through [facility]).  ---Patient discontinued PT because it was not helping his ankle pain. He did not get an ankle brace.  ---Ankle pain is now resolved.    -Patient in  agreement with the above treatment plan and all of patient's questions were answered.        Return to clinic 2-3 weeks to ensure the RIGHT hallux subungual skin opening is healed  RTC 9 weeks for diabetic foot exam and high risk nail debridement and callus pariung  ---3 months was too long for the RIGHT plantar heel callus        Juliette Jarrett DPM

## 2021-12-21 NOTE — PATIENT INSTRUCTIONS
Nail procedure care:  -Start a mild dish soap soak after work today on 12/21/2021.   -Apply an antibiotic cream, gauze and a bandaid over the toe until there is no further drainage and the toe looks healed.  -If the toe looks healed, apply a dry gauze and tape over the toe. If there is no drainage for two days in a row, then you may discontinue the dressing.  -Do not apply a band aid directly over the nail procedure site without gauze.     Keep the toe covered at all times until it is completely healed. You may let it air out for a bit after the soak.        Watching for signs of infection:  If the toe has a thick, white pus coming from the procedure site or if the the toe becomes red, swollen, painful, or you begin to feel sick (fever/chills/nausea/vomitting), return to the podiatry clinic immediately or to the emergency room if after hours.

## 2021-12-22 ENCOUNTER — TELEPHONE (OUTPATIENT)
Dept: PODIATRY | Facility: OTHER | Age: 56
End: 2021-12-22
Payer: COMMERCIAL

## 2021-12-22 NOTE — TELEPHONE ENCOUNTER
Faxed referral, demographics and note to [facility] in [place], [place]  Fax # [phone]  Phone # [phone]

## 2022-01-08 DIAGNOSIS — R11.0 NAUSEA: ICD-10-CM

## 2022-01-08 DIAGNOSIS — E10.649 TYPE 1 DIABETES MELLITUS WITH HYPOGLYCEMIA UNAWARENESS (H): ICD-10-CM

## 2022-01-10 RX ORDER — GABAPENTIN 300 MG/1
CAPSULE ORAL
Qty: 90 CAPSULE | Refills: 2 | Status: SHIPPED | OUTPATIENT
Start: 2022-01-10 | End: 2022-05-09

## 2022-01-10 RX ORDER — ONDANSETRON 4 MG/1
TABLET, FILM COATED ORAL
Qty: 30 TABLET | Refills: 0 | Status: SHIPPED | OUTPATIENT
Start: 2022-01-10 | End: 2022-02-23

## 2022-01-10 NOTE — TELEPHONE ENCOUNTER
Gabapentin  Last Written Prescription Date: 8/27/21  Last Fill Quantity: 90 # of Refills: 2  Last Office Visit: 10/22/21    Zofran  Last Written Prescription Date: 7/13/21  Last Fill Quantity: 30 # of Refills: 5  Last Office Visit: 10/22/21

## 2022-01-11 ENCOUNTER — OFFICE VISIT (OUTPATIENT)
Dept: PODIATRY | Facility: OTHER | Age: 57
End: 2022-01-11
Attending: PODIATRIST
Payer: COMMERCIAL

## 2022-01-11 VITALS
HEIGHT: 60 IN | SYSTOLIC BLOOD PRESSURE: 120 MMHG | OXYGEN SATURATION: 95 % | DIASTOLIC BLOOD PRESSURE: 71 MMHG | WEIGHT: 200 LBS | BODY MASS INDEX: 39.27 KG/M2 | RESPIRATION RATE: 16 BRPM | HEART RATE: 74 BPM

## 2022-01-11 DIAGNOSIS — L84 CALLUS OF FOOT: Primary | ICD-10-CM

## 2022-01-11 DIAGNOSIS — M20.21 HALLUX RIGIDUS OF RIGHT FOOT: ICD-10-CM

## 2022-01-11 DIAGNOSIS — M62.461 GASTROCNEMIUS EQUINUS OF RIGHT LOWER EXTREMITY: ICD-10-CM

## 2022-01-11 DIAGNOSIS — E10.42 DIABETIC POLYNEUROPATHY ASSOCIATED WITH TYPE 1 DIABETES MELLITUS (H): ICD-10-CM

## 2022-01-11 DIAGNOSIS — M62.462 GASTROCNEMIUS EQUINUS OF LEFT LOWER EXTREMITY: ICD-10-CM

## 2022-01-11 DIAGNOSIS — E10.65 TYPE 1 DIABETES MELLITUS WITH HYPERGLYCEMIA (H): ICD-10-CM

## 2022-01-11 DIAGNOSIS — R20.8 LOSS OF PROTECTIVE SENSATION OF SKIN OF DEFORMED FOOT: ICD-10-CM

## 2022-01-11 DIAGNOSIS — L60.3 ONYCHODYSTROPHY: ICD-10-CM

## 2022-01-11 DIAGNOSIS — M21.969 LOSS OF PROTECTIVE SENSATION OF SKIN OF DEFORMED FOOT: ICD-10-CM

## 2022-01-11 DIAGNOSIS — M21.371 FOOT DROP, RIGHT FOOT: ICD-10-CM

## 2022-01-11 PROCEDURE — 99212 OFFICE O/P EST SF 10 MIN: CPT | Performed by: PODIATRIST

## 2022-01-11 PROCEDURE — G0463 HOSPITAL OUTPT CLINIC VISIT: HCPCS

## 2022-01-11 ASSESSMENT — MIFFLIN-ST. JEOR: SCORE: 1579.69

## 2022-01-11 ASSESSMENT — PAIN SCALES - GENERAL: PAINLEVEL: MILD PAIN (2)

## 2022-01-11 NOTE — PROGRESS NOTES
Chief complaint: Patient presents with:  Surgical Followup: right foot great toe nail removal follow up      History of Present Illness: This 56 year old IDDM type I male is seen for follow-up management of a RIGHT hallux subungual ulcer. He has been applying Neosporin, gauze and a bandage daily. He has not noticed any drainage.    He gets burning, tingling, and numbness in his feet.     He received new DM shoes from Navdeep at [facility] in [place], [place], around December, 2020. The shoes are comfortable and the rocker bottom has decreased his heel pain. [facility] to be fit for new shoes today on 01/11/2022.    His blood sugars are doing well and he is following with Macrina Dixon NP for blood sugar management.     Lastly, patient's ankle pain has resolved.      No further pedal complaints today.    Last HbA1C was 7.7% on 10/20/2021.    Last HbA1C was 7.8% on 06/03/2021.    ---Previously 7.5% on 11/30/2020.      Patient does not use tobacco products.       Additionally:  Patient returned to work on 02/04/2019 at the [employer] working 4-5 hour shifts. He was wearing AFO on his RIGHT foot for drop foot, but the brace caused too much pressure on the toe and he thinks it may have caused his last hallux ulcer on the RIGHT foot so he stopped wearing it (the wound opened around January, 2019.    He still has some pain across the anterior ankle. He wears a copper fit brace which helps decrease the pain when the pain worsens.       /71 (BP Location: Left arm, Cuff Size: Adult Regular)   Pulse 74   Resp 16   Ht 1.524 m (5')   Wt 90.7 kg (200 lb)   SpO2 95%   BMI 39.06 kg/m      Patient Active Problem List   Diagnosis     DM type 1 (diabetes mellitus, type 1) (H)     HTN (hypertension)     Hyperlipidemia     ACP (advance care planning)     Diabetic polyneuropathy associated with type 1 diabetes mellitus (H)     Class 2 obesity in adult      Obesity (BMI 35.0-39.9) with comorbidity (H)     Gastroparesis     Gastroesophageal reflux disease without esophagitis       Past Surgical History:   Procedure Laterality Date     COLONOSCOPY - HIM SCAN N/A 09/19/2016    Procedure: COLONOSCOPY SCREENING; Surgeon: Rudy Rojo MD; Location: [facility] OR     ESOPHAGOSCOPY, GASTROSCOPY, DUODENOSCOPY (EGD), COMBINED  08/13/2019       Current Outpatient Medications   Medication     acetone, Urine, test STRP     blood glucose monitoring (MELISSA CONTOUR NEXT MONITOR) meter device kit     blood glucose monitoring (MELISSA CONTOUR NEXT) test strip     CALCIUM-VITAMIN D PO     Continuous Blood Gluc  (DEXCOM G6 ) DAYAN     Continuous Blood Gluc Sensor (DEXCOM G6 SENSOR) MISC     Continuous Blood Gluc Transmit (DEXCOM G6 TRANSMITTER) MISC     CONTOUR NEXT TEST test strip     gabapentin (NEURONTIN) 300 MG capsule     Glucagon (GVOKE HYPOPEN 2-PACK) 1 MG/0.2ML SOAJ     insulin aspart (NOVOLOG VIAL) 100 UNITS/ML vial     insulin aspart (NOVOLOG VIAL) 100 UNITS/ML vial     INSULIN PUMP - OUTPATIENT     lisinopril (ZESTRIL) 10 MG tablet     Multiple Vitamin (MULTI-VITAMIN DAILY PO)     nitroglycerin (NITROSTAT) 0.4 MG SL tablet     NOVOLOG VIAL 100 UNIT/ML soln     ondansetron (ZOFRAN) 4 MG tablet     order for DME     pantoprazole (PROTONIX) 40 MG EC tablet     UREA 10 HYDRATING 10 % external cream     URINE GLUCOSE-KETONES TEST VI     simvastatin (ZOCOR) 40 MG tablet     No current facility-administered medications for this visit.        No Known Allergies    Family History   Problem Relation Age of Onset     No Known Problems Mother      Hypertension Father      Hypertension Brother        Social History     Socioeconomic History     Marital status: Single     Spouse name: None     Number of children: None     Years of education: None     Highest education level: None   Occupational History     None   Social Needs     Financial resource strain: None     Food  insecurity:     Worry: None     Inability: None     Transportation needs:     Medical: None     Non-medical: None   Tobacco Use     Smoking status: Never Smoker     Smokeless tobacco: Never Used   Substance and Sexual Activity     Alcohol use: No     Alcohol/week: 0.0 oz     Drug use: No     Sexual activity: None   Lifestyle     Physical activity:     Days per week: None     Minutes per session: None     Stress: None   Relationships     Social connections:     Talks on phone: None     Gets together: None     Attends [employer] service: None     Active member of club or organization: None     Attends meetings of clubs or organizations: None     Relationship status: None     Intimate partner violence:     Fear of current or ex partner: None     Emotionally abused: None     Physically abused: None     Forced sexual activity: None   Other Topics Concern     Parent/sibling w/ CABG, MI or angioplasty before 65F 55M? Not Asked   Social History Narrative     None       ROS: 10 point ROS neg other than the symptoms noted above in the HPI.  EXAM  Constitutional: healthy, alert and no distress     Psychiatric: mentation appears normal and affect normal/bright     VASCULAR:  -Dorsalis pedis pulse +2/4 RIGHT and +1/4 LEFT  -Posterior tibial pulse +1/4 b/l   -Capillary refill time < 3 seconds to b/l hallux  -Hair growth Present to b/l anterior legs and ankles     NEURO:  -Protective sensation diminished with SWM +0/10 RIGHT and +10/10 LEFT on 12/21/2021  -Protective sensation diminished with SWM +0/10 RIGHT and +10/10 LEFT on 10/12/2021  -Protective sensation diminished with SWM +0/10 RIGHT and +10/10 LEFT on 08/03/2021  -Protective sensation diminished with SWM +0/10 RIGHT and +9/10 LEFT on 05/17/2021  -Protective sensation diminished with SWM +1/10 RIGHT and +5/10 LEFT on 02/15/2021  -Protective sensation diminished with SWM +3/10 RIGHT and +10/10 LEFT on 01/23/2019     DERM:  -Minimal hyperkeratotic lesion to RIGHT plantar 5th  metatarsal head and RIGHT plantar heel  ---No wounds post paring  -Toenails thickend, dystrophic and discolored x 10  ---RIGHT hallux toenail bed is fully healed without open wounds and without drainage  ---No severe erythema, no ascending erythema, no calor, no purulence, no malodor, no other signs of infection.     -Skin mildly dry to bilateral plantar foot   -Skin temperature within normal limits to bilateral foot       MSK:  -RIGHT 1st MTPJ has 0 degrees of DORSIFLEXION and the hallux IPJ is mildly but rigidly plantarflexed   -DORSIFLEXION of bilateral ankle limited to between -5 short of vertical bilaterally   -Muscle strength of ankles for dorsiflexion, plantarflexion +0/5 RIGHT foot and +5/5 LEFT foot  -Muscle strength for ABDUction and ADDuction +5/5 LEFT and +4/5 RIGHT   ============================================================     ASSESSMENT:      (L84) Callus of heel (primary encounter diagnosis)     (L60.3) Onychodystrophy      (M20.21) Hallux rigidus of right foot     (M21.371) Foot drop, right foot    (M21.6X1) Gastrocnemius equinus of right lower extremity    (M21.6X2) Gastrocnemius equinus of left lower extremity    (E10.65) Type 1 diabetes mellitus with hyperglycemia (H)    (E10.42) Diabetic polyneuropathy associated with type 1 diabetes mellitus (H)    (M21.969,  R20.8) Loss of protective sensation of skin of deformed foot            PLAN:  -Patient evaluated and examined. Treatment options discussed with no educational barriers noted.    -The RIGHT hallux subungual wound is healing. Patient may discontinue all dressings.  -Continue to monitor feet daily for open wounds or drainage.    -Toenails last debrided on 12/21/2021    -DM shoes: patient is being fit for new shoes today at [facility] on 01/11/2022 for new DM shoes.      -Ankle brace -- referral was placed on 03/04/2021. Patient did not receive an ankle brace but he did get the rocker shoe which did decrease his  pain (dispensed through [facility]).  ---Patient discontinued PT because it was not helping his ankle pain. He did not get an ankle brace.  ---The last steroid injection resolved the ankle pain    -Patient in agreement with the above treatment plan and all of patient's questions were answered.        Return to clinic 3 months from last appointment for diabetic foot exam and high risk nail debridement         Juliette Jarrett DPM

## 2022-01-11 NOTE — NURSING NOTE
Chief Complaint   Patient presents with     Surgical Followup     right foot great toe nail removal follow up       Initial /71 (BP Location: Left arm, Cuff Size: Adult Regular)   Pulse 74   Resp 16   Ht 1.524 m (5')   Wt 90.7 kg (200 lb)   SpO2 95%   BMI 39.06 kg/m   Estimated body mass index is 39.06 kg/m  as calculated from the following:    Height as of this encounter: 1.524 m (5').    Weight as of this encounter: 90.7 kg (200 lb).  Medication Reconciliation: complete  Julia Brewster LPN

## 2022-01-18 ENCOUNTER — MEDICAL CORRESPONDENCE (OUTPATIENT)
Dept: HEALTH INFORMATION MANAGEMENT | Facility: CLINIC | Age: 57
End: 2022-01-18

## 2022-01-18 NOTE — PROGRESS NOTES
Assessment & Plan     Diabetic polyneuropathy associated with type 1 diabetes mellitus (H)  A1c (1/20/2022): 7.5 stable from 7.7  Does not always feel his lows. Has alarm on his CGM  - Hemoglobin A1c  - Basic metabolic panel  - on insulin pump  - visited with diabetic education after this visit    Hyperlipidemia, unspecified hyperlipidemia type  LDL (6/14/2021): 42  - c/w simvastatin     Primary hypertension  BP at goal  - c/w lisinopril 10mg    Gastroesophageal reflux disease without esophagitis  Reasonably controlled  - c/w protonix 40mg bid         See Patient Instructions    Return in about 3 months (around 4/20/2022).    Natacha Alvarez MD  [facility] - SAURABH Ramos is a 56 year old who presents for the following health issues     HPI     Diabetes type 1 Follow-up    How often are you checking your blood sugar? Continuous glucose monitor  What time of day are you checking your blood sugars (select all that apply)?  through out the day  Have you had any blood sugars above 200?  Yes   Have you had any blood sugars below 70?  Yes     What symptoms do you notice when your blood sugar is low?  Shaky, Dizzy, Weak, Lethargy, Blurred vision and Confusion    What concerns do you have today about your diabetes? None     Do you have any of these symptoms? (Select all that apply)  Numbness in feet    - Last A1c (10/20/201): 7.7  - BG:  up and down. Lows into the 50s. Has an alarm on his CGM  - Diet:  - Exercise:  - on insulin pump  - not on ASA d/t GI issues  - on ACE, statin   - last eye exam  - working with Navdeep to obtain orthotics. Shoes are ordered.   - follows with Dr. Jarrett for foot cares with last visit 1/11/2021. S/p right great toenail removed. Doing well    Hyperlipidemia Follow-Up      Are you regularly taking any medication or supplement to lower your cholesterol?   Yes- Simvastatin 40 mg    Are you having muscle aches or other side effects that you think could be caused  by your cholesterol lowering medication?  No    Hypertension Follow-up      Do you check your blood pressure regularly outside of the clinic? No     Are you following a low salt diet? Yes    Are your blood pressures ever more than 140 on the top number (systolic) OR more   than 90 on the bottom number (diastolic), for example 140/90? No    BP Readings from Last 2 Encounters:   01/20/22 115/66   01/11/22 120/71     Hemoglobin A1C POCT (%)   Date Value   10/20/2021 7.7 (A)   06/03/2021 7.8 (A)     LDL Cholesterol Calculated (mg/dL)   Date Value   06/14/2021 42   03/24/2020 40     # GERD   - no changes, tolerable  - occasional breakthrough symptoms       Review of Systems   Constitutional: Negative for chills and fever.   HENT: Negative for congestion.    Respiratory: Negative for shortness of breath and wheezing.    Cardiovascular: Negative for chest pain and palpitations.   Gastrointestinal: Positive for heartburn (no changes). Negative for abdominal pain.   Neurological: Positive for dizziness (when standing, chronic) and light-headedness.   Psychiatric/Behavioral: Negative for mood changes.            Objective    /66 (BP Location: Left arm, Patient Position: Chair, Cuff Size: Adult Regular)   Pulse 72   Temp 97.5  F (36.4  C) (Tympanic)   Resp 18   Wt 91.7 kg (202 lb 4 oz)   SpO2 93%   BMI 39.50 kg/m    Body mass index is 39.5 kg/m .  Physical Exam  Constitutional:       General: He is not in acute distress.     Appearance: He is not ill-appearing.   Cardiovascular:      Rate and Rhythm: Normal rate and regular rhythm.      Pulses: Normal pulses.           Dorsalis pedis pulses are 2+ on the right side and 2+ on the left side.      Heart sounds: No murmur heard.      Pulmonary:      Effort: Pulmonary effort is normal. No respiratory distress.      Breath sounds: No wheezing or rales.   Musculoskeletal:      Right lower leg: No edema.      Left lower leg: No edema.   Feet:      Right foot:      Skin  integrity: Dry skin present.      Left foot:      Skin integrity: Skin integrity normal.      Toenail Condition: Left toenails are normal.      Comments: Right great toenail removed, well healed  Neurological:      Mental Status: He is alert.          Results for orders placed or performed in visit on 01/20/22 (from the past 24 hour(s))   Hemoglobin A1c   Result Value Ref Range    Estimated Average Glucose 169 mg/dL    Hemoglobin A1C 7.5 (H) 0.0 - 5.6 %   Basic metabolic panel   Result Value Ref Range    Sodium 139 133 - 144 mmol/L    Potassium 4.3 3.4 - 5.3 mmol/L    Chloride 107 94 - 109 mmol/L    Carbon Dioxide (CO2) 28 20 - 32 mmol/L    Anion Gap 4 3 - 14 mmol/L    Urea Nitrogen 20 7 - 30 mg/dL    Creatinine 0.90 0.66 - 1.25 mg/dL    Calcium 8.5 8.5 - 10.1 mg/dL    Glucose 174 (H) 70 - 99 mg/dL    GFR Estimate >90 >60 mL/min/1.73m2

## 2022-01-20 ENCOUNTER — ALLIED HEALTH/NURSE VISIT (OUTPATIENT)
Dept: EDUCATION SERVICES | Facility: OTHER | Age: 57
End: 2022-01-20
Attending: NURSE PRACTITIONER
Payer: COMMERCIAL

## 2022-01-20 ENCOUNTER — APPOINTMENT (OUTPATIENT)
Dept: LAB | Facility: OTHER | Age: 57
End: 2022-01-20
Attending: FAMILY MEDICINE
Payer: COMMERCIAL

## 2022-01-20 ENCOUNTER — OFFICE VISIT (OUTPATIENT)
Dept: FAMILY MEDICINE | Facility: OTHER | Age: 57
End: 2022-01-20
Attending: FAMILY MEDICINE
Payer: COMMERCIAL

## 2022-01-20 VITALS
DIASTOLIC BLOOD PRESSURE: 66 MMHG | SYSTOLIC BLOOD PRESSURE: 115 MMHG | WEIGHT: 202.25 LBS | OXYGEN SATURATION: 93 % | RESPIRATION RATE: 18 BRPM | TEMPERATURE: 97.5 F | HEART RATE: 72 BPM | BODY MASS INDEX: 39.5 KG/M2

## 2022-01-20 VITALS
HEART RATE: 72 BPM | WEIGHT: 202.25 LBS | BODY MASS INDEX: 39.5 KG/M2 | DIASTOLIC BLOOD PRESSURE: 66 MMHG | OXYGEN SATURATION: 93 % | RESPIRATION RATE: 18 BRPM | TEMPERATURE: 97.5 F | SYSTOLIC BLOOD PRESSURE: 115 MMHG

## 2022-01-20 DIAGNOSIS — I10 PRIMARY HYPERTENSION: ICD-10-CM

## 2022-01-20 DIAGNOSIS — E10.65 TYPE 1 DIABETES MELLITUS WITH HYPERGLYCEMIA (H): Primary | ICD-10-CM

## 2022-01-20 DIAGNOSIS — E78.5 HYPERLIPIDEMIA, UNSPECIFIED HYPERLIPIDEMIA TYPE: ICD-10-CM

## 2022-01-20 DIAGNOSIS — E10.42 DIABETIC POLYNEUROPATHY ASSOCIATED WITH TYPE 1 DIABETES MELLITUS (H): Primary | ICD-10-CM

## 2022-01-20 DIAGNOSIS — K21.9 GASTROESOPHAGEAL REFLUX DISEASE WITHOUT ESOPHAGITIS: ICD-10-CM

## 2022-01-20 LAB
ANION GAP SERPL CALCULATED.3IONS-SCNC: 4 MMOL/L (ref 3–14)
BUN SERPL-MCNC: 20 MG/DL (ref 7–30)
CALCIUM SERPL-MCNC: 8.5 MG/DL (ref 8.5–10.1)
CHLORIDE BLD-SCNC: 107 MMOL/L (ref 94–109)
CO2 SERPL-SCNC: 28 MMOL/L (ref 20–32)
CREAT SERPL-MCNC: 0.9 MG/DL (ref 0.66–1.25)
EST. AVERAGE GLUCOSE BLD GHB EST-MCNC: 169 MG/DL
GFR SERPL CREATININE-BSD FRML MDRD: >90 ML/MIN/1.73M2
GLUCOSE BLD-MCNC: 174 MG/DL (ref 70–99)
HBA1C MFR BLD: 7.5 % (ref 0–5.6)
POTASSIUM BLD-SCNC: 4.3 MMOL/L (ref 3.4–5.3)
SODIUM SERPL-SCNC: 139 MMOL/L (ref 133–144)

## 2022-01-20 PROCEDURE — 80048 BASIC METABOLIC PNL TOTAL CA: CPT | Mod: ZL | Performed by: FAMILY MEDICINE

## 2022-01-20 PROCEDURE — 99214 OFFICE O/P EST MOD 30 MIN: CPT | Performed by: FAMILY MEDICINE

## 2022-01-20 PROCEDURE — G0463 HOSPITAL OUTPT CLINIC VISIT: HCPCS | Mod: 27

## 2022-01-20 PROCEDURE — 95251 CONT GLUC MNTR ANALYSIS I&R: CPT | Performed by: NURSE PRACTITIONER

## 2022-01-20 PROCEDURE — 36415 COLL VENOUS BLD VENIPUNCTURE: CPT | Mod: ZL | Performed by: FAMILY MEDICINE

## 2022-01-20 PROCEDURE — 83036 HEMOGLOBIN GLYCOSYLATED A1C: CPT | Mod: ZL | Performed by: FAMILY MEDICINE

## 2022-01-20 PROCEDURE — 99213 OFFICE O/P EST LOW 20 MIN: CPT | Performed by: NURSE PRACTITIONER

## 2022-01-20 PROCEDURE — G0463 HOSPITAL OUTPT CLINIC VISIT: HCPCS | Mod: 25

## 2022-01-20 PROCEDURE — G0463 HOSPITAL OUTPT CLINIC VISIT: HCPCS

## 2022-01-20 RX ORDER — SIMVASTATIN 40 MG
40 TABLET ORAL AT BEDTIME
Qty: 90 TABLET | Refills: 3 | Status: SHIPPED | OUTPATIENT
Start: 2022-01-20 | End: 2023-02-20

## 2022-01-20 ASSESSMENT — ENCOUNTER SYMPTOMS
PALPITATIONS: 0
CHILLS: 0
DIZZINESS: 1
WHEEZING: 0
SHORTNESS OF BREATH: 0
LIGHT-HEADEDNESS: 1
HEARTBURN: 1
FEVER: 0
ABDOMINAL PAIN: 0

## 2022-01-20 ASSESSMENT — PAIN SCALES - GENERAL
PAINLEVEL: MODERATE PAIN (5)
PAINLEVEL: MODERATE PAIN (5)

## 2022-01-20 NOTE — PROGRESS NOTES
SUBJECTIVE:  Richard Harvey, 56 year old, male presents with the following Chief Complaint(s) with HPI to follow:  Chief Complaint   Patient presents with     Diabetes        Diabetes Follow-up      Patient is checking blood sugars: >4x/day using his continuous glucose.  Results:    Date: 12/24 to 1/6/22  Glucose management indicator: 7.2%    Average glucose: 161    Glucose range  Very low (<54): <1%  low (<70): 3%  In target range (): 59%  High (>180): 29%  Very high (>250): 8%    Date: 1/7 to 1/20/22  Glucose management indicator: 7.4%    Average glucose: 172    Glucose range  Very low (<54): <1%  low (<70): 4%  In target range (): 56%  High (>180): 23%  Very high (>250): 16%      Symptoms of hypoglycemia (low blood sugar): yes--due to increase activity or his stomach issues    Paresthesias (numbness or burning in feet) or sores: Yes; no sores    Diabetic eye exam within the last year: Yes    Breakfast eaten regularly: Yes    Patient counting carbs: trying       HPI: Richard's here today for the follow up regarding his Diabetes mellitus, Type 1.    Lab Results   Component Value Date    A1C 7.5 01/20/2022    A1C 7.7 10/20/2021    A1C 7.8 06/03/2021    A1C 7.5 11/30/2020    A1C 7.7 09/01/2020    A1C 8.1 03/24/2020     Current Diabetes medication:   1.  Insulin pump, uses Novolog;   ASA use: yes, 325 mg daily  Statin use: yes, simvastatin 40 mg at bedime    Richard's here for his Dexcom G6 and insulin pump download with possible adjust.  He reports the following:   Denies any issues with his insulin pump  Denies any issues with his Dexcom.  Needs more skin tac product and overlaying patches.       Continued issues with low BGs with increase activity (working) and/or stomach issues--not eating as much    He continues to adjust his carb dose down to 2/3 before work.   States he's been using the temp basal.      No new health concerns    Hasn't heard anything from [facility] endocrinology    Wt Readings from Last 4  Encounters:   01/20/22 91.7 kg (202 lb 4 oz)   01/20/22 91.7 kg (202 lb 4 oz)   01/11/22 90.7 kg (200 lb)   10/22/21 92.4 kg (203 lb 12.8 oz)     Asking for refill on his cholesterol medication     Patient Active Problem List   Diagnosis     DM type 1 (diabetes mellitus, type 1) (H)     HTN (hypertension)     Hyperlipidemia     ACP (advance care planning)     Diabetic polyneuropathy associated with type 1 diabetes mellitus (H)     Class 2 obesity in adult     Obesity (BMI 35.0-39.9) with comorbidity (H)     Gastroparesis     Gastroesophageal reflux disease without esophagitis       Past Medical History:   Diagnosis Date     Diabetes mellitus type 1, controlled (H)      HLD (hyperlipidemia)      HTN (hypertension)      Neuropathy      Type 1 diabetes (H)        Past Surgical History:   Procedure Laterality Date     COLONOSCOPY - HIM SCAN N/A 09/19/2016    Procedure: COLONOSCOPY SCREENING; Surgeon: Rudy Rojo MD; Location: [facility] OR     ESOPHAGOSCOPY, GASTROSCOPY, DUODENOSCOPY (EGD), COMBINED  08/13/2019       Family History   Problem Relation Age of Onset     No Known Problems Mother      Hypertension Father      Hypertension Brother        Social History     Tobacco Use     Smoking status: Never Smoker     Smokeless tobacco: Never Used   Substance Use Topics     Alcohol use: No     Alcohol/week: 0.0 standard drinks       Current Outpatient Medications   Medication Sig Dispense Refill     acetone, Urine, test STRP Use as directed 50 each 3     blood glucose monitoring (MELISSA CONTOUR NEXT MONITOR) meter device kit 1 each       blood glucose monitoring (MELISSA CONTOUR NEXT) test strip Use to test blood sugar 6 times daily or as directed. 200 each 11     CALCIUM-VITAMIN D PO Take  by mouth daily. 600 mg       Continuous Blood Gluc  (DEXCOM G6 ) DAYAN 1 each continuous To be used per manufacture's recommendation 1 each 0     Continuous Blood Gluc Sensor (DEXCOM G6 SENSOR) MISC 1 each continuous 3  each 11     Continuous Blood Gluc Transmit (DEXCOM G6 TRANSMITTER) MISC 1 each continuous 1 each 4     CONTOUR NEXT TEST test strip TEST 6 TIMES DAILY, OR AS DIRECTED. 200 strip 11     gabapentin (NEURONTIN) 300 MG capsule TAKE 1 CAPSULE BY MOUTH THREE TIMES DAILY 90 capsule 2     Glucagon (GVOKE HYPOPEN 2-PACK) 1 MG/0.2ML SOAJ Inject 1 mg Subcutaneous as needed (for low blood glucose) 0.4 mL 3     insulin aspart (NOVOLOG VIAL) 100 UNITS/ML vial USE WITH INSULIN PUMP FOR A TOTAL DAILY USAGE OF 70 UNITS 20 mL 1     insulin aspart (NOVOLOG VIAL) 100 UNITS/ML vial  UNITS IN PUMP EVERY 3 DAYS       INSULIN PUMP - OUTPATIENT Date last updated:  6/23/15  Medtronic Minimed: Model 751  BASAL RATES and times:  12   AM (midnight): 1.25 units/hour    2     AM: 1.3 units/hour   6     AM: 0.95 units/hour   6    PM: 1.2 units/hour   Basal Pattern A:  Richard Harvey will use when N/A.  CARB RATIO and times:  12   AM (midnight): 12  1    PM:  10  6    PM:    9  Corection Factor (Sensitivity) and times:  12   AM (midnight): 40 mg/dL  6     PM: 45 mg/dL  BLOOD GLUCOSE TARGET and times:  12   AM (midnight): 100 - 140  9     AM:  100 - 120  10   PM:  100 - 140  Active Insulin Time:  4 hours  Sensor:  Yes: 12   AM (midnight): 70 - 240  Carelink / Diasend username:  [email or web address]  Carelink / Diasend Password:  [email or web address]       lisinopril (ZESTRIL) 10 MG tablet Take 1 tablet (10 mg) by mouth daily 90 tablet 3     Multiple Vitamin (MULTI-VITAMIN DAILY PO) Take  by mouth daily.       nitroglycerin (NITROSTAT) 0.4 MG SL tablet Place under the tongue every 5 minutes as needed       NOVOLOG VIAL 100 UNIT/ML soln USE WITH INSULIN PUMP FOR A TOTAL DAILY USAGE OF 70 UNITS 20 mL 3     ondansetron (ZOFRAN) 4 MG tablet TAKE 1 TABLET BY MOUTH EVERY 8 HOURS AS NEEDED FOR NAUSEA 30 tablet 0     order for DME Equipment being ordered:     1.  Medtronic insulin pump supplies--insertion sets and reserviors  Disp: 1 month  Refill: 11 months 30 days 11      pantoprazole (PROTONIX) 40 MG EC tablet Take 1 tablet by mouth twice daily 60 tablet 5     simvastatin (ZOCOR) 40 MG tablet Take 1 tablet (40 mg) by mouth At Bedtime 90 tablet 3     UREA 10 HYDRATING 10 % external cream APPLY CREAM TOPICALLY TO AFFECTED AREA AS NEEDED FOR  CALLOUSED  SKIN 85 g 3     URINE GLUCOSE-KETONES TEST VI          No Known Allergies    REVIEW OF SYSTEMS  Skin: negative  Eyes: negative; new glasses  Ears/Nose/Throat: negative; hx of allergies  Respiratory: No shortness of breath, dyspnea on exertion, cough, or hemoptysis  Cardiovascular: negative  Gastrointestinal: off and on issues with nausea  Genitourinary: negative  Musculoskeletal: history of arthritis--hands--same, shoulders, knees--worse  Neurologic: positive for numbness or tingling of hands and numbness or tingling of feet--same (gabagentin helps)  Psychiatric: negative  Hematologic/Lymphatic/Immunologic: negative  Endocrine: positive for diabetes    OBJECTIVE:  /66   Pulse 72   Temp 97.5  F (36.4  C) (Tympanic)   Resp 18   Wt 91.7 kg (202 lb 4 oz)   SpO2 93%   BMI 39.50 kg/m    Constitutional: alert and no distress  Respiratory:  Good diaphragmatic excursion.   Musculoskeletal: extremities normal- no gross deformities noted and gait normal  Skin: no suspicious lesions or rashes to visible skin  Psychiatric: mentation appears normal and affect normal/bright      LAB  Results for orders placed or performed in visit on 01/20/22   Hemoglobin A1c     Status: Abnormal   Result Value Ref Range    Estimated Average Glucose 169 mg/dL    Hemoglobin A1C 7.5 (H) 0.0 - 5.6 %   Basic metabolic panel     Status: Abnormal   Result Value Ref Range    Sodium 139 133 - 144 mmol/L    Potassium 4.3 3.4 - 5.3 mmol/L    Chloride 107 94 - 109 mmol/L    Carbon Dioxide (CO2) 28 20 - 32 mmol/L    Anion Gap 4 3 - 14 mmol/L    Urea Nitrogen 20 7 - 30 mg/dL    Creatinine 0.90 0.66 - 1.25 mg/dL    Calcium 8.5 8.5 - 10.1 mg/dL    Glucose 174 (H) 70  - 99 mg/dL    GFR Estimate >90 >60 mL/min/1.73m2       ASSESSMENT / PLAN:.  (E10.65) Type 1 diabetes mellitus with hyperglycemia (H)  (primary encounter diagnosis)  Comment: noted A1c  Plan: GLUCOSE MONITOR, 72 HOUR, PHYS INTERP          Insulin pump information:   Average: 171 +/- 91  Basal/bolus ratio: 23.7 (49%)/24.9 (51%)   Testing: continuous     Hypoglycemia: yes    Will back off on his CR  0000 9.5  1200 12  1600 13    Reminded him about ways to prevent low BGs with increase activity    I want him to start adding only 1/2 the amount of carbs before meals and if he finishes--add the remaining amount    We also talked about finding the date/year of his insulin pump warranty.  I really want him to switch to a closed loop system.      Gave him samples of overlay patches and skin tac    (E78.5) Hyperlipidemia, unspecified hyperlipidemia type  Comment: noted  Plan: simvastatin (ZOCOR) 40 MG tablet          Reordered per patient's request.      Follow up  6 weeks with me.    Keep schedule Dr. Alvarez's appt.      Call sooner if any issues/concerns develop and/or continued issues with low BGs    Patient Instructions   Continue working on healthy eating and moving as best as you can (start low and slow, work up to 30 min, 5x/week)    BG goals:  Fasting and before meals <130, >70  2 hour after eating <180    We only need 1/2 of these numbers to be within target then your A1c will be within target    Medication changes   Let me if you have continued issues with low BGs    Follow up   3 months    Call me sooner if any problems/concerns and/or questions develop including consistent low BGs <70 or consistent high BGs >200  [phone] (Unit Coordinator)    [phone] (Nurse)    Please call [employer] to see when your warranty expires.            Time: 30 min  Barrier: none  Willingness to learn: accepting    Macrina CHAMBERLAIN FNP-BC  Diabetes and Wound Care    With the electronic record, we can now more quickly and  easily track our patient diabetic goals. Our diabetes clinical review is in progress and these are the indicators we are monitoring for good diabetes health.     1.) HbA1C less than 7 (measurement of your average blood sugars)  2.) Blood Pressure less than 140/80  3.) LDL less than 100 (bad cholesterol)  4.) HbA1C is checked in the last 6 months and below 7% (more frequently if not at goal or adjusting medications)  5.) LDL is checked in the last 12 months (more frequently if not at goal or adjusting medications)  6.) Taking one baby aspirin daily (unless otherwise instructed)  7.) No tobacco use  8) Statin use     You have achieved 7 out of 8 of these and I am encouraging you to come in and get tuned up to achieve 8 out of 8!  Here is what you have achieved so far in my goals for you:  1.) HbA1C  less than 7:                              NO  Your last  HbA1C :   Lab Results   Component Value Date    A1C 7.5 01/20/2022    A1C 7.7 10/20/2021    A1C 7.8 06/03/2021    A1C 7.5 11/30/2020    A1C 7.7 09/01/2020    A1C 8.1 03/24/2020     2.) Blood Pressure less than 140/80:       YES      Your last    /66   Pulse 72   Temp 97.5  F (36.4  C) (Tympanic)   Resp 18   Wt 91.7 kg (202 lb 4 oz)   SpO2 93%   BMI 39.50 kg/m      3.) LDL less than 100:                              YES      Your last   LDL         LDL Cholesterol Calculated   Date Value Ref Range Status   06/14/2021 42 <100 mg/dL Final     Comment:     Desirable:       <100 mg/dl       4.) Checked HbA1C in the past 6 months: YES      5.) Checked LDL in the past 12 months:    YES    6.) Taking one aspirin daily:                       YES     7.) No tobacco use:                                        YES      8.) Statin use      YES       CGM requirements:  1.  Patient has been seen in the clinic within the last 6 month  2. Diagnosis of Type 1 diabetes  3. Has been testing their BGs 4x/day using the Dexcom  4. Uses an insulin pump   5. Requires frequent  adjustments to their treatment regimen based on BGM and/or CGM testing results.

## 2022-01-20 NOTE — NURSING NOTE
Chief Complaint   Patient presents with     Lipids     Diabetes       Initial /66 (BP Location: Left arm, Patient Position: Chair, Cuff Size: Adult Regular)   Pulse 72   Temp 97.5  F (36.4  C) (Tympanic)   Resp 18   Wt 91.7 kg (202 lb 4 oz)   SpO2 93%   BMI 39.50 kg/m   Estimated body mass index is 39.5 kg/m  as calculated from the following:    Height as of 1/11/22: 1.524 m (5').    Weight as of this encounter: 91.7 kg (202 lb 4 oz).  Medication Reconciliation: complete  Rosy Smith LPN

## 2022-01-20 NOTE — PATIENT INSTRUCTIONS
Continue working on healthy eating and moving as best as you can (start low and slow, work up to 30 min, 5x/week)    BG goals:  Fasting and before meals <130, >70  2 hour after eating <180    We only need 1/2 of these numbers to be within target then your A1c will be within target    Medication changes   Let me if you have continued issues with low BGs after adjustments    Follow up   6 weeks    Call me sooner if any problems/concerns and/or questions develop including consistent low BGs <70 or consistent high BGs >200  [phone] (Unit Coordinator)    [phone] (Tracey, Nurse)    Please call [employer] to see when your warranty expires.

## 2022-01-20 NOTE — PROGRESS NOTES
Chief Complaint   Patient presents with     Diabetes       Initial /66   Pulse 72   Temp 97.5  F (36.4  C) (Tympanic)   Resp 18   Wt 91.7 kg (202 lb 4 oz)   SpO2 93%   BMI 39.50 kg/m   Estimated body mass index is 39.5 kg/m  as calculated from the following:    Height as of 1/11/22: 1.524 m (5').    Weight as of this encounter: 91.7 kg (202 lb 4 oz).  Medication Reconciliation: complete   Lianna Brewster LPN

## 2022-01-28 DIAGNOSIS — E10.649 TYPE 1 DIABETES MELLITUS WITH HYPOGLYCEMIA AND WITHOUT COMA (H): ICD-10-CM

## 2022-01-28 DIAGNOSIS — E10.649 TYPE 1 DIABETES MELLITUS WITH HYPOGLYCEMIA UNAWARENESS (H): ICD-10-CM

## 2022-01-31 RX ORDER — PROCHLORPERAZINE 25 MG/1
1 SUPPOSITORY RECTAL CONTINUOUS
Qty: 3 EACH | Refills: 11 | Status: SHIPPED | OUTPATIENT
Start: 2022-01-31 | End: 2023-02-06

## 2022-01-31 NOTE — TELEPHONE ENCOUNTER
Dexcom Sensor  Last Written Prescription Date: 7/15/21  Last Fill Quantity: 1 # of Refills: 0  Last Office Visit: 1/20/22

## 2022-02-22 NOTE — PROGRESS NOTES
Chief complaint: No chief complaint on file.      History of Present Illness: This 56 year old IDDM type I male is seen for follow-up management of diabetic foot exam and high risk nail debridement. He also recently had a RIGHT hallux subungual ulcer. He has not noticed any open wounds on his feet.    He gets burning, tingling, and numbness in his feet.     He was fit for new DM shoes from [place] at [facility] in [place], [place], around January, 2022. He thinks he may receive them this week.    His blood sugars are doing well and he is following with Macrina Dixon NP for blood sugar management. He says his blood sugars have been okay and his ankle pain has been minimal.      No further pedal complaints today.    Last HbA1C was 7.5% on 01/20/2022.    Last HbA1C was 7.7% on 10/20/2021.    Last HbA1C was 7.8% on 06/03/2021.    ---Previously 7.5% on 11/30/2020.      Patient does not use tobacco products.       Additionally:  Patient returned to work on 02/04/2019 at the [employer] working 4-5 hour shifts. He was wearing AFO on his RIGHT foot for drop foot, but the brace caused too much pressure on the toe and he thinks it may have caused his last hallux ulcer on the RIGHT foot so he stopped wearing it (the wound opened around January, 2019.    He still has some pain across the anterior ankle. He wears a copper fit brace which helps decrease the pain when the pain worsens.       There were no vitals taken for this visit.    Patient Active Problem List   Diagnosis     DM type 1 (diabetes mellitus, type 1) (H)     HTN (hypertension)     Hyperlipidemia     ACP (advance care planning)     Diabetic polyneuropathy associated with type 1 diabetes mellitus (H)     Class 2 obesity in adult     Obesity (BMI 35.0-39.9) with comorbidity (H)     Gastroparesis     Gastroesophageal reflux disease without esophagitis       Past Surgical History:   Procedure Laterality Date     COLONOSCOPY - HIM SCAN N/A  09/19/2016    Procedure: COLONOSCOPY SCREENING; Surgeon: Rudy Rojo MD; Location: [facility] OR     ESOPHAGOSCOPY, GASTROSCOPY, DUODENOSCOPY (EGD), COMBINED  08/13/2019       Current Outpatient Medications   Medication     acetone, Urine, test STRP     blood glucose monitoring (MELISSA CONTOUR NEXT MONITOR) meter device kit     blood glucose monitoring (MELISSA CONTOUR NEXT) test strip     CALCIUM-VITAMIN D PO     Continuous Blood Gluc  (DEXCOM G6 ) DAYAN     Continuous Blood Gluc Sensor (DEXCOM G6 SENSOR) MISC     Continuous Blood Gluc Transmit (DEXCOM G6 TRANSMITTER) MISC     CONTOUR NEXT TEST test strip     gabapentin (NEURONTIN) 300 MG capsule     Glucagon (GVOKE HYPOPEN 2-PACK) 1 MG/0.2ML SOAJ     insulin aspart (NOVOLOG VIAL) 100 UNITS/ML vial     insulin aspart (NOVOLOG VIAL) 100 UNITS/ML vial     INSULIN PUMP - OUTPATIENT     lisinopril (ZESTRIL) 10 MG tablet     Multiple Vitamin (MULTI-VITAMIN DAILY PO)     nitroglycerin (NITROSTAT) 0.4 MG SL tablet     NOVOLOG VIAL 100 UNIT/ML soln     ondansetron (ZOFRAN) 4 MG tablet     order for DME     pantoprazole (PROTONIX) 40 MG EC tablet     simvastatin (ZOCOR) 40 MG tablet     UREA 10 HYDRATING 10 % external cream     URINE GLUCOSE-KETONES TEST VI     No current facility-administered medications for this visit.        No Known Allergies    Family History   Problem Relation Age of Onset     No Known Problems Mother      Hypertension Father      Hypertension Brother        Social History     Socioeconomic History     Marital status: Single     Spouse name: None     Number of children: None     Years of education: None     Highest education level: None   Occupational History     None   Social Needs     Financial resource strain: None     Food insecurity:     Worry: None     Inability: None     Transportation needs:     Medical: None     Non-medical: None   Tobacco Use     Smoking status: Never Smoker     Smokeless tobacco: Never Used   Substance and  Sexual Activity     Alcohol use: No     Alcohol/week: 0.0 oz     Drug use: No     Sexual activity: None   Lifestyle     Physical activity:     Days per week: None     Minutes per session: None     Stress: None   Relationships     Social connections:     Talks on phone: None     Gets together: None     Attends Yazidism service: None     Active member of club or organization: None     Attends meetings of clubs or organizations: None     Relationship status: None     Intimate partner violence:     Fear of current or ex partner: None     Emotionally abused: None     Physically abused: None     Forced sexual activity: None   Other Topics Concern     Parent/sibling w/ CABG, MI or angioplasty before 65F 55M? Not Asked   Social History Narrative     None       ROS: 10 point ROS neg other than the symptoms noted above in the HPI.  EXAM  Constitutional: healthy, alert and no distress     Psychiatric: mentation appears normal and affect normal/bright     VASCULAR:  -Dorsalis pedis pulse +2/4 RIGHT and +1/4 LEFT  -Posterior tibial pulse +1/4 b/l   -Capillary refill time < 3 seconds to b/l hallux  -Hair growth Present to b/l anterior legs and ankles     NEURO:  -Protective sensation diminished with SWM +0/10 RIGHT and +9/10 LEFT on 02/23/2022  -Protective sensation diminished with SWM +0/10 RIGHT and +10/10 LEFT on 12/21/2021  -Protective sensation diminished with SWM +0/10 RIGHT and +10/10 LEFT on 10/12/2021  -Protective sensation diminished with SWM +0/10 RIGHT and +10/10 LEFT on 08/03/2021  -Protective sensation diminished with SWM +0/10 RIGHT and +9/10 LEFT on 05/17/2021  -Protective sensation diminished with SWM +1/10 RIGHT and +5/10 LEFT on 02/15/2021  -Protective sensation diminished with SWM +3/10 RIGHT and +10/10 LEFT on 01/23/2019     DERM:  -Hyperkeratotic lesion to RIGHT plantar 5th metatarsal head, RIGHT plantar heel and RIGHT distal plantar hallux  ---No wounds post paring  -Toenails thickend, dystrophic and  discolored x 10    -Skin mildly dry to bilateral plantar foot   -Skin temperature within normal limits to bilateral foot       MSK:  -RIGHT 1st MTPJ has 0 degrees of DORSIFLEXION and the hallux IPJ is mildly but rigidly plantarflexed   -DORSIFLEXION of bilateral ankle limited to between -5 short of vertical bilaterally   -Muscle strength of ankles for dorsiflexion, plantarflexion +0/5 RIGHT foot and +5/5 LEFT foot  -Muscle strength for ABDUction and ADDuction +5/5 LEFT and +4/5 RIGHT   ============================================================     ASSESSMENT:      (L84) Callus of heel (primary encounter diagnosis)     (L60.3) Onychodystrophy      (M20.21) Hallux rigidus of right foot     (M21.371) Foot drop, right foot    (M21.6X1) Gastrocnemius equinus of right lower extremity    (M21.6X2) Gastrocnemius equinus of left lower extremity    (E10.65) Type 1 diabetes mellitus with hyperglycemia (H)    (E10.42) Diabetic polyneuropathy associated with type 1 diabetes mellitus (H)    (M21.969,  R20.8) Loss of protective sensation of skin of deformed foot            PLAN:  -Patient evaluated and examined. Treatment options discussed with no educational barriers noted.    -High risk toenail debridement x 10 toenails without incident  ---Mild bleeding to RIGHT distal third toenail lateral border  ---Applied triple antibiotic cream and a bandage. Only continue with dressing if still bleeding / draining.  ----Patient was instructed to look for signs of infection (redness, swelling, pain, purulence, fever, chills, nausea, vomiting) and to return to podiatry or the emergency department immediately if there are any signs of infection.     -Callus pared x 3 to the RIGHT plantar hallux, RIGHT plantar fifth metatarsal head and RIGHT plantar heel without incident  ---Patient reminded that the callus will likely return due to the underlying, prominent bone causing the callus while the patient is walking.    -DM shoes: patient was fit  for new shoes today at [facility] on 01/11/2022 for new DM shoes. He is hoping to receive them this week.    -Ankle brace -- referral was placed on 03/04/2021. Patient did not receive an ankle brace but he did get the rocker shoe which did decrease his pain (dispensed through [facility]).  ---Patient discontinued PT because it was not helping his ankle pain. He did not get an ankle brace.  ---The last steroid injection resolved the ankle pain    -Diabetic Foot Education provided. This included checking the feet daily looking for new new blisters or wounds, wearing shoes at all times when walking including around the house, and avoiding lotion application between the toes. If there are any signs of infection, the patient should present to the ED as soon as possible. Infections of the foot can be life threatening or lead to amputations of the foot or leg.      -Patient in agreement with the above treatment plan and all of patient's questions were answered.        Return to clinic 3 months from last appointment for diabetic foot exam and high risk nail debridement         Juliette Jarrett DPM

## 2022-02-23 ENCOUNTER — OFFICE VISIT (OUTPATIENT)
Dept: PODIATRY | Facility: OTHER | Age: 57
End: 2022-02-23
Attending: PODIATRIST
Payer: COMMERCIAL

## 2022-02-23 VITALS
OXYGEN SATURATION: 93 % | HEART RATE: 80 BPM | DIASTOLIC BLOOD PRESSURE: 72 MMHG | RESPIRATION RATE: 16 BRPM | TEMPERATURE: 97.7 F | SYSTOLIC BLOOD PRESSURE: 119 MMHG

## 2022-02-23 DIAGNOSIS — M62.462 GASTROCNEMIUS EQUINUS OF LEFT LOWER EXTREMITY: ICD-10-CM

## 2022-02-23 DIAGNOSIS — R20.8 LOSS OF PROTECTIVE SENSATION OF SKIN OF DEFORMED FOOT: ICD-10-CM

## 2022-02-23 DIAGNOSIS — M21.969 LOSS OF PROTECTIVE SENSATION OF SKIN OF DEFORMED FOOT: ICD-10-CM

## 2022-02-23 DIAGNOSIS — M21.371 FOOT DROP, RIGHT FOOT: ICD-10-CM

## 2022-02-23 DIAGNOSIS — E10.65 TYPE 1 DIABETES MELLITUS WITH HYPERGLYCEMIA (H): ICD-10-CM

## 2022-02-23 DIAGNOSIS — M62.461 GASTROCNEMIUS EQUINUS OF RIGHT LOWER EXTREMITY: ICD-10-CM

## 2022-02-23 DIAGNOSIS — E10.42 DIABETIC POLYNEUROPATHY ASSOCIATED WITH TYPE 1 DIABETES MELLITUS (H): ICD-10-CM

## 2022-02-23 DIAGNOSIS — R11.0 NAUSEA: ICD-10-CM

## 2022-02-23 DIAGNOSIS — M20.21 HALLUX RIGIDUS OF RIGHT FOOT: ICD-10-CM

## 2022-02-23 DIAGNOSIS — L84 CALLUS OF FOOT: Primary | ICD-10-CM

## 2022-02-23 DIAGNOSIS — L60.3 ONYCHODYSTROPHY: ICD-10-CM

## 2022-02-23 PROCEDURE — 11721 DEBRIDE NAIL 6 OR MORE: CPT | Performed by: PODIATRIST

## 2022-02-23 PROCEDURE — 11056 PARNG/CUTG B9 HYPRKR LES 2-4: CPT | Performed by: PODIATRIST

## 2022-02-23 PROCEDURE — G0463 HOSPITAL OUTPT CLINIC VISIT: HCPCS | Mod: 25

## 2022-02-23 RX ORDER — ONDANSETRON 4 MG/1
TABLET, FILM COATED ORAL
Qty: 30 TABLET | Refills: 5 | Status: SHIPPED | OUTPATIENT
Start: 2022-02-23 | End: 2022-08-30

## 2022-02-23 ASSESSMENT — PAIN SCALES - GENERAL: PAINLEVEL: MODERATE PAIN (5)

## 2022-02-23 NOTE — PATIENT INSTRUCTIONS
Thank you for allowing  and our Podiatry team to participate in your care. Please call our office at [phone] with scheduling questions or with any other questions or concerns.

## 2022-02-23 NOTE — NURSING NOTE
Chief Complaint   Patient presents with     calluses     trimming     Toenail     trimming       Initial /72 (BP Location: Left arm, Patient Position: Sitting, Cuff Size: Adult Regular)   Pulse 80   Temp 97.7  F (36.5  C) (Tympanic)   Resp 16   SpO2 93%  Estimated body mass index is 39.5 kg/m  as calculated from the following:    Height as of 1/11/22: 1.524 m (5').    Weight as of 1/20/22: 91.7 kg (202 lb 4 oz).  Medication Reconciliation: complete  Radha Richter LPN

## 2022-03-02 DIAGNOSIS — E10.649 TYPE 1 DIABETES MELLITUS WITH HYPOGLYCEMIA AND WITHOUT COMA (H): ICD-10-CM

## 2022-03-02 DIAGNOSIS — E10.649 TYPE 1 DIABETES MELLITUS WITH HYPOGLYCEMIA UNAWARENESS (H): ICD-10-CM

## 2022-03-03 RX ORDER — PROCHLORPERAZINE 25 MG/1
1 SUPPOSITORY RECTAL CONTINUOUS
Qty: 1 EACH | Refills: 4 | Status: SHIPPED | OUTPATIENT
Start: 2022-03-03 | End: 2023-05-04

## 2022-03-23 ENCOUNTER — ALLIED HEALTH/NURSE VISIT (OUTPATIENT)
Dept: EDUCATION SERVICES | Facility: OTHER | Age: 57
End: 2022-03-23
Attending: NURSE PRACTITIONER
Payer: COMMERCIAL

## 2022-03-23 VITALS
WEIGHT: 201.1 LBS | OXYGEN SATURATION: 98 % | HEART RATE: 77 BPM | DIASTOLIC BLOOD PRESSURE: 64 MMHG | BODY MASS INDEX: 37.97 KG/M2 | SYSTOLIC BLOOD PRESSURE: 122 MMHG | RESPIRATION RATE: 16 BRPM | HEIGHT: 61 IN

## 2022-03-23 DIAGNOSIS — E10.65 TYPE 1 DIABETES MELLITUS WITH HYPERGLYCEMIA (H): Primary | ICD-10-CM

## 2022-03-23 PROCEDURE — 99213 OFFICE O/P EST LOW 20 MIN: CPT | Mod: 25 | Performed by: NURSE PRACTITIONER

## 2022-03-23 PROCEDURE — G0463 HOSPITAL OUTPT CLINIC VISIT: HCPCS

## 2022-03-23 PROCEDURE — 95251 CONT GLUC MNTR ANALYSIS I&R: CPT | Performed by: NURSE PRACTITIONER

## 2022-03-23 ASSESSMENT — PAIN SCALES - GENERAL: PAINLEVEL: SEVERE PAIN (6)

## 2022-03-23 NOTE — PROGRESS NOTES
Chief Complaint   Patient presents with     Diabetes       Initial /64   Pulse 77   Resp 16   Wt 91.2 kg (201 lb 1.6 oz)   SpO2 98%   BMI 39.27 kg/m   Estimated body mass index is 39.27 kg/m  as calculated from the following:    Height as of 1/11/22: 1.524 m (5').    Weight as of this encounter: 91.2 kg (201 lb 1.6 oz).  Medication Reconciliation: complete  Lianna Brewster LPN

## 2022-03-23 NOTE — PATIENT INSTRUCTIONS
Continue working on healthy eating and moving as best as you can (start low and slow, work up to 30 min, 5x/week)    BG goals:  Fasting and before meals <130, >70  2 hour after eating <180    We only need 1/2 of these numbers to be within target then your A1c will be within target    Medication changes   Let me if you have continued issues with low BGs     Follow up   Omnipod    Call me sooner if any problems/concerns and/or questions develop including consistent low BGs <70 or consistent high BGs >200  [phone] (Unit Coordinator)    [phone] (Tracey, Nurse)

## 2022-03-23 NOTE — PROGRESS NOTES
SUBJECTIVE:  Richard Harvey, 56 year old, male presents with the following Chief Complaint(s) with HPI to follow:  Chief Complaint   Patient presents with     Diabetes        Diabetes Follow-up      Patient is checking blood sugars: >4x/day using his continuous glucose.  Results:    Date: 2/24 to 3/9/22  Glucose management indicator: 7%    Average glucose: 154    Glucose range  Very low (<54): 1%  low (<70): 4%  In target range (): 63%  High (>180): 23%  Very high (>250): 9%    Date: 3/10 to 3/23/22  Glucose management indicator: 7.3%    Average glucose: 167    Glucose range  Very low (<54): 1%  low (<70): 2%  In target range (): 62%  High (>180): 26%  Very high (>250): 9%      Symptoms of hypoglycemia (low blood sugar): yes--due to work    Paresthesias (numbness or burning in feet) or sores: Yes; no sores    Diabetic eye exam within the last year: Yes    Breakfast eaten regularly: Yes    Patient counting carbs: trying       HPI: Richard's here today for the follow up regarding his Diabetes mellitus, Type 1.    Lab Results   Component Value Date    A1C 7.5 01/20/2022    A1C 7.7 10/20/2021    A1C 7.8 06/03/2021    A1C 7.5 11/30/2020    A1C 7.7 09/01/2020    A1C 8.1 03/24/2020     Current Diabetes medication:   1.  Insulin pump, uses Novolog;   ASA use: yes, 325 mg daily  Statin use: yes, simvastatin 40 mg at bedime    Richard's here for his Dexcom G6 and insulin pump download with possible adjust.  He reports the following:   Denies any issues with his insulin pump  Denies any issues with his Dexcom.    Continued issues with low BGs with increase activity  He continues to decrease the amount of carbs the meal before working.      No new health concerns    Still hasn't heard anything from [facility] endocrinology    Wt Readings from Last 4 Encounters:   03/23/22 91.2 kg (201 lb 1.6 oz)   01/20/22 91.7 kg (202 lb 4 oz)   01/20/22 91.7 kg (202 lb 4 oz)   01/11/22 90.7 kg (200 lb)         Patient Active Problem List    Diagnosis     DM type 1 (diabetes mellitus, type 1) (H)     HTN (hypertension)     Hyperlipidemia     ACP (advance care planning)     Diabetic polyneuropathy associated with type 1 diabetes mellitus (H)     Class 2 obesity in adult     Obesity (BMI 35.0-39.9) with comorbidity (H)     Gastroparesis     Gastroesophageal reflux disease without esophagitis       Past Medical History:   Diagnosis Date     Diabetes mellitus type 1, controlled (H)      HLD (hyperlipidemia)      HTN (hypertension)      Neuropathy      Type 1 diabetes (H)        Past Surgical History:   Procedure Laterality Date     COLONOSCOPY - HIM SCAN N/A 09/19/2016    Procedure: COLONOSCOPY SCREENING; Surgeon: Rudy Rojo MD; Location: [facility] OR     ESOPHAGOSCOPY, GASTROSCOPY, DUODENOSCOPY (EGD), COMBINED  08/13/2019       Family History   Problem Relation Age of Onset     No Known Problems Mother      Hypertension Father      Hypertension Brother        Social History     Tobacco Use     Smoking status: Never Smoker     Smokeless tobacco: Never Used   Substance Use Topics     Alcohol use: No     Alcohol/week: 0.0 standard drinks       Current Outpatient Medications   Medication Sig Dispense Refill     acetone, Urine, test STRP Use as directed 50 each 3     blood glucose monitoring (MELISSA CONTOUR NEXT MONITOR) meter device kit 1 each       blood glucose monitoring (MELISSA CONTOUR NEXT) test strip Use to test blood sugar 6 times daily or as directed. 200 each 11     CALCIUM-VITAMIN D PO Take  by mouth daily. 600 mg       Continuous Blood Gluc  (DEXCOM G6 ) DAYAN 1 each continuous To be used per manufacture's recommendation 1 each 0     Continuous Blood Gluc Sensor (DEXCOM G6 SENSOR) MISC 1 each continuous 3 each 11     Continuous Blood Gluc Transmit (DEXCOM G6 TRANSMITTER) MISC 1 each continuous 1 each 4     CONTOUR NEXT TEST test strip TEST 6 TIMES DAILY, OR AS DIRECTED. 200 strip 11     gabapentin (NEURONTIN) 300 MG capsule TAKE 1  CAPSULE BY MOUTH THREE TIMES DAILY 90 capsule 2     Glucagon (GVOKE HYPOPEN 2-PACK) 1 MG/0.2ML SOAJ Inject 1 mg Subcutaneous as needed (for low blood glucose) 0.4 mL 3     insulin aspart (NOVOLOG VIAL) 100 UNITS/ML vial USE WITH INSULIN PUMP FOR A TOTAL DAILY USAGE OF 70 UNITS 20 mL 1     insulin aspart (NOVOLOG VIAL) 100 UNITS/ML vial  UNITS IN PUMP EVERY 3 DAYS       INSULIN PUMP - OUTPATIENT Date last updated:  6/23/15  [employer]: Model 751  BASAL RATES and times:  12   AM (midnight): 1.25 units/hour    2     AM: 1.3 units/hour   6     AM: 0.95 units/hour   6    PM: 1.2 units/hour   Basal Pattern A:  Richard Harvey will use when N/A.  CARB RATIO and times:  12   AM (midnight): 12  1    PM:  10  6    PM:    9  Corection Factor (Sensitivity) and times:  12   AM (midnight): 40 mg/dL  6     PM: 45 mg/dL  BLOOD GLUCOSE TARGET and times:  12   AM (midnight): 100 - 140  9     AM:  100 - 120  10   PM:  100 - 140  Active Insulin Time:  4 hours  Sensor:  Yes: 12   AM (midnight): 70 - 240  Carelink / Diasend username:  [email or web address]  Carelink / Diasend Password:  [email or web address]       lisinopril (ZESTRIL) 10 MG tablet Take 1 tablet (10 mg) by mouth daily 90 tablet 3     Multiple Vitamin (MULTI-VITAMIN DAILY PO) Take  by mouth daily.       nitroglycerin (NITROSTAT) 0.4 MG SL tablet Place under the tongue every 5 minutes as needed       NOVOLOG VIAL 100 UNIT/ML soln USE WITH INSULIN PUMP FOR A TOTAL DAILY USAGE OF 70 UNITS 20 mL 3     ondansetron (ZOFRAN) 4 MG tablet TAKE 1 TABLET BY MOUTH EVERY 8 HOURS AS NEEDED FOR NAUSEA 30 tablet 5     order for DME Equipment being ordered:     1.  [employer] insulin pump supplies--insertion sets and reserviors  Disp: 1 month  Refill: 11 months 30 days 11     pantoprazole (PROTONIX) 40 MG EC tablet Take 1 tablet by mouth twice daily 60 tablet 5     simvastatin (ZOCOR) 40 MG tablet Take 1 tablet (40 mg) by mouth At Bedtime 90 tablet 3     UREA 10 HYDRATING 10 % external cream APPLY   "CREAM TOPICALLY TO AFFECTED AREA AS NEEDED FOR  CALLOUSED  SKIN 85 g 2     URINE GLUCOSE-KETONES TEST VI          No Known Allergies    REVIEW OF SYSTEMS  Skin: negative  Eyes: negative; new glasses  Ears/Nose/Throat: negative; hx of allergies  Respiratory: No shortness of breath, dyspnea on exertion, cough, or hemoptysis  Cardiovascular: negative  Gastrointestinal: off and on issues with nausea  Genitourinary: negative  Musculoskeletal: history of arthritis--hands--same, shoulders, knees--worse  Neurologic: positive for numbness or tingling of hands and numbness or tingling of feet--same (gabagentin helps)  Psychiatric: negative  Hematologic/Lymphatic/Immunologic: negative  Endocrine: positive for diabetes    OBJECTIVE:  /64   Pulse 77   Resp 16   Ht 1.537 m (5' 0.5\")   Wt 91.2 kg (201 lb 1.6 oz)   SpO2 98%   BMI 38.63 kg/m    Constitutional: alert and no distress  Respiratory:  Good diaphragmatic excursion.   Musculoskeletal: extremities normal- no gross deformities noted and gait normal  Skin: no suspicious lesions or rashes to visible skin  Psychiatric: mentation appears normal and affect normal/bright      LAB  Results for orders placed or performed in visit on 03/23/22   GLUCOSE MONITOR, 72 HOUR, PHYS INTERP     Status: None    Narrative    Date: 2/24 to 3/9/22  Glucose management indicator: 7%    Average glucose: 154    Glucose range  Very low (<54): 1%  low (<70): 4%  In target range (): 63%  High (>180): 23%  Very high (>250): 9%    Date: 3/10 to 3/23/22  Glucose management indicator: 7.3%    Average glucose: 167    Glucose range  Very low (<54): 1%  low (<70): 2%  In target range (): 62%  High (>180): 26%  Very high (>250): 9%       ASSESSMENT / PLAN:.  (E10.65) Type 1 diabetes mellitus with hyperglycemia (H)  (primary encounter diagnosis)  Comment: noted CGM and insulin pump data    TIR: 62%  Even though he's having low BGs, the frequency is still appropriate.            Insulin " pump information:   Average: 168 +/- 98  Basal/bolus ratio: 23.5 (49%)/24.1 (51%)   Testing: continuous     Hypoglycemia: yes    Plan: GLUCOSE MONITOR, 72 HOUR, PHYS INTERP          Discussion on the new Omnipod 5 coming out this summer.    This will definitely help Richard from both high and low BGs.    He's interested and asked me to give his information to Pat.      Reviewed ways to prevent low BGs with increase activity    Follow up  Omnipod     Keep schedule Dr. Alvarez's appt.      Call sooner if any issues/concerns develop and/or continued issues with low BGs    Patient Instructions   Continue working on healthy eating and moving as best as you can (start low and slow, work up to 30 min, 5x/week)    BG goals:  Fasting and before meals <130, >70  2 hour after eating <180    We only need 1/2 of these numbers to be within target then your A1c will be within target    Medication changes   Let me if you have continued issues with low BGs     Follow up   Omnipod    Call me sooner if any problems/concerns and/or questions develop including consistent low BGs <70 or consistent high BGs >200  [phone] (Unit Coordinator)    [phone] (Tracey, Nurse)              Time: 30 min  Barrier: none  Willingness to learn: accepting    Macrina CHAMBERLAIN [facility]  Diabetes and Wound Care    With the electronic record, we can now more quickly and easily track our patient diabetic goals. Our diabetes clinical review is in progress and these are the indicators we are monitoring for good diabetes health.     1.) HbA1C less than 7 (measurement of your average blood sugars)  2.) Blood Pressure less than 140/80  3.) LDL less than 100 (bad cholesterol)  4.) HbA1C is checked in the last 6 months and below 7% (more frequently if not at goal or adjusting medications)  5.) LDL is checked in the last 12 months (more frequently if not at goal or adjusting medications)  6.) Taking one baby aspirin daily (unless otherwise instructed)  7.) No  "tobacco use  8) Statin use     You have achieved 7 out of 8 of these and I am encouraging you to come in and get tuned up to achieve 8 out of 8!  Here is what you have achieved so far in my goals for you:  1.) HbA1C  less than 7:                              NO  Your last  HbA1C :   Lab Results   Component Value Date    A1C 7.5 01/20/2022    A1C 7.7 10/20/2021    A1C 7.8 06/03/2021    A1C 7.5 11/30/2020    A1C 7.7 09/01/2020    A1C 8.1 03/24/2020     2.) Blood Pressure less than 140/80:       YES      Your last    /64   Pulse 77   Resp 16   Ht 1.537 m (5' 0.5\")   Wt 91.2 kg (201 lb 1.6 oz)   SpO2 98%   BMI 38.63 kg/m      3.) LDL less than 100:                              YES      Your last   LDL         LDL Cholesterol Calculated   Date Value Ref Range Status   06/14/2021 42 <100 mg/dL Final     Comment:     Desirable:       <100 mg/dl       4.) Checked HbA1C in the past 6 months: YES      5.) Checked LDL in the past 12 months:    YES    6.) Taking one aspirin daily:                       YES     7.) No tobacco use:                                        YES      8.) Statin use      YES       CGM requirements:  1.  Patient has been seen in the clinic within the last 6 month  2. Diagnosis of Type 1 diabetes  3. Has been testing their BGs 4x/day using the Dexcom  4. Uses an insulin pump   5. Requires frequent adjustments to their treatment regimen based on BGM and/or CGM testing results.                 "

## 2022-03-24 DIAGNOSIS — E10.649 TYPE 1 DIABETES MELLITUS WITH HYPOGLYCEMIA AND WITHOUT COMA (H): Primary | ICD-10-CM

## 2022-03-24 RX ORDER — INSULIN PUMP CONTROLLER
1 EACH MISCELLANEOUS
Qty: 15 EACH | Refills: 5 | Status: SHIPPED | OUTPATIENT
Start: 2022-03-24 | End: 2022-06-22 | Stop reason: ALTCHOICE

## 2022-03-24 NOTE — PROGRESS NOTES
Omnipod dash pods sent to [facility] per fernando Tang rep.     Macrina Dixon APRN FNP-BC  Diabetes and Wound Care

## 2022-04-06 ENCOUNTER — MEDICAL CORRESPONDENCE (OUTPATIENT)
Dept: HEALTH INFORMATION MANAGEMENT | Facility: CLINIC | Age: 57
End: 2022-04-06

## 2022-04-19 NOTE — PROGRESS NOTES
Assessment & Plan     Type 1 diabetes mellitus with hyperglycemia (H) / Diabetic polyneuropathy associated with type 1 diabetes mellitus (H)  A1c steady at 7.2  - Hemoglobin A1c  - insulin aspart (NOVOLOG VIAL) 100 UNITS/ML vial; Use insulin with pump or OmniPod for a total of daily usage of 70 units  - c/w OmniPod  - c/w gabapentin 300mg tid    Hyperlipidemia, unspecified hyperlipidemia type  LDL (6/14/2021): 42  - simvastatin 40mg     Primary hypertension  BP at goal  - c/w lisinopril 10mg     Chronic pain of both knees  Crepitus with movement. Xrays w/ osteopenia  - XR Knee Right 3 Views (Clinic Performed); Future  - XR Knee Left 3 Views (Clinic Performed); Future  - Orthopedic  Referral; OA    Osteopenia by xray  H/o type 1 diabetes. Previous celiac panel negative (12/2019), Hgb (10/22/2021 normal)  - lab work at next visit: vitamin d, tsh    See Patient Instructions    Return in about 3 months (around 7/21/2022) for Lab Work, Diabetic Visit.    Natacha Alvarez MD  [facility] - SAURABH Ramos is a 56 year old who presents for the following health issues     HPI       Diabetes type 1 Follow-up    How often are you checking your blood sugar? Continuous glucose monitor  What time of day are you checking your blood sugars (select all that apply)?  throughout the day  Have you had any blood sugars above 200?  Yes   Have you had any blood sugars below 70?  Yes     What symptoms do you notice when your blood sugar is low?  Shaky, Dizzy, Weak, Lethargy and Confusion    What concerns do you have today about your diabetes? None and Blood sugar is often over 200     Do you have any of these symptoms? (Select all that apply)  No numbness or tingling in feet.  No redness, sores or blisters on feet.  No complaints of excessive thirst.  No reports of blurry vision.  No significant changes to weight.    - Last A1c (1/20/2022): 7.5  - BG: lowest 49 has alarms on OmniPod. High 240s  -  Diet: no changes   - Exercise: working a lot  - changed to OmniPod, likes it so far. Uses novolog   - ASA (not on d/t GI issues), ACE(lisinopril ), statin (simvastatin)  - last eye exam 07/21/2021      Hyperlipidemia Follow-Up      Are you regularly taking any medication or supplement to lower your cholesterol?   Yes- Simvastatin 40 mg    Are you having muscle aches or other side effects that you think could be caused by your cholesterol lowering medication?  No  LDL (6/14/2021): 42  - simvastatin     Hypertension Follow-up      Do you check your blood pressure regularly outside of the clinic? Yes     Are you following a low salt diet? Yes    Are your blood pressures ever more than 140 on the top number (systolic) OR more   than 90 on the bottom number (diastolic), for example 140/90? No  - lisinopril     BP Readings from Last 2 Encounters:   04/21/22 130/70   03/23/22 122/64     Hemoglobin A1C POCT (%)   Date Value   10/20/2021 7.7 (A)   06/03/2021 7.8 (A)     Hemoglobin A1C (%)   Date Value   01/20/2022 7.5 (H)     LDL Cholesterol Calculated (mg/dL)   Date Value   06/14/2021 42   03/24/2020 40     # Mood: doing okay    # knee pain   - putting grocery in the back of truck and left knee gave out  - painful after work   - previous injection into foot resulted in increase of BG to over 300 for day to two days        Review of Systems   Constitutional: Negative for chills and fever.   HENT: Negative for congestion.    Respiratory: Negative for shortness of breath and wheezing.    Cardiovascular: Negative for chest pain and palpitations.   Gastrointestinal: Negative for abdominal pain.   Musculoskeletal: Positive for arthralgias (knee).   Psychiatric/Behavioral: Negative for mood changes.            Objective    /70   Pulse 69   Temp 97.9  F (36.6  C) (Tympanic)   Resp 18   Wt 91.6 kg (202 lb)   SpO2 94%   BMI 38.80 kg/m    Body mass index is 38.8 kg/m .  Physical Exam  Constitutional:       General: He is  not in acute distress.     Appearance: He is not ill-appearing.   Cardiovascular:      Rate and Rhythm: Normal rate and regular rhythm.      Pulses: Normal pulses.      Heart sounds: No murmur heard.  Pulmonary:      Effort: Pulmonary effort is normal. No respiratory distress.      Breath sounds: No wheezing or rales.   Musculoskeletal:      Comments: Knees b/l: TTP over medial and lateral joint lines. TTP over patella. Creptius with extension and flexion. Reduced ROM d/t pain. Positive Sheryl b/l.   Neurological:      Mental Status: He is alert.   Psychiatric:         Mood and Affect: Mood normal.            Results for orders placed or performed in visit on 04/21/22 (from the past 24 hour(s))   Hemoglobin A1c   Result Value Ref Range    Estimated Average Glucose 160 mg/dL    Hemoglobin A1C 7.2 (H) 0.0 - 5.6 %       XR Knee Left 3 Views (Clinic Performed)    Result Date: 4/21/2022  PROCEDURE:  XR KNEE LEFT 3 VIEWS HISTORY: Chronic pain of both knees; Chronic pain of both knees; Chronic pain of both knees COMPARISON:  None. TECHNIQUE:  3 views of the left knee were obtained. FINDINGS:  No fracture or dislocation is identified. The joint spaces are preserved. There is no knee effusion. There is generalized osteopenia     IMPRESSION: Generalized osteopenia. No acute bony abnormality MARII EMMANUEL MD   SYSTEM ID:  [id number]    XR Knee Right 3 Views (Clinic Performed)    Result Date: 4/21/2022  PROCEDURE:  XR KNEE RIGHT 3 VIEWS HISTORY: Chronic pain of both knees; Chronic pain of both knees; Chronic pain of both knees COMPARISON:  None. TECHNIQUE:  3 views of the right knee were obtained. FINDINGS:  No acute fracture or dislocation is identified. The joint spaces are preserved.  Severe osteopenia is noted. There is an old healed fracture of the proximal third of the fibular shaft. The patellar fracture seen in 2018 has healed. There is no knee effusion     IMPRESSION: Severe osteopenia. No arthritic changes are noted   MARII EMMANUEL MD   SYSTEM ID:  [id number]

## 2022-04-21 ENCOUNTER — OFFICE VISIT (OUTPATIENT)
Dept: FAMILY MEDICINE | Facility: OTHER | Age: 57
End: 2022-04-21
Attending: FAMILY MEDICINE
Payer: COMMERCIAL

## 2022-04-21 ENCOUNTER — ANCILLARY PROCEDURE (OUTPATIENT)
Dept: GENERAL RADIOLOGY | Facility: OTHER | Age: 57
End: 2022-04-21
Attending: FAMILY MEDICINE
Payer: COMMERCIAL

## 2022-04-21 VITALS
DIASTOLIC BLOOD PRESSURE: 70 MMHG | HEART RATE: 69 BPM | SYSTOLIC BLOOD PRESSURE: 130 MMHG | RESPIRATION RATE: 18 BRPM | WEIGHT: 202 LBS | OXYGEN SATURATION: 94 % | TEMPERATURE: 97.9 F | BODY MASS INDEX: 38.8 KG/M2

## 2022-04-21 DIAGNOSIS — G89.29 CHRONIC PAIN OF BOTH KNEES: ICD-10-CM

## 2022-04-21 DIAGNOSIS — E10.65 TYPE 1 DIABETES MELLITUS WITH HYPERGLYCEMIA (H): Primary | ICD-10-CM

## 2022-04-21 DIAGNOSIS — M25.561 CHRONIC PAIN OF BOTH KNEES: ICD-10-CM

## 2022-04-21 DIAGNOSIS — M25.562 CHRONIC PAIN OF BOTH KNEES: ICD-10-CM

## 2022-04-21 DIAGNOSIS — E78.5 HYPERLIPIDEMIA, UNSPECIFIED HYPERLIPIDEMIA TYPE: ICD-10-CM

## 2022-04-21 DIAGNOSIS — I10 PRIMARY HYPERTENSION: ICD-10-CM

## 2022-04-21 DIAGNOSIS — M85.80 OSTEOPENIA DETERMINED BY X-RAY: ICD-10-CM

## 2022-04-21 DIAGNOSIS — E10.42 DIABETIC POLYNEUROPATHY ASSOCIATED WITH TYPE 1 DIABETES MELLITUS (H): ICD-10-CM

## 2022-04-21 LAB
EST. AVERAGE GLUCOSE BLD GHB EST-MCNC: 160 MG/DL
HBA1C MFR BLD: 7.2 % (ref 0–5.6)

## 2022-04-21 PROCEDURE — 73562 X-RAY EXAM OF KNEE 3: CPT | Mod: TC,RT

## 2022-04-21 PROCEDURE — 99214 OFFICE O/P EST MOD 30 MIN: CPT | Performed by: FAMILY MEDICINE

## 2022-04-21 PROCEDURE — 83036 HEMOGLOBIN GLYCOSYLATED A1C: CPT | Mod: ZL | Performed by: FAMILY MEDICINE

## 2022-04-21 PROCEDURE — 36415 COLL VENOUS BLD VENIPUNCTURE: CPT | Mod: ZL | Performed by: FAMILY MEDICINE

## 2022-04-21 PROCEDURE — G0463 HOSPITAL OUTPT CLINIC VISIT: HCPCS | Mod: 25

## 2022-04-21 PROCEDURE — 73562 X-RAY EXAM OF KNEE 3: CPT | Mod: TC,LT

## 2022-04-21 RX ORDER — INSULIN ASPART 100 [IU]/ML
INJECTION, SOLUTION INTRAVENOUS; SUBCUTANEOUS
Qty: 20 ML | Refills: 3 | Status: SHIPPED | OUTPATIENT
Start: 2022-04-21 | End: 2022-05-18 | Stop reason: ALTCHOICE

## 2022-04-21 ASSESSMENT — ENCOUNTER SYMPTOMS
FEVER: 0
CHILLS: 0
SHORTNESS OF BREATH: 0
ARTHRALGIAS: 1
WHEEZING: 0
ABDOMINAL PAIN: 0
PALPITATIONS: 0

## 2022-04-21 ASSESSMENT — PAIN SCALES - GENERAL: PAINLEVEL: SEVERE PAIN (7)

## 2022-04-21 NOTE — PATIENT INSTRUCTIONS
We will call you with your lab and xray results.     We put in a referral to [facility]

## 2022-04-21 NOTE — NURSING NOTE
"Chief Complaint   Patient presents with     Hypertension     Lipids     Diabetes       Initial /70   Pulse 69   Temp 97.9  F (36.6  C) (Tympanic)   Resp 18   Wt 91.6 kg (202 lb)   SpO2 94%   BMI 38.80 kg/m   Estimated body mass index is 38.8 kg/m  as calculated from the following:    Height as of 3/23/22: 1.537 m (5' 0.5\").    Weight as of this encounter: 91.6 kg (202 lb).  Medication Reconciliation: complete  Rosy Smith LPN  "

## 2022-04-26 ENCOUNTER — HOSPITAL ENCOUNTER (OUTPATIENT)
Dept: BONE DENSITY | Facility: HOSPITAL | Age: 57
Discharge: HOME | End: 2022-04-26
Attending: FAMILY MEDICINE | Admitting: FAMILY MEDICINE
Payer: COMMERCIAL

## 2022-04-26 ENCOUNTER — MEDICAL CORRESPONDENCE (OUTPATIENT)
Dept: HEALTH INFORMATION MANAGEMENT | Facility: CLINIC | Age: 57
End: 2022-04-26

## 2022-04-26 DIAGNOSIS — M85.80 OSTEOPENIA DETERMINED BY X-RAY: ICD-10-CM

## 2022-04-26 DIAGNOSIS — E10.65 TYPE 1 DIABETES MELLITUS WITH HYPERGLYCEMIA (H): ICD-10-CM

## 2022-04-26 PROCEDURE — 77080 DXA BONE DENSITY AXIAL: CPT

## 2022-04-28 DIAGNOSIS — M85.80 OSTEOPENIA, UNSPECIFIED LOCATION: Primary | ICD-10-CM

## 2022-05-03 DIAGNOSIS — E10.649 TYPE 1 DIABETES MELLITUS WITH HYPOGLYCEMIA AND WITHOUT COMA (H): ICD-10-CM

## 2022-05-03 DIAGNOSIS — E10.649 TYPE 1 DIABETES MELLITUS WITH HYPOGLYCEMIA UNAWARENESS (H): ICD-10-CM

## 2022-05-03 RX ORDER — PROCHLORPERAZINE 25 MG/1
1 SUPPOSITORY RECTAL CONTINUOUS
Qty: 1 EACH | Refills: 2 | Status: SHIPPED | OUTPATIENT
Start: 2022-05-03

## 2022-05-03 NOTE — TELEPHONE ENCOUNTER
Patient calling and is needing a new Dexcom  sent to pharmacy. Reports his is not working and the warranty is  and was advised he needed a new script. Pended Rx.   Please advise, thank you.

## 2022-05-04 NOTE — PROGRESS NOTES
Chief complaint: Patient presents with:  Toenail: Trimming      History of Present Illness: This 57 year old IDDM type I male is seen for follow-up management of diabetic foot exam and high risk nail debridement.     He still gets burning, tingling, and numbness in his feet.     He received new DM shoes from Navdeep at [facility] in [place], [place], around February, 2022. The shoes are comfortable.    His blood sugars are doing well and he is following with Macrina Dixon NP for blood sugar management.    No further pedal complaints today.    Last HbA1C was 7.2% on 04/21/2022.    ---Previously 7.5% on 01/20/2022.    ---Previously 7.7% on 10/20/2021.    ---Previously 7.8% on 06/03/2021.    ---Previously 7.5% on 11/30/2020.      Patient does not use tobacco products.       Additionally:  Patient returned to work on 02/04/2019 at the [employer] working 4-5 hour shifts. He was wearing AFO on his RIGHT foot for drop foot, but the brace caused too much pressure on the toe and he thinks it may have caused his last hallux ulcer on the RIGHT foot so he stopped wearing it (the wound opened around January, 2019). The wound has since healed.       /75   Pulse 77   Temp 98  F (36.7  C) (Tympanic)   SpO2 96%     Patient Active Problem List   Diagnosis     DM type 1 (diabetes mellitus, type 1) (H)     HTN (hypertension)     Hyperlipidemia     ACP (advance care planning)     Diabetic polyneuropathy associated with type 1 diabetes mellitus (H)     Class 2 obesity in adult     Obesity (BMI 35.0-39.9) with comorbidity (H)     Gastroparesis     Gastroesophageal reflux disease without esophagitis       Past Surgical History:   Procedure Laterality Date     COLONOSCOPY - HIM SCAN N/A 09/19/2016    Procedure: COLONOSCOPY SCREENING; Surgeon: Rudy Rojo MD; Location: [facility] OR     ESOPHAGOSCOPY, GASTROSCOPY, DUODENOSCOPY (EGD), COMBINED  08/13/2019       Current Outpatient Medications   Medication      acetone, Urine, test STRP     blood glucose monitoring (MELISSA CONTOUR NEXT MONITOR) meter device kit     blood glucose monitoring (MELISSA CONTOUR NEXT) test strip     CALCIUM-VITAMIN D PO     Continuous Blood Gluc  (DEXCOM G6 ) DAYAN     Continuous Blood Gluc Sensor (DEXCOM G6 SENSOR) MISC     Continuous Blood Gluc Transmit (DEXCOM G6 TRANSMITTER) MISC     CONTOUR NEXT TEST test strip     gabapentin (NEURONTIN) 300 MG capsule     Glucagon (GVOKE HYPOPEN 2-PACK) 1 MG/0.2ML SOAJ     insulin aspart (NOVOLOG VIAL) 100 UNITS/ML vial     insulin aspart (NOVOLOG VIAL) 100 UNITS/ML vial     insulin aspart (NOVOLOG VIAL) 100 UNITS/ML vial     Insulin Disposable Pump (OMNIPOD DASH 5 PACK PODS) MISC     INSULIN PUMP - OUTPATIENT     lisinopril (ZESTRIL) 10 MG tablet     Multiple Vitamin (MULTI-VITAMIN DAILY PO)     nitroGLYcerin (NITROSTAT) 0.4 MG sublingual tablet     NOVOLOG VIAL 100 UNIT/ML soln     ondansetron (ZOFRAN) 4 MG tablet     order for DME     pantoprazole (PROTONIX) 40 MG EC tablet     simvastatin (ZOCOR) 40 MG tablet     UREA 10 HYDRATING 10 % external cream     URINE GLUCOSE-KETONES TEST VI     No current facility-administered medications for this visit.        No Known Allergies    Family History   Problem Relation Age of Onset     No Known Problems Mother      Hypertension Father      Hypertension Brother        Social History     Socioeconomic History     Marital status: Single     Spouse name: None     Number of children: None     Years of education: None     Highest education level: None   Occupational History     None   Social Needs     Financial resource strain: None     Food insecurity:     Worry: None     Inability: None     Transportation needs:     Medical: None     Non-medical: None   Tobacco Use     Smoking status: Never Smoker     Smokeless tobacco: Never Used   Substance and Sexual Activity     Alcohol use: No     Alcohol/week: 0.0 oz     Drug use: No     Sexual activity: None    Lifestyle     Physical activity:     Days per week: None     Minutes per session: None     Stress: None   Relationships     Social connections:     Talks on phone: None     Gets together: None     Attends Quaker service: None     Active member of club or organization: None     Attends meetings of clubs or organizations: None     Relationship status: None     Intimate partner violence:     Fear of current or ex partner: None     Emotionally abused: None     Physically abused: None     Forced sexual activity: None   Other Topics Concern     Parent/sibling w/ CABG, MI or angioplasty before 65F 55M? Not Asked   Social History Narrative     None       ROS: 10 point ROS neg other than the symptoms noted above in the HPI.  EXAM  Constitutional: healthy, alert and no distress     Psychiatric: mentation appears normal and affect normal/bright     VASCULAR:  -Dorsalis pedis pulse +2/4 RIGHT and +1/4 LEFT  -Posterior tibial pulse +0/4 LEFT and +1/4 RIGHT    -Capillary refill time < 3 seconds to b/l hallux  -Hair growth Present to b/l anterior legs and ankles     NEURO:  -Protective sensation diminished with SWM +0/10 RIGHT and +10/10 LEFT on 05/05/2022  -Protective sensation diminished with SWM +0/10 RIGHT and +9/10 LEFT on 02/23/2022  -Protective sensation diminished with SWM +0/10 RIGHT and +10/10 LEFT on 12/21/2021  -Protective sensation diminished with SWM +0/10 RIGHT and +10/10 LEFT on 10/12/2021  -Protective sensation diminished with SWM +0/10 RIGHT and +10/10 LEFT on 08/03/2021  -Protective sensation diminished with SWM +0/10 RIGHT and +9/10 LEFT on 05/17/2021  -Protective sensation diminished with SWM +1/10 RIGHT and +5/10 LEFT on 02/15/2021  -Protective sensation diminished with SWM +3/10 RIGHT and +10/10 LEFT on 01/23/2019     DERM:  -Hyperkeratotic lesion to RIGHT plantar 5th metatarsal head, RIGHT plantar heel and RIGHT distal plantar hallux  ---No wounds post paring  -Toenails thickend, dystrophic and  discolored x 10    -Skin mildly dry to bilateral plantar foot   -Skin temperature within normal limits to bilateral foot       MSK:  -RIGHT 1st MTPJ has 0 degrees of DORSIFLEXION and the hallux IPJ is mildly but rigidly plantarflexed   -DORSIFLEXION of bilateral ankle limited to between -5 short of vertical bilaterally   -Muscle strength of ankles for dorsiflexion, plantarflexion +0/5 RIGHT foot and +5/5 LEFT foot  -Muscle strength for ABDUction and ADDuction +5/5 LEFT and +4/5 RIGHT   ============================================================     ASSESSMENT:      (L84) Callus of heel (primary encounter diagnosis)     (L60.3) Onychodystrophy      (M20.21) Hallux rigidus of right foot     (M21.371) Foot drop, right foot    (M21.6X1) Gastrocnemius equinus of right lower extremity    (M21.6X2) Gastrocnemius equinus of left lower extremity    (E10.65) Type 1 diabetes mellitus with hyperglycemia (H)    (E10.42) Diabetic polyneuropathy associated with type 1 diabetes mellitus (H)    (M21.969,  R20.8) Loss of protective sensation of skin of deformed foot            PLAN:  -Patient evaluated and examined. Treatment options discussed with no educational barriers noted.    -High risk toenail debridement x 10 toenails without incident    -Callus pared x 3 to the RIGHT plantar hallux, RIGHT plantar fifth metatarsal head and RIGHT plantar heel without incident  ---Patient reminded that the callus will likely return due to the underlying, prominent bone causing the callus while the patient is walking.    -DM shoes: patient was dispensed for DM shoes at [facility] around February, 2022. The shoes are comfortable.    -Diabetic Foot Education provided. This included checking the feet daily looking for new new blisters or wounds, wearing shoes at all times when walking including around the house, and avoiding lotion application between the toes. If there are any signs of infection, the patient should present  to the ED as soon as possible. Infections of the foot can be life threatening or lead to amputations of the foot or leg.      -Patient in agreement with the above treatment plan and all of patient's questions were answered.        Return to clinic 3 months from last appointment for diabetic foot exam and high risk nail debridement         Juliette Jarrett DPM, KASSIDYM

## 2022-05-05 ENCOUNTER — OFFICE VISIT (OUTPATIENT)
Dept: PODIATRY | Facility: OTHER | Age: 57
End: 2022-05-05
Attending: PODIATRIST
Payer: COMMERCIAL

## 2022-05-05 ENCOUNTER — LAB (OUTPATIENT)
Dept: LAB | Facility: OTHER | Age: 57
End: 2022-05-05
Attending: PODIATRIST
Payer: COMMERCIAL

## 2022-05-05 VITALS
SYSTOLIC BLOOD PRESSURE: 129 MMHG | HEART RATE: 77 BPM | TEMPERATURE: 98 F | DIASTOLIC BLOOD PRESSURE: 75 MMHG | OXYGEN SATURATION: 96 %

## 2022-05-05 DIAGNOSIS — L60.3 ONYCHODYSTROPHY: ICD-10-CM

## 2022-05-05 DIAGNOSIS — L84 CALLUS OF FOOT: Primary | ICD-10-CM

## 2022-05-05 DIAGNOSIS — R20.8 LOSS OF PROTECTIVE SENSATION OF SKIN OF DEFORMED FOOT: ICD-10-CM

## 2022-05-05 DIAGNOSIS — M85.80 OSTEOPENIA, UNSPECIFIED LOCATION: ICD-10-CM

## 2022-05-05 DIAGNOSIS — M21.371 FOOT DROP, RIGHT FOOT: ICD-10-CM

## 2022-05-05 DIAGNOSIS — E10.42 DIABETIC POLYNEUROPATHY ASSOCIATED WITH TYPE 1 DIABETES MELLITUS (H): ICD-10-CM

## 2022-05-05 DIAGNOSIS — M62.461 GASTROCNEMIUS EQUINUS OF RIGHT LOWER EXTREMITY: ICD-10-CM

## 2022-05-05 DIAGNOSIS — M62.462 GASTROCNEMIUS EQUINUS OF LEFT LOWER EXTREMITY: ICD-10-CM

## 2022-05-05 DIAGNOSIS — E10.65 TYPE 1 DIABETES MELLITUS WITH HYPERGLYCEMIA (H): ICD-10-CM

## 2022-05-05 DIAGNOSIS — M20.21 HALLUX RIGIDUS OF RIGHT FOOT: ICD-10-CM

## 2022-05-05 DIAGNOSIS — M21.969 LOSS OF PROTECTIVE SENSATION OF SKIN OF DEFORMED FOOT: ICD-10-CM

## 2022-05-05 LAB
ALBUMIN SERPL-MCNC: 3.6 G/DL (ref 3.4–5)
ALP SERPL-CCNC: 114 U/L (ref 40–150)
ALT SERPL W P-5'-P-CCNC: 29 U/L (ref 0–70)
ANION GAP SERPL CALCULATED.3IONS-SCNC: 6 MMOL/L (ref 3–14)
AST SERPL W P-5'-P-CCNC: 22 U/L (ref 0–45)
BILIRUB SERPL-MCNC: 0.4 MG/DL (ref 0.2–1.3)
BUN SERPL-MCNC: 26 MG/DL (ref 7–30)
CALCIUM SERPL-MCNC: 8.5 MG/DL (ref 8.5–10.1)
CHLORIDE BLD-SCNC: 109 MMOL/L (ref 94–109)
CO2 SERPL-SCNC: 26 MMOL/L (ref 20–32)
CREAT SERPL-MCNC: 0.89 MG/DL (ref 0.66–1.25)
GFR SERPL CREATININE-BSD FRML MDRD: >90 ML/MIN/1.73M2
GLUCOSE BLD-MCNC: 150 MG/DL (ref 70–99)
POTASSIUM BLD-SCNC: 4.1 MMOL/L (ref 3.4–5.3)
PROT SERPL-MCNC: 7 G/DL (ref 6.8–8.8)
PTH-INTACT SERPL-MCNC: 35 PG/ML (ref 18–80)
SODIUM SERPL-SCNC: 141 MMOL/L (ref 133–144)
TSH SERPL DL<=0.005 MIU/L-ACNC: 1.94 MU/L (ref 0.4–4)

## 2022-05-05 PROCEDURE — 11721 DEBRIDE NAIL 6 OR MORE: CPT | Performed by: PODIATRIST

## 2022-05-05 PROCEDURE — G0463 HOSPITAL OUTPT CLINIC VISIT: HCPCS | Mod: 25

## 2022-05-05 PROCEDURE — 84443 ASSAY THYROID STIM HORMONE: CPT | Mod: ZL

## 2022-05-05 PROCEDURE — 83970 ASSAY OF PARATHORMONE: CPT | Mod: ZL

## 2022-05-05 PROCEDURE — 36415 COLL VENOUS BLD VENIPUNCTURE: CPT | Mod: ZL

## 2022-05-05 PROCEDURE — 84403 ASSAY OF TOTAL TESTOSTERONE: CPT | Mod: ZL

## 2022-05-05 PROCEDURE — 82306 VITAMIN D 25 HYDROXY: CPT | Mod: ZL

## 2022-05-05 PROCEDURE — 11056 PARNG/CUTG B9 HYPRKR LES 2-4: CPT | Performed by: PODIATRIST

## 2022-05-05 PROCEDURE — 80053 COMPREHEN METABOLIC PANEL: CPT | Mod: ZL

## 2022-05-05 ASSESSMENT — PAIN SCALES - GENERAL: PAINLEVEL: NO PAIN (0)

## 2022-05-05 NOTE — PATIENT INSTRUCTIONS
Thank you for allowing  and our Podiatry team to participate in your care. Please call our office at [phone] with scheduling questions or with any other questions or concerns.

## 2022-05-05 NOTE — NURSING NOTE
"Chief Complaint   Patient presents with     Toenail     Trimming       Initial /75   Pulse 77   Temp 98  F (36.7  C) (Tympanic)   SpO2 96%  Estimated body mass index is 38.8 kg/m  as calculated from the following:    Height as of 3/23/22: 1.537 m (5' 0.5\").    Weight as of 4/21/22: 91.6 kg (202 lb).  Medication Reconciliation: complete  Nargis Lutz MA  "

## 2022-05-06 ENCOUNTER — TRANSFERRED RECORDS (OUTPATIENT)
Dept: HEALTH INFORMATION MANAGEMENT | Facility: CLINIC | Age: 57
End: 2022-05-06

## 2022-05-06 LAB — DEPRECATED CALCIDIOL+CALCIFEROL SERPL-MC: 40 UG/L (ref 20–75)

## 2022-05-07 LAB — TESTOST SERPL-MCNC: 274 NG/DL (ref 240–950)

## 2022-05-08 DIAGNOSIS — E10.649 TYPE 1 DIABETES MELLITUS WITH HYPOGLYCEMIA UNAWARENESS (H): ICD-10-CM

## 2022-05-09 RX ORDER — GABAPENTIN 300 MG/1
CAPSULE ORAL
Qty: 90 CAPSULE | Refills: 0 | Status: SHIPPED | OUTPATIENT
Start: 2022-05-09 | End: 2022-06-21

## 2022-05-09 NOTE — TELEPHONE ENCOUNTER
Gabapentin      Last Written Prescription Date:  4.7.22  Last Fill Quantity: #90,   # refills: 0  Last Office Visit: 4.21.22  Future Office visit:    Next 5 appointments (look out 90 days)    Jul 13, 2022  8:00 AM  (Arrive by 7:45 AM)  Return Visit with Juliette Jarrett DPM  [facility] ([facility] ) [address] [place] [id number] [phone]   Jul 21, 2022  8:00 AM  (Arrive by 7:45 AM)  SHORT with Natacha Alvarez MD  [facility] ([facility] ) [address] [place] [id number]  [phone]           Routing refill request to provider for review/approval because:  Drug not on the [facility], [facility] or [facility] refill protocol or controlled substance

## 2022-05-11 ENCOUNTER — ALLIED HEALTH/NURSE VISIT (OUTPATIENT)
Dept: EDUCATION SERVICES | Facility: OTHER | Age: 57
End: 2022-05-11
Attending: NURSE PRACTITIONER
Payer: COMMERCIAL

## 2022-05-11 VITALS
HEART RATE: 83 BPM | OXYGEN SATURATION: 96 % | DIASTOLIC BLOOD PRESSURE: 66 MMHG | HEIGHT: 61 IN | BODY MASS INDEX: 37.42 KG/M2 | SYSTOLIC BLOOD PRESSURE: 108 MMHG | WEIGHT: 198.2 LBS | RESPIRATION RATE: 16 BRPM

## 2022-05-11 DIAGNOSIS — E10.65 TYPE 1 DIABETES MELLITUS WITH HYPERGLYCEMIA (H): Primary | ICD-10-CM

## 2022-05-11 PROCEDURE — G0463 HOSPITAL OUTPT CLINIC VISIT: HCPCS

## 2022-05-11 PROCEDURE — 99213 OFFICE O/P EST LOW 20 MIN: CPT | Performed by: NURSE PRACTITIONER

## 2022-05-11 ASSESSMENT — PAIN SCALES - GENERAL: PAINLEVEL: MILD PAIN (3)

## 2022-05-11 NOTE — PROGRESS NOTES
"Chief Complaint   Patient presents with     Diabetes       Initial /66   Pulse 83   Resp 16   Ht 1.537 m (5' 0.5\")   Wt 89.9 kg (198 lb 3.2 oz)   SpO2 96%   BMI 38.07 kg/m   Estimated body mass index is 38.07 kg/m  as calculated from the following:    Height as of this encounter: 1.537 m (5' 0.5\").    Weight as of this encounter: 89.9 kg (198 lb 3.2 oz).  Medication Reconciliation: complete  Lianna Brewster LPN    "

## 2022-05-11 NOTE — PROGRESS NOTES
SUBJECTIVE:  Richard Harvey, 57 year old, male presents with the following Chief Complaint(s) with HPI to follow:  Chief Complaint   Patient presents with     Diabetes        Diabetes Follow-up      Patient is checking blood sugars: >4x/day using his continuous glucose.  Results:    Ran out of Dexcom supplies.        Symptoms of hypoglycemia (low blood sugar): yes--due to work and increase activity    Paresthesias (numbness or burning in feet) or sores: Yes; no sores    Diabetic eye exam within the last year: Yes    Breakfast eaten regularly: Yes    Patient counting carbs: trying       HPI: Richard's here today for the follow up regarding his Diabetes mellitus, Type 1.    Lab Results   Component Value Date    A1C 7.2 04/21/2022    A1C 7.5 01/20/2022    A1C 7.7 10/20/2021    A1C 7.8 06/03/2021    A1C 7.5 11/30/2020    A1C 7.7 09/01/2020    A1C 8.1 03/24/2020     Current Diabetes medication:   1.  Insulin pump, uses Novolog;   ASA use: yes, 325 mg daily  Statin use: yes, simvastatin 40 mg at bedime    Richard's here for his Dexcom G6 and insulin pump download with possible adjust.  He reports the following:   Initially, he had issues with the Omnipod  He was able to fix them with the help of Iris (Omnipod )  Current, no further issues with his insulin pump  Denies any issues with his Dexcom.  However, the received stopped working for him.    Got a new one, but never received supplies--transmitter and sensors.      Last Friday, received bilateral knee steroid injections.   BGs have been higher since then.   Last     No new health concerns    Weight trend:  Wt Readings from Last 4 Encounters:   05/11/22 89.9 kg (198 lb 3.2 oz)   04/21/22 91.6 kg (202 lb)   03/23/22 91.2 kg (201 lb 1.6 oz)   01/20/22 91.7 kg (202 lb 4 oz)       Patient Active Problem List   Diagnosis     DM type 1 (diabetes mellitus, type 1) (H)     HTN (hypertension)     Hyperlipidemia     ACP (advance care planning)     Diabetic polyneuropathy  associated with type 1 diabetes mellitus (H)     Class 2 obesity in adult     Obesity (BMI 35.0-39.9) with comorbidity (H)     Gastroparesis     Gastroesophageal reflux disease without esophagitis       Past Medical History:   Diagnosis Date     Diabetes mellitus type 1, controlled (H)      HLD (hyperlipidemia)      HTN (hypertension)      Neuropathy      Type 1 diabetes (H)        Past Surgical History:   Procedure Laterality Date     COLONOSCOPY - HIM SCAN N/A 09/19/2016    Procedure: COLONOSCOPY SCREENING; Surgeon: Rudy Rojo MD; Location: [facility] OR     ESOPHAGOSCOPY, GASTROSCOPY, DUODENOSCOPY (EGD), COMBINED  08/13/2019       Family History   Problem Relation Age of Onset     No Known Problems Mother      Hypertension Father      Hypertension Brother        Social History     Tobacco Use     Smoking status: Never Smoker     Smokeless tobacco: Never Used   Substance Use Topics     Alcohol use: No     Alcohol/week: 0.0 standard drinks       Current Outpatient Medications   Medication Sig Dispense Refill     acetone, Urine, test STRP Use as directed 50 each 3     blood glucose monitoring (MELISSA CONTOUR NEXT MONITOR) meter device kit 1 each       blood glucose monitoring (MELISSA CONTOUR NEXT) test strip Use to test blood sugar 6 times daily or as directed. 200 each 11     CALCIUM-VITAMIN D PO Take  by mouth daily. 600 mg       Continuous Blood Gluc  (DEXCOM G6 ) DAYAN 1 each continuous To be used per manufacture's recommendation 1 each 2     Continuous Blood Gluc Sensor (DEXCOM G6 SENSOR) MISC 1 each continuous 3 each 11     Continuous Blood Gluc Transmit (DEXCOM G6 TRANSMITTER) MISC 1 each continuous 1 each 4     CONTOUR NEXT TEST test strip TEST 6 TIMES DAILY, OR AS DIRECTED. 200 strip 11     gabapentin (NEURONTIN) 300 MG capsule TAKE 1 CAPSULE BY MOUTH THREE TIMES DAILY 90 capsule 0     Glucagon (GVOKE HYPOPEN 2-PACK) 1 MG/0.2ML SOAJ Inject 1 mg Subcutaneous as needed (for low blood glucose)  0.4 mL 3     insulin aspart (NOVOLOG VIAL) 100 UNITS/ML vial Use insulin with pump or OmniPod for a total of daily usage of 70 units 20 mL 3     insulin aspart (NOVOLOG VIAL) 100 UNITS/ML vial USE WITH INSULIN PUMP FOR A TOTAL DAILY USAGE OF 70 UNITS 20 mL 1     Insulin Disposable Pump (OMNIPOD DASH 5 PACK PODS) MISC 1 each every 48 hours 15 each 5     INSULIN PUMP - OUTPATIENT Date last updated:  6/23/15  MedNiko Niko Minimed: Model 751  BASAL RATES and times:  12   AM (midnight): 1.25 units/hour    2     AM: 1.3 units/hour   6     AM: 0.95 units/hour   6    PM: 1.2 units/hour   Basal Pattern A:  Richard Harvey will use when N/A.  CARB RATIO and times:  12   AM (midnight): 12  1    PM:  10  6    PM:    9  Corection Factor (Sensitivity) and times:  12   AM (midnight): 40 mg/dL  6     PM: 45 mg/dL  BLOOD GLUCOSE TARGET and times:  12   AM (midnight): 100 - 140  9     AM:  100 - 120  10   PM:  100 - 140  Active Insulin Time:  4 hours  Sensor:  Yes: 12   AM (midnight): 70 - 240  Carelink / Diasend username:  [email or web address]  Carelink / Diasend Password:  [email or web address]       lisinopril (ZESTRIL) 10 MG tablet Take 1 tablet (10 mg) by mouth daily 90 tablet 3     Multiple Vitamin (MULTI-VITAMIN DAILY PO) Take  by mouth daily.       nitroGLYcerin (NITROSTAT) 0.4 MG sublingual tablet Place under the tongue every 5 minutes as needed       NOVOLOG VIAL 100 UNIT/ML soln USE WITH INSULIN PUMP FOR A TOTAL DAILY USAGE OF 70 UNITS 20 mL 3     ondansetron (ZOFRAN) 4 MG tablet TAKE 1 TABLET BY MOUTH EVERY 8 HOURS AS NEEDED FOR NAUSEA 30 tablet 5     order for DME Equipment being ordered:     1.  Medtronic insulin pump supplies--insertion sets and reserviors  Disp: 1 month  Refill: 11 months 30 days 11     pantoprazole (PROTONIX) 40 MG EC tablet Take 1 tablet by mouth twice daily 60 tablet 5     simvastatin (ZOCOR) 40 MG tablet Take 1 tablet (40 mg) by mouth At Bedtime 90 tablet 3     UREA 10 HYDRATING 10 % external cream APPLY  CREAM TOPICALLY  "TO AFFECTED AREA AS NEEDED FOR  CALLOUSED  SKIN 85 g 2     URINE GLUCOSE-KETONES TEST VI          No Known Allergies    REVIEW OF SYSTEMS  Skin: negative  Eyes: negative; new glasses  Ears/Nose/Throat: negative; hx of allergies  Respiratory: No shortness of breath, dyspnea on exertion, cough, or hemoptysis  Cardiovascular: negative  Gastrointestinal: off and on issues with nausea  Genitourinary: negative  Musculoskeletal: history of arthritis--hands--same, shoulders, knees--better after 2 injections (Dr. Turner  Neurologic: positive for numbness or tingling of hands and numbness or tingling of feet--same (gabagentin helps)  Psychiatric: negative  Hematologic/Lymphatic/Immunologic: negative  Endocrine: positive for diabetes    OBJECTIVE:  /66   Pulse 83   Resp 16   Ht 1.537 m (5' 0.5\")   Wt 89.9 kg (198 lb 3.2 oz)   SpO2 96%   BMI 38.07 kg/m    Constitutional: alert and no distress  Respiratory:  Good diaphragmatic excursion.   Musculoskeletal: extremities normal- no gross deformities noted and gait normal  Skin: no suspicious lesions or rashes to visible skin  Psychiatric: mentation appears normal and affect normal/bright      LAB  No results found for any visits on 05/11/22.    ASSESSMENT / PLAN:.  (E10.65) Type 1 diabetes mellitus with hyperglycemia (H)  (primary encounter diagnosis)  Comment: noted insulin pump's date was off  I don't have any data    Plan:   Showed Richard how to change the date.     Education focused on using the temp basal   Highly encouraged him to restart his Dexcom (gave him a sample) and start using the temp basal--either way--increase for higher BGs, lower for times a work.      Hopefully, the Omnipod 5 will be releasing within the next couple months.  This should help with his fluctuating basal demands.     Follow up  One week--nurse only download    Call sooner if any issues/concerns develop and/or continued issues with low BGs    Patient Instructions   Continue working on " healthy eating and moving as best as you can (start low and slow, work up to 30 min, 5x/week)    BG goals:  Fasting and before meals <130, >70  2 hour after eating <180    We only need 1/2 of these numbers to be within target then your A1c will be within target    Medication changes   I need more data to adjust you safely     Start using your temp basal to help manage your higher BGs    Restart Dexcom with the sample provided    Follow up   1 week for download    Call me sooner if any problems/concerns and/or questions develop including consistent low BGs <70 or consistent high BGs >200  [phone] (Unit Coordinator)    [phone] (Tracey, Nurse)              Time: 25 min  Barrier: none  Willingness to learn: accepting    Macrina CH[facility]BERLAIN [facility]-BC  Diabetes and Wound Care    25 minutes was spent with patient.    All of this time was spent on counseling patient regarding illness, medication and/or treatment options, coordinating further cares and follow ups that are needed along with resource material that will be helpful in the treatment of these issues.       With the electronic record, we can now more quickly and easily track our patient diabetic goals. Our diabetes clinical review is in progress and these are the indicators we are monitoring for good diabetes health.     1.) HbA1C less than 7 (measurement of your average blood sugars)  2.) Blood Pressure less than 140/80  3.) LDL less than 100 (bad cholesterol)  4.) HbA1C is checked in the last 6 months and below 7% (more frequently if not at goal or adjusting medications)  5.) LDL is checked in the last 12 months (more frequently if not at goal or adjusting medications)  6.) Taking one baby aspirin daily (unless otherwise instructed)  7.) No tobacco use  8) Statin use     You have achieved 7 out of 8 of these and I am encouraging you to come in and get tuned up to achieve 8 out of 8!  Here is what you have achieved so far in my goals for you:  1.) HbA1C  less  "than 7:                              NO  Your last  HbA1C :   Lab Results   Component Value Date    A1C 7.2 04/21/2022    A1C 7.5 01/20/2022    A1C 7.7 10/20/2021    A1C 7.8 06/03/2021    A1C 7.5 11/30/2020    A1C 7.7 09/01/2020    A1C 8.1 03/24/2020     2.) Blood Pressure less than 140/80:       YES      Your last    /66   Pulse 83   Resp 16   Ht 1.537 m (5' 0.5\")   Wt 89.9 kg (198 lb 3.2 oz)   SpO2 96%   BMI 38.07 kg/m      3.) LDL less than 100:                              YES      Your last   LDL         LDL Cholesterol Calculated   Date Value Ref Range Status   06/14/2021 42 <100 mg/dL Final     Comment:     Desirable:       <100 mg/dl       4.) Checked HbA1C in the past 6 months: YES      5.) Checked LDL in the past 12 months:    YES    6.) Taking one aspirin daily:                       YES     7.) No tobacco use:                                        YES      8.) Statin use      YES       CGM requirements:  1.  Patient has been seen in the clinic within the last 6 month  2. Diagnosis of Type 1 diabetes  3. Has been testing their BGs 4x/day using the Dexcom  4. Uses an insulin pump   5. Requires frequent adjustments to their treatment regimen based on BGM and/or CGM testing results.                   "

## 2022-05-11 NOTE — PATIENT INSTRUCTIONS
Continue working on healthy eating and moving as best as you can (start low and slow, work up to 30 min, 5x/week)    BG goals:  Fasting and before meals <130, >70  2 hour after eating <180    We only need 1/2 of these numbers to be within target then your A1c will be within target    Medication changes   I need more data to adjust you safely     Start using your temp basal to help manage your higher BGs    Restart Dexcom with the sample provided    Follow up   1 week for download    Call me sooner if any problems/concerns and/or questions develop including consistent low BGs <70 or consistent high BGs >200  [phone] (Unit Coordinator)    [phone] (Tracey, Nurse)

## 2022-05-18 ENCOUNTER — ALLIED HEALTH/NURSE VISIT (OUTPATIENT)
Dept: EDUCATION SERVICES | Facility: OTHER | Age: 57
End: 2022-05-18
Attending: NURSE PRACTITIONER
Payer: COMMERCIAL

## 2022-05-18 DIAGNOSIS — E10.65 TYPE 1 DIABETES MELLITUS WITH HYPERGLYCEMIA (H): Primary | ICD-10-CM

## 2022-05-18 NOTE — PROGRESS NOTES
Pt came in for a Dexcom and insulin pump download today. This was completed and given to Macrina Dixon.    Lianna Brewster

## 2022-05-18 NOTE — PROGRESS NOTES
Richard stopped by for a download of his insulin pump and Dexcom, which is as followed:    Back story:  He received bilateral knee injections  He also had the stomach flu over the past weekend    Date: 5/5 to 5/18/22  Glucose management indicator: n/a    Average glucose: 227    Glucose range  Very low (<54): 0  low (<70): 1%  In target range (): 31%  High (>180): 34%  Very high (>250): 34%    Plan:  Noted his BGs are spiking with breakfast--but not all the time.    He's starting to use the extend bolus .    No adjustments at this time as there's not a consistent pattern.   Education on how to use the temp basal (either way) based on BGs.   Reviewed sick day management  Keep using extend bolus    Will trial Fiasp to see if this will match those postprandial spikes better.     Macrina Dixon APRN FNP-BC  Diabetes and Wound Care

## 2022-05-19 ENCOUNTER — TELEPHONE (OUTPATIENT)
Dept: EDUCATION SERVICES | Facility: OTHER | Age: 57
End: 2022-05-19
Payer: COMMERCIAL

## 2022-05-19 NOTE — TELEPHONE ENCOUNTER
Received PA from [facility] for Fiasp 100UNIT/ML solution. Submitted on CMM. Waiting for a response.

## 2022-05-19 NOTE — TELEPHONE ENCOUNTER
Received APPROVAL from [facility] for Fiasp 100UNIT/ML solution. Effective 04/19/2022-05/19/2023. Forms scanned to [facility].

## 2022-06-02 DIAGNOSIS — E10.65 TYPE 1 DIABETES MELLITUS WITH HYPERGLYCEMIA (H): ICD-10-CM

## 2022-06-02 NOTE — TELEPHONE ENCOUNTER
Pt requesting Fiasp be sent to [facility]. Writer signed as Macrina did on 5/18/22. See encounter 5/19/22 PA approved.

## 2022-06-17 DIAGNOSIS — E10.649 TYPE 1 DIABETES MELLITUS WITH HYPOGLYCEMIA UNAWARENESS (H): ICD-10-CM

## 2022-06-21 RX ORDER — GABAPENTIN 300 MG/1
CAPSULE ORAL
Qty: 90 CAPSULE | Refills: 4 | Status: SHIPPED | OUTPATIENT
Start: 2022-06-21 | End: 2023-01-31

## 2022-06-21 NOTE — TELEPHONE ENCOUNTER
Gabapentin       Last Written Prescription Date:  5/9/22  Last Fill Quantity: 90,   # refills: 0  Last Office Visit: 4/21/22  Future Office visit:    Next 5 appointments (look out 90 days)    Jul 13, 2022  8:00 AM  (Arrive by 7:45 AM)  Return Visit with Juliette Jarrett DPM  [facility] ([facility] ) [address] [place] [id number] [phone]   Jul 21, 2022  8:00 AM  (Arrive by 7:45 AM)  SHORT with Natacha Alvarez MD  [facility] ([facility] ) [address] [place] [id number]  [phone]           Routing refill request to provider for review/approval because:  Drug not on the [facility], [facility] or [facility] refill protocol or controlled substance

## 2022-06-22 ENCOUNTER — ALLIED HEALTH/NURSE VISIT (OUTPATIENT)
Dept: EDUCATION SERVICES | Facility: OTHER | Age: 57
End: 2022-06-22
Attending: NURSE PRACTITIONER
Payer: COMMERCIAL

## 2022-06-22 VITALS
HEART RATE: 67 BPM | BODY MASS INDEX: 38.01 KG/M2 | RESPIRATION RATE: 16 BRPM | SYSTOLIC BLOOD PRESSURE: 116 MMHG | WEIGHT: 201.3 LBS | HEIGHT: 61 IN | DIASTOLIC BLOOD PRESSURE: 58 MMHG | OXYGEN SATURATION: 94 %

## 2022-06-22 DIAGNOSIS — E10.65 TYPE 1 DIABETES MELLITUS WITH HYPERGLYCEMIA (H): Primary | ICD-10-CM

## 2022-06-22 PROCEDURE — G0463 HOSPITAL OUTPT CLINIC VISIT: HCPCS

## 2022-06-22 PROCEDURE — 95251 CONT GLUC MNTR ANALYSIS I&R: CPT | Performed by: NURSE PRACTITIONER

## 2022-06-22 PROCEDURE — 99213 OFFICE O/P EST LOW 20 MIN: CPT | Mod: 25 | Performed by: NURSE PRACTITIONER

## 2022-06-22 RX ORDER — INSULIN PMP CART,AUT,G6/7,CNTR
1 EACH SUBCUTANEOUS
Qty: 10 EACH | Refills: 5 | Status: SHIPPED | OUTPATIENT
Start: 2022-06-22 | End: 2023-12-18

## 2022-06-22 RX ORDER — INSULIN PMP CART,AUT,G6/7,CNTR
1 EACH SUBCUTANEOUS CONTINUOUS
Qty: 1 KIT | Refills: 0 | Status: SHIPPED | OUTPATIENT
Start: 2022-06-22

## 2022-06-22 ASSESSMENT — PAIN SCALES - GENERAL: PAINLEVEL: MILD PAIN (3)

## 2022-06-22 NOTE — PROGRESS NOTES
"Chief Complaint   Patient presents with     Diabetes       Initial /58   Pulse 67   Resp 16   Ht 1.537 m (5' 0.5\")   Wt 91.3 kg (201 lb 4.8 oz)   SpO2 94%   BMI 38.67 kg/m   Estimated body mass index is 38.67 kg/m  as calculated from the following:    Height as of this encounter: 1.537 m (5' 0.5\").    Weight as of this encounter: 91.3 kg (201 lb 4.8 oz).  Medication Reconciliation: complete  Lianna Brewster LPN    "

## 2022-06-22 NOTE — PROGRESS NOTES
SUBJECTIVE:  Richard Harvey, 57 year old, male presents with the following Chief Complaint(s) with HPI to follow:  Chief Complaint   Patient presents with     Diabetes        Diabetes Follow-up      Patient is checking blood sugars: >4x/day using his continuous glucose.  Results:    Date: 5/26 to 6/8  Glucose management indicator: 7.6%    Average glucose: 179    Glucose range  Very low (<54): <1%  low (<70): 3%  In target range (): 55%  High (>180): 22%  Very high (>250): 19%    Date: 6/9 to 6/22/22  Glucose management indicator: 7.1%    Average glucose: 160    Glucose range  Very low (<54): <1%  low (<70): 2%  In target range (): 66%  High (>180): 20%  Very high (>250): 11%      Symptoms of hypoglycemia (low blood sugar): yes--due to work    Paresthesias (numbness or burning in feet) or sores: Yes; no sores    Diabetic eye exam within the last year: Yes    Breakfast eaten regularly: Yes    Patient counting carbs: trying       HPI: Richard's here today for the follow up regarding his Diabetes mellitus, Type 1.    Lab Results   Component Value Date    A1C 7.2 04/21/2022    A1C 7.5 01/20/2022    A1C 7.7 10/20/2021    A1C 7.8 06/03/2021    A1C 7.5 11/30/2020    A1C 7.7 09/01/2020    A1C 8.1 03/24/2020     Current Diabetes medication:   1.  Insulin pump, uses Novolog;   ASA use: yes, 325 mg daily  Statin use: yes, simvastatin 40 mg at bedime    Richard's here for his Dexcom G6 and insulin pump download with possible adjust.  He reports the following:   No issues with the Dexcom G6  No issues with the Omnipod.     Continued issues with low BGs, especially at work.      No new health concerns    Weight trend:  Wt Readings from Last 4 Encounters:   06/22/22 91.3 kg (201 lb 4.8 oz)   05/11/22 89.9 kg (198 lb 3.2 oz)   04/21/22 91.6 kg (202 lb)   03/23/22 91.2 kg (201 lb 1.6 oz)       Patient Active Problem List   Diagnosis     DM type 1 (diabetes mellitus, type 1) (H)     HTN (hypertension)     Hyperlipidemia     ACP  (advance care planning)     Diabetic polyneuropathy associated with type 1 diabetes mellitus (H)     Class 2 obesity in adult     Obesity (BMI 35.0-39.9) with comorbidity (H)     Gastroparesis     Gastroesophageal reflux disease without esophagitis       Past Medical History:   Diagnosis Date     Diabetes mellitus type 1, controlled (H)      HLD (hyperlipidemia)      HTN (hypertension)      Neuropathy      Type 1 diabetes (H)        Past Surgical History:   Procedure Laterality Date     COLONOSCOPY - HIM SCAN N/A 09/19/2016    Procedure: COLONOSCOPY SCREENING; Surgeon: Rudy Rojo MD; Location: [facility] OR     ESOPHAGOSCOPY, GASTROSCOPY, DUODENOSCOPY (EGD), COMBINED  08/13/2019       Family History   Problem Relation Age of Onset     No Known Problems Mother      Hypertension Father      Hypertension Brother        Social History     Tobacco Use     Smoking status: Never Smoker     Smokeless tobacco: Never Used   Substance Use Topics     Alcohol use: No     Alcohol/week: 0.0 standard drinks       Current Outpatient Medications   Medication Sig Dispense Refill     acetone, Urine, test STRP Use as directed 50 each 3     blood glucose monitoring (MELISSA CONTOUR NEXT MONITOR) meter device kit 1 each       blood glucose monitoring (MELISSA CONTOUR NEXT) test strip Use to test blood sugar 6 times daily or as directed. 200 each 11     CALCIUM-VITAMIN D PO Take  by mouth daily. 600 mg       Continuous Blood Gluc  (DEXCOM G6 ) DAYAN 1 each continuous To be used per manufacture's recommendation 1 each 2     Continuous Blood Gluc Sensor (DEXCOM G6 SENSOR) MISC 1 each continuous 3 each 11     Continuous Blood Gluc Transmit (DEXCOM G6 TRANSMITTER) MISC 1 each continuous 1 each 4     CONTOUR NEXT TEST test strip TEST 6 TIMES DAILY, OR AS DIRECTED. 200 strip 11     gabapentin (NEURONTIN) 300 MG capsule TAKE 1 CAPSULE BY MOUTH THREE TIMES DAILY 90 capsule 4     Glucagon (GVOKE HYPOPEN 2-PACK) 1 MG/0.2ML SOAJ Inject  1 mg Subcutaneous as needed (for low blood glucose) 0.4 mL 3     insulin aspart (FIASP) 100 UNIT/ML vial TO BE USED WITH THE INSULIN PUMP. TDD: 80 UNITS 30 mL 5     Insulin Disposable Pump (OMNIPOD DASH 5 PACK PODS) MISC 1 each every 48 hours 15 each 5     INSULIN PUMP - OUTPATIENT Date last updated:  6/23/15  Medtronic Minimed: Model 751  BASAL RATES and times:  12   AM (midnight): 1.25 units/hour    2     AM: 1.3 units/hour   6     AM: 0.95 units/hour   6    PM: 1.2 units/hour   Basal Pattern A:  Richard Harvey will use when N/A.  CARB RATIO and times:  12   AM (midnight): 12  1    PM:  10  6    PM:    9  Corection Factor (Sensitivity) and times:  12   AM (midnight): 40 mg/dL  6     PM: 45 mg/dL  BLOOD GLUCOSE TARGET and times:  12   AM (midnight): 100 - 140  9     AM:  100 - 120  10   PM:  100 - 140  Active Insulin Time:  4 hours  Sensor:  Yes: 12   AM (midnight): 70 - 240  Carelink / Diasend username:  [email or web address]  Carelink / Diasend Password:  [email or web address]       lisinopril (ZESTRIL) 10 MG tablet Take 1 tablet (10 mg) by mouth daily 90 tablet 3     Multiple Vitamin (MULTI-VITAMIN DAILY PO) Take  by mouth daily.       nitroGLYcerin (NITROSTAT) 0.4 MG sublingual tablet Place under the tongue every 5 minutes as needed       NOVOLOG VIAL 100 UNIT/ML soln USE WITH INSULIN PUMP FOR A TOTAL DAILY USAGE OF 70 UNITS 20 mL 3     ondansetron (ZOFRAN) 4 MG tablet TAKE 1 TABLET BY MOUTH EVERY 8 HOURS AS NEEDED FOR NAUSEA 30 tablet 5     order for DME Equipment being ordered:     1.  Medtronic insulin pump supplies--insertion sets and reserviors  Disp: 1 month  Refill: 11 months 30 days 11     pantoprazole (PROTONIX) 40 MG EC tablet Take 1 tablet by mouth twice daily 60 tablet 5     simvastatin (ZOCOR) 40 MG tablet Take 1 tablet (40 mg) by mouth At Bedtime 90 tablet 3     UREA 10 HYDRATING 10 % external cream APPLY  CREAM TOPICALLY TO AFFECTED AREA AS NEEDED FOR  CALLOUSED  SKIN 85 g 2     URINE GLUCOSE-KETONES TEST VI     "      No Known Allergies    REVIEW OF SYSTEMS  Skin: negative  Eyes: negative; new glasses  Ears/Nose/Throat: negative; hx of allergies  Respiratory: No shortness of breath, dyspnea on exertion, cough, or hemoptysis  Cardiovascular: negative  Gastrointestinal: off and on issues with nausea  Genitourinary: negative  Musculoskeletal: history of arthritis--hands--same, shoulders, knees--better   Neurologic: positive for numbness or tingling of hands and numbness or tingling of feet--same (gabagentin helps)  Psychiatric: negative  Hematologic/Lymphatic/Immunologic: negative  Endocrine: positive for diabetes    OBJECTIVE:  /58   Pulse 67   Resp 16   Ht 1.537 m (5' 0.5\")   Wt 91.3 kg (201 lb 4.8 oz)   SpO2 94%   BMI 38.67 kg/m    Constitutional: alert and no distress  Respiratory:  Good diaphragmatic excursion.   Musculoskeletal: extremities normal- no gross deformities noted and gait normal  Skin: no suspicious lesions or rashes to visible skin  Psychiatric: mentation appears normal and affect normal/bright      LAB  No results found for any visits on 06/22/22.    ASSESSMENT / PLAN:.  (E10.65) Type 1 diabetes mellitus with hyperglycemia (H)  (primary encounter diagnosis)  Comment: noted CGM  Plan: GLUCOSE MONITOR, 72 HOUR, PHYS INTERP, Insulin         Disposable Pump (OMNIPOD 5 G6 INTRO, GEN 5,)         KIT, Insulin Disposable Pump (OMNIPOD 5 G6 POD,        GEN 5,) MISC          Changed CR to 12  Encouraged him to enter less carbs on days he work the meal before    Reached out to Pat (per Richard's request) about the Omnipod 5  Order sent  This should help him manage his low BGs by having an AID insulin pump    Education again focused on using the temp basal     Follow up  One week after starting the Omnipod 5    Call sooner if any issues/concerns develop and/or continued issues with low BGs    Patient Instructions   Continue working on healthy eating and moving as best as you can (start low and slow, work up to " 30 min, 5x/week)    BG goals:  Fasting and before meals <130, >70  2 hour after eating <180    We only need 1/2 of these numbers to be within target then your A1c will be within target    Medication changes   Watch for continued low blood glucose after adjustment    Try using your temp basal  Decrease to 20-30%, 2 hours    Follow up   Nurse download    Call me sooner if any problems/concerns and/or questions develop including consistent low BGs <70 or consistent high BGs >200  [phone] (Unit Coordinator)    [phone] (Tracey, Nurse)      Omnipod 5  Prescription will be sent to [place], [place]    Time: 25 min  Barrier: none  Willingness to learn: accepting    Macrina CHAMBERLAIN [facility]  Diabetes and Wound Care    25 minutes was spent with patient.    All of this time was spent on counseling patient regarding illness, medication and/or treatment options, coordinating further cares and follow ups that are needed along with resource material that will be helpful in the treatment of these issues.       With the electronic record, we can now more quickly and easily track our patient diabetic goals. Our diabetes clinical review is in progress and these are the indicators we are monitoring for good diabetes health.     1.) HbA1C less than 7 (measurement of your average blood sugars)  2.) Blood Pressure less than 140/80  3.) LDL less than 100 (bad cholesterol)  4.) HbA1C is checked in the last 6 months and below 7% (more frequently if not at goal or adjusting medications)  5.) LDL is checked in the last 12 months (more frequently if not at goal or adjusting medications)  6.) Taking one baby aspirin daily (unless otherwise instructed)  7.) No tobacco use  8) Statin use     You have achieved 7 out of 8 of these and I am encouraging you to come in and get tuned up to achieve 8 out of 8!  Here is what you have achieved so far in my goals for you:  1.) HbA1C  less than 7:                              NO  Your last  HbA1C :  "  Lab Results   Component Value Date    A1C 7.2 04/21/2022    A1C 7.5 01/20/2022    A1C 7.7 10/20/2021    A1C 7.8 06/03/2021    A1C 7.5 11/30/2020    A1C 7.7 09/01/2020    A1C 8.1 03/24/2020     2.) Blood Pressure less than 140/80:       YES      Your last    /58   Pulse 67   Resp 16   Ht 1.537 m (5' 0.5\")   Wt 91.3 kg (201 lb 4.8 oz)   SpO2 94%   BMI 38.67 kg/m      3.) LDL less than 100:                              YES      Your last   LDL         LDL Cholesterol Calculated   Date Value Ref Range Status   06/14/2021 42 <100 mg/dL Final     Comment:     Desirable:       <100 mg/dl       4.) Checked HbA1C in the past 6 months: YES      5.) Checked LDL in the past 12 months:    YES    6.) Taking one aspirin daily:                       YES     7.) No tobacco use:                                        YES      8.) Statin use      YES       CGM requirements:  1.  Patient has been seen in the clinic within the last 6 month  2. Diagnosis of Type 1 diabetes  3. Has been testing their BGs 4x/day using the Dexcom  4. Uses an insulin pump   5. Requires frequent adjustments to their treatment regimen based on BGM and/or CGM testing results.                     "

## 2022-06-22 NOTE — PATIENT INSTRUCTIONS
Continue working on healthy eating and moving as best as you can (start low and slow, work up to 30 min, 5x/week)    BG goals:  Fasting and before meals <130, >70  2 hour after eating <180    We only need 1/2 of these numbers to be within target then your A1c will be within target    Medication changes   Watch for continued low blood glucose after adjustment    Try using your temp basal  Decrease to 20-30%, 2 hours    Follow up   Nurse download    Call me sooner if any problems/concerns and/or questions develop including consistent low BGs <70 or consistent high BGs >200  [phone] (Unit Coordinator)    [phone] (Tracey, Nurse)      Omnipod 5  Prescription will be sent to [facility] [place]

## 2022-07-01 ENCOUNTER — TRANSFERRED RECORDS (OUTPATIENT)
Dept: HEALTH INFORMATION MANAGEMENT | Facility: CLINIC | Age: 57
End: 2022-07-01

## 2022-07-01 LAB — RETINOPATHY: NORMAL

## 2022-07-10 DIAGNOSIS — K31.84 GASTROPARESIS: ICD-10-CM

## 2022-07-10 DIAGNOSIS — K21.9 GASTROESOPHAGEAL REFLUX DISEASE WITHOUT ESOPHAGITIS: ICD-10-CM

## 2022-07-12 RX ORDER — PANTOPRAZOLE SODIUM 40 MG/1
TABLET, DELAYED RELEASE ORAL
Qty: 60 TABLET | Refills: 9 | Status: SHIPPED | OUTPATIENT
Start: 2022-07-12 | End: 2023-09-14

## 2022-07-13 ENCOUNTER — OFFICE VISIT (OUTPATIENT)
Dept: PODIATRY | Facility: OTHER | Age: 57
End: 2022-07-13
Attending: PODIATRIST
Payer: COMMERCIAL

## 2022-07-13 VITALS
HEART RATE: 78 BPM | SYSTOLIC BLOOD PRESSURE: 126 MMHG | OXYGEN SATURATION: 94 % | DIASTOLIC BLOOD PRESSURE: 73 MMHG | TEMPERATURE: 97.1 F

## 2022-07-13 DIAGNOSIS — M21.371 FOOT DROP, RIGHT FOOT: ICD-10-CM

## 2022-07-13 DIAGNOSIS — M62.461 GASTROCNEMIUS EQUINUS OF RIGHT LOWER EXTREMITY: ICD-10-CM

## 2022-07-13 DIAGNOSIS — R20.8 LOSS OF PROTECTIVE SENSATION OF SKIN OF DEFORMED FOOT: ICD-10-CM

## 2022-07-13 DIAGNOSIS — L60.3 ONYCHODYSTROPHY: ICD-10-CM

## 2022-07-13 DIAGNOSIS — E10.42 DIABETIC POLYNEUROPATHY ASSOCIATED WITH TYPE 1 DIABETES MELLITUS (H): ICD-10-CM

## 2022-07-13 DIAGNOSIS — E10.65 TYPE 1 DIABETES MELLITUS WITH HYPERGLYCEMIA (H): ICD-10-CM

## 2022-07-13 DIAGNOSIS — L84 CALLUS OF FOOT: Primary | ICD-10-CM

## 2022-07-13 DIAGNOSIS — M20.21 HALLUX RIGIDUS OF RIGHT FOOT: ICD-10-CM

## 2022-07-13 DIAGNOSIS — M62.462 GASTROCNEMIUS EQUINUS OF LEFT LOWER EXTREMITY: ICD-10-CM

## 2022-07-13 DIAGNOSIS — M21.969 LOSS OF PROTECTIVE SENSATION OF SKIN OF DEFORMED FOOT: ICD-10-CM

## 2022-07-13 PROCEDURE — 11721 DEBRIDE NAIL 6 OR MORE: CPT | Performed by: PODIATRIST

## 2022-07-13 PROCEDURE — 11056 PARNG/CUTG B9 HYPRKR LES 2-4: CPT | Performed by: PODIATRIST

## 2022-07-13 PROCEDURE — G0463 HOSPITAL OUTPT CLINIC VISIT: HCPCS | Mod: 25

## 2022-07-13 ASSESSMENT — PAIN SCALES - GENERAL: PAINLEVEL: MODERATE PAIN (5)

## 2022-07-13 NOTE — PROGRESS NOTES
Chief complaint: Patient presents with:  Toenail: DFE      History of Present Illness: This 57 year old IDDM type I male is seen for follow-up management of diabetic foot exam and high risk nail debridement.     He still gets burning, tingling, and numbness in his feet.     He received new DM shoes from Navdeep at [facility] in [place], [place], around February, 2022. The shoes are comfortable.    His blood sugars are doing well and he is following with Macrina Dixon NP for blood sugar management.    No further pedal complaints today.    Last HbA1C was 7.2% on 04/21/2022.    ---Previously 7.5% on 01/20/2022.    ---Previously 7.7% on 10/20/2021.    ---Previously 7.8% on 06/03/2021.    ---Previously 7.5% on 11/30/2020.      Patient does not use tobacco products.       Additionally:  Patient returned to work on 02/04/2019 at the [employer] working 4-5 hour shifts. He was wearing AFO on his RIGHT foot for drop foot, but the brace caused too much pressure on the toe and he thinks it may have caused his last hallux ulcer on the RIGHT foot so he stopped wearing it (the wound opened around January, 2019). The wound has since healed.       /73 (BP Location: Left arm, Patient Position: Sitting, Cuff Size: Adult Regular)   Pulse 78   Temp 97.1  F (36.2  C) (Tympanic)   SpO2 94%     Patient Active Problem List   Diagnosis     DM type 1 (diabetes mellitus, type 1) (H)     HTN (hypertension)     Hyperlipidemia     ACP (advance care planning)     Diabetic polyneuropathy associated with type 1 diabetes mellitus (H)     Class 2 obesity in adult     Obesity (BMI 35.0-39.9) with comorbidity (H)     Gastroparesis     Gastroesophageal reflux disease without esophagitis       Past Surgical History:   Procedure Laterality Date     COLONOSCOPY - HIM SCAN N/A 09/19/2016    Procedure: COLONOSCOPY SCREENING; Surgeon: Rudy Rojo MD; Location: [facility] OR     ESOPHAGOSCOPY, GASTROSCOPY, DUODENOSCOPY (EGD),  COMBINED  08/13/2019       Current Outpatient Medications   Medication     acetone, Urine, test STRP     blood glucose monitoring (MELISSA CONTOUR NEXT MONITOR) meter device kit     blood glucose monitoring (MELISSA CONTOUR NEXT) test strip     CALCIUM-VITAMIN D PO     Continuous Blood Gluc  (DEXCOM G6 ) DAYAN     Continuous Blood Gluc Sensor (DEXCOM G6 SENSOR) MISC     Continuous Blood Gluc Transmit (DEXCOM G6 TRANSMITTER) MISC     CONTOUR NEXT TEST test strip     gabapentin (NEURONTIN) 300 MG capsule     Glucagon (GVOKE HYPOPEN 2-PACK) 1 MG/0.2ML SOAJ     insulin aspart (FIASP) 100 UNIT/ML vial     Insulin Disposable Pump (OMNIPOD 5 G6 INTRO, GEN 5,) KIT     Insulin Disposable Pump (OMNIPOD 5 G6 POD, GEN 5,) MISC     INSULIN PUMP - OUTPATIENT     lisinopril (ZESTRIL) 10 MG tablet     Multiple Vitamin (MULTI-VITAMIN DAILY PO)     nitroGLYcerin (NITROSTAT) 0.4 MG sublingual tablet     NOVOLOG VIAL 100 UNIT/ML soln     ondansetron (ZOFRAN) 4 MG tablet     order for DME     pantoprazole (PROTONIX) 40 MG EC tablet     simvastatin (ZOCOR) 40 MG tablet     UREA 10 HYDRATING 10 % external cream     URINE GLUCOSE-KETONES TEST VI     No current facility-administered medications for this visit.        No Known Allergies    Family History   Problem Relation Age of Onset     No Known Problems Mother      Hypertension Father      Hypertension Brother        Social History     Socioeconomic History     Marital status: Single     Spouse name: None     Number of children: None     Years of education: None     Highest education level: None   Occupational History     None   Social Needs     Financial resource strain: None     Food insecurity:     Worry: None     Inability: None     Transportation needs:     Medical: None     Non-medical: None   Tobacco Use     Smoking status: Never Smoker     Smokeless tobacco: Never Used   Substance and Sexual Activity     Alcohol use: No     Alcohol/week: 0.0 oz     Drug use: No      Sexual activity: None   Lifestyle     Physical activity:     Days per week: None     Minutes per session: None     Stress: None   Relationships     Social connections:     Talks on phone: None     Gets together: None     Attends Muslim service: None     Active member of club or organization: None     Attends meetings of clubs or organizations: None     Relationship status: None     Intimate partner violence:     Fear of current or ex partner: None     Emotionally abused: None     Physically abused: None     Forced sexual activity: None   Other Topics Concern     Parent/sibling w/ CABG, MI or angioplasty before 65F 55M? Not Asked   Social History Narrative     None       ROS: 10 point ROS neg other than the symptoms noted above in the HPI.  EXAM  Constitutional: healthy, alert and no distress     Psychiatric: mentation appears normal and affect normal/bright     VASCULAR:  -Dorsalis pedis pulse +2/4 RIGHT and +1/4 LEFT  -Posterior tibial pulse +0/4 LEFT and +1/4 RIGHT    -Capillary refill time < 3 seconds to b/l hallux  -Hair growth Present to b/l anterior legs and ankles     NEURO:  -Protective sensation diminished with SWM +0/10 RIGHT and +10/10 LEFT on 07/13/2022  -Protective sensation diminished with SWM +0/10 RIGHT and +10/10 LEFT on 05/05/2022  -Protective sensation diminished with SWM +0/10 RIGHT and +9/10 LEFT on 02/23/2022  -Protective sensation diminished with SWM +0/10 RIGHT and +10/10 LEFT on 12/21/2021  -Protective sensation diminished with SWM +0/10 RIGHT and +10/10 LEFT on 10/12/2021  -Protective sensation diminished with SWM +0/10 RIGHT and +10/10 LEFT on 08/03/2021  -Protective sensation diminished with SWM +0/10 RIGHT and +9/10 LEFT on 05/17/2021  -Protective sensation diminished with SWM +1/10 RIGHT and +5/10 LEFT on 02/15/2021  -Protective sensation diminished with SWM +3/10 RIGHT and +10/10 LEFT on 01/23/2019     DERM:  -Hyperkeratotic lesion to RIGHT plantar 5th metatarsal head, RIGHT plantar  heel and RIGHT distal plantar hallux  ---No wounds post paring  -Toenails thickend, dystrophic and discolored x 10    -Skin mildly dry to bilateral plantar foot   -Skin temperature within normal limits to bilateral foot       MSK:  -RIGHT 1st MTPJ has 0 degrees of DORSIFLEXION and the hallux IPJ is mildly but rigidly plantarflexed   -DORSIFLEXION of bilateral ankle limited to between -5 short of vertical bilaterally   -Muscle strength of ankles for dorsiflexion, plantarflexion +0/5 RIGHT foot and +5/5 LEFT foot  -Muscle strength for ABDUction and ADDuction +5/5 LEFT and +4/5 RIGHT   ============================================================     ASSESSMENT:      (L84) Callus of heel (primary encounter diagnosis)     (L60.3) Onychodystrophy      (M20.21) Hallux rigidus of right foot     (M21.371) Foot drop, right foot    (M21.6X1) Gastrocnemius equinus of right lower extremity    (M21.6X2) Gastrocnemius equinus of left lower extremity    (E10.65) Type 1 diabetes mellitus with hyperglycemia (H)    (E10.42) Diabetic polyneuropathy associated with type 1 diabetes mellitus (H)    (M21.969,  R20.8) Loss of protective sensation of skin of deformed foot            PLAN:  -Patient evaluated and examined. Treatment options discussed with no educational barriers noted.    -High risk toenail debridement x 10 toenails without incident    -Callus pared x 3 to the RIGHT plantar hallux, RIGHT plantar fifth metatarsal head and RIGHT plantar heel without incident  ---Patient reminded that the callus will likely return due to the underlying, prominent bone causing the callus while the patient is walking.    -DM shoes: patient was dispensed for DM shoes at [facility] around February, 2022. The shoes are comfortable.    -Diabetic Foot Education provided. This included checking the feet daily looking for new new blisters or wounds, wearing shoes at all times when walking including around the house, and avoiding  lotion application between the toes. If there are any signs of infection, the patient should present to the ED as soon as possible. Infections of the foot can be life threatening or lead to amputations of the foot or leg.      -Patient in agreement with the above treatment plan and all of patient's questions were answered.        Return to clinic 3 months from last appointment for diabetic foot exam and high risk nail debridement         Juliette Jarrett DPM, KASSIDYM   <<----- Click to add NO pertinent Family History

## 2022-07-13 NOTE — NURSING NOTE
"Chief Complaint   Patient presents with     Toenail     DFE       Initial /73 (BP Location: Left arm, Patient Position: Sitting, Cuff Size: Adult Regular)   Pulse 78   Temp 97.1  F (36.2  C) (Tympanic)   SpO2 94%  Estimated body mass index is 38.67 kg/m  as calculated from the following:    Height as of 6/22/22: 1.537 m (5' 0.5\").    Weight as of 6/22/22: 91.3 kg (201 lb 4.8 oz).  Medication Reconciliation: gerald Velez  "

## 2022-07-19 NOTE — PROGRESS NOTES
Assessment & Plan     Type 1 diabetes mellitus with hyperglycemia (H)  Richard reports his blood sugars in the 130s to 140s at home. He did have a low of 49 two days ago while at work. He did not feel this low, but his dexcom will alarm. Last A1c 7.2 on 4/21/22 and A1c stable at 7.2 today. Urine albumin 10 with urine creatinine of 168, which is stable. Richard had an eye exam 7/2022, saw Macrina on 6/22/22, and was seen by Dr. Jarrett for foot care on 7/13/22. He reports feeling well today. He denies concerns or issues with Dexcom and omnipod.   - Hemoglobin A1c  - Albumin Random Urine Quantitative with Creat Ratio  - c/w Dexcom  - c/w fiasp insulin  - c/w omnipod  - c/w novolog   -     Mixed hyperlipidemia  Last LDL 42 on 6/14/21, 36  today.   - Lipid Profile (Chol, Trig, HDL, LDL calc)  - c/w simvastatin 40 mg daily     Primary hypertension  Stable with lisinopril 10 mg daily, continue with lisinopril daily    Gastroesophageal reflux disease without esophagitis  Stable with Protonix 40 mg daily, continue with Protonix daily     Chronic pain of both knees  Richard saw Dr. Stephens on 5/6/22 and was given bilateral kenalog injections to knees. He does note some relief in pain after injections. He will discuss non steroid injection options such as Synvisc with Dr. Stephens           Return in about 3 months (around 10/21/2022) for Diabetic Visit, Lab Work.    Rachel Stanley, Nurse Practitioner Student  [facility]      I was present with the nurse practitioner student who participated in the service and in the documentation of the note. I have verified the history and personally performed the physical exam and medical decision making. I agree with the assessment and plan of care as documented in the note.       Natacha Alvarez MD  [facility]   Richard is a 57 year old presenting for the following health issues:  Gastrointestinal Problem, Lipids, and Hypertension      HPI      Diabetes Follow-up    How often are you checking your blood sugar? Continuous glucose monitor  What time of day are you checking your blood sugars (select all that apply)?  Before and after meals  Have you had any blood sugars above 200?  Yes 287  Have you had any blood sugars below 70?  Yes 49    What symptoms do you notice when your blood sugar is low?  Shaky, Dizzy, Weak and Lethargy    What concerns do you have today about your diabetes? None     Do you have any of these symptoms? (Select all that apply)  Numbness in feet and Burning in feet    Have you had a diabetic eye exam in the last 12 months? Yes- Date of last eye exam: 7/2022,  Location: [facility] [place]    - last A1c 4/21/22, 7.2  - blood sugars 130s-140s at home   - did not feel low of 49 2 days ago while at work   - numbness and pain in feet stable       Hyperlipidemia Follow-Up      Are you regularly taking any medication or supplement to lower your cholesterol?   Yes- lipitor 40 mg    Are you having muscle aches or other side effects that you think could be caused by your cholesterol lowering medication?  No    Hypertension Follow-up      Do you check your blood pressure regularly outside of the clinic? No     Are you following a low salt diet? Yes    Are your blood pressures ever more than 140 on the top number (systolic) OR more   than 90 on the bottom number (diastolic), for example 140/90? Yes    BP Readings from Last 2 Encounters:   07/21/22 118/70   07/13/22 126/73     Hemoglobin A1C POCT (%)   Date Value   10/20/2021 7.7 (A)   06/03/2021 7.8 (A)     Hemoglobin A1C (%)   Date Value   07/21/2022 7.2 (H)   04/21/2022 7.2 (H)     LDL Cholesterol Calculated (mg/dL)   Date Value   07/21/2022 36   06/14/2021 42   03/24/2020 40         GERD/Heartburn  Onset/Duration: 3 years  Description: Acid reflex  Intensity: moderate  Progression of Symptoms: same  Accompanying Signs & Symptoms:  Does it feel like food gets stuck or trouble swallowing:  "No  Nausea: YES  Vomiting (bloody?): No  Abdominal Pain: YES  Black-Tarry stools: YES  Bloody stools: YES  History:  Previous similar episodes: YES  Previous ulcers: No  Precipitating factors:   Caffeine use: No  Alcohol use: No  NSAID/Aspirin use: No  Tobacco use: No  Worse with no particular food or drink.  Alleviating factors: None  Therapies tried and outcome:             Lifestyle changes: Limit amount of food            Medications: Protonix  - feel Protonix is working     # knee pain  - saw Dr. Stephens and given bilateral knee injections of kenalog and lidocaine (5/6/22)  - relief in pain, no concerns today       Review of Systems   Constitutional: Negative for activity change, appetite change, chills, fatigue and fever.   Respiratory: Negative for shortness of breath.    Cardiovascular: Negative for chest pain, palpitations and peripheral edema.   Gastrointestinal: Positive for heartburn. Negative for abdominal pain, diarrhea, nausea and vomiting.   Genitourinary: Negative for difficulty urinating.   Skin: Negative for pallor.   Neurological: Negative for dizziness and headaches.   All other systems reviewed and are negative.         Objective    /70 (BP Location: Left arm, Patient Position: Sitting)   Pulse 74   Temp 97.7  F (36.5  C) (Tympanic)   Resp 16   Ht 1.651 m (5' 5\")   Wt 89.8 kg (198 lb)   SpO2 93%   BMI 32.95 kg/m    Body mass index is 32.95 kg/m .  Physical Exam  Vitals and nursing note reviewed.   Constitutional:       General: He is not in acute distress.     Appearance: Normal appearance. He is not ill-appearing.   HENT:      Mouth/Throat:      Mouth: Mucous membranes are moist.      Pharynx: Oropharynx is clear. No oropharyngeal exudate or posterior oropharyngeal erythema.   Eyes:      Extraocular Movements: Extraocular movements intact.      Right eye: Normal extraocular motion and no nystagmus.      Left eye: Normal extraocular motion and no nystagmus.      Conjunctiva/sclera: " Conjunctivae normal.      Pupils: Pupils are equal, round, and reactive to light.   Cardiovascular:      Rate and Rhythm: Normal rate and regular rhythm.      Pulses:           Dorsalis pedis pulses are 1+ on the right side and 1+ on the left side.        Posterior tibial pulses are 1+ on the right side and 1+ on the left side.      Heart sounds: Normal heart sounds, S1 normal and S2 normal. No murmur heard.  Pulmonary:      Effort: Pulmonary effort is normal. No respiratory distress.      Breath sounds: Normal breath sounds. No wheezing or rales.   Abdominal:      General: Bowel sounds are normal.      Palpations: Abdomen is soft.   Musculoskeletal:         General: No swelling or deformity.      Right lower leg: No edema.      Left lower leg: No edema.      Right foot: Normal range of motion. No deformity.      Left foot: Normal range of motion. No deformity.   Feet:      Right foot:      Protective Sensation: 10 sites tested. 0 sites sensed.      Skin integrity: No ulcer, blister, skin breakdown, erythema or warmth.      Toenail Condition: Right toenails are abnormally thick.      Left foot:      Protective Sensation: 10 sites tested. 10 sites sensed.      Skin integrity: No ulcer, blister, skin breakdown, erythema or warmth.      Toenail Condition: Left toenails are abnormally thick.      Comments: Saw Dr. Jarrett 7/13/22 for foot care  Skin:     General: Skin is warm and dry.      Coloration: Skin is not jaundiced or pale.      Findings: No erythema.   Neurological:      General: No focal deficit present.      Mental Status: He is alert and oriented to person, place, and time.   Psychiatric:         Mood and Affect: Mood normal.         Behavior: Behavior normal.         Results for orders placed or performed in visit on 07/21/22 (from the past 24 hour(s))   Hemoglobin A1c   Result Value Ref Range    Estimated Average Glucose 160 mg/dL    Hemoglobin A1C 7.2 (H) 0.0 - 5.6 %   Albumin Random Urine Quantitative  with Creat Ratio   Result Value Ref Range    Creatinine Urine mg/dL 168 mg/dL    Albumin Urine mg/L 10 mg/L    Albumin Urine mg/g Cr 5.95 0.00 - 17.00 mg/g Cr   Lipid Profile (Chol, Trig, HDL, LDL calc)   Result Value Ref Range    Cholesterol 92 <200 mg/dL    Triglycerides 51 <150 mg/dL    Direct Measure HDL 46 >=40 mg/dL    LDL Cholesterol Calculated 36 <=100 mg/dL    Non HDL Cholesterol 46 <130 mg/dL    Patient Fasting > 8hrs? Yes     Narrative    Cholesterol  Desirable:  <200 mg/dL    Triglycerides  Normal:  Less than 150 mg/dL  Borderline High:  150-199 mg/dL  High:  200-499 mg/dL  Very High:  Greater than or equal to 500 mg/dL    Direct Measure HDL  Female:  Greater than or equal to 50 mg/dL   Male:  Greater than or equal to 40 mg/dL    LDL Cholesterol  Desirable:  <100mg/dL  Above Desirable:  100-129 mg/dL   Borderline High:  130-159 mg/dL   High:  160-189 mg/dL   Very High:  >= 190 mg/dL    Non HDL Cholesterol  Desirable:  130 mg/dL  Above Desirable:  130-159 mg/dL  Borderline High:  160-189 mg/dL  High:  190-219 mg/dL  Very High:  Greater than or equal to 220 mg/dL                   .  ..

## 2022-07-21 ENCOUNTER — LAB (OUTPATIENT)
Dept: LAB | Facility: OTHER | Age: 57
End: 2022-07-21
Attending: FAMILY MEDICINE
Payer: COMMERCIAL

## 2022-07-21 ENCOUNTER — OFFICE VISIT (OUTPATIENT)
Dept: FAMILY MEDICINE | Facility: OTHER | Age: 57
End: 2022-07-21
Attending: FAMILY MEDICINE
Payer: COMMERCIAL

## 2022-07-21 ENCOUNTER — ALLIED HEALTH/NURSE VISIT (OUTPATIENT)
Dept: EDUCATION SERVICES | Facility: OTHER | Age: 57
End: 2022-07-21
Attending: FAMILY MEDICINE
Payer: COMMERCIAL

## 2022-07-21 VITALS
SYSTOLIC BLOOD PRESSURE: 118 MMHG | HEART RATE: 74 BPM | DIASTOLIC BLOOD PRESSURE: 70 MMHG | RESPIRATION RATE: 16 BRPM | WEIGHT: 198 LBS | HEIGHT: 65 IN | BODY MASS INDEX: 32.99 KG/M2 | OXYGEN SATURATION: 93 % | TEMPERATURE: 97.7 F

## 2022-07-21 DIAGNOSIS — M25.561 CHRONIC PAIN OF BOTH KNEES: ICD-10-CM

## 2022-07-21 DIAGNOSIS — E10.9 DM TYPE 1 (DIABETES MELLITUS, TYPE 1) (H): Primary | ICD-10-CM

## 2022-07-21 DIAGNOSIS — E10.65 TYPE 1 DIABETES MELLITUS WITH HYPERGLYCEMIA (H): Primary | ICD-10-CM

## 2022-07-21 DIAGNOSIS — I10 PRIMARY HYPERTENSION: ICD-10-CM

## 2022-07-21 DIAGNOSIS — K21.9 GASTROESOPHAGEAL REFLUX DISEASE WITHOUT ESOPHAGITIS: ICD-10-CM

## 2022-07-21 DIAGNOSIS — M25.562 CHRONIC PAIN OF BOTH KNEES: ICD-10-CM

## 2022-07-21 DIAGNOSIS — E78.2 MIXED HYPERLIPIDEMIA: ICD-10-CM

## 2022-07-21 DIAGNOSIS — G89.29 CHRONIC PAIN OF BOTH KNEES: ICD-10-CM

## 2022-07-21 LAB
CHOLEST SERPL-MCNC: 92 MG/DL
CREAT UR-MCNC: 168 MG/DL
EST. AVERAGE GLUCOSE BLD GHB EST-MCNC: 160 MG/DL
FASTING STATUS PATIENT QL REPORTED: YES
HBA1C MFR BLD: 7.2 % (ref 0–5.6)
HDLC SERPL-MCNC: 46 MG/DL
LDLC SERPL CALC-MCNC: 36 MG/DL
MICROALBUMIN UR-MCNC: 10 MG/L
MICROALBUMIN/CREAT UR: 5.95 MG/G CR (ref 0–17)
NONHDLC SERPL-MCNC: 46 MG/DL
TRIGL SERPL-MCNC: 51 MG/DL

## 2022-07-21 PROCEDURE — 99214 OFFICE O/P EST MOD 30 MIN: CPT | Performed by: FAMILY MEDICINE

## 2022-07-21 PROCEDURE — 80061 LIPID PANEL: CPT | Mod: ZL | Performed by: FAMILY MEDICINE

## 2022-07-21 PROCEDURE — 83036 HEMOGLOBIN GLYCOSYLATED A1C: CPT | Mod: ZL | Performed by: FAMILY MEDICINE

## 2022-07-21 PROCEDURE — 36415 COLL VENOUS BLD VENIPUNCTURE: CPT | Mod: ZL | Performed by: FAMILY MEDICINE

## 2022-07-21 PROCEDURE — 82043 UR ALBUMIN QUANTITATIVE: CPT | Mod: ZL | Performed by: FAMILY MEDICINE

## 2022-07-21 PROCEDURE — G0463 HOSPITAL OUTPT CLINIC VISIT: HCPCS | Performed by: FAMILY MEDICINE

## 2022-07-21 ASSESSMENT — ANXIETY QUESTIONNAIRES
1. FEELING NERVOUS, ANXIOUS, OR ON EDGE: NOT AT ALL
5. BEING SO RESTLESS THAT IT IS HARD TO SIT STILL: NOT AT ALL
3. WORRYING TOO MUCH ABOUT DIFFERENT THINGS: NOT AT ALL
GAD7 TOTAL SCORE: 0
7. FEELING AFRAID AS IF SOMETHING AWFUL MIGHT HAPPEN: NOT AT ALL
2. NOT BEING ABLE TO STOP OR CONTROL WORRYING: NOT AT ALL
GAD7 TOTAL SCORE: 0
4. TROUBLE RELAXING: NOT AT ALL
6. BECOMING EASILY ANNOYED OR IRRITABLE: NOT AT ALL

## 2022-07-21 ASSESSMENT — ENCOUNTER SYMPTOMS
HEADACHES: 0
DIZZINESS: 0
FEVER: 0
CHILLS: 0
HEARTBURN: 1
APPETITE CHANGE: 0
FATIGUE: 0
ABDOMINAL PAIN: 0
VOMITING: 0
SHORTNESS OF BREATH: 0
DIFFICULTY URINATING: 0
PALPITATIONS: 0
ACTIVITY CHANGE: 0
DIARRHEA: 0
NAUSEA: 0

## 2022-07-21 ASSESSMENT — PAIN SCALES - GENERAL: PAINLEVEL: MODERATE PAIN (5)

## 2022-07-21 ASSESSMENT — PATIENT HEALTH QUESTIONNAIRE - PHQ9: SUM OF ALL RESPONSES TO PHQ QUESTIONS 1-9: 1

## 2022-07-21 NOTE — NURSING NOTE
"Chief Complaint   Patient presents with     Gastrointestinal Problem     Lipids     Hypertension       Initial /70 (BP Location: Left arm, Patient Position: Sitting)   Pulse 74   Temp 97.7  F (36.5  C) (Tympanic)   Resp 16   Ht 1.651 m (5' 5\")   Wt 89.8 kg (198 lb)   SpO2 93%   BMI 32.95 kg/m   Estimated body mass index is 32.95 kg/m  as calculated from the following:    Height as of this encounter: 1.651 m (5' 5\").    Weight as of this encounter: 89.8 kg (198 lb).  Medication Reconciliation: complete  Pham Eugene LPN  "

## 2022-08-03 ENCOUNTER — MEDICAL CORRESPONDENCE (OUTPATIENT)
Dept: EDUCATION SERVICES | Facility: OTHER | Age: 57
End: 2022-08-03

## 2022-08-29 DIAGNOSIS — R11.0 NAUSEA: ICD-10-CM

## 2022-08-30 RX ORDER — ONDANSETRON 4 MG/1
TABLET, FILM COATED ORAL
Qty: 30 TABLET | Refills: 4 | Status: SHIPPED | OUTPATIENT
Start: 2022-08-30 | End: 2022-12-29

## 2022-10-13 ENCOUNTER — OFFICE VISIT (OUTPATIENT)
Dept: PODIATRY | Facility: OTHER | Age: 57
End: 2022-10-13
Attending: PODIATRIST
Payer: COMMERCIAL

## 2022-10-13 VITALS
OXYGEN SATURATION: 96 % | SYSTOLIC BLOOD PRESSURE: 134 MMHG | TEMPERATURE: 96.6 F | HEART RATE: 70 BPM | DIASTOLIC BLOOD PRESSURE: 83 MMHG

## 2022-10-13 DIAGNOSIS — E10.65 TYPE 1 DIABETES MELLITUS WITH HYPERGLYCEMIA (H): ICD-10-CM

## 2022-10-13 DIAGNOSIS — R20.8 LOSS OF PROTECTIVE SENSATION OF SKIN OF DEFORMED FOOT: ICD-10-CM

## 2022-10-13 DIAGNOSIS — M62.462 GASTROCNEMIUS EQUINUS OF LEFT LOWER EXTREMITY: ICD-10-CM

## 2022-10-13 DIAGNOSIS — M21.371 FOOT DROP, RIGHT FOOT: ICD-10-CM

## 2022-10-13 DIAGNOSIS — E10.42 DIABETIC POLYNEUROPATHY ASSOCIATED WITH TYPE 1 DIABETES MELLITUS (H): ICD-10-CM

## 2022-10-13 DIAGNOSIS — M20.21 HALLUX RIGIDUS OF RIGHT FOOT: ICD-10-CM

## 2022-10-13 DIAGNOSIS — M62.461 GASTROCNEMIUS EQUINUS OF RIGHT LOWER EXTREMITY: ICD-10-CM

## 2022-10-13 DIAGNOSIS — L84 CALLUS OF FOOT: Primary | ICD-10-CM

## 2022-10-13 DIAGNOSIS — L60.3 ONYCHODYSTROPHY: ICD-10-CM

## 2022-10-13 DIAGNOSIS — M21.969 LOSS OF PROTECTIVE SENSATION OF SKIN OF DEFORMED FOOT: ICD-10-CM

## 2022-10-13 PROCEDURE — 11056 PARNG/CUTG B9 HYPRKR LES 2-4: CPT | Performed by: PODIATRIST

## 2022-10-13 PROCEDURE — G0463 HOSPITAL OUTPT CLINIC VISIT: HCPCS | Mod: 25

## 2022-10-13 PROCEDURE — 11721 DEBRIDE NAIL 6 OR MORE: CPT | Performed by: PODIATRIST

## 2022-10-13 ASSESSMENT — PAIN SCALES - GENERAL: PAINLEVEL: SEVERE PAIN (7)

## 2022-10-13 NOTE — NURSING NOTE
"Chief Complaint   Patient presents with     Toenail     DFE       Initial /83 (BP Location: Left arm, Patient Position: Sitting, Cuff Size: Adult Regular)   Pulse 70   Temp (!) 96.6  F (35.9  C) (Tympanic)   SpO2 96%  Estimated body mass index is 32.95 kg/m  as calculated from the following:    Height as of 7/21/22: 1.651 m (5' 5\").    Weight as of 7/21/22: 89.8 kg (198 lb).  Medication Reconciliation: complete  Ema Velez  "
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
## 2022-10-21 NOTE — PROGRESS NOTES
Assessment & Plan     Type 1 diabetes mellitus with hyperglycemia (H)  A1c steady at 7.3.  Rare lows and highs, no changes.   - Hemoglobin A1c  - on insulin pump  - c/w gabapentin 300mg tid   - will make arrangements for follow up with Macrina diabetic education   - CGM downloaded today    Primary hypertension  BP at goal   - c/w lisinopril 10mg     Hyperlipidemia, unspecified hyperlipidemia type  LDL (7/21/2022): 36  - simvastatin 40mg     Gastroesophageal reflux disease without esophagitis  Well controlled  - c/w protonix 40mg    Need for prophylactic vaccination and inoculation against influenza  - INFLUENZA QUAD, RECOMBINANT, P-FREE (RIV4) (FLUBLOK)      # COVID booster updated today      See Patient Instructions    Return in about 3 months (around 1/24/2023) for Physical Exam, Lab Work, Diabetic Visit.    Ntaacha Alvarez MD  [facility] Ramos is a 57 year old, presenting for the following health issues:  Diabetes, Hypertension, and Lipids      HPI     Diabetes Type 1 Follow-up    How often are you checking your blood sugar? Continuous glucose monitor  What time of day are you checking your blood sugars (select all that apply)?  throughout the day  Have you had any blood sugars above 200?  Yes. Rare, unknown reason  Have you had any blood sugars below 70?  Yes when overworking. Has alarms on pump    What symptoms do you notice when your blood sugar is low?  Shaky, Dizzy, Weak, Lethargy, Blurred vision and Confusion    What concerns do you have today about your diabetes? Blood sugar is often over 200 and Low blood sugar     Do you have any of these symptoms? (Select all that apply)  No numbness or tingling in feet.  No redness, sores or blisters on feet.  No complaints of excessive thirst.  No reports of blurry vision.  No significant changes to weight.    - follows with Dr. Jarrett, podiatry with last visit 10/13/2022. No issues. DM shoes at next appt. Follow up in 3  months    Hyperlipidemia Follow-Up      Are you regularly taking any medication or supplement to lower your cholesterol?   Yes- Simvastatin 40 mg    Are you having muscle aches or other side effects that you think could be caused by your cholesterol lowering medication?  No    Hypertension Follow-up      Do you check your blood pressure regularly outside of the clinic? Yes     Are you following a low salt diet? Yes    Are your blood pressures ever more than 140 on the top number (systolic) OR more   than 90 on the bottom number (diastolic), for example 140/90? Yes    BP Readings from Last 2 Encounters:   10/24/22 112/74   10/13/22 134/83     Hemoglobin A1C (%)   Date Value   07/21/2022 7.2 (H)   04/21/2022 7.2 (H)   10/20/2021 7.7 (A)   06/03/2021 7.8 (A)     LDL Cholesterol Calculated (mg/dL)   Date Value   07/21/2022 36   06/14/2021 42   03/24/2020 40         # Immunizations: COVID (updating today), influenza (updated today), PCV20 (next visit)  # [employer] stool kit for colon cancer screen. Sent sample in two weeks ago    ** wellness visit 3 months    Review of Systems   Constitutional: Negative for chills and fever.   HENT: Negative for congestion.    Respiratory: Negative for shortness of breath and wheezing.    Cardiovascular: Negative for chest pain and palpitations.   Gastrointestinal: Negative for abdominal pain and heartburn (controlled).   Neurological: Positive for paresthesias (feet).   Psychiatric/Behavioral: Negative for dysphoric mood.          Objective    /74   Pulse 73   Temp 97.8  F (36.6  C) (Tympanic)   Resp 17   Wt 89 kg (196 lb 2 oz)   SpO2 94%   BMI 32.64 kg/m    Body mass index is 32.64 kg/m .  Physical Exam  Constitutional:       General: He is not in acute distress.     Appearance: He is not ill-appearing.   Cardiovascular:      Rate and Rhythm: Normal rate and regular rhythm.      Pulses: Normal pulses.      Heart sounds: No murmur heard.  Pulmonary:      Effort: Pulmonary effort  is normal. No respiratory distress.      Breath sounds: No wheezing or rales.   Musculoskeletal:      Right lower leg: No edema.      Left lower leg: No edema.   Neurological:      Mental Status: He is alert.   Psychiatric:         Mood and Affect: Mood normal.          Results for orders placed or performed in visit on 10/24/22 (from the past 24 hour(s))   Hemoglobin A1c   Result Value Ref Range    Estimated Average Glucose 163 mg/dL    Hemoglobin A1C 7.3 (H) <5.7 %

## 2022-10-24 ENCOUNTER — OFFICE VISIT (OUTPATIENT)
Dept: FAMILY MEDICINE | Facility: OTHER | Age: 57
End: 2022-10-24
Attending: FAMILY MEDICINE
Payer: COMMERCIAL

## 2022-10-24 VITALS
RESPIRATION RATE: 17 BRPM | SYSTOLIC BLOOD PRESSURE: 112 MMHG | WEIGHT: 196.13 LBS | HEART RATE: 73 BPM | OXYGEN SATURATION: 94 % | BODY MASS INDEX: 32.64 KG/M2 | DIASTOLIC BLOOD PRESSURE: 74 MMHG | TEMPERATURE: 97.8 F

## 2022-10-24 DIAGNOSIS — I10 PRIMARY HYPERTENSION: ICD-10-CM

## 2022-10-24 DIAGNOSIS — Z23 NEED FOR PROPHYLACTIC VACCINATION AND INOCULATION AGAINST INFLUENZA: ICD-10-CM

## 2022-10-24 DIAGNOSIS — E10.65 TYPE 1 DIABETES MELLITUS WITH HYPERGLYCEMIA (H): ICD-10-CM

## 2022-10-24 DIAGNOSIS — Z23 NEED FOR COVID-19 VACCINE: Primary | ICD-10-CM

## 2022-10-24 DIAGNOSIS — K21.9 GASTROESOPHAGEAL REFLUX DISEASE WITHOUT ESOPHAGITIS: ICD-10-CM

## 2022-10-24 DIAGNOSIS — E78.5 HYPERLIPIDEMIA, UNSPECIFIED HYPERLIPIDEMIA TYPE: ICD-10-CM

## 2022-10-24 LAB
EST. AVERAGE GLUCOSE BLD GHB EST-MCNC: 163 MG/DL
HBA1C MFR BLD: 7.3 %

## 2022-10-24 PROCEDURE — G0008 ADMIN INFLUENZA VIRUS VAC: HCPCS

## 2022-10-24 PROCEDURE — 83036 HEMOGLOBIN GLYCOSYLATED A1C: CPT | Mod: ZL | Performed by: FAMILY MEDICINE

## 2022-10-24 PROCEDURE — 99214 OFFICE O/P EST MOD 30 MIN: CPT | Performed by: FAMILY MEDICINE

## 2022-10-24 PROCEDURE — 36415 COLL VENOUS BLD VENIPUNCTURE: CPT | Mod: ZL | Performed by: FAMILY MEDICINE

## 2022-10-24 PROCEDURE — G0463 HOSPITAL OUTPT CLINIC VISIT: HCPCS | Mod: 25

## 2022-10-24 PROCEDURE — 0124A COVID-19,PF,PFIZER BOOSTER BIVALENT: CPT

## 2022-10-24 ASSESSMENT — ENCOUNTER SYMPTOMS
SHORTNESS OF BREATH: 0
WHEEZING: 0
FEVER: 0
HEARTBURN: 0
ABDOMINAL PAIN: 0
PALPITATIONS: 0
CHILLS: 0
PARESTHESIAS: 1
DYSPHORIC MOOD: 0

## 2022-10-24 ASSESSMENT — PAIN SCALES - GENERAL: PAINLEVEL: SEVERE PAIN (6)

## 2022-10-24 NOTE — NURSING NOTE
"Chief Complaint   Patient presents with     Diabetes     Hypertension     Lipids       Initial /74   Pulse 73   Temp 97.8  F (36.6  C) (Tympanic)   Resp 17   Wt 89 kg (196 lb 2 oz)   SpO2 94%   BMI 32.64 kg/m   Estimated body mass index is 32.64 kg/m  as calculated from the following:    Height as of 7/21/22: 1.651 m (5' 5\").    Weight as of this encounter: 89 kg (196 lb 2 oz).  Medication Reconciliation: complete  Rosy Smith LPN  "

## 2022-11-03 ENCOUNTER — APPOINTMENT (OUTPATIENT)
Dept: CT IMAGING | Facility: HOSPITAL | Age: 57
End: 2022-11-03
Attending: EMERGENCY MEDICINE
Payer: COMMERCIAL

## 2022-11-03 ENCOUNTER — HOSPITAL ENCOUNTER (EMERGENCY)
Facility: HOSPITAL | Age: 57
Discharge: HOME | End: 2022-11-03
Attending: EMERGENCY MEDICINE | Admitting: EMERGENCY MEDICINE
Payer: COMMERCIAL

## 2022-11-03 VITALS
TEMPERATURE: 97.1 F | RESPIRATION RATE: 16 BRPM | HEART RATE: 66 BPM | SYSTOLIC BLOOD PRESSURE: 142 MMHG | OXYGEN SATURATION: 96 % | DIASTOLIC BLOOD PRESSURE: 72 MMHG

## 2022-11-03 DIAGNOSIS — R33.9 URINARY RETENTION WITH INCOMPLETE BLADDER EMPTYING: ICD-10-CM

## 2022-11-03 LAB
ALBUMIN SERPL BCG-MCNC: 4.2 G/DL (ref 3.5–5.2)
ALBUMIN UR-MCNC: NEGATIVE MG/DL
ALP SERPL-CCNC: 96 U/L (ref 40–129)
ALT SERPL W P-5'-P-CCNC: 24 U/L (ref 10–50)
ANION GAP SERPL CALCULATED.3IONS-SCNC: 9 MMOL/L (ref 7–15)
APPEARANCE UR: CLEAR
AST SERPL W P-5'-P-CCNC: 31 U/L (ref 10–50)
BASOPHILS # BLD AUTO: 0.1 10E3/UL (ref 0–0.2)
BASOPHILS NFR BLD AUTO: 1 %
BILIRUB SERPL-MCNC: 0.5 MG/DL
BILIRUB UR QL STRIP: NEGATIVE
BUN SERPL-MCNC: 20 MG/DL (ref 6–20)
CALCIUM SERPL-MCNC: 9.3 MG/DL (ref 8.6–10)
CHLORIDE SERPL-SCNC: 99 MMOL/L (ref 98–107)
COLOR UR AUTO: ABNORMAL
CREAT SERPL-MCNC: 0.98 MG/DL (ref 0.67–1.17)
DEPRECATED HCO3 PLAS-SCNC: 26 MMOL/L (ref 22–29)
EOSINOPHIL # BLD AUTO: 0 10E3/UL (ref 0–0.7)
EOSINOPHIL NFR BLD AUTO: 0 %
ERYTHROCYTE [DISTWIDTH] IN BLOOD BY AUTOMATED COUNT: 11.9 % (ref 10–15)
GFR SERPL CREATININE-BSD FRML MDRD: 89 ML/MIN/1.73M2
GLUCOSE BLDC GLUCOMTR-MCNC: 266 MG/DL (ref 70–99)
GLUCOSE SERPL-MCNC: 293 MG/DL (ref 70–99)
GLUCOSE UR STRIP-MCNC: 100 MG/DL
HCT VFR BLD AUTO: 41.4 % (ref 40–53)
HGB BLD-MCNC: 14 G/DL (ref 13.3–17.7)
HGB UR QL STRIP: NEGATIVE
HOLD SPECIMEN: NORMAL
HOLD SPECIMEN: NORMAL
IMM GRANULOCYTES # BLD: 0 10E3/UL
IMM GRANULOCYTES NFR BLD: 0 %
INR PPP: 1.19 (ref 0.85–1.15)
KETONES UR STRIP-MCNC: NEGATIVE MG/DL
LEUKOCYTE ESTERASE UR QL STRIP: NEGATIVE
LYMPHOCYTES # BLD AUTO: 1.1 10E3/UL (ref 0.8–5.3)
LYMPHOCYTES NFR BLD AUTO: 12 %
MCH RBC QN AUTO: 30.6 PG (ref 26.5–33)
MCHC RBC AUTO-ENTMCNC: 33.8 G/DL (ref 31.5–36.5)
MCV RBC AUTO: 90 FL (ref 78–100)
MONOCYTES # BLD AUTO: 0.5 10E3/UL (ref 0–1.3)
MONOCYTES NFR BLD AUTO: 6 %
MUCOUS THREADS #/AREA URNS LPF: PRESENT /LPF
NEUTROPHILS # BLD AUTO: 7.2 10E3/UL (ref 1.6–8.3)
NEUTROPHILS NFR BLD AUTO: 81 %
NITRATE UR QL: NEGATIVE
NRBC # BLD AUTO: 0 10E3/UL
NRBC BLD AUTO-RTO: 0 /100
PH UR STRIP: 6 [PH] (ref 4.7–8)
PLATELET # BLD AUTO: 286 10E3/UL (ref 150–450)
POTASSIUM SERPL-SCNC: 4.9 MMOL/L (ref 3.4–5.3)
PROT SERPL-MCNC: 7.2 G/DL (ref 6.4–8.3)
RBC # BLD AUTO: 4.58 10E6/UL (ref 4.4–5.9)
RBC URINE: 1 /HPF
SODIUM SERPL-SCNC: 134 MMOL/L (ref 136–145)
SP GR UR STRIP: 1.01 (ref 1–1.03)
SQUAMOUS EPITHELIAL: 0 /HPF
UROBILINOGEN UR STRIP-MCNC: NORMAL MG/DL
WBC # BLD AUTO: 8.9 10E3/UL (ref 4–11)
WBC URINE: <1 /HPF

## 2022-11-03 PROCEDURE — 93010 ELECTROCARDIOGRAM REPORT: CPT | Performed by: INTERNAL MEDICINE

## 2022-11-03 PROCEDURE — 258N000003 HC RX IP 258 OP 636: Performed by: EMERGENCY MEDICINE

## 2022-11-03 PROCEDURE — 74176 CT ABD & PELVIS W/O CONTRAST: CPT

## 2022-11-03 PROCEDURE — 51702 INSERT TEMP BLADDER CATH: CPT

## 2022-11-03 PROCEDURE — 85610 PROTHROMBIN TIME: CPT | Performed by: EMERGENCY MEDICINE

## 2022-11-03 PROCEDURE — 36415 COLL VENOUS BLD VENIPUNCTURE: CPT | Performed by: EMERGENCY MEDICINE

## 2022-11-03 PROCEDURE — 99284 EMERGENCY DEPT VISIT MOD MDM: CPT | Performed by: EMERGENCY MEDICINE

## 2022-11-03 PROCEDURE — 93005 ELECTROCARDIOGRAM TRACING: CPT

## 2022-11-03 PROCEDURE — 82040 ASSAY OF SERUM ALBUMIN: CPT | Performed by: EMERGENCY MEDICINE

## 2022-11-03 PROCEDURE — 70450 CT HEAD/BRAIN W/O DYE: CPT

## 2022-11-03 PROCEDURE — 81001 URINALYSIS AUTO W/SCOPE: CPT | Performed by: EMERGENCY MEDICINE

## 2022-11-03 PROCEDURE — 99285 EMERGENCY DEPT VISIT HI MDM: CPT | Mod: 25

## 2022-11-03 PROCEDURE — 80053 COMPREHEN METABOLIC PANEL: CPT | Performed by: EMERGENCY MEDICINE

## 2022-11-03 PROCEDURE — 96360 HYDRATION IV INFUSION INIT: CPT | Mod: XU

## 2022-11-03 PROCEDURE — 85025 COMPLETE CBC W/AUTO DIFF WBC: CPT | Performed by: EMERGENCY MEDICINE

## 2022-11-03 RX ORDER — SODIUM CHLORIDE 9 MG/ML
INJECTION, SOLUTION INTRAVENOUS CONTINUOUS
Status: DISCONTINUED | OUTPATIENT
Start: 2022-11-03 | End: 2022-11-03 | Stop reason: HOSPADM

## 2022-11-03 RX ORDER — NITROFURANTOIN 25; 75 MG/1; MG/1
100 CAPSULE ORAL 2 TIMES DAILY
Qty: 10 CAPSULE | Refills: 0 | Status: SHIPPED | OUTPATIENT
Start: 2022-11-03 | End: 2022-11-08

## 2022-11-03 RX ADMIN — SODIUM CHLORIDE 1000 ML: 9 INJECTION, SOLUTION INTRAVENOUS at 13:23

## 2022-11-03 ASSESSMENT — ENCOUNTER SYMPTOMS
LIGHT-HEADEDNESS: 1
RESPIRATORY NEGATIVE: 1
BACK PAIN: 1
FATIGUE: 1
CARDIOVASCULAR NEGATIVE: 1
ENDOCRINE NEGATIVE: 1
FLANK PAIN: 1
EYES NEGATIVE: 1
GASTROINTESTINAL NEGATIVE: 1

## 2022-11-03 ASSESSMENT — ACTIVITIES OF DAILY LIVING (ADL)
ADLS_ACUITY_SCORE: 35
ADLS_ACUITY_SCORE: 35

## 2022-11-03 NOTE — ED PROVIDER NOTES
"  History     Chief Complaint   Patient presents with     Dizziness     HPI  Richard Harvey is a 57 year old male who comes to the emergency department complaining of an episode of dizziness and lightheadedness that came on him suddenly about noon.  He states that he is diabetic.  He did eat earlier today.  He states that he felt as though he was going to \"blacked out\".  He states that the symptoms passed fairly quickly and now he just feels very fatigued.  He denies headache.  He states that his vision became blurry for a brief period of time.  He did not have chest pain or palpitations.  He states he has had some low back and flank pain for the last 4 to 5 days.  He has not noticed hematuria or dysuria.  He has not had vomiting or diarrhea.  He denies any recent abdominal discomfort.  He denies any extremity weakness.    Allergies:  No Known Allergies    Problem List:    Patient Active Problem List    Diagnosis Date Noted     Gastroesophageal reflux disease without esophagitis 11/30/2020     Priority: Medium     Gastroparesis 09/29/2020     Priority: Medium     Positive gastric emptying study on 9/28/2020 w/ 20% retention at 4 hours.        Obesity (BMI 35.0-39.9) with comorbidity (H) 08/15/2019     Priority: Medium     Diabetic polyneuropathy associated with type 1 diabetes mellitus (H) 06/05/2019     Priority: Medium     Class 2 obesity in adult 06/05/2019     Priority: Medium     ACP (advance care planning) 01/18/2017     Priority: Medium     Advance Care Planning 1/18/2017: ACP Review of Chart / Resources Provided:  Reviewed chart for advance care plan.  Richard Harvey has no plan or code status on file. Discussed available resources and provided with information. Confirmed code status reflects current choices pending further ACP discussions.  Confirmed/documented legally designated decision makers.  Added by Janiya Rocha           DM type 1 (diabetes mellitus, type 1) (H) 08/15/2013     Priority: Medium     " HTN (hypertension) 08/15/2013     Priority: Medium     Hyperlipidemia 08/15/2013     Priority: Medium        Past Medical History:    Past Medical History:   Diagnosis Date     Diabetes mellitus type 1, controlled (H)      HLD (hyperlipidemia)      HTN (hypertension)      Neuropathy      Type 1 diabetes (H)        Past Surgical History:    Past Surgical History:   Procedure Laterality Date     COLONOSCOPY - HIM SCAN N/A 09/19/2016    Procedure: COLONOSCOPY SCREENING; Surgeon: Rudy Rojo MD; Location: [facility] OR     ESOPHAGOSCOPY, GASTROSCOPY, DUODENOSCOPY (EGD), COMBINED  08/13/2019       Family History:    Family History   Problem Relation Age of Onset     No Known Problems Mother      Hypertension Father      Hypertension Brother        Social History:  Marital Status:  Single [1]  Social History     Tobacco Use     Smoking status: Never     Smokeless tobacco: Never   Substance Use Topics     Alcohol use: No     Alcohol/week: 0.0 standard drinks     Drug use: No        Medications:    acetone, Urine, test STRP  blood glucose monitoring (MELISSA CONTOUR NEXT MONITOR) meter device kit  blood glucose monitoring (MELISSA CONTOUR NEXT) test strip  CALCIUM-VITAMIN D PO  Continuous Blood Gluc  (DEXCOM G6 ) DAYAN  Continuous Blood Gluc Sensor (DEXCOM G6 SENSOR) MISC  Continuous Blood Gluc Transmit (DEXCOM G6 TRANSMITTER) MISC  CONTOUR NEXT TEST test strip  gabapentin (NEURONTIN) 300 MG capsule  Glucagon (GVOKE HYPOPEN 2-PACK) 1 MG/0.2ML SOAJ  Insulin Disposable Pump (OMNIPOD 5 G6 INTRO, GEN 5,) KIT  Insulin Disposable Pump (OMNIPOD 5 G6 POD, GEN 5,) MISC  INSULIN PUMP - OUTPATIENT  lisinopril (ZESTRIL) 10 MG tablet  Multiple Vitamin (MULTI-VITAMIN DAILY PO)  nitroFURantoin macrocrystal-monohydrate (MACROBID) 100 MG capsule  nitroGLYcerin (NITROSTAT) 0.4 MG sublingual tablet  NOVOLOG VIAL 100 UNIT/ML soln  NOVOLOG VIAL 100 UNIT/ML soln  ondansetron (ZOFRAN) 4 MG tablet  pantoprazole (PROTONIX) 40 MG EC  tablet  simvastatin (ZOCOR) 40 MG tablet  UREA 10 HYDRATING 10 % external cream  URINE GLUCOSE-KETONES TEST VI  order for DME          Review of Systems   Constitutional: Positive for fatigue.   HENT: Negative.    Eyes: Negative.    Respiratory: Negative.    Cardiovascular: Negative.    Gastrointestinal: Negative.    Endocrine: Negative.    Genitourinary: Positive for flank pain.   Musculoskeletal: Positive for back pain.   Skin: Negative.    Neurological: Positive for light-headedness.   All other systems reviewed they are found unremarkable.    Physical Exam   BP: 130/80  Pulse: 69  Temp: 97.8  F (36.6  C)  Resp: 18  SpO2: 94 %      Physical Exam  Constitutional:       Appearance: Normal appearance.   HENT:      Head: Normocephalic and atraumatic.      Nose: Nose normal.      Mouth/Throat:      Mouth: Mucous membranes are moist.      Pharynx: Oropharynx is clear. No oropharyngeal exudate or posterior oropharyngeal erythema.   Eyes:      Extraocular Movements: Extraocular movements intact.      Pupils: Pupils are equal, round, and reactive to light.   Cardiovascular:      Rate and Rhythm: Normal rate and regular rhythm.      Pulses: Normal pulses.      Heart sounds: Normal heart sounds.   Pulmonary:      Effort: Pulmonary effort is normal.      Breath sounds: Normal breath sounds.   Abdominal:      General: Abdomen is flat.      Palpations: Abdomen is soft.      Tenderness: There is no abdominal tenderness. There is no guarding.   Musculoskeletal:         General: No swelling or tenderness. Normal range of motion.      Cervical back: Normal range of motion and neck supple. No rigidity or tenderness.   Skin:     General: Skin is warm and dry.      Capillary Refill: Capillary refill takes less than 2 seconds.   Neurological:      General: No focal deficit present.      Mental Status: He is alert and oriented to person, place, and time.      Cranial Nerves: No cranial nerve deficit.      Motor: No weakness.    Psychiatric:         Mood and Affect: Mood normal.         Behavior: Behavior normal.     Patient's bladder is palpated above the symphysis pubis.    ED Course        Patient was bladder scanned on return from CT and was seen to be retaining 700 cc of urine.  Snowden catheter was established, the patient drained several 100 cc of urine and felt subjectively much better.  Will be discharged with Snowden catheter and leg bag.  We will refer him to Dr. Hernandez from urology.  I will advise him to follow-up with his primary care provider also as soon as possible.                  {EKG done and interpreted by myself.  Shows a normal sinus rhythm.  Ventricular rate 67 bpm.  NC interval 142 ms.  Corrected  ms.  There is no ST segment elevation or depression.  No inappropriate T wave inversion.  No evidence of ischemia or ectopy             Results for orders placed or performed during the hospital encounter of 11/03/22 (from the past 24 hour(s))   Glucose by meter   Result Value Ref Range    GLUCOSE BY METER POCT 266 (H) 70 - 99 mg/dL   CBC with platelets differential    Narrative    The following orders were created for panel order CBC with platelets differential.  Procedure                               Abnormality         Status                     ---------                               -----------         ------                     CBC with platelets and d...[[id number]]                      Final result                 Please view results for these tests on the individual orders.   INR   Result Value Ref Range    INR 1.19 (H) 0.85 - 1.15   Comprehensive metabolic panel   Result Value Ref Range    Sodium 134 (L) 136 - 145 mmol/L    Potassium 4.9 3.4 - 5.3 mmol/L    Chloride 99 98 - 107 mmol/L    Carbon Dioxide (CO2) 26 22 - 29 mmol/L    Anion Gap 9 7 - 15 mmol/L    Urea Nitrogen 20.0 6.0 - 20.0 mg/dL    Creatinine 0.98 0.67 - 1.17 mg/dL    Calcium 9.3 8.6 - 10.0 mg/dL    Glucose 293 (H) 70 - 99 mg/dL    Alkaline  Phosphatase 96 40 - 129 U/L    AST 31 10 - 50 U/L    ALT 24 10 - 50 U/L    Protein Total 7.2 6.4 - 8.3 g/dL    Albumin 4.2 3.5 - 5.2 g/dL    Bilirubin Total 0.5 <=1.2 mg/dL    GFR Estimate 89 >60 mL/min/1.73m2   CBC with platelets and differential   Result Value Ref Range    WBC Count 8.9 4.0 - 11.0 10e3/uL    RBC Count 4.58 4.40 - 5.90 10e6/uL    Hemoglobin 14.0 13.3 - 17.7 g/dL    Hematocrit 41.4 40.0 - 53.0 %    MCV 90 78 - 100 fL    MCH 30.6 26.5 - 33.0 pg    MCHC 33.8 31.5 - 36.5 g/dL    RDW 11.9 10.0 - 15.0 %    Platelet Count 286 150 - 450 10e3/uL    % Neutrophils 81 %    % Lymphocytes 12 %    % Monocytes 6 %    % Eosinophils 0 %    % Basophils 1 %    % Immature Granulocytes 0 %    NRBCs per 100 WBC 0 <1 /100    Absolute Neutrophils 7.2 1.6 - 8.3 10e3/uL    Absolute Lymphocytes 1.1 0.8 - 5.3 10e3/uL    Absolute Monocytes 0.5 0.0 - 1.3 10e3/uL    Absolute Eosinophils 0.0 0.0 - 0.7 10e3/uL    Absolute Basophils 0.1 0.0 - 0.2 10e3/uL    Absolute Immature Granulocytes 0.0 <=0.4 10e3/uL    Absolute NRBCs 0.0 10e3/uL   Extra Tube    Narrative    The following orders were created for panel order Extra Tube.  Procedure                               Abnormality         Status                     ---------                               -----------         ------                     Extra Red Top Tube[[id number]]                               Final result               Extra Green Top (Lithium...[[id number]]                      Final result                 Please view results for these tests on the individual orders.   Extra Red Top Tube   Result Value Ref Range    Hold Specimen JIC    Extra Green Top (Lithium Heparin) ON ICE   Result Value Ref Range    Hold Specimen JIC    CT Head w/o Contrast    Narrative    PROCEDURE: CT HEAD W/O CONTRAST 11/3/2022 1:41 PM    HISTORY: dizziness    COMPARISONS: None.    Meds/Dose Given: None.    TECHNIQUE: Axial noncontrast enhanced images with coronal and sagittal  reformatted  images.    FINDINGS: Ventricles, sulci and basilar cisterns are normal in size  for patient of this age. No mass or midline shift is seen and there is  no extra-axial fluid collection or acute hemorrhage. Gray-white  differentiation is preserved.    Bone windows show no fracture. Visualized paranasal sinuses and  mastoid air cells are clear.         Impression    IMPRESSION: No intracranial mass effect or hemorrhage.    BERE TATE MD         SYSTEM ID:  [id number]   CT Abdomen Pelvis w/o Contrast    Narrative    Exam: CT ABDOMEN PELVIS W/O CONTRAST    Exam reason: flank pain    Technique: Using helical CT technique, axial images of the abdomen and  pelvis  were acquired without administration of IV contrast. Coronal  and sagittal reformats were performed. This CT was performed using one  or more of the following dose reduction techniques: automated exposure  control, adjustment of the mA and/or kV according to patient size,  and/or use of iterative reconstruction technique.    Comparison: 10/15/2019.    FINDINGS:    NOTE: Noncontrast images are insensitive for detection of solid organ  abnormalities and some other types of pathology.    ABDOMEN:     Liver:  No focal mass.    Gallbladder:  No calcified gallstones.  Bile Ducts:  No significantly dilated ducts.  Spleen:  Normal size without focal abnormality.  Kidneys:  No hydronephrosis. No urinary calculi in the kidneys,  ureters, or bladder. No definite solid mass.  Adrenals:  No nodules.  Pancreas:  No mass or peripancreatic fat stranding.   Lymph Nodes:   No significant adenopathy.   Vascular:  No aortic aneurysm.   Abdominal wall:   No acute findings.    Pelvis: No mass or adenopathy. The bladder is moderately distended.    Bowel/Mesentery/Peritoneum:   -No evidence of bowel obstruction.   -No acute inflammatory findings.   -Normal appendix.  -No ascites.    Visualized portion of the Chest: No pleural effusion or consolidation.      Musculoskeletal: No acute  osseous abnormalities. Multilevel  degenerative changes of the visualized spine. There is fusion of the  pubic symphysis and deformity of the sacrum, unchanged.      Impression    IMPRESSION:    No hydronephrosis. No calculi within the kidneys, ureters, or bladder.  Normal appendix.    There is moderate distention of the bladder.    SHRUTI MARTE MD         SYSTEM ID:  [id number]   UA with Microscopic reflex to Culture    Specimen: Urine, NOS   Result Value Ref Range    Color Urine Light Yellow Colorless, Straw, Light Yellow, Yellow    Appearance Urine Clear Clear    Glucose Urine 100 (A) Negative mg/dL    Bilirubin Urine Negative Negative    Ketones Urine Negative Negative mg/dL    Specific Gravity Urine 1.015 1.003 - 1.035    Blood Urine Negative Negative    pH Urine 6.0 4.7 - 8.0    Protein Albumin Urine Negative Negative mg/dL    Urobilinogen Urine Normal Normal, 2.0 mg/dL    Nitrite Urine Negative Negative    Leukocyte Esterase Urine Negative Negative    Mucus Urine Present (A) None Seen /LPF    RBC Urine 1 <=2 /HPF    WBC Urine <1 <=5 /HPF    Squamous Epithelials Urine 0 <=1 /HPF    Narrative    Urine Culture not indicated       Medications   0.9% sodium chloride BOLUS (0 mLs Intravenous Stopped 11/3/22 1422)     Followed by   sodium chloride 0.9% infusion (has no administration in time range)       Assessments & Plan (with Medical Decision Making)     I have reviewed the nursing notes.    I have reviewed the findings, diagnosis, plan and need for follow up with the patient.      New Prescriptions    NITROFURANTOIN MACROCRYSTAL-MONOHYDRATE (MACROBID) 100 MG CAPSULE    Take 1 capsule (100 mg) by mouth 2 times daily for 5 days       Final diagnoses:   Urinary retention with incomplete bladder emptying       11/3/2022   [facility]     Luis Antonio Cabrera,   11/03/22 [id number]

## 2022-11-03 NOTE — ED NOTES
"Pt stated that pain began in right lower flank area that is still present, rating it 8/10. From there, pt developed additional symptoms of nausea, diaphoresis, dizziness and \"feeling off.\" Pt reports voiding less than usual but also has been drinking less than normal. No other significant symptoms noted.   "

## 2022-11-03 NOTE — ED TRIAGE NOTES
Patient presents with c/o feeling lightheaded, clammy, and feeling like he is going to pass out, and back pain.  Patient diaphoretic, denies any chest pain or SOB.       Patient reports that symptom started around noonish.     Patient reports being diabetic sugar was 245. Patient reports eating this AM.    BEFAST negative.

## 2022-11-04 ENCOUNTER — TELEPHONE (OUTPATIENT)
Dept: FAMILY MEDICINE | Facility: OTHER | Age: 57
End: 2022-11-04

## 2022-11-04 DIAGNOSIS — R33.9 URINARY RETENTION WITH INCOMPLETE BLADDER EMPTYING: Primary | ICD-10-CM

## 2022-11-04 NOTE — TELEPHONE ENCOUNTER
Scheduled with Dr. Ozuna 11/8/22  9:45  Patient relayed understanding.  No further concerns at calls end.

## 2022-11-04 NOTE — TELEPHONE ENCOUNTER
Referral to urology signed.  Follow up with covering provider next week, please.    Natacha Alvarez MD

## 2022-11-04 NOTE — TELEPHONE ENCOUNTER
"Emergency Department and Urgent Care Follow-up      Reason for ER/UC visit: Dizziness  o Date seen: 11/3/22      New or Worsening symptoms:  Feels \"about the same\"       Prescription Received/Picked up from Pharmacy?: macrobid 100 mg   o Medications started? yes  o Any questions or issues regarding your prescription?: no      Follow-up Results or Labs that are pending: None      Questions or concerns?: suggested to f/u with Urology.        ER Recommends Follow-up by: asap      RN Recommendations: complete abx course. Consider probiotic during abx course. Increase fluid intake      o Appointment scheduled:   Request sent to Provider to advise if tiago't needed today, or see covering Provider next week, or f/u with PCP week of the 14th?    Pended to PCP to review & advise.  Include referral to Urology     Call patient with update.T:  [phone]                If you start feeling worse, or have any further questions, please feel free to contact Nurse Triage at [phone].  If needing immediate medical attention at any time please call 911/Go to the ER.    "

## 2022-11-08 ENCOUNTER — OFFICE VISIT (OUTPATIENT)
Dept: FAMILY MEDICINE | Facility: OTHER | Age: 57
End: 2022-11-08
Attending: FAMILY MEDICINE
Payer: COMMERCIAL

## 2022-11-08 ENCOUNTER — TELEPHONE (OUTPATIENT)
Dept: FAMILY MEDICINE | Facility: OTHER | Age: 57
End: 2022-11-08

## 2022-11-08 VITALS
RESPIRATION RATE: 18 BRPM | BODY MASS INDEX: 30.79 KG/M2 | HEART RATE: 85 BPM | WEIGHT: 185 LBS | SYSTOLIC BLOOD PRESSURE: 120 MMHG | TEMPERATURE: 98.7 F | DIASTOLIC BLOOD PRESSURE: 64 MMHG | OXYGEN SATURATION: 94 %

## 2022-11-08 DIAGNOSIS — E10.65 TYPE 1 DIABETES MELLITUS WITH HYPERGLYCEMIA (H): Primary | ICD-10-CM

## 2022-11-08 DIAGNOSIS — R42 VERTIGO: Primary | ICD-10-CM

## 2022-11-08 DIAGNOSIS — R33.9 URINARY RETENTION WITH INCOMPLETE BLADDER EMPTYING: ICD-10-CM

## 2022-11-08 PROCEDURE — G0463 HOSPITAL OUTPT CLINIC VISIT: HCPCS

## 2022-11-08 PROCEDURE — G0463 HOSPITAL OUTPT CLINIC VISIT: HCPCS | Mod: 25

## 2022-11-08 PROCEDURE — 99213 OFFICE O/P EST LOW 20 MIN: CPT | Performed by: FAMILY MEDICINE

## 2022-11-08 ASSESSMENT — ENCOUNTER SYMPTOMS
SHORTNESS OF BREATH: 0
FEVER: 0
PALPITATIONS: 0
WEAKNESS: 0

## 2022-11-08 NOTE — NURSING NOTE
"Chief Complaint   Patient presents with     ER F/U       Initial /64   Pulse 85   Temp 98.7  F (37.1  C) (Tympanic)   Resp 18   Wt 83.9 kg (185 lb)   SpO2 94%   BMI 30.79 kg/m   Estimated body mass index is 30.79 kg/m  as calculated from the following:    Height as of 7/21/22: 1.651 m (5' 5\").    Weight as of this encounter: 83.9 kg (185 lb).  Medication Reconciliation: complete  Rosy Smith LPN  "

## 2022-11-08 NOTE — PROGRESS NOTES
Assessment & Plan     Vertigo  Vertigo, resolved per patient.  If recurs, may consider PT eval.      Urinary retention with incomplete bladder emptying  Catheter removed at his request as he states he must work and cannot work with it.  We discussed it was placed due to him not emptying his bladder, and if removed he may not be able to urinate again.  If this happens, he will need to go back to the ER to have a new Snowden placed.        HOSSEIN VEGA, DO  [facility]   Richard is a 57 year old, presenting for the following health issues:  ER F/U      HPI     Richard is a 57-year-old male whom I am seeing today for ER follow-up.  He has type 1 diabetes with neuropathy and gastroparesis.      He was seen in the ER 5 days ago for lightheadedness/vertigo.  Upon questioning today, he states that he was mopping the floor like he normally does when he had acute onset of room spinning sensation without darkness closing in or syncope/presyncope.  He has had this before but much milder.  He also does endorse that chronically he gets mildly lightheaded if he stands up too quickly but this is not the same.  States sugar checked and okay at time of incident.     Also in the ER he was found to have urinary retention and a Snowden catheter was placed.  He has upcoming follow-up with urology next week but he wants us to take out his catheter today.  This is because it is uncomfortable and he is unable to work if he is wearing it.  He must work.    He denies abdominal pain, headache, visual changes, palpitations, chest pain, dyspnea, fever, flank pain.    ED/UC Followup:    Facility:  [facility] [place]  Date of visit: 11/03/2022  Reason for visit: Dizziness, Urinary Retention with incomplete bladder emptying  Current Status: States that he is feeling better. Finished the Macrobid . States that he does take in lots of fluid.      Review of Systems   Constitutional: Negative for fever.   Respiratory: Negative for  shortness of breath.    Cardiovascular: Negative for chest pain and palpitations.   Neurological: Negative for weakness.            Objective    /64   Pulse 85   Temp 98.7  F (37.1  C) (Tympanic)   Resp 18   Wt 83.9 kg (185 lb)   SpO2 94%   BMI 30.79 kg/m    Body mass index is 30.79 kg/m .     Orthostatics  Supine 120/62  74  Sitting  124/60  70  Standing  120/62  82    Physical Exam  Constitutional:       General: He is not in acute distress.     Appearance: Normal appearance.   Neck:      Vascular: No carotid bruit.   Cardiovascular:      Rate and Rhythm: Normal rate and regular rhythm.      Heart sounds: Normal heart sounds. No murmur heard.  Pulmonary:      Effort: Pulmonary effort is normal.      Breath sounds: Normal breath sounds. No wheezing.   Neurological:      General: No focal deficit present.      Mental Status: He is alert and oriented to person, place, and time.

## 2022-11-08 NOTE — LETTER
November 8, 2022      Richard Harvey  [address] [facility] [id number]        To Whom It May Concern:    Richard Harvey  was seen on 11/03/2022 in the ER, he was also seen in the clinic on 11/08/2022.  Please excuse him  until 11/09/2022 due to illness.        Sincerely,        HOSSEIN VEGA, DO

## 2022-11-13 DIAGNOSIS — I10 ESSENTIAL HYPERTENSION: ICD-10-CM

## 2022-11-15 ENCOUNTER — ALLIED HEALTH/NURSE VISIT (OUTPATIENT)
Dept: UROLOGY | Facility: OTHER | Age: 57
End: 2022-11-15
Attending: UROLOGY
Payer: COMMERCIAL

## 2022-11-15 VITALS
SYSTOLIC BLOOD PRESSURE: 122 MMHG | BODY MASS INDEX: 32.95 KG/M2 | HEART RATE: 88 BPM | DIASTOLIC BLOOD PRESSURE: 68 MMHG | OXYGEN SATURATION: 98 % | WEIGHT: 198 LBS | RESPIRATION RATE: 16 BRPM

## 2022-11-15 DIAGNOSIS — R33.8 URINARY RETENTION DUE TO BENIGN PROSTATIC HYPERPLASIA: Primary | ICD-10-CM

## 2022-11-15 DIAGNOSIS — N40.1 URINARY RETENTION DUE TO BENIGN PROSTATIC HYPERPLASIA: Primary | ICD-10-CM

## 2022-11-15 DIAGNOSIS — E10.65 TYPE 1 DIABETES MELLITUS WITH HYPERGLYCEMIA (H): ICD-10-CM

## 2022-11-15 PROCEDURE — 99204 OFFICE O/P NEW MOD 45 MIN: CPT | Mod: 25 | Performed by: UROLOGY

## 2022-11-15 PROCEDURE — G0463 HOSPITAL OUTPT CLINIC VISIT: HCPCS | Mod: 25

## 2022-11-15 PROCEDURE — 51701 INSERT BLADDER CATHETER: CPT | Performed by: UROLOGY

## 2022-11-15 RX ORDER — TAMSULOSIN HYDROCHLORIDE 0.4 MG/1
0.4 CAPSULE ORAL EVERY EVENING
Qty: 90 CAPSULE | Refills: 3 | Status: SHIPPED | OUTPATIENT
Start: 2022-11-15 | End: 2023-08-16

## 2022-11-15 RX ORDER — TAMSULOSIN HYDROCHLORIDE 0.4 MG/1
0.4 CAPSULE ORAL EVERY EVENING
Qty: 90 CAPSULE | Refills: 3 | Status: SHIPPED | OUTPATIENT
Start: 2022-11-15 | End: 2023-01-31

## 2022-11-15 RX ORDER — LISINOPRIL 10 MG/1
TABLET ORAL
Qty: 90 TABLET | Refills: 3 | Status: SHIPPED | OUTPATIENT
Start: 2022-11-15 | End: 2023-08-24

## 2022-11-15 ASSESSMENT — PAIN SCALES - GENERAL: PAINLEVEL: SEVERE PAIN (7)

## 2022-11-15 NOTE — NURSING NOTE
Pt presents to clinic for a void trial  Review of Systems:    Weight loss:    No     Recent fever/chills:  No   Night sweats:   No  Current skin rash:  No   Recent hair loss:  No  Heat intolerance:  No   Cold intolerance:  No  Chest pain:   No   Palpitations:   No  Shortness of breath:  No   Wheezing:   No  Constipation:    No   Diarrhea:   No   Nausea:   No   Vomiting:   No   Kidney/side pain:  No   Back pain:   Yes  Frequent headaches:  No   Dizziness:     No  Leg swelling:   No   Calf pain:    No    Parents, brothers or sisters with history of kidney cancer:   No  Parents, brothers or sisters with history of bladder cancer: No  Father or brother with history of prostate cancer:  No      Post-Void Residual  A post-void residual was measured by ultrasonic bladder scanner.  >503 mL

## 2022-11-15 NOTE — PROGRESS NOTES
Type of Visit  NPV    Chief Complaint  Urinary retention  DM1    HPI  Mr Harvey is a 57 year old male w/h/o DM1 who presents with urinary retention.  He presented to the ED with lower back pain and an inability to void.  A work-up in the ED revealed he had significant retention.  A catheter was placed at that time which was approximately 12 days ago.  His catheter was removed by PCP approximately 1 week later.  His catheter now has been out for about 1 week.  He has minimal baseline urinary symptoms.  He has no history of recurrent infections or need for an indwelling catheter.  He is not on Flomax or any other urinary medication.  His most recent A1c was <8  He is currently on the following medications for urinary complaints: None.      Past Medical History  He  has a past medical history of Diabetes mellitus type 1, controlled (H), HLD (hyperlipidemia), HTN (hypertension), Neuropathy, and Type 1 diabetes (H).  Patient Active Problem List   Diagnosis     DM type 1 (diabetes mellitus, type 1) (H)     HTN (hypertension)     Hyperlipidemia     ACP (advance care planning)     Diabetic polyneuropathy associated with type 1 diabetes mellitus (H)     Class 2 obesity in adult     Obesity (BMI 35.0-39.9) with comorbidity (H)     Gastroparesis     Gastroesophageal reflux disease without esophagitis       Past Surgical History  He  has a past surgical history that includes Colonoscopy - HIM Scan (N/A, 09/19/2016) and Esophagoscopy, gastroscopy, duodenoscopy (EGD), combined (08/13/2019).    Medications  He has a current medication list which includes the following prescription(s): tamsulosin, tamsulosin, acetone urine, blood glucose monitoring, blood glucose, calcium-vitamin d, dexcom g6 , dexcom g6 sensor, dexcom g6 transmitter, contour next test, gabapentin, gvoke hypopen 2-pack, omnipod 5 g6 intro (gen 5), omnipod 5 g6 pod (gen 5), insulin pump, lisinopril, multiple vitamin, nitroglycerin, novolog vial, novolog  vial, ondansetron, order for dme, pantoprazole, simvastatin, urea 10 hydrating, and urine glucose-ketones test.    Allergies  No Known Allergies    Social History  He  reports that he has never smoked. He has never used smokeless tobacco. He reports that he does not drink alcohol and does not use drugs.  No drug abuse.    Family History  Family History   Problem Relation Age of Onset     No Known Problems Mother      Hypertension Father      Hypertension Brother        Review of Systems  I personally reviewed the ROS with the patient.    Nursing Notes:   Pham Thurston LPN  11/15/2022  8:08 AM  Addendum  Pt presents to clinic for a void trial  Review of Systems:    Weight loss:    No     Recent fever/chills:  No   Night sweats:   No  Current skin rash:  No   Recent hair loss:  No  Heat intolerance:  No   Cold intolerance:  No  Chest pain:   No   Palpitations:   No  Shortness of breath:  No   Wheezing:   No  Constipation:    No   Diarrhea:   No   Nausea:   No   Vomiting:   No   Kidney/side pain:  No   Back pain:   Yes  Frequent headaches:  No   Dizziness:     No  Leg swelling:   No   Calf pain:    No    Parents, brothers or sisters with history of kidney cancer:   No  Parents, brothers or sisters with history of bladder cancer: No  Father or brother with history of prostate cancer:  No      Post-Void Residual  A post-void residual was measured by ultrasonic bladder scanner.  >503 mL        Pham Thurston LPN  11/15/2022  8:28 AM  Signed  Pt taught self cathing with 18F    Physical Exam  Vitals:    11/15/22 0757   BP: 122/68   BP Location: Right arm   Patient Position: Sitting   Cuff Size: Adult Regular   Pulse: 88   Resp: 16   SpO2: 98%   Weight: 89.8 kg (198 lb)   Constitutional: No acute distress.  Alert and cooperative   Head: NCAT  Eyes: Conjunctivae normal  Cardiovascular: Regular rate.  Pulmonary/Chest: Respirations are even and non-labored bilaterally, no audible wheezing  Abdominal: Soft. No  distension, tenderness, masses or guarding.   Extremities: TOMMY x 4, Warm. No clubbing.  No cyanosis.    Skin: Pink, warm and dry.  No visible rashes noted.  Back:  No left CVA tenderness.  No right CVA tenderness.  Genitourinary:  Catheter in place draining clear urine    Labs   11/03/22 14:18   Color Urine Light Yellow   Appearance Urine Clear   Glucose Urine 100 !   Bilirubin Urine Negative   Ketones Urine Negative   Specific Gravity Urine 1.015   pH Urine 6.0   Protein Albumin Urine Negative   Urobilinogen mg/dL Normal   Nitrite Urine Negative   Blood Urine Negative   Leukocyte Esterase Urine Negative   WBC Urine <1   RBC Urine 1   Squamous Epithelial /HPF Urine 0   Mucus Urine Present !      11/03/22 13:20   Sodium 134 (L)   Potassium 4.9   Chloride 99   Carbon Dioxide (CO2) 26   Urea Nitrogen 20.0   Creatinine 0.98   GFR Estimate 89   Calcium 9.3   Anion Gap 9   Albumin 4.2   Protein Total 7.2   Alkaline Phosphatase 96   ALT 24   AST 31   Bilirubin Total 0.5   Glucose 293 (H)      10/24/22 08:02   Hemoglobin A1C 7.3 (H)   Estimated Average Glucose 163     Imaging  I personally reviewed and interpreted the images and report.  CT a/p   11/3/2021  IMPRESSION:     No hydronephrosis. No calculi within the kidneys, ureters, or bladder.  Normal appendix.     There is moderate distention of the bladder.    Post-Void Residual  A post-void residual was measured by ultrasonic bladder scanner.  >503 mL today    Procedure  The patient was taught self-catheterization by passing a sterile 18 Tristanian straight coudé catheter into the bladder draining over 500 mL clear urine and the catheter was removed.    Assessment  Mr. Harvey is a 57 year old male w/h/o DM1 who presents with a significantly elevated residual.  Reviewed the past notes labs and imaging  We discussed the factors involved in emptying including a bladder that can generate pressure as well as a urethra that is patent with minimal obstruction.  We discussed the fact  that the prostate is treatable if obstruction is an issue.  We also discussed the limitations of a diabetic neurogenic bladder.  We really do not have treatments to improve the force the detrusor muscle can generate.  With this said we discussed various treatments to improve or reduce resistance to flow.  First I recommended we start an alpha-blocker such as Flomax.  We then discussed possible TURP for improving retention.    In the short-term we discussed self-catheterization.  I recommended he perform this once daily.  He was taught this procedure in clinic today.    We discussed side effects of Flomax including, but not limited to, retrograde ejaculation, congestion and lightheadedness.    Plan  Start Flomax 0.4mg every evening  Start self-catheterization 1-2 times daily  Follow up in [place] clinic, next available, for diagnostic cystoscopy (assess for obstruction)

## 2022-11-16 ENCOUNTER — ALLIED HEALTH/NURSE VISIT (OUTPATIENT)
Dept: EDUCATION SERVICES | Facility: OTHER | Age: 57
End: 2022-11-16
Attending: FAMILY MEDICINE
Payer: COMMERCIAL

## 2022-11-16 VITALS
HEIGHT: 61 IN | RESPIRATION RATE: 16 BRPM | SYSTOLIC BLOOD PRESSURE: 103 MMHG | DIASTOLIC BLOOD PRESSURE: 65 MMHG | BODY MASS INDEX: 37 KG/M2 | WEIGHT: 196 LBS | HEART RATE: 105 BPM | OXYGEN SATURATION: 96 %

## 2022-11-16 DIAGNOSIS — E10.65 TYPE 1 DIABETES MELLITUS WITH HYPERGLYCEMIA (H): ICD-10-CM

## 2022-11-16 PROCEDURE — 99213 OFFICE O/P EST LOW 20 MIN: CPT | Mod: 25 | Performed by: NURSE PRACTITIONER

## 2022-11-16 PROCEDURE — 95251 CONT GLUC MNTR ANALYSIS I&R: CPT | Performed by: NURSE PRACTITIONER

## 2022-11-16 PROCEDURE — G0463 HOSPITAL OUTPT CLINIC VISIT: HCPCS

## 2022-11-16 ASSESSMENT — PAIN SCALES - GENERAL: PAINLEVEL: SEVERE PAIN (7)

## 2022-11-16 NOTE — PATIENT INSTRUCTIONS
Continue working on healthy eating and moving as best as you can (start low and slow, work up to 30 min, 5x/week)    BG goals:  Fasting and before meals <130, >70  2 hour after eating <180    We only need 1/2 of these numbers to be within target then your A1c will be within target    Medication changes   Need more information to safely adjust him    Follow up   Please call us with your Omnipod 5 + Dexcom G6 username and password      Call me sooner if any problems/concerns and/or questions develop including consistent low BGs <70 or consistent high BGs >200  [phone] (Unit Coordinator)    [phone] (Tracey, Nurse)

## 2022-11-16 NOTE — PROGRESS NOTES
SUBJECTIVE:  Richard Harvey, 57 year old, male presents with the following Chief Complaint(s) with HPI to follow:  Chief Complaint   Patient presents with     Diabetes        Diabetes Follow-up      Patient is checking blood sugars: >4x/day using his continuous glucose.  Results:    Date: 11/5 to 11/18/22  Glucose management indicator: n/a    Average glucose: 169  Highest: HI  Lowest: 62    Glucose range  Very low (<54): 0  low (<70): 0  In target range (): 65%  High (>180): 28%  Very high (>250): 7%        Symptoms of hypoglycemia (low blood sugar): rare    Paresthesias (numbness or burning in feet) or sores: Yes; no sores    Diabetic eye exam within the last year: Yes    Breakfast eaten regularly: Yes    Patient counting carbs: trying       HPI:   Richard's here today for the follow up regarding his Diabetes mellitus, Type 1.      A1c trend:  Lab Results   Component Value Date    A1C 7.3 10/24/2022    A1C 7.2 07/21/2022    A1C 7.2 04/21/2022    A1C 7.5 01/20/2022    A1C 7.7 10/20/2021    A1C 7.8 06/03/2021    A1C 7.5 11/30/2020    A1C 7.7 09/01/2020    A1C 8.1 03/24/2020     Current Diabetes medication:   1.  Insulin pump (Omnipod 5), uses Novolog   ASA use: yes, 325 mg daily  Statin use: yes, simvastatin 40 mg at bedime    Reports the following:  No issues with the insulin pump  No issues with the Dexcom G6  Needs skin barrier prep pads.     Working with his urologist regarding his urinary issues  Working with Dr. Alvarez with his other chronic medical concerns.     No other new changes to his health    Weight trend:  Wt Readings from Last 4 Encounters:   11/16/22 88.9 kg (196 lb)   11/15/22 89.8 kg (198 lb)   11/08/22 83.9 kg (185 lb)   10/24/22 89 kg (196 lb 2 oz)       Patient Active Problem List   Diagnosis     DM type 1 (diabetes mellitus, type 1) (H)     HTN (hypertension)     Hyperlipidemia     ACP (advance care planning)     Diabetic polyneuropathy associated with type 1 diabetes mellitus (H)     Class 2  obesity in adult     Obesity (BMI 35.0-39.9) with comorbidity (H)     Gastroparesis     Gastroesophageal reflux disease without esophagitis       Past Medical History:   Diagnosis Date     Diabetes mellitus type 1, controlled (H)      HLD (hyperlipidemia)      HTN (hypertension)      Neuropathy      Type 1 diabetes (H)        Past Surgical History:   Procedure Laterality Date     COLONOSCOPY - HIM SCAN N/A 09/19/2016    Procedure: COLONOSCOPY SCREENING; Surgeon: Rudy Rojo MD; Location: [facility] OR     ESOPHAGOSCOPY, GASTROSCOPY, DUODENOSCOPY (EGD), COMBINED  08/13/2019       Family History   Problem Relation Age of Onset     No Known Problems Mother      Hypertension Father      Hypertension Brother        Social History     Tobacco Use     Smoking status: Never     Smokeless tobacco: Never   Substance Use Topics     Alcohol use: No     Alcohol/week: 0.0 standard drinks       Current Outpatient Medications   Medication Sig Dispense Refill     acetone, Urine, test STRP Use as directed 50 each 3     blood glucose monitoring (Vayusa CONTOUR NEXT MONITOR) meter device kit 1 each       blood glucose monitoring (MELISSA CONTOUR NEXT) test strip Use to test blood sugar 6 times daily or as directed. 200 each 11     CALCIUM-VITAMIN D PO Take  by mouth daily. 600 mg       Continuous Blood Gluc  (DEXCOM G6 ) DAYAN 1 each continuous To be used per manufacture's recommendation 1 each 2     Continuous Blood Gluc Sensor (DEXCOM G6 SENSOR) MISC 1 each continuous 3 each 11     Continuous Blood Gluc Transmit (DEXCOM G6 TRANSMITTER) MISC 1 each continuous 1 each 4     CONTOUR NEXT TEST test strip TEST 6 TIMES DAILY, OR AS DIRECTED. 200 strip 5     gabapentin (NEURONTIN) 300 MG capsule TAKE 1 CAPSULE BY MOUTH THREE TIMES DAILY 90 capsule 4     Glucagon (GVOKE HYPOPEN 2-PACK) 1 MG/0.2ML SOAJ Inject 1 mg Subcutaneous as needed (for low blood glucose) 0.4 mL 3     Insulin Disposable Pump (OMNIPOD 5 G6 INTRO, GEN 5,) KIT  1 kit continuous 1 kit 0     Insulin Disposable Pump (OMNIPOD 5 G6 POD, GEN 5,) MISC 1 each every 3 days 10 each 5     INSULIN PUMP - OUTPATIENT Insulin pump settings Updated: 11/16/22 Omnipod 5 Basal:  0000 1.1 0200 1.05 0600 0.95 1020 0.7 1530 0.9 1800 1.15 Total bolus: 23.3 CR:  0000 12 1200 13 1600 14 ISF:  0000 65 1200 60 1800 55 Target: 110       lisinopril (ZESTRIL) 10 MG tablet Take 1 tablet by mouth once daily 90 tablet 3     Multiple Vitamin (MULTI-VITAMIN DAILY PO) Take  by mouth daily.       nitroGLYcerin (NITROSTAT) 0.4 MG sublingual tablet Place under the tongue every 5 minutes as needed       NOVOLOG VIAL 100 UNIT/ML soln USE WITH INSULIN PUMP FOR A TOTAL DAILY USAGE OF 70 UNITS 30 mL 3     NOVOLOG VIAL 100 UNIT/ML soln USE WITH INSULIN PUMP FOR A TOTAL DAILY USAGE OF 70 UNITS 20 mL 3     ondansetron (ZOFRAN) 4 MG tablet TAKE 1 TABLET BY MOUTH EVERY 8 HOURS AS NEEDED FOR NAUSEA 30 tablet 4     order for DME Equipment being ordered:     1.  [employer] insulin pump supplies--insertion sets and reserviors  Disp: 1 month  Refill: 11 months 30 days 11     pantoprazole (PROTONIX) 40 MG EC tablet Take 1 tablet by mouth twice daily 60 tablet 9     simvastatin (ZOCOR) 40 MG tablet Take 1 tablet (40 mg) by mouth At Bedtime 90 tablet 3     tamsulosin (FLOMAX) 0.4 MG capsule Take 1 capsule (0.4 mg) by mouth every evening 90 capsule 3     tamsulosin (FLOMAX) 0.4 MG capsule Take 1 capsule (0.4 mg) by mouth every evening 90 capsule 3     UREA 10 HYDRATING 10 % external cream APPLY CREAM TOPICALLY AS NEEDED FOR CALLOUSED SKIN 85 g 2     URINE GLUCOSE-KETONES TEST VI          No Known Allergies    REVIEW OF SYSTEMS  Skin: negative  Eyes: negative; new glasses  Ears/Nose/Throat: negative; hx of allergies  Respiratory: No shortness of breath, dyspnea on exertion, cough, or hemoptysis  Cardiovascular: negative  Gastrointestinal: off and on issues with nausea  Genitourinary: urinary retention  Musculoskeletal: back pain +  "sciatica (right side); history of arthritis--hands--same, shoulders, knees--same   Neurologic: positive for numbness or tingling of hands and numbness or tingling of feet--same  Psychiatric: negative  Hematologic/Lymphatic/Immunologic: negative  Endocrine: positive for diabetes    OBJECTIVE:  /65   Pulse 105   Resp 16   Ht 1.537 m (5' 0.5\")   Wt 88.9 kg (196 lb)   HC 16 cm (6.3\")   SpO2 96%   BMI 37.65 kg/m    Constitutional: alert and no distress  Respiratory:  Good diaphragmatic excursion.   Musculoskeletal: extremities normal- no gross deformities noted and gait normal  Skin: no suspicious lesions or rashes to visible skin  Psychiatric: mentation appears normal and affect normal/bright      LAB  Results for orders placed or performed in visit on 11/16/22   GLUCOSE MONITOR, 72 HOUR, PHYS INTERP     Status: None    Narrative    Date: 11/5 to 11/18/22  Glucose management indicator: n/a    Average glucose: 169  Highest: HI  Lowest: 62    Glucose range  Very low (<54): 0  low (<70): 0  In target range (): 65%  High (>180): 28%  Very high (>250): 7%         ASSESSMENT / PLAN:.  (E10.65) Type 1 diabetes mellitus with hyperglycemia (H)  Comment: noted his insulin pump and CGM download    Insulin pump settings  Updated: 11/16/22  Omnipod 5  Basal:   0000 1.1  0200 1.05  0600 0.95  1020 0.7  1530 0.9  1800 1.15  Total bolus: 23.3  CR:   0000 12  1200 13  1600 14  ISF:   0000 65  1200 60  1800 55  Target: 110    Insulin  Basal: 25.2 (65%)  Bolus: 13.3 (35%)  TDD: 40    Plan: GLUCOSE MONITOR, 72 HOUR, PHYS INTERP          No change at this time    Keep working on giving your bolus before consuming carbs.      Gave him skin barrier prep pads    Follow up  Download in one month    Call sooner if any issues/concerns develop and/or continued issues with low BGs    Patient Instructions   Continue working on healthy eating and moving as best as you can (start low and slow, work up to 30 min, 5x/week)    BG " goals:  Fasting and before meals <130, >70  2 hour after eating <180    We only need 1/2 of these numbers to be within target then your A1c will be within target    Medication changes   Need more information to safely adjust him    Follow up   Please call us with your Omnipod 5 + Dexcom G6 username and password      Call me sooner if any problems/concerns and/or questions develop including consistent low BGs <70 or consistent high BGs >200  [phone] (Unit Coordinator)    [phone] (Tracey, Nurse)    Time: 25 min  Barrier: none  Willingness to learn: accepting    Macrina CHAMBERLAIN [facility]  Diabetes and Wound Care    25 minutes was spent with patient.    All of this time was spent on counseling patient regarding illness, medication and/or treatment options, coordinating further cares and follow ups that are needed along with resource material that will be helpful in the treatment of these issues.       With the electronic record, we can now more quickly and easily track our patient diabetic goals. Our diabetes clinical review is in progress and these are the indicators we are monitoring for good diabetes health.     1.) HbA1C less than 7 (measurement of your average blood sugars)  2.) Blood Pressure less than 140/80  3.) LDL less than 100 (bad cholesterol)  4.) HbA1C is checked in the last 6 months and below 7% (more frequently if not at goal or adjusting medications)  5.) LDL is checked in the last 12 months (more frequently if not at goal or adjusting medications)  6.) Taking one baby aspirin daily (unless otherwise instructed)  7.) No tobacco use  8) Statin use     You have achieved 7 out of 8 of these and I am encouraging you to come in and get tuned up to achieve 8 out of 8!  Here is what you have achieved so far in my goals for you:  1.) HbA1C  less than 7:                              NO  Your last  HbA1C :   Lab Results   Component Value Date    A1C 7.2 04/21/2022    A1C 7.5 01/20/2022    A1C 7.7  "10/20/2021    A1C 7.8 06/03/2021    A1C 7.5 11/30/2020    A1C 7.7 09/01/2020    A1C 8.1 03/24/2020     2.) Blood Pressure less than 140/80:       YES      Your last    /65   Pulse 105   Resp 16   Ht 1.537 m (5' 0.5\")   Wt 88.9 kg (196 lb)   HC 16 cm (6.3\")   SpO2 96%   BMI 37.65 kg/m      3.) LDL less than 100:                              YES      Your last   LDL         LDL Cholesterol Calculated   Date Value Ref Range Status   07/21/2022 36 <=100 mg/dL Final   06/14/2021 42 <100 mg/dL Final     Comment:     Desirable:       <100 mg/dl       4.) Checked HbA1C in the past 6 months: YES      5.) Checked LDL in the past 12 months:    YES    6.) Taking one aspirin daily:                       YES     7.) No tobacco use:                                        YES      8.) Statin use      YES       CGM requirements:  1.  Patient has been seen in the clinic within the last 6 month  2. Diagnosis of Type 1 diabetes  3. Has been testing their BGs 4x/day using the Dexcom  4. Uses an insulin pump   5. Requires frequent adjustments to their treatment regimen based on BGM and/or CGM testing results.                       "

## 2022-11-16 NOTE — PROGRESS NOTES
"Chief Complaint   Patient presents with     Diabetes       Initial /65   Pulse 105   Resp 16   Ht 1.537 m (5' 0.5\")   HC 16 cm (6.3\")   SpO2 96%   BMI 38.03 kg/m   Estimated body mass index is 38.03 kg/m  as calculated from the following:    Height as of this encounter: 1.537 m (5' 0.5\").    Weight as of 11/15/22: 89.8 kg (198 lb).  Medication Reconciliation: complete  Lianna Brewster LPN    "

## 2022-11-23 ENCOUNTER — OFFICE VISIT (OUTPATIENT)
Dept: UROLOGY | Facility: OTHER | Age: 57
End: 2022-11-23
Attending: UROLOGY
Payer: COMMERCIAL

## 2022-11-23 VITALS
RESPIRATION RATE: 18 BRPM | HEART RATE: 86 BPM | WEIGHT: 198 LBS | OXYGEN SATURATION: 96 % | BODY MASS INDEX: 38.03 KG/M2

## 2022-11-23 DIAGNOSIS — R33.8 URINARY RETENTION DUE TO BENIGN PROSTATIC HYPERPLASIA: Primary | ICD-10-CM

## 2022-11-23 DIAGNOSIS — E10.65 TYPE 1 DIABETES MELLITUS WITH HYPERGLYCEMIA (H): ICD-10-CM

## 2022-11-23 DIAGNOSIS — N40.1 URINARY RETENTION DUE TO BENIGN PROSTATIC HYPERPLASIA: Primary | ICD-10-CM

## 2022-11-23 PROCEDURE — G0463 HOSPITAL OUTPT CLINIC VISIT: HCPCS | Mod: 25

## 2022-11-23 PROCEDURE — 99213 OFFICE O/P EST LOW 20 MIN: CPT | Mod: 25 | Performed by: UROLOGY

## 2022-11-23 PROCEDURE — 52000 CYSTOURETHROSCOPY: CPT | Performed by: UROLOGY

## 2022-11-23 ASSESSMENT — PAIN SCALES - GENERAL: PAINLEVEL: SEVERE PAIN (7)

## 2022-11-23 NOTE — PATIENT INSTRUCTIONS

## 2022-11-23 NOTE — PROGRESS NOTES
Type of Visit  EST    Chief Complaint  Urinary retention  DM1    HPI  Mr Harvey is a 57 year old male w/h/o DM1 who follows up with urinary retention.  He presented to the ED with lower back pain and an inability to void.  A work-up in the ED revealed he had significant retention.  A catheter was placed at that time which was approximately 1 month ago.  The patient followed up in urology clinic 2 weeks ago.  At that time he had been catheter free for about 1 week and his PVR was over 500 mL.  It was clear the patient has chronic retention likely due to poor detrusor function given his history of diabetes.  Nonetheless he is presenting for cystoscopy today to assess for any anatomic challenges leading to difficulty with emptying.  We will assess his prostate and discuss possible procedures as well.  At the last visit 2 weeks ago had recommended patient's start Flomax.  No side effects.  His most recent A1c was <8  He presents for cystoscopy and discussion about potential procedures.      Review of Systems  I personally reviewed the ROS with the patient.    Nursing Notes:   Pham Thurston, LPN  11/23/2022 11:12 AM  Signed  Patient positioned in supine position, perineum area prepped with chlorhexidene Gluconate and patient draped per sterile technique. Per verbal order read back by Arvin Hernandez MD, Urojet 10mL 2% lidocaine jelly to be instilled into urethra.  Urojet- 10ml 2% Lidocaine jelly instilled into the urethra.    Urojet 2%  Lot#: [id number]  Expiration date: 52024  : [employer]  NDC: [address]-4    [place] Protocol    A. Pre-procedure verification complete Yes  1-relevant information / documentation available, reviewed and properly matched to the patient; 2-consent accurate and complete, 3-equipment and supplies available    B. Site marking complete N/A  Site marked if not in continuous attendance with patient    C. TIME OUT completed Yes  Time Out was conducted just prior to starting  procedure to verify the eight required elements: 1-patient identity, 2-consent accurate and complete, 3-position, 4-correct side/site marked (if applicable), 5-procedure, 6-relevant images / results properly labeled and displayed (if applicable), 7-antibiotics / irrigation fluids (if applicable), 8-safety precautions.    After procedure perineum area rinsed. Discharge instructions reviewed with patient. Patient verbalized understanding of discharge instructions and discharged ambulatory.  Pham Thurston LPN..................11/23/2022  11:10 AM       Weight loss:    No     Recent fever/chills:  No   Night sweats:   No  Current skin rash:  No   Recent hair loss:  No  Heat intolerance:  No   Cold intolerance:  No  Chest pain:   No   Palpitations:   No  Shortness of breath:  No   Wheezing:   No  Constipation:    No   Diarrhea:   No   Nausea:   No   Vomiting:   No   Kidney/side pain:  No   Back pain:   Yes  Frequent headaches:  No   Dizziness:     No  Leg swelling:   No   Calf pain:    No    Physical Exam  Vitals:    11/23/22 1114   Pulse: 86   Resp: 18   SpO2: 96%   Weight: 89.8 kg (198 lb)      Constitutional: No acute distress.  Alert and cooperative   Pulmonary/Chest: Respirations are even and non-labored bilaterally, no audible wheezing  Abdominal: Soft. No distension, tenderness, masses or guarding.   Extremities: TOMMY x 4, Warm. No clubbing.  No cyanosis.    Skin: Pink, warm and dry.  No visible rashes noted.  Back:  No left CVA tenderness.  No right CVA tenderness.  Genitourinary:  Catheter in place draining clear urine    ^^^^^^^^^^^^^^^^^^^^^^^^^^^^^^^^^^^^^^^^^^^^^^^^^^^^^^^^^^^^^^^    Preprocedure diagnosis  BPH with retention    Postprocedure diagnosis  BPH with retention and obstruction    Procedure  Flexible Cystourethroscopy    Surgeon  Arvin Hernandez MD    Anesthesia  2% lidocaine jelly intraurethrally    Complications  None    Indications  57 year old male undergoing a flexible cystoscopy for the  above mentioned indications.    Findings  Cystoscopic findings included a normal anterior urethra.    There was not a prominent median lobe.    The lateral lobes were mildly to moderately obstructive in appearance.  The bladder appeared to be normal capacity.    There were no tumors, stones or foreign bodies.    The orifices were slit-shaped and in their normal location.    Procedure  The patient was placed in supine position and prepped and draped in sterile fashion with lidocaine jelly per urethra for anesthesia.    I passed a lubricated 14F flexible cystoscope through the penile urethra and into the bladder and the bladder was completely visualized.  The cystoscope was retroflexed and the bladder neck and prostate visualized.    The cystoscope was slowly withdrawn while visualizing the urethra and the procedure terminated.    The patient tolerated the procedure well.      ^^^^^^^^^^^^^^^^^^^^^^^^^^^^^^^^^^^^^^^^^^^^^^^^^^^^^^^^^^^^^^^    Labs   11/03/22 14:18   Color Urine Light Yellow   Appearance Urine Clear   Glucose Urine 100 !   Bilirubin Urine Negative   Ketones Urine Negative   Specific Gravity Urine 1.015   pH Urine 6.0   Protein Albumin Urine Negative   Urobilinogen mg/dL Normal   Nitrite Urine Negative   Blood Urine Negative   Leukocyte Esterase Urine Negative   WBC Urine <1   RBC Urine 1   Squamous Epithelial /HPF Urine 0   Mucus Urine Present !      11/03/22 13:20   Sodium 134 (L)   Potassium 4.9   Chloride 99   Carbon Dioxide (CO2) 26   Urea Nitrogen 20.0   Creatinine 0.98   GFR Estimate 89   Calcium 9.3   Anion Gap 9   Albumin 4.2   Protein Total 7.2   Alkaline Phosphatase 96   ALT 24   AST 31   Bilirubin Total 0.5   Glucose 293 (H)      10/24/22 08:02   Hemoglobin A1C 7.3 (H)   Estimated Average Glucose 163     Imaging  I personally reviewed and interpreted the images and report.  CT a/p   11/3/2021  IMPRESSION:  No hydronephrosis. No calculi within the kidneys, ureters, or bladder.  Normal  appendix.     There is moderate distention of the bladder.    Post-Void Residual  A post-void residual was measured by ultrasonic bladder scanner.  >503 mL (previously recorded)    Assessment  Mr. Harvey is a 57 year old male w/h/o DM1 who follows up with a significantly elevated residual currently managed with self-catheterization.  Following cystoscopy we discussed his clinical situation.  We discussed bladder findings today which included significant trabeculation.  Explained that this is common in patients with lifelong diabetes.  It is a sign of diminished bladder function and diminished bladder capacity to generate pressure.  This will complicate his ability to empty.  Explained there are no treatments to improve bladder strength or contractility.  Our only option is decreasing resistance to flow through a prostate procedure such as a TURP or Urolift.  I quoted approximately 50% chance at best that a procedure could render him catheter free.  Following this discussion we elected to defer a procedure at this time.    Plan  Continue Flomax 0.4mg every evening  Continue self-catheterization 1-2 times daily  Follow up once annually

## 2022-11-23 NOTE — NURSING NOTE
Patient positioned in supine position, perineum area prepped with chlorhexidene Gluconate and patient draped per sterile technique. Per verbal order read back by Arvin Hernandez MD, Urojet 10mL 2% lidocaine jelly to be instilled into urethra.  Urojet- 10ml 2% Lidocaine jelly instilled into the urethra.    Urojet 2%  Lot#: [id number]  Expiration date: 52024  : [employer]  NDC: [address]-7    [place] Protocol    A. Pre-procedure verification complete Yes  1-relevant information / documentation available, reviewed and properly matched to the patient; 2-consent accurate and complete, 3-equipment and supplies available    B. Site marking complete N/A  Site marked if not in continuous attendance with patient    C. TIME OUT completed Yes  Time Out was conducted just prior to starting procedure to verify the eight required elements: 1-patient identity, 2-consent accurate and complete, 3-position, 4-correct side/site marked (if applicable), 5-procedure, 6-relevant images / results properly labeled and displayed (if applicable), 7-antibiotics / irrigation fluids (if applicable), 8-safety precautions.    After procedure perineum area rinsed. Discharge instructions reviewed with patient. Patient verbalized understanding of discharge instructions and discharged ambulatory.  Pham Thurston LPN..................11/23/2022  11:10 AM

## 2022-11-28 ENCOUNTER — ALLIED HEALTH/NURSE VISIT (OUTPATIENT)
Dept: EDUCATION SERVICES | Facility: OTHER | Age: 57
End: 2022-11-28
Attending: NURSE PRACTITIONER
Payer: COMMERCIAL

## 2022-11-28 DIAGNOSIS — E10.65 TYPE 1 DIABETES MELLITUS WITH HYPERGLYCEMIA (H): Primary | ICD-10-CM

## 2022-11-28 NOTE — PROGRESS NOTES
Patient comes to clinic to connect his [employer] account, with the help of JESSICA Wiseman the patient finished his invitation and glucose numbers for the past two weeks printed out for review.  Patient will wait to hear from provider about any medicaiton changes

## 2022-12-20 ENCOUNTER — OFFICE VISIT (OUTPATIENT)
Dept: PODIATRY | Facility: OTHER | Age: 57
End: 2022-12-20
Attending: PODIATRIST
Payer: COMMERCIAL

## 2022-12-20 VITALS
DIASTOLIC BLOOD PRESSURE: 80 MMHG | TEMPERATURE: 96.9 F | SYSTOLIC BLOOD PRESSURE: 133 MMHG | HEART RATE: 78 BPM | OXYGEN SATURATION: 94 %

## 2022-12-20 DIAGNOSIS — M21.969 LOSS OF PROTECTIVE SENSATION OF SKIN OF DEFORMED FOOT: ICD-10-CM

## 2022-12-20 DIAGNOSIS — E10.65 TYPE 1 DIABETES MELLITUS WITH HYPERGLYCEMIA (H): ICD-10-CM

## 2022-12-20 DIAGNOSIS — M62.461 GASTROCNEMIUS EQUINUS OF RIGHT LOWER EXTREMITY: ICD-10-CM

## 2022-12-20 DIAGNOSIS — M21.371 FOOT DROP, RIGHT FOOT: ICD-10-CM

## 2022-12-20 DIAGNOSIS — M20.21 HALLUX RIGIDUS OF RIGHT FOOT: ICD-10-CM

## 2022-12-20 DIAGNOSIS — M62.462 GASTROCNEMIUS EQUINUS OF LEFT LOWER EXTREMITY: ICD-10-CM

## 2022-12-20 DIAGNOSIS — E10.42 DIABETIC POLYNEUROPATHY ASSOCIATED WITH TYPE 1 DIABETES MELLITUS (H): ICD-10-CM

## 2022-12-20 DIAGNOSIS — L60.3 ONYCHODYSTROPHY: ICD-10-CM

## 2022-12-20 DIAGNOSIS — L84 CALLUS OF FOOT: Primary | ICD-10-CM

## 2022-12-20 DIAGNOSIS — R20.8 LOSS OF PROTECTIVE SENSATION OF SKIN OF DEFORMED FOOT: ICD-10-CM

## 2022-12-20 PROCEDURE — 11721 DEBRIDE NAIL 6 OR MORE: CPT | Performed by: PODIATRIST

## 2022-12-20 PROCEDURE — G0463 HOSPITAL OUTPT CLINIC VISIT: HCPCS | Mod: 25

## 2022-12-20 PROCEDURE — 11056 PARNG/CUTG B9 HYPRKR LES 2-4: CPT | Performed by: PODIATRIST

## 2022-12-20 ASSESSMENT — PAIN SCALES - GENERAL: PAINLEVEL: SEVERE PAIN (6)
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
## 2022-12-29 ENCOUNTER — OFFICE VISIT (OUTPATIENT)
Dept: FAMILY MEDICINE | Facility: OTHER | Age: 57
End: 2022-12-29
Attending: FAMILY MEDICINE
Payer: COMMERCIAL

## 2022-12-29 VITALS
HEART RATE: 73 BPM | SYSTOLIC BLOOD PRESSURE: 112 MMHG | BODY MASS INDEX: 38.8 KG/M2 | DIASTOLIC BLOOD PRESSURE: 70 MMHG | WEIGHT: 202 LBS | OXYGEN SATURATION: 96 % | RESPIRATION RATE: 18 BRPM | TEMPERATURE: 97.5 F

## 2022-12-29 DIAGNOSIS — Z23 NEED FOR VACCINATION: ICD-10-CM

## 2022-12-29 DIAGNOSIS — G89.29 CHRONIC BILATERAL LOW BACK PAIN WITH RIGHT-SIDED SCIATICA: Primary | ICD-10-CM

## 2022-12-29 DIAGNOSIS — Z23 NEED FOR ZOSTER VACCINATION: ICD-10-CM

## 2022-12-29 DIAGNOSIS — M54.41 CHRONIC BILATERAL LOW BACK PAIN WITH RIGHT-SIDED SCIATICA: Primary | ICD-10-CM

## 2022-12-29 DIAGNOSIS — R11.0 NAUSEA: ICD-10-CM

## 2022-12-29 PROCEDURE — 90677 PCV20 VACCINE IM: CPT

## 2022-12-29 PROCEDURE — G0463 HOSPITAL OUTPT CLINIC VISIT: HCPCS | Mod: 25

## 2022-12-29 PROCEDURE — 99213 OFFICE O/P EST LOW 20 MIN: CPT | Performed by: FAMILY MEDICINE

## 2022-12-29 PROCEDURE — 90750 HZV VACC RECOMBINANT IM: CPT

## 2022-12-29 RX ORDER — LIDOCAINE 4 G/G
1 PATCH TOPICAL EVERY 24 HOURS
Qty: 20 PATCH | Refills: 1 | Status: SHIPPED | OUTPATIENT
Start: 2022-12-29 | End: 2023-10-17

## 2022-12-29 RX ORDER — ONDANSETRON 4 MG/1
4 TABLET, FILM COATED ORAL EVERY 8 HOURS PRN
Qty: 30 TABLET | Refills: 4 | Status: SHIPPED | OUTPATIENT
Start: 2022-12-29 | End: 2023-05-08

## 2022-12-29 ASSESSMENT — ENCOUNTER SYMPTOMS
BACK PAIN: 1
PARESTHESIAS: 1
DIFFICULTY URINATING: 0
SHORTNESS OF BREATH: 0
ABDOMINAL PAIN: 0
WHEEZING: 0
WEAKNESS: 1
FEVER: 0
CHILLS: 0
PALPITATIONS: 0

## 2022-12-29 ASSESSMENT — PAIN SCALES - GENERAL: PAINLEVEL: SEVERE PAIN (6)

## 2022-12-29 ASSESSMENT — PATIENT HEALTH QUESTIONNAIRE - PHQ9: SUM OF ALL RESPONSES TO PHQ QUESTIONS 1-9: 1

## 2022-12-29 NOTE — PATIENT INSTRUCTIONS
We put in an order for physical therapy     Prescription sent for voltaren gel for you back pain   We also put in a prescription for lidocaine patches

## 2023-01-09 ENCOUNTER — HOSPITAL ENCOUNTER (OUTPATIENT)
Dept: PHYSICAL THERAPY | Facility: HOSPITAL | Age: 58
Setting detail: THERAPIES SERIES
Discharge: HOME | End: 2023-01-09
Attending: FAMILY MEDICINE
Payer: COMMERCIAL

## 2023-01-09 ENCOUNTER — TELEPHONE (OUTPATIENT)
Dept: EDUCATION SERVICES | Facility: OTHER | Age: 58
End: 2023-01-09

## 2023-01-09 DIAGNOSIS — G89.29 CHRONIC BILATERAL LOW BACK PAIN WITH RIGHT-SIDED SCIATICA: ICD-10-CM

## 2023-01-09 DIAGNOSIS — M54.41 CHRONIC BILATERAL LOW BACK PAIN WITH RIGHT-SIDED SCIATICA: ICD-10-CM

## 2023-01-09 PROCEDURE — 97161 PT EVAL LOW COMPLEX 20 MIN: CPT | Mod: GP

## 2023-01-09 PROCEDURE — 97140 MANUAL THERAPY 1/> REGIONS: CPT | Mod: GP

## 2023-01-09 PROCEDURE — 97110 THERAPEUTIC EXERCISES: CPT | Mod: GP

## 2023-01-09 NOTE — TELEPHONE ENCOUNTER
Pt dropped off a letter he received in the mail stating his Omnipod 5 needs a PA. I completed a PA on [email or web address] and response is listed below. No further action needed at this time.      An active PA is already on file with expiration date of 06/30/2023. Please wait to resubmit request within 60 days of that expiration date to obtain a PA renewal.
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
## 2023-01-12 ENCOUNTER — TELEPHONE (OUTPATIENT)
Dept: FAMILY MEDICINE | Facility: OTHER | Age: 58
End: 2023-01-12

## 2023-01-12 NOTE — TELEPHONE ENCOUNTER
PRIOR AUTHORIZATION DENIED    Medication: Lidocaine (LIDOCARE) 4 % Patch - EPA DENIED    Denial Date: 1/11/2023    Denial Rational:      Appeal Information:

## 2023-01-17 ENCOUNTER — MEDICAL CORRESPONDENCE (OUTPATIENT)
Dept: HEALTH INFORMATION MANAGEMENT | Facility: HOSPITAL | Age: 58
End: 2023-01-17

## 2023-01-27 NOTE — PROGRESS NOTES
SUBJECTIVE:   CC: Richard is an 57 year old who presents for preventative health visit.   Patient has been advised of split billing requirements and indicates understanding: Yes  Healthy Habits:     Getting at least 3 servings of Calcium per day:  Yes    Bi-annual eye exam:  Yes    Dental care twice a year:  NO    Sleep apnea or symptoms of sleep apnea:  Daytime drowsiness    Diet:  Diabetic    Frequency of exercise:  2-3 days/week    Duration of exercise:  15-30 minutes    Taking medications regularly:  Yes    Medication side effects:  None    PHQ-2 Total Score: 0    Additional concerns today:  No    - [facility] for [facility] visits and follow-ups   - SAM has not been diagnosed but on occassion lack of sleep/ interrupted sleep causes daytime fatigue      Chronic/Recurring Back with right sided sciatica Pain Follow Up      Where is your back pain located? (Select all that apply) low back middle    How would you describe your back pain?  stabbing and achy    Where does your back pain spread? Down right leg    Since your last clinic visit for back pain, how has your pain changed? unchanged    Does your back pain interfere with your job? No    Since your last visit, have you tried any new treatment? No     - Physical Therapy Referral; Future  - diclofenac (VOLTAREN) 1 % topical gel; - does not help  - Lidocaine (LIDOCARE) 4 % Patch; (not yet tried)  - c/w current dosage of gabapentin 300mg tid   - gabapentin improves tingling and numbness of the hands and feet   - obtain imaging from [facility]  - discussed placing [facility] on hold until PT completed   - low threshold for MRI d/t leg weakness   - consideration for methocarbamol prn if pain does not improve with PT/topic  - seeing the chiropractor 3 times a weeks  - pain is tolerable and able to function    Diabetes Follow-up    How often are you checking your blood sugar? Continuous glucose monitor  What time of day are you checking your blood sugars (select all  that apply)?  continuous  Have you had any blood sugars above 200?  Yes - occasionally   Have you had any blood sugars below 70?  Yes - 2 or 3 times     What symptoms do you notice when your blood sugar is low?  Shaky, Dizzy, Weak, Lethargy, Blurred vision and Confusion    What concerns do you have today about your diabetes? None and Blood sugar is often over 200     Do you have any of these symptoms? (Select all that apply)  No numbness or tingling in feet.  No redness, sores or blisters on feet.  No complaints of excessive thirst.  No reports of blurry vision.  No significant changes to weight.  - Type 1 DM  - A1C 7.3 (10/24/22) due   - glucose monitoring   - Insulin pump OMNIPOD  - dexcom 6  - Novolog 70 unit TDD  - dexcom with a reading of 41 for low blood sugars but OMNIPOD did not show that level of hypoglycemia which woke him up form bed.   - future appointment with [facility] for diabetes educations    Hyperlipidemia Follow-Up      Are you regularly taking any medication or supplement to lower your cholesterol?   Yes- Simvastatin 40 mg    Are you having muscle aches or other side effects that you think could be caused by your cholesterol lowering medication?  No    - LDL 36, Trig 51, cholesterol 92  - simvastatin 40 mg QD    Hypertension Follow-up      Do you check your blood pressure regularly outside of the clinic? Yes     Are you following a low salt diet? Yes    Are your blood pressures ever more than 140 on the top number (systolic) OR more   than 90 on the bottom number (diastolic), for example 140/90? No    - lisinopril 40 mg     BP Readings from Last 2 Encounters:   01/31/23 112/70   12/29/22 112/70     Hemoglobin A1C (%)   Date Value   01/31/2023 6.9 (H)   10/24/2022 7.3 (H)   10/20/2021 7.7 (A)   06/03/2021 7.8 (A)     LDL Cholesterol Calculated (mg/dL)   Date Value   07/21/2022 36   06/14/2021 42   03/24/2020 40     Today's PHQ-2 Score:   PHQ-2 ( 1999 [employer]) 1/31/2023   Q1: Little interest or pleasure  in doing things 0   Q2: Feeling down, depressed or hopeless 0   PHQ-2 Score 0   PHQ-2 Total Score (12-17 Years)- Positive if 3 or more points; Administer PHQ-A if positive -   Q1: Little interest or pleasure in doing things Not at all   Q2: Feeling down, depressed or hopeless Not at all   PHQ-2 Score 0       Have you ever done Advance Care Planning? (For example, a Health Directive, POLST, or a discussion with a medical provider or your loved ones about your wishes): No, advance care planning information given to patient to review.  Patient plans to discuss their wishes with loved ones or provider.      Social History     Tobacco Use     Smoking status: Never     Smokeless tobacco: Never   Substance Use Topics     Alcohol use: No     Alcohol/week: 0.0 standard drinks     If you drink alcohol do you typically have >3 drinks per day or >7 drinks per week? Not applicable    Alcohol Use 1/31/2023   Prescreen: >3 drinks/day or >7 drinks/week? Not Applicable   Prescreen: >3 drinks/day or >7 drinks/week? -   No flowsheet data found.    Last PSA:   PSA   Date Value Ref Range Status   06/14/2021 0.69 0 - 4 ug/L Final     Comment:     Assay Method:  Chemiluminescence using Siemens Vista analyzer     Prostate Specific Antigen Screen   Date Value Ref Range Status   01/31/2023 0.73 0.00 - 3.50 ng/mL Final       Reviewed orders with patient. Reviewed health maintenance and updated orders accordingly - Yes  BP Readings from Last 3 Encounters:   01/31/23 112/70   12/29/22 112/70   12/20/22 133/80    Wt Readings from Last 3 Encounters:   01/31/23 89.4 kg (197 lb 1 oz)   12/29/22 91.6 kg (202 lb)   11/23/22 89.8 kg (198 lb)                  Patient Active Problem List   Diagnosis     DM type 1 (diabetes mellitus, type 1) (H)     HTN (hypertension)     Hyperlipidemia     ACP (advance care planning)     Diabetic polyneuropathy associated with type 1 diabetes mellitus (H)     Class 2 obesity in adult     Obesity (BMI 35.0-39.9) with  comorbidity (H)     Gastroparesis     Gastroesophageal reflux disease without esophagitis     Chronic bilateral low back pain with right-sided sciatica     Past Surgical History:   Procedure Laterality Date     COLONOSCOPY - HIM SCAN N/A 09/19/2016    Procedure: COLONOSCOPY SCREENING; Surgeon: Rudy Rojo MD; Location: [facility] OR     ESOPHAGOSCOPY, GASTROSCOPY, DUODENOSCOPY (EGD), COMBINED  08/13/2019       Social History     Tobacco Use     Smoking status: Never     Smokeless tobacco: Never   Substance Use Topics     Alcohol use: No     Alcohol/week: 0.0 standard drinks     Family History   Problem Relation Age of Onset     No Known Problems Mother      Hypertension Father      Hypertension Brother          Current Outpatient Medications   Medication Sig Dispense Refill     acetone, Urine, test STRP Use as directed 50 each 3     blood glucose monitoring (eMeter CONTOUR NEXT MONITOR) meter device kit 1 each       blood glucose monitoring (MELISSA CONTOUR NEXT) test strip Use to test blood sugar 6 times daily or as directed. 200 each 11     CALCIUM-VITAMIN D PO Take  by mouth daily. 600 mg       Continuous Blood Gluc  (DEXCOM G6 ) DAYAN 1 each continuous To be used per manufacture's recommendation 1 each 2     Continuous Blood Gluc Sensor (DEXCOM G6 SENSOR) MISC 1 each continuous 3 each 11     Continuous Blood Gluc Transmit (DEXCOM G6 TRANSMITTER) MISC 1 each continuous 1 each 4     CONTOUR NEXT TEST test strip TEST 6 TIMES DAILY, OR AS DIRECTED. 200 strip 5     diclofenac (VOLTAREN) 1 % topical gel Apply 4 g topically 4 times daily as needed for moderate pain (4-6) 50 g 2     gabapentin (NEURONTIN) 300 MG capsule Take 1 capsule (300 mg) by mouth 3 times daily 90 capsule 4     Glucagon (GVOKE HYPOPEN 2-PACK) 1 MG/0.2ML SOAJ Inject 1 mg Subcutaneous as needed (for low blood glucose) 0.4 mL 3     Insulin Disposable Pump (OMNIPOD 5 G6 INTRO, GEN 5,) KIT 1 kit continuous 1 kit 0     Insulin Disposable  Pump (OMNIPOD 5 G6 POD, GEN 5,) MISC 1 each every 3 days 10 each 5     INSULIN PUMP - OUTPATIENT Insulin pump settings Updated: 11/16/22 Omnipod 5 Basal:  0000 1.1 0200 1.05 0600 0.95 1020 0.7 1530 0.9 1800 1.15 Total bolus: 23.3 CR:  0000 12 1200 13 1600 14 ISF:  0000 65 1200 60 1800 55 Target: 110       Lidocaine (LIDOCARE) 4 % Patch Place 1 patch onto the skin every 24 hours To prevent lidocaine toxicity, patient should be patch free for 12 hrs daily. 20 patch 1     lisinopril (ZESTRIL) 10 MG tablet Take 1 tablet by mouth once daily 90 tablet 3     Multiple Vitamin (MULTI-VITAMIN DAILY PO) Take  by mouth daily.       nitroGLYcerin (NITROSTAT) 0.4 MG sublingual tablet Place under the tongue every 5 minutes as needed       NOVOLOG VIAL 100 UNIT/ML soln USE WITH INSULIN PUMP FOR A TOTAL DAILY USAGE OF 70 UNITS 30 mL 3     NOVOLOG VIAL 100 UNIT/ML soln USE WITH INSULIN PUMP FOR A TOTAL DAILY USAGE OF 70 UNITS 20 mL 3     ondansetron (ZOFRAN) 4 MG tablet Take 1 tablet (4 mg) by mouth every 8 hours as needed for nausea 30 tablet 4     order for DME Equipment being ordered:     1.  nPicker insulin pump supplies--insertion sets and reserviors  Disp: 1 month  Refill: 11 months 30 days 11     pantoprazole (PROTONIX) 40 MG EC tablet Take 1 tablet by mouth twice daily 60 tablet 9     simvastatin (ZOCOR) 40 MG tablet Take 1 tablet (40 mg) by mouth At Bedtime 90 tablet 3     tamsulosin (FLOMAX) 0.4 MG capsule Take 1 capsule (0.4 mg) by mouth every evening 90 capsule 3     UREA 10 HYDRATING 10 % external cream APPLY CREAM TOPICALLY AS NEEDED FOR CALLOUSED SKIN 85 g 2     URINE GLUCOSE-KETONES TEST VI        No Known Allergies    Reviewed and updated as needed this visit by clinical staff   Tobacco  Allergies  Meds  Problems  Med Hx  Surg Hx  Fam Hx          Reviewed and updated as needed this visit by Provider   Tobacco  Allergies  Meds  Problems  Med Hx  Surg Hx  Fam Hx          # Wellness:  - Immunizations:  UTD  - Lipids (7/21/2022): 36  - Dexa scan (4/26/2022, done d/t DM1): lowest T score of -2.0. major 6.1%, hip 0.9%  - Colon cancer screening (9/19/2016):  Colonoscopy normal. Ucare stool sample results were negative  - PSA: updating today  - Lung cancer screening: never smoker  - AAA screening: N/A    Review of Systems   Constitutional: Negative for chills and fever.   HENT: Negative.    Eyes: Negative.    Respiratory: Negative for chest tightness and shortness of breath.    Cardiovascular: Negative for chest pain and palpitations.   Gastrointestinal: Negative for constipation, diarrhea, heartburn, nausea and vomiting.   Endocrine: Negative for polydipsia, polyphagia and polyuria.   Musculoskeletal: Positive for arthralgias and back pain (ongoing since adolescence).   Skin: Negative.    Neurological: Positive for light-headedness (lack of sleep), numbness (both hands and feet) and paresthesias (both hands and feet).   Psychiatric/Behavioral: Negative for mood changes.     OBJECTIVE:   /70 (BP Location: Left arm, Patient Position: Chair, Cuff Size: Adult Regular)   Pulse 70   Temp 97.2  F (36.2  C) (Tympanic)   Resp 17   Wt 89.4 kg (197 lb 1 oz)   SpO2 92%   BMI 37.85 kg/m      Physical Exam  Constitutional:       Appearance: Normal appearance.   HENT:      Head: Normocephalic and atraumatic.      Right Ear: Tympanic membrane and external ear normal.      Left Ear: Tympanic membrane and external ear normal.      Mouth/Throat:      Mouth: Mucous membranes are moist.   Eyes:      Pupils: Pupils are equal, round, and reactive to light.   Cardiovascular:      Rate and Rhythm: Normal rate and regular rhythm.      Pulses: Normal pulses.      Heart sounds: Normal heart sounds. No murmur heard.    No gallop.   Pulmonary:      Effort: Pulmonary effort is normal.      Breath sounds: Normal breath sounds. No wheezing, rhonchi or rales.   Abdominal:      General: Bowel sounds are normal.      Palpations: Abdomen is  soft.   Musculoskeletal:      Cervical back: Neck supple. No tenderness.      Right lower leg: No edema.      Left lower leg: No edema.   Lymphadenopathy:      Cervical: No cervical adenopathy.   Skin:     General: Skin is warm and dry.   Neurological:      Mental Status: He is alert.   Psychiatric:         Mood and Affect: Mood normal.         Behavior: Behavior normal.       Diagnostic Test Results:  Labs reviewed in Epic  Results for orders placed or performed in visit on 01/31/23 (from the past 24 hour(s))   Hemoglobin A1c   Result Value Ref Range    Estimated Average Glucose 151 mg/dL    Hemoglobin A1C 6.9 (H) <5.7 %   Basic metabolic panel   Result Value Ref Range    Sodium 142 136 - 145 mmol/L    Potassium 4.4 3.4 - 5.3 mmol/L    Chloride 106 98 - 107 mmol/L    Carbon Dioxide (CO2) 27 22 - 29 mmol/L    Anion Gap 9 7 - 15 mmol/L    Urea Nitrogen 20.4 (H) 6.0 - 20.0 mg/dL    Creatinine 1.12 0.67 - 1.17 mg/dL    Calcium 8.8 8.6 - 10.0 mg/dL    Glucose 71 70 - 99 mg/dL    GFR Estimate 76 >60 mL/min/1.73m2   PSA, screen   Result Value Ref Range    Prostate Specific Antigen Screen 0.73 0.00 - 3.50 ng/mL    Narrative    This result is obtained using the Roche Elecsys total PSA method on the [id number] [id number] immunoassay analyzer. Results obtained with different assay methods or kits cannot be used interchangeably.       ASSESSMENT/PLAN:   (Z00.00) Routine general medical examination at a health care facility  (primary encounter diagnosis)  Comment: He is stable with no health concerns besides chronic conditions on his problem list.   Plan: continue with health lifestyle choices to maintenance good health    (M54.41,  G89.29) Chronic bilateral low back pain with right-sided sciatica  Comment: His back pain has been ongoing since adolescence. Back pain continues to improve. He is functional and able to work and live appropriately. Improvements can be obtained by using different medications with different mechanisms of  action. Numbness and tingling of both hands and feet have improved with gabapentin and will appreciate more decrease in symptoms especially during work. Trying lidocaine patch may decrease the pain, numbness and tingling especially while he is at work. Discussed the benefits and instructions of using the lidocaine patches. He continues to follow with chiropractor 3 times a week and reports mild improvements. Next step if symptoms do not improve after a month on lidocaine is to add a muscle relaxer to his regimen.   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 He did report of hypoglycemia a couple of times a week. Dexcom is about to sense and beep, however his OMNIPOD machine does not register it as hypoglycemia per printout of OMNIPOD. The lowest reading according to the OMNIPOD is 82 whiles the dexcom has multiple low readings mostly before lunch time. A couple of lows at night. Discussed signs and symptoms of hypoglycemia and how to correct it. Discussed visiting with [facility] diabetic educator to help manage these episodes. His A1C is 6.9 and at goal < 7. He does his year eye exam at [facility] and foot exams right here with podiatrist Dr Juliette Jarrett.   Plan: Hemoglobin A1c, Basic metabolic panel  - glucose monitoring using both OMNIPOD and Dexcom 6  - Insulin pump via OMNIPOD  - Novolog 70 unit TDD  - discussed following with [facility] diabetic educator on 2/21/2023    (E78.5) Hyperlipidemia, unspecified hyperlipidemia type  Comment: stable   Plan:  "simvastatin 40 mg at bed time     (I10) Primary hypertension  Comment: controlled and stable 130/80  Plan: lisinopril 10 mg every day     (Z12.5) Screening for prostate cancer  Comment: PSA level is low. PSA tumor marker is low  Plan: PSA, screen      COUNSELING:   Reviewed preventive health counseling, as reflected in patient instructions      BMI:   Estimated body mass index is 37.85 kg/m  as calculated from the following:    Height as of 11/16/22: 1.537 m (5' 0.5\").    Weight as of this encounter: 89.4 kg (197 lb 1 oz).     Weight management plan: Discussed healthy diet and exercise guidelines      He reports that he has never smoked. He has never used smokeless tobacco.            ROBERTO Lora-S  [facility].     I was present with the physician assistant student who participated in the service and in the documentation of the note. I have verified the history and personally performed the physical exam and medical decision making. I agree with the assessment and plan of care as documented in the note.      Natacha Alvarez MD  [facility] [place]  "

## 2023-01-31 ENCOUNTER — LAB (OUTPATIENT)
Dept: LAB | Facility: OTHER | Age: 58
End: 2023-01-31
Attending: FAMILY MEDICINE
Payer: COMMERCIAL

## 2023-01-31 ENCOUNTER — OFFICE VISIT (OUTPATIENT)
Dept: FAMILY MEDICINE | Facility: OTHER | Age: 58
End: 2023-01-31
Attending: FAMILY MEDICINE
Payer: COMMERCIAL

## 2023-01-31 ENCOUNTER — ALLIED HEALTH/NURSE VISIT (OUTPATIENT)
Dept: EDUCATION SERVICES | Facility: OTHER | Age: 58
End: 2023-01-31
Attending: FAMILY MEDICINE
Payer: COMMERCIAL

## 2023-01-31 VITALS
OXYGEN SATURATION: 92 % | RESPIRATION RATE: 17 BRPM | HEART RATE: 70 BPM | BODY MASS INDEX: 37.85 KG/M2 | WEIGHT: 197.06 LBS | DIASTOLIC BLOOD PRESSURE: 70 MMHG | SYSTOLIC BLOOD PRESSURE: 112 MMHG | TEMPERATURE: 97.2 F

## 2023-01-31 DIAGNOSIS — E10.649 TYPE 1 DIABETES MELLITUS WITH HYPOGLYCEMIA UNAWARENESS (H): ICD-10-CM

## 2023-01-31 DIAGNOSIS — I10 PRIMARY HYPERTENSION: ICD-10-CM

## 2023-01-31 DIAGNOSIS — M54.41 CHRONIC BILATERAL LOW BACK PAIN WITH RIGHT-SIDED SCIATICA: ICD-10-CM

## 2023-01-31 DIAGNOSIS — Z00.00 ROUTINE GENERAL MEDICAL EXAMINATION AT A HEALTH CARE FACILITY: Primary | ICD-10-CM

## 2023-01-31 DIAGNOSIS — E10.65 TYPE 1 DIABETES MELLITUS WITH HYPERGLYCEMIA (H): Primary | ICD-10-CM

## 2023-01-31 DIAGNOSIS — G89.29 CHRONIC BILATERAL LOW BACK PAIN WITH RIGHT-SIDED SCIATICA: ICD-10-CM

## 2023-01-31 DIAGNOSIS — E78.5 HYPERLIPIDEMIA, UNSPECIFIED HYPERLIPIDEMIA TYPE: ICD-10-CM

## 2023-01-31 DIAGNOSIS — E10.65 TYPE 1 DIABETES MELLITUS WITH HYPERGLYCEMIA (H): ICD-10-CM

## 2023-01-31 DIAGNOSIS — Z12.5 SCREENING FOR PROSTATE CANCER: ICD-10-CM

## 2023-01-31 LAB
ANION GAP SERPL CALCULATED.3IONS-SCNC: 9 MMOL/L (ref 7–15)
BUN SERPL-MCNC: 20.4 MG/DL (ref 6–20)
CALCIUM SERPL-MCNC: 8.8 MG/DL (ref 8.6–10)
CHLORIDE SERPL-SCNC: 106 MMOL/L (ref 98–107)
CREAT SERPL-MCNC: 1.12 MG/DL (ref 0.67–1.17)
DEPRECATED HCO3 PLAS-SCNC: 27 MMOL/L (ref 22–29)
EST. AVERAGE GLUCOSE BLD GHB EST-MCNC: 151 MG/DL
GFR SERPL CREATININE-BSD FRML MDRD: 76 ML/MIN/1.73M2
GLUCOSE SERPL-MCNC: 71 MG/DL (ref 70–99)
HBA1C MFR BLD: 6.9 %
POTASSIUM SERPL-SCNC: 4.4 MMOL/L (ref 3.4–5.3)
PSA SERPL-MCNC: 0.73 NG/ML (ref 0–3.5)
SODIUM SERPL-SCNC: 142 MMOL/L (ref 136–145)

## 2023-01-31 PROCEDURE — 99213 OFFICE O/P EST LOW 20 MIN: CPT | Mod: 25 | Performed by: FAMILY MEDICINE

## 2023-01-31 PROCEDURE — G0103 PSA SCREENING: HCPCS | Mod: ZL | Performed by: FAMILY MEDICINE

## 2023-01-31 PROCEDURE — 80048 BASIC METABOLIC PNL TOTAL CA: CPT | Mod: ZL | Performed by: FAMILY MEDICINE

## 2023-01-31 PROCEDURE — 36415 COLL VENOUS BLD VENIPUNCTURE: CPT | Mod: ZL | Performed by: FAMILY MEDICINE

## 2023-01-31 PROCEDURE — 83036 HEMOGLOBIN GLYCOSYLATED A1C: CPT | Mod: ZL | Performed by: FAMILY MEDICINE

## 2023-01-31 PROCEDURE — 99396 PREV VISIT EST AGE 40-64: CPT | Performed by: FAMILY MEDICINE

## 2023-01-31 RX ORDER — GABAPENTIN 300 MG/1
300 CAPSULE ORAL 3 TIMES DAILY
Qty: 90 CAPSULE | Refills: 4 | Status: SHIPPED | OUTPATIENT
Start: 2023-01-31 | End: 2023-05-08

## 2023-01-31 RX ORDER — NITROGLYCERIN 0.4 MG/1
0.4 TABLET SUBLINGUAL EVERY 5 MIN PRN
Qty: 20 TABLET | Refills: 0 | Status: CANCELLED | OUTPATIENT
Start: 2023-01-31

## 2023-01-31 ASSESSMENT — ENCOUNTER SYMPTOMS
POLYDIPSIA: 0
CHEST TIGHTNESS: 0
SHORTNESS OF BREATH: 0
ARTHRALGIAS: 1
CONSTIPATION: 0
HEARTBURN: 0
DIARRHEA: 0
LIGHT-HEADEDNESS: 1
VOMITING: 0
PALPITATIONS: 0
CHILLS: 0
NUMBNESS: 1
PARESTHESIAS: 1
FEVER: 0
NAUSEA: 0
EYES NEGATIVE: 1
POLYPHAGIA: 0
BACK PAIN: 1

## 2023-01-31 ASSESSMENT — ANXIETY QUESTIONNAIRES
6. BECOMING EASILY ANNOYED OR IRRITABLE: NOT AT ALL
GAD7 TOTAL SCORE: 0
3. WORRYING TOO MUCH ABOUT DIFFERENT THINGS: NOT AT ALL
5. BEING SO RESTLESS THAT IT IS HARD TO SIT STILL: NOT AT ALL
IF YOU CHECKED OFF ANY PROBLEMS ON THIS QUESTIONNAIRE, HOW DIFFICULT HAVE THESE PROBLEMS MADE IT FOR YOU TO DO YOUR WORK, TAKE CARE OF THINGS AT HOME, OR GET ALONG WITH OTHER PEOPLE: NOT DIFFICULT AT ALL
GAD7 TOTAL SCORE: 0
1. FEELING NERVOUS, ANXIOUS, OR ON EDGE: NOT AT ALL
7. FEELING AFRAID AS IF SOMETHING AWFUL MIGHT HAPPEN: NOT AT ALL
2. NOT BEING ABLE TO STOP OR CONTROL WORRYING: NOT AT ALL

## 2023-01-31 ASSESSMENT — PAIN SCALES - GENERAL: PAINLEVEL: SEVERE PAIN (6)

## 2023-01-31 ASSESSMENT — PATIENT HEALTH QUESTIONNAIRE - PHQ9: 5. POOR APPETITE OR OVEREATING: NOT AT ALL

## 2023-01-31 NOTE — PATIENT INSTRUCTIONS
Preventive Health Recommendations  Male Ages 50 - 64    Yearly exam:             See your health care provider every year in order to  o   Review health changes.   o   Discuss preventive care.    o   Review your medicines if your doctor has prescribed any.   Have a cholesterol test every 5 years, or more frequently if you are at risk for high cholesterol/heart disease.   Have a diabetes test (fasting glucose) every three years. If you are at risk for diabetes, you should have this test more often.   Have a colonoscopy at age 50, or have a yearly FIT test (stool test). These exams will check for colon cancer.    Talk with your health care provider about whether or not a prostate cancer screening test (PSA) is right for you.  You should be tested each year for STDs (sexually transmitted diseases), if you re at risk.     Shots: Get a flu shot each year. Get a tetanus shot every 10 years.     Nutrition:  Eat at least 5 servings of fruits and vegetables daily.   Eat whole-grain bread, whole-wheat pasta and brown rice instead of white grains and rice.   Get adequate Calcium and Vitamin D.     Lifestyle  Exercise for at least 150 minutes a week (30 minutes a day, 5 days a week). This will help you control your weight and prevent disease.   Limit alcohol to one drink per day.   No smoking.   Wear sunscreen to prevent skin cancer.   See your dentist every six months for an exam and cleaning.   See your eye doctor every 1 to 2 years.    Please bring in [facility] stool results at your next appointment

## 2023-01-31 NOTE — PROGRESS NOTES
Pt came in for a Omnipod 5 download today. This was completed and given to Dr Alvarez.    Lianna Brewster

## 2023-02-16 DIAGNOSIS — E78.5 HYPERLIPIDEMIA, UNSPECIFIED HYPERLIPIDEMIA TYPE: ICD-10-CM

## 2023-02-16 NOTE — TELEPHONE ENCOUNTER
Zocor      Last Written Prescription Date:  01/20/22  Last Fill Quantity: 90,   # refills: 3  Last Office Visit: 01/31/23  Future Office visit:    Next 5 appointments (look out 90 days)    Feb 21, 2023  8:00 AM  (Arrive by 7:45 AM)  [facility] [address] [place] [address] [phone]   May 08, 2023  8:30 AM  (Arrive by 8:15 AM)  SHORT with Natacha Alvarez MD  [facility] ([facility] ) [address] [place] [id number]  [phone]

## 2023-02-20 RX ORDER — SIMVASTATIN 40 MG
TABLET ORAL
Qty: 90 TABLET | Refills: 0 | Status: SHIPPED | OUTPATIENT
Start: 2023-02-20 | End: 2023-05-24

## 2023-02-21 ENCOUNTER — OFFICE VISIT (OUTPATIENT)
Dept: PODIATRY | Facility: OTHER | Age: 58
End: 2023-02-21
Attending: PODIATRIST
Payer: COMMERCIAL

## 2023-02-21 VITALS
OXYGEN SATURATION: 96 % | HEIGHT: 60 IN | BODY MASS INDEX: 38.68 KG/M2 | WEIGHT: 197 LBS | TEMPERATURE: 97.1 F | HEART RATE: 66 BPM

## 2023-02-21 DIAGNOSIS — M62.461 GASTROCNEMIUS EQUINUS OF RIGHT LOWER EXTREMITY: ICD-10-CM

## 2023-02-21 DIAGNOSIS — M62.462 GASTROCNEMIUS EQUINUS OF LEFT LOWER EXTREMITY: ICD-10-CM

## 2023-02-21 DIAGNOSIS — M21.969 LOSS OF PROTECTIVE SENSATION OF SKIN OF DEFORMED FOOT: ICD-10-CM

## 2023-02-21 DIAGNOSIS — E10.65 TYPE 1 DIABETES MELLITUS WITH HYPERGLYCEMIA (H): ICD-10-CM

## 2023-02-21 DIAGNOSIS — L60.3 ONYCHODYSTROPHY: ICD-10-CM

## 2023-02-21 DIAGNOSIS — R20.8 LOSS OF PROTECTIVE SENSATION OF SKIN OF DEFORMED FOOT: ICD-10-CM

## 2023-02-21 DIAGNOSIS — E10.42 DIABETIC POLYNEUROPATHY ASSOCIATED WITH TYPE 1 DIABETES MELLITUS (H): ICD-10-CM

## 2023-02-21 DIAGNOSIS — M20.21 HALLUX RIGIDUS OF RIGHT FOOT: ICD-10-CM

## 2023-02-21 DIAGNOSIS — L84 CALLUS OF FOOT: Primary | ICD-10-CM

## 2023-02-21 DIAGNOSIS — M21.371 FOOT DROP, RIGHT FOOT: ICD-10-CM

## 2023-02-21 PROCEDURE — 11721 DEBRIDE NAIL 6 OR MORE: CPT | Performed by: PODIATRIST

## 2023-02-21 PROCEDURE — G0463 HOSPITAL OUTPT CLINIC VISIT: HCPCS

## 2023-02-21 PROCEDURE — 11056 PARNG/CUTG B9 HYPRKR LES 2-4: CPT | Performed by: PODIATRIST

## 2023-02-21 ASSESSMENT — PAIN SCALES - GENERAL: PAINLEVEL: MODERATE PAIN (5)
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
## 2023-02-22 ENCOUNTER — ALLIED HEALTH/NURSE VISIT (OUTPATIENT)
Dept: EDUCATION SERVICES | Facility: OTHER | Age: 58
End: 2023-02-22
Attending: NURSE PRACTITIONER
Payer: COMMERCIAL

## 2023-02-22 VITALS
OXYGEN SATURATION: 94 % | HEIGHT: 61 IN | BODY MASS INDEX: 37.61 KG/M2 | WEIGHT: 199.2 LBS | DIASTOLIC BLOOD PRESSURE: 62 MMHG | HEART RATE: 74 BPM | SYSTOLIC BLOOD PRESSURE: 104 MMHG | RESPIRATION RATE: 16 BRPM

## 2023-02-22 DIAGNOSIS — E10.649 TYPE 1 DIABETES MELLITUS WITH HYPOGLYCEMIA AND WITHOUT COMA (H): ICD-10-CM

## 2023-02-22 PROCEDURE — G0463 HOSPITAL OUTPT CLINIC VISIT: HCPCS

## 2023-02-22 PROCEDURE — 99213 OFFICE O/P EST LOW 20 MIN: CPT | Mod: 25 | Performed by: NURSE PRACTITIONER

## 2023-02-22 PROCEDURE — 95251 CONT GLUC MNTR ANALYSIS I&R: CPT | Performed by: NURSE PRACTITIONER

## 2023-02-22 RX ORDER — GLUCAGON INJECTION, SOLUTION 1 MG/.2ML
1 INJECTION, SOLUTION SUBCUTANEOUS PRN
Qty: 0.4 ML | Refills: 3 | Status: SHIPPED | OUTPATIENT
Start: 2023-02-22

## 2023-02-22 ASSESSMENT — PAIN SCALES - GENERAL: PAINLEVEL: SEVERE PAIN (6)

## 2023-02-22 NOTE — PROGRESS NOTES
SUBJECTIVE:  Richard Harvey, 57 year old, male presents with the following Chief Complaint(s) with HPI to follow:  Chief Complaint   Patient presents with     Diabetes        Diabetes Follow-up      Patient is checking blood sugars: >4x/day using his continuous glucose.  Results:    Date: 2/9 to 2/22/23  Glucose management indicator: n/a    Average glucose: 172  Highest: 233  Lowest: 110    Glucose range  Very low (<54): 0  low (<70): 0  In target range (): 50%  High (>180): 50%  Very high (>250): 0%      Symptoms of hypoglycemia (low blood sugar): some--at work    Paresthesias (numbness or burning in feet) or sores: Yes; no sores    Diabetic eye exam within the last year: Yes    Breakfast eaten regularly: Yes    Patient counting carbs: trying       HPI:   Richard's here today for the follow up regarding his Diabetes mellitus, Type 1.    A1c trend:  Lab Results   Component Value Date    A1C 6.9 01/31/2023    A1C 7.3 10/24/2022    A1C 7.2 07/21/2022    A1C 7.2 04/21/2022    A1C 7.5 01/20/2022    A1C 7.7 10/20/2021    A1C 7.8 06/03/2021    A1C 7.5 11/30/2020    A1C 7.7 09/01/2020    A1C 8.1 03/24/2020     Current Diabetes medication:   1.  Insulin pump (Omnipod 5), uses Novolog   ASA use: yes, 325 mg daily  Statin use: yes, simvastatin 40 mg at bedime    Reports the following:  No issues with the insulin pump  No issues with the Dexcom G6    Working with his urologist regarding his urinary issues  Working with Dr. Alvarez with his other chronic medical concerns.     No other new changes to his health    Have some low BGs with working    Weight trend:  Wt Readings from Last 4 Encounters:   02/22/23 90.4 kg (199 lb 3.2 oz)   02/21/23 89.4 kg (197 lb)   01/31/23 89.4 kg (197 lb 1 oz)   12/29/22 91.6 kg (202 lb)       Patient Active Problem List   Diagnosis     DM type 1 (diabetes mellitus, type 1) (H)     HTN (hypertension)     Hyperlipidemia     ACP (advance care planning)     Diabetic polyneuropathy associated with  type 1 diabetes mellitus (H)     Class 2 obesity in adult     Obesity (BMI 35.0-39.9) with comorbidity (H)     Gastroparesis     Gastroesophageal reflux disease without esophagitis     Chronic bilateral low back pain with right-sided sciatica       Past Medical History:   Diagnosis Date     Diabetes mellitus type 1, controlled (H)      HLD (hyperlipidemia)      HTN (hypertension)      Neuropathy      Type 1 diabetes (H)        Past Surgical History:   Procedure Laterality Date     COLONOSCOPY - HIM SCAN N/A 09/19/2016    Procedure: COLONOSCOPY SCREENING; Surgeon: Rudy Rojo MD; Location: [facility] OR     ESOPHAGOSCOPY, GASTROSCOPY, DUODENOSCOPY (EGD), COMBINED  08/13/2019       Family History   Problem Relation Age of Onset     No Known Problems Mother      Hypertension Father      Hypertension Brother        Social History     Tobacco Use     Smoking status: Never     Smokeless tobacco: Never   Substance Use Topics     Alcohol use: No     Alcohol/week: 0.0 standard drinks       Current Outpatient Medications   Medication Sig Dispense Refill     acetone, Urine, test STRP Use as directed 50 each 3     blood glucose monitoring ([employer] CONTOUR NEXT MONITOR) meter device kit 1 each       blood glucose monitoring ([employer] CONTOUR NEXT) test strip Use to test blood sugar 6 times daily or as directed. 200 each 11     CALCIUM-VITAMIN D PO Take  by mouth daily. 600 mg       Continuous Blood Gluc  (DEXCOM G6 ) DAYAN 1 each continuous To be used per manufacture's recommendation 1 each 2     Continuous Blood Gluc Sensor (DEXCOM G6 SENSOR) MISC CHANGE EVERY 10 DAYS 3 each 11     Continuous Blood Gluc Transmit (DEXCOM G6 TRANSMITTER) MISC 1 each continuous 1 each 4     CONTOUR NEXT TEST test strip TEST 6 TIMES DAILY, OR AS DIRECTED. 200 strip 5     diclofenac (VOLTAREN) 1 % topical gel Apply 4 g topically 4 times daily as needed for moderate pain (4-6) 50 g 2     gabapentin (NEURONTIN) 300 MG capsule Take 1  capsule (300 mg) by mouth 3 times daily 90 capsule 4     Glucagon (GVOKE HYPOPEN 2-PACK) 1 MG/0.2ML SOAJ Inject 1 mg Subcutaneous as needed (for low blood glucose) 0.4 mL 3     Insulin Disposable Pump (OMNIPOD 5 G6 INTRO, GEN 5,) KIT 1 kit continuous 1 kit 0     Insulin Disposable Pump (OMNIPOD 5 G6 POD, GEN 5,) MISC 1 each every 3 days 10 each 5     INSULIN PUMP - OUTPATIENT Insulin pump settings Updated: 11/16/22 Omnipod 5 Basal:  0000 1.1 0200 1.05 0600 0.95 1020 0.7 1530 0.9 1800 1.15 Total bolus: 23.3 CR:  0000 12 1200 13 1600 14 ISF:  0000 65 1200 60 1800 55 Target: 110       Lidocaine (LIDOCARE) 4 % Patch Place 1 patch onto the skin every 24 hours To prevent lidocaine toxicity, patient should be patch free for 12 hrs daily. 20 patch 1     lisinopril (ZESTRIL) 10 MG tablet Take 1 tablet by mouth once daily 90 tablet 3     Multiple Vitamin (MULTI-VITAMIN DAILY PO) Take  by mouth daily.       nitroGLYcerin (NITROSTAT) 0.4 MG sublingual tablet Place under the tongue every 5 minutes as needed       NOVOLOG VIAL 100 UNIT/ML soln USE WITH INSULIN PUMP FOR A TOTAL DAILY USAGE OF 70 UNITS 30 mL 3     NOVOLOG VIAL 100 UNIT/ML soln USE WITH INSULIN PUMP FOR A TOTAL DAILY USAGE OF 70 UNITS 20 mL 3     ondansetron (ZOFRAN) 4 MG tablet Take 1 tablet (4 mg) by mouth every 8 hours as needed for nausea 30 tablet 4     order for DME Equipment being ordered:     1.  [employer] insulin pump supplies--insertion sets and reserviors  Disp: 1 month  Refill: 11 months 30 days 11     pantoprazole (PROTONIX) 40 MG EC tablet Take 1 tablet by mouth twice daily 60 tablet 9     simvastatin (ZOCOR) 40 MG tablet TAKE 1 TABLET BY MOUTH AT BEDTIME 90 tablet 0     tamsulosin (FLOMAX) 0.4 MG capsule Take 1 capsule (0.4 mg) by mouth every evening 90 capsule 3     UREA 10 HYDRATING 10 % external cream APPLY CREAM TOPICALLY AS NEEDED FOR CALLOUSED SKIN 85 g 2     URINE GLUCOSE-KETONES TEST VI          No Known Allergies    REVIEW OF SYSTEMS  Skin:  "negative; callouses--sees Dr. Jarrett  Eyes: negative; wears glasses   Ears/Nose/Throat: negative; hx of allergies  Respiratory: No shortness of breath, dyspnea on exertion, cough, or hemoptysis  Cardiovascular: negative  Gastrointestinal: off and on nausea  Genitourinary: urinary retention   Musculoskeletal: back pain + sciatica (right side)--\"so, so\"--seeing a chiropractor; history of arthritis--hands--same, shoulders, knees--same   Neurologic: positive for numbness or tingling of hands and numbness or tingling of feet--worse   Psychiatric: negative  Hematologic/Lymphatic/Immunologic: negative  Endocrine: positive for diabetes    OBJECTIVE:  /62   Pulse 74   Resp 16   Ht 1.537 m (5' 0.5\")   Wt 90.4 kg (199 lb 3.2 oz)   SpO2 94%   BMI 38.26 kg/m    Constitutional: alert and no distress  Respiratory:  Good diaphragmatic excursion.   Musculoskeletal: extremities normal- no gross deformities noted and gait normal  Skin: no suspicious lesions or rashes to visible skin  Psychiatric: mentation appears normal and affect normal/bright      LAB  No results found for any visits on 02/22/23.    ASSESSMENT / PLAN:.  (E10.649) Type 1 diabetes mellitus with hypoglycemia and without coma (H)  Comment: noted insulin pump and CGM download    Insulin pump settings  Updated: 2/22/23  Omnipod 5  Basal:   0000 1.1  0200 1.05  0600 0.95  1020 0.7  1530 0.9  1800 1.15  Total bolus: 23.3  CR:   0000 12  1200 13  1600 14  ISF:   0000 65  1200 60  1800 55  Target: 110    Insulin  Basal: 27.8 (57%)  Bolus: 21.4 (43%)  TDD: 55 units    Plan: Glucagon (GVOKE HYPOPEN 2-PACK) 1 MG/0.2ML         SOAJ, GLUCOSE MONITOR, 72 HOUR, PHYS INTERP          Awesome job!   a1c 6.9%          No change with his insulin pump settings at this time    Keep working on giving your bolus before consuming carbs.      Education on how to use the activity mode especially when working.    Discussed his back up plan if insulin pump fails.      Follow up  3 " months    Call sooner if any issues/concerns develop and/or continued issues with low BGs    Patient Instructions     Continue working on healthy eating and moving as best as you can (start low and slow, work up to 30 min, 5x/week)    BG goals:  Fasting and before meals <130, >70  2 hour after eating <180    We only need 1/2 of these numbers to be within target then your A1c will be within target    Medication changes   None for now     Follow up   3 months    Call me sooner if any problems/concerns and/or questions develop including consistent low BGs <70 or consistent high BGs >200  [phone] (Unit Coordinator)    [phone] (Tracey, Nurse)    Last A1c  Lab Results   Component Value Date    A1C 6.9 01/31/2023    A1C 7.3 10/24/2022    A1C 7.2 07/21/2022    A1C 7.2 04/21/2022    A1C 7.5 01/20/2022    A1C 7.7 10/20/2021    A1C 7.8 06/03/2021    A1C 7.5 11/30/2020    A1C 7.7 09/01/2020    A1C 8.1 03/24/2020         Time: 25 min  Barrier: none  Willingness to learn: accepting    Macrina CHAMBERLAIN [facility]  Diabetes and Wound Care    With the electronic record, we can now more quickly and easily track our patient diabetic goals. Our diabetes clinical review is in progress and these are the indicators we are monitoring for good diabetes health.     1.) HbA1C less than 7 (measurement of your average blood sugars)  2.) Blood Pressure less than 140/80  3.) LDL less than 100 (bad cholesterol)  4.) HbA1C is checked in the last 6 months and below 7% (more frequently if not at goal or adjusting medications)  5.) LDL is checked in the last 12 months (more frequently if not at goal or adjusting medications)  6.) Taking one baby aspirin daily (unless otherwise instructed)  7.) No tobacco use  8) Statin use     You have achieved 8 out of 8 of these and I am encouraging you to come in and get tuned up to achieve 8 out of 8!  Here is what you have achieved so far in my goals for you:  1.) HbA1C  less than 7:                          "     YES  Your last  HbA1C :   Lab Results   Component Value Date    A1C 6.9 01/31/2023    A1C 7.3 10/24/2022    A1C 7.2 07/21/2022    A1C 7.2 04/21/2022    A1C 7.5 01/20/2022    A1C 7.7 10/20/2021    A1C 7.8 06/03/2021    A1C 7.5 11/30/2020    A1C 7.7 09/01/2020    A1C 8.1 03/24/2020     2.) Blood Pressure less than 140/80:       YES      Your last    /62   Pulse 74   Resp 16   Ht 1.537 m (5' 0.5\")   Wt 90.4 kg (199 lb 3.2 oz)   SpO2 94%   BMI 38.26 kg/m      3.) LDL less than 100:                              YES      Your last   LDL         LDL Cholesterol Calculated   Date Value Ref Range Status   07/21/2022 36 <=100 mg/dL Final   06/14/2021 42 <100 mg/dL Final     Comment:     Desirable:       <100 mg/dl       4.) Checked HbA1C in the past 6 months: YES      5.) Checked LDL in the past 12 months:    YES    6.) Taking one aspirin daily:                       YES     7.) No tobacco use:                                        YES      8.) Statin use      YES       CGM requirements:  1.  Patient has been seen in the clinic within the last 6 month  2. Diagnosis of Type 1 diabetes  3. Has been testing their BGs 4x/day using the Dexcom  4. Uses an insulin pump   5. Requires frequent adjustments to their treatment regimen based on BGM and/or CGM testing results.                         "

## 2023-02-24 NOTE — PATIENT INSTRUCTIONS
Continue working on healthy eating and moving as best as you can (start low and slow, work up to 30 min, 5x/week)    BG goals:  Fasting and before meals <130, >70  2 hour after eating <180    We only need 1/2 of these numbers to be within target then your A1c will be within target    Medication changes   None for now     Follow up   3 months    Call me sooner if any problems/concerns and/or questions develop including consistent low BGs <70 or consistent high BGs >200  [phone] (Unit Coordinator)    [phone] (Tracey, Nurse)    Last A1c  Lab Results   Component Value Date    A1C 6.9 01/31/2023    A1C 7.3 10/24/2022    A1C 7.2 07/21/2022    A1C 7.2 04/21/2022    A1C 7.5 01/20/2022    A1C 7.7 10/20/2021    A1C 7.8 06/03/2021    A1C 7.5 11/30/2020    A1C 7.7 09/01/2020    A1C 8.1 03/24/2020

## 2023-04-18 ENCOUNTER — APPOINTMENT (OUTPATIENT)
Dept: ULTRASOUND IMAGING | Facility: HOSPITAL | Age: 58
End: 2023-04-18
Attending: HOSPITALIST
Payer: COMMERCIAL

## 2023-04-18 ENCOUNTER — HOSPITAL ENCOUNTER (INPATIENT)
Facility: HOSPITAL | Age: 58
LOS: 7 days | Discharge: HOME | End: 2023-04-25
Attending: STUDENT IN AN ORGANIZED HEALTH CARE EDUCATION/TRAINING PROGRAM | Admitting: HOSPITALIST
Payer: COMMERCIAL

## 2023-04-18 DIAGNOSIS — A41.9 SEPSIS, DUE TO UNSPECIFIED ORGANISM, UNSPECIFIED WHETHER ACUTE ORGAN DYSFUNCTION PRESENT (H): Primary | ICD-10-CM

## 2023-04-18 DIAGNOSIS — N30.00 ACUTE CYSTITIS WITHOUT HEMATURIA: ICD-10-CM

## 2023-04-18 DIAGNOSIS — N10 PYELONEPHRITIS, ACUTE: ICD-10-CM

## 2023-04-18 LAB
ALBUMIN SERPL BCG-MCNC: 3.6 G/DL (ref 3.5–5.2)
ALBUMIN UR-MCNC: 100 MG/DL
ALP SERPL-CCNC: 87 U/L (ref 40–129)
ALT SERPL W P-5'-P-CCNC: 21 U/L (ref 10–50)
ANION GAP SERPL CALCULATED.3IONS-SCNC: 12 MMOL/L (ref 7–15)
APPEARANCE UR: ABNORMAL
AST SERPL W P-5'-P-CCNC: 24 U/L (ref 10–50)
BACTERIA #/AREA URNS HPF: ABNORMAL /HPF
BASE EXCESS BLDV CALC-SCNC: 2.7 MMOL/L (ref -7.7–1.9)
BASOPHILS # BLD AUTO: 0 10E3/UL (ref 0–0.2)
BASOPHILS NFR BLD AUTO: 0 %
BILIRUB SERPL-MCNC: 0.6 MG/DL
BILIRUB UR QL STRIP: NEGATIVE
BUN SERPL-MCNC: 30.1 MG/DL (ref 6–20)
CALCIUM SERPL-MCNC: 8.7 MG/DL (ref 8.6–10)
CHLORIDE SERPL-SCNC: 96 MMOL/L (ref 98–107)
COLOR UR AUTO: YELLOW
CREAT SERPL-MCNC: 1.6 MG/DL (ref 0.67–1.17)
DEPRECATED HCO3 PLAS-SCNC: 25 MMOL/L (ref 22–29)
EOSINOPHIL # BLD AUTO: 0 10E3/UL (ref 0–0.7)
EOSINOPHIL NFR BLD AUTO: 0 %
ERYTHROCYTE [DISTWIDTH] IN BLOOD BY AUTOMATED COUNT: 12.1 % (ref 10–15)
GFR SERPL CREATININE-BSD FRML MDRD: 50 ML/MIN/1.73M2
GLUCOSE SERPL-MCNC: 197 MG/DL (ref 70–99)
GLUCOSE UR STRIP-MCNC: NEGATIVE MG/DL
HCO3 BLDV-SCNC: 26 MMOL/L (ref 21–28)
HCT VFR BLD AUTO: 37.2 % (ref 40–53)
HGB BLD-MCNC: 12.7 G/DL (ref 13.3–17.7)
HGB UR QL STRIP: NEGATIVE
HOLD SPECIMEN: NORMAL
HOLD SPECIMEN: NORMAL
IMM GRANULOCYTES # BLD: 0.1 10E3/UL
IMM GRANULOCYTES NFR BLD: 1 %
KETONES UR STRIP-MCNC: 20 MG/DL
LACTATE SERPL-SCNC: 1 MMOL/L (ref 0.7–2)
LEUKOCYTE ESTERASE UR QL STRIP: ABNORMAL
LYMPHOCYTES # BLD AUTO: 0.5 10E3/UL (ref 0.8–5.3)
LYMPHOCYTES NFR BLD AUTO: 2 %
MCH RBC QN AUTO: 30.5 PG (ref 26.5–33)
MCHC RBC AUTO-ENTMCNC: 34.1 G/DL (ref 31.5–36.5)
MCV RBC AUTO: 89 FL (ref 78–100)
MONOCYTES # BLD AUTO: 1.5 10E3/UL (ref 0–1.3)
MONOCYTES NFR BLD AUTO: 7 %
MUCOUS THREADS #/AREA URNS LPF: PRESENT /LPF
NEUTROPHILS # BLD AUTO: 18.2 10E3/UL (ref 1.6–8.3)
NEUTROPHILS NFR BLD AUTO: 90 %
NITRATE UR QL: NEGATIVE
NRBC # BLD AUTO: 0 10E3/UL
NRBC BLD AUTO-RTO: 0 /100
O2/TOTAL GAS SETTING VFR VENT: 0 %
OXYHGB MFR BLDV: 96 % (ref 70–75)
PCO2 BLDV: 35 MM HG (ref 40–50)
PH BLDV: 7.48 [PH] (ref 7.32–7.43)
PH UR STRIP: 7 [PH] (ref 4.7–8)
PLATELET # BLD AUTO: 218 10E3/UL (ref 150–450)
PO2 BLDV: 97 MM HG (ref 25–47)
POTASSIUM SERPL-SCNC: 4.1 MMOL/L (ref 3.4–5.3)
PROCALCITONIN SERPL IA-MCNC: 1.88 NG/ML
PROT SERPL-MCNC: 6.9 G/DL (ref 6.4–8.3)
RBC # BLD AUTO: 4.16 10E6/UL (ref 4.4–5.9)
RBC URINE: 3 /HPF
SODIUM SERPL-SCNC: 133 MMOL/L (ref 136–145)
SP GR UR STRIP: 1.02 (ref 1–1.03)
SQUAMOUS EPITHELIAL: 1 /HPF
UROBILINOGEN UR STRIP-MCNC: NORMAL MG/DL
WBC # BLD AUTO: 20.3 10E3/UL (ref 4–11)
WBC URINE: 99 /HPF

## 2023-04-18 PROCEDURE — 120N000001 HC R&B MED SURG/OB

## 2023-04-18 PROCEDURE — 258N000003 HC RX IP 258 OP 636: Performed by: HOSPITALIST

## 2023-04-18 PROCEDURE — 250N000011 HC RX IP 250 OP 636: Performed by: STUDENT IN AN ORGANIZED HEALTH CARE EDUCATION/TRAINING PROGRAM

## 2023-04-18 PROCEDURE — 250N000011 HC RX IP 250 OP 636: Performed by: HOSPITALIST

## 2023-04-18 PROCEDURE — 36415 COLL VENOUS BLD VENIPUNCTURE: CPT | Performed by: STUDENT IN AN ORGANIZED HEALTH CARE EDUCATION/TRAINING PROGRAM

## 2023-04-18 PROCEDURE — 250N000013 HC RX MED GY IP 250 OP 250 PS 637: Performed by: HOSPITALIST

## 2023-04-18 PROCEDURE — 87088 URINE BACTERIA CULTURE: CPT | Performed by: STUDENT IN AN ORGANIZED HEALTH CARE EDUCATION/TRAINING PROGRAM

## 2023-04-18 PROCEDURE — 96365 THER/PROPH/DIAG IV INF INIT: CPT

## 2023-04-18 PROCEDURE — 51798 US URINE CAPACITY MEASURE: CPT

## 2023-04-18 PROCEDURE — 82805 BLOOD GASES W/O2 SATURATION: CPT | Performed by: STUDENT IN AN ORGANIZED HEALTH CARE EDUCATION/TRAINING PROGRAM

## 2023-04-18 PROCEDURE — 80053 COMPREHEN METABOLIC PANEL: CPT | Performed by: STUDENT IN AN ORGANIZED HEALTH CARE EDUCATION/TRAINING PROGRAM

## 2023-04-18 PROCEDURE — 99285 EMERGENCY DEPT VISIT HI MDM: CPT | Performed by: STUDENT IN AN ORGANIZED HEALTH CARE EDUCATION/TRAINING PROGRAM

## 2023-04-18 PROCEDURE — 250N000013 HC RX MED GY IP 250 OP 250 PS 637: Performed by: STUDENT IN AN ORGANIZED HEALTH CARE EDUCATION/TRAINING PROGRAM

## 2023-04-18 PROCEDURE — 83605 ASSAY OF LACTIC ACID: CPT | Performed by: STUDENT IN AN ORGANIZED HEALTH CARE EDUCATION/TRAINING PROGRAM

## 2023-04-18 PROCEDURE — 96361 HYDRATE IV INFUSION ADD-ON: CPT

## 2023-04-18 PROCEDURE — 258N000003 HC RX IP 258 OP 636: Performed by: STUDENT IN AN ORGANIZED HEALTH CARE EDUCATION/TRAINING PROGRAM

## 2023-04-18 PROCEDURE — 99223 1ST HOSP IP/OBS HIGH 75: CPT | Mod: AI | Performed by: HOSPITALIST

## 2023-04-18 PROCEDURE — 81001 URINALYSIS AUTO W/SCOPE: CPT | Performed by: STUDENT IN AN ORGANIZED HEALTH CARE EDUCATION/TRAINING PROGRAM

## 2023-04-18 PROCEDURE — 87077 CULTURE AEROBIC IDENTIFY: CPT | Performed by: STUDENT IN AN ORGANIZED HEALTH CARE EDUCATION/TRAINING PROGRAM

## 2023-04-18 PROCEDURE — 99285 EMERGENCY DEPT VISIT HI MDM: CPT | Mod: 25

## 2023-04-18 PROCEDURE — 76770 US EXAM ABDO BACK WALL COMP: CPT

## 2023-04-18 PROCEDURE — 84145 PROCALCITONIN (PCT): CPT | Performed by: HOSPITALIST

## 2023-04-18 PROCEDURE — 85004 AUTOMATED DIFF WBC COUNT: CPT | Performed by: STUDENT IN AN ORGANIZED HEALTH CARE EDUCATION/TRAINING PROGRAM

## 2023-04-18 RX ORDER — OXYCODONE HYDROCHLORIDE 5 MG/1
5 TABLET ORAL EVERY 4 HOURS PRN
Status: DISCONTINUED | OUTPATIENT
Start: 2023-04-18 | End: 2023-04-25 | Stop reason: HOSPADM

## 2023-04-18 RX ORDER — GABAPENTIN 300 MG/1
300 CAPSULE ORAL 3 TIMES DAILY
Status: DISCONTINUED | OUTPATIENT
Start: 2023-04-18 | End: 2023-04-25 | Stop reason: HOSPADM

## 2023-04-18 RX ORDER — PANTOPRAZOLE SODIUM 40 MG/1
40 TABLET, DELAYED RELEASE ORAL DAILY
Status: DISCONTINUED | OUTPATIENT
Start: 2023-04-18 | End: 2023-04-25 | Stop reason: HOSPADM

## 2023-04-18 RX ORDER — NALOXONE HYDROCHLORIDE 0.4 MG/ML
0.2 INJECTION, SOLUTION INTRAMUSCULAR; INTRAVENOUS; SUBCUTANEOUS
Status: DISCONTINUED | OUTPATIENT
Start: 2023-04-18 | End: 2023-04-25 | Stop reason: HOSPADM

## 2023-04-18 RX ORDER — NALOXONE HYDROCHLORIDE 0.4 MG/ML
0.4 INJECTION, SOLUTION INTRAMUSCULAR; INTRAVENOUS; SUBCUTANEOUS
Status: DISCONTINUED | OUTPATIENT
Start: 2023-04-18 | End: 2023-04-25 | Stop reason: HOSPADM

## 2023-04-18 RX ORDER — LIDOCAINE 4 G/G
1 PATCH TOPICAL
Status: DISCONTINUED | OUTPATIENT
Start: 2023-04-19 | End: 2023-04-18

## 2023-04-18 RX ORDER — UREA 200 MG/G
CREAM TOPICAL 2 TIMES DAILY PRN
Status: DISCONTINUED | OUTPATIENT
Start: 2023-04-18 | End: 2023-04-25 | Stop reason: HOSPADM

## 2023-04-18 RX ORDER — SODIUM CHLORIDE, SODIUM LACTATE, POTASSIUM CHLORIDE, CALCIUM CHLORIDE 600; 310; 30; 20 MG/100ML; MG/100ML; MG/100ML; MG/100ML
INJECTION, SOLUTION INTRAVENOUS CONTINUOUS
Status: DISCONTINUED | OUTPATIENT
Start: 2023-04-18 | End: 2023-04-22

## 2023-04-18 RX ORDER — TAMSULOSIN HYDROCHLORIDE 0.4 MG/1
0.4 CAPSULE ORAL EVERY EVENING
Status: DISCONTINUED | OUTPATIENT
Start: 2023-04-18 | End: 2023-04-25 | Stop reason: HOSPADM

## 2023-04-18 RX ORDER — ACETAMINOPHEN 325 MG/1
325 TABLET ORAL EVERY 4 HOURS PRN
Status: DISCONTINUED | OUTPATIENT
Start: 2023-04-18 | End: 2023-04-18

## 2023-04-18 RX ORDER — LIDOCAINE 40 MG/G
CREAM TOPICAL
Status: DISCONTINUED | OUTPATIENT
Start: 2023-04-18 | End: 2023-04-25 | Stop reason: HOSPADM

## 2023-04-18 RX ORDER — ACETAMINOPHEN 325 MG/1
650 TABLET ORAL ONCE
Status: COMPLETED | OUTPATIENT
Start: 2023-04-18 | End: 2023-04-18

## 2023-04-18 RX ORDER — ONDANSETRON 4 MG/1
4 TABLET, ORALLY DISINTEGRATING ORAL EVERY 6 HOURS PRN
Status: DISCONTINUED | OUTPATIENT
Start: 2023-04-18 | End: 2023-04-25 | Stop reason: HOSPADM

## 2023-04-18 RX ORDER — NITROGLYCERIN 0.4 MG/1
0.4 TABLET SUBLINGUAL EVERY 5 MIN PRN
Status: DISCONTINUED | OUTPATIENT
Start: 2023-04-18 | End: 2023-04-25 | Stop reason: HOSPADM

## 2023-04-18 RX ORDER — ACETAMINOPHEN 325 MG/1
975 TABLET ORAL ONCE
Status: COMPLETED | OUTPATIENT
Start: 2023-04-18 | End: 2023-04-18

## 2023-04-18 RX ORDER — ONDANSETRON 2 MG/ML
4 INJECTION INTRAMUSCULAR; INTRAVENOUS EVERY 6 HOURS PRN
Status: DISCONTINUED | OUTPATIENT
Start: 2023-04-18 | End: 2023-04-25 | Stop reason: HOSPADM

## 2023-04-18 RX ORDER — CEFTRIAXONE SODIUM 2 G/50ML
2 INJECTION, SOLUTION INTRAVENOUS DAILY
Status: DISCONTINUED | OUTPATIENT
Start: 2023-04-19 | End: 2023-04-19

## 2023-04-18 RX ORDER — ENOXAPARIN SODIUM 100 MG/ML
40 INJECTION SUBCUTANEOUS AT BEDTIME
Status: DISCONTINUED | OUTPATIENT
Start: 2023-04-18 | End: 2023-04-25 | Stop reason: HOSPADM

## 2023-04-18 RX ORDER — CALCIUM CARBONATE/VITAMIN D3 600 MG-10
TABLET ORAL DAILY
Status: DISCONTINUED | OUTPATIENT
Start: 2023-04-18 | End: 2023-04-25 | Stop reason: HOSPADM

## 2023-04-18 RX ORDER — SIMVASTATIN 40 MG
40 TABLET ORAL AT BEDTIME
Status: DISCONTINUED | OUTPATIENT
Start: 2023-04-18 | End: 2023-04-25 | Stop reason: HOSPADM

## 2023-04-18 RX ORDER — POLYETHYLENE GLYCOL 3350 17 G/17G
17 POWDER, FOR SOLUTION ORAL DAILY
Status: DISCONTINUED | OUTPATIENT
Start: 2023-04-18 | End: 2023-04-25 | Stop reason: HOSPADM

## 2023-04-18 RX ORDER — LIDOCAINE 4 G/G
1 PATCH TOPICAL
Status: DISCONTINUED | OUTPATIENT
Start: 2023-04-18 | End: 2023-04-25 | Stop reason: HOSPADM

## 2023-04-18 RX ORDER — CEFTRIAXONE SODIUM 2 G/50ML
2 INJECTION, SOLUTION INTRAVENOUS ONCE
Status: COMPLETED | OUTPATIENT
Start: 2023-04-18 | End: 2023-04-18

## 2023-04-18 RX ORDER — ACETAMINOPHEN 325 MG/1
650 TABLET ORAL EVERY 4 HOURS PRN
Status: DISCONTINUED | OUTPATIENT
Start: 2023-04-18 | End: 2023-04-25 | Stop reason: HOSPADM

## 2023-04-18 RX ADMIN — GABAPENTIN 300 MG: 300 CAPSULE ORAL at 20:33

## 2023-04-18 RX ADMIN — SODIUM CHLORIDE, POTASSIUM CHLORIDE, SODIUM LACTATE AND CALCIUM CHLORIDE: 600; 310; 30; 20 INJECTION, SOLUTION INTRAVENOUS at 15:57

## 2023-04-18 RX ADMIN — SIMVASTATIN 40 MG: 40 TABLET, FILM COATED ORAL at 20:33

## 2023-04-18 RX ADMIN — ENOXAPARIN SODIUM 40 MG: 40 INJECTION SUBCUTANEOUS at 20:33

## 2023-04-18 RX ADMIN — TAMSULOSIN HYDROCHLORIDE 0.4 MG: 0.4 CAPSULE ORAL at 20:34

## 2023-04-18 RX ADMIN — SODIUM CHLORIDE, POTASSIUM CHLORIDE, SODIUM LACTATE AND CALCIUM CHLORIDE: 600; 310; 30; 20 INJECTION, SOLUTION INTRAVENOUS at 20:35

## 2023-04-18 RX ADMIN — LIDOCAINE 1 PATCH: 560 PATCH PERCUTANEOUS; TOPICAL; TRANSDERMAL at 17:13

## 2023-04-18 RX ADMIN — CEFTRIAXONE SODIUM 2 G: 2 INJECTION, SOLUTION INTRAVENOUS at 14:04

## 2023-04-18 RX ADMIN — ACETAMINOPHEN 650 MG: 325 TABLET ORAL at 18:32

## 2023-04-18 RX ADMIN — SODIUM CHLORIDE, POTASSIUM CHLORIDE, SODIUM LACTATE AND CALCIUM CHLORIDE 500 ML: 600; 310; 30; 20 INJECTION, SOLUTION INTRAVENOUS at 15:48

## 2023-04-18 RX ADMIN — PANTOPRAZOLE SODIUM 40 MG: 40 TABLET, DELAYED RELEASE ORAL at 17:06

## 2023-04-18 RX ADMIN — SODIUM CHLORIDE 1000 ML: 9 INJECTION, SOLUTION INTRAVENOUS at 12:28

## 2023-04-18 RX ADMIN — GABAPENTIN 300 MG: 300 CAPSULE ORAL at 17:06

## 2023-04-18 RX ADMIN — ACETAMINOPHEN 975 MG: 325 TABLET ORAL at 12:26

## 2023-04-18 RX ADMIN — CALCIUM CARBONATE 600 MG (1,500 MG)-VITAMIN D3 400 UNIT TABLET 1 TABLET: at 17:06

## 2023-04-18 ASSESSMENT — ACTIVITIES OF DAILY LIVING (ADL)
ADLS_ACUITY_SCORE: 35
ADLS_ACUITY_SCORE: 28
ADLS_ACUITY_SCORE: 26
ADLS_ACUITY_SCORE: 26
ADLS_ACUITY_SCORE: 35
ADLS_ACUITY_SCORE: 28

## 2023-04-18 NOTE — H&P
[facility]    History and Physical  Hospitalist       Date of Admission:  4/18/2023    Assessment & Plan   Richard Harvey is a 57 year old male with history of hypertension, hyperlipidemia, type 1 diabetes mellitus, diabetic neuropathy with gastroparesis and chronic lower back pain presented to the ED with a complaint of fevers and chills going on for the last 4 days was found to have sepsis due to urinary tract infection.:    Sepsis due to UTI/pyelonephritis: Patient has UTI, leukocytosis, fevers, tachycardia and hypotension.  The patient's urine was found to be positive for leuk esterase and nitrates along with WBCs.  Urine and blood cultures  have been sent.  The patient has been started on Rocephin 2 g IV daily.  IV fluids have been given.  Initially was hypotensive and tachycardic but now hemodynamics have stabilized.  Lactate was normal.  Monitor on telemetry due to tachycardia and hypotension.  Procalcitonin was high.  We will get a renal ultrasound to evaluate for pyelonephritis/abscess.    Acute urinary retention: The patient has a history of BPH.  He did have urinary retention in the ED.  Continue on tamsulosin and Snowden catheter has been placed.  We will remove the Snowden catheter tomorrow and give him voiding trials.    Acute kidney injury: Probably secondary to obstructive uropathy.  The patient did have urinary retention.  I will rule out hydronephrosis by getting an ultrasound.  Continue on IV fluids and monitor creatinine closely.    Type 1 diabetes mellitus: The patient has an insulin pump.  He has enough supply to manage his pump.  We will make sure that he does not get hypoglycemic so we will check twice a day D sticks.    Hypertension: Hold lisinopril.    Diabetic neuropathy with gastroparesis: Continue on gabapentin.  Also the patient takes PPI.    Hyperlipidemia: Continue on simvastatin    BPH: Continue on tamsulosin.  The patient had urinary retention so Snowden catheter in place.  We  will try to remove it tomorrow.      DVT Prophylaxis: Enoxaparin (Lovenox) SQ  Code Status: Full Code    Disposition: Expected discharge in 1-2 days once stable.    Mani Mujica MD    Primary Care Physician   Natacha Alvarez    Chief Complaint   Fevers and chills for 4 days    History is obtained from the patient    History of Present Illness   Richard Harvey is a 57 year old male with history of hypertension, hyperlipidemia, type 1 diabetes mellitus, diabetic neuropathy with gastroparesis and chronic lower back pain presented to the ED with a complaint of fevers and chills going on for the last 4 days.  The patient uses an insulin pump has been doing well overall.  4 days ago he woke up with some chills put on some more blankets.  There was also fever.  He does not take Tylenol or ibuprofen because of stomach issues.  His fevers continued to get worse to the point that he was feeling very weak and lethargic today and had to come to the ED.  He lost his appetite as well.  In the ED he was found to have urinary retention for which a Snowden catheter was placed and his urine was found to be dirty.  The patient did report some hesitancy in urination but no dysuria as such.  The patient denies any abdominal pain nausea or vomiting.  Denies any chest pain or shortness of breath.  He does report right-sided lower back pain.  He denies any cough.  Denies any diarrhea.    Past Medical History    I have reviewed this patient's medical history and updated it with pertinent information if needed.   Past Medical History:   Diagnosis Date     BPH (benign prostatic hyperplasia)      Diabetes mellitus type 1, controlled (H)      HLD (hyperlipidemia)      HTN (hypertension)      Neuropathy      Type 1 diabetes (H)        Past Surgical History   I have reviewed this patient's surgical history and updated it with pertinent information if needed.  Past Surgical History:   Procedure Laterality Date     COLONOSCOPY - HIM SCAN N/A  2016    Procedure: COLONOSCOPY SCREENING; Surgeon: Rudy Rojo MD; Location: [facility] OR     ESOPHAGOSCOPY, GASTROSCOPY, DUODENOSCOPY (EGD), COMBINED  2019       Prior to Admission Medications   Prior to Admission Medications   Prescriptions Last Dose Informant Patient Reported? Taking?   CALCIUM-VITAMIN D PO  Self Yes No   Sig: Take  by mouth daily. 600 mg   CONTOUR NEXT TEST test strip  Self No No   Sig: TEST 6 TIMES DAILY, OR AS DIRECTED.   Continuous Blood Gluc  (DEXCOM G6 ) DAYAN  Self No No   Si each continuous To be used per manufacture's recommendation   Continuous Blood Gluc Sensor (DEXCOM G6 SENSOR) MISC  Self No No   Sig: CHANGE EVERY 10 DAYS   Continuous Blood Gluc Transmit (DEXCOM G6 TRANSMITTER) MISC  Self No No   Si each continuous   Glucagon (GVOKE HYPOPEN 2-PACK) 1 MG/0.2ML SOAJ   No No   Sig: Inject 1 mg Subcutaneous as needed (for low blood glucose)   INSULIN PUMP - OUTPATIENT  Self Yes No   Sig: Insulin pump settings Updated: 23 Omnipod 5 Basal:  0000 1.1 0200 1.05 0600 0.95 1020 0.7 1530 0.9 1800 1.15 Total bolus: 23.3 CR:  0000 12 1200 13 1600 14 ISF:  0000 65 1200 60 1800 55 Target: 110   Insulin Disposable Pump (OMNIPOD 5 G6 INTRO, GEN 5,) KIT  Self No No   Si kit continuous   Insulin Disposable Pump (OMNIPOD 5 G6 POD, GEN 5,) MISC  Self No No   Si each every 3 days   Lidocaine (LIDOCARE) 4 % Patch  Self No No   Sig: Place 1 patch onto the skin every 24 hours To prevent lidocaine toxicity, patient should be patch free for 12 hrs daily.   Multiple Vitamin (MULTI-VITAMIN DAILY PO)  Self Yes No   Sig: Take  by mouth daily.   NOVOLOG VIAL 100 UNIT/ML soln  Self No No   Sig: USE WITH INSULIN PUMP FOR A TOTAL DAILY USAGE OF 70 UNITS   NOVOLOG VIAL 100 UNIT/ML soln  Self No No   Sig: USE WITH INSULIN PUMP FOR A TOTAL DAILY USAGE OF 70 UNITS   UREA 10 HYDRATING 10 % external cream   No No   Sig: APPLY  CREAM TOPICALLY TO AFFECTED AREA AS NEEDED FOR  CALLOUSED SKIN   URINE GLUCOSE-KETONES TEST VI  Self Yes No   acetone, Urine, test STRP  Self No No   Sig: Use as directed   blood glucose monitoring (MELISSA CONTOUR NEXT MONITOR) meter device kit  Self Yes No   Si each   blood glucose monitoring (MELISSA CONTOUR NEXT) test strip  Self No No   Sig: Use to test blood sugar 6 times daily or as directed.   diclofenac (VOLTAREN) 1 % topical gel  Self No No   Sig: Apply 4 g topically 4 times daily as needed for moderate pain (4-6)   gabapentin (NEURONTIN) 300 MG capsule  Self No No   Sig: Take 1 capsule (300 mg) by mouth 3 times daily   lisinopril (ZESTRIL) 10 MG tablet  Self No No   Sig: Take 1 tablet by mouth once daily   nitroGLYcerin (NITROSTAT) 0.4 MG sublingual tablet  Self Yes No   Sig: Place under the tongue every 5 minutes as needed   ondansetron (ZOFRAN) 4 MG tablet  Self No No   Sig: Take 1 tablet (4 mg) by mouth every 8 hours as needed for nausea   order for DME  Self No No   Sig: Equipment being ordered:     1.  Neuronex insulin pump supplies--insertion sets and reserviors  Disp: 1 month  Refill: 11 months   pantoprazole (PROTONIX) 40 MG EC tablet  Self No No   Sig: Take 1 tablet by mouth twice daily   simvastatin (ZOCOR) 40 MG tablet  Self No No   Sig: TAKE 1 TABLET BY MOUTH AT BEDTIME   tamsulosin (FLOMAX) 0.4 MG capsule  Self No No   Sig: Take 1 capsule (0.4 mg) by mouth every evening      Facility-Administered Medications: None     Allergies   No Known Allergies    Social History   I have reviewed this patient's social history and updated it with pertinent information if needed. Richard FLAHERTY Wes  reports that he has never smoked. He has never used smokeless tobacco. He reports that he does not drink alcohol and does not use drugs.    Family History   I have reviewed this patient's family history and updated it with pertinent information if needed.   Family History   Problem Relation Age of Onset     No Known Problems Mother      Hypertension Father       Hypertension Brother        Review of Systems   GENERAL/CONSTITUTIONAL: The patient denies weight gain or weight loss.  HEAD, EYES, EARS, NOSE AND THROAT: Eyes - The patient denies pain, redness, ,Ears, nose, mouth and throat. The patient denies ringing in the ears,  nosebleeds, CARDIOVASCULAR: The patient denies chest pain, irregular heartbeats, palpitation, shortness of breath, orthopnea or PND  RESPIRATORY: The patient denies chronic dry cough, Hemoptysis,  wheezing or night sweats.  GASTROINTESTINAL: The patient denies decreased appetite, nausea, vomiting, ,hematochezia, melena or hematemesis. No abdominal pain  GENITOURINARY: As per HPI  MUSCULOSKELETAL: Complaint of chronic back pain  SKIN : The patient denies easy bruising, skin redness, skin rash,   NEUROLOGIC: The patient denies headache, dizziness, fainting, seizures, loss of consciousness, memory loss.  PSYCHIATRIC: The patient denies thoughts of suicide,  ENDOCRINE: The patient denies intolerance to hot or cold temperature,  HEMATOLOGIC/LYMPHATIC: The patient denies anemia, bleeding tendency or clotting tendency.    Physical Exam   Temp: 99.5  F (37.5  C) Temp src: Oral BP: 108/58 Pulse: 87   Resp: 21 SpO2: 93 % O2 Device: None (Room air)    Vital Signs with Ranges  Temp:  [99.5  F (37.5  C)-100.5  F (38.1  C)] 99.5  F (37.5  C)  Pulse:  [] 87  Resp:  [13-24] 21  BP: (101-116)/(52-65) 108/58  SpO2:  [92 %-94 %] 93 %  0 lbs 0 oz  GENERAL APPEARANCE: The patient is an obese male,  in no acute distress.  HEENT: Normocephalic and atraumatic. No scleral icterus.PERRLA.  No oropharyngeal lesions.  NECK: Supple. Trachea is midline. No evidence of thyroid enlargement. No lymphadenopathy or tenderness.  CHEST: Symmetric. Nontender to palpation.  LUNGS: Breath sounds are equal and clear bilaterally. No wheezes, rhonchi, or rales.  HEART: Regular rate and rhythm with normal S1 and S2. No murmurs, gallops, or rubs.  ABDOMEN: Soft, mildly tender in  hypogastrium, no mass, tenderness, guarding, or rebound.  Bowel sounds are present.  Right-sided CVA tenderness  RECTAL: Deferred  EXTREMITIES: No cyanosis, clubbing, or edema.  NEUROLOGIC: No focal sensory or motor deficits are noted. Cranial nerves II through XII are intact. Grossly nonfocal examination  PSYCHIATRIC: Appropriate mood and affect.  SKIN: Warm, dry, and well perfused. Good turgor. No lesions, nodules or rashes are noted.     Data   Data reviewed today:  I personally reviewed  Recent Labs   Lab 04/18/23  1219   WBC 20.3*   HGB 12.7*   MCV 89      *   POTASSIUM 4.1   CHLORIDE 96*   CO2 25   BUN 30.1*   CR 1.60*   ANIONGAP 12   ANGELINA 8.7   *   ALBUMIN 3.6   PROTTOTAL 6.9   BILITOTAL 0.6   ALKPHOS 87   ALT 21   AST 24       No results found for this or any previous visit (from the past 24 hour(s)).    Total time spent in patient care was 65 minutes. >  50% of time was spent in coordination of care and patient counseling.

## 2023-04-18 NOTE — ED NOTES
Patient comfortable on cot. Watching TV. Call light within reach. Reports his chills and back pain have improved. Back pain is at baseline.

## 2023-04-18 NOTE — MEDICATION SCRIBE - ADMISSION MEDICATION HISTORY
Medication Scribe Admission Medication History    Admission medication history is complete. The information provided in this note is only as accurate as the sources available at the time of the update.    Medication reconciliation/reorder completed by provider prior to medication history? Yes    Information Source(s): Patient and CareEverywhere/SureScripts via phone    Pertinent Information:     Patient unsure of pump settings, reports update on 2/22/23 would still be correct    Patient confirms taking no medications TODAY PTA aside from insulin in pump.     Patient wearing pump and dexcom currently    Not taking diclofenac at this time, only uses lidocaine patches PRN    zofran- takes one dose each night    protonix- has only been taking the AM dose    Changes made to PTA medication list:    Added: none    Deleted: duplicates    Changed:     Updated missing information    zofran- takes different than prescribed    protonix- takes different than prescribed    Medication Affordability:  Not including over the counter (OTC) medications, was there a time in the past 12 months when you did not take your medications as prescribed because of cost?: No    Allergies reviewed with patient and updates made in EHR: yes- NKDA    Medication History Completed By: Esther Man 4/18/2023 3:57 PM    Prior to Admission medications    Medication Sig Last Dose Taking? Auth Provider Long Term End Date   acetone, Urine, test STRP Use as directed More than a month Yes Macrina Dixon APRN CNP     CALCIUM-VITAMIN D PO Take 1 tablet by mouth daily 4/17/2023 at 0800 Yes Reported, Patient     Continuous Blood Gluc  (DEXCOM G6 ) DAYAN 1 each continuous To be used per manufacture's recommendation 4/18/2023 Yes Natacha Alvarez MD     Continuous Blood Gluc Sensor (DEXCOM G6 SENSOR) MISC CHANGE EVERY 10 DAYS 4/18/2023 Yes Natacha Alvarez MD     Continuous Blood Gluc Transmit (DEXCOM G6 TRANSMITTER) MISC 1 each  continuous 4/18/2023 Yes Natacha Alvarez MD     gabapentin (NEURONTIN) 300 MG capsule Take 1 capsule (300 mg) by mouth 3 times daily 4/17/2023 at 2100 Yes Natacha Alvarez MD Yes    Glucagon (GVOKE HYPOPEN 2-PACK) 1 MG/0.2ML SOAJ Inject 1 mg Subcutaneous as needed (for low blood glucose) Unknown Yes Macrina Dixon APRN CNP Yes    Insulin Disposable Pump (OMNIPOD 5 G6 INTRO, GEN 5,) KIT 1 kit continuous 4/18/2023 Yes Macrina Dixon APRN CNP     Insulin Disposable Pump (OMNIPOD 5 G6 POD, GEN 5,) MISC 1 each every 3 days 4/18/2023 Yes Macrina Dixon APRN CNP     INSULIN PUMP - OUTPATIENT Insulin pump settings Updated: 2/22/23 Omnipod 5 Basal:  0000 1.1 0200 1.05 0600 0.95 1020 0.7 1530 0.9 1800 1.15 Total bolus: 23.3 CR:  0000 12 1200 13 1600 14 ISF:  0000 65 1200 60 1800 55 Target: 110 4/18/2023 Yes Reported, Patient     Lidocaine (LIDOCARE) 4 % Patch Place 1 patch onto the skin every 24 hours To prevent lidocaine toxicity, patient should be patch free for 12 hrs daily.  Patient taking differently: Place 1 patch onto the skin daily as needed To prevent lidocaine toxicity, patient should be patch free for 12 hrs daily. 4/14/2023 Yes Natacha Alvarez MD     lisinopril (ZESTRIL) 10 MG tablet Take 1 tablet by mouth once daily 4/17/2023 at 0800 Yes Natacha Alvarez MD Yes    Multiple Vitamin (MULTI-VITAMIN DAILY PO) Take 1 tablet by mouth daily 4/17/2023 at 0800 Yes Reported, Patient     nitroGLYcerin (NITROSTAT) 0.4 MG sublingual tablet Place 0.4 mg under the tongue every 5 minutes as needed for chest pain . Do not crush; maximum of 3 doses in 15 minutes. Unknown Yes Reported, Patient Yes    NOVOLOG VIAL 100 UNIT/ML soln USE WITH INSULIN PUMP FOR A TOTAL DAILY USAGE OF 70 UNITS 4/18/2023 Yes Dixon, Macrina L, APRN CNP Yes    ondansetron (ZOFRAN) 4 MG tablet Take 1 tablet (4 mg) by mouth every 8 hours as needed for nausea  Patient taking differently: Take 4 mg by mouth At Bedtime 4/17/2023 at  2100 Yes Natacha Alvarez MD     pantoprazole (PROTONIX) 40 MG EC tablet Take 1 tablet by mouth twice daily  Patient taking differently: Take 40 mg by mouth daily 4/17/2023 at 0800 Yes Natacha Alvarez MD     simvastatin (ZOCOR) 40 MG tablet TAKE 1 TABLET BY MOUTH AT BEDTIME 4/17/2023 at 2100 Yes Natacha Alvarez MD Yes    tamsulosin (FLOMAX) 0.4 MG capsule Take 1 capsule (0.4 mg) by mouth every evening 4/17/2023 at 2100 Yes Arvin Hernandez MD     UREA 10 HYDRATING 10 % external cream APPLY  CREAM TOPICALLY TO AFFECTED AREA AS NEEDED FOR CALLOUSED SKIN 4/17/2023 at 2100 Yes Natacha Alvarez MD     CONTOUR NEXT TEST test strip TEST 6 TIMES DAILY, OR AS DIRECTED.   Natacha Alvaerz MD     diclofenac (VOLTAREN) 1 % topical gel Apply 4 g topically 4 times daily as needed for moderate pain (4-6)  Patient not taking: Reported on 4/18/2023 Not Taking  Natacha Alvarez MD     order for DME Equipment being ordered:     1.  [employer] insulin pump supplies--insertion sets and reserviors  Disp: 1 month  Refill: 11 months   Macrina Dixon APRN CNP

## 2023-04-18 NOTE — ED TRIAGE NOTES
Patient presents with c/o fever, chills, decreased appetite, decreased urination, pain in back, symptoms started Friday.

## 2023-04-18 NOTE — ED PROVIDER NOTES
History     Chief Complaint   Patient presents with     Back Pain     Urinary Retention     HPI  Richard Harvey is a 57 year old male with the below hx and self-reported history of prostate issues requiring catheterization for urinary retention presents to the emergency department today concerned about urinary retention and a urinary tract infection.  He states he is got fevers and chills, is urinating less and is having some low pain in his back.  Symptoms for him started 4 days prior to arrival.  No chest pain or abdominal pain.  No other complaints    Allergies:  No Known Allergies    Problem List:    Patient Active Problem List    Diagnosis Date Noted     Acute cystitis without hematuria 04/18/2023     Priority: Medium     Sepsis, due to unspecified organism, unspecified whether acute organ dysfunction present (H) 04/18/2023     Priority: Medium     Chronic bilateral low back pain with right-sided sciatica 12/29/2022     Priority: Medium     Gastroesophageal reflux disease without esophagitis 11/30/2020     Priority: Medium     Gastroparesis 09/29/2020     Priority: Medium     Positive gastric emptying study on 9/28/2020 w/ 20% retention at 4 hours.        Obesity (BMI 35.0-39.9) with comorbidity (H) 08/15/2019     Priority: Medium     Diabetic polyneuropathy associated with type 1 diabetes mellitus (H) 06/05/2019     Priority: Medium     Class 2 obesity in adult 06/05/2019     Priority: Medium     ACP (advance care planning) 01/18/2017     Priority: Medium     Advance Care Planning 1/18/2017: ACP Review of Chart / Resources Provided:  Reviewed chart for advance care plan.  Richard Harvey has no plan or code status on file. Discussed available resources and provided with information. Confirmed code status reflects current choices pending further ACP discussions.  Confirmed/documented legally designated decision makers.  Added by Janiya Rocha           DM type 1 (diabetes mellitus, type 1) (H) 08/15/2013      Priority: Medium     HTN (hypertension) 08/15/2013     Priority: Medium     Hyperlipidemia 08/15/2013     Priority: Medium        Past Medical History:    Past Medical History:   Diagnosis Date     BPH (benign prostatic hyperplasia)      Diabetes mellitus type 1, controlled (H)      HLD (hyperlipidemia)      HTN (hypertension)      Neuropathy      Type 1 diabetes (H)        Past Surgical History:    Past Surgical History:   Procedure Laterality Date     COLONOSCOPY - HIM SCAN N/A 09/19/2016    Procedure: COLONOSCOPY SCREENING; Surgeon: Rudy Rojo MD; Location: [facility] OR     ESOPHAGOSCOPY, GASTROSCOPY, DUODENOSCOPY (EGD), COMBINED  08/13/2019       Family History:    Family History   Problem Relation Age of Onset     No Known Problems Mother      Hypertension Father      Hypertension Brother        Social History:  Marital Status:  Single [1]  Social History     Tobacco Use     Smoking status: Never     Smokeless tobacco: Never   Substance Use Topics     Alcohol use: No     Alcohol/week: 0.0 standard drinks of alcohol     Drug use: No        Medications:    No current outpatient medications on file.        Review of Systems  A complete review of systems was performed and is otherwise negative.     Physical Exam   BP: 101/65  Pulse: 108  Temp: (!) 100.5  F (38.1  C)  Resp: 18  Height: 152.4 cm (5')  Weight: 84.7 kg (186 lb 11.7 oz)  SpO2: 94 %      Physical Exam  Constitutional: Alert and conversant. NAD   HENT: NCAT   Eyes: Normal pupils   Neck: supple   CV: tachycardic rate, regular rhythm, no murmur   Pulmonary/Chest: Non-labored respirations, clear to auscultation bilaterally   Abdominal: Soft, non-tender, non-distended   MSK: MAO.   Neuro: Alert and appropriate   Skin: Warm and dry. No diaphoresis. No rashes on exposed skin    Psych: Appropriate mood and affect     ED Course              ED Course as of 04/18/23 1603   Tue Apr 18, 2023   1331 57 male here with vitals consistent with sepsis.  Sepsis bundle  initiated.  Bladder scanned for 614, some evidence of retention.  Suspect at this time there that the most likely source of infection is UTI.  Abdominal exam overall benign with some lower tenderness which seems focused in the suprapubic region.  Not worse on the right than the left argues against appendicitis.  No Simpson sign to suggest cholecystitis.  Patient unable to urinate.  Has had history with retention secondary to prostate issues.  Considered prostatitis, however, less likely, this is more likely to be UTI.  We will hold off on imaging now until the urine comes back.   1357 Leukocyte Esterase Urine(!): Moderate  UTI, ceftriaxone ordered   1602 Temp: 98.7  F (37.1  C)  Responding well to Tylenol   1602 Patient appropriate for inpatient management, case discussed with hospital medicine who graciously excepted the patient.  Patient admitted.     Procedures         Results for orders placed or performed during the hospital encounter of 04/18/23 (from the past 24 hour(s))   CBC with platelets differential    Narrative    The following orders were created for panel order CBC with platelets differential.  Procedure                               Abnormality         Status                     ---------                               -----------         ------                     CBC with platelets and d...[[id number]]  Abnormal            Final result                 Please view results for these tests on the individual orders.   Comprehensive metabolic panel   Result Value Ref Range    Sodium 133 (L) 136 - 145 mmol/L    Potassium 4.1 3.4 - 5.3 mmol/L    Chloride 96 (L) 98 - 107 mmol/L    Carbon Dioxide (CO2) 25 22 - 29 mmol/L    Anion Gap 12 7 - 15 mmol/L    Urea Nitrogen 30.1 (H) 6.0 - 20.0 mg/dL    Creatinine 1.60 (H) 0.67 - 1.17 mg/dL    Calcium 8.7 8.6 - 10.0 mg/dL    Glucose 197 (H) 70 - 99 mg/dL    Alkaline Phosphatase 87 40 - 129 U/L    AST 24 10 - 50 U/L    ALT 21 10 - 50 U/L    Protein Total 6.9 6.4 - 8.3  g/dL    Albumin 3.6 3.5 - 5.2 g/dL    Bilirubin Total 0.6 <=1.2 mg/dL    GFR Estimate 50 (L) >60 mL/min/1.73m2   Lactic acid whole blood   Result Value Ref Range    Lactic Acid 1.0 0.7 - 2.0 mmol/L   Blood gas venous and oxyhgb   Result Value Ref Range    pH Venous 7.48 (H) 7.32 - 7.43    pCO2 Venous 35 (L) 40 - 50 mm Hg    pO2 Venous 97 (H) 25 - 47 mm Hg    Bicarbonate Venous 26 21 - 28 mmol/L    FIO2 0     Oxyhemoglobin Venous 96 (H) 70 - 75 %    Base Excess/Deficit (+/-) 2.7 (H) -7.7 - 1.9 mmol/L   CBC with platelets and differential   Result Value Ref Range    WBC Count 20.3 (H) 4.0 - 11.0 10e3/uL    RBC Count 4.16 (L) 4.40 - 5.90 10e6/uL    Hemoglobin 12.7 (L) 13.3 - 17.7 g/dL    Hematocrit 37.2 (L) 40.0 - 53.0 %    MCV 89 78 - 100 fL    MCH 30.5 26.5 - 33.0 pg    MCHC 34.1 31.5 - 36.5 g/dL    RDW 12.1 10.0 - 15.0 %    Platelet Count 218 150 - 450 10e3/uL    % Neutrophils 90 %    % Lymphocytes 2 %    % Monocytes 7 %    % Eosinophils 0 %    % Basophils 0 %    % Immature Granulocytes 1 %    NRBCs per 100 WBC 0 <1 /100    Absolute Neutrophils 18.2 (H) 1.6 - 8.3 10e3/uL    Absolute Lymphocytes 0.5 (L) 0.8 - 5.3 10e3/uL    Absolute Monocytes 1.5 (H) 0.0 - 1.3 10e3/uL    Absolute Eosinophils 0.0 0.0 - 0.7 10e3/uL    Absolute Basophils 0.0 0.0 - 0.2 10e3/uL    Absolute Immature Granulocytes 0.1 <=0.4 10e3/uL    Absolute NRBCs 0.0 10e3/uL   [place] Draw    Narrative    The following orders were created for panel order [place] Draw.  Procedure                               Abnormality         Status                     ---------                               -----------         ------                     Extra Blue Top Tube[[id number]]                              Final result               Extra Red Top Tube[[id number]]                               Final result                 Please view results for these tests on the individual orders.   Extra Blue Top Tube   Result Value Ref Range    Hold Specimen OK    Extra Red Top  Tube   Result Value Ref Range    Hold Specimen OK    Procalcitonin   Result Value Ref Range    Procalcitonin 1.88 (H) <0.05 ng/mL   UA with Microscopic reflex to Culture    Specimen: Urine, Snowden Catheter   Result Value Ref Range    Color Urine Yellow Colorless, Straw, Light Yellow, Yellow    Appearance Urine Slightly Cloudy (A) Clear    Glucose Urine Negative Negative mg/dL    Bilirubin Urine Negative Negative    Ketones Urine 20 (A) Negative mg/dL    Specific Gravity Urine 1.021 1.003 - 1.035    Blood Urine Negative Negative    pH Urine 7.0 4.7 - 8.0    Protein Albumin Urine 100 (A) Negative mg/dL    Urobilinogen Urine Normal Normal, 2.0 mg/dL    Nitrite Urine Negative Negative    Leukocyte Esterase Urine Moderate (A) Negative    Bacteria Urine Few (A) None Seen /HPF    Mucus Urine Present (A) None Seen /LPF    RBC Urine 3 (H) <=2 /HPF    WBC Urine 99 (H) <=5 /HPF    Squamous Epithelials Urine 1 <=1 /HPF    Narrative    Urine Culture ordered based on laboratory criteria       Medications   Calcium-Vitamin D 600-5 MG-MCG TABS (has no administration in time range)   diclofenac (VOLTAREN) 1 % topical gel 4 g (has no administration in time range)   gabapentin (NEURONTIN) capsule 300 mg (has no administration in time range)   Lidocaine (LIDOCARE) 4 % Patch 1 patch (has no administration in time range)     And   lidocaine patch in PLACE (has no administration in time range)   nitroGLYcerin (NITROSTAT) sublingual tablet 0.4 mg (has no administration in time range)   pantoprazole (PROTONIX) EC tablet 40 mg (has no administration in time range)   simvastatin (ZOCOR) tablet 40 mg (has no administration in time range)   tamsulosin (FLOMAX) capsule 0.4 mg (has no administration in time range)   urea (CARMOL) 10 % cream (has no administration in time range)   lidocaine 1 % 0.1-1 mL (has no administration in time range)   lidocaine (LMX4) cream (has no administration in time range)   sodium chloride (PF) 0.9% PF flush 3 mL (3  mLs Intracatheter $Given 4/18/23 1552)   sodium chloride (PF) 0.9% PF flush 3 mL (has no administration in time range)   melatonin tablet 1 mg (has no administration in time range)   enoxaparin ANTICOAGULANT (LOVENOX) injection 40 mg (has no administration in time range)   lactated ringers infusion ( Intravenous $New Bag 4/18/23 1557)   polyethylene glycol (MIRALAX) Packet 17 g (17 g Oral Not Given 4/18/23 1558)   ondansetron (ZOFRAN ODT) ODT tab 4 mg (has no administration in time range)     Or   ondansetron (ZOFRAN) injection 4 mg (has no administration in time range)   cefTRIAXone IN D5W (ROCEPHIN) intermittent infusion 2 g (has no administration in time range)   acetaminophen (TYLENOL) tablet 325 mg (has no administration in time range)   oxyCODONE (ROXICODONE) tablet 5 mg (has no administration in time range)   lactated ringers BOLUS 500 mL (500 mLs Intravenous $New Bag 4/18/23 1548)   acetaminophen (TYLENOL) tablet 975 mg (975 mg Oral $Given 4/18/23 1226)   0.9% sodium chloride BOLUS (0 mLs Intravenous Stopped 4/18/23 1339)   cefTRIAXone IN D5W (ROCEPHIN) intermittent infusion 2 g (0 g Intravenous Stopped 4/18/23 1431)       Assessments & Plan (with Medical Decision Making)     I have reviewed the nursing notes.    I have reviewed the findings, diagnosis, plan and need for follow up with the patient.      Current Discharge Medication List          Final diagnoses:   Sepsis, due to unspecified organism, unspecified whether acute organ dysfunction present (H)   Acute cystitis without hematuria       4/18/2023   [facility]     Westley Andrea MD  04/18/23 0468

## 2023-04-18 NOTE — ED NOTES
Patient reports back pain is reducing in severity after inserting catheter. Feels he is more comfortable. Call light within reach.

## 2023-04-18 NOTE — PLAN OF CARE
[facility] Inpatient Admission Note:    Patient admitted to 3212/3212-1 at approximately 1454 via wheel chair accompanied by transport tech and mother from emergency room . Report received from FARZANA Wade in SBAR format at 1430 via telephone. Patient ambulated to bed via self.. Patient is alert and oriented X 3, reports pain; rates at 6 on 0-10 scale.  Patient oriented to room, unit, hourly rounding, and plan of care. Explained admission packet and patient handbook with patient bill of rights brochure. Will continue to monitor and document as needed.     Inpatient Nursing criteria listed below was met:    Health care directives status obtained and documented: Yes     If initial lactic acid greater than 2.0, repeat lactic acid drawn within one hour of arrival to unit: NA.     Clergy visit ordered if patient requests: N/A    Skin issues/needs documented: No    Isolation Patient: no Education given, correct sign in place and documentation row added to PCS:  No    Fall Prevention Yes: Care plan updated, education given and documented, sticker and magnet in place: Yes    Care Plan initiated: Yes    Education Documented (including assessment): Yes    Patient has discharge needs : Yes If yes, please explain:Urology     Patient is alert and oriented, makes needs known. Up with SBA, calls appropriately. Patient reporting pain but want to wait until bedtime for pain medication. Snowden in place and draining. Patient complaining of being cold, temp 103.3 at check. Text paged doctor to inform them and asked for a higher dose of tylenol d/t only one being ordered. Assessment as charted. Skin assessment not completed, patient denies any skin issues. BP 99/53   Pulse 113   Temp (!) 103.3  F (39.6  C) (Tympanic)   Resp 18   Ht 1.524 m (5')   Wt 84.7 kg (186 lb 11.7 oz)   SpO2 94%   BMI 36.47 kg/m      Face to face report given with opportunity to observe patient.    Report given to FARZANA Regan RN    4/18/2023  7:08 PM

## 2023-04-19 ENCOUNTER — APPOINTMENT (OUTPATIENT)
Dept: GENERAL RADIOLOGY | Facility: HOSPITAL | Age: 58
End: 2023-04-19
Attending: HOSPITALIST
Payer: COMMERCIAL

## 2023-04-19 LAB
ANION GAP SERPL CALCULATED.3IONS-SCNC: 9 MMOL/L (ref 7–15)
BASOPHILS # BLD AUTO: 0 10E3/UL (ref 0–0.2)
BASOPHILS NFR BLD AUTO: 0 %
BUN SERPL-MCNC: 24.2 MG/DL (ref 6–20)
C DIFF TOX B STL QL: NEGATIVE
CALCIUM SERPL-MCNC: 8.3 MG/DL (ref 8.6–10)
CHLORIDE SERPL-SCNC: 102 MMOL/L (ref 98–107)
CREAT SERPL-MCNC: 1.54 MG/DL (ref 0.67–1.17)
DEPRECATED HCO3 PLAS-SCNC: 26 MMOL/L (ref 22–29)
EOSINOPHIL # BLD AUTO: 0 10E3/UL (ref 0–0.7)
EOSINOPHIL NFR BLD AUTO: 0 %
ERYTHROCYTE [DISTWIDTH] IN BLOOD BY AUTOMATED COUNT: 12.1 % (ref 10–15)
GFR SERPL CREATININE-BSD FRML MDRD: 52 ML/MIN/1.73M2
GLUCOSE BLDC GLUCOMTR-MCNC: 142 MG/DL (ref 70–99)
GLUCOSE BLDC GLUCOMTR-MCNC: 202 MG/DL (ref 70–99)
GLUCOSE SERPL-MCNC: 160 MG/DL (ref 70–99)
HCT VFR BLD AUTO: 35.7 % (ref 40–53)
HGB BLD-MCNC: 11.8 G/DL (ref 13.3–17.7)
IMM GRANULOCYTES # BLD: 0.1 10E3/UL
IMM GRANULOCYTES NFR BLD: 1 %
LACTATE SERPL-SCNC: 1 MMOL/L (ref 0.7–2)
LYMPHOCYTES # BLD AUTO: 1 10E3/UL (ref 0.8–5.3)
LYMPHOCYTES NFR BLD AUTO: 5 %
MCH RBC QN AUTO: 30 PG (ref 26.5–33)
MCHC RBC AUTO-ENTMCNC: 33.1 G/DL (ref 31.5–36.5)
MCV RBC AUTO: 91 FL (ref 78–100)
MONOCYTES # BLD AUTO: 1.3 10E3/UL (ref 0–1.3)
MONOCYTES NFR BLD AUTO: 7 %
NEUTROPHILS # BLD AUTO: 17.2 10E3/UL (ref 1.6–8.3)
NEUTROPHILS NFR BLD AUTO: 87 %
NRBC # BLD AUTO: 0 10E3/UL
NRBC BLD AUTO-RTO: 0 /100
PLATELET # BLD AUTO: 219 10E3/UL (ref 150–450)
POTASSIUM SERPL-SCNC: 4 MMOL/L (ref 3.4–5.3)
PROCALCITONIN SERPL IA-MCNC: 4.98 NG/ML
RBC # BLD AUTO: 3.93 10E6/UL (ref 4.4–5.9)
SODIUM SERPL-SCNC: 137 MMOL/L (ref 136–145)
WBC # BLD AUTO: 19.7 10E3/UL (ref 4–11)

## 2023-04-19 PROCEDURE — 250N000011 HC RX IP 250 OP 636: Performed by: HOSPITALIST

## 2023-04-19 PROCEDURE — 250N000013 HC RX MED GY IP 250 OP 250 PS 637: Performed by: HOSPITALIST

## 2023-04-19 PROCEDURE — 82310 ASSAY OF CALCIUM: CPT | Performed by: HOSPITALIST

## 2023-04-19 PROCEDURE — 71046 X-RAY EXAM CHEST 2 VIEWS: CPT

## 2023-04-19 PROCEDURE — 258N000003 HC RX IP 258 OP 636: Performed by: HOSPITALIST

## 2023-04-19 PROCEDURE — 87493 C DIFF AMPLIFIED PROBE: CPT | Performed by: HOSPITALIST

## 2023-04-19 PROCEDURE — 83605 ASSAY OF LACTIC ACID: CPT | Performed by: HOSPITALIST

## 2023-04-19 PROCEDURE — 84145 PROCALCITONIN (PCT): CPT | Performed by: HOSPITALIST

## 2023-04-19 PROCEDURE — 120N000001 HC R&B MED SURG/OB

## 2023-04-19 PROCEDURE — 36415 COLL VENOUS BLD VENIPUNCTURE: CPT | Performed by: HOSPITALIST

## 2023-04-19 PROCEDURE — 99233 SBSQ HOSP IP/OBS HIGH 50: CPT | Performed by: HOSPITALIST

## 2023-04-19 PROCEDURE — 85025 COMPLETE CBC W/AUTO DIFF WBC: CPT | Performed by: HOSPITALIST

## 2023-04-19 RX ORDER — MEROPENEM AND SODIUM CHLORIDE 1 G/50ML
1 INJECTION, SOLUTION INTRAVENOUS EVERY 8 HOURS
Status: DISCONTINUED | OUTPATIENT
Start: 2023-04-19 | End: 2023-04-19

## 2023-04-19 RX ORDER — BISMUTH SUBSALICYLATE 262 MG/1
262 TABLET, CHEWABLE ORAL EVERY 4 HOURS PRN
Status: DISCONTINUED | OUTPATIENT
Start: 2023-04-19 | End: 2023-04-25 | Stop reason: HOSPADM

## 2023-04-19 RX ORDER — DEXTROSE MONOHYDRATE 25 G/50ML
25-50 INJECTION, SOLUTION INTRAVENOUS
Status: DISCONTINUED | OUTPATIENT
Start: 2023-04-19 | End: 2023-04-25 | Stop reason: HOSPADM

## 2023-04-19 RX ORDER — MEROPENEM AND SODIUM CHLORIDE 1 G/50ML
1 INJECTION, SOLUTION INTRAVENOUS EVERY 12 HOURS
Status: DISCONTINUED | OUTPATIENT
Start: 2023-04-19 | End: 2023-04-20

## 2023-04-19 RX ORDER — NICOTINE POLACRILEX 4 MG
15-30 LOZENGE BUCCAL
Status: DISCONTINUED | OUTPATIENT
Start: 2023-04-19 | End: 2023-04-25 | Stop reason: HOSPADM

## 2023-04-19 RX ORDER — MEROPENEM AND SODIUM CHLORIDE 1 G/50ML
1 INJECTION, SOLUTION INTRAVENOUS EVERY 8 HOURS
Status: DISCONTINUED | OUTPATIENT
Start: 2023-04-19 | End: 2023-04-19 | Stop reason: DRUGHIGH

## 2023-04-19 RX ORDER — CEFTRIAXONE SODIUM 2 G/50ML
2 INJECTION, SOLUTION INTRAVENOUS EVERY 24 HOURS
Status: DISCONTINUED | OUTPATIENT
Start: 2023-04-19 | End: 2023-04-19

## 2023-04-19 RX ADMIN — GABAPENTIN 300 MG: 300 CAPSULE ORAL at 08:05

## 2023-04-19 RX ADMIN — POLYETHYLENE GLYCOL 3350 17 G: 17 POWDER, FOR SOLUTION ORAL at 08:05

## 2023-04-19 RX ADMIN — MEROPENEM AND SODIUM CHLORIDE 1 G: 1 INJECTION, SOLUTION INTRAVENOUS at 20:15

## 2023-04-19 RX ADMIN — SODIUM CHLORIDE, POTASSIUM CHLORIDE, SODIUM LACTATE AND CALCIUM CHLORIDE: 600; 310; 30; 20 INJECTION, SOLUTION INTRAVENOUS at 13:35

## 2023-04-19 RX ADMIN — GABAPENTIN 300 MG: 300 CAPSULE ORAL at 14:46

## 2023-04-19 RX ADMIN — SODIUM CHLORIDE, POTASSIUM CHLORIDE, SODIUM LACTATE AND CALCIUM CHLORIDE: 600; 310; 30; 20 INJECTION, SOLUTION INTRAVENOUS at 04:44

## 2023-04-19 RX ADMIN — TAMSULOSIN HYDROCHLORIDE 0.4 MG: 0.4 CAPSULE ORAL at 21:17

## 2023-04-19 RX ADMIN — ONDANSETRON 4 MG: 2 INJECTION INTRAMUSCULAR; INTRAVENOUS at 16:39

## 2023-04-19 RX ADMIN — GABAPENTIN 300 MG: 300 CAPSULE ORAL at 21:17

## 2023-04-19 RX ADMIN — ENOXAPARIN SODIUM 40 MG: 40 INJECTION SUBCUTANEOUS at 21:17

## 2023-04-19 RX ADMIN — ACETAMINOPHEN 650 MG: 325 TABLET ORAL at 04:48

## 2023-04-19 RX ADMIN — LIDOCAINE 1 PATCH: 560 PATCH PERCUTANEOUS; TOPICAL; TRANSDERMAL at 16:34

## 2023-04-19 RX ADMIN — SODIUM CHLORIDE, POTASSIUM CHLORIDE, SODIUM LACTATE AND CALCIUM CHLORIDE 500 ML: 600; 310; 30; 20 INJECTION, SOLUTION INTRAVENOUS at 07:53

## 2023-04-19 RX ADMIN — ACETAMINOPHEN 650 MG: 325 TABLET ORAL at 00:14

## 2023-04-19 RX ADMIN — SIMVASTATIN 40 MG: 40 TABLET, FILM COATED ORAL at 21:17

## 2023-04-19 RX ADMIN — PANTOPRAZOLE SODIUM 40 MG: 40 TABLET, DELAYED RELEASE ORAL at 08:05

## 2023-04-19 RX ADMIN — SODIUM CHLORIDE, POTASSIUM CHLORIDE, SODIUM LACTATE AND CALCIUM CHLORIDE: 600; 310; 30; 20 INJECTION, SOLUTION INTRAVENOUS at 22:32

## 2023-04-19 RX ADMIN — ACETAMINOPHEN 650 MG: 325 TABLET ORAL at 16:39

## 2023-04-19 RX ADMIN — CALCIUM CARBONATE 600 MG (1,500 MG)-VITAMIN D3 400 UNIT TABLET 1 TABLET: at 08:05

## 2023-04-19 RX ADMIN — MEROPENEM AND SODIUM CHLORIDE 1 G: 1 INJECTION, SOLUTION INTRAVENOUS at 07:56

## 2023-04-19 RX ADMIN — ACETAMINOPHEN 650 MG: 325 TABLET ORAL at 21:17

## 2023-04-19 RX ADMIN — VANCOMYCIN HYDROCHLORIDE 2000 MG: 1 INJECTION, POWDER, LYOPHILIZED, FOR SOLUTION INTRAVENOUS at 08:37

## 2023-04-19 ASSESSMENT — ACTIVITIES OF DAILY LIVING (ADL)
ADLS_ACUITY_SCORE: 34
ADLS_ACUITY_SCORE: 28
ADLS_ACUITY_SCORE: 34
ADLS_ACUITY_SCORE: 35
ADLS_ACUITY_SCORE: 34
ADLS_ACUITY_SCORE: 28
ADLS_ACUITY_SCORE: 28
ADLS_ACUITY_SCORE: 34
ADLS_ACUITY_SCORE: 34
ADLS_ACUITY_SCORE: 28

## 2023-04-19 NOTE — PROGRESS NOTES
Nurse paged about blood culture from today being positive for gram positive cocci 2 sets out of 2.  Chart review and patient is already on appropriate antibiotics with vancomycin and meropenem.   Sign out give to morning regular team.

## 2023-04-19 NOTE — PROGRESS NOTES
[facility]    Hospitalist Progress Note      Assessment & Plan   Richard Harvey is a 57 year old male with history of hypertension, hyperlipidemia, type 1 diabetes mellitus, diabetic neuropathy with gastroparesis and chronic lower back pain presented to the ED with a complaint of fevers and chills going on for the last 4 days was found to have sepsis due to urinary tract infection.:     Sepsis due to UTI/pyelonephritis: The patient is growing gram-positive cocci in blood.  He was febrile, hypotensive last night.  Also procalcitonin is up today.  I will broaden the antibiotic coverage to meropenem and vancomycin.  We will wait for the identification.  Urine cultures are so far negative.  Not sure whether gram-positive cocci are coming from the urine itself.  Patient has UTI, leukocytosis, fevers, tachycardia and hypotension.  The patient's urine was found to be positive for leuk esterase and nitrates along with WBCs.  Lactate was normal.  Monitor on telemetry due to tachycardia and hypotension.  Procalcitonin was high.    Renal ultrasound was negative for hydronephrosis or an abscess.  We will get a chest x-ray as well.    Acute urinary retention: The patient has a history of BPH.  He did have urinary retention in the ED.  Continue on tamsulosin and Snowedn catheter has been placed.  We will remove the Snowden catheter tomorrow once he is more stable..     Acute kidney injury: Probably secondary to obstructive uropathy.  The patient did have urinary retention.  No hydronephrosis on ultrasound..  Continue on IV fluids and monitor creatinine closely.     Type 1 diabetes mellitus: The patient has an insulin pump.  He has enough supply to manage his pump.  We will make sure that he does not get hypoglycemic so we will check twice a day D sticks.     Hypertension: Holding lisinopril.     Diabetic neuropathy with gastroparesis: Continue on gabapentin.  Also the patient takes PPI.     Hyperlipidemia: Continue on  simvastatin     BPH: Continue on tamsulosin.  The patient had urinary retention so Snowden catheter in place.  We will try to remove once more stable.    DVT Prophylaxis: Enoxaparin (Lovenox) SQ  Code Status: Full Code    Disposition: Expected discharge in 2-3 days once stable.    Mani Mujica MD    Interval History   The patient seen and examined.  Overnight events reviewed.  Discussed with the nursing staff.  The patient continued to have fevers and chills last night.  This morning he has been complaining of shakes.  Overall feeling sick.  His blood pressure was also dropping last night.  Denies chest pain or shortness of breath.  Denies any cough.  Does have lower abdominal discomfort.  He did not move his bowels but has been passing gas.    -Data reviewed today: I reviewed all new labs and imaging results over the last 24 hours. I personally reviewed no images or EKG's today.    Physical Exam   Temp: (!) 102.4  F (39.1  C) Temp src: Tympanic BP: (!) 116/48 Pulse: 75   Resp: 16 SpO2: 97 % O2 Device: None (Room air)    Vitals:    04/18/23 1454 04/19/23 0444   Weight: 84.7 kg (186 lb 11.7 oz) 82.6 kg (182 lb 1.6 oz)     Vital Signs with Ranges  Temp:  [98.3  F (36.8  C)-103.3  F (39.6  C)] 102.4  F (39.1  C)  Pulse:  [] 75  Resp:  [13-24] 16  BP: ()/(45-65) 116/48  SpO2:  [92 %-97 %] 97 %  I/O last 3 completed shifts:  In: 2123 [P.O.:720; I.V.:1403]  Out: 2025 [Urine:2025]    Peripheral IV 04/18/23 Left;Posterior Hand (Active)   Site Assessment WDL 04/19/23 0430   Line Status Infusing 04/19/23 0430   Dressing Transparent 04/18/23 1800   Dressing Status clean;dry;intact 04/19/23 0430   Dressing Intervention New dressing  04/18/23 1800   Line Intervention Flushed 04/18/23 2030   Phlebitis Scale 0-->no symptoms 04/19/23 0430   Infiltration? no 04/19/23 0430   Number of days: 1       Urethral Catheter 04/18/23 Latex 16 fr (Active)   Tube Description Positional 04/19/23 044   Catheter Care Done;Catheter wipes  04/19/23 0445   Collection Container Standard 04/19/23 0445   Securement Method Securing device (Describe) 04/19/23 0445   Rationale for Continued Use Retention 04/19/23 0445   Urine Output 175 mL 04/19/23 0600   Number of days: 1     Line/device assessment(s) completed for medical necessity    General: In no acute distress  HEENT: PERRLA  Neck: Supple  Lungs: Bilaterally clear to auscultation  Cardiovascular:S1+S2 Pierce  ABDOMEN: Soft, mildly tender in hypogastrium, no mass, tenderness, guarding, or rebound.  Bowel sounds are present.  Right-sided CVA tenderness  Extremities: No pitting edema, pulses bilaterally palpable, no cyanosis  Neuro: Moving all extremities  Psych: Appropriate mood     Medications     lactated ringers 125 mL/hr at 04/19/23 0444       calcium carbonate-vitamin D   Oral Daily     enoxaparin ANTICOAGULANT  40 mg Subcutaneous At Bedtime     gabapentin  300 mg Oral TID     lactated ringers  500 mL Intravenous Once     lidocaine  1 patch Transdermal Q24H    And     lidocaine   Transdermal Q8H THI     meropenem  1 g Intravenous Q12H     pantoprazole  40 mg Oral Daily     polyethylene glycol  17 g Oral Daily     simvastatin  40 mg Oral At Bedtime     sodium chloride (PF)  3 mL Intracatheter Q8H     tamsulosin  0.4 mg Oral QPM       Data   Recent Labs   Lab 04/19/23  0514 04/18/23  1219   WBC 19.7* 20.3*   HGB 11.8* 12.7*   MCV 91 89    218    133*   POTASSIUM 4.0 4.1   CHLORIDE 102 96*   CO2 26 25   BUN 24.2* 30.1*   CR 1.54* 1.60*   ANIONGAP 9 12   ANGELINA 8.3* 8.7   * 197*   ALBUMIN  --  3.6   PROTTOTAL  --  6.9   BILITOTAL  --  0.6   ALKPHOS  --  87   ALT  --  21   AST  --  24       Recent Results (from the past 24 hour(s))   US Renal Complete Non-Vascular    Narrative    PROCEDURE: US RENAL COMPLETE NON-VASCULAR    HISTORY: UTI with right CVA tenderness please evaluate for abscess    TECHNIQUE:  A renal ultrasound was performed.    COMPARISON:  11/3/2022    MEASUREMENTS:    Right  renal length: 11.1 cm  Left renal length: 10.2 cm    RENAL FINDINGS: The kidneys are normal in size and echogenicity. There  is no hydronephrosis. No shadowing stones are seen.    BLADDER: The bladder is collapsed around a Snowden catheter.      Impression    IMPRESSION:      No hydronephrosis.      JEREMY ANGELES MD         SYSTEM ID:  [id number]
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
## 2023-04-19 NOTE — PLAN OF CARE
Writer contacted pharmacy regarding patient's insulin pump correction dose in MAR and pharmacist said to leave pump as is and she was waiting to hear back from diabetic education and will adjust it accordingly in the MAR.

## 2023-04-19 NOTE — PHARMACY-VANCOMYCIN DOSING SERVICE
Pharmacy Vancomycin Initial Note  Date of Service 2023  Patient's  1965  57 year old, male    Indication: Sepsis    Current estimated CrCl = Estimated Creatinine Clearance: 46.6 mL/min (A) (based on SCr of 1.54 mg/dL (H)).    Creatinine for last 3 days  2023: 12:19 PM Creatinine 1.60 mg/dL  2023:  5:14 AM Creatinine 1.54 mg/dL    Recent Vancomycin Level(s) for last 3 days  No results found for requested labs within last 3 days.      Vancomycin IV Administrations (past 72 hours)      No vancomycin orders with administrations in past 72 hours.                Nephrotoxins and other renal medications (From now, onward)    None          Contrast Orders - past 72 hours (72h ago, onward)    None          [employer] Prediction of Planned Initial Vancomycin Regimen  Loading dose: 2000 mg  Regimen: 1500 mg IV every 24 hours.  Start time: 19:00 on 2023  Exposure target: AUC24 (range)400-600 mg/L.hr   AUC24,ss: 568 mg/L.hr  Probability of AUC24 > 400: 84 %  Ctrough,ss: 17 mg/L  Probability of Ctrough,ss > 20: 37 %  Probability of nephrotoxicity (Lodise [place] ): 13 %      Plan:  1. Start vancomycin  2000 mg IV x1 followed by 1500 mg IV Q24H.   2. Vancomycin monitoring method: AUC  3. Vancomycin therapeutic monitoring goal: 400-600 mg*h/L  4. Pharmacy will check vancomycin levels as appropriate in 1-3 Days.    5. Serum creatinine levels will be ordered daily for the first week of therapy and at least twice weekly for subsequent weeks.      Roe Pradhan, [facility]

## 2023-04-19 NOTE — PLAN OF CARE
A&O x 4. PIV infusing LR at 125ml/hr. 500 ml LR bolus completed. Merrem and Vancomycin given today. VS as charted. BP soft, but MAP remains >65. Remains in sinus rhythm. Temperature max of 100.5, tylenol given. Lin remains intact and draining adequately. HCP would like one more day with lin catheter. Incontinent of loose stools today as charted. Cdiff negative. Barrier cream applied. Bed bath completed. SBA to the commode, up in the chair periodically throughout the day. Poor appetite this morning, but trying a hamburger for dinner this evening. 4/4 blood cultures came back positive gram cocci in clusters. HCP was notified. No new orders were placed. Chest x ray completed today. , 144, 142, 249. Finger poked x 2 and dexcom comparable within range. Lidocaine patch applied to middle lower back to c/o back pain. C/O pain in abdominal area and feeling nauseous zofran given with relief. Pepto bismol PRN if needed, but patient has not requested it so far. Mom here to visit patient this evening. Remains free of injury all shift. Able to make needs known. Alarms activated and audible. Remains in chair.     Face to face report given with opportunity to observe patient.    Report given to Tenisha Bansal RN   4/19/2023  7:10 PM

## 2023-04-19 NOTE — PHARMACY-MEDICATION REGIMEN REVIEW
Pharmacy Antimicrobial Stewardship Assessment     Current Antimicrobial Therapy:  Anti-infectives (From now, onward)    Start     Dose/Rate Route Frequency Ordered Stop    23 0730  meropenem (MERREM) intermittent 1g in NS 50ML PREMIX         1 g  100 mL/hr over 30 Minutes Intravenous EVERY 12 HOURS 23 0700            Indication: Sepsis    Days of Therapy: Day 1 Meropenem + Vanco; Day 2 Abx     Pertinent Labs:  Recent Labs   Lab Test 23  0514 23  1219 22  1320   WBC 19.7* 20.3* 8.9     Recent Labs   Lab Test 23  0514 23  1219   LACT  --  1.0   PCAL 4.98* 1.88*        Temperature:  Temp (24hrs), Av.1  F (38.4  C), Min:98.3  F (36.8  C), Max:103.3  F (39.6  C)    Culture Results:   (23) Urine  (23) Blood = GPC    Recommendations/Interventions:  1. None    Roe Pradhan, [facility]  2023

## 2023-04-19 NOTE — PROGRESS NOTES
Hospital asking how to enter in his insulin pump.   Explained it's an AID insulin pump with varying basal rates.   ISF change based on time  CR, too    Macrina CHAMBERLAIN FNP-BC  Diabetes and Wound Care

## 2023-04-19 NOTE — PROGRESS NOTES
Assessment completed with Richard.    LOC: alert, very pleasant    Dx: acute cystitis and sepsis  Chronic Disease Management: DM1, HTN, diabetic neuropathy, hyper lipidemia, BPH    Lives with: with mother  Living at:  Home in [place]  Transportation: YES, drive self and mother drives as well    Primary PCP: Natacha Alvarez  Insurance: [facility]      Support System:  family  Homecare/PCA: no  /County Services:   no  : NO      How was the [facility] notification completed: n/a    Health Care Directive: wants information  Guardian: no  POA: no    Pharmacy: [facility]  Meds management: manages own    Adequate Resources for needs (housing, utilities, food/med): YES  Household chores: does own  Work/community/social activity: YES works at [employer]    ADLs: independent  Ambulation:independent  Falls: recently tripped on rug and bruised arm  Nutrition: no concern  Sleep: gets up to pee multiple times    Equipment used: none, did recommend grab bars in bathroom      Oxygen supplier: no      Does the supplier have valid oxygen orders: n/a    Mental health: denies  Substance abuse: denies  Exposure to violence/abuse: denies  Stressors: denies    Able to Return to Prior Living Arrangements: YES    Choice of Vendor: n/a    Barriers: none identified    OCTAVIO: low    Plan: home with mom transporting.    Carolina Cortez CM

## 2023-04-19 NOTE — CARE PLAN
Planned two-view CXR. Called to clarify with Dr. Mujica if he wanted a 2 view and not a 1 view CXR.     Per Dr. Mujica - would like 2 view CXR, but would like to infuse abx first before sending him down. Will update dayshift RN

## 2023-04-19 NOTE — PROVIDER NOTIFICATION
DATE:  4/19/2023   TIME OF RECEIPT FROM LAB:  0648  LAB TEST:  Blood Cultures - 1st of 2 bottles (1st set)  LAB VALUE: Gram (+) cocci in clusters  RESULTS GIVEN WITH READ-BACK TO (PROVIDER):  Mani Mujica MD  TIME LAB VALUE REPORTED TO PROVIDER: 0627

## 2023-04-19 NOTE — UTILIZATION REVIEW
Inpatient appropriate    Admission Status; Secondary Review Determination      Under the authority of the Utilization Management Committee, the utilization review process indicated a secondary review on the above patient. The review outcome is based on review of the medical records, discussions with staff, and applying clinical experience noted on the date of the review.    (x) Inpatient Status Appropriate - This patient's medical care is consistent with medical management for inpatient care and reasonable inpatient medical practice.    RATIONALE FOR DETERMINATION  57-year-old male with history of hypertension, hyperlipidemia, diabetes mellitus type 1, diabetic neuropathy with gastroparesis was admitted to [facility] with sepsis due to UTI.  Patient presented with high-grade fever of 103.3  F, was tachycardic, hypotensive with significant leukocytosis.  UA is concerning for UTI.  Procalcitonin is significantly elevated with a rising trend.  He also appears bacteremic with gram-positive cocci in clusters.  Patient is on broad-spectrum IV antibiotics with meropenem and vancomycin.  Inpatient care is appropriate at this time.    At the time of admission with the information available to the attending physician more than 2 nights Hospital complex care was anticipated, based on patient risk of adverse outcome if treated as outpatient and complex care required. Inpatient admission is appropriate based on the Medicare guidelines.    This document was produced using voice recognition software      The information on this document is developed by the utilization review team in order for the business office to ensure compliance. This only denotes the appropriateness of proper admission status and does not reflect the quality of care rendered.  The definitions of Inpatient Status and Observation Status used in making the determination above are those provided in the CMS Coverage Manual, Chapter 1 and Chapter 6,  section 70.4.    Sincerely,    Utilization Review  Physician Advisor  [facility].

## 2023-04-20 ENCOUNTER — APPOINTMENT (OUTPATIENT)
Dept: CT IMAGING | Facility: HOSPITAL | Age: 58
End: 2023-04-20
Attending: HOSPITALIST
Payer: COMMERCIAL

## 2023-04-20 ENCOUNTER — APPOINTMENT (OUTPATIENT)
Dept: CARDIOLOGY | Facility: HOSPITAL | Age: 58
End: 2023-04-20
Attending: HOSPITALIST
Payer: COMMERCIAL

## 2023-04-20 LAB
ANION GAP SERPL CALCULATED.3IONS-SCNC: 7 MMOL/L (ref 7–15)
BACTERIA UR CULT: ABNORMAL
BASOPHILS # BLD AUTO: 0 10E3/UL (ref 0–0.2)
BASOPHILS NFR BLD AUTO: 0 %
BUN SERPL-MCNC: 18.6 MG/DL (ref 6–20)
CALCIUM SERPL-MCNC: 8.7 MG/DL (ref 8.6–10)
CHLORIDE SERPL-SCNC: 105 MMOL/L (ref 98–107)
CREAT SERPL-MCNC: 1.54 MG/DL (ref 0.67–1.17)
CRP SERPL-MCNC: 208.84 MG/L
DEPRECATED HCO3 PLAS-SCNC: 28 MMOL/L (ref 22–29)
EOSINOPHIL # BLD AUTO: 0 10E3/UL (ref 0–0.7)
EOSINOPHIL NFR BLD AUTO: 0 %
ERYTHROCYTE [DISTWIDTH] IN BLOOD BY AUTOMATED COUNT: 12.3 % (ref 10–15)
GFR SERPL CREATININE-BSD FRML MDRD: 52 ML/MIN/1.73M2
GLUCOSE BLDC GLUCOMTR-MCNC: 132 MG/DL (ref 70–99)
GLUCOSE BLDC GLUCOMTR-MCNC: 136 MG/DL (ref 70–99)
GLUCOSE BLDC GLUCOMTR-MCNC: 137 MG/DL (ref 70–99)
GLUCOSE SERPL-MCNC: 132 MG/DL (ref 70–99)
HCT VFR BLD AUTO: 34.3 % (ref 40–53)
HGB BLD-MCNC: 11.3 G/DL (ref 13.3–17.7)
IMM GRANULOCYTES # BLD: 0.1 10E3/UL
IMM GRANULOCYTES NFR BLD: 1 %
LACTATE SERPL-SCNC: 0.8 MMOL/L (ref 0.7–2)
LACTATE SERPL-SCNC: 0.9 MMOL/L (ref 0.7–2)
LVEF ECHO: NORMAL
LYMPHOCYTES # BLD AUTO: 0.8 10E3/UL (ref 0.8–5.3)
LYMPHOCYTES NFR BLD AUTO: 6 %
MCH RBC QN AUTO: 30.1 PG (ref 26.5–33)
MCHC RBC AUTO-ENTMCNC: 32.9 G/DL (ref 31.5–36.5)
MCV RBC AUTO: 92 FL (ref 78–100)
MONOCYTES # BLD AUTO: 1.1 10E3/UL (ref 0–1.3)
MONOCYTES NFR BLD AUTO: 7 %
NEUTROPHILS # BLD AUTO: 13.2 10E3/UL (ref 1.6–8.3)
NEUTROPHILS NFR BLD AUTO: 86 %
NRBC # BLD AUTO: 0 10E3/UL
NRBC BLD AUTO-RTO: 0 /100
PLATELET # BLD AUTO: 242 10E3/UL (ref 150–450)
POTASSIUM SERPL-SCNC: 4.2 MMOL/L (ref 3.4–5.3)
PROCALCITONIN SERPL IA-MCNC: 3.07 NG/ML
RBC # BLD AUTO: 3.75 10E6/UL (ref 4.4–5.9)
SODIUM SERPL-SCNC: 140 MMOL/L (ref 136–145)
WBC # BLD AUTO: 15.3 10E3/UL (ref 4–11)

## 2023-04-20 PROCEDURE — 250N000011 HC RX IP 250 OP 636: Performed by: HOSPITALIST

## 2023-04-20 PROCEDURE — 250N000011 HC RX IP 250 OP 636: Performed by: RADIOLOGY

## 2023-04-20 PROCEDURE — 87040 BLOOD CULTURE FOR BACTERIA: CPT | Performed by: HOSPITALIST

## 2023-04-20 PROCEDURE — 93306 TTE W/DOPPLER COMPLETE: CPT | Mod: 26 | Performed by: INTERNAL MEDICINE

## 2023-04-20 PROCEDURE — 255N000002 HC RX 255 OP 636: Performed by: HOSPITALIST

## 2023-04-20 PROCEDURE — 258N000003 HC RX IP 258 OP 636: Performed by: HOSPITALIST

## 2023-04-20 PROCEDURE — 120N000001 HC R&B MED SURG/OB

## 2023-04-20 PROCEDURE — 80048 BASIC METABOLIC PNL TOTAL CA: CPT | Performed by: HOSPITALIST

## 2023-04-20 PROCEDURE — 250N000013 HC RX MED GY IP 250 OP 250 PS 637: Performed by: HOSPITALIST

## 2023-04-20 PROCEDURE — 36415 COLL VENOUS BLD VENIPUNCTURE: CPT | Performed by: HOSPITALIST

## 2023-04-20 PROCEDURE — 74177 CT ABD & PELVIS W/CONTRAST: CPT

## 2023-04-20 PROCEDURE — 85025 COMPLETE CBC W/AUTO DIFF WBC: CPT | Performed by: HOSPITALIST

## 2023-04-20 PROCEDURE — 84145 PROCALCITONIN (PCT): CPT | Performed by: HOSPITALIST

## 2023-04-20 PROCEDURE — 83605 ASSAY OF LACTIC ACID: CPT | Performed by: HOSPITALIST

## 2023-04-20 PROCEDURE — 86140 C-REACTIVE PROTEIN: CPT | Performed by: HOSPITALIST

## 2023-04-20 PROCEDURE — 99233 SBSQ HOSP IP/OBS HIGH 50: CPT | Performed by: HOSPITALIST

## 2023-04-20 RX ORDER — IOPAMIDOL 755 MG/ML
85 INJECTION, SOLUTION INTRAVASCULAR ONCE
Status: COMPLETED | OUTPATIENT
Start: 2023-04-20 | End: 2023-04-20

## 2023-04-20 RX ORDER — IOPAMIDOL 755 MG/ML
17 INJECTION, SOLUTION INTRAVASCULAR ONCE
Status: COMPLETED | OUTPATIENT
Start: 2023-04-20 | End: 2023-04-20

## 2023-04-20 RX ADMIN — LIDOCAINE 1 PATCH: 560 PATCH PERCUTANEOUS; TOPICAL; TRANSDERMAL at 17:59

## 2023-04-20 RX ADMIN — SIMVASTATIN 40 MG: 40 TABLET, FILM COATED ORAL at 20:55

## 2023-04-20 RX ADMIN — PERFLUTREN 5 ML: 6.52 INJECTION, SUSPENSION INTRAVENOUS at 12:00

## 2023-04-20 RX ADMIN — CALCIUM CARBONATE 600 MG (1,500 MG)-VITAMIN D3 400 UNIT TABLET 1 TABLET: at 08:43

## 2023-04-20 RX ADMIN — VANCOMYCIN HYDROCHLORIDE 1500 MG: 1 INJECTION, POWDER, LYOPHILIZED, FOR SOLUTION INTRAVENOUS at 10:26

## 2023-04-20 RX ADMIN — Medication 1 LOZENGE: at 15:39

## 2023-04-20 RX ADMIN — IOPAMIDOL 85 ML: 755 INJECTION, SOLUTION INTRAVENOUS at 09:39

## 2023-04-20 RX ADMIN — SODIUM CHLORIDE, POTASSIUM CHLORIDE, SODIUM LACTATE AND CALCIUM CHLORIDE: 600; 310; 30; 20 INJECTION, SOLUTION INTRAVENOUS at 20:58

## 2023-04-20 RX ADMIN — IOPAMIDOL 17 ML: 755 INJECTION, SOLUTION INTRAVENOUS at 09:39

## 2023-04-20 RX ADMIN — SODIUM CHLORIDE, POTASSIUM CHLORIDE, SODIUM LACTATE AND CALCIUM CHLORIDE 500 ML: 600; 310; 30; 20 INJECTION, SOLUTION INTRAVENOUS at 06:49

## 2023-04-20 RX ADMIN — GABAPENTIN 300 MG: 300 CAPSULE ORAL at 20:55

## 2023-04-20 RX ADMIN — GABAPENTIN 300 MG: 300 CAPSULE ORAL at 08:43

## 2023-04-20 RX ADMIN — ACETAMINOPHEN 650 MG: 325 TABLET ORAL at 05:39

## 2023-04-20 RX ADMIN — BISMUTH SUBSALICYLATE 262 MG: 262 TABLET, CHEWABLE ORAL at 10:12

## 2023-04-20 RX ADMIN — PANTOPRAZOLE SODIUM 40 MG: 40 TABLET, DELAYED RELEASE ORAL at 08:43

## 2023-04-20 RX ADMIN — ACETAMINOPHEN 650 MG: 325 TABLET ORAL at 18:00

## 2023-04-20 RX ADMIN — TAMSULOSIN HYDROCHLORIDE 0.4 MG: 0.4 CAPSULE ORAL at 20:54

## 2023-04-20 RX ADMIN — ENOXAPARIN SODIUM 40 MG: 40 INJECTION SUBCUTANEOUS at 20:54

## 2023-04-20 RX ADMIN — GABAPENTIN 300 MG: 300 CAPSULE ORAL at 14:30

## 2023-04-20 ASSESSMENT — ACTIVITIES OF DAILY LIVING (ADL)
ADLS_ACUITY_SCORE: 35
ADLS_ACUITY_SCORE: 37
ADLS_ACUITY_SCORE: 35
ADLS_ACUITY_SCORE: 37
ADLS_ACUITY_SCORE: 35
ADLS_ACUITY_SCORE: 37
ADLS_ACUITY_SCORE: 37

## 2023-04-20 NOTE — PLAN OF CARE
Patient in 3212 complained of pain at IV site. Vanco was infusing at this time. Area became a little bit red in color and edematous. Stopped fluids and removed IV. Contacted pharmacy. Applied hot pack. Outlined area. Writer will fill out a compass. Pharmacy notified writer to start with the hot pack and if it does not improve then IV drug will need to be administered. HCP notified as well. New IV placed.

## 2023-04-20 NOTE — PLAN OF CARE
Face to face report given with opportunity to observe patient.    Report given to Elli Bansal RN   4/20/2023  6:29 PM

## 2023-04-20 NOTE — PLAN OF CARE
Patient A&O x 4. CT and Echo completed today. Patient continues to receive vanco, merrem discontinued. IVF infusing LR at 150ml/hr, after 500 ml bolus given this am. Lin was removed at 1000. Patient is due to void any time now. SBA to chair. IV in left hand infiltrated with vanco see note regarding. New IV placed in right arm. Regular diet. Requested pepto x 1 today due to upset stomach/loose stools. Patient had some relief. Lozenge PRN for sore throat. Lidocaine patch to lower back. 1st set of blood cultures came back positive. Miralax held due to frequent loose stools yesterday. Mom here to visit patient for awhile. Able to make needs known. Call light in reach. Alarms activated and audible.     1755: patient received tylenol due to low grade fever at 100.8. Lidocaine patch placed to lower middle back.     1827: after encouraging fluids to patient since lin removal, patient continues to not urinate. Bladder scanned patient for 600. Nurse writer just gave report to will straight cath patient once she gets him back into bed after dinner.

## 2023-04-20 NOTE — PROGRESS NOTES
[facility]    Hospitalist Progress Note      Assessment & Plan   Richard Harvey is a 57 year old male with history of hypertension, hyperlipidemia, type 1 diabetes mellitus, diabetic neuropathy with gastroparesis and chronic lower back pain presented to the ED with a complaint of fevers and chills going on for the last 4 days was found to have sepsis due to urinary tract infection.:     GPC Sepsis: The patient had 4/4 blood cultures positive for gram-positive cocci in clusters.  Awaiting identification.  Urine cultures are growing more than 100,000 colonies of staph epidermidis.  The patient is appropriately covered with vancomycin.  The source of GPC sepsis is not clear.  I will go ahead and scan the abdomen to evaluate for occult abscess.  Also get a TTE.  Surveillance cultures have been sent as well.  I am not sure whether this is primarily UTI causing staph bacteremia or vice versa.  Will await further investigation.  Continue to monitor on telemetry.  Chest x-ray was unremarkable.    Acute urinary retention: The patient has a history of BPH.  He did have urinary retention in the ED.  Continue on tamsulosin and Snowden catheter has been placed.    Will give voiding trials today.    Acute kidney injury: No hydronephrosis on ultrasound.  Continue on fluids.  Creatinine is about the same.     Type 1 diabetes mellitus: The patient has an insulin pump.  He has enough supply to manage his pump.  Continue to make sure that he does not get hypoglycemic so we will check twice a day D sticks.     Hypertension: Holding lisinopril.     Diabetic neuropathy with gastroparesis: Continue on gabapentin.  Also the patient takes PPI.     Hyperlipidemia: Continue on simvastatin     BPH: Continue on tamsulosin.  The patient had urinary retention so Snowden catheter in place.  We will try to remove it today.      DVT Prophylaxis: Enoxaparin (Lovenox) SQ  Code Status: Full Code    Disposition: Expected discharge in 2-3 days  once stable.    Mani Mujica MD    Interval History   The patient seen and examined.  Overnight events reviewed.  Discussed with the nursing staff.  The patient is reporting diarrhea today.  Stools for C. difficile were negative.  The diarrhea started after starting antibiotics.  He continues to have chills but getting better.  Denies chest pain or shortness of breath.  Denies any cough.  Does report her lower back pain.  Tmax was 100.8.    -Data reviewed today: I reviewed all new labs and imaging results over the last 24 hours. I personally reviewed no images or EKG's today.    Physical Exam   Temp: 99.4  F (37.4  C) Temp src: Tympanic BP: 116/60 Pulse: 62   Resp: 16 SpO2: 94 % O2 Device: None (Room air)    Vitals:    04/18/23 1454 04/19/23 0444 04/20/23 0537   Weight: 84.7 kg (186 lb 11.7 oz) 82.6 kg (182 lb 1.6 oz) 87.6 kg (193 lb 2 oz)     Vital Signs with Ranges  Temp:  [99.4  F (37.4  C)-101.1  F (38.4  C)] 99.4  F (37.4  C)  Pulse:  [62-81] 62  Resp:  [16-18] 16  BP: (101-121)/(47-66) 116/60  SpO2:  [93 %-96 %] 94 %  I/O last 3 completed shifts:  In: 5946 [P.O.:400; I.V.:5546]  Out: 1905 [Urine:1905]    Peripheral IV 04/18/23 Left;Posterior Hand (Active)   Site Assessment WDL 04/20/23 0530   Line Status Infusing 04/20/23 0530   Dressing Transparent 04/20/23 0530   Dressing Status clean;dry;intact 04/20/23 0530   Dressing Intervention New dressing  04/18/23 1800   Line Intervention Flushed 04/18/23 2030   Phlebitis Scale 0-->no symptoms 04/20/23 0530   Infiltration? no 04/20/23 0530   Number of days: 2       Urethral Catheter 04/18/23 Latex 16 fr (Active)   Tube Description Positional 04/20/23 0140   Catheter Care Soap and water/perineal cleanser 04/19/23 2100   Collection Container Standard 04/20/23 0140   Securement Method Securing device (Describe) 04/20/23 0140   Rationale for Continued Use Retention 04/20/23 0140   Urine Output 255 mL 04/20/23 0530   Number of days: 2     Line/device assessment(s)  completed for medical necessity    General: In no acute distress  HEENT: PERRLA  Neck: Supple  Lungs: Bilaterally clear to auscultation  Cardiovascular:S1+S2 Emery  ABDOMEN: Soft, mildly tender in hypogastrium, no mass, tenderness, guarding, or rebound.  Bowel sounds are present.  Right-sided CVA tenderness  Extremities: No pitting edema, pulses bilaterally palpable, no cyanosis  Neuro: Moving all extremities  Psych: Appropriate mood     Medications     insulin basal rate for inpatient ambulatory pump       lactated ringers 150 mL/hr at 04/20/23 0753       calcium carbonate-vitamin D   Oral Daily     enoxaparin ANTICOAGULANT  40 mg Subcutaneous At Bedtime     gabapentin  300 mg Oral TID     insulin aspart   Device See Admin Instructions     insulin bolus from AMBULATORY PUMP   Subcutaneous TID AC     lidocaine  1 patch Transdermal Q24H    And     lidocaine   Transdermal Q8H THI     pantoprazole  40 mg Oral Daily     polyethylene glycol  17 g Oral Daily     simvastatin  40 mg Oral At Bedtime     sodium chloride (PF)  3 mL Intracatheter Q8H     tamsulosin  0.4 mg Oral QPM     vancomycin  1,500 mg Intravenous Q24H       Data   Recent Labs   Lab 04/20/23  0529 04/20/23  0528 04/20/23  0142 04/19/23  2124 04/19/23  1332 04/19/23  0514 04/18/23  1219   WBC 15.3*  --   --   --   --  19.7* 20.3*   HGB 11.3*  --   --   --   --  11.8* 12.7*   MCV 92  --   --   --   --  91 89     --   --   --   --  219 218   NA  --  140  --   --   --  137 133*   POTASSIUM  --  4.2  --   --   --  4.0 4.1   CHLORIDE  --  105  --   --   --  102 96*   CO2  --  28  --   --   --  26 25   BUN  --  18.6  --   --   --  24.2* 30.1*   CR  --  1.54*  --   --   --  1.54* 1.60*   ANIONGAP  --  7  --   --   --  9 12   ANGELINA  --  8.7  --   --   --  8.3* 8.7   GLC  --  132* 137* 202*   < > 160* 197*   ALBUMIN  --   --   --   --   --   --  3.6   PROTTOTAL  --   --   --   --   --   --  6.9   BILITOTAL  --   --   --   --   --   --  0.6   ALKPHOS  --   --    --   --   --   --  87   ALT  --   --   --   --   --   --  21   AST  --   --   --   --   --   --  24    < > = values in this interval not displayed.       Recent Results (from the past 24 hour(s))   XR Chest 2 Views    Narrative    PROCEDURE: XR CHEST 2 VIEWS 4/19/2023 11:26 AM    HISTORY: sepsis, evaluate for pneumonia    COMPARISONS: None.    TECHNIQUE: 2 views.    FINDINGS: Heart is at the upper limits of normal in size. No confluent  infiltrate is seen and there is no pleural effusion. There is a right  convex scoliosis with mild degenerative change in the spine.         Impression    IMPRESSION: No acute infiltrate.    BERE TATE MD         SYSTEM ID:  [id number]

## 2023-04-20 NOTE — PLAN OF CARE
Goal Outcome Evaluation:         Pt A&O. VSS on RA ex temps continue-tmax 101.1F and tylenol given. HR SR on tele. Denies pain. LR at 125ml/hr to PIV, scheduled merrem and vanco. Bolus ordered this morning, as well we CT and BCx2. Snowden for retention, yellow output with sediment. Regular diet. Has own ambulatory insulin pump, , 137, 132. Up with SBA. Lactic 0.8.    Face to face report given with opportunity to observe patient.    Report given to FARZANA Webber RN   4/20/2023  7:25 AM

## 2023-04-20 NOTE — DISCHARGE INSTRUCTIONS
An appointment has been made for you with [facility] up in [facility] for May 3rd, 2023 at 10:30AM. Please arrive at the [facility] where Urology is 15 minutes prior to your appointment. Your appointment will be with Dr. Humberto Mcwilliams.      Dr. Alvarez's care team will be calling you with information regarding a hospital follow up appointment.  You should be receiving a call within 2 days.  If you do not receive a call, please call [phone].      Infusion appointment for 4/26/23 for 11:30 AM. Please enter at [facility], and register at Admitting.

## 2023-04-20 NOTE — PLAN OF CARE
Took over care of this pt at this time.                   Assess as charted.     Pt has not voided since lin removed at 1000. Assisted back to bed. Straight cath done- 800cc urine. Urinal within reach. Explained to pt. IV at 150/hr. HS glucose: 136. Pts 149. Up in chair for dinner. Appetite good. Takes po meds without diff. Wears lidocaine patch to lower back-was placed at 1800 today. Explained removal time to pt.          Face to face report given with opportunity to observe patient.    Report given to Eleanor Kelley RN   4/20/2023  10:13 PM        Addendum: 2240: Bladder scan: 344. Straight cath: 275cc.    Sepsis flag-lab here to draw at 2250

## 2023-04-20 NOTE — PHARMACY-MEDICATION REGIMEN REVIEW
Pharmacy Antimicrobial Stewardship Assessment     Current Antimicrobial Therapy:  Anti-infectives (From now, onward)    Start     Dose/Rate Route Frequency Ordered Stop    23 0900  vancomycin (VANCOCIN) 1,500 mg in 0.9% NaCl 250 mL intermittent infusion         1,500 mg  over 90 Minutes Intravenous EVERY 24 HOURS 23 0741          Indication: Sepsis     Days of Therapy: Day 2 Vanco; Day 3 Abx     Pertinent Labs:  Recent Labs   Lab Test 23  0529 23  0514 23  1219   WBC 15.3* 19.7* 20.3*     Recent Labs   Lab Test 23  0632 23  1312 23  0514 23  1219   LACT 0.8 1.0  --  1.0   PCAL  --   --  4.98* 1.88*        Temperature:  Temp (24hrs), Av.4  F (38  C), Min:99.4  F (37.4  C), Max:101.1  F (38.4  C)    Culture Results:   (23) Blood  (23) C. Diff = negative  (23) Urine= Staph epi    (23) Blood = GPC    Recommendations/Interventions:  1. Staph epi growing consider switch to Ceftriaxone    Roe Pradhan RPH  2023    Discussed with hospitalist; plan to continue vancomycin until blood cultures known  Roe Pradhan RPH on 2023 at 9:23 AM

## 2023-04-21 ENCOUNTER — APPOINTMENT (OUTPATIENT)
Dept: INTERVENTIONAL RADIOLOGY/VASCULAR | Facility: HOSPITAL | Age: 58
End: 2023-04-21
Attending: HOSPITALIST
Payer: COMMERCIAL

## 2023-04-21 LAB
ANION GAP SERPL CALCULATED.3IONS-SCNC: 10 MMOL/L (ref 7–15)
BACTERIA BLD CULT: ABNORMAL
BACTERIA BLD CULT: ABNORMAL
BASOPHILS # BLD AUTO: 0 10E3/UL (ref 0–0.2)
BASOPHILS NFR BLD AUTO: 0 %
BUN SERPL-MCNC: 14.6 MG/DL (ref 6–20)
CALCIUM SERPL-MCNC: 8.3 MG/DL (ref 8.6–10)
CHLORIDE SERPL-SCNC: 106 MMOL/L (ref 98–107)
CREAT SERPL-MCNC: 1.28 MG/DL (ref 0.67–1.17)
DEPRECATED HCO3 PLAS-SCNC: 25 MMOL/L (ref 22–29)
EOSINOPHIL # BLD AUTO: 0.1 10E3/UL (ref 0–0.7)
EOSINOPHIL NFR BLD AUTO: 1 %
ERYTHROCYTE [DISTWIDTH] IN BLOOD BY AUTOMATED COUNT: 12.4 % (ref 10–15)
GFR SERPL CREATININE-BSD FRML MDRD: 65 ML/MIN/1.73M2
GLUCOSE BLDC GLUCOMTR-MCNC: 165 MG/DL (ref 70–99)
GLUCOSE BLDC GLUCOMTR-MCNC: 166 MG/DL (ref 70–99)
GLUCOSE SERPL-MCNC: 149 MG/DL (ref 70–99)
HCT VFR BLD AUTO: 31.3 % (ref 40–53)
HGB BLD-MCNC: 10.3 G/DL (ref 13.3–17.7)
HOLD SPECIMEN: NORMAL
IMM GRANULOCYTES # BLD: 0.1 10E3/UL
IMM GRANULOCYTES NFR BLD: 1 %
INR PPP: 1.19 (ref 0.85–1.15)
LACTATE SERPL-SCNC: 1.2 MMOL/L (ref 0.7–2)
LYMPHOCYTES # BLD AUTO: 1.1 10E3/UL (ref 0.8–5.3)
LYMPHOCYTES NFR BLD AUTO: 8 %
MCH RBC QN AUTO: 29.9 PG (ref 26.5–33)
MCHC RBC AUTO-ENTMCNC: 32.9 G/DL (ref 31.5–36.5)
MCV RBC AUTO: 91 FL (ref 78–100)
MONOCYTES # BLD AUTO: 0.9 10E3/UL (ref 0–1.3)
MONOCYTES NFR BLD AUTO: 7 %
NEUTROPHILS # BLD AUTO: 11.6 10E3/UL (ref 1.6–8.3)
NEUTROPHILS NFR BLD AUTO: 83 %
NRBC # BLD AUTO: 0 10E3/UL
NRBC BLD AUTO-RTO: 0 /100
PLATELET # BLD AUTO: 241 10E3/UL (ref 150–450)
POTASSIUM SERPL-SCNC: 3.6 MMOL/L (ref 3.4–5.3)
RBC # BLD AUTO: 3.44 10E6/UL (ref 4.4–5.9)
SODIUM SERPL-SCNC: 141 MMOL/L (ref 136–145)
VANCOMYCIN SERPL-MCNC: 9.4 UG/ML
WBC # BLD AUTO: 14 10E3/UL (ref 4–11)

## 2023-04-21 PROCEDURE — 85610 PROTHROMBIN TIME: CPT | Performed by: HOSPITALIST

## 2023-04-21 PROCEDURE — 02H633Z INSERTION OF INFUSION DEVICE INTO RIGHT ATRIUM, PERCUTANEOUS APPROACH: ICD-10-PCS | Performed by: HOSPITALIST

## 2023-04-21 PROCEDURE — C1751 CATH, INF, PER/CENT/MIDLINE: HCPCS

## 2023-04-21 PROCEDURE — 80202 ASSAY OF VANCOMYCIN: CPT | Performed by: HOSPITALIST

## 2023-04-21 PROCEDURE — 258N000003 HC RX IP 258 OP 636: Performed by: HOSPITALIST

## 2023-04-21 PROCEDURE — 250N000009 HC RX 250

## 2023-04-21 PROCEDURE — 99232 SBSQ HOSP IP/OBS MODERATE 35: CPT | Performed by: HOSPITALIST

## 2023-04-21 PROCEDURE — 120N000001 HC R&B MED SURG/OB

## 2023-04-21 PROCEDURE — 255N000002 HC RX 255 OP 636: Performed by: RADIOLOGY

## 2023-04-21 PROCEDURE — 80048 BASIC METABOLIC PNL TOTAL CA: CPT | Performed by: HOSPITALIST

## 2023-04-21 PROCEDURE — 85014 HEMATOCRIT: CPT | Performed by: HOSPITALIST

## 2023-04-21 PROCEDURE — 250N000011 HC RX IP 250 OP 636: Performed by: HOSPITALIST

## 2023-04-21 PROCEDURE — 36415 COLL VENOUS BLD VENIPUNCTURE: CPT | Performed by: HOSPITALIST

## 2023-04-21 PROCEDURE — 250N000013 HC RX MED GY IP 250 OP 250 PS 637: Performed by: HOSPITALIST

## 2023-04-21 PROCEDURE — 83605 ASSAY OF LACTIC ACID: CPT | Performed by: HOSPITALIST

## 2023-04-21 RX ORDER — IOPAMIDOL 612 MG/ML
50 INJECTION, SOLUTION INTRAVASCULAR ONCE
Status: DISCONTINUED | OUTPATIENT
Start: 2023-04-21 | End: 2023-04-21

## 2023-04-21 RX ORDER — LIDOCAINE HYDROCHLORIDE 10 MG/ML
10 INJECTION, SOLUTION EPIDURAL; INFILTRATION; INTRACAUDAL; PERINEURAL ONCE
Status: COMPLETED | OUTPATIENT
Start: 2023-04-21 | End: 2023-04-21

## 2023-04-21 RX ORDER — DEXTROSE MONOHYDRATE 25 G/50ML
25-50 INJECTION, SOLUTION INTRAVENOUS
Status: DISCONTINUED | OUTPATIENT
Start: 2023-04-21 | End: 2023-04-25 | Stop reason: HOSPADM

## 2023-04-21 RX ORDER — NICOTINE POLACRILEX 4 MG
15-30 LOZENGE BUCCAL
Status: DISCONTINUED | OUTPATIENT
Start: 2023-04-21 | End: 2023-04-21 | Stop reason: HOSPADM

## 2023-04-21 RX ORDER — LOPERAMIDE HCL 2 MG
2 CAPSULE ORAL 4 TIMES DAILY PRN
Status: DISCONTINUED | OUTPATIENT
Start: 2023-04-21 | End: 2023-04-25 | Stop reason: HOSPADM

## 2023-04-21 RX ORDER — LIDOCAINE HYDROCHLORIDE 10 MG/ML
INJECTION, SOLUTION EPIDURAL; INFILTRATION; INTRACAUDAL; PERINEURAL
Status: DISCONTINUED
Start: 2023-04-21 | End: 2023-04-21 | Stop reason: HOSPADM

## 2023-04-21 RX ORDER — DEXTROSE MONOHYDRATE 25 G/50ML
25-50 INJECTION, SOLUTION INTRAVENOUS
Status: DISCONTINUED | OUTPATIENT
Start: 2023-04-21 | End: 2023-04-21 | Stop reason: HOSPADM

## 2023-04-21 RX ORDER — NICOTINE POLACRILEX 4 MG
15-30 LOZENGE BUCCAL
Status: DISCONTINUED | OUTPATIENT
Start: 2023-04-21 | End: 2023-04-25 | Stop reason: HOSPADM

## 2023-04-21 RX ORDER — LIDOCAINE HYDROCHLORIDE 10 MG/ML
20 INJECTION, SOLUTION EPIDURAL; INFILTRATION; INTRACAUDAL; PERINEURAL ONCE
Status: DISCONTINUED | OUTPATIENT
Start: 2023-04-21 | End: 2023-04-21

## 2023-04-21 RX ADMIN — SODIUM CHLORIDE, POTASSIUM CHLORIDE, SODIUM LACTATE AND CALCIUM CHLORIDE: 600; 310; 30; 20 INJECTION, SOLUTION INTRAVENOUS at 09:58

## 2023-04-21 RX ADMIN — GABAPENTIN 300 MG: 300 CAPSULE ORAL at 10:02

## 2023-04-21 RX ADMIN — SIMVASTATIN 40 MG: 40 TABLET, FILM COATED ORAL at 20:11

## 2023-04-21 RX ADMIN — ACETAMINOPHEN 650 MG: 325 TABLET ORAL at 00:11

## 2023-04-21 RX ADMIN — SODIUM CHLORIDE, POTASSIUM CHLORIDE, SODIUM LACTATE AND CALCIUM CHLORIDE: 600; 310; 30; 20 INJECTION, SOLUTION INTRAVENOUS at 23:16

## 2023-04-21 RX ADMIN — ACETAMINOPHEN 650 MG: 325 TABLET ORAL at 20:11

## 2023-04-21 RX ADMIN — BISMUTH SUBSALICYLATE 262 MG: 262 TABLET, CHEWABLE ORAL at 00:11

## 2023-04-21 RX ADMIN — ENOXAPARIN SODIUM 40 MG: 40 INJECTION SUBCUTANEOUS at 20:12

## 2023-04-21 RX ADMIN — IOHEXOL 3 ML: 300 INJECTION, SOLUTION INTRAVENOUS at 14:06

## 2023-04-21 RX ADMIN — GABAPENTIN 300 MG: 300 CAPSULE ORAL at 15:01

## 2023-04-21 RX ADMIN — CALCIUM CARBONATE 600 MG (1,500 MG)-VITAMIN D3 400 UNIT TABLET 1 TABLET: at 10:02

## 2023-04-21 RX ADMIN — LIDOCAINE HYDROCHLORIDE 3 ML: 10 INJECTION, SOLUTION EPIDURAL; INFILTRATION; INTRACAUDAL; PERINEURAL at 14:05

## 2023-04-21 RX ADMIN — PANTOPRAZOLE SODIUM 40 MG: 40 TABLET, DELAYED RELEASE ORAL at 10:02

## 2023-04-21 RX ADMIN — ACETAMINOPHEN 650 MG: 325 TABLET ORAL at 10:02

## 2023-04-21 RX ADMIN — TAMSULOSIN HYDROCHLORIDE 0.4 MG: 0.4 CAPSULE ORAL at 20:12

## 2023-04-21 RX ADMIN — LIDOCAINE 1 PATCH: 560 PATCH PERCUTANEOUS; TOPICAL; TRANSDERMAL at 15:01

## 2023-04-21 RX ADMIN — VANCOMYCIN HYDROCHLORIDE 1500 MG: 1 INJECTION, POWDER, LYOPHILIZED, FOR SOLUTION INTRAVENOUS at 10:26

## 2023-04-21 RX ADMIN — ONDANSETRON 4 MG: 4 TABLET, ORALLY DISINTEGRATING ORAL at 00:11

## 2023-04-21 RX ADMIN — GABAPENTIN 300 MG: 300 CAPSULE ORAL at 20:11

## 2023-04-21 RX ADMIN — Medication 1 LOZENGE: at 20:11

## 2023-04-21 RX ADMIN — LOPERAMIDE HYDROCHLORIDE 2 MG: 2 CAPSULE ORAL at 10:02

## 2023-04-21 ASSESSMENT — ACTIVITIES OF DAILY LIVING (ADL)
ADLS_ACUITY_SCORE: 34
ADLS_ACUITY_SCORE: 35
ADLS_ACUITY_SCORE: 33
ADLS_ACUITY_SCORE: 35
ADLS_ACUITY_SCORE: 35

## 2023-04-21 NOTE — PLAN OF CARE
/66 (BP Location: Left arm, Patient Position: Semi-Fagan's, Cuff Size: Adult Regular)   Pulse 88   Temp 100  F (37.8  C) (Tympanic)   Resp 16   Ht 1.524 m (5')   Wt 87.6 kg (193 lb 2 oz)   SpO2 94%   BMI 37.72 kg/m      Patient is A&O, max temp 100.6, and he reported pain rated 5/10. PRN tylenol and pepto bismol given with relief. He has not voided spontaneously this shift - bladder scan volume: 361 mL. Patient was straight cathed by Charge RN - straight cath: 352 mL. His abdomen is firm, round/obese, and tender to palpation. Bowel sounds are audible & hyperactive x4. Patient reported nausea without vomiting, diarrhea, and abdominal discomfort. He was given PRN zofran for his nausea. Rest of assessment as charted. IV infusing LR @ 150 mL/hr. His call light is within reach, bed alarm is on, and his room is near the nurses' station. He has been free of falls/injuries this shift.    Face to face report given with opportunity to observe patient.    Report given to FARZANA Joel RN   4/21/2023

## 2023-04-21 NOTE — PROGRESS NOTES
[facility]    Outpatient Physical Therapy Discharge Note  Patient: Richard Harvey  : 1965    Beginning/End Dates of Reporting Period:  23 to 23    Referring Provider: Dr. Alvarez    Therapy Diagnosis:      Client Self Report: See initial evaluation    Goals:  Goal Identifier LTG #1   Goal Description Pt. to report minimal reproduction with work activities to restore PLOF.   Target Date 23   Date Met      Progress (detail required for progress note):  Unable to assess. Pt. Never returned following initial evaluation.      Goal Identifier LTG #2   Goal Description Pt. to be independent with correct performance of HEP to progress to independent home management.   Target Date 23   Date Met      Progress (detail required for progress note):  Unable to assess. Pt. Never returned following initial evaluation.      Goal Identifier LTG #3   Goal Description Pt. to improve LE MMT to [place] right to improve performance with daily and work tasks.   Target Date 23   Date Met      Progress (detail required for progress note):  Unable to assess. Pt. Never returned following initial evaluation.      Goal Identifier STG #1   Goal Description Pt. to report decrease in pain with work activities to 4/10 or better.   Target Date 23   Date Met      Progress (detail required for progress note):  Unable to assess. Pt. Never returned following initial evaluation.        Plan:  Discharge from therapy.    Discharge:    Reason for Discharge: Patient has failed to schedule further appointments.    Equipment Issued: None    Discharge Plan: Unknown, Pt. Failed to appear for scheduled follow up visits so disposition can not be measured.

## 2023-04-21 NOTE — PHARMACY-MEDICATION REGIMEN REVIEW
Pharmacy Antimicrobial Stewardship Assessment     Current Antimicrobial Therapy:  Anti-infectives (From now, onward)    Start     Dose/Rate Route Frequency Ordered Stop    23 0900  vancomycin (VANCOCIN) 1,500 mg in 0.9% NaCl 250 mL intermittent infusion         1,500 mg  over 90 Minutes Intravenous EVERY 24 HOURS 23 0741          Indication: Sepsis     Days of Therapy: Day 3 Vanco; Day 4 Abx     Pertinent Labs:  Recent Labs   Lab Test 23  0516 23  0529 23  0514   WBC 14.0* 15.3* 19.7*     Recent Labs   Lab Test 23  2255 23  0632 23  0528 23  1312 23  0514   LACT 0.9 0.8  --    < >  --    PCAL  --   --  3.07*  --  4.98*    < > = values in this interval not displayed.        Temperature:  Temp (24hrs), Av.4  F (38  C), Min:100  F (37.8  C), Max:100.8  F (38.2  C)    Culture Results:   (23) Blood  (23) C. Diff = negative  (23) Urine= Staph epi    (23) Blood = Staph epi      Recommendations/Interventions:  1. Change vancomycin to alternate susceptible agent    Roe Pradhan, [facility]  2023

## 2023-04-21 NOTE — PROGRESS NOTES
Patient transferred to IR and back to room via wheelchair for PICC line placement. Tolerated procedure well, no complications during procedure. PICC information as documented in LDA. Site WNL.     Face to face report given with opportunity to observe patient.    Report given to Jania YANES.    Aisha Adkins RN   4/21/2023  2:34 PM

## 2023-04-21 NOTE — PLAN OF CARE
Free from falls/injuries since taking over care of pt at 1815. Straight cath at 1850. Has not voided since and does not feel urge to void. IVF @ 150/hr.

## 2023-04-21 NOTE — PROGRESS NOTES
[facility]    Hospitalist Progress Note      Assessment & Plan   Richard [place] is a 57 year old male with history of hypertension, hyperlipidemia, type 1 diabetes mellitus, diabetic neuropathy with gastroparesis and chronic lower back pain presented to the ED with a complaint of fevers and chills going on for the last 4 days was found to have sepsis due to urinary tract infection.:     Staph epi sepsis with multifocal pyelonephritis: The patient had 4/4 blood cultures positive for staph epidermidis.    Urine cultures are growing more than 100,000 colonies of staph epidermidis.  The patient is appropriately covered with vancomycin.  The source of GPC sepsis is not clear.  I am not sure whether the patient had primarily staph epi UTI leading to sepsis or staph epi bacteremia ended up causing urinary tract infection as the patient has multifocal pyelonephritis on the CT scan and that is rare with primary UTI.  CT scan of the abdomen was negative for an abscess.  TTE did not reveal any vegetations either.  Surveillance cultures are so far negative.  The patient will need 2 weeks of vancomycin.  I will get a PICC line placed.  Chest x-ray was unremarkable.  He will need home care for IV antibiotics.  Versus outpatient infusion of daptomycin.  If the surveillance cultures remained negative, stop date for antibiotic therapy would be 5/3/2023.  The patient can be discharged once leukocytosis resolves.    Acute urinary retention: The patient has a history of BPH.  The Snowden catheter was removed but the patient is still retaining.  That might be the cause for the urinary tract infection.  I will replace a Snowden catheter and the patient can follow-up with urology.    Diarrhea: Probably induced by antibiotics.  I will give Imodium.  C. difficile was negative.    Acute kidney injury: No hydronephrosis on ultrasound.  Continue on fluids.  Creatinine is trending down now.    Type 1 diabetes mellitus: The patient has an  insulin pump.  He has enough supply to manage his pump.  Continue to make sure that he does not get hypoglycemic so we will check twice a day D sticks.     Hypertension: Holding lisinopril due to ELMA     Diabetic neuropathy with gastroparesis: Continue on gabapentin.  Also the patient takes PPI.     Hyperlipidemia: Continue on simvastatin     BPH: Continue on tamsulosin.    The patient is still retaining urine.  Replace the Snowden catheter and follow-up with urology.    DVT Prophylaxis: Enoxaparin (Lovenox) SQ  Code Status: Full Code    Disposition: Expected discharge in 2-3 days once stable.    Mani Mujica MD    Interval History   The patient seen and examined.  Overnight events reviewed.  Discussed with the nursing staff.  The patient continues to have diarrhea today.    The diarrhea started after starting antibiotics.  He continues to have chills but getting better.  Denies chest pain or shortness of breath.  Denies any cough.  Does report her lower back pain.  Tmax was 100.6.    -Data reviewed today: I reviewed all new labs and imaging results over the last 24 hours. I personally reviewed no images or EKG's today.    Physical Exam   Temp: 100  F (37.8  C) Temp src: Tympanic BP: 127/66 Pulse: 88   Resp: 16 SpO2: 94 % O2 Device: None (Room air)    Vitals:    04/19/23 0444 04/20/23 0537 04/21/23 0540   Weight: 82.6 kg (182 lb 1.6 oz) 87.6 kg (193 lb 2 oz) 93.1 kg (205 lb 4 oz)     Vital Signs with Ranges  Temp:  [100  F (37.8  C)-100.8  F (38.2  C)] 100  F (37.8  C)  Pulse:  [65-88] 88  Resp:  [16-18] 16  BP: (111-127)/(50-66) 127/66  SpO2:  [94 %] 94 %  I/O last 3 completed shifts:  In: 2771 [P.O.:480; I.V.:2291]  Out: 1625 [Urine:1625]    Peripheral IV 04/20/23 Right;Posterior Lower forearm (Active)   Site Assessment WDL 04/21/23 0000   Line Status Infusing 04/21/23 0000   Dressing Transparent 04/21/23 0000   Dressing Status clean;dry;intact 04/21/23 0000   Dressing Intervention New dressing  04/20/23 1446    Phlebitis Scale 0-->no symptoms 04/21/23 0000   Infiltration? no 04/21/23 0000   Number of days: 1     Line/device assessment(s) completed for medical necessity    General: In no acute distress  HEENT: PERRLA  Neck: Supple  Lungs: Bilaterally clear to auscultation  Cardiovascular:S1+S2 [place]  ABDOMEN: Soft, mildly tender in hypogastrium, no mass, tenderness, guarding, or rebound.  Bowel sounds are present.  Right-sided CVA tenderness  Extremities: No pitting edema, pulses bilaterally palpable, no cyanosis  Neuro: Moving all extremities  Psych: Appropriate mood     Medications     insulin basal rate for inpatient ambulatory pump       lactated ringers 150 mL/hr at 04/20/23 2058       calcium carbonate-vitamin D   Oral Daily     enoxaparin ANTICOAGULANT  40 mg Subcutaneous At Bedtime     gabapentin  300 mg Oral TID     insulin aspart   Device See Admin Instructions     insulin bolus from AMBULATORY PUMP   Subcutaneous TID AC     lidocaine  1 patch Transdermal Q24H    And     lidocaine   Transdermal Q8H THI     pantoprazole  40 mg Oral Daily     polyethylene glycol  17 g Oral Daily     simvastatin  40 mg Oral At Bedtime     sodium chloride (PF)  3 mL Intracatheter Q8H     tamsulosin  0.4 mg Oral QPM     vancomycin  1,500 mg Intravenous Q24H       Data   Recent Labs   Lab 04/21/23  0516 04/20/23  2042 04/20/23  1208 04/20/23  0529 04/20/23  0528 04/19/23  1332 04/19/23  0514 04/18/23  1219   WBC 14.0*  --   --  15.3*  --   --  19.7* 20.3*   HGB 10.3*  --   --  11.3*  --   --  11.8* 12.7*   MCV 91  --   --  92  --   --  91 89     --   --  242  --   --  219 218     --   --   --  140  --  137 133*   POTASSIUM 3.6  --   --   --  4.2  --  4.0 4.1   CHLORIDE 106  --   --   --  105  --  102 96*   CO2 25  --   --   --  28  --  26 25   BUN 14.6  --   --   --  18.6  --  24.2* 30.1*   CR 1.28*  --   --   --  1.54*  --  1.54* 1.60*   ANIONGAP 10  --   --   --  7  --  9 12   ANGELINA 8.3*  --   --   --  8.7  --  8.3* 8.7    * 136* 132*  --  132*   < > 160* 197*   ALBUMIN  --   --   --   --   --   --   --  3.6   PROTTOTAL  --   --   --   --   --   --   --  6.9   BILITOTAL  --   --   --   --   --   --   --  0.6   ALKPHOS  --   --   --   --   --   --   --  87   ALT  --   --   --   --   --   --   --  21   AST  --   --   --   --   --   --   --  24    < > = values in this interval not displayed.       Recent Results (from the past 24 hour(s))   CT Abdomen Pelvis w Contrast    Narrative    PROCEDURE: CT ABDOMEN PELVIS W CONTRAST 4/20/2023 9:58 AM    HISTORY: GPC Sepsis evaluate for abscess    COMPARISONS: 11/3/2022.    Meds/Dose Given: ISOVUE 370  85mL    TECHNIQUE: Axial postcontrast enhanced images with coronal and  sagittal reformatted images.    FINDINGS: Lung bases are relatively clear.    No focal abnormality is seen in the liver, spleen, adrenal glands or  in the pancreas. Gallbladder is present.    There is patchy asymmetric enhancement of the kidneys. No renal mass  is seen. There is no hydronephrosis or definite renal or ureteral  stone. There is not significant inflammation in the fat around the  kidneys.    There is diffuse thickening of the wall of the urinary bladder. Small  amount of gas is seen in the anterior bladder. Prostate is enlarged  and indents the base of the bladder.    There is a questionable soft tissue mass in the left side of the  rectum. This could represent stool but this appears different than  other stool in the colon. No other significant bowel abnormality is  seen. There is no inflammatory change or focal fluid collection.    No lymph node enlargement is seen. There is no aneurysm.    There is a chronic fracture deformity of the pelvis with fusion of the  pubic symphysis.         Impression    IMPRESSION:   1. No abscess. Tiny amount of ascites around the lateral margin of the  inferior liver and in the pelvis.  2. Patchy asymmetrical enhancement of the kidneys. Most likely  etiology for this  appearance would be multifocal pyelonephritis.  Infiltrative process such as lymphoma could have a similar appearance.  3. Diffuse thickening of the wall of the bladder suggesting cystitis.  If there hasn't been recent instrumentation the air in the bladder  would be worrisome for gas forming infection.  4. Questionable soft tissue mass in the left side of the rectum. If  the patient has not had: Screening exam this should be considered.    BERE TATE MD         SYSTEM ID:  [id number]   Echocardiogram Complete   Result Value    LVEF  45-50% (mildly reduced)    [facility]    [id number]  [facility]  [id number]  [id number]^NORRIS^WU     [facility]  Echocardiography Laboratory  [address] [place] [id number]     Name: FRANCIA MONDRAGON  MRN: [id number]  : 1965  Study Date: 2023 11:32 AM  Age: 57 yrs  Gender: Male  Patient Location: [facility]  Reason For Study: Other, Mechanical Complication  History: Endocarditis  Ordering Physician: WU PISANO  Performed By: Yue Reese RDCS     BSA: 1.8 m2  Height: 60 in  Weight: 193 lb  ______________________________________________________________________________  Procedure  Complete Portable Echo Adult. Contrast Definity. Technically difficult study.  Patient was given 5ml mixture of 1.5ml Definity and 8.5ml saline. 5 ml wasted.  ______________________________________________________________________________  Interpretation Summary  No vegetation or mass identified, however this does not exclude endocarditis.  Very technically difficult study and valvular evaluation is suboptimal. Left  ventricular function is decreased. The ejection fraction is 45-50% (mildly  reduced).  Global right ventricular function is normal.  Both atria appear normal.  Trace mitral insufficiency is present.  Trace tricuspid insufficiency is present.  Trace pulmonic insufficiency is present.  Very technically difficult study and valvular evaluation is  suboptimal.  ______________________________________________________________________________  Left Ventricle  Left ventricular function is decreased. The ejection fraction is 45-50%  (mildly reduced). Left ventricular diastolic function is normal. No regional  wall motion abnormalities are seen.     Right Ventricle  The right ventricle is normal size. Global right ventricular function is  normal.     Atria  Both atria appear normal.     Mitral Valve  Trace mitral insufficiency is present.     Aortic Valve  The mean AoV pressure gradient is 6.2 mmHg. The peak AoV pressure gradient is  11.4 mmHg. No aortic stenosis is present.     Tricuspid Valve  Trace tricuspid insufficiency is present.     Pulmonic Valve  Trace pulmonic insufficiency is present.     Pericardium  No pericardial effusion is present.     ______________________________________________________________________________  MMode/2D Measurements & Calculations  IVSd: 1.1 cm  LVIDd: 4.4 cm  LVIDs: 2.9 cm  LVPWd: 1.1 cm  FS: 34.6 %  LV mass(C)d: 169.0 grams  LV mass(C)dI: 91.9 grams/m2  Ao root diam: 3.3 cm  LVOT diam: 2.0 cm  LVOT area: 3.3 cm2     LA Volume Indexed (AL/bp): 30.8 ml/m2  RWT: 0.49  TAPSE: 2.7 cm     Doppler Measurements & Calculations  MV E max ventura: 122.0 cm/sec  MV A max ventura: 95.0 cm/sec  MV E/A: 1.3  MV dec slope: 727.0 cm/sec2  MV dec time: 0.17 sec  Ao V2 max: 169.0 cm/sec  Ao max P.4 mmHg  Ao V2 mean: 116.0 cm/sec  Ao mean P.2 mmHg  Ao V2 VTI: 38.0 cm  MICHELLE(I,D): 2.1 cm2  MICHELLE(V,D): 1.9 cm2  LV V1 max PG: 3.9 mmHg  LV V1 max: 99.0 cm/sec  LV V1 VTI: 24.7 cm  SV(LVOT): 80.8 ml  SI(LVOT): 44.0 ml/m2  AV Ventura Ratio (DI): 0.59  MICHELLE Index (cm2/m2): 1.2     E/E' av.6  Lateral E/e': 14.2  Medial E/e': 15.1     ______________________________________________________________________________  Report approved by: Madalyn Cornell 2023 06:20 AM

## 2023-04-21 NOTE — PHARMACY-VANCOMYCIN DOSING SERVICE
Pharmacy Vancomycin Note  Date of Service 2023  Patient's  1965   57 year old, male    Indication: Sepsis  Day of Therapy: 3  Current vancomycin regimen:  1500 mg IV q24h  Current vancomycin monitoring method: AUC  Current vancomycin therapeutic monitoring goal: 400-600 mg*h/L    InsightRX Prediction of Current Vancomycin Regimen  Loading dose: 2000 mg  Regimen: 1500 mg IV every 24 hours.  Start time: 10:26 on 2023  Exposure target: AUC24 (range)400-600 mg/L.hr   AUC24,ss: 420 mg/L.hr  Probability of AUC24 > 400: 59 %  Ctrough,ss: 5 mg/L  Probability of Ctrough,ss > 20: 0 %  Probability of nephrotoxicity (Lodise [place] ): 3 %    Current estimated CrCl = Estimated Creatinine Clearance: 59.8 mL/min (A) (based on SCr of 1.28 mg/dL (H)).    Creatinine for last 3 days  2023: 12:19 PM Creatinine 1.60 mg/dL  2023:  5:14 AM Creatinine 1.54 mg/dL  2023:  5:28 AM Creatinine 1.54 mg/dL  2023:  5:16 AM Creatinine 1.28 mg/dL    Recent Vancomycin Levels (past 3 days)  2023:  5:16 AM Vancomycin 9.4 ug/mL    Vancomycin IV Administrations (past 72 hours)                   vancomycin (VANCOCIN) 1,500 mg in 0.9% NaCl 250 mL intermittent infusion (mg) 1,500 mg New Bag 23 1026    vancomycin (VANCOCIN) 2,000 mg in 0.9% NaCl 500 mL intermittent infusion (mg) 2,000 mg New Bag 23 0837                Nephrotoxins and other renal medications (From now, onward)    Start     Dose/Rate Route Frequency Ordered Stop    23 0900  vancomycin (VANCOCIN) 1,500 mg in 0.9% NaCl 250 mL intermittent infusion         1,500 mg  over 90 Minutes Intravenous EVERY 24 HOURS 23 0741               Contrast Orders - past 72 hours (72h ago, onward)    Start     Dose/Rate Route Frequency Stop    23 1230  perflutren diluted in saline (DEFINITY) injection 10 mL         10 mL Intravenous ONCE 23 1200    23 0930  iopamidol (ISOVUE-370) solution 85 mL         85 mL Intravenous ONCE  04/20/23 0939    04/20/23 0930  iopamidol (ISOVUE-370) solution 17 mL         17 mL Oral ONCE 04/20/23 0939          Interpretation of levels and current regimen:  Vancomycin level is reflective of -600    Has serum creatinine changed greater than 50% in last 72 hours: No    Urine output:  unable to determine    Renal Function: Improving    Plan:  1. Continue Current Dose  2. Vancomycin monitoring method: AUC  3. Vancomycin therapeutic monitoring goal: 400-600 mg*h/L  4. Pharmacy will check vancomycin levels as appropriate in 1-3 Days. Recommend recheck soon as renal function improving.  5. Serum creatinine levels will be ordered daily for the first week of therapy and at least twice weekly for subsequent weeks.  6. When dose adjustments are made, keep on Q24H schedule in preparation for outpatient dosing.     Roe Pradhan, [facility]

## 2023-04-22 PROBLEM — K31.84 GASTROPARESIS: Status: ACTIVE | Noted: 2020-09-29

## 2023-04-22 PROBLEM — N10 PYELONEPHRITIS, ACUTE: Status: ACTIVE | Noted: 2023-04-22

## 2023-04-22 PROBLEM — K21.9 GASTROESOPHAGEAL REFLUX DISEASE WITHOUT ESOPHAGITIS: Status: ACTIVE | Noted: 2020-11-30

## 2023-04-22 PROBLEM — R33.8 BENIGN PROSTATIC HYPERPLASIA WITH URINARY RETENTION: Status: ACTIVE | Noted: 2023-04-22

## 2023-04-22 PROBLEM — N17.9 AKI (ACUTE KIDNEY INJURY) (H): Status: ACTIVE | Noted: 2023-04-22

## 2023-04-22 PROBLEM — N40.1 BENIGN PROSTATIC HYPERPLASIA WITH URINARY RETENTION: Status: ACTIVE | Noted: 2023-04-22

## 2023-04-22 LAB
ANION GAP SERPL CALCULATED.3IONS-SCNC: 9 MMOL/L (ref 7–15)
BASOPHILS # BLD AUTO: 0.1 10E3/UL (ref 0–0.2)
BASOPHILS NFR BLD AUTO: 0 %
BUN SERPL-MCNC: 13.3 MG/DL (ref 6–20)
CALCIUM SERPL-MCNC: 8.4 MG/DL (ref 8.6–10)
CHLORIDE SERPL-SCNC: 105 MMOL/L (ref 98–107)
CREAT SERPL-MCNC: 1.25 MG/DL (ref 0.67–1.17)
DEPRECATED HCO3 PLAS-SCNC: 27 MMOL/L (ref 22–29)
EOSINOPHIL # BLD AUTO: 0.2 10E3/UL (ref 0–0.7)
EOSINOPHIL NFR BLD AUTO: 1 %
ERYTHROCYTE [DISTWIDTH] IN BLOOD BY AUTOMATED COUNT: 12.3 % (ref 10–15)
GFR SERPL CREATININE-BSD FRML MDRD: 67 ML/MIN/1.73M2
GLUCOSE SERPL-MCNC: 120 MG/DL (ref 70–99)
HCT VFR BLD AUTO: 31.1 % (ref 40–53)
HGB BLD-MCNC: 10.4 G/DL (ref 13.3–17.7)
IMM GRANULOCYTES # BLD: 0.2 10E3/UL
IMM GRANULOCYTES NFR BLD: 2 %
LACTATE SERPL-SCNC: 0.8 MMOL/L (ref 0.7–2)
LYMPHOCYTES # BLD AUTO: 1.1 10E3/UL (ref 0.8–5.3)
LYMPHOCYTES NFR BLD AUTO: 7 %
MCH RBC QN AUTO: 30.2 PG (ref 26.5–33)
MCHC RBC AUTO-ENTMCNC: 33.4 G/DL (ref 31.5–36.5)
MCV RBC AUTO: 90 FL (ref 78–100)
MONOCYTES # BLD AUTO: 1.1 10E3/UL (ref 0–1.3)
MONOCYTES NFR BLD AUTO: 7 %
NEUTROPHILS # BLD AUTO: 12.3 10E3/UL (ref 1.6–8.3)
NEUTROPHILS NFR BLD AUTO: 83 %
NRBC # BLD AUTO: 0 10E3/UL
NRBC BLD AUTO-RTO: 0 /100
PLATELET # BLD AUTO: 284 10E3/UL (ref 150–450)
POTASSIUM SERPL-SCNC: 3.8 MMOL/L (ref 3.4–5.3)
PROCALCITONIN SERPL IA-MCNC: 0.84 NG/ML
RBC # BLD AUTO: 3.44 10E6/UL (ref 4.4–5.9)
SODIUM SERPL-SCNC: 141 MMOL/L (ref 136–145)
WBC # BLD AUTO: 14.8 10E3/UL (ref 4–11)

## 2023-04-22 PROCEDURE — 258N000003 HC RX IP 258 OP 636: Performed by: HOSPITALIST

## 2023-04-22 PROCEDURE — 84145 PROCALCITONIN (PCT): CPT | Performed by: NURSE PRACTITIONER

## 2023-04-22 PROCEDURE — 250N000011 HC RX IP 250 OP 636: Performed by: NURSE PRACTITIONER

## 2023-04-22 PROCEDURE — 250N000011 HC RX IP 250 OP 636: Performed by: HOSPITALIST

## 2023-04-22 PROCEDURE — 85025 COMPLETE CBC W/AUTO DIFF WBC: CPT | Performed by: HOSPITALIST

## 2023-04-22 PROCEDURE — 80048 BASIC METABOLIC PNL TOTAL CA: CPT | Performed by: HOSPITALIST

## 2023-04-22 PROCEDURE — 99232 SBSQ HOSP IP/OBS MODERATE 35: CPT | Performed by: NURSE PRACTITIONER

## 2023-04-22 PROCEDURE — 120N000001 HC R&B MED SURG/OB

## 2023-04-22 PROCEDURE — 36415 COLL VENOUS BLD VENIPUNCTURE: CPT | Performed by: NURSE PRACTITIONER

## 2023-04-22 PROCEDURE — 250N000013 HC RX MED GY IP 250 OP 250 PS 637: Performed by: HOSPITALIST

## 2023-04-22 PROCEDURE — 36415 COLL VENOUS BLD VENIPUNCTURE: CPT | Performed by: HOSPITALIST

## 2023-04-22 PROCEDURE — 83605 ASSAY OF LACTIC ACID: CPT | Performed by: NURSE PRACTITIONER

## 2023-04-22 PROCEDURE — 250N000013 HC RX MED GY IP 250 OP 250 PS 637: Performed by: NURSE PRACTITIONER

## 2023-04-22 RX ORDER — CEFTRIAXONE SODIUM 2 G/50ML
2 INJECTION, SOLUTION INTRAVENOUS EVERY 24 HOURS
Status: DISCONTINUED | OUTPATIENT
Start: 2023-04-22 | End: 2023-04-25 | Stop reason: HOSPADM

## 2023-04-22 RX ORDER — SACCHAROMYCES BOULARDII 250 MG
250 CAPSULE ORAL 2 TIMES DAILY
Status: DISCONTINUED | OUTPATIENT
Start: 2023-04-22 | End: 2023-04-25 | Stop reason: HOSPADM

## 2023-04-22 RX ORDER — LEVOFLOXACIN 5 MG/ML
500 INJECTION, SOLUTION INTRAVENOUS EVERY 24 HOURS
Status: DISCONTINUED | OUTPATIENT
Start: 2023-04-22 | End: 2023-04-22

## 2023-04-22 RX ADMIN — LIDOCAINE 1 PATCH: 560 PATCH PERCUTANEOUS; TOPICAL; TRANSDERMAL at 17:40

## 2023-04-22 RX ADMIN — Medication 250 MG: at 13:31

## 2023-04-22 RX ADMIN — GABAPENTIN 300 MG: 300 CAPSULE ORAL at 20:35

## 2023-04-22 RX ADMIN — ACETAMINOPHEN 650 MG: 325 TABLET ORAL at 06:03

## 2023-04-22 RX ADMIN — SIMVASTATIN 40 MG: 40 TABLET, FILM COATED ORAL at 20:35

## 2023-04-22 RX ADMIN — GABAPENTIN 300 MG: 300 CAPSULE ORAL at 13:33

## 2023-04-22 RX ADMIN — TAMSULOSIN HYDROCHLORIDE 0.4 MG: 0.4 CAPSULE ORAL at 20:35

## 2023-04-22 RX ADMIN — ENOXAPARIN SODIUM 40 MG: 40 INJECTION SUBCUTANEOUS at 20:35

## 2023-04-22 RX ADMIN — VANCOMYCIN HYDROCHLORIDE 1500 MG: 1 INJECTION, POWDER, LYOPHILIZED, FOR SOLUTION INTRAVENOUS at 11:39

## 2023-04-22 RX ADMIN — Medication 250 MG: at 20:35

## 2023-04-22 RX ADMIN — LEVOFLOXACIN 500 MG: 500 INJECTION, SOLUTION INTRAVENOUS at 09:11

## 2023-04-22 RX ADMIN — ACETAMINOPHEN 650 MG: 325 TABLET ORAL at 23:15

## 2023-04-22 RX ADMIN — GABAPENTIN 300 MG: 300 CAPSULE ORAL at 09:11

## 2023-04-22 RX ADMIN — CEFTRIAXONE SODIUM 2 G: 2 INJECTION, SOLUTION INTRAVENOUS at 13:30

## 2023-04-22 RX ADMIN — SODIUM CHLORIDE, POTASSIUM CHLORIDE, SODIUM LACTATE AND CALCIUM CHLORIDE: 600; 310; 30; 20 INJECTION, SOLUTION INTRAVENOUS at 05:53

## 2023-04-22 RX ADMIN — PANTOPRAZOLE SODIUM 40 MG: 40 TABLET, DELAYED RELEASE ORAL at 09:11

## 2023-04-22 RX ADMIN — CALCIUM CARBONATE 600 MG (1,500 MG)-VITAMIN D3 400 UNIT TABLET 1 TABLET: at 09:11

## 2023-04-22 RX ADMIN — Medication 1 LOZENGE: at 06:03

## 2023-04-22 RX ADMIN — ACETAMINOPHEN 650 MG: 325 TABLET ORAL at 17:32

## 2023-04-22 ASSESSMENT — ACTIVITIES OF DAILY LIVING (ADL)
ADLS_ACUITY_SCORE: 33
ADLS_ACUITY_SCORE: 33
ADLS_ACUITY_SCORE: 37
ADLS_ACUITY_SCORE: 37
ADLS_ACUITY_SCORE: 34
ADLS_ACUITY_SCORE: 33
ADLS_ACUITY_SCORE: 37
ADLS_ACUITY_SCORE: 33
ADLS_ACUITY_SCORE: 37
ADLS_ACUITY_SCORE: 33
ADLS_ACUITY_SCORE: 37
ADLS_ACUITY_SCORE: 35

## 2023-04-22 NOTE — PROGRESS NOTES
[facility]    Hospitalist Progress Note    Date of Service (when I saw the patient): 04/22/2023    Assessment & Plan       Sepsis, due to staph epi (H): Bacteremia positive in admission, repeat cultures negative. He was started on vancomycin on arrival. He continues to have mild fevers, will start rocephin to cover both the staph and possible gram negatives that may be present in urine that did not grow in culture. He has a pic line in place with pln for 2 weeks IV antibiotics.      Bacteremia: Staph epi, see above       Acute cystitis without hematuria: Staph epi on UC, treating as above. Rocephin in case there is second bacteria as he is still having low grade fevers.       ELMA (acute kidney injury) (H): Due to acute cystitis vs bilateral nephritis. BPH with obstruction also contributing.       Possible Pyelonephritis, acute: as above. CT scan shows possible bilateral multifocal pyelonephritis      Benign prostatic hyperplasia with urinary retention: Lin placed onarrival, attempted trial without but he was unable to void so replaced lin 4/21. Will need follow up with urology.       DM type 1 (diabetes mellitus, type 1) (H): Using home pump, glucose has been well controlled.       HTN (hypertension): Pressures stable.                       # Severe Obesity: Estimated body mass index is 40.34 kg/m  as calculated from the following:    Height as of this encounter: 1.524 m (5').    Weight as of this encounter: 93.7 kg (206 lb 9.1 oz)., PRESENT ON ADMISSION         DVT Prophylaxis: Enoxaparin (Lovenox) SQ  Code Status: Full Code    Disposition: Expected discharge in 1-3 days once fever and leukocytosis resolved.    Lily Acevedo CNP    Interval History   Feels well, denies pain, chest pain, abdominal pain, nausea.    -Data reviewed today: I reviewed all new labs and imaging results over the last 24 hours.     Physical Exam   Temp: 99.2  F (37.3  C) Temp src: Tympanic BP: 122/63 Pulse: 70   Resp: 16  SpO2: (!) 89 % O2 Device: Nasal cannula Oxygen Delivery: 1 LPM  Vitals:    04/20/23 0537 04/21/23 0540 04/22/23 0522   Weight: 87.6 kg (193 lb 2 oz) 93.1 kg (205 lb 4 oz) 93.7 kg (206 lb 9.1 oz)     Vital Signs with Ranges  Temp:  [99  F (37.2  C)-102.1  F (38.9  C)] 99.2  F (37.3  C)  Pulse:  [70-82] 70  Resp:  [16-18] 16  BP: (120-135)/(57-70) 122/63  SpO2:  [89 %-95 %] 89 %  I/O last 3 completed shifts:  In: 3217 [P.O.:240; I.V.:2977]  Out: 2475 [Urine:2475]    PICC 04/21/23 Double Lumen Left Basilic Extended IV antibiotics (Active)   Site Assessment WDL 04/22/23 0903   Extremity Circumference (cm) 27 cm 04/18/23 0001   Dressing Transparent;Chlorhexidine disk 04/22/23 0903   Dressing Status clean;dry;intact 04/22/23 0903   Dressing Change Due 04/28/23 04/21/23 1438   Line Necessity Yes, meets criteria 04/22/23 0903   Purple - Status blood return noted;infusing 04/22/23 0903   Red - Status blood return noted;saline locked 04/22/23 0903   Number of days: 1       Urethral Catheter 04/21/23 Latex 16 fr (Active)   Tube Description Positional 04/21/23 2312   Catheter Care Catheter wipes;Done 04/22/23 0944   Collection Container Standard 04/21/23 2312   Securement Method Securing device (Describe) 04/21/23 2312   Rationale for Continued Use Retention 04/21/23 2312   Urine Output 250 mL 04/22/23 1100   Number of days: 1     Line/device assessment(s) completed for medical necessity    Constitutional: Awake,alert, no acute distress  Respiratory: Clear bilaterally, no wheezes, crackles or rhonchi  Cardiovascular: HRR, no murmurs, rubs, thrills.   GI: Soft,nontender, bowel sounds positive   Skin/Integumen: No unusual rashes, bruising or open areas.       Medications     insulin basal rate for inpatient ambulatory pump         calcium carbonate-vitamin D   Oral Daily     cefTRIAXone  2 g Intravenous Q24H     enoxaparin ANTICOAGULANT  40 mg Subcutaneous At Bedtime     gabapentin  300 mg Oral TID     insulin aspart   Device See  Admin Instructions     insulin bolus from AMBULATORY PUMP   Subcutaneous TID AC     lidocaine  1 patch Transdermal Q24H    And     lidocaine   Transdermal Q8H THI     pantoprazole  40 mg Oral Daily     polyethylene glycol  17 g Oral Daily     saccharomyces boulardii  250 mg Oral BID     simvastatin  40 mg Oral At Bedtime     sodium chloride (PF)  3 mL Intracatheter Q8H     tamsulosin  0.4 mg Oral QPM       Data   Recent Labs   Lab 04/22/23  0606 04/21/23  1808 04/21/23  1417 04/21/23  1052 04/21/23  0516 04/20/23  1208 04/20/23  0529 04/20/23  0528 04/19/23  0514 04/18/23  1219   WBC 14.8*  --   --   --  14.0*  --  15.3*  --    < > 20.3*   HGB 10.4*  --   --   --  10.3*  --  11.3*  --    < > 12.7*   MCV 90  --   --   --  91  --  92  --    < > 89     --   --   --  241  --  242  --    < > 218   INR  --   --   --  1.19*  --   --   --   --   --   --      --   --   --  141  --   --  140   < > 133*   POTASSIUM 3.8  --   --   --  3.6  --   --  4.2   < > 4.1   CHLORIDE 105  --   --   --  106  --   --  105   < > 96*   CO2 27  --   --   --  25  --   --  28   < > 25   BUN 13.3  --   --   --  14.6  --   --  18.6   < > 30.1*   CR 1.25*  --   --   --  1.28*  --   --  1.54*   < > 1.60*   ANIONGAP 9  --   --   --  10  --   --  7   < > 12   ANGELINA 8.4*  --   --   --  8.3*  --   --  8.7   < > 8.7   * 166* 165*  --  149*   < >  --  132*   < > 197*   ALBUMIN  --   --   --   --   --   --   --   --   --  3.6   PROTTOTAL  --   --   --   --   --   --   --   --   --  6.9   BILITOTAL  --   --   --   --   --   --   --   --   --  0.6   ALKPHOS  --   --   --   --   --   --   --   --   --  87   ALT  --   --   --   --   --   --   --   --   --  21   AST  --   --   --   --   --   --   --   --   --  24    < > = values in this interval not displayed.       Recent Results (from the past 24 hour(s))   IR PICC Placement > 5 Yrs of Age    Narrative    IR PICC PLACEMENT > 5 YRS OF AGE    HISTORY: 57 years Male Pt. needs 2 weeks of  vancomycin for sepsis.    COMPARISON: None    TECHNIQUE: Informed consent was obtained. A timeout was performed. The  patient was sterilely prepped and draped. Local injections of  lidocaine were performed.    Using ultrasound guidance a dual lumen PICC line was placed via the  left basilic vein. The tip was positioned at the level of the high  right atrium. Patient tolerated procedure well.      Impression    IMPRESSION: Successful PICC line placement as described above.    DOTTIE LAYTON MD         SYSTEM ID:  [id number]

## 2023-04-22 NOTE — PHARMACY
[facility]    Pharmacy      Antimicrobial Stewardship Note     Levaquin stopped after 1 dose.          Culture Results:           Recommendations/Interventions:  Source: Angela Guide for Staphylococcus epidermidis     Rifampin 300-450 mg q12h often added in combination with one of the above for prosthetic device infections.    1.  Consider consulting ID with whether Rifampin should be added. I think vancomycin could be stopped, as Rocephin should provide adequate coverage for S. Epidermidis.       Iris Poole, [facility]  April 22, 2023

## 2023-04-22 NOTE — PLAN OF CARE
Goal Outcome Evaluation: Patient stated he slept well overnight. Denies pain. Febrile overnight, tylenol given with improvement. Snowden patent, 1050ml urine output. Denies nausea or diarrhea overnight. LR infusing per order, PICC patent with blood return. O2 sats 89% on room air this morning, reinforced importance of coughing and deep breathing. 1L O2 via nasal cannula placed, lung sounds still clear to auscultation. Continuous glucose monitor in place on abdomen, remains CDI and flush to skin. Insulin pump in place LUE, remains CDI and flush to skin. Patient managing his own insulin pump.    Face to face report given with opportunity to observe patient.    Report given to Maggie Gilliam RN   4/22/2023  7:27 AM

## 2023-04-23 LAB
ANION GAP SERPL CALCULATED.3IONS-SCNC: 7 MMOL/L (ref 7–15)
BASOPHILS # BLD AUTO: 0.1 10E3/UL (ref 0–0.2)
BASOPHILS NFR BLD AUTO: 0 %
BUN SERPL-MCNC: 11.4 MG/DL (ref 6–20)
CALCIUM SERPL-MCNC: 8.5 MG/DL (ref 8.6–10)
CHLORIDE SERPL-SCNC: 104 MMOL/L (ref 98–107)
CREAT SERPL-MCNC: 1.2 MG/DL (ref 0.67–1.17)
DEPRECATED HCO3 PLAS-SCNC: 29 MMOL/L (ref 22–29)
EOSINOPHIL # BLD AUTO: 0.2 10E3/UL (ref 0–0.7)
EOSINOPHIL NFR BLD AUTO: 2 %
ERYTHROCYTE [DISTWIDTH] IN BLOOD BY AUTOMATED COUNT: 12.3 % (ref 10–15)
GFR SERPL CREATININE-BSD FRML MDRD: 70 ML/MIN/1.73M2
GLUCOSE SERPL-MCNC: 112 MG/DL (ref 70–99)
HCT VFR BLD AUTO: 30.9 % (ref 40–53)
HGB BLD-MCNC: 10.3 G/DL (ref 13.3–17.7)
IMM GRANULOCYTES # BLD: 0.3 10E3/UL
IMM GRANULOCYTES NFR BLD: 2 %
LACTATE SERPL-SCNC: 0.5 MMOL/L (ref 0.7–2)
LYMPHOCYTES # BLD AUTO: 1.2 10E3/UL (ref 0.8–5.3)
LYMPHOCYTES NFR BLD AUTO: 9 %
MCH RBC QN AUTO: 30.3 PG (ref 26.5–33)
MCHC RBC AUTO-ENTMCNC: 33.3 G/DL (ref 31.5–36.5)
MCV RBC AUTO: 91 FL (ref 78–100)
MONOCYTES # BLD AUTO: 1.2 10E3/UL (ref 0–1.3)
MONOCYTES NFR BLD AUTO: 9 %
NEUTROPHILS # BLD AUTO: 10.7 10E3/UL (ref 1.6–8.3)
NEUTROPHILS NFR BLD AUTO: 78 %
NRBC # BLD AUTO: 0 10E3/UL
NRBC BLD AUTO-RTO: 0 /100
PLATELET # BLD AUTO: 276 10E3/UL (ref 150–450)
POTASSIUM SERPL-SCNC: 3.6 MMOL/L (ref 3.4–5.3)
RBC # BLD AUTO: 3.4 10E6/UL (ref 4.4–5.9)
SODIUM SERPL-SCNC: 140 MMOL/L (ref 136–145)
WBC # BLD AUTO: 13.6 10E3/UL (ref 4–11)

## 2023-04-23 PROCEDURE — 250N000013 HC RX MED GY IP 250 OP 250 PS 637: Performed by: HOSPITALIST

## 2023-04-23 PROCEDURE — 250N000013 HC RX MED GY IP 250 OP 250 PS 637: Performed by: NURSE PRACTITIONER

## 2023-04-23 PROCEDURE — 85004 AUTOMATED DIFF WBC COUNT: CPT | Performed by: HOSPITALIST

## 2023-04-23 PROCEDURE — 82310 ASSAY OF CALCIUM: CPT | Performed by: HOSPITALIST

## 2023-04-23 PROCEDURE — 36415 COLL VENOUS BLD VENIPUNCTURE: CPT | Performed by: HOSPITALIST

## 2023-04-23 PROCEDURE — 250N000011 HC RX IP 250 OP 636: Performed by: NURSE PRACTITIONER

## 2023-04-23 PROCEDURE — 99232 SBSQ HOSP IP/OBS MODERATE 35: CPT | Performed by: NURSE PRACTITIONER

## 2023-04-23 PROCEDURE — 250N000011 HC RX IP 250 OP 636: Performed by: HOSPITALIST

## 2023-04-23 PROCEDURE — 120N000001 HC R&B MED SURG/OB

## 2023-04-23 PROCEDURE — 83605 ASSAY OF LACTIC ACID: CPT | Performed by: NURSE PRACTITIONER

## 2023-04-23 RX ADMIN — PANTOPRAZOLE SODIUM 40 MG: 40 TABLET, DELAYED RELEASE ORAL at 09:05

## 2023-04-23 RX ADMIN — LOPERAMIDE HYDROCHLORIDE 2 MG: 2 CAPSULE ORAL at 09:05

## 2023-04-23 RX ADMIN — Medication 250 MG: at 09:05

## 2023-04-23 RX ADMIN — TAMSULOSIN HYDROCHLORIDE 0.4 MG: 0.4 CAPSULE ORAL at 20:00

## 2023-04-23 RX ADMIN — ACETAMINOPHEN 650 MG: 325 TABLET ORAL at 05:46

## 2023-04-23 RX ADMIN — SIMVASTATIN 40 MG: 40 TABLET, FILM COATED ORAL at 20:00

## 2023-04-23 RX ADMIN — Medication 1 LOZENGE: at 19:09

## 2023-04-23 RX ADMIN — ENOXAPARIN SODIUM 40 MG: 40 INJECTION SUBCUTANEOUS at 20:00

## 2023-04-23 RX ADMIN — GABAPENTIN 300 MG: 300 CAPSULE ORAL at 14:10

## 2023-04-23 RX ADMIN — CEFTRIAXONE SODIUM 2 G: 2 INJECTION, SOLUTION INTRAVENOUS at 12:30

## 2023-04-23 RX ADMIN — Medication 250 MG: at 20:00

## 2023-04-23 RX ADMIN — GABAPENTIN 300 MG: 300 CAPSULE ORAL at 09:05

## 2023-04-23 RX ADMIN — GABAPENTIN 300 MG: 300 CAPSULE ORAL at 20:00

## 2023-04-23 RX ADMIN — CALCIUM CARBONATE 600 MG (1,500 MG)-VITAMIN D3 400 UNIT TABLET 1 TABLET: at 09:05

## 2023-04-23 RX ADMIN — OXYCODONE HYDROCHLORIDE 5 MG: 5 TABLET ORAL at 03:00

## 2023-04-23 RX ADMIN — LIDOCAINE 1 PATCH: 560 PATCH PERCUTANEOUS; TOPICAL; TRANSDERMAL at 17:09

## 2023-04-23 RX ADMIN — ACETAMINOPHEN 650 MG: 325 TABLET ORAL at 19:54

## 2023-04-23 ASSESSMENT — ACTIVITIES OF DAILY LIVING (ADL)
ADLS_ACUITY_SCORE: 33
ADLS_ACUITY_SCORE: 33
ADLS_ACUITY_SCORE: 32
ADLS_ACUITY_SCORE: 35
ADLS_ACUITY_SCORE: 33
ADLS_ACUITY_SCORE: 35
ADLS_ACUITY_SCORE: 35
ADLS_ACUITY_SCORE: 32
ADLS_ACUITY_SCORE: 33
ADLS_ACUITY_SCORE: 35
ADLS_ACUITY_SCORE: 32
ADLS_ACUITY_SCORE: 35

## 2023-04-23 NOTE — PLAN OF CARE
Most recent vitals: /68 (BP Location: Right arm, Patient Position: Sitting)   Pulse 72   Temp 99.2  F (37.3  C) (Oral)   Resp 18   Ht 1.524 m (5')   Wt 93.7 kg (206 lb 9.1 oz)   SpO2 94%   BMI 40.34 kg/m      Patient A&Ox4, pleasant, up with SBA to the bathroom. Fair appetite all day. Denied nausea. Has been up in the recliner majority of the shift. Denies pain. Febrile. Snowden patent, with good output. PICC line saline locked with good blood return from both lumens. Dressing CDI. Lidocaine patch in place- mid lower back. Abx given per order. Glucose checked and monitored by patients dexcom. Did have family visiting in and out all day, patient appreciated these visits. Call light within reach, personal belongings within reach, able to make needs known. Has been calling appropriately this shift.     Face to face report given with opportunity to observe patient.    Report given to Aisha YANES.    Jania Poole RN   4/23/2023  7:15 PM

## 2023-04-23 NOTE — PLAN OF CARE
Most recent vitals: /63 (BP Location: Right arm, Patient Position: Sitting, Cuff Size: Adult Regular)   Pulse 71   Temp 100  F (37.8  C) (Tympanic)   Resp 16   Ht 1.524 m (5')   Wt 93.7 kg (206 lb 9.1 oz)   SpO2 96%   BMI 40.34 kg/m      Comments:   A/O,calm, cooperative, VSS, temp 100 F, tylenol give, Snowden remains intact, denies pain.  Up to chair with visitors most of day. Saline locked L PICC line.  Lungs are clear and diminished, no reports of cough or SOB. Chronic neuropathy per patient bilateral lower extremities with 1+ trace edema in L. Leg that is also chronic.   Rash/blanchable redness on wrist remains w/in borders.      Face to face report given with opportunity to observe patient.    Report given to Childress RN Cassie M. Gosser, RN   4/22/2023  7:35 PM

## 2023-04-23 NOTE — PROGRESS NOTES
[facility]    Hospitalist Progress Note    Date of Service (when I saw the patient): 04/23/2023    Assessment & Plan       Sepsis, due to staph epi (H): Bacteremia positive in admission, repeat cultures negative. He was started on vancomycin on arrival. He continues to have mild fevers, will start rocephin to cover both the staph and possible gram negatives that may be present in urine that did not grow in culture. He has a picc line in place with pln for 2 weeks IV antibiotics.  -4/23: No fever x24 hours, WBC coming down today. If remains fever free and wbc normalizes may be ready for discharge tomorrow.       Bacteremia: Staph epi, see above       Acute cystitis without hematuria: Staph epi on UC, treating as above. Rocephin in case there is second bacteria as he is still having significant fevers.       ELMA (acute kidney injury) (H): Due to acute cystitis vs bilateral nephritis. BPH with obstruction also contributing.   -4/23: Significant improvement though not resolved.       Possible Pyelonephritis, acute: as above. CT scan shows possible bilateral multifocal pyelonephritis      Benign prostatic hyperplasia with urinary retention: Lin placed onarrival, attempted trial without but he was unable to void so replaced lin 4/21. Will need follow up with urology.       DM type 1 (diabetes mellitus, type 1) (H): Using home pump, glucose has been well controlled.       HTN (hypertension): Pressures stable.                       # Severe Obesity: Estimated body mass index is 40.34 kg/m  as calculated from the following:    Height as of this encounter: 1.524 m (5').    Weight as of this encounter: 93.7 kg (206 lb 9.1 oz).          DVT Prophylaxis: Enoxaparin (Lovenox) SQ  Code Status: Full Code    Disposition: Expected discharge in 1-3 days once fever and leukocytosis resolved.    Lily Acevedo, CNP    Interval History   Feels well, denies pain, chest pain, abdominal pain, nausea.    -Data reviewed  today: I reviewed all new labs and imaging results over the last 24 hours.     Physical Exam   Temp: 99.2  F (37.3  C) Temp src: Oral BP: 129/72 Pulse: 76   Resp: 16 SpO2: 93 % O2 Device: None (Room air) Oxygen Delivery: 1 LPM  Vitals:    04/21/23 0540 04/22/23 0522 04/23/23 0343   Weight: 93.1 kg (205 lb 4 oz) 93.7 kg (206 lb 9.1 oz) 93.7 kg (206 lb 9.1 oz)     Vital Signs with Ranges  Temp:  [98.9  F (37.2  C)-100  F (37.8  C)] 99.2  F (37.3  C)  Pulse:  [71-76] 76  Resp:  [16-18] 16  BP: (129-146)/(59-72) 129/72  SpO2:  [91 %-96 %] 93 %  I/O last 3 completed shifts:  In: 980 [P.O.:960; I.V.:20]  Out: 3105 [Urine:3105]    PICC 04/21/23 Double Lumen Left Basilic Extended IV antibiotics (Active)   Site Assessment WDL 04/23/23 0900   Extremity Circumference (cm) 27 cm 04/18/23 0001   Dressing Transparent;Chlorhexidine disk 04/23/23 0900   Dressing Status clean;dry;intact 04/23/23 0900   Dressing Change Due 04/28/23 04/23/23 0900   Line Necessity Yes, meets criteria 04/23/23 0900   Purple - Status blood return noted;saline locked 04/23/23 0900   Purple - Intervention Flushed 04/23/23 0900   Red - Status blood return noted;saline locked 04/23/23 0900   Red - Intervention Lab drawn;Cap change;Flushed 04/23/23 0547   Phlebitis Scale 0-->no symptoms 04/23/23 0900   Infiltration? no 04/23/23 0900   Number of days: 2       Urethral Catheter 04/21/23 Latex 16 fr (Active)   Tube Description Positional 04/22/23 2309   Catheter Care Catheter wipes;Done 04/23/23 0955   Collection Container Standard 04/22/23 2309   Securement Method Securing device (Describe) 04/22/23 2309   Rationale for Continued Use Retention 04/22/23 2309   Urine Output 325 mL 04/23/23 0955   Number of days: 2     Line/device assessment(s) completed for medical necessity    Constitutional: Awake,alert, no acute distress  Respiratory: Clear bilaterally, no wheezes, crackles or rhonchi  Cardiovascular: HRR, no murmurs, rubs, thrills.   GI: Soft,nontender, bowel  sounds positive   Skin/Integumen: No unusual rashes, bruising or open areas.       Medications     insulin basal rate for inpatient ambulatory pump         calcium carbonate-vitamin D   Oral Daily     cefTRIAXone  2 g Intravenous Q24H     enoxaparin ANTICOAGULANT  40 mg Subcutaneous At Bedtime     gabapentin  300 mg Oral TID     insulin aspart   Device See Admin Instructions     insulin bolus from AMBULATORY PUMP   Subcutaneous TID AC     lidocaine  1 patch Transdermal Q24H    And     lidocaine   Transdermal Q8H THI     pantoprazole  40 mg Oral Daily     polyethylene glycol  17 g Oral Daily     saccharomyces boulardii  250 mg Oral BID     simvastatin  40 mg Oral At Bedtime     sodium chloride (PF)  3 mL Intracatheter Q8H     tamsulosin  0.4 mg Oral QPM       Data   Recent Labs   Lab 04/23/23  0536 04/22/23  0606 04/21/23  1808 04/21/23  1417 04/21/23  1052 04/21/23  0516 04/19/23  0514 04/18/23  1219   WBC 13.6* 14.8*  --   --   --  14.0*   < > 20.3*   HGB 10.3* 10.4*  --   --   --  10.3*   < > 12.7*   MCV 91 90  --   --   --  91   < > 89    284  --   --   --  241   < > 218   INR  --   --   --   --  1.19*  --   --   --     141  --   --   --  141   < > 133*   POTASSIUM 3.6 3.8  --   --   --  3.6   < > 4.1   CHLORIDE 104 105  --   --   --  106   < > 96*   CO2 29 27  --   --   --  25   < > 25   BUN 11.4 13.3  --   --   --  14.6   < > 30.1*   CR 1.20* 1.25*  --   --   --  1.28*   < > 1.60*   ANIONGAP 7 9  --   --   --  10   < > 12   ANGELINA 8.5* 8.4*  --   --   --  8.3*   < > 8.7   * 120* 166*   < >  --  149*   < > 197*   ALBUMIN  --   --   --   --   --   --   --  3.6   PROTTOTAL  --   --   --   --   --   --   --  6.9   BILITOTAL  --   --   --   --   --   --   --  0.6   ALKPHOS  --   --   --   --   --   --   --  87   ALT  --   --   --   --   --   --   --  21   AST  --   --   --   --   --   --   --  24    < > = values in this interval not displayed.       No results found for this or any previous visit  (from the past 24 hour(s)).

## 2023-04-23 NOTE — PHARMACY
Pharmacy Antimicrobial Stewardship Assessment     Current Antimicrobial Therapy:  Anti-infectives (From now, onward)    Start     Dose/Rate Route Frequency Ordered Stop    23 1200  cefTRIAXone IN D5W (ROCEPHIN) intermittent infusion 2 g        Note to Pharmacy: Dose adjusted to 2g Rocephin due to weight >90kg per policy.    2 g  100 mL/hr over 30 Minutes Intravenous EVERY 24 HOURS 23 1145          Indication: UTI    Days of Therapy: 2  *previously on 1 day meropenem, then 4 days Vanco, then 1 day Levaquin     Pertinent Labs:  Recent Labs   Lab Test 23  0536 23  0606 23  0516   WBC 13.6* 14.8* 14.0*     Recent Labs   Lab Test 23  0536 23  1242 23  0606 23  0632 23  0528   LACT 0.5* 0.8  --    < >  --    PCAL  --   --  0.84*  --  3.07*    < > = values in this interval not displayed.        Temperature:  Temp (24hrs), Av.4  F (37.4  C), Min:98.9  F (37.2  C), Max:100  F (37.8  C)    Culture Results:       Recommendations/Interventions:  None at this time    Laura Juárez, [facility]  2023

## 2023-04-23 NOTE — PLAN OF CARE
Goal Outcome Evaluation: Patient stated he slept well for several hours. Around 0300, patient awoke with 7/10 pain in his left shoulder. Shoulder was tender and sore, states he rolled on his arm and then it began to hurt. PICC intact, flushed and blood return present. Shoulders appear equal bilaterally upon assessment, patient able to move his arm but states its sore. Ice pack applied and oxycodone given with relief. Continuous glucose monitor system in place on abdomen, remains CDI and flush to skin. Insulin pump in place on RUE, remains CDI and flush to skin. Patient managing his own insulin pump. Snowden patent. Up to bathroom with standby assist. Remains alert and oriented, calls appropriately for assistance.    I/O this shift:  In: 360 [P.O.:360]  Out: 1130 [Urine:1130]    Face to face report given with opportunity to observe patient.    Report given to Jania Gilliam RN   4/23/2023  7:06 AM

## 2023-04-24 LAB
ANION GAP SERPL CALCULATED.3IONS-SCNC: 10 MMOL/L (ref 7–15)
BUN SERPL-MCNC: 13 MG/DL (ref 6–20)
CALCIUM SERPL-MCNC: 8.7 MG/DL (ref 8.6–10)
CHLORIDE SERPL-SCNC: 104 MMOL/L (ref 98–107)
CREAT SERPL-MCNC: 1.21 MG/DL (ref 0.67–1.17)
DEPRECATED HCO3 PLAS-SCNC: 29 MMOL/L (ref 22–29)
ERYTHROCYTE [DISTWIDTH] IN BLOOD BY AUTOMATED COUNT: 12.4 % (ref 10–15)
GFR SERPL CREATININE-BSD FRML MDRD: 69 ML/MIN/1.73M2
GLUCOSE BLDC GLUCOMTR-MCNC: 199 MG/DL (ref 70–99)
GLUCOSE BLDC GLUCOMTR-MCNC: 206 MG/DL (ref 70–99)
GLUCOSE SERPL-MCNC: 122 MG/DL (ref 70–99)
HCT VFR BLD AUTO: 34 % (ref 40–53)
HGB BLD-MCNC: 11.1 G/DL (ref 13.3–17.7)
MCH RBC QN AUTO: 29.9 PG (ref 26.5–33)
MCHC RBC AUTO-ENTMCNC: 32.6 G/DL (ref 31.5–36.5)
MCV RBC AUTO: 92 FL (ref 78–100)
PLATELET # BLD AUTO: 259 10E3/UL (ref 150–450)
POTASSIUM SERPL-SCNC: 4.1 MMOL/L (ref 3.4–5.3)
RBC # BLD AUTO: 3.71 10E6/UL (ref 4.4–5.9)
SODIUM SERPL-SCNC: 143 MMOL/L (ref 136–145)
WBC # BLD AUTO: 13.5 10E3/UL (ref 4–11)

## 2023-04-24 PROCEDURE — 250N000013 HC RX MED GY IP 250 OP 250 PS 637: Performed by: NURSE PRACTITIONER

## 2023-04-24 PROCEDURE — 99232 SBSQ HOSP IP/OBS MODERATE 35: CPT | Performed by: NURSE PRACTITIONER

## 2023-04-24 PROCEDURE — 120N000001 HC R&B MED SURG/OB

## 2023-04-24 PROCEDURE — 250N000011 HC RX IP 250 OP 636: Performed by: HOSPITALIST

## 2023-04-24 PROCEDURE — 85027 COMPLETE CBC AUTOMATED: CPT | Performed by: NURSE PRACTITIONER

## 2023-04-24 PROCEDURE — 250N000011 HC RX IP 250 OP 636: Performed by: NURSE PRACTITIONER

## 2023-04-24 PROCEDURE — 250N000013 HC RX MED GY IP 250 OP 250 PS 637: Performed by: HOSPITALIST

## 2023-04-24 PROCEDURE — 80048 BASIC METABOLIC PNL TOTAL CA: CPT | Performed by: NURSE PRACTITIONER

## 2023-04-24 PROCEDURE — 36592 COLLECT BLOOD FROM PICC: CPT | Performed by: NURSE PRACTITIONER

## 2023-04-24 RX ADMIN — Medication 250 MG: at 21:09

## 2023-04-24 RX ADMIN — CEFTRIAXONE SODIUM 2 G: 2 INJECTION, SOLUTION INTRAVENOUS at 16:55

## 2023-04-24 RX ADMIN — GABAPENTIN 300 MG: 300 CAPSULE ORAL at 08:37

## 2023-04-24 RX ADMIN — GABAPENTIN 300 MG: 300 CAPSULE ORAL at 13:06

## 2023-04-24 RX ADMIN — CALCIUM CARBONATE 600 MG (1,500 MG)-VITAMIN D3 400 UNIT TABLET 1 TABLET: at 08:37

## 2023-04-24 RX ADMIN — Medication 250 MG: at 08:37

## 2023-04-24 RX ADMIN — LIDOCAINE 1 PATCH: 560 PATCH PERCUTANEOUS; TOPICAL; TRANSDERMAL at 16:59

## 2023-04-24 RX ADMIN — ALTEPLASE 1 MG: 2.2 INJECTION, POWDER, LYOPHILIZED, FOR SOLUTION INTRAVENOUS at 14:40

## 2023-04-24 RX ADMIN — ENOXAPARIN SODIUM 40 MG: 40 INJECTION SUBCUTANEOUS at 21:10

## 2023-04-24 RX ADMIN — TAMSULOSIN HYDROCHLORIDE 0.4 MG: 0.4 CAPSULE ORAL at 21:09

## 2023-04-24 RX ADMIN — PANTOPRAZOLE SODIUM 40 MG: 40 TABLET, DELAYED RELEASE ORAL at 08:37

## 2023-04-24 RX ADMIN — ACETAMINOPHEN 650 MG: 325 TABLET ORAL at 21:08

## 2023-04-24 RX ADMIN — SIMVASTATIN 40 MG: 40 TABLET, FILM COATED ORAL at 21:09

## 2023-04-24 RX ADMIN — GABAPENTIN 300 MG: 300 CAPSULE ORAL at 21:09

## 2023-04-24 ASSESSMENT — ACTIVITIES OF DAILY LIVING (ADL)
ADLS_ACUITY_SCORE: 33

## 2023-04-24 NOTE — PLAN OF CARE
Cathflo admistered at 1442. 0.5mL was placed into both lumens of PICC line. Will attempt to flush in 30 minutes.     -Not able to aspirate after 30 min. Will reassess in 90 min.

## 2023-04-24 NOTE — PLAN OF CARE
Free from falls/injuries this shift. Assess as charted. PICC intact LAC. Snowden in place. Educated on attachment of leg bag. States he had catheter a few months ago as he was not able to pass urine at that time. Pt and mother educated on appt set up with urology in [place]. Mother had questions about pt being able to shower with PICC. Educated on wrapping with saran wrap.   Picc line flushed again at 2130 with 10cc NS in each port-flushed without diff. Temp max this shift: 99.8.  Med with po tylenol at 2130    Face to face report given with opportunity to observe patient.    Report given to Aisha Kelley RN   4/24/2023  11:32 PM

## 2023-04-24 NOTE — PLAN OF CARE
Pt A&O, VSS, afebrile. He remains on RA w/ sats 90's. Denies pain. LLE elevated throughout shift, 2+ edema, per pt this is chronic. CGM remains in place on abdomen, blood sugars 125, 149. No inulin coverage needed. Insulin pump is on place on patients RUE. He is independent w/ glucose management. Snowden remains in place, adequate output. Continues w/ loose stools. Up to bathroom SBA. Call light within reach, needs made known.     Face to face report given with opportunity to observe patient.    Report given to Elli Cadet RN   4/24/2023  3:05 PM

## 2023-04-24 NOTE — PHARMACY
Pharmacy Antimicrobial Stewardship Assessment     Current Antimicrobial Therapy:  Anti-infectives (From now, onward)    Start     Dose/Rate Route Frequency Ordered Stop    23 1200  cefTRIAXone IN D5W (ROCEPHIN) intermittent infusion 2 g        Note to Pharmacy: Dose adjusted to 2g Rocephin due to weight >90kg per policy.    2 g  100 mL/hr over 30 Minutes Intravenous EVERY 24 HOURS 23 1145            Indication: UTI    Days of Therapy: day 3 of ceftriaxone, day 7 of abx     Pertinent Labs:    Recent labs: (last 7 days)     23  1219 23  0514 23  1312 23  0528 23  0529 23  2255 23  0516 23  1659 23  0606 23  1242 23  0536 23  0553   WBC 20.3* 19.7*  --   --    < >  --  14.0*  --  14.8*  --  13.6* 13.5*   LACT 1.0  --    < >  --    < > 0.9  --  1.2  --  0.8 0.5*  --    PCAL 1.88* 4.98*  --  3.07*  --   --   --   --  0.84*  --   --   --     < > = values in this interval not displayed.       Temperature:  Temp (24hrs), Av.5  F (37.5  C), Min:98.4  F (36.9  C), Max:100.4  F (38  C)      Culture Results:   7-Day Micro Results    Collected Updated Procedure Result Status    2023 0637 2023 1110 Blood Culture Peripheral Blood [[id number]]   Peripheral Blood    Preliminary result Component Value   Culture No growth after 3 days P             2023 0632 2023 1110 Blood Culture Peripheral Blood [[id number]]   Peripheral Blood    Preliminary result Component Value   Culture No growth after 3 days P             2023 1248 2023 1339 C. difficile Toxin B PCR with reflex to C. difficile Antigen and Toxins A/B EIA [[id number]]    Stool from Per Rectum    Final result Component Value   C Difficile Toxin B by PCR Negative   A negative result does not exclude actual disease due to C. difficile and may be due to improper collection, handling and storage of the specimen or the number of organisms in the specimen is  below the detection limit of the assay.          04/18/2023 1323 04/20/2023 0633 Urine Culture [[id number]]    (Abnormal)   Urine, Snowden Catheter    Final result Component Value   Culture >100,000 CFU/mL Staphylococcus epidermidis Abnormal        Susceptibility     Staphylococcus epidermidis     MACO     Cefoxitin Screen Negative  Negative *     Ciprofloxacin <=0.5  Susceptible     Clindamycin <=0.25  Susceptible *     Erythromycin <=0.25  Susceptible *     Gentamicin <=0.5  Susceptible     Inducible macrolide resistance test Negative  Negative *     Levofloxacin <=0.12  Susceptible     Linezolid 1  Susceptible *     Moxifloxacin <=0.25  Susceptible *     Oxacillin <=0.25  Susceptible     Penicillin 0.25  Resistant     Quinupristin/Dalfopristin <=0.25  Susceptible *     Rifampin <=0.5  Susceptible *     Tetracycline <=1  Susceptible     Trimethoprim/Sulfamethoxazole <=0.5/9.5  Susceptible     Vancomycin 1  Susceptible              * Suppressed Antibiotic            04/18/2023 1237 04/21/2023 0649 Blood Culture Arm, Right [[id number]]    (Abnormal)   Blood from Arm, Right    Final result Component Value   Culture Staphylococcus epidermidis Panic     2 of 2 bottles        Susceptibility     Staphylococcus epidermidis     MACO     Cefoxitin Screen Negative  Negative *     Ciprofloxacin <=0.5  Susceptible     Clindamycin <=0.25  Susceptible     Erythromycin <=0.25  Susceptible     Gentamicin <=0.5  Susceptible     Inducible macrolide resistance test Negative  Negative *     Levofloxacin <=0.12  Susceptible     Linezolid 1  Susceptible *     Moxifloxacin <=0.25  Susceptible *     Oxacillin <=0.25  Susceptible     Penicillin >=0.5  Resistant     Quinupristin/Dalfopristin <=0.25  Susceptible *     Rifampin <=0.5  Susceptible *     Tetracycline <=1  Susceptible     Trimethoprim/Sulfamethoxazole 0.5/9.5  Susceptible     Vancomycin 1  Susceptible              * Suppressed Antibiotic            04/18/2023 1219 04/21/2023  0649 Blood Culture Peripheral Blood [[id number]]   (Abnormal)   Peripheral Blood    Final result Component Value   Culture Staphylococcus epidermidis Panic     2 of 2 bottles.See coinciding culture for susceptibility results.                    Recommendations/Interventions:  1. None at this time.    Richa Tabor, [facility]  April 24, 2023

## 2023-04-24 NOTE — PLAN OF CARE
@ 0265- was able to aspirate Alteplase and blood from both ports of PICC line. Rocephin infused easily.

## 2023-04-24 NOTE — PROGRESS NOTES
Checked in with patient to see how he's doing after PICC placement yesterday. Patient doing well. Nursing staff not no issues with PICC.

## 2023-04-24 NOTE — PLAN OF CARE
Goal Outcome Evaluation: Progressing.  Patient stated he slept well overnight. Denies pain. Max temp 100.4F, tylenol given with improvement. PICC saline locked. LLE elevated on pillow overnight, patient states it is slightly more edamatous than normal (chronically always larger than the right leg). Continuous glucose monitor in place on abdomen, remains CDI and flush to skin. Insulin pump in place on RUE, remains CDI and flush to skin. Patient managing his own insulin pump. Snowden patent with good output. Up to bathroom with standby assist. Calling appropriately for assistance.     I/O this shift:  In: 120 [P.O.:120]  Out: 1025 [Urine:1025]     Face to face report given with opportunity to observe patient.    Report given to Richa Gilliam RN   4/24/2023  7:17 AM

## 2023-04-24 NOTE — PROGRESS NOTES
[facility]    Hospitalist Progress Note    Date of Service (when I saw the patient): 04/24/2023    Assessment & Plan       Sepsis, due to staph epi (H): Bacteremia positive in admission, repeat cultures negative. He was started on vancomycin on arrival. He continues to have mild fevers, will start rocephin to cover both the staph and possible gram negatives that may be present in urine that did not grow in culture. He has a picc line in place with pln for 2 weeks IV antibiotics.  -4/23: No fever x24 hours, WBC coming down today. If remains fever free and wbc normalizes may be ready for discharge tomorrow.   -4/24: High temp 100.4, wbc stable but not resolved. He is concerned about managing his lin at home-will have nursing complete education      Bacteremia: Staph epi, see above       Acute cystitis without hematuria: Staph epi on UC, treating as above. Rocephin in case there is second bacteria as he is still having significant fevers.       ELMA (acute kidney injury) (H): Due to acute cystitis vs bilateral nephritis. BPH with obstruction also contributing.   -4/23: Significant improvement though not resolved.   -4/24: stable      Possible Pyelonephritis, acute: as above. CT scan shows possible bilateral multifocal pyelonephritis      Benign prostatic hyperplasia with urinary retention: Lin placed onarrival, attempted trial without but he was unable to void so replaced lin 4/21. Will need follow up with urology.       DM type 1 (diabetes mellitus, type 1) (H): Using home pump, glucose has been well controlled.       HTN (hypertension): Pressures stable.                       # Severe Obesity: Estimated body mass index is 40.34 kg/m  as calculated from the following:    Height as of this encounter: 1.524 m (5').    Weight as of this encounter: 93.7 kg (206 lb 9.1 oz).          DVT Prophylaxis: Enoxaparin (Lovenox) SQ  Code Status: Full Code    Disposition: Expected discharge tomorrow if leuckocytosis  and fever resolved    Lily Acevedo CNP    Interval History   Feels well, denies pain, chest pain, abdominal pain, nausea.    -Data reviewed today: I reviewed all new labs and imaging results over the last 24 hours.     Physical Exam   Temp: 98.4  F (36.9  C) Temp src: Tympanic BP: 131/66 Pulse: 67   Resp: 16 SpO2: 94 % O2 Device: None (Room air)    Vitals:    04/21/23 0540 04/22/23 0522 04/23/23 0343   Weight: 93.1 kg (205 lb 4 oz) 93.7 kg (206 lb 9.1 oz) 93.7 kg (206 lb 9.1 oz)     Vital Signs with Ranges  Temp:  [98.4  F (36.9  C)-100.4  F (38  C)] 98.4  F (36.9  C)  Pulse:  [67-85] 67  Resp:  [16-18] 16  BP: (131-150)/(57-72) 131/66  SpO2:  [91 %-94 %] 94 %  I/O last 3 completed shifts:  In: 920 [P.O.:840; I.V.:80]  Out: 3050 [Urine:3050]    PICC 04/21/23 Double Lumen Left Basilic Extended IV antibiotics (Active)   Site Assessment WDL 04/23/23 1958   Extremity Circumference (cm) 27 cm 04/18/23 0001   Dressing Transparent;Chlorhexidine disk;Securement device 04/24/23 0745   Dressing Status clean;dry;intact 04/24/23 0745   Dressing Change Due 04/28/23 04/23/23 1600   Line Necessity Yes, meets criteria 04/24/23 0745   Purple - Status blood return noted;saline locked 04/24/23 0745   Purple - Intervention Flushed 04/24/23 0745   Red - Status blood return noted;saline locked 04/24/23 0745   Red - Intervention Flushed 04/24/23 0745   Phlebitis Scale 0-->no symptoms 04/24/23 0745   Infiltration? no 04/24/23 0745   Number of days: 3       Urethral Catheter 04/21/23 Latex 16 fr (Active)   Tube Description Positional 04/24/23 0745   Catheter Care Done;Soap and water/perineal cleanser 04/24/23 1200   Collection Container Standard 04/24/23 0745   Securement Method Securing device (Describe) 04/24/23 0745   Rationale for Continued Use Retention 04/24/23 0745   Urine Output 475 mL 04/24/23 1200   Number of days: 3     Line/device assessment(s) completed for medical necessity    Constitutional: Awake,alert, no acute  distress  Respiratory: Clear bilaterally, no wheezes, crackles or rhonchi  Cardiovascular: HRR, no murmurs, rubs, thrills.   GI: Soft,nontender, bowel sounds positive   Skin/Integumen: No unusual rashes, bruising or open areas.       Medications     insulin basal rate for inpatient ambulatory pump         calcium carbonate-vitamin D   Oral Daily     cefTRIAXone  2 g Intravenous Q24H     enoxaparin ANTICOAGULANT  40 mg Subcutaneous At Bedtime     gabapentin  300 mg Oral TID     insulin aspart   Device See Admin Instructions     insulin bolus from AMBULATORY PUMP   Subcutaneous TID AC     lidocaine  1 patch Transdermal Q24H    And     lidocaine   Transdermal Q8H THI     pantoprazole  40 mg Oral Daily     polyethylene glycol  17 g Oral Daily     saccharomyces boulardii  250 mg Oral BID     simvastatin  40 mg Oral At Bedtime     sodium chloride (PF)  10-40 mL Intracatheter Q8H     sodium chloride (PF)  3 mL Intracatheter Q8H     tamsulosin  0.4 mg Oral QPM       Data   Recent Labs   Lab 04/24/23  0553 04/23/23  0536 04/22/23  0606 04/21/23  1417 04/21/23  1052 04/19/23  0514 04/18/23  1219   WBC 13.5* 13.6* 14.8*  --   --    < > 20.3*   HGB 11.1* 10.3* 10.4*  --   --    < > 12.7*   MCV 92 91 90  --   --    < > 89    276 284  --   --    < > 218   INR  --   --   --   --  1.19*  --   --     140 141  --   --    < > 133*   POTASSIUM 4.1 3.6 3.8  --   --    < > 4.1   CHLORIDE 104 104 105  --   --    < > 96*   CO2 29 29 27  --   --    < > 25   BUN 13.0 11.4 13.3  --   --    < > 30.1*   CR 1.21* 1.20* 1.25*  --   --    < > 1.60*   ANIONGAP 10 7 9  --   --    < > 12   ANGELINA 8.7 8.5* 8.4*  --   --    < > 8.7   * 112* 120*   < >  --    < > 197*   ALBUMIN  --   --   --   --   --   --  3.6   PROTTOTAL  --   --   --   --   --   --  6.9   BILITOTAL  --   --   --   --   --   --  0.6   ALKPHOS  --   --   --   --   --   --  87   ALT  --   --   --   --   --   --  21   AST  --   --   --   --   --   --  24    < > = values  in this interval not displayed.       No results found for this or any previous visit (from the past 24 hour(s)).

## 2023-04-25 ENCOUNTER — TELEPHONE (OUTPATIENT)
Dept: INFUSION THERAPY | Facility: OTHER | Age: 58
End: 2023-04-25

## 2023-04-25 ENCOUNTER — TELEPHONE (OUTPATIENT)
Dept: FAMILY MEDICINE | Facility: OTHER | Age: 58
End: 2023-04-25

## 2023-04-25 ENCOUNTER — TELEPHONE (OUTPATIENT)
Dept: EMERGENCY MEDICINE | Facility: HOSPITAL | Age: 58
End: 2023-04-25

## 2023-04-25 VITALS
DIASTOLIC BLOOD PRESSURE: 60 MMHG | HEART RATE: 81 BPM | SYSTOLIC BLOOD PRESSURE: 149 MMHG | BODY MASS INDEX: 38.91 KG/M2 | RESPIRATION RATE: 18 BRPM | WEIGHT: 198.19 LBS | HEIGHT: 60 IN | OXYGEN SATURATION: 92 % | TEMPERATURE: 97.4 F

## 2023-04-25 LAB
ANION GAP SERPL CALCULATED.3IONS-SCNC: 7 MMOL/L (ref 7–15)
BUN SERPL-MCNC: 15.1 MG/DL (ref 6–20)
CALCIUM SERPL-MCNC: 8.5 MG/DL (ref 8.6–10)
CHLORIDE SERPL-SCNC: 105 MMOL/L (ref 98–107)
CREAT SERPL-MCNC: 1.23 MG/DL (ref 0.67–1.17)
CRP SERPL-MCNC: 105.31 MG/L
DEPRECATED HCO3 PLAS-SCNC: 31 MMOL/L (ref 22–29)
ERYTHROCYTE [DISTWIDTH] IN BLOOD BY AUTOMATED COUNT: 12.3 % (ref 10–15)
GFR SERPL CREATININE-BSD FRML MDRD: 68 ML/MIN/1.73M2
GLUCOSE SERPL-MCNC: 137 MG/DL (ref 70–99)
HCT VFR BLD AUTO: 31.8 % (ref 40–53)
HGB BLD-MCNC: 10.3 G/DL (ref 13.3–17.7)
MCH RBC QN AUTO: 29.7 PG (ref 26.5–33)
MCHC RBC AUTO-ENTMCNC: 32.4 G/DL (ref 31.5–36.5)
MCV RBC AUTO: 92 FL (ref 78–100)
PLATELET # BLD AUTO: 343 10E3/UL (ref 150–450)
POTASSIUM SERPL-SCNC: 3.9 MMOL/L (ref 3.4–5.3)
PROCALCITONIN SERPL IA-MCNC: 0.34 NG/ML
RBC # BLD AUTO: 3.47 10E6/UL (ref 4.4–5.9)
SODIUM SERPL-SCNC: 143 MMOL/L (ref 136–145)
WBC # BLD AUTO: 11.2 10E3/UL (ref 4–11)

## 2023-04-25 PROCEDURE — 82435 ASSAY OF BLOOD CHLORIDE: CPT | Performed by: NURSE PRACTITIONER

## 2023-04-25 PROCEDURE — 99239 HOSP IP/OBS DSCHRG MGMT >30: CPT | Performed by: NURSE PRACTITIONER

## 2023-04-25 PROCEDURE — 85027 COMPLETE CBC AUTOMATED: CPT | Performed by: NURSE PRACTITIONER

## 2023-04-25 PROCEDURE — 250N000011 HC RX IP 250 OP 636: Performed by: NURSE PRACTITIONER

## 2023-04-25 PROCEDURE — 250N000013 HC RX MED GY IP 250 OP 250 PS 637: Performed by: HOSPITALIST

## 2023-04-25 PROCEDURE — 250N000013 HC RX MED GY IP 250 OP 250 PS 637: Performed by: NURSE PRACTITIONER

## 2023-04-25 PROCEDURE — 84145 PROCALCITONIN (PCT): CPT | Performed by: NURSE PRACTITIONER

## 2023-04-25 PROCEDURE — 36592 COLLECT BLOOD FROM PICC: CPT | Performed by: NURSE PRACTITIONER

## 2023-04-25 PROCEDURE — 86140 C-REACTIVE PROTEIN: CPT | Performed by: NURSE PRACTITIONER

## 2023-04-25 RX ORDER — EPINEPHRINE 1 MG/ML
0.3 INJECTION, SOLUTION INTRAMUSCULAR; SUBCUTANEOUS EVERY 5 MIN PRN
Status: CANCELLED | OUTPATIENT
Start: 2023-04-26

## 2023-04-25 RX ORDER — ALBUTEROL SULFATE 0.83 MG/ML
2.5 SOLUTION RESPIRATORY (INHALATION)
Status: CANCELLED | OUTPATIENT
Start: 2023-04-26

## 2023-04-25 RX ORDER — HEPARIN SODIUM,PORCINE 10 UNIT/ML
5 VIAL (ML) INTRAVENOUS
Status: CANCELLED | OUTPATIENT
Start: 2023-04-26

## 2023-04-25 RX ORDER — METHYLPREDNISOLONE SODIUM SUCCINATE 125 MG/2ML
125 INJECTION, POWDER, LYOPHILIZED, FOR SOLUTION INTRAMUSCULAR; INTRAVENOUS
Status: CANCELLED
Start: 2023-04-26

## 2023-04-25 RX ORDER — MEPERIDINE HYDROCHLORIDE 25 MG/ML
25 INJECTION INTRAMUSCULAR; INTRAVENOUS; SUBCUTANEOUS EVERY 30 MIN PRN
Status: CANCELLED | OUTPATIENT
Start: 2023-04-26

## 2023-04-25 RX ORDER — ALBUTEROL SULFATE 90 UG/1
1-2 AEROSOL, METERED RESPIRATORY (INHALATION)
Status: CANCELLED
Start: 2023-04-26

## 2023-04-25 RX ORDER — CEFTRIAXONE SODIUM 2 G/50ML
2 INJECTION, SOLUTION INTRAVENOUS EVERY 24 HOURS
Status: CANCELLED
Start: 2023-04-27 | End: 2023-05-04

## 2023-04-25 RX ORDER — DIPHENHYDRAMINE HYDROCHLORIDE 50 MG/ML
50 INJECTION INTRAMUSCULAR; INTRAVENOUS
Status: CANCELLED
Start: 2023-04-26

## 2023-04-25 RX ORDER — HEPARIN SODIUM (PORCINE) LOCK FLUSH IV SOLN 100 UNIT/ML 100 UNIT/ML
5 SOLUTION INTRAVENOUS
Status: CANCELLED | OUTPATIENT
Start: 2023-04-26

## 2023-04-25 RX ADMIN — PANTOPRAZOLE SODIUM 40 MG: 40 TABLET, DELAYED RELEASE ORAL at 09:02

## 2023-04-25 RX ADMIN — GABAPENTIN 300 MG: 300 CAPSULE ORAL at 09:03

## 2023-04-25 RX ADMIN — CEFTRIAXONE SODIUM 2 G: 2 INJECTION, SOLUTION INTRAVENOUS at 11:31

## 2023-04-25 RX ADMIN — CALCIUM CARBONATE 600 MG (1,500 MG)-VITAMIN D3 400 UNIT TABLET 1 TABLET: at 09:02

## 2023-04-25 RX ADMIN — Medication 250 MG: at 09:03

## 2023-04-25 ASSESSMENT — ACTIVITIES OF DAILY LIVING (ADL)
ADLS_ACUITY_SCORE: 32
ADLS_ACUITY_SCORE: 33
ADLS_ACUITY_SCORE: 32
ADLS_ACUITY_SCORE: 33
ADLS_ACUITY_SCORE: 32
ADLS_ACUITY_SCORE: 33
ADLS_ACUITY_SCORE: 33

## 2023-04-25 NOTE — TELEPHONE ENCOUNTER
8:26 AM    Reason for Call: OVERBOOK    Patient is having the following symptoms: for ER follow/up.    The patient is requesting an appointment for ER Follow/up with Natacha Alvaerz.    Was an appointment offered for this call? No, He has an appointment coming up 5/8 but the nurse that called requested he be seen at earliest convenience, and would like him to keep the appointment for 5/8 also.   If yes : Appointment type              Date    Preferred method for responding to this message: Telephone Call  What is your phone number ? [phone]    If we cannot reach you directly, may we leave a detailed response at the number you provided? Yes    Can this message wait until your PCP/provider returns, if unavailable today? Not applicable, provider is in today    Mariah Barber

## 2023-04-25 NOTE — PROGRESS NOTES
Assessment & Plan     Pyelonephritis / Sepsis, due to unspecified organism, unspecified whether acute organ dysfunction present (H)  Richard is improving. WBC stable, CRP improving. Cr stable  - CRP, inflammation; Future  - Basic metabolic panel; Future  - CBC with platelets; Future  - CRP, inflammation  - Basic metabolic panel  - CBC with platelets  - c/w CTX which should be completed on 5/2/2023      Benign prostatic hyperplasia with urinary retention  Catheter in place and draining  - appt with [facility] urology on 5/3/2023    Diarrhea, unspecified type  Will check for c diff, if negative, okay for imodium   - C. difficile Toxin B PCR with reflex to C. difficile Antigen and Toxins A/B EIA; Future  - C. difficile Toxin B PCR with reflex to C. difficile Antigen and Toxins A/B EIA       MED REC REQUIRED  Post Medication Reconciliation Status:  Discharge medications reconciled and changed, see notes/orders    See Patient Instructions    Return if symptoms worsen or fail to improve.    Natacha Alvarez MD  [facility] - SAURABH Ramos is a 57 year old, presenting for the following health issues:  Hospital F/U         View : No data to display.              [facility]       Hospital Follow-up Visit:    Hospital/Nursing Home/IP Rehab Facility: [facility]  Date of Admission: 4/18/2023  Date of Discharge: 4/25/2023  Reason(s) for Admission: sepsis, acute cystitis, ELMA, pyelonephritis     Was your hospitalization related to COVID-19? No   Problems taking medications regularly:  None  Medication changes since discharge: None  Problems adhering to non-medication therapy:  None    Summary of hospitalization:  [facility] discharge summary reviewed  Diagnostic Tests/Treatments reviewed.  Follow up needed: urology  Other Healthcare Providers Involved in Patient s Care:         None  Update since discharge: improved.     Plan of care communicated with patient      # today:  feeling better, but not 100%  - CTX with infusions done on 5/2  - cannot go back to work until done with abx      # urology: appt with [facility] urology on May 3rd   - managing urinary catheter okay     # DM  - occasional the 300s after switching pod  - avg blood sugar 179 on printout    Review of Systems   Constitutional: Negative for chills and fever.   HENT: Negative for congestion.    Respiratory: Negative for shortness of breath and wheezing.    Cardiovascular: Negative for chest pain and palpitations.   Gastrointestinal: Positive for diarrhea. Negative for abdominal pain.   Endocrine:        Dry mouth    Genitourinary:        Urine is yellow, not cloudy, no odor   Neurological: Positive for dizziness.   Psychiatric/Behavioral:        Frustration d/t current health issues          Objective    /64 (BP Location: Right arm, Patient Position: Chair, Cuff Size: Adult Regular)   Pulse 93   Temp 98.2  F (36.8  C) (Tympanic)   Resp 18   Wt 92.7 kg (204 lb 6 oz)   SpO2 94%   BMI 39.91 kg/m    Body mass index is 39.91 kg/m .  Physical Exam  Constitutional:       General: He is not in acute distress.     Appearance: He is not ill-appearing.   Cardiovascular:      Rate and Rhythm: Normal rate and regular rhythm.      Pulses: Normal pulses.      Heart sounds: No murmur heard.  Pulmonary:      Effort: Pulmonary effort is normal. No respiratory distress.      Breath sounds: No wheezing or rales.   Genitourinary:     Comments: Urine bag with darker yellow, but not cloudy  Neurological:      Mental Status: He is alert.          Results for orders placed or performed in visit on 04/27/23 (from the past 24 hour(s))   CRP, inflammation   Result Value Ref Range    CRP Inflammation 54.95 (H) <5.00 mg/L   Basic metabolic panel   Result Value Ref Range    Sodium 140 136 - 145 mmol/L    Potassium 4.1 3.4 - 5.3 mmol/L    Chloride 102 98 - 107 mmol/L    Carbon Dioxide (CO2) 28 22 - 29 mmol/L    Anion Gap 10 7 - 15 mmol/L     Urea Nitrogen 11.8 6.0 - 20.0 mg/dL    Creatinine 1.24 (H) 0.67 - 1.17 mg/dL    Calcium 8.5 (L) 8.6 - 10.0 mg/dL    Glucose 230 (H) 70 - 99 mg/dL    GFR Estimate 67 >60 mL/min/1.73m2   CBC with platelets   Result Value Ref Range    WBC Count 11.3 (H) 4.0 - 11.0 10e3/uL    RBC Count 3.89 (L) 4.40 - 5.90 10e6/uL    Hemoglobin 11.6 (L) 13.3 - 17.7 g/dL    Hematocrit 36.1 (L) 40.0 - 53.0 %    MCV 93 78 - 100 fL    MCH 29.8 26.5 - 33.0 pg    MCHC 32.1 31.5 - 36.5 g/dL    RDW 12.4 10.0 - 15.0 %    Platelet Count 394 150 - 450 10e3/uL

## 2023-04-25 NOTE — PLAN OF CARE
Pt is A&O this shift. Pt febrile 99.2 this morning. Pt remains on room air.   Pt uses Dexcom this shift, and gives insulin per pump by self as documented in charting.   PICC in place to L forearm, and flushes. Blood return noted in both ports.   Snowden catheter in place. Pale yellow output this shift.   Pt educated on how to use leg bag this shift, and verbalizes understanding.  Pt stand by assist in room.   Patient discharged at 12:56 PM via wheel chair accompanied by mother and staff. Prescriptions sent to patients preferred pharmacy. All belongings sent with patient.     Discharge instructions reviewed with patient. Listed belongings gathered and returned to patient.     Patient discharged to home.     Surgical Patient   Surgical Procedures during stay: N/A  Did patient receive discharge instruction on wound care and recognition of infection symptoms? Yes    MISC  Follow up appointment made:  Yes  Home medications returned to patient: N/A  Patient reports pain was well managed at discharge: Yes

## 2023-04-25 NOTE — NURSING NOTE
Call from Aisha in Acute Care, noting she spoke with [facility] this am, wondering if orders are now in place for patient to be treated outpatient x7 days starting tomorrow. Therapy plan in but not signed, per Bernice this was where we were at this am in orders process. Advised Aisha that if provider is off site or unable to sign, she can call us and instruct us to sign on her behalf, however, patient can not be scheduled until orders are signed. Aisha will reach out to provider.

## 2023-04-25 NOTE — PROGRESS NOTES
Name: Richard Harvey    MRN#: [id number]    Reason for Hospitalization: Acute cystitis without hematuria [N30.00]  Sepsis, due to unspecified organism, unspecified whether acute organ dysfunction present (H) [A41.9]    Discharge Date: 4/25/2023    Patient / Family response to discharge plan: in agreement    Follow-Up Appt:   Future Appointments   Date Time Provider Department Center   4/26/2023  8:30 AM Juliette Jarrett DPM [facility]   5/8/2023  8:30 AM Natacha Alvarez MD [facility]   5/23/2023  8:30 AM Macrina Dixon APRN CNP [facility]       Other Providers (Care Coordinator, [facility] Services, PCA services etc): No, patient did have financial questions as he will be out of work for another week, was advised to contact the places that he is concerned about and explain his situation.    Discharge Disposition: home    Carolina Cortez CM

## 2023-04-25 NOTE — DISCHARGE SUMMARY
[facility]    Discharge Summary  Hospitalist    Date of Admission:  4/18/2023  Date of Discharge:  4/25/2023  Discharging Provider: Lily Acevedo CNP  Date of Service (when I saw the patient): 04/25/23    Discharge Diagnoses     Principal Problem:    Sepsis, due to unspecified organism, unspecified whether acute organ dysfunction present (H)  Active Problems:    Acute cystitis without hematuria    ELMA (acute kidney injury) (H)    Pyelonephritis, acute    Benign prostatic hyperplasia with urinary retention    DM type 1 (diabetes mellitus, type 1) (H)    HTN (hypertension)    Gastroparesis    Gastroesophageal reflux disease without esophagitis      History of Present Illness   From admission: Richard Harvey is a 57 year old male with history of hypertension, hyperlipidemia, type 1 diabetes mellitus, diabetic neuropathy with gastroparesis and chronic lower back pain presented to the ED with a complaint of fevers and chills going on for the last 4 days.  The patient uses an insulin pump has been doing well overall.  4 days ago he woke up with some chills put on some more blankets.  There was also fever.  He does not take Tylenol or ibuprofen because of stomach issues.  His fevers continued to get worse to the point that he was feeling very weak and lethargic today and had to come to the ED.  He lost his appetite as well.  In the ED he was found to have urinary retention for which a Snowden catheter was placed and his urine was found to be dirty.  The patient did report some hesitancy in urination but no dysuria as such.  The patient denies any abdominal pain nausea or vomiting.  Denies any chest pain or shortness of breath.  He does report right-sided lower back pain.  He denies any cough.  Denies any diarrhea.    Hospital Course     Sepsis, due to staph epi (H): Bacteremia positive in admission, repeat cultures negative. He was started on vancomycin on arrival. He continues to have mild fevers, will  start rocephin to cover both the staph and possible gram negatives that may be present in urine that did not grow in culture. He has a picc line in place with pln for 2 weeks IV antibiotics.  -4/23: No fever x24 hours, WBC coming down today. If remains fever free and wbc normalizes may be ready for discharge tomorrow.   -4/24: High temp 100.4, wbc stable but not resolved. He is concerned about managing his lin at home-will have nursing complete education       Bacteremia: Staph epi, see above        Acute cystitis without hematuria: Staph epi on UC, treating as above. Rocephin in case there is second bacteria as he is still having significant fevers.        ELMA (acute kidney injury) (H): Due to acute cystitis vs bilateral nephritis. BPH with obstruction also contributing.   -4/23: Significant improvement though not resolved.   -4/24: stable       Possible Pyelonephritis, acute: as above. CT scan shows possible bilateral multifocal pyelonephritis       Benign prostatic hyperplasia with urinary retention: Lin placed onarrival, attempted trial without but he was unable to void so replaced lin 4/21. Will need follow up with urology.        DM type 1 (diabetes mellitus, type 1) (H): Using home pump, glucose has been well controlled.        HTN (hypertension): Pressures stable.                        # Severe Obesity: Estimated body mass index is 40.34 kg/m  as calculated from the following:    Height as of this encounter: 1.524 m (5').    Weight as of this encounter: 93.7 kg (206 lb 9.1 oz).         Lily Acevedo CNP      Pending Results     Unresulted Labs Ordered in the Past 30 Days of this Admission     Date and Time Order Name Status Description    4/20/2023  6:23 AM Blood Culture Peripheral Blood Preliminary     4/20/2023  6:23 AM Blood Culture Peripheral Blood Preliminary           Code Status   Full Code       Primary Care Physician   Natacha Alvarez           Discharge Disposition   Discharged to  home  Condition at discharge: Stable    Consultations This Hospital Stay   PHARMACY TO DOSE VANCO    Time Spent on this Encounter   Lily BARBA NP, personally saw the patient today and spent greater than 30 minutes discharging this patient.    Discharge Orders      Reason for your hospital stay    Strep bacteremia     Activity    Your activity upon discharge: activity as tolerated     Tubes and drains    You are going home with the following tubes or drains: lin catheter.  Tube cares per hospital or home care instructions     Follow-up and recommended labs and tests     Follow up with primary care provider, Natacha Alvarez, within 7 days for hospital follow- up.  No follow up labs or test are needed.     Diet    Follow this diet upon discharge: Orders Placed This Encounter      Consistent Carbohydrate Diet Moderate Consistent Carb (60 g CHO per Meal) Diet     Discharge Medications   Current Discharge Medication List      CONTINUE these medications which have NOT CHANGED    Details   acetone, Urine, test STRP Use as directed  Qty: 50 each, Refills: 3    Associated Diagnoses: Type 1 diabetes mellitus with hyperglycemia (H)      CALCIUM-VITAMIN D PO Take 1 tablet by mouth daily      Continuous Blood Gluc  (DEXCOM G6 ) DAYAN 1 each continuous To be used per manufacture's recommendation  Qty: 1 each, Refills: 2    Associated Diagnoses: Type 1 diabetes mellitus with hypoglycemia and without coma (H); 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)      Continuous Blood Gluc Sensor (DEXCOM G6 SENSOR) MISC CHANGE EVERY 10 DAYS  Qty: 3 each, Refills: 11    Associated Diagnoses: Type 1 diabetes mellitus with hypoglycemia and without coma (H); 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)      Continuous Blood Gluc Transmit (DEXCOM G6 TRANSMITTER) MISC 1 each continuous  Qty: 1 each, Refills: 4    Associated Diagnoses: Type 1 diabetes mellitus with hypoglycemia and  without coma (H); 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)      gabapentin (NEURONTIN) 300 MG capsule Take 1 capsule (300 mg) by mouth 3 times daily  Qty: 90 capsule, Refills: 4    Associated Diagnoses: 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)      Glucagon (GVOKE HYPOPEN 2-PACK) 1 MG/0.2ML SOAJ Inject 1 mg Subcutaneous as needed (for low blood glucose)  Qty: 0.4 mL, Refills: 3    Associated Diagnoses: Type 1 diabetes mellitus with hypoglycemia and without coma (H)      Insulin Disposable Pump (OMNIPOD 5 G6 INTRO, GEN 5,) KIT 1 kit continuous  Qty: 1 kit, Refills: 0    Associated Diagnoses: Type 1 diabetes mellitus with hyperglycemia (H)      Insulin Disposable Pump (OMNIPOD 5 G6 POD, GEN 5,) MISC 1 each every 3 days  Qty: 10 each, Refills: 5    Associated Diagnoses: Type 1 diabetes mellitus with hyperglycemia (H)      INSULIN PUMP - OUTPATIENT Insulin pump settings Updated: 2/22/23 Omnipod 5 Basal:  0000 1.1 0200 1.05 0600 0.95 1020 0.7 1530 0.9 1800 1.15 Total bolus: 23.3 CR:  0000 12 1200 13 1600 14 ISF:  0000 65 1200 60 1800 55 Target: 110      Lidocaine (LIDOCARE) 4 % Patch Place 1 patch onto the skin every 24 hours To prevent lidocaine toxicity, patient should be patch free for 12 hrs daily.  Qty: 20 patch, Refills: 1    Associated Diagnoses: Chronic bilateral low back pain with right-sided sciatica      lisinopril (ZESTRIL) 10 MG tablet Take 1 tablet by mouth once daily  Qty: 90 tablet, Refills: 3    Associated Diagnoses: Essential hypertension      Multiple Vitamin (MULTI-VITAMIN DAILY PO) Take 1 tablet by mouth daily      nitroGLYcerin (NITROSTAT) 0.4 MG sublingual tablet Place 0.4 mg under the tongue every 5 minutes as needed for chest pain . Do not crush; maximum of 3 doses in 15 minutes.      NOVOLOG VIAL 100 UNIT/ML soln USE WITH INSULIN PUMP FOR A TOTAL DAILY USAGE OF 70 UNITS  Qty: 30 mL, Refills: 3    Associated Diagnoses: Type 1 diabetes mellitus with  hyperglycemia (H)      ondansetron (ZOFRAN) 4 MG tablet Take 1 tablet (4 mg) by mouth every 8 hours as needed for nausea  Qty: 30 tablet, Refills: 4    Associated Diagnoses: Nausea      pantoprazole (PROTONIX) 40 MG EC tablet Take 1 tablet by mouth twice daily  Qty: 60 tablet, Refills: 9    Associated Diagnoses: Gastroesophageal reflux disease without esophagitis; Gastroparesis      simvastatin (ZOCOR) 40 MG tablet TAKE 1 TABLET BY MOUTH AT BEDTIME  Qty: 90 tablet, Refills: 0    Associated Diagnoses: Hyperlipidemia, unspecified hyperlipidemia type      tamsulosin (FLOMAX) 0.4 MG capsule Take 1 capsule (0.4 mg) by mouth every evening  Qty: 90 capsule, Refills: 3    Associated Diagnoses: Urinary retention due to benign prostatic hyperplasia; Type 1 diabetes mellitus with hyperglycemia (H)      UREA 10 HYDRATING 10 % external cream APPLY  CREAM TOPICALLY TO AFFECTED AREA AS NEEDED FOR CALLOUSED SKIN  Qty: 85 g, Refills: 1    Associated Diagnoses: Type 1 diabetes mellitus with hyperglycemia (H)      CONTOUR NEXT TEST test strip TEST 6 TIMES DAILY, OR AS DIRECTED.  Qty: 200 strip, Refills: 5    Associated Diagnoses: Type 1 diabetes mellitus with hyperglycemia (H)      diclofenac (VOLTAREN) 1 % topical gel Apply 4 g topically 4 times daily as needed for moderate pain (4-6)  Qty: 50 g, Refills: 2    Associated Diagnoses: Chronic bilateral low back pain with right-sided sciatica      order for DME Equipment being ordered:     1.  [employer] insulin pump supplies--insertion sets and reserviors  Disp: 1 month  Refill: 11 months  Qty: 30 days, Refills: 11    Associated Diagnoses: Type 1 diabetes mellitus with hyperglycemia (H)           Allergies   No Known Allergies  Data   Most Recent 3 CBC's:Recent Labs   Lab Test 04/25/23  0521 04/24/23  0553 04/23/23  0536   WBC 11.2* 13.5* 13.6*   HGB 10.3* 11.1* 10.3*   MCV 92 92 91    259 276      Most Recent 3 BMP's:  Recent Labs   Lab Test 04/25/23  0521 04/24/23  2115  04/24/23  1726 04/24/23  0553 04/23/23  0536     --   --  143 140   POTASSIUM 3.9  --   --  4.1 3.6   CHLORIDE 105  --   --  104 104   CO2 31*  --   --  29 29   BUN 15.1  --   --  13.0 11.4   CR 1.23*  --   --  1.21* 1.20*   ANIONGAP 7  --   --  10 7   ANGELINA 8.5*  --   --  8.7 8.5*   * 206* 199* 122* 112*     Most Recent 2 LFT's:  Recent Labs   Lab Test 04/18/23  1219 11/03/22  1320   AST 24 31   ALT 21 24   ALKPHOS 87 96   BILITOTAL 0.6 0.5     Most Recent INR's and Anticoagulation Dosing History:  Anticoagulation Dose History         Latest Ref Rng & Units 11/3/2022 4/21/2023   Recent Dosing and Labs   INR 0.85 - 1.15 1.19   1.19                Most Recent 3 Troponin's:No lab results found.  Most Recent Cholesterol Panel:  Recent Labs   Lab Test 07/21/22  0735   CHOL 92   LDL 36   HDL 46   TRIG 51     Most Recent 6 Bacteria Isolates From Any Culture (See EPIC Reports for Culture Details):  Recent Labs   Lab Test 10/17/19  0945   CULT No Salmonella, Shigella, Campylobacter, E. coli O157, Aeromonas, or Plesiomonas isolated.  No Yersinia enterocolitica isolated       Most Recent TSH, T4 and A1c Labs:  Recent Labs   Lab Test 01/31/23  0752 07/21/22  0735 05/05/22  0834   TSH  --   --  1.94   A1C 6.9*   < >  --     < > = values in this interval not displayed.     Results for orders placed or performed during the hospital encounter of 04/18/23   US Renal Complete Non-Vascular    Narrative    PROCEDURE: US RENAL COMPLETE NON-VASCULAR    HISTORY: UTI with right CVA tenderness please evaluate for abscess    TECHNIQUE:  A renal ultrasound was performed.    COMPARISON:  11/3/2022    MEASUREMENTS:    Right renal length: 11.1 cm  Left renal length: 10.2 cm    RENAL FINDINGS: The kidneys are normal in size and echogenicity. There  is no hydronephrosis. No shadowing stones are seen.    BLADDER: The bladder is collapsed around a Snowden catheter.      Impression    IMPRESSION:      No hydronephrosis.      JEREMY  MD KARTHIK         SYSTEM ID:  [id number]   XR Chest 2 Views    Narrative    PROCEDURE: XR CHEST 2 VIEWS 4/19/2023 11:26 AM    HISTORY: sepsis, evaluate for pneumonia    COMPARISONS: None.    TECHNIQUE: 2 views.    FINDINGS: Heart is at the upper limits of normal in size. No confluent  infiltrate is seen and there is no pleural effusion. There is a right  convex scoliosis with mild degenerative change in the spine.         Impression    IMPRESSION: No acute infiltrate.    BERE TATE MD         SYSTEM ID:  [id number]   CT Abdomen Pelvis w Contrast    Narrative    PROCEDURE: CT ABDOMEN PELVIS W CONTRAST 4/20/2023 9:58 AM    HISTORY: [facility] Sepsis evaluate for abscess    COMPARISONS: 11/3/2022.    Meds/Dose Given: ISOVUE 370  85mL    TECHNIQUE: Axial postcontrast enhanced images with coronal and  sagittal reformatted images.    FINDINGS: Lung bases are relatively clear.    No focal abnormality is seen in the liver, spleen, adrenal glands or  in the pancreas. Gallbladder is present.    There is patchy asymmetric enhancement of the kidneys. No renal mass  is seen. There is no hydronephrosis or definite renal or ureteral  stone. There is not significant inflammation in the fat around the  kidneys.    There is diffuse thickening of the wall of the urinary bladder. Small  amount of gas is seen in the anterior bladder. Prostate is enlarged  and indents the base of the bladder.    There is a questionable soft tissue mass in the left side of the  rectum. This could represent stool but this appears different than  other stool in the colon. No other significant bowel abnormality is  seen. There is no inflammatory change or focal fluid collection.    No lymph node enlargement is seen. There is no aneurysm.    There is a chronic fracture deformity of the pelvis with fusion of the  pubic symphysis.         Impression    IMPRESSION:   1. No abscess. Tiny amount of ascites around the lateral margin of the  inferior liver and in the  pelvis.  2. Patchy asymmetrical enhancement of the kidneys. Most likely  etiology for this appearance would be multifocal pyelonephritis.  Infiltrative process such as lymphoma could have a similar appearance.  3. Diffuse thickening of the wall of the bladder suggesting cystitis.  If there hasn't been recent instrumentation the air in the bladder  would be worrisome for gas forming infection.  4. Questionable soft tissue mass in the left side of the rectum. If  the patient has not had: Screening exam this should be considered.    BERE TATE MD         SYSTEM ID:  [id number]   IR PICC Placement > 5 Yrs of Age    Narrative    IR PICC PLACEMENT > 5 YRS OF AGE    HISTORY: 57 years Male Pt. needs 2 weeks of vancomycin for sepsis.    COMPARISON: None    TECHNIQUE: Informed consent was obtained. A timeout was performed. The  patient was sterilely prepped and draped. Local injections of  lidocaine were performed.    Using ultrasound guidance a dual lumen PICC line was placed via the  left basilic vein. The tip was positioned at the level of the high  right atrium. Patient tolerated procedure well.      Impression    IMPRESSION: Successful PICC line placement as described above.    DOTTIE LAYTON MD         SYSTEM ID:  [id number]   Echocardiogram Complete     Value    LVEF  45-50% (mildly reduced)    Narrative    [id number]  [id number]  [id number]  [id number]     [facility]  Echocardiography Laboratory  [address] [place], [place] [id number]     Name: FRANCIA MONDRAGON  MRN: [id number]  : 1965  Study Date: 2023 11:32 AM  Age: 57 yrs  Gender: Male  Patient [facility]  Reason For Study: Other, Mechanical Complication  History: Endocarditis  Ordering Physician: WU PISANO  Performed By: Yue Reese RDCS     BSA: 1.8 m2  Height: 60 in  Weight: 193 lb  ______________________________________________________________________________  Procedure  Complete Portable Echo Adult.  Contrast Definity. Technically difficult study.  Patient was given 5ml mixture of 1.5ml Definity and 8.5ml saline. 5 ml wasted.  ______________________________________________________________________________  Interpretation Summary  No vegetation or mass identified, however this does not exclude endocarditis.  Very technically difficult study and valvular evaluation is suboptimal. Left  ventricular function is decreased. The ejection fraction is 45-50% (mildly  reduced).  Global right ventricular function is normal.  Both atria appear normal.  Trace mitral insufficiency is present.  Trace tricuspid insufficiency is present.  Trace pulmonic insufficiency is present.  Very technically difficult study and valvular evaluation is suboptimal.  ______________________________________________________________________________  Left Ventricle  Left ventricular function is decreased. The ejection fraction is 45-50%  (mildly reduced). Left ventricular diastolic function is normal. No regional  wall motion abnormalities are seen.     Right Ventricle  The right ventricle is normal size. Global right ventricular function is  normal.     Atria  Both atria appear normal.     Mitral Valve  Trace mitral insufficiency is present.     Aortic Valve  The mean AoV pressure gradient is 6.2 mmHg. The peak AoV pressure gradient is  11.4 mmHg. No aortic stenosis is present.     Tricuspid Valve  Trace tricuspid insufficiency is present.     Pulmonic Valve  Trace pulmonic insufficiency is present.     Pericardium  No pericardial effusion is present.     ______________________________________________________________________________  MMode/2D Measurements & Calculations  IVSd: 1.1 cm  LVIDd: 4.4 cm  LVIDs: 2.9 cm  LVPWd: 1.1 cm  FS: 34.6 %  LV mass(C)d: 169.0 grams  LV mass(C)dI: 91.9 grams/m2  Ao root diam: 3.3 cm  LVOT diam: 2.0 cm  LVOT area: 3.3 cm2     LA Volume Indexed (AL/bp): 30.8 ml/m2  RWT: 0.49  TAPSE: 2.7 cm     Doppler Measurements &  Calculations  MV E max ventura: 122.0 cm/sec  MV A max ventura: 95.0 cm/sec  MV E/A: 1.3  MV dec slope: 727.0 cm/sec2  MV dec time: 0.17 sec  Ao V2 max: 169.0 cm/sec  Ao max P.4 mmHg  Ao V2 mean: 116.0 cm/sec  Ao mean P.2 mmHg  Ao V2 VTI: 38.0 cm  MICHELLE(I,D): 2.1 cm2  MICHELLE(V,D): 1.9 cm2  LV V1 max PG: 3.9 mmHg  LV V1 max: 99.0 cm/sec  LV V1 VTI: 24.7 cm  SV(LVOT): 80.8 ml  SI(LVOT): 44.0 ml/m2  AV Ventura Ratio (DI): 0.59  MICHELLE Index (cm2/m2): 1.2     E/E' av.6  Lateral E/e': 14.2  Medial E/e': 15.1     ______________________________________________________________________________  Report approved by: Madalyn Cornell 2023 06:20 AM

## 2023-04-25 NOTE — PHARMACY
Pharmacy Antimicrobial Stewardship Assessment     Current Antimicrobial Therapy:  Anti-infectives (From now, onward)    Start     Dose/Rate Route Frequency Ordered Stop    23 1200  cefTRIAXone IN D5W (ROCEPHIN) intermittent infusion 2 g        Note to Pharmacy: Dose adjusted to 2g Rocephin due to weight >90kg per policy.    2 g  100 mL/hr over 30 Minutes Intravenous EVERY 24 HOURS 23 1145          Indication: UTI    Days of Therapy: Day 4 of ceftriaxone, day 8 of abx     Pertinent Labs:  Recent Labs   Lab Test 23  0521 23  0553 23  0536   WBC 11.2* 13.5* 13.6*     Recent Labs   Lab Test 23  0521 23  0536 23  1242 23  0606   LACT  --  0.5* 0.8  --    PCAL 0.34*  --   --  0.84*        Temperature:  Temp (24hrs), Av.6  F (37  C), Min:97.9  F (36.6  C), Max:99.8  F (37.7  C)    Culture Results:           Recommendations/Interventions:  No recommendations at this time. Will continue to monitor.    [facility]  2023

## 2023-04-25 NOTE — NURSING NOTE
Plan is signed. Call to Lawanda CASEY asking she schedule patient 1130 or later, depending on chair availability and to keep doses 23-24 hours apart as able.

## 2023-04-25 NOTE — TELEPHONE ENCOUNTER
Received notes from Lily Acevedo NP noting she entered order re ok to do dressing changes to PICC through treatment. Second note received with ok to add nursing communication to pull PICC after last abx infusion ordered. Plan updated.

## 2023-04-25 NOTE — PLAN OF CARE
Goal Outcome Evaluation: Progressing. Afebrile overnight. Denies pain. PICC saline locked with good blood return in both lumens. Snowden patent with adequate output. Continuous glucose monitor in place on abdomen, remains CDI and flush to skin. Insulin pump in place on RUE, remains CDI and flush to skin. Patient managing insulin pump independently, blood sugars well controlled overnight. Remains alert and oriented. Up to bathroom with standby assist. Calling appropriately for assistance. Patient hoping to discharge today.    I/O this shift:  In: 120 [P.O.:120]  Out: 800 [Urine:800]    Face to face report given with opportunity to observe patient.    Report given to Aisha Gilliam RN   4/25/2023  7:16 AM

## 2023-04-25 NOTE — PROGRESS NOTES
Thank you. Can I add a nursing communication to physically pull patients PICC at end of last abx treatment?

## 2023-04-25 NOTE — TELEPHONE ENCOUNTER
Patient will be coming to us for daily Rocephin under your orders starting tomorrow 4-26-23, x7 days. On review of chart, it appears patient has a PICC line.    We will need orders:    1) to be able to change his PICC dressing weekly and PRN until abx orders complete.      2) and the ok to pull PICC at end of treatment, unless that hinges on a follow up appt.      Please advise.

## 2023-04-25 NOTE — TELEPHONE ENCOUNTER
Left message regarding an appointment request. I have scheduled an ER follow-up on 04/27/2023 at 0900 arrival time 0845  Thank You

## 2023-04-26 ENCOUNTER — PATIENT OUTREACH (OUTPATIENT)
Dept: CARE COORDINATION | Facility: OTHER | Age: 58
End: 2023-04-26

## 2023-04-26 ENCOUNTER — OFFICE VISIT (OUTPATIENT)
Dept: PODIATRY | Facility: OTHER | Age: 58
End: 2023-04-26
Attending: PODIATRIST
Payer: COMMERCIAL

## 2023-04-26 ENCOUNTER — INFUSION THERAPY VISIT (OUTPATIENT)
Dept: INFUSION THERAPY | Facility: OTHER | Age: 58
End: 2023-04-26
Attending: NURSE PRACTITIONER
Payer: COMMERCIAL

## 2023-04-26 VITALS
OXYGEN SATURATION: 92 % | DIASTOLIC BLOOD PRESSURE: 66 MMHG | TEMPERATURE: 97.6 F | RESPIRATION RATE: 18 BRPM | SYSTOLIC BLOOD PRESSURE: 125 MMHG | HEART RATE: 88 BPM

## 2023-04-26 VITALS
SYSTOLIC BLOOD PRESSURE: 150 MMHG | DIASTOLIC BLOOD PRESSURE: 80 MMHG | OXYGEN SATURATION: 92 % | HEART RATE: 89 BPM | TEMPERATURE: 97 F

## 2023-04-26 DIAGNOSIS — R20.8 LOSS OF PROTECTIVE SENSATION OF SKIN OF DEFORMED FOOT: ICD-10-CM

## 2023-04-26 DIAGNOSIS — N10 PYELONEPHRITIS, ACUTE: Primary | ICD-10-CM

## 2023-04-26 DIAGNOSIS — A41.9 SEPSIS, DUE TO UNSPECIFIED ORGANISM, UNSPECIFIED WHETHER ACUTE ORGAN DYSFUNCTION PRESENT (H): ICD-10-CM

## 2023-04-26 DIAGNOSIS — E10.65 TYPE 1 DIABETES MELLITUS WITH HYPERGLYCEMIA (H): ICD-10-CM

## 2023-04-26 DIAGNOSIS — E10.42 DIABETIC POLYNEUROPATHY ASSOCIATED WITH TYPE 1 DIABETES MELLITUS (H): ICD-10-CM

## 2023-04-26 DIAGNOSIS — L84 CALLUS OF FOOT: Primary | ICD-10-CM

## 2023-04-26 DIAGNOSIS — R60.0 BILATERAL LOWER EXTREMITY EDEMA: ICD-10-CM

## 2023-04-26 DIAGNOSIS — L60.3 ONYCHODYSTROPHY: ICD-10-CM

## 2023-04-26 DIAGNOSIS — R09.89 DECREASED PEDAL PULSES: ICD-10-CM

## 2023-04-26 DIAGNOSIS — M21.969 LOSS OF PROTECTIVE SENSATION OF SKIN OF DEFORMED FOOT: ICD-10-CM

## 2023-04-26 LAB
BACTERIA BLD CULT: NO GROWTH
BACTERIA BLD CULT: NO GROWTH

## 2023-04-26 PROCEDURE — G0463 HOSPITAL OUTPT CLINIC VISIT: HCPCS | Mod: 25

## 2023-04-26 PROCEDURE — 11721 DEBRIDE NAIL 6 OR MORE: CPT | Mod: XU | Performed by: PODIATRIST

## 2023-04-26 PROCEDURE — 250N000011 HC RX IP 250 OP 636: Performed by: NURSE PRACTITIONER

## 2023-04-26 PROCEDURE — 99213 OFFICE O/P EST LOW 20 MIN: CPT | Mod: 25 | Performed by: PODIATRIST

## 2023-04-26 PROCEDURE — 96372 THER/PROPH/DIAG INJ SC/IM: CPT

## 2023-04-26 PROCEDURE — 11056 PARNG/CUTG B9 HYPRKR LES 2-4: CPT | Mod: GZ | Performed by: PODIATRIST

## 2023-04-26 RX ORDER — MEPERIDINE HYDROCHLORIDE 25 MG/ML
25 INJECTION INTRAMUSCULAR; INTRAVENOUS; SUBCUTANEOUS EVERY 30 MIN PRN
Status: CANCELLED | OUTPATIENT
Start: 2023-04-26

## 2023-04-26 RX ORDER — ALBUTEROL SULFATE 90 UG/1
1-2 AEROSOL, METERED RESPIRATORY (INHALATION)
Status: DISCONTINUED | OUTPATIENT
Start: 2023-04-26 | End: 2023-04-26 | Stop reason: HOSPADM

## 2023-04-26 RX ORDER — HEPARIN SODIUM,PORCINE 10 UNIT/ML
5 VIAL (ML) INTRAVENOUS
Status: CANCELLED | OUTPATIENT
Start: 2023-04-26

## 2023-04-26 RX ORDER — METHYLPREDNISOLONE SODIUM SUCCINATE 125 MG/2ML
125 INJECTION, POWDER, LYOPHILIZED, FOR SOLUTION INTRAMUSCULAR; INTRAVENOUS
Status: CANCELLED
Start: 2023-04-26

## 2023-04-26 RX ORDER — HEPARIN SODIUM (PORCINE) LOCK FLUSH IV SOLN 100 UNIT/ML 100 UNIT/ML
5 SOLUTION INTRAVENOUS
Status: CANCELLED | OUTPATIENT
Start: 2023-04-26

## 2023-04-26 RX ORDER — MEPERIDINE HYDROCHLORIDE 25 MG/ML
25 INJECTION INTRAMUSCULAR; INTRAVENOUS; SUBCUTANEOUS EVERY 30 MIN PRN
Status: DISCONTINUED | OUTPATIENT
Start: 2023-04-26 | End: 2023-04-26 | Stop reason: HOSPADM

## 2023-04-26 RX ORDER — EPINEPHRINE 1 MG/ML
0.3 INJECTION, SOLUTION, CONCENTRATE INTRAVENOUS EVERY 5 MIN PRN
Status: CANCELLED | OUTPATIENT
Start: 2023-04-26

## 2023-04-26 RX ORDER — METHYLPREDNISOLONE SODIUM SUCCINATE 125 MG/2ML
125 INJECTION, POWDER, LYOPHILIZED, FOR SOLUTION INTRAMUSCULAR; INTRAVENOUS
Status: DISCONTINUED | OUTPATIENT
Start: 2023-04-26 | End: 2023-04-26 | Stop reason: HOSPADM

## 2023-04-26 RX ORDER — DIPHENHYDRAMINE HYDROCHLORIDE 50 MG/ML
50 INJECTION INTRAMUSCULAR; INTRAVENOUS
Status: DISCONTINUED | OUTPATIENT
Start: 2023-04-26 | End: 2023-04-26 | Stop reason: HOSPADM

## 2023-04-26 RX ORDER — CEFTRIAXONE SODIUM 2 G/50ML
2 INJECTION, SOLUTION INTRAVENOUS EVERY 24 HOURS
Status: CANCELLED
Start: 2023-04-28 | End: 2023-05-05

## 2023-04-26 RX ORDER — EPINEPHRINE 1 MG/ML
0.3 INJECTION, SOLUTION, CONCENTRATE INTRAVENOUS EVERY 5 MIN PRN
Status: DISCONTINUED | OUTPATIENT
Start: 2023-04-26 | End: 2023-04-26 | Stop reason: HOSPADM

## 2023-04-26 RX ORDER — ALBUTEROL SULFATE 0.83 MG/ML
2.5 SOLUTION RESPIRATORY (INHALATION)
Status: DISCONTINUED | OUTPATIENT
Start: 2023-04-26 | End: 2023-04-26 | Stop reason: HOSPADM

## 2023-04-26 RX ORDER — CEFTRIAXONE SODIUM 2 G/50ML
2 INJECTION, SOLUTION INTRAVENOUS EVERY 24 HOURS
Status: DISCONTINUED | OUTPATIENT
Start: 2023-04-26 | End: 2023-04-26 | Stop reason: HOSPADM

## 2023-04-26 RX ORDER — DIPHENHYDRAMINE HYDROCHLORIDE 50 MG/ML
50 INJECTION INTRAMUSCULAR; INTRAVENOUS
Status: CANCELLED
Start: 2023-04-26

## 2023-04-26 RX ORDER — ALBUTEROL SULFATE 90 UG/1
1-2 AEROSOL, METERED RESPIRATORY (INHALATION)
Status: CANCELLED
Start: 2023-04-26

## 2023-04-26 RX ORDER — ALBUTEROL SULFATE 0.83 MG/ML
2.5 SOLUTION RESPIRATORY (INHALATION)
Status: CANCELLED | OUTPATIENT
Start: 2023-04-26

## 2023-04-26 RX ADMIN — CEFTRIAXONE SODIUM 2 G: 2 INJECTION, SOLUTION INTRAVENOUS at 11:23

## 2023-04-26 ASSESSMENT — PAIN SCALES - GENERAL
PAINLEVEL: SEVERE PAIN (6)
PAINLEVEL: SEVERE PAIN (6)

## 2023-04-26 NOTE — PROGRESS NOTES
"Clinic Care Coordination Contact  Care Team Conversations    RN CC attempted to complete TCM with patient, patient declined TCM. Patient states he is doing \"okay\" after recent hospitalization and then did not answer the rest of writer's questions. Patient stated he had to go and then hung up.   Patient does have infusion appointments scheduled and has a PCP appt tomorrow, 4/27 with an arrival time of 8:45 am.   No further outreach by FARZANA BERNARD.     Jose F WHITLOCK RN, Care Coordinator  [facility]        "

## 2023-04-26 NOTE — PROGRESS NOTES
Post Infusion Assessment:  Patient tolerated infusion without incident.  Blood return noted pre and post infusion.  Site patent and intact, free from redness, edema or discomfort.  No evidence of extravasations.    Discharge Plan:   Discharge instructions reviewed with: Patient and Family.  AVS to patient via IIDT.  Patient will return 4/27/23 for next appointment.   Patient discharged in stable condition accompanied by: self and mother.  Departure Mode: Ambulatory.
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
## 2023-04-26 NOTE — PROGRESS NOTES
Infusion Nursing Note:  Richard Harvey presents today for Rocephin.    Patient seen by provider today: No   present during visit today: Not Applicable.    Note: Patient and mother reporting they were not able to empty overnight catheter bag, they unclamped but would not drain. Attempted to explain where/how to drain but they did not seem to fully understand with verbal instruction alone. Obtained urine drainage bag, same as they described he had at home, and demonstrated how to empty. Patient and mom verbalized and demonstrated understanding.       Intravenous Access:  PICC.

## 2023-04-27 ENCOUNTER — OFFICE VISIT (OUTPATIENT)
Dept: FAMILY MEDICINE | Facility: OTHER | Age: 58
End: 2023-04-27
Attending: FAMILY MEDICINE
Payer: COMMERCIAL

## 2023-04-27 ENCOUNTER — ALLIED HEALTH/NURSE VISIT (OUTPATIENT)
Dept: EDUCATION SERVICES | Facility: OTHER | Age: 58
End: 2023-04-27
Attending: NURSE PRACTITIONER
Payer: COMMERCIAL

## 2023-04-27 VITALS
BODY MASS INDEX: 39.91 KG/M2 | HEART RATE: 93 BPM | SYSTOLIC BLOOD PRESSURE: 126 MMHG | WEIGHT: 204.38 LBS | TEMPERATURE: 98.2 F | DIASTOLIC BLOOD PRESSURE: 64 MMHG | RESPIRATION RATE: 18 BRPM | OXYGEN SATURATION: 94 %

## 2023-04-27 DIAGNOSIS — R19.7 DIARRHEA, UNSPECIFIED TYPE: ICD-10-CM

## 2023-04-27 DIAGNOSIS — R33.8 BENIGN PROSTATIC HYPERPLASIA WITH URINARY RETENTION: ICD-10-CM

## 2023-04-27 DIAGNOSIS — A41.9 SEPSIS, DUE TO UNSPECIFIED ORGANISM, UNSPECIFIED WHETHER ACUTE ORGAN DYSFUNCTION PRESENT (H): ICD-10-CM

## 2023-04-27 DIAGNOSIS — N12 PYELONEPHRITIS: Primary | ICD-10-CM

## 2023-04-27 DIAGNOSIS — E10.65 TYPE 1 DIABETES MELLITUS WITH HYPERGLYCEMIA (H): Primary | ICD-10-CM

## 2023-04-27 DIAGNOSIS — N40.1 BENIGN PROSTATIC HYPERPLASIA WITH URINARY RETENTION: ICD-10-CM

## 2023-04-27 LAB
ANION GAP SERPL CALCULATED.3IONS-SCNC: 10 MMOL/L (ref 7–15)
BUN SERPL-MCNC: 11.8 MG/DL (ref 6–20)
CALCIUM SERPL-MCNC: 8.5 MG/DL (ref 8.6–10)
CHLORIDE SERPL-SCNC: 102 MMOL/L (ref 98–107)
CREAT SERPL-MCNC: 1.24 MG/DL (ref 0.67–1.17)
CRP SERPL-MCNC: 54.95 MG/L
DEPRECATED HCO3 PLAS-SCNC: 28 MMOL/L (ref 22–29)
ERYTHROCYTE [DISTWIDTH] IN BLOOD BY AUTOMATED COUNT: 12.4 % (ref 10–15)
GFR SERPL CREATININE-BSD FRML MDRD: 67 ML/MIN/1.73M2
GLUCOSE SERPL-MCNC: 230 MG/DL (ref 70–99)
HCT VFR BLD AUTO: 36.1 % (ref 40–53)
HGB BLD-MCNC: 11.6 G/DL (ref 13.3–17.7)
MCH RBC QN AUTO: 29.8 PG (ref 26.5–33)
MCHC RBC AUTO-ENTMCNC: 32.1 G/DL (ref 31.5–36.5)
MCV RBC AUTO: 93 FL (ref 78–100)
PLATELET # BLD AUTO: 394 10E3/UL (ref 150–450)
POTASSIUM SERPL-SCNC: 4.1 MMOL/L (ref 3.4–5.3)
RBC # BLD AUTO: 3.89 10E6/UL (ref 4.4–5.9)
SODIUM SERPL-SCNC: 140 MMOL/L (ref 136–145)
WBC # BLD AUTO: 11.3 10E3/UL (ref 4–11)

## 2023-04-27 PROCEDURE — G0463 HOSPITAL OUTPT CLINIC VISIT: HCPCS | Mod: 25

## 2023-04-27 PROCEDURE — 86140 C-REACTIVE PROTEIN: CPT | Mod: ZL | Performed by: FAMILY MEDICINE

## 2023-04-27 PROCEDURE — 99495 TRANSJ CARE MGMT MOD F2F 14D: CPT | Performed by: FAMILY MEDICINE

## 2023-04-27 PROCEDURE — 80048 BASIC METABOLIC PNL TOTAL CA: CPT | Mod: ZL | Performed by: FAMILY MEDICINE

## 2023-04-27 PROCEDURE — 96365 THER/PROPH/DIAG IV INF INIT: CPT

## 2023-04-27 PROCEDURE — 87493 C DIFF AMPLIFIED PROBE: CPT | Mod: ZL | Performed by: FAMILY MEDICINE

## 2023-04-27 PROCEDURE — 85027 COMPLETE CBC AUTOMATED: CPT | Mod: ZL | Performed by: FAMILY MEDICINE

## 2023-04-27 PROCEDURE — 36415 COLL VENOUS BLD VENIPUNCTURE: CPT | Mod: ZL | Performed by: FAMILY MEDICINE

## 2023-04-27 ASSESSMENT — PAIN SCALES - GENERAL: PAINLEVEL: SEVERE PAIN (7)

## 2023-04-27 ASSESSMENT — ENCOUNTER SYMPTOMS
DIARRHEA: 1
SHORTNESS OF BREATH: 0
ABDOMINAL PAIN: 0
FEVER: 0
ENDOCRINE COMMENTS: DRY MOUTH
CHILLS: 0
WHEEZING: 0
DIZZINESS: 1
PALPITATIONS: 0
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
## 2023-04-27 NOTE — PROGRESS NOTES
Pt came in for a Omnipod 5 download today. This was completed and given to Macrina Dixon and Dr Alvarez.    Lianna Brewster

## 2023-04-27 NOTE — PROGRESS NOTES
[facility] CGM and insulin pump download:    Date: 4/14 to 4/27/23  Glucose management indicator: n/a    Average glucose: 170    Glucose range  Very low (<54): 0  low (<70): 0  In target range (): 69%  High (>180): 21%  Very high (>250): 10%    Keep working on the timing of your carb bolus  No change    Follow up   As scheduled in May    Keep up the hard work!      Macrina Dixon APRN FNP-BC  Diabetes and Wound Care

## 2023-04-28 ENCOUNTER — TELEPHONE (OUTPATIENT)
Dept: FAMILY MEDICINE | Facility: OTHER | Age: 58
End: 2023-04-28

## 2023-04-28 ENCOUNTER — INFUSION THERAPY VISIT (OUTPATIENT)
Dept: INFUSION THERAPY | Facility: OTHER | Age: 58
End: 2023-04-28
Attending: NURSE PRACTITIONER
Payer: COMMERCIAL

## 2023-04-28 ENCOUNTER — APPOINTMENT (OUTPATIENT)
Dept: LAB | Facility: OTHER | Age: 58
End: 2023-04-28
Payer: COMMERCIAL

## 2023-04-28 VITALS
TEMPERATURE: 98 F | RESPIRATION RATE: 17 BRPM | OXYGEN SATURATION: 93 % | HEART RATE: 80 BPM | DIASTOLIC BLOOD PRESSURE: 68 MMHG | SYSTOLIC BLOOD PRESSURE: 122 MMHG

## 2023-04-28 DIAGNOSIS — R19.7 DIARRHEA, UNSPECIFIED TYPE: Primary | ICD-10-CM

## 2023-04-28 DIAGNOSIS — N10 PYELONEPHRITIS, ACUTE: Primary | ICD-10-CM

## 2023-04-28 DIAGNOSIS — A41.9 SEPSIS, DUE TO UNSPECIFIED ORGANISM, UNSPECIFIED WHETHER ACUTE ORGAN DYSFUNCTION PRESENT (H): ICD-10-CM

## 2023-04-28 LAB — C DIFF TOX B STL QL: NEGATIVE

## 2023-04-28 PROCEDURE — 96365 THER/PROPH/DIAG IV INF INIT: CPT

## 2023-04-28 PROCEDURE — 250N000011 HC RX IP 250 OP 636: Performed by: NURSE PRACTITIONER

## 2023-04-28 RX ORDER — ALBUTEROL SULFATE 90 UG/1
1-2 AEROSOL, METERED RESPIRATORY (INHALATION)
Status: CANCELLED
Start: 2023-04-28

## 2023-04-28 RX ORDER — METHYLPREDNISOLONE SODIUM SUCCINATE 125 MG/2ML
125 INJECTION, POWDER, LYOPHILIZED, FOR SOLUTION INTRAMUSCULAR; INTRAVENOUS
Status: CANCELLED
Start: 2023-04-28

## 2023-04-28 RX ORDER — LOPERAMIDE HYDROCHLORIDE 2 MG/1
2 TABLET ORAL 4 TIMES DAILY PRN
Qty: 30 TABLET | Refills: 0 | Status: SHIPPED | OUTPATIENT
Start: 2023-04-28

## 2023-04-28 RX ORDER — EPINEPHRINE 1 MG/ML
0.3 INJECTION, SOLUTION, CONCENTRATE INTRAVENOUS EVERY 5 MIN PRN
Status: CANCELLED | OUTPATIENT
Start: 2023-04-28

## 2023-04-28 RX ORDER — DIPHENHYDRAMINE HYDROCHLORIDE 50 MG/ML
50 INJECTION INTRAMUSCULAR; INTRAVENOUS
Status: CANCELLED
Start: 2023-04-28

## 2023-04-28 RX ORDER — HEPARIN SODIUM (PORCINE) LOCK FLUSH IV SOLN 100 UNIT/ML 100 UNIT/ML
5 SOLUTION INTRAVENOUS
Status: CANCELLED | OUTPATIENT
Start: 2023-04-28

## 2023-04-28 RX ORDER — CEFTRIAXONE SODIUM 2 G/50ML
2 INJECTION, SOLUTION INTRAVENOUS EVERY 24 HOURS
Status: CANCELLED
Start: 2023-04-29

## 2023-04-28 RX ORDER — ALBUTEROL SULFATE 0.83 MG/ML
2.5 SOLUTION RESPIRATORY (INHALATION)
Status: CANCELLED | OUTPATIENT
Start: 2023-04-28

## 2023-04-28 RX ORDER — MEPERIDINE HYDROCHLORIDE 25 MG/ML
25 INJECTION INTRAMUSCULAR; INTRAVENOUS; SUBCUTANEOUS EVERY 30 MIN PRN
Status: CANCELLED | OUTPATIENT
Start: 2023-04-28

## 2023-04-28 RX ORDER — HEPARIN SODIUM,PORCINE 10 UNIT/ML
5 VIAL (ML) INTRAVENOUS
Status: CANCELLED | OUTPATIENT
Start: 2023-04-28

## 2023-04-28 RX ORDER — CEFTRIAXONE SODIUM 2 G/50ML
2 INJECTION, SOLUTION INTRAVENOUS EVERY 24 HOURS
Status: DISCONTINUED | OUTPATIENT
Start: 2023-04-29 | End: 2023-04-28 | Stop reason: HOSPADM

## 2023-04-28 RX ORDER — HEPARIN SODIUM (PORCINE) LOCK FLUSH IV SOLN 100 UNIT/ML 100 UNIT/ML
5 SOLUTION INTRAVENOUS
Status: DISCONTINUED | OUTPATIENT
Start: 2023-04-28 | End: 2023-04-28 | Stop reason: HOSPADM

## 2023-04-28 RX ADMIN — CEFTRIAXONE SODIUM 2 G: 2 INJECTION, SOLUTION INTRAVENOUS at 11:29

## 2023-04-28 NOTE — PROGRESS NOTES
Patient is 57 years old, here today for infusion of rocephin.      Patient identified with two identifiers, order verified, and verbal consent for today's infusion obtained from patient.      Lab values:  n/a      Patient meets order parameters for today's treatment.         Patients picc accessed . Port accessed per facility protocol. Purple and red lumens flushed easily, blood return noted.  No signs and symptoms of infection or infiltration.      IV pump verified with dose, drug, and rate of administration.  Infusion administered per protocol.  Patient tolerated infusion well, no signs or symptoms of adverse reaction noted.  Patient denies pain nor discomfort.       Site clean, dry and intact.  No signs or symptoms of infiltration or infection.  Covered with a sterile bandage, slight pressure applied for 30 seconds.  Pt instructed to leave bandage intact for a minimum of one hour, and to call with questions or concerns. Patient states understanding, discharged.

## 2023-04-29 ENCOUNTER — HOSPITAL ENCOUNTER (OUTPATIENT)
Facility: HOSPITAL | Age: 58
Discharge: HOME | End: 2023-04-29
Attending: NURSE PRACTITIONER | Admitting: NURSE PRACTITIONER
Payer: COMMERCIAL

## 2023-04-29 VITALS
HEART RATE: 77 BPM | RESPIRATION RATE: 17 BRPM | DIASTOLIC BLOOD PRESSURE: 69 MMHG | TEMPERATURE: 98 F | SYSTOLIC BLOOD PRESSURE: 140 MMHG

## 2023-04-29 DIAGNOSIS — N10 PYELONEPHRITIS, ACUTE: Primary | ICD-10-CM

## 2023-04-29 DIAGNOSIS — A41.9 SEPSIS, DUE TO UNSPECIFIED ORGANISM, UNSPECIFIED WHETHER ACUTE ORGAN DYSFUNCTION PRESENT (H): ICD-10-CM

## 2023-04-29 PROCEDURE — 250N000011 HC RX IP 250 OP 636: Performed by: NURSE PRACTITIONER

## 2023-04-29 PROCEDURE — 96365 THER/PROPH/DIAG IV INF INIT: CPT

## 2023-04-29 RX ORDER — EPINEPHRINE 1 MG/ML
0.3 INJECTION, SOLUTION INTRAMUSCULAR; SUBCUTANEOUS EVERY 5 MIN PRN
Status: CANCELLED | OUTPATIENT
Start: 2023-04-29

## 2023-04-29 RX ORDER — DIPHENHYDRAMINE HYDROCHLORIDE 50 MG/ML
50 INJECTION INTRAMUSCULAR; INTRAVENOUS
Status: CANCELLED
Start: 2023-04-29

## 2023-04-29 RX ORDER — CEFTRIAXONE SODIUM 2 G/50ML
2 INJECTION, SOLUTION INTRAVENOUS EVERY 24 HOURS
Status: CANCELLED
Start: 2023-04-30

## 2023-04-29 RX ORDER — MEPERIDINE HYDROCHLORIDE 25 MG/ML
25 INJECTION INTRAMUSCULAR; INTRAVENOUS; SUBCUTANEOUS EVERY 30 MIN PRN
Status: CANCELLED | OUTPATIENT
Start: 2023-04-29

## 2023-04-29 RX ORDER — CEFTRIAXONE SODIUM 2 G/50ML
2 INJECTION, SOLUTION INTRAVENOUS EVERY 24 HOURS
Status: DISCONTINUED | OUTPATIENT
Start: 2023-04-29 | End: 2023-04-29 | Stop reason: HOSPADM

## 2023-04-29 RX ORDER — METHYLPREDNISOLONE SODIUM SUCCINATE 125 MG/2ML
125 INJECTION, POWDER, LYOPHILIZED, FOR SOLUTION INTRAMUSCULAR; INTRAVENOUS
Status: CANCELLED
Start: 2023-04-29

## 2023-04-29 RX ORDER — ALBUTEROL SULFATE 90 UG/1
1-2 AEROSOL, METERED RESPIRATORY (INHALATION)
Status: CANCELLED
Start: 2023-04-29

## 2023-04-29 RX ORDER — DIPHENHYDRAMINE HYDROCHLORIDE 50 MG/ML
50 INJECTION INTRAMUSCULAR; INTRAVENOUS
Status: DISCONTINUED | OUTPATIENT
Start: 2023-04-29 | End: 2023-04-29 | Stop reason: HOSPADM

## 2023-04-29 RX ORDER — HEPARIN SODIUM,PORCINE 10 UNIT/ML
5 VIAL (ML) INTRAVENOUS
Status: DISCONTINUED | OUTPATIENT
Start: 2023-04-29 | End: 2023-04-29 | Stop reason: HOSPADM

## 2023-04-29 RX ORDER — ALBUTEROL SULFATE 90 UG/1
1-2 AEROSOL, METERED RESPIRATORY (INHALATION)
Status: DISCONTINUED | OUTPATIENT
Start: 2023-04-29 | End: 2023-04-29 | Stop reason: HOSPADM

## 2023-04-29 RX ORDER — METHYLPREDNISOLONE SODIUM SUCCINATE 125 MG/2ML
125 INJECTION, POWDER, LYOPHILIZED, FOR SOLUTION INTRAMUSCULAR; INTRAVENOUS
Status: DISCONTINUED | OUTPATIENT
Start: 2023-04-29 | End: 2023-04-29 | Stop reason: HOSPADM

## 2023-04-29 RX ORDER — ALBUTEROL SULFATE 0.83 MG/ML
2.5 SOLUTION RESPIRATORY (INHALATION)
Status: DISCONTINUED | OUTPATIENT
Start: 2023-04-29 | End: 2023-04-29 | Stop reason: HOSPADM

## 2023-04-29 RX ORDER — HEPARIN SODIUM (PORCINE) LOCK FLUSH IV SOLN 100 UNIT/ML 100 UNIT/ML
5 SOLUTION INTRAVENOUS
Status: CANCELLED | OUTPATIENT
Start: 2023-04-29

## 2023-04-29 RX ORDER — ALBUTEROL SULFATE 0.83 MG/ML
2.5 SOLUTION RESPIRATORY (INHALATION)
Status: CANCELLED | OUTPATIENT
Start: 2023-04-29

## 2023-04-29 RX ORDER — HEPARIN SODIUM,PORCINE 10 UNIT/ML
5 VIAL (ML) INTRAVENOUS
Status: CANCELLED | OUTPATIENT
Start: 2023-04-29

## 2023-04-29 RX ORDER — EPINEPHRINE 1 MG/ML
0.3 INJECTION, SOLUTION INTRAMUSCULAR; SUBCUTANEOUS EVERY 5 MIN PRN
Status: DISCONTINUED | OUTPATIENT
Start: 2023-04-29 | End: 2023-04-29 | Stop reason: HOSPADM

## 2023-04-29 RX ORDER — HEPARIN SODIUM (PORCINE) LOCK FLUSH IV SOLN 100 UNIT/ML 100 UNIT/ML
5 SOLUTION INTRAVENOUS
Status: DISCONTINUED | OUTPATIENT
Start: 2023-04-29 | End: 2023-04-29 | Stop reason: HOSPADM

## 2023-04-29 RX ORDER — MEPERIDINE HYDROCHLORIDE 25 MG/ML
25 INJECTION INTRAMUSCULAR; INTRAVENOUS; SUBCUTANEOUS EVERY 30 MIN PRN
Status: DISCONTINUED | OUTPATIENT
Start: 2023-04-29 | End: 2023-04-29 | Stop reason: HOSPADM

## 2023-04-29 RX ADMIN — CEFTRIAXONE SODIUM 2 G: 2 INJECTION, SOLUTION INTRAVENOUS at 12:43

## 2023-04-29 ASSESSMENT — ACTIVITIES OF DAILY LIVING (ADL): ADLS_ACUITY_SCORE: 35

## 2023-04-29 NOTE — PLAN OF CARE
Pt alert and oriented. Denies pain. PICC site WDL. Purple line flushed and infused w/o complication. Flushed and new curos cap prior to discharge.  VSS. Pt ambulated out of facility by self at 1320.

## 2023-04-30 ENCOUNTER — HOSPITAL ENCOUNTER (OUTPATIENT)
Facility: HOSPITAL | Age: 58
Discharge: HOME | End: 2023-04-30
Attending: NURSE PRACTITIONER | Admitting: NURSE PRACTITIONER
Payer: COMMERCIAL

## 2023-04-30 VITALS
OXYGEN SATURATION: 96 % | HEART RATE: 76 BPM | TEMPERATURE: 97.8 F | DIASTOLIC BLOOD PRESSURE: 75 MMHG | RESPIRATION RATE: 16 BRPM | SYSTOLIC BLOOD PRESSURE: 136 MMHG

## 2023-04-30 DIAGNOSIS — A41.9 SEPSIS, DUE TO UNSPECIFIED ORGANISM, UNSPECIFIED WHETHER ACUTE ORGAN DYSFUNCTION PRESENT (H): ICD-10-CM

## 2023-04-30 DIAGNOSIS — N10 PYELONEPHRITIS, ACUTE: Primary | ICD-10-CM

## 2023-04-30 PROCEDURE — 250N000011 HC RX IP 250 OP 636: Performed by: NURSE PRACTITIONER

## 2023-04-30 PROCEDURE — 96365 THER/PROPH/DIAG IV INF INIT: CPT

## 2023-04-30 RX ORDER — HEPARIN SODIUM,PORCINE 10 UNIT/ML
5 VIAL (ML) INTRAVENOUS
Status: CANCELLED | OUTPATIENT
Start: 2023-04-30

## 2023-04-30 RX ORDER — NALOXONE HYDROCHLORIDE 0.4 MG/ML
0.4 INJECTION, SOLUTION INTRAMUSCULAR; INTRAVENOUS; SUBCUTANEOUS
Status: DISCONTINUED | OUTPATIENT
Start: 2023-04-30 | End: 2023-04-30 | Stop reason: HOSPADM

## 2023-04-30 RX ORDER — EPINEPHRINE 1 MG/ML
0.3 INJECTION, SOLUTION INTRAMUSCULAR; SUBCUTANEOUS EVERY 5 MIN PRN
Status: CANCELLED | OUTPATIENT
Start: 2023-04-30

## 2023-04-30 RX ORDER — EPINEPHRINE 1 MG/ML
0.3 INJECTION, SOLUTION INTRAMUSCULAR; SUBCUTANEOUS EVERY 5 MIN PRN
Status: DISCONTINUED | OUTPATIENT
Start: 2023-04-30 | End: 2023-04-30 | Stop reason: HOSPADM

## 2023-04-30 RX ORDER — METHYLPREDNISOLONE SODIUM SUCCINATE 125 MG/2ML
125 INJECTION, POWDER, LYOPHILIZED, FOR SOLUTION INTRAMUSCULAR; INTRAVENOUS
Status: CANCELLED
Start: 2023-04-30

## 2023-04-30 RX ORDER — HEPARIN SODIUM (PORCINE) LOCK FLUSH IV SOLN 100 UNIT/ML 100 UNIT/ML
5 SOLUTION INTRAVENOUS
Status: CANCELLED | OUTPATIENT
Start: 2023-04-30

## 2023-04-30 RX ORDER — DIPHENHYDRAMINE HYDROCHLORIDE 50 MG/ML
50 INJECTION INTRAMUSCULAR; INTRAVENOUS
Status: CANCELLED
Start: 2023-04-30

## 2023-04-30 RX ORDER — DIPHENHYDRAMINE HYDROCHLORIDE 50 MG/ML
50 INJECTION INTRAMUSCULAR; INTRAVENOUS
Status: DISCONTINUED | OUTPATIENT
Start: 2023-04-30 | End: 2023-04-30 | Stop reason: HOSPADM

## 2023-04-30 RX ORDER — HEPARIN SODIUM (PORCINE) LOCK FLUSH IV SOLN 100 UNIT/ML 100 UNIT/ML
5 SOLUTION INTRAVENOUS
Status: DISCONTINUED | OUTPATIENT
Start: 2023-04-30 | End: 2023-04-30 | Stop reason: HOSPADM

## 2023-04-30 RX ORDER — CEFTRIAXONE SODIUM 2 G/50ML
2 INJECTION, SOLUTION INTRAVENOUS EVERY 24 HOURS
Status: CANCELLED
Start: 2023-05-01

## 2023-04-30 RX ORDER — ALBUTEROL SULFATE 90 UG/1
1-2 AEROSOL, METERED RESPIRATORY (INHALATION)
Status: CANCELLED
Start: 2023-04-30

## 2023-04-30 RX ORDER — NALOXONE HYDROCHLORIDE 0.4 MG/ML
0.2 INJECTION, SOLUTION INTRAMUSCULAR; INTRAVENOUS; SUBCUTANEOUS
Status: DISCONTINUED | OUTPATIENT
Start: 2023-04-30 | End: 2023-04-30 | Stop reason: HOSPADM

## 2023-04-30 RX ORDER — MEPERIDINE HYDROCHLORIDE 25 MG/ML
25 INJECTION INTRAMUSCULAR; INTRAVENOUS; SUBCUTANEOUS EVERY 30 MIN PRN
Status: CANCELLED | OUTPATIENT
Start: 2023-04-30

## 2023-04-30 RX ORDER — ALBUTEROL SULFATE 90 UG/1
1-2 AEROSOL, METERED RESPIRATORY (INHALATION)
Status: DISCONTINUED | OUTPATIENT
Start: 2023-04-30 | End: 2023-04-30 | Stop reason: HOSPADM

## 2023-04-30 RX ORDER — ALBUTEROL SULFATE 0.83 MG/ML
2.5 SOLUTION RESPIRATORY (INHALATION)
Status: CANCELLED | OUTPATIENT
Start: 2023-04-30

## 2023-04-30 RX ORDER — MEPERIDINE HYDROCHLORIDE 25 MG/ML
25 INJECTION INTRAMUSCULAR; INTRAVENOUS; SUBCUTANEOUS EVERY 30 MIN PRN
Status: DISCONTINUED | OUTPATIENT
Start: 2023-04-30 | End: 2023-04-30 | Stop reason: HOSPADM

## 2023-04-30 RX ORDER — CEFTRIAXONE SODIUM 2 G/50ML
2 INJECTION, SOLUTION INTRAVENOUS EVERY 24 HOURS
Status: DISCONTINUED | OUTPATIENT
Start: 2023-04-30 | End: 2023-04-30 | Stop reason: HOSPADM

## 2023-04-30 RX ORDER — ALBUTEROL SULFATE 0.83 MG/ML
2.5 SOLUTION RESPIRATORY (INHALATION)
Status: DISCONTINUED | OUTPATIENT
Start: 2023-04-30 | End: 2023-04-30 | Stop reason: HOSPADM

## 2023-04-30 RX ORDER — METHYLPREDNISOLONE SODIUM SUCCINATE 125 MG/2ML
125 INJECTION, POWDER, LYOPHILIZED, FOR SOLUTION INTRAMUSCULAR; INTRAVENOUS
Status: DISCONTINUED | OUTPATIENT
Start: 2023-04-30 | End: 2023-04-30 | Stop reason: HOSPADM

## 2023-04-30 RX ORDER — HEPARIN SODIUM,PORCINE 10 UNIT/ML
5 VIAL (ML) INTRAVENOUS
Status: DISCONTINUED | OUTPATIENT
Start: 2023-04-30 | End: 2023-04-30 | Stop reason: HOSPADM

## 2023-04-30 RX ADMIN — CEFTRIAXONE SODIUM 2 G: 2 INJECTION, SOLUTION INTRAVENOUS at 11:53

## 2023-04-30 NOTE — PLAN OF CARE
Pt alert and oriented. Denies pain. PICC line site WDL. Purple line infused, tolerated well. Flushed NS after ABX. VSS. Ambulated out of building 1230.

## 2023-05-01 ENCOUNTER — INFUSION THERAPY VISIT (OUTPATIENT)
Dept: INFUSION THERAPY | Facility: OTHER | Age: 58
End: 2023-05-01
Attending: NURSE PRACTITIONER
Payer: COMMERCIAL

## 2023-05-01 VITALS
OXYGEN SATURATION: 92 % | SYSTOLIC BLOOD PRESSURE: 113 MMHG | HEART RATE: 78 BPM | TEMPERATURE: 97.6 F | DIASTOLIC BLOOD PRESSURE: 70 MMHG | RESPIRATION RATE: 16 BRPM

## 2023-05-01 DIAGNOSIS — A41.9 SEPSIS, DUE TO UNSPECIFIED ORGANISM, UNSPECIFIED WHETHER ACUTE ORGAN DYSFUNCTION PRESENT (H): ICD-10-CM

## 2023-05-01 DIAGNOSIS — N10 PYELONEPHRITIS, ACUTE: Primary | ICD-10-CM

## 2023-05-01 PROCEDURE — 250N000011 HC RX IP 250 OP 636: Performed by: NURSE PRACTITIONER

## 2023-05-01 PROCEDURE — 96365 THER/PROPH/DIAG IV INF INIT: CPT

## 2023-05-01 RX ORDER — DIPHENHYDRAMINE HYDROCHLORIDE 50 MG/ML
50 INJECTION INTRAMUSCULAR; INTRAVENOUS
Status: CANCELLED
Start: 2023-05-01

## 2023-05-01 RX ORDER — CEFTRIAXONE SODIUM 2 G/50ML
2 INJECTION, SOLUTION INTRAVENOUS EVERY 24 HOURS
Status: CANCELLED
Start: 2023-05-03 | End: 2023-05-10

## 2023-05-01 RX ORDER — HEPARIN SODIUM,PORCINE 10 UNIT/ML
5 VIAL (ML) INTRAVENOUS
Status: CANCELLED | OUTPATIENT
Start: 2023-05-01

## 2023-05-01 RX ORDER — EPINEPHRINE 1 MG/ML
0.3 INJECTION, SOLUTION, CONCENTRATE INTRAVENOUS EVERY 5 MIN PRN
Status: CANCELLED | OUTPATIENT
Start: 2023-05-01

## 2023-05-01 RX ORDER — ALBUTEROL SULFATE 0.83 MG/ML
2.5 SOLUTION RESPIRATORY (INHALATION)
Status: CANCELLED | OUTPATIENT
Start: 2023-05-01

## 2023-05-01 RX ORDER — ALBUTEROL SULFATE 90 UG/1
1-2 AEROSOL, METERED RESPIRATORY (INHALATION)
Status: CANCELLED
Start: 2023-05-01

## 2023-05-01 RX ORDER — HEPARIN SODIUM (PORCINE) LOCK FLUSH IV SOLN 100 UNIT/ML 100 UNIT/ML
5 SOLUTION INTRAVENOUS
Status: CANCELLED | OUTPATIENT
Start: 2023-05-01

## 2023-05-01 RX ORDER — CEFTRIAXONE SODIUM 2 G/50ML
2 INJECTION, SOLUTION INTRAVENOUS EVERY 24 HOURS
Status: DISCONTINUED | OUTPATIENT
Start: 2023-05-01 | End: 2023-05-01 | Stop reason: HOSPADM

## 2023-05-01 RX ORDER — MEPERIDINE HYDROCHLORIDE 25 MG/ML
25 INJECTION INTRAMUSCULAR; INTRAVENOUS; SUBCUTANEOUS EVERY 30 MIN PRN
Status: CANCELLED | OUTPATIENT
Start: 2023-05-01

## 2023-05-01 RX ORDER — METHYLPREDNISOLONE SODIUM SUCCINATE 125 MG/2ML
125 INJECTION, POWDER, LYOPHILIZED, FOR SOLUTION INTRAMUSCULAR; INTRAVENOUS
Status: CANCELLED
Start: 2023-05-01

## 2023-05-01 RX ADMIN — CEFTRIAXONE SODIUM 2 G: 2 INJECTION, SOLUTION INTRAVENOUS at 11:39

## 2023-05-01 NOTE — PROGRESS NOTES
Patient is 58 years old, here today for infusion of Rocephin.      Patient identified with two identifiers, order verified, and verbal consent for today's infusion obtained from patient.      Lab values:  No Labs ordered/pertinent to today's visit.       PICC line CDI, with Krono's caps present. Both lumens flushed easily with no resistance, brisk blood return noted    1139 IV pump verified with dose, drug, and rate of administration.  Infusion administered per protocol.  Patient tolerated infusion well, no signs or symptoms of adverse reaction noted.  Patient denies pain nor discomfort.     Pt PICC saline locked with Kronos caps in place at discharge.

## 2023-05-02 ENCOUNTER — INFUSION THERAPY VISIT (OUTPATIENT)
Dept: INFUSION THERAPY | Facility: OTHER | Age: 58
End: 2023-05-02
Attending: NURSE PRACTITIONER
Payer: COMMERCIAL

## 2023-05-02 VITALS — OXYGEN SATURATION: 94 % | DIASTOLIC BLOOD PRESSURE: 66 MMHG | SYSTOLIC BLOOD PRESSURE: 110 MMHG | HEART RATE: 80 BPM

## 2023-05-02 DIAGNOSIS — A41.9 SEPSIS, DUE TO UNSPECIFIED ORGANISM, UNSPECIFIED WHETHER ACUTE ORGAN DYSFUNCTION PRESENT (H): ICD-10-CM

## 2023-05-02 DIAGNOSIS — N10 PYELONEPHRITIS, ACUTE: Primary | ICD-10-CM

## 2023-05-02 PROCEDURE — 250N000011 HC RX IP 250 OP 636: Performed by: NURSE PRACTITIONER

## 2023-05-02 PROCEDURE — 96365 THER/PROPH/DIAG IV INF INIT: CPT

## 2023-05-02 RX ORDER — DIPHENHYDRAMINE HYDROCHLORIDE 50 MG/ML
50 INJECTION INTRAMUSCULAR; INTRAVENOUS
Status: CANCELLED
Start: 2023-05-02

## 2023-05-02 RX ORDER — EPINEPHRINE 1 MG/ML
0.3 INJECTION, SOLUTION, CONCENTRATE INTRAVENOUS EVERY 5 MIN PRN
Status: CANCELLED | OUTPATIENT
Start: 2023-05-02

## 2023-05-02 RX ORDER — MEPERIDINE HYDROCHLORIDE 25 MG/ML
25 INJECTION INTRAMUSCULAR; INTRAVENOUS; SUBCUTANEOUS EVERY 30 MIN PRN
Status: CANCELLED | OUTPATIENT
Start: 2023-05-02

## 2023-05-02 RX ORDER — HEPARIN SODIUM (PORCINE) LOCK FLUSH IV SOLN 100 UNIT/ML 100 UNIT/ML
5 SOLUTION INTRAVENOUS
Status: CANCELLED | OUTPATIENT
Start: 2023-05-02

## 2023-05-02 RX ORDER — CEFTRIAXONE SODIUM 2 G/50ML
2 INJECTION, SOLUTION INTRAVENOUS EVERY 24 HOURS
Status: CANCELLED
Start: 2023-05-03

## 2023-05-02 RX ORDER — CEFTRIAXONE SODIUM 2 G/50ML
2 INJECTION, SOLUTION INTRAVENOUS EVERY 24 HOURS
Status: DISCONTINUED | OUTPATIENT
Start: 2023-05-03 | End: 2023-05-02 | Stop reason: HOSPADM

## 2023-05-02 RX ORDER — ALBUTEROL SULFATE 0.83 MG/ML
2.5 SOLUTION RESPIRATORY (INHALATION)
Status: CANCELLED | OUTPATIENT
Start: 2023-05-02

## 2023-05-02 RX ORDER — HEPARIN SODIUM,PORCINE 10 UNIT/ML
5 VIAL (ML) INTRAVENOUS
Status: CANCELLED | OUTPATIENT
Start: 2023-05-02

## 2023-05-02 RX ORDER — METHYLPREDNISOLONE SODIUM SUCCINATE 125 MG/2ML
125 INJECTION, POWDER, LYOPHILIZED, FOR SOLUTION INTRAMUSCULAR; INTRAVENOUS
Status: CANCELLED
Start: 2023-05-02

## 2023-05-02 RX ORDER — ALBUTEROL SULFATE 90 UG/1
1-2 AEROSOL, METERED RESPIRATORY (INHALATION)
Status: CANCELLED
Start: 2023-05-02

## 2023-05-02 RX ADMIN — CEFTRIAXONE SODIUM 2 G: 2 INJECTION, SOLUTION INTRAVENOUS at 11:53

## 2023-05-02 NOTE — PROGRESS NOTES
Patient is 58 years old, here today for infusion of ceftriaxone.      Patient identified with two identifiers, order verified, and verbal consent for today's infusion obtained from patient.      Lab values:  Not ordered or applicable for today's treatment.       Patient meets order parameters for today's treatment.     PICC dressing CDI, both lumens flush without resistance with brisk blood return noted.    1153 IV pump verified with dose, drug, and rate of administration.  Infusion administered per protocol.  Patient tolerated infusion well, no signs or symptoms of adverse reaction noted.  Patient denies pain nor discomfort.     Patient reported to facility for PICC line removal under the order of Lily Acevedo NP.  Patient vitals WNL.  Procedure verfifed with Patient. Procedure explained.  Patient educated to keep dressing on for 24 hours if tolerated.  Encouraged to keep in place for a minimum of 8 hours to ensure pressure dressing works effectively.  Patient consented to procedure.  Procedure completed. PICC removed.  Appeared intact.  No odor or pus noted with in or on lumen.  Site clean, dry and intact.  Patient denied pain with removal.  Site covered with a petroleum based gaze and sterile bandage.  Patient dressing pressure reinforced with amadeo.  Patient remained in recumbent postion for 1/2 hour.  Denies pain or discomfort with removal.    PICC removed at 1245.

## 2023-05-04 DIAGNOSIS — E10.649 TYPE 1 DIABETES MELLITUS WITH HYPOGLYCEMIA AND WITHOUT COMA (H): ICD-10-CM

## 2023-05-04 DIAGNOSIS — E10.649 TYPE 1 DIABETES MELLITUS WITH HYPOGLYCEMIA UNAWARENESS (H): ICD-10-CM

## 2023-05-04 RX ORDER — PROCHLORPERAZINE 25 MG/1
SUPPOSITORY RECTAL
Qty: 1 EACH | Refills: 4 | Status: SHIPPED | OUTPATIENT
Start: 2023-05-04 | End: 2024-03-13

## 2023-05-08 ENCOUNTER — LAB (OUTPATIENT)
Dept: LAB | Facility: OTHER | Age: 58
End: 2023-05-08
Attending: FAMILY MEDICINE
Payer: COMMERCIAL

## 2023-05-08 ENCOUNTER — APPOINTMENT (OUTPATIENT)
Dept: LAB | Facility: OTHER | Age: 58
End: 2023-05-08
Payer: COMMERCIAL

## 2023-05-08 ENCOUNTER — OFFICE VISIT (OUTPATIENT)
Dept: FAMILY MEDICINE | Facility: OTHER | Age: 58
End: 2023-05-08
Attending: FAMILY MEDICINE
Payer: COMMERCIAL

## 2023-05-08 VITALS
OXYGEN SATURATION: 94 % | DIASTOLIC BLOOD PRESSURE: 74 MMHG | BODY MASS INDEX: 38.06 KG/M2 | WEIGHT: 194.9 LBS | SYSTOLIC BLOOD PRESSURE: 116 MMHG | TEMPERATURE: 97.7 F | HEART RATE: 90 BPM

## 2023-05-08 DIAGNOSIS — R93.5 ABNORMAL CT SCAN, PELVIS: ICD-10-CM

## 2023-05-08 DIAGNOSIS — I10 PRIMARY HYPERTENSION: ICD-10-CM

## 2023-05-08 DIAGNOSIS — N40.1 BENIGN PROSTATIC HYPERPLASIA WITH URINARY RETENTION: ICD-10-CM

## 2023-05-08 DIAGNOSIS — R19.7 DIARRHEA, UNSPECIFIED TYPE: ICD-10-CM

## 2023-05-08 DIAGNOSIS — E10.42 DIABETIC POLYNEUROPATHY ASSOCIATED WITH TYPE 1 DIABETES MELLITUS (H): Primary | ICD-10-CM

## 2023-05-08 DIAGNOSIS — R11.0 NAUSEA: ICD-10-CM

## 2023-05-08 DIAGNOSIS — M54.41 CHRONIC BILATERAL LOW BACK PAIN WITH RIGHT-SIDED SCIATICA: ICD-10-CM

## 2023-05-08 DIAGNOSIS — R33.8 BENIGN PROSTATIC HYPERPLASIA WITH URINARY RETENTION: ICD-10-CM

## 2023-05-08 DIAGNOSIS — G89.29 CHRONIC BILATERAL LOW BACK PAIN WITH RIGHT-SIDED SCIATICA: ICD-10-CM

## 2023-05-08 DIAGNOSIS — E10.649 TYPE 1 DIABETES MELLITUS WITH HYPOGLYCEMIA UNAWARENESS (H): ICD-10-CM

## 2023-05-08 LAB
EST. AVERAGE GLUCOSE BLD GHB EST-MCNC: 160 MG/DL
HBA1C MFR BLD: 7.2 %
HOLD SPECIMEN: NORMAL
LACTOFERRIN STL QL IA: NEGATIVE

## 2023-05-08 PROCEDURE — 36415 COLL VENOUS BLD VENIPUNCTURE: CPT | Mod: ZL

## 2023-05-08 PROCEDURE — 83993 ASSAY FOR CALPROTECTIN FECAL: CPT | Mod: ZL | Performed by: FAMILY MEDICINE

## 2023-05-08 PROCEDURE — 83630 LACTOFERRIN FECAL (QUAL): CPT | Mod: ZL | Performed by: FAMILY MEDICINE

## 2023-05-08 PROCEDURE — 99214 OFFICE O/P EST MOD 30 MIN: CPT | Performed by: FAMILY MEDICINE

## 2023-05-08 PROCEDURE — G0463 HOSPITAL OUTPT CLINIC VISIT: HCPCS

## 2023-05-08 PROCEDURE — 86364 TISS TRNSGLTMNASE EA IG CLAS: CPT | Mod: ZL | Performed by: FAMILY MEDICINE

## 2023-05-08 PROCEDURE — 83036 HEMOGLOBIN GLYCOSYLATED A1C: CPT | Mod: ZL | Performed by: FAMILY MEDICINE

## 2023-05-08 PROCEDURE — 87506 IADNA-DNA/RNA PROBE TQ 6-11: CPT | Mod: ZL | Performed by: FAMILY MEDICINE

## 2023-05-08 RX ORDER — GABAPENTIN 300 MG/1
300 CAPSULE ORAL 3 TIMES DAILY
Qty: 90 CAPSULE | Refills: 4 | Status: SHIPPED | OUTPATIENT
Start: 2023-05-08 | End: 2024-03-07

## 2023-05-08 RX ORDER — ONDANSETRON 4 MG/1
4 TABLET, FILM COATED ORAL EVERY 8 HOURS PRN
Qty: 30 TABLET | Refills: 4 | Status: SHIPPED | OUTPATIENT
Start: 2023-05-08 | End: 2023-08-16

## 2023-05-08 ASSESSMENT — ENCOUNTER SYMPTOMS
SHORTNESS OF BREATH: 0
FEVER: 0
BACK PAIN: 1
FATIGUE: 1
DIARRHEA: 1
PALPITATIONS: 0
CHILLS: 0
WHEEZING: 0
ABDOMINAL PAIN: 0

## 2023-05-08 ASSESSMENT — PAIN SCALES - GENERAL: PAINLEVEL: SEVERE PAIN (6)

## 2023-05-08 NOTE — PATIENT INSTRUCTIONS
Referral placed to general surgery for colonoscopy    We will call you with your lab results.      We put an order for MRI lumbar

## 2023-05-10 LAB
C COLI+JEJUNI+LARI FUSA STL QL NAA+PROBE: NOT DETECTED
CALPROTECTIN STL-MCNT: 52.6 MG/KG (ref 0–49.9)
EC STX1 GENE STL QL NAA+PROBE: NOT DETECTED
EC STX2 GENE STL QL NAA+PROBE: NOT DETECTED
NOROV GI+II ORF1-ORF2 JNC STL QL NAA+PR: NOT DETECTED
RVA NSP5 STL QL NAA+PROBE: NOT DETECTED
SALMONELLA SP RPOD STL QL NAA+PROBE: NOT DETECTED
SHIGELLA SP+EIEC IPAH STL QL NAA+PROBE: NOT DETECTED
TTG IGA SER-ACNC: 0.8 U/ML
TTG IGG SER-ACNC: 1 U/ML
V CHOL+PARA RFBL+TRKH+TNAA STL QL NAA+PR: NOT DETECTED
Y ENTERO RECN STL QL NAA+PROBE: NOT DETECTED

## 2023-05-15 ENCOUNTER — HOSPITAL ENCOUNTER (OUTPATIENT)
Dept: ULTRASOUND IMAGING | Facility: HOSPITAL | Age: 58
Discharge: HOME | End: 2023-05-15
Attending: PODIATRIST | Admitting: PODIATRIST
Payer: COMMERCIAL

## 2023-05-15 DIAGNOSIS — R09.89 DECREASED PEDAL PULSES: ICD-10-CM

## 2023-05-15 PROCEDURE — 93922 UPR/L XTREMITY ART 2 LEVELS: CPT

## 2023-05-17 ENCOUNTER — OFFICE VISIT (OUTPATIENT)
Dept: SURGERY | Facility: OTHER | Age: 58
End: 2023-05-17
Attending: SURGERY
Payer: COMMERCIAL

## 2023-05-17 ENCOUNTER — PREP FOR PROCEDURE (OUTPATIENT)
Dept: SURGERY | Facility: OTHER | Age: 58
End: 2023-05-17

## 2023-05-17 VITALS
HEIGHT: 60 IN | TEMPERATURE: 98.5 F | SYSTOLIC BLOOD PRESSURE: 112 MMHG | DIASTOLIC BLOOD PRESSURE: 54 MMHG | BODY MASS INDEX: 38.09 KG/M2 | OXYGEN SATURATION: 96 % | WEIGHT: 194 LBS | HEART RATE: 94 BPM

## 2023-05-17 DIAGNOSIS — K63.9 COLONIC THICKENING: Primary | ICD-10-CM

## 2023-05-17 DIAGNOSIS — R93.5 ABNORMAL CT SCAN, PELVIS: ICD-10-CM

## 2023-05-17 DIAGNOSIS — R19.7 DIARRHEA, UNSPECIFIED TYPE: ICD-10-CM

## 2023-05-17 PROCEDURE — 99203 OFFICE O/P NEW LOW 30 MIN: CPT | Performed by: SURGERY

## 2023-05-17 PROCEDURE — G0463 HOSPITAL OUTPT CLINIC VISIT: HCPCS

## 2023-05-17 ASSESSMENT — PAIN SCALES - GENERAL: PAINLEVEL: SEVERE PAIN (7)

## 2023-05-17 NOTE — PATIENT INSTRUCTIONS
Thank you for allowing Dr. Alcaraz and our surgical team to participate in your care. Please call our health unit coordinator at [phone] with scheduling questions or the nurse at [phone] with any other questions or concerns.      You have been scheduled for: Colonoscopy with  on 6/27/23.   You will use [employer] bowel prep.  Please see handout for additional instruction.  You will need a pre-operative appointment with your primary care provider.  You may call [phone] or [phone] with any questions.

## 2023-05-21 RX ORDER — BISACODYL 5 MG/1
TABLET, DELAYED RELEASE ORAL
Qty: 4 TABLET | Refills: 0 | Status: SHIPPED | OUTPATIENT
Start: 2023-05-21 | End: 2024-02-20

## 2023-05-22 ENCOUNTER — HOSPITAL ENCOUNTER (OUTPATIENT)
Dept: MRI IMAGING | Facility: HOSPITAL | Age: 58
Discharge: HOME | End: 2023-05-22
Attending: FAMILY MEDICINE | Admitting: FAMILY MEDICINE
Payer: COMMERCIAL

## 2023-05-22 DIAGNOSIS — M54.41 CHRONIC BILATERAL LOW BACK PAIN WITH RIGHT-SIDED SCIATICA: ICD-10-CM

## 2023-05-22 DIAGNOSIS — E78.5 HYPERLIPIDEMIA, UNSPECIFIED HYPERLIPIDEMIA TYPE: ICD-10-CM

## 2023-05-22 DIAGNOSIS — G89.29 CHRONIC BILATERAL LOW BACK PAIN WITH RIGHT-SIDED SCIATICA: ICD-10-CM

## 2023-05-22 PROCEDURE — 72148 MRI LUMBAR SPINE W/O DYE: CPT

## 2023-05-22 NOTE — PROGRESS NOTES
[facility] Surgery Consultation    CC:  Abnormal CT scan     HPI:  This 58 year old year old male is seen at the request of Dr. Alvarez for evaluation of findings from CT scan. He noted to have a CT scan to work up cystitis/pyleonephritis and was found to have possible soft tissue irregularity in his rectum. He did have a colonoscopy in 2016 which was normal. He has tyoe 1 diabetes, and does have to straight cath himself on occasion to void. He denies bleeding or pain.     Past Medical History:   Diagnosis Date     BPH (benign prostatic hyperplasia)      Diabetes mellitus type 1, controlled (H)      HLD (hyperlipidemia)      HTN (hypertension)      Neuropathy      Type 1 diabetes (H)        Past Surgical History:   Procedure Laterality Date     COLONOSCOPY - HIM SCAN N/A 09/19/2016    Procedure: COLONOSCOPY SCREENING; Surgeon: Rudy Rojo MD; Location: [facility] OR     ESOPHAGOSCOPY, GASTROSCOPY, DUODENOSCOPY (EGD), COMBINED  08/13/2019     IR PICC PLACEMENT > 5 YRS OF AGE  4/21/2023       No Known Allergies    Current Outpatient Medications   Medication     acetone, Urine, test STRP     bisacodyl (DULCOLAX) 5 MG EC tablet     CALCIUM-VITAMIN D PO     Continuous Blood Gluc  (DEXCOM G6 ) DAYAN     Continuous Blood Gluc Sensor (DEXCOM G6 SENSOR) MISC     Continuous Blood Gluc Transmit (DEXCOM G6 TRANSMITTER) MISC     CONTOUR NEXT TEST test strip     diclofenac (VOLTAREN) 1 % topical gel     gabapentin (NEURONTIN) 300 MG capsule     Glucagon (GVOKE HYPOPEN 2-PACK) 1 MG/0.2ML SOAJ     Insulin Disposable Pump (OMNIPOD 5 G6 INTRO, GEN 5,) KIT     Insulin Disposable Pump (OMNIPOD 5 G6 POD, GEN 5,) MISC     INSULIN PUMP - OUTPATIENT     Lidocaine (LIDOCARE) 4 % Patch     lisinopril (ZESTRIL) 10 MG tablet     loperamide (IMODIUM A-D) 2 MG tablet     Multiple Vitamin (MULTI-VITAMIN DAILY PO)     nitroGLYcerin (NITROSTAT) 0.4 MG sublingual tablet     NOVOLOG VIAL 100 UNIT/ML soln     ondansetron (ZOFRAN) 4  MG tablet     order for DME     pantoprazole (PROTONIX) 40 MG EC tablet     polyethylene glycol (GOLYTELY) 236 g suspension     simvastatin (ZOCOR) 40 MG tablet     tamsulosin (FLOMAX) 0.4 MG capsule     UREA 10 HYDRATING 10 % external cream     No current facility-administered medications for this visit.       HABITS:    Social History     Tobacco Use     Smoking status: Never     Smokeless tobacco: Never   Vaping Use     Vaping status: Never Used   Substance Use Topics     Alcohol use: No     Alcohol/week: 0.0 standard drinks of alcohol     Drug use: No       Family History   Problem Relation Age of Onset     No Known Problems Mother      Hypertension Father      Hypertension Brother        REVIEW OF SYSTEMS:  Ten point review of systems negative except those mentioned in the HPI.     OBJECTIVE:    /54 (BP Location: Left arm, Patient Position: Chair, Cuff Size: Adult Regular)   Pulse 94   Temp 98.5  F (36.9  C) (Tympanic)   Ht 1.524 m (5')   Wt 88 kg (194 lb)   SpO2 96%   BMI 37.89 kg/m      GENERAL: Generally appears well, in no distress with appropriate affect.  HEENT:   Sclerae anicteric - normocephalic atraumatic   Respiratory:  No acute distress, no splinting   Cardiovascular:  Regular Rate and Rhythm  Abdomen: non-distended   :  deferred  Extremities:  Extremities normal. No deformities, edema, or skin discoloration.  Skin:  no suspicious lesions or rashes  Neurological: grossly intact  Psych:  Alert, oriented, affect appropriate with normal decision making ability.    IMPRESSION:  Rectal thickening on CT scan, asymmetric. Will plan to repeat his colonoscopy early. He has no further questions.      PLAN:  See above     Raúl Alcaraz MD,     5/21/2023  8:57 PM

## 2023-05-23 ENCOUNTER — ALLIED HEALTH/NURSE VISIT (OUTPATIENT)
Dept: EDUCATION SERVICES | Facility: OTHER | Age: 58
End: 2023-05-23
Attending: NURSE PRACTITIONER
Payer: COMMERCIAL

## 2023-05-23 VITALS
BODY MASS INDEX: 38.04 KG/M2 | HEART RATE: 85 BPM | DIASTOLIC BLOOD PRESSURE: 80 MMHG | SYSTOLIC BLOOD PRESSURE: 128 MMHG | WEIGHT: 194.8 LBS | TEMPERATURE: 98 F | OXYGEN SATURATION: 95 %

## 2023-05-23 DIAGNOSIS — S91.101A OPEN WOUND OF RIGHT GREAT TOE, INITIAL ENCOUNTER: ICD-10-CM

## 2023-05-23 DIAGNOSIS — E10.65 TYPE 1 DIABETES MELLITUS WITH HYPERGLYCEMIA (H): Primary | ICD-10-CM

## 2023-05-23 DIAGNOSIS — G89.29 CHRONIC BILATERAL LOW BACK PAIN WITH RIGHT-SIDED SCIATICA: Primary | ICD-10-CM

## 2023-05-23 DIAGNOSIS — M54.41 CHRONIC BILATERAL LOW BACK PAIN WITH RIGHT-SIDED SCIATICA: Primary | ICD-10-CM

## 2023-05-23 PROCEDURE — G0463 HOSPITAL OUTPT CLINIC VISIT: HCPCS | Mod: 25

## 2023-05-23 PROCEDURE — 11042 DBRDMT SUBQ TIS 1ST 20SQCM/<: CPT | Performed by: NURSE PRACTITIONER

## 2023-05-23 PROCEDURE — 95251 CONT GLUC MNTR ANALYSIS I&R: CPT | Performed by: NURSE PRACTITIONER

## 2023-05-23 PROCEDURE — 99214 OFFICE O/P EST MOD 30 MIN: CPT | Mod: 25 | Performed by: NURSE PRACTITIONER

## 2023-05-23 ASSESSMENT — PAIN SCALES - GENERAL: PAINLEVEL: SEVERE PAIN (7)

## 2023-05-23 NOTE — PROGRESS NOTES
SUBJECTIVE:  Richard Harvey, 58 year old, male presents with the following Chief Complaint(s) with HPI to follow:  Chief Complaint   Patient presents with     Follow Up        Diabetes Follow-up      Patient is checking blood sugars: >4x/day using his continuous glucose.  Results:    Date: 5/10 to 5/23  Glucose management indicator: n/a    Average glucose: 167    Glucose range  Very low (<54): 0  low (<70): 2%  In target range (): 63%  High (>180): 23%  Very high (>250): 12%        Symptoms of hypoglycemia (low blood sugar): some--at work    Paresthesias (numbness or burning in feet) or sores: Yes; no sores    Diabetic eye exam within the last year: Yes    Breakfast eaten regularly: Yes    Patient counting carbs: trying       HPI:   Richard's here today for the follow up regarding his Diabetes mellitus, Type 1.    A1c trend:  Lab Results   Component Value Date    A1C 6.9 01/31/2023    A1C 7.3 10/24/2022    A1C 7.2 07/21/2022    A1C 7.2 04/21/2022    A1C 7.5 01/20/2022    A1C 7.7 10/20/2021    A1C 7.8 06/03/2021    A1C 7.5 11/30/2020    A1C 7.7 09/01/2020    A1C 8.1 03/24/2020     Current Diabetes medication:   1.  Insulin pump (Omnipod 5), uses Novolog   ASA use: yes, 325 mg daily  Statin use: yes, simvastatin 40 mg at bedime    Reports the following:  No issues with the insulin pump  No issues with the Dexcom G6    Working with his urologist regarding his urinary issues  Working with Dr. Alvarez with his other chronic medical concerns.     Repots right toe pain  Started a couple days ago  Has been wearing his diabetic/orthotic shoes  No drainage  Painful      Continued issues with some low BGs with working    Weight trend:  Wt Readings from Last 4 Encounters:   05/23/23 88.4 kg (194 lb 12.8 oz)   05/17/23 88 kg (194 lb)   05/08/23 88.4 kg (194 lb 14.4 oz)   04/27/23 92.7 kg (204 lb 6 oz)       Patient Active Problem List   Diagnosis     DM type 1 (diabetes mellitus, type 1) (H)     HTN (hypertension)      Hyperlipidemia     ACP (advance care planning)     Diabetic polyneuropathy associated with type 1 diabetes mellitus (H)     Class 2 obesity in adult     Obesity (BMI 35.0-39.9) with comorbidity (H)     Gastroparesis     Gastroesophageal reflux disease without esophagitis     Chronic bilateral low back pain with right-sided sciatica     Acute cystitis without hematuria     Sepsis, due to unspecified organism, unspecified whether acute organ dysfunction present (H)     ELMA (acute kidney injury) (H)     Pyelonephritis, acute     Benign prostatic hyperplasia with urinary retention       Past Medical History:   Diagnosis Date     BPH (benign prostatic hyperplasia)      Diabetes mellitus type 1, controlled (H)      HLD (hyperlipidemia)      HTN (hypertension)      Neuropathy      Type 1 diabetes (H)        Past Surgical History:   Procedure Laterality Date     COLONOSCOPY - HIM SCAN N/A 09/19/2016    Procedure: COLONOSCOPY SCREENING; Surgeon: Rudy Rojo MD; Location: [facility] OR     ESOPHAGOSCOPY, GASTROSCOPY, DUODENOSCOPY (EGD), COMBINED  08/13/2019     IR PICC PLACEMENT > 5 YRS OF AGE  4/21/2023       Family History   Problem Relation Age of Onset     No Known Problems Mother      Hypertension Father      Hypertension Brother        Social History     Tobacco Use     Smoking status: Never     Smokeless tobacco: Never   Vaping Use     Vaping status: Never Used   Substance Use Topics     Alcohol use: No     Alcohol/week: 0.0 standard drinks of alcohol       Current Outpatient Medications   Medication Sig Dispense Refill     acetone, Urine, test STRP Use as directed 50 each 3     bisacodyl (DULCOLAX) 5 MG EC tablet Take 2 tabs at bedtime 2 nights prior to procedure. Take 2 tabs at 3pm the day before procedure. 4 tablet 0     CALCIUM-VITAMIN D PO Take 1 tablet by mouth daily       Continuous Blood Gluc  (DEXCOM G6 ) DAYAN 1 each continuous To be used per manufacture's recommendation 1 each 2      Continuous Blood Gluc Sensor (DEXCOM G6 SENSOR) MISC CHANGE EVERY 10 DAYS 3 each 11     Continuous Blood Gluc Transmit (DEXCOM G6 TRANSMITTER) MISC CHANGE EVERY 3 MONTHS 1 each 4     CONTOUR NEXT TEST test strip TEST 6 TIMES DAILY, OR AS DIRECTED. 200 strip 5     diclofenac (VOLTAREN) 1 % topical gel Apply 4 g topically 4 times daily as needed for moderate pain (4-6) 50 g 2     gabapentin (NEURONTIN) 300 MG capsule Take 1 capsule (300 mg) by mouth 3 times daily 90 capsule 4     Glucagon (GVOKE HYPOPEN 2-PACK) 1 MG/0.2ML SOAJ Inject 1 mg Subcutaneous as needed (for low blood glucose) 0.4 mL 3     Insulin Disposable Pump (OMNIPOD 5 G6 INTRO, GEN 5,) KIT 1 kit continuous 1 kit 0     Insulin Disposable Pump (OMNIPOD 5 G6 POD, GEN 5,) MISC 1 each every 3 days 10 each 5     INSULIN PUMP - OUTPATIENT Insulin pump settings Updated: 5/23/23 Omnipod 5 Basal:  0000 1.1 0200 1.05 0600 0.95 1020 0.7 1530 0.9 1800 1.15 Total bolus: 23.3 CR:  0000 12 1200 13 1600 14 ISF:  0000 65 1200 60 1800 55 Target: 110       Lidocaine (LIDOCARE) 4 % Patch Place 1 patch onto the skin every 24 hours To prevent lidocaine toxicity, patient should be patch free for 12 hrs daily. (Patient taking differently: Place 1 patch onto the skin daily as needed To prevent lidocaine toxicity, patient should be patch free for 12 hrs daily.) 20 patch 1     lisinopril (ZESTRIL) 10 MG tablet Take 1 tablet by mouth once daily 90 tablet 3     loperamide (IMODIUM A-D) 2 MG tablet Take 1 tablet (2 mg) by mouth 4 times daily as needed for diarrhea 30 tablet 0     Multiple Vitamin (MULTI-VITAMIN DAILY PO) Take 1 tablet by mouth daily       nitroGLYcerin (NITROSTAT) 0.4 MG sublingual tablet Place 0.4 mg under the tongue every 5 minutes as needed for chest pain . Do not crush; maximum of 3 doses in 15 minutes.       NOVOLOG VIAL 100 UNIT/ML soln USE WITH INSULIN PUMP FOR A TOTAL DAILY USAGE OF 70 UNITS 30 mL 3     ondansetron (ZOFRAN) 4 MG tablet Take 1 tablet (4 mg) by  "mouth every 8 hours as needed for nausea 30 tablet 4     order for DME Equipment being ordered:     1.  [employer] insulin pump supplies--insertion sets and reserviors  Disp: 1 month  Refill: 11 months 30 days 11     pantoprazole (PROTONIX) 40 MG EC tablet Take 1 tablet by mouth twice daily (Patient taking differently: Take 40 mg by mouth daily) 60 tablet 9     polyethylene glycol (GOLYTELY) 236 g suspension Drink 8 ounces every 10 minutes until the jug is half-empty at 6pm the night before procedure. Refrigerate. Repeat 6 hours prior to procedure. 4000 mL 0     tamsulosin (FLOMAX) 0.4 MG capsule Take 1 capsule (0.4 mg) by mouth every evening 90 capsule 3     UREA 10 HYDRATING 10 % external cream APPLY  CREAM TOPICALLY TO AFFECTED AREA AS NEEDED FOR CALLOUSED SKIN 85 g 1     simvastatin (ZOCOR) 40 MG tablet TAKE 1 TABLET BY MOUTH AT BEDTIME 90 tablet 0       No Known Allergies    REVIEW OF SYSTEMS  Skin: as noted above  Eyes: negative; wears glasses   Ears/Nose/Throat: negative; hx of allergies  Respiratory: No shortness of breath, dyspnea on exertion, cough, or hemoptysis  Cardiovascular: negative  Gastrointestinal: off and on nausea  Genitourinary: urinary retention   Musculoskeletal: back pain + sciatica (right side)--\"so, so\"--seeing a chiropractor; history of arthritis--hands--same, shoulders, knees--same   Neurologic: positive for numbness or tingling of hands and numbness or tingling of feet--worse   Psychiatric: negative  Hematologic/Lymphatic/Immunologic: negative  Endocrine: positive for diabetes    OBJECTIVE:  /80   Pulse 85   Temp 98  F (36.7  C)   Wt 88.4 kg (194 lb 12.8 oz)   SpO2 95%   BMI 38.04 kg/m    Constitutional: alert and no distress  Card: right foot: DP palpable; cap refill <3 sec; hair present on leg, foot and toes; no edema, pink and warm  Respiratory:  Good diaphragmatic excursion.   Musculoskeletal: extremities normal- no gross deformities noted and gait normal  Skin:   Wound " description:   Type of Wound- pressure  Location-right great toe, distal  Thickness: partial  Measurements: 0.6 x 0.3 cm  Tunnel: none  Undermining: none  Wound bed- pink/red, moist  Prior to debridement    Post-debridement    Surrounding skin- calloused, slight erythema, no increase heat and/or lymphangitis  Drainage amount- scant  Drainage color- serous  Odor- none  Dressing change: cleansed with betadine, debridement, rinsed with saline, dried; silver foam and tape (up and down)  Wound debridement completed on: 5/23/23 debridement type: excisional    Psychiatric: mentation appears normal and affect normal/bright    Procedure: Wound debridement  Indication: calloused cap    Explained risks and benefits of procedure.   Verbal consent to continued.    Wound area cleansed with soap and water. Dried    Applied betadine prior to procedure.     Excisional debridement of wound on 5/23/23 with a sharp, sterile scalpel to the level of and including the dermis tissues layer (debrided a total of <20 sqcm) with all non-viable soft tissue debrided from the wound bed.  Debrided wound in an attempt to decrease the biofilm, promote healing and prevent infection  no bleeding  No pain.  The patient tolerated this well with no complications.    Wound care as mentioned above.      LAB  Results for orders placed or performed in visit on 05/23/23   GLUCOSE MONITOR, 72 HOUR, PHYS INTERP     Status: None    Narrative    Date: 5/10 to 5/23  Glucose management indicator: n/a    Average glucose: 167    Glucose range  Very low (<54): 0  low (<70): 2%  In target range (): 63%  High (>180): 23%  Very high (>250): 12%       ASSESSMENT / PLAN:.  (E10.65) Type 1 diabetes mellitus with hyperglycemia (H)  (primary encounter diagnosis)  Comment: noted CGM and insulin pump data  Plan: Miscellaneous Order for DME - ONLY FOR DME,         GLUCOSE MONITOR, 72 HOUR, PHYS INTERP          No change as of right now    Education focused on the  "following  Activity mode    Highly encouraged him to use this during work  This gives a \"looser\" target of 150 vs 110    Keep working on entering the carb bolus BEFORE consuming carbs    DME order sent for skin tac barrier wipes    (S91.101A) Open wound of right great toe, initial encounter  Comment: noted, plan develop  Plan:     Wash with mild soap and water, dry  Apply your silver foam (friendly reminder, removed plastic back and sticky side a down)   Secure with tape (up and down, not a round)  Do this daily (as he showers daily)    Follow up  3 months    Call sooner if any issues/concerns develop and/or continued issues with low BGs    Patient Instructions   Continue working on healthy eating and moving as best as you can (start low and slow, work up to 30 min, 5x/week)    BG goals:  Fasting and before meals <130, >70  2 hour after eating <180    We only need 1/2 of these numbers to be within target then your A1c will be within target    Medication changes   None for now     Follow up   Thursday, June 1st at 0830 in wound care  Download insulin pump when you see Dr. Alvarez in June  3 months for DM    Call me sooner if any problems/concerns and/or questions develop including consistent low BGs <70 or consistent high BGs >200  [phone] (Unit Coordinator)    [phone] (Tracey, Nurse)    Try the activity mode  If continued issues with low BGs after eating, let me know too      Wound care  Wash with mild soap and water, dry  Apply your silver foam (friendly reminder, removed plastic back and sticky side a down)   Secure with tape (up and down, not a round)  Do this daily      Time: 30 min + 5 min CGM review  Barrier: none  Willingness to learn: accepting    Macrina CHAMBERLAIN [facility]  Diabetes and Wound Care    30 minutes was spent with patient.    All of this time was spent on counseling patient regarding illness, medication and/or treatment options, coordinating further cares and follow ups that are needed " along with resource material that will be helpful in the treatment of these issues.       With the electronic record, we can now more quickly and easily track our patient diabetic goals. Our diabetes clinical review is in progress and these are the indicators we are monitoring for good diabetes health.     1.) HbA1C less than 7 (measurement of your average blood sugars)  2.) Blood Pressure less than 140/80  3.) LDL less than 100 (bad cholesterol)  4.) HbA1C is checked in the last 6 months and below 7% (more frequently if not at goal or adjusting medications)  5.) LDL is checked in the last 12 months (more frequently if not at goal or adjusting medications)  6.) Taking one baby aspirin daily (unless otherwise instructed)  7.) No tobacco use  8) Statin use     You have achieved 7 out of 8 of these and I am encouraging you to come in and get tuned up to achieve 8 out of 8!  Here is what you have achieved so far in my goals for you:  1.) HbA1C  less than 7:                              NO  Your last  HbA1C :   Lab Results   Component Value Date    A1C 7.2 05/08/2023    A1C 6.9 01/31/2023    A1C 7.3 10/24/2022    A1C 7.2 07/21/2022    A1C 7.2 04/21/2022    A1C 7.7 10/20/2021    A1C 7.8 06/03/2021    A1C 7.5 11/30/2020    A1C 7.7 09/01/2020    A1C 8.1 03/24/2020     2.) Blood Pressure less than 140/80:       YES      Your last    /80   Pulse 85   Temp 98  F (36.7  C)   Wt 88.4 kg (194 lb 12.8 oz)   SpO2 95%   BMI 38.04 kg/m      3.) LDL less than 100:                              YES      Your last   LDL         LDL Cholesterol Calculated   Date Value Ref Range Status   07/21/2022 36 <=100 mg/dL Final   06/14/2021 42 <100 mg/dL Final     Comment:     Desirable:       <100 mg/dl       4.) Checked HbA1C in the past 6 months: YES      5.) Checked LDL in the past 12 months:    YES    6.) Taking one aspirin daily:                       YES     7.) No tobacco use:                                        YES      8.)  Statin use      YES       CGM requirements:  1.  Patient has been seen in the clinic within the last 6 month  2. Diagnosis of Type 1 diabetes  3. Has been testing their BGs 4x/day using the Dexcom  4. Uses an insulin pump   5. Requires frequent adjustments to their treatment regimen based on BGM and/or CGM testing results.

## 2023-05-23 NOTE — PATIENT INSTRUCTIONS
Continue working on healthy eating and moving as best as you can (start low and slow, work up to 30 min, 5x/week)    BG goals:  Fasting and before meals <130, >70  2 hour after eating <180    We only need 1/2 of these numbers to be within target then your A1c will be within target    Medication changes   None for now     Follow up   Thursday, June 1st at 0830 in wound care  Download insulin pump when you see Dr. Alvarez in June  3 months for DM    Call me sooner if any problems/concerns and/or questions develop including consistent low BGs <70 or consistent high BGs >200  [phone] (Unit Coordinator)    [phone] (Tracey, Nurse)    Try the activity mode  If continued issues with low BGs after eating, let me know too      Wound care  Wash with mild soap and water, dry  Apply your silver foam (friendly reminder, removed plastic back and sticky side a down)   Secure with tape (up and down, not a round)  Do this daily

## 2023-05-24 RX ORDER — SIMVASTATIN 40 MG
TABLET ORAL
Qty: 90 TABLET | Refills: 0 | Status: SHIPPED | OUTPATIENT
Start: 2023-05-24 | End: 2023-08-23

## 2023-06-01 ENCOUNTER — ALLIED HEALTH/NURSE VISIT (OUTPATIENT)
Dept: EDUCATION SERVICES | Facility: OTHER | Age: 58
End: 2023-06-01
Attending: NURSE PRACTITIONER
Payer: COMMERCIAL

## 2023-06-01 VITALS
DIASTOLIC BLOOD PRESSURE: 64 MMHG | OXYGEN SATURATION: 93 % | TEMPERATURE: 97.3 F | HEART RATE: 86 BPM | SYSTOLIC BLOOD PRESSURE: 111 MMHG

## 2023-06-01 DIAGNOSIS — E10.65 TYPE 1 DIABETES MELLITUS WITH HYPERGLYCEMIA (H): Primary | ICD-10-CM

## 2023-06-01 DIAGNOSIS — S91.101A OPEN WOUND OF RIGHT GREAT TOE, INITIAL ENCOUNTER: ICD-10-CM

## 2023-06-01 PROCEDURE — 99213 OFFICE O/P EST LOW 20 MIN: CPT | Performed by: NURSE PRACTITIONER

## 2023-06-01 PROCEDURE — G0463 HOSPITAL OUTPT CLINIC VISIT: HCPCS

## 2023-06-01 ASSESSMENT — PAIN SCALES - GENERAL: PAINLEVEL: SEVERE PAIN (7)

## 2023-06-01 NOTE — PROGRESS NOTES
SUBJECTIVE:  Richard Harvey, 58 year old, male presents with the following Chief Complaint(s) with HPI to follow:  Chief Complaint   Patient presents with     WOUND CARE     Right foot        Diabetes Follow-up      Patient is checking blood sugars: >4x/day using his continuous glucose.  Results:    Date: 5/19 to 6/1/23  Glucose management indicator: n/a    Average glucose: 167    Glucose range  Very low (<54): 0  low (<70): 0%  In target range (): 68%  High (>180): 23%  Very high (>250): 9%        Symptoms of hypoglycemia (low blood sugar): better    Paresthesias (numbness or burning in feet) or sores: Yes; yes--right great toe    Diabetic eye exam within the last year: Yes    Breakfast eaten regularly: Yes    Patient counting carbs: trying       HPI:   Richard's here today for the follow up regarding his Diabetes mellitus, Type 1.    A1c trend:  Lab Results   Component Value Date    A1C 7.2 05/08/2023    A1C 6.9 01/31/2023    A1C 7.3 10/24/2022    A1C 7.2 07/21/2022    A1C 7.2 04/21/2022    A1C 7.7 10/20/2021    A1C 7.8 06/03/2021    A1C 7.5 11/30/2020    A1C 7.7 09/01/2020    A1C 8.1 03/24/2020     Current Diabetes medication:   1.  Insulin pump (Omnipod 5), uses Novolog   ASA use: yes, 325 mg daily  Statin use: yes, simvastatin 40 mg at bedime    Reports the following:  No issues with the insulin pump  No issues with the Dexcom G6    Low BG frequency is better using the activity mode feature on the insulin pump      Richard is also here today for the reassessment and treatment of right big toe.  Back story:  Started a couple day ago prior to his last appt with me (5/23/23)  He had been wearing his new diabetic/orthotic shoes  No drainage  Painful  Callous cap.   5/23/23: debrided; noted he had an ulcer underneath the callous cap; tx: silver foam, tape. Recommended reaching out to [facility] from [facility]  6/1/23: seeing myself today.     No issues with dressing changes   Denies any fevers, chills, and/or  malodorous drainage.   Saw Navdeep and it was discovered his rocker was never placed in his shoe.      Weight trend:  Wt Readings from Last 4 Encounters:   05/23/23 88.4 kg (194 lb 12.8 oz)   05/17/23 88 kg (194 lb)   05/08/23 88.4 kg (194 lb 14.4 oz)   04/27/23 92.7 kg (204 lb 6 oz)       Patient Active Problem List   Diagnosis     DM type 1 (diabetes mellitus, type 1) (H)     HTN (hypertension)     Hyperlipidemia     ACP (advance care planning)     Diabetic polyneuropathy associated with type 1 diabetes mellitus (H)     Class 2 obesity in adult     Obesity (BMI 35.0-39.9) with comorbidity (H)     Gastroparesis     Gastroesophageal reflux disease without esophagitis     Chronic bilateral low back pain with right-sided sciatica     Acute cystitis without hematuria     Sepsis, due to unspecified organism, unspecified whether acute organ dysfunction present (H)     ELMA (acute kidney injury) (H)     Pyelonephritis, acute     Benign prostatic hyperplasia with urinary retention       Past Medical History:   Diagnosis Date     BPH (benign prostatic hyperplasia)      Diabetes mellitus type 1, controlled (H)      HLD (hyperlipidemia)      HTN (hypertension)      Neuropathy      Type 1 diabetes (H)        Past Surgical History:   Procedure Laterality Date     COLONOSCOPY - HIM SCAN N/A 09/19/2016    Procedure: COLONOSCOPY SCREENING; Surgeon: Rudy Rojo MD; Location: [facility] OR     ESOPHAGOSCOPY, GASTROSCOPY, DUODENOSCOPY (EGD), COMBINED  08/13/2019     IR PICC PLACEMENT > 5 YRS OF AGE  4/21/2023       Family History   Problem Relation Age of Onset     No Known Problems Mother      Hypertension Father      Hypertension Brother        Social History     Tobacco Use     Smoking status: Never     Smokeless tobacco: Never   Vaping Use     Vaping status: Never Used   Substance Use Topics     Alcohol use: No     Alcohol/week: 0.0 standard drinks of alcohol       Current Outpatient Medications   Medication Sig Dispense Refill      acetone, Urine, test STRP Use as directed 50 each 3     bisacodyl (DULCOLAX) 5 MG EC tablet Take 2 tabs at bedtime 2 nights prior to procedure. Take 2 tabs at 3pm the day before procedure. 4 tablet 0     CALCIUM-VITAMIN D PO Take 1 tablet by mouth daily       Continuous Blood Gluc  (DEXCOM G6 ) DAYAN 1 each continuous To be used per manufacture's recommendation 1 each 2     Continuous Blood Gluc Sensor (DEXCOM G6 SENSOR) MISC CHANGE EVERY 10 DAYS 3 each 11     Continuous Blood Gluc Transmit (DEXCOM G6 TRANSMITTER) MISC CHANGE EVERY 3 MONTHS 1 each 4     CONTOUR NEXT TEST test strip TEST 6 TIMES DAILY, OR AS DIRECTED. 200 strip 5     diclofenac (VOLTAREN) 1 % topical gel Apply 4 g topically 4 times daily as needed for moderate pain (4-6) 50 g 2     gabapentin (NEURONTIN) 300 MG capsule Take 1 capsule (300 mg) by mouth 3 times daily 90 capsule 4     Glucagon (GVOKE HYPOPEN 2-PACK) 1 MG/0.2ML SOAJ Inject 1 mg Subcutaneous as needed (for low blood glucose) 0.4 mL 3     Insulin Disposable Pump (OMNIPOD 5 G6 INTRO, GEN 5,) KIT 1 kit continuous 1 kit 0     Insulin Disposable Pump (OMNIPOD 5 G6 POD, GEN 5,) MISC 1 each every 3 days 10 each 5     INSULIN PUMP - OUTPATIENT Insulin pump settings Updated: 5/23/23 Omnipod 5 Basal:  0000 1.1 0200 1.05 0600 0.95 1020 0.7 1530 0.9 1800 1.15 Total bolus: 23.3 CR:  0000 12 1200 13 1600 14 ISF:  0000 65 1200 60 1800 55 Target: 110       Lidocaine (LIDOCARE) 4 % Patch Place 1 patch onto the skin every 24 hours To prevent lidocaine toxicity, patient should be patch free for 12 hrs daily. (Patient taking differently: Place 1 patch onto the skin daily as needed To prevent lidocaine toxicity, patient should be patch free for 12 hrs daily.) 20 patch 1     lisinopril (ZESTRIL) 10 MG tablet Take 1 tablet by mouth once daily 90 tablet 3     loperamide (IMODIUM A-D) 2 MG tablet Take 1 tablet (2 mg) by mouth 4 times daily as needed for diarrhea 30 tablet 0     Multiple Vitamin  "(MULTI-VITAMIN DAILY PO) Take 1 tablet by mouth daily       nitroGLYcerin (NITROSTAT) 0.4 MG sublingual tablet Place 0.4 mg under the tongue every 5 minutes as needed for chest pain . Do not crush; maximum of 3 doses in 15 minutes.       NOVOLOG VIAL 100 UNIT/ML soln USE WITH INSULIN PUMP FOR A TOTAL DAILY USAGE OF 70 UNITS 30 mL 3     ondansetron (ZOFRAN) 4 MG tablet Take 1 tablet (4 mg) by mouth every 8 hours as needed for nausea 30 tablet 4     order for DME Equipment being ordered:     1.  [facility] insulin pump supplies--insertion sets and reserviors  Disp: 1 month  Refill: 11 months 30 days 11     pantoprazole (PROTONIX) 40 MG EC tablet Take 1 tablet by mouth twice daily (Patient taking differently: Take 40 mg by mouth daily) 60 tablet 9     polyethylene glycol (GOLYTELY) 236 g suspension Drink 8 ounces every 10 minutes until the jug is half-empty at 6pm the night before procedure. Refrigerate. Repeat 6 hours prior to procedure. 4000 mL 0     simvastatin (ZOCOR) 40 MG tablet TAKE 1 TABLET BY MOUTH AT BEDTIME 90 tablet 0     tamsulosin (FLOMAX) 0.4 MG capsule Take 1 capsule (0.4 mg) by mouth every evening 90 capsule 3     UREA 10 HYDRATING 10 % external cream APPLY  CREAM TOPICALLY TO AFFECTED AREA AS NEEDED FOR CALLOUSED SKIN 85 g 1       No Known Allergies    REVIEW OF SYSTEMS  Skin: as noted above  Eyes: negative; wears glasses   Ears/Nose/Throat: negative; hx of allergies  Respiratory: No shortness of breath, dyspnea on exertion, cough, or hemoptysis  Cardiovascular: negative  Gastrointestinal: off and on nausea  Genitourinary: urinary retention   Musculoskeletal: back pain + sciatica (right side)--\"so, so\"--seeing a chiropractor; history of arthritis--hands--same, shoulders, knees--same   Neurologic: positive for numbness or tingling of hands and numbness or tingling of feet--worse   Psychiatric: negative  Hematologic/Lymphatic/Immunologic: negative  Endocrine: positive for diabetes    OBJECTIVE:  BP " 111/64 (BP Location: Left arm, Patient Position: Sitting, Cuff Size: Adult Regular)   Pulse 86   Temp 97.3  F (36.3  C) (Tympanic)   SpO2 93%   Constitutional: alert and no distress  Card: right foot: DP palpable; cap refill <3 sec; hair present on leg, foot and toes; no edema, pink and warm  Respiratory:  Good diaphragmatic excursion.   Musculoskeletal: extremities normal- no gross deformities noted and gait normal  Skin:   Wound description:   Type of Wound- pressure  Location-right great toe, distal  Thickness: partial  Measurements: 0.1 x 0.1 cm  Tunnel: none  Undermining: none  Wound bed- pink/red, moist  Post-debridement    Surrounding skin- calloused, slight erythema, no increase heat and/or lymphangitis  Drainage amount- scant  Drainage color- bloody  Odor- none  Dressing change: cleansed with soap and water, dried; debrided, rinsed with saline, dried; silver foam and tape (up and down)  Wound debridement completed on: 6/1/23 debridement type: excisional    Psychiatric: mentation appears normal and affect normal/bright    Procedure: Wound debridement  Indication: calloused    Explained risks and benefits of procedure.   Verbal consent to continued.    Wound area cleansed with soap and water. Dried    Applied betadine prior to procedure.     Excisional debridement of wound on 6/1/23 with a sharp, sterile scalpel to the level of and including the dermis tissues layer (debrided a total of <20 sqcm) with all non-viable soft tissue debrided from the wound bed.  Debrided wound in an attempt to decrease the biofilm, promote healing and prevent infection  Small bleeding--stopped by pressure  No pain.  The patient tolerated this well with no complications.    Wound care as mentioned above.      LAB  No results found for any visits on 06/01/23.    ASSESSMENT / PLAN:.  (E10.65) Type 1 diabetes mellitus with hyperglycemia (H)  (primary encounter diagnosis)  Comment: noted CGM and insulin pump download  Plan:   No  change  TIR is better--68%  Less >240  No lows!!    Keep working on the timing of your carb bolus  Keep using the activity mode feature--it's working    (S91.101A) Open wound of right great toe, initial encounter  Comment: noted, better  Plan:   Continue with the following:  Wash with mild soap and water, dry  Apply your silver foam (friendly reminder, removed plastic back and sticky side a down)   Secure with tape (up and down, not a round)  Do this daily (as he showers daily)  Do this for the next 1 week  Once healed, start using non-scented moisturizer  Keep wearing your old DM shoes until you get your new ones.      Follow up  Download insulin pump when you see Dr. Alvarez in [place]  3 months for DM    Call sooner if any issues/concerns develop and/or continued issues with low BGs    Patient Instructions   Continue working on healthy eating and moving as best as you can (start low and slow, work up to 30 min, 5x/week)    BG goals:  Fasting and before meals <130, >70  2 hour after eating <180    We only need 1/2 of these numbers to be within target then your A1c will be within target    Medication changes   None for now     Follow up   Download insulin pump when you see Dr. Alvarez in June  3 months for DM    Call me sooner if any problems/concerns and/or questions develop including consistent low BGs <70 or consistent high BGs >200  [phone] (Unit Coordinator)    [phone] (Tracey, Nurse)    Try the activity mode  If continued issues with low BGs after eating, let me know too      Wound care  Wash with mild soap and water, dry  Apply your silver foam (friendly reminder, removed plastic back and sticky side a down)   Secure with tape (up and down, not a round)  Do this daily for the next week    After healed, okay to moisturize it daily with non-scented lotion  Keep wearing your old DM shoes until they fix your new ones    Time: 25 min + 5 min CGM review  Barrier: none  Willingness to learn: accepting    Macrina DE LA ROSA  Zack CHAMBERLAIN [facility]  Diabetes and Wound Care    With the electronic record, we can now more quickly and easily track our patient diabetic goals. Our diabetes clinical review is in progress and these are the indicators we are monitoring for good diabetes health.     1.) HbA1C less than 7 (measurement of your average blood sugars)  2.) Blood Pressure less than 140/80  3.) LDL less than 100 (bad cholesterol)  4.) HbA1C is checked in the last 6 months and below 7% (more frequently if not at goal or adjusting medications)  5.) LDL is checked in the last 12 months (more frequently if not at goal or adjusting medications)  6.) Taking one baby aspirin daily (unless otherwise instructed)  7.) No tobacco use  8) Statin use     You have achieved 7 out of 8 of these and I am encouraging you to come in and get tuned up to achieve 8 out of 8!  Here is what you have achieved so far in my goals for you:  1.) HbA1C  less than 7:                              NO  Your last  HbA1C :   Lab Results   Component Value Date    A1C 7.2 05/08/2023    A1C 6.9 01/31/2023    A1C 7.3 10/24/2022    A1C 7.2 07/21/2022    A1C 7.2 04/21/2022    A1C 7.7 10/20/2021    A1C 7.8 06/03/2021    A1C 7.5 11/30/2020    A1C 7.7 09/01/2020    A1C 8.1 03/24/2020     2.) Blood Pressure less than 140/80:       YES      Your last    /64 (BP Location: Left arm, Patient Position: Sitting, Cuff Size: Adult Regular)   Pulse 86   Temp 97.3  F (36.3  C) (Tympanic)   SpO2 93%     3.) LDL less than 100:                              YES      Your last   LDL         LDL Cholesterol Calculated   Date Value Ref Range Status   07/21/2022 36 <=100 mg/dL Final   06/14/2021 42 <100 mg/dL Final     Comment:     Desirable:       <100 mg/dl       4.) Checked HbA1C in the past 6 months: YES      5.) Checked LDL in the past 12 months:    YES    6.) Taking one aspirin daily:                       YES     7.) No tobacco use:                                        YES      8.)  Statin use      YES       CGM requirements:  1.  Patient has been seen in the clinic within the last 6 month  2. Diagnosis of Type 1 diabetes  3. Has been testing their BGs 4x/day using the Dexcom  4. Uses an insulin pump   5. Requires frequent adjustments to their treatment regimen based on BGM and/or CGM testing results.

## 2023-06-01 NOTE — PATIENT INSTRUCTIONS
Continue working on healthy eating and moving as best as you can (start low and slow, work up to 30 min, 5x/week)    BG goals:  Fasting and before meals <130, >70  2 hour after eating <180    We only need 1/2 of these numbers to be within target then your A1c will be within target    Medication changes   None for now     Follow up   Download insulin pump when you see Dr. Alvarez in June  3 months for DM    Call me sooner if any problems/concerns and/or questions develop including consistent low BGs <70 or consistent high BGs >200  [phone] (Unit Coordinator)    [phone] (Tracey, Nurse)    Try the activity mode  If continued issues with low BGs after eating, let me know too      Wound care  Wash with mild soap and water, dry  Apply your silver foam (friendly reminder, removed plastic back and sticky side a down)   Secure with tape (up and down, not a round)  Do this daily for the next week    After healed, okay to moisturize it daily with non-scented lotion  Keep wearing your old DM shoes until they fix your new ones

## 2023-06-13 RX ORDER — POLYETHYLENE GLYCOL 3350, SODIUM CHLORIDE, SODIUM BICARBONATE, POTASSIUM CHLORIDE 420; 11.2; 5.72; 1.48 G/4L; G/4L; G/4L; G/4L
POWDER, FOR SOLUTION ORAL
COMMUNITY
Start: 2023-05-22 | End: 2023-08-16

## 2023-06-13 NOTE — PROGRESS NOTES
[facility] - [place]  [address]  [place] [address]  Phone: [phone]  Primary Provider: Natacha Alvarez  Pre-op Performing Provider: NATACHA ALVAREZ      PREOPERATIVE EVALUATION:  Today's date: 6/16/2023    Richard Harvey is a 58 year old male who presents for a preoperative evaluation.    Surgical Information:  Surgery/Procedure: Diagnostic colonoscopy, possible biopsy, possible polypectomy  Surgery Location: [facility]  Surgeon: Dr. Alcaraz  Surgery Date: 06/27/2023  Time of Surgery: TBD  Where patient plans to recover: At home alone  Fax number for surgical facility: Note does not need to be faxed, will be available electronically in Epic.    Assessment & Plan     The proposed surgical procedure is considered LOW risk.    Preop general physical exam  No concerning lab or exam findings. Richard is active. No issues with completing 4METs w/o symptoms  - CBC with platelets; Future  - Basic metabolic panel; Future    Abnormal CT scan, pelvis  Reason for the procedure     Type 1 diabetes mellitus with hyperglycemia (H)  A1c (5/8/2023) 7.2  - on insulin pump    Hyperlipidemia, unspecified hyperlipidemia type  Controlled  - c/w simvastatin     Primary hypertension  BP at goal  - c/w lisinopril     Chronic b/l low back pain w/ right sided sciatica   - MRI completed  - appt with Dr. Pugh in August  - c/w gabapentin    CKD  Increased today, most likely d/t dehydration, check at next visit      Implanted Device:   - Type of device: CGM Patient advised to bring device information on day of surgery.      Risks and Recommendations:  The patient has the following additional risks and recommendations for perioperative complications:  Diabetes:  - Patient is on insulin therapy; diabetic NPO guidelines provided and discussed.    Antiplatelet or Anticoagulation Medication Instructions:   - Patient is on no antiplatelet or anticoagulation medications.    Additional Medication Instructions:  Patient is to  take all scheduled medications on the day of surgery   - Insulin pump: Continue basal rate of insulin up until the time of surgery.   - Continuous Glucose Monitor (CGM): Patient was made aware on the day of surgery, they should be prepared to remove the Continuous Glucose Monitor (CGM) prior to the operation in order to avoid damage to the equipment during the procedure. The CGM will not be the source of glucose monitoring during the operation.    RECOMMENDATION:  APPROVAL GIVEN to proceed with proposed procedure, without further diagnostic evaluation.      Subjective       HPI related to upcoming procedure: Richard will be undergoing colonoscopy for possible soft tissue irregularity in his rectum which was noted on CT sacn          6/16/2023     8:33 AM   Preop Questions   1. Have you ever had a heart attack or stroke? No   2. Have you ever had surgery on your heart or blood vessels, such as a stent placement, a coronary artery bypass, or surgery on an artery in your head, neck, heart, or legs? No   3. Do you have chest pain with activity? No   4. Do you have a history of  heart failure? No   5. Do you currently have a cold, bronchitis or symptoms of other infection? No   6. Do you have a cough, shortness of breath, or wheezing? No   7. Do you or anyone in your family have previous history of blood clots? No   8. Do you or does anyone in your family have a serious bleeding problem such as prolonged bleeding following surgeries or cuts? No   9. Have you ever had problems with anemia or been told to take iron pills? No   10. Have you had any abnormal blood loss such as black, tarry or bloody stools? No   11. Have you ever had a blood transfusion? No   12. Are you willing to have a blood transfusion if it is medically needed before, during, or after your surgery? Yes   13. Have you or any of your relatives ever had problems with anesthesia? No   14. Do you have sleep apnea, excessive snoring or daytime drowsiness? No    15. Do you have any artifical heart valves or other implanted medical devices like a pacemaker, defibrillator, or continuous glucose monitor? YES - continuous glucose monitor   15a. What type of device do you have? continuous glucose monitor   15b. Name of the clinic that manages your device:  [facility] in [place]   16. Do you have artificial joints? No   17. Are you allergic to latex? No     Health Care Directive:  Patient does not have a Health Care Directive or Living Will: Discussed advance care planning with patient; information given to patient to review.    Preoperative Review of :   reviewed - controlled substances reflected in medication list.      Status of Chronic Conditions:  See problem list for active medical problems.  Problems all longstanding and stable, except as noted/documented.  See ROS for pertinent symptoms related to these conditions.    DIABETES - Patient has a longstanding history of DiabetesType Type I . Patient is being treated with insulin pump and denies significant side effects. Control has been good. Complicating factors include but are not limited to: hypertension and hyperlipidemia.   A1c (5/8/2023) 7.2    HYPERLIPIDEMIA - Patient has a long history of significant Hyperlipidemia requiring medication for treatment with recent good control. Patient reports no problems or side effects with the medication.   LDL (7/21/2022) 36    HYPERTENSION - Patient has longstanding history of HTN , currently denies any symptoms referable to elevated blood pressure. Specifically denies chest pain, palpitations, dyspnea, orthopnea, PND or peripheral edema. Blood pressure readings have been in normal range. Current medication regimen is as listed below. Patient denies any side effects of medication.     # Back   - appt with Dr. Pugh in August    Answers for HPI/ROS submitted by the patient on 6/16/2023  If you checked off any problems, how difficult have these problems made it  for you to do your work, take care of things at home, or get along with other people?: Not difficult at all  PHQ9 TOTAL SCORE: 2      # Cardiovascular: able to walk up a flight of stairs w/o cardiac or pulmonary issues    # Pulmonary: never smoker    # Bleeding/Clotting risk: no personal or family h/o bleeding or clotting issues    # Previous surgical history: no issues with anesthesia      Review of Systems   Constitutional: Negative for chills and fever.   HENT: Negative for congestion.    Respiratory: Negative for shortness of breath and wheezing.    Cardiovascular: Negative for chest pain, palpitations and peripheral edema.   Gastrointestinal: Negative for abdominal pain.   Genitourinary: Positive for difficulty urinating (w/ self cath intermittent).   Musculoskeletal: Positive for back pain.   Psychiatric/Behavioral: Negative for mood changes.         Patient Active Problem List    Diagnosis Date Noted     ELMA (acute kidney injury) (H) 04/22/2023     Priority: Medium     Pyelonephritis, acute 04/22/2023     Priority: Medium     Benign prostatic hyperplasia with urinary retention 04/22/2023     Priority: Medium     Acute cystitis without hematuria 04/18/2023     Priority: Medium     Sepsis, due to unspecified organism, unspecified whether acute organ dysfunction present (H) 04/18/2023     Priority: Medium     Chronic bilateral low back pain with right-sided sciatica 12/29/2022     Priority: Medium     Gastroesophageal reflux disease without esophagitis 11/30/2020     Priority: Medium     Gastroparesis 09/29/2020     Priority: Medium     Positive gastric emptying study on 9/28/2020 w/ 20% retention at 4 hours.        Obesity (BMI 35.0-39.9) with comorbidity (H) 08/15/2019     Priority: Medium     Diabetic polyneuropathy associated with type 1 diabetes mellitus (H) 06/05/2019     Priority: Medium     Class 2 obesity in adult 06/05/2019     Priority: Medium     ACP (advance care planning) 01/18/2017     Priority:  Medium     Advance Care Planning 1/18/2017: ACP Review of Chart / Resources Provided:  Reviewed chart for advance care plan.  Richard Harvey has no plan or code status on file. Discussed available resources and provided with information. Confirmed code status reflects current choices pending further ACP discussions.  Confirmed/documented legally designated decision makers.  Added by Janiya Rocha           DM type 1 (diabetes mellitus, type 1) (H) 08/15/2013     Priority: Medium     HTN (hypertension) 08/15/2013     Priority: Medium     Hyperlipidemia 08/15/2013     Priority: Medium      Past Medical History:   Diagnosis Date     BPH (benign prostatic hyperplasia)      Diabetes mellitus type 1, controlled (H)      HLD (hyperlipidemia)      HTN (hypertension)      Neuropathy      Type 1 diabetes (H)      Past Surgical History:   Procedure Laterality Date     COLONOSCOPY - HIM SCAN N/A 09/19/2016    Procedure: COLONOSCOPY SCREENING; Surgeon: Rudy Rojo MD; Location: [facility] OR     ESOPHAGOSCOPY, GASTROSCOPY, DUODENOSCOPY (EGD), COMBINED  08/13/2019     IR PICC PLACEMENT > 5 YRS OF AGE  4/21/2023     Current Outpatient Medications   Medication Sig Dispense Refill     acetone, Urine, test STRP Use as directed 50 each 3     bisacodyl (DULCOLAX) 5 MG EC tablet Take 2 tabs at bedtime 2 nights prior to procedure. Take 2 tabs at 3pm the day before procedure. 4 tablet 0     CALCIUM-VITAMIN D PO Take 1 tablet by mouth daily       Continuous Blood Gluc  (DEXCOM G6 ) DAYAN 1 each continuous To be used per manufacture's recommendation 1 each 2     Continuous Blood Gluc Sensor (DEXCOM G6 SENSOR) MISC CHANGE EVERY 10 DAYS 3 each 11     Continuous Blood Gluc Transmit (DEXCOM G6 TRANSMITTER) MISC CHANGE EVERY 3 MONTHS 1 each 4     CONTOUR NEXT TEST test strip TEST 6 TIMES DAILY, OR AS DIRECTED. 200 strip 5     diclofenac (VOLTAREN) 1 % topical gel Apply 4 g topically 4 times daily as needed for moderate pain  (4-6) 50 g 2     gabapentin (NEURONTIN) 300 MG capsule Take 1 capsule (300 mg) by mouth 3 times daily 90 capsule 4     Glucagon (GVOKE HYPOPEN 2-PACK) 1 MG/0.2ML SOAJ Inject 1 mg Subcutaneous as needed (for low blood glucose) 0.4 mL 3     Insulin Disposable Pump (OMNIPOD 5 G6 INTRO, GEN 5,) KIT 1 kit continuous 1 kit 0     Insulin Disposable Pump (OMNIPOD 5 G6 POD, GEN 5,) MISC 1 each every 3 days 10 each 5     INSULIN PUMP - OUTPATIENT Insulin pump settings Updated: 5/23/23 Omnipod 5 Basal:  0000 1.1 0200 1.05 0600 0.95 1020 0.7 1530 0.9 1800 1.15 Total bolus: 23.3 CR:  0000 12 1200 13 1600 14 ISF:  0000 65 1200 60 1800 55 Target: 110       Lidocaine (LIDOCARE) 4 % Patch Place 1 patch onto the skin every 24 hours To prevent lidocaine toxicity, patient should be patch free for 12 hrs daily. (Patient taking differently: Place 1 patch onto the skin daily as needed To prevent lidocaine toxicity, patient should be patch free for 12 hrs daily.) 20 patch 1     lisinopril (ZESTRIL) 10 MG tablet Take 1 tablet by mouth once daily 90 tablet 3     loperamide (IMODIUM A-D) 2 MG tablet Take 1 tablet (2 mg) by mouth 4 times daily as needed for diarrhea 30 tablet 0     Multiple Vitamin (MULTI-VITAMIN DAILY PO) Take 1 tablet by mouth daily       nitroGLYcerin (NITROSTAT) 0.4 MG sublingual tablet Place 0.4 mg under the tongue every 5 minutes as needed for chest pain . Do not crush; maximum of 3 doses in 15 minutes.       NOVOLOG VIAL 100 UNIT/ML soln USE WITH INSULIN PUMP FOR A TOTAL DAILY USAGE OF 70 UNITS 30 mL 3     ondansetron (ZOFRAN) 4 MG tablet Take 1 tablet (4 mg) by mouth every 8 hours as needed for nausea 30 tablet 4     order for DME Equipment being ordered:     1.  [email or web address] insulin pump supplies--insertion sets and reserviors  Disp: 1 month  Refill: 11 months 30 days 11     pantoprazole (PROTONIX) 40 MG EC tablet Take 1 tablet by mouth twice daily (Patient taking differently: Take 40 mg by mouth daily) 60 tablet 9      polyethylene glycol (GOLYTELY) 236 g suspension Drink 8 ounces every 10 minutes until the jug is half-empty at 6pm the night before procedure. Refrigerate. Repeat 6 hours prior to procedure. 4000 mL 0     polyethylene glycol-electrolytes (NULYTELY) 420 g solution AT 6 PM THE NIGHT BEFORE PROCEDURE, DRINK 8 OZ OF MIXED PREP EVERY 10 MINUTES UNTIL THE JUG IS HALF-EMPTY, REFRIGERATE. REPEAT 6 HOURS PRIOR TO PROCEDURE.       simvastatin (ZOCOR) 40 MG tablet TAKE 1 TABLET BY MOUTH AT BEDTIME 90 tablet 0     tamsulosin (FLOMAX) 0.4 MG capsule Take 1 capsule (0.4 mg) by mouth every evening 90 capsule 3     UREA 10 HYDRATING 10 % external cream APPLY  CREAM TOPICALLY TO AFFECTED AREA AS NEEDED FOR CALLOUSED SKIN 85 g 1       No Known Allergies     Social History     Tobacco Use     Smoking status: Never     Passive exposure: Never     Smokeless tobacco: Never   Vaping Use     Vaping status: Never Used   Substance Use Topics     Alcohol use: No     Alcohol/week: 0.0 standard drinks of alcohol     Family History   Problem Relation Age of Onset     No Known Problems Mother      Hypertension Father      Hypertension Brother      History   Drug Use No         Objective     /76 (BP Location: Left arm, Patient Position: Sitting, Cuff Size: Adult Regular)   Pulse 76   Temp 97.5  F (36.4  C) (Tympanic)   Resp 18   Ht 1.524 m (5')   Wt 87.1 kg (192 lb)   SpO2 94%   BMI 37.50 kg/m      Physical Exam  Constitutional:       General: He is not in acute distress.     Appearance: He is well-developed.   HENT:      Head: Normocephalic and atraumatic.      Right Ear: Tympanic membrane normal.      Left Ear: Tympanic membrane normal.      Mouth/Throat:      Mouth: Mucous membranes are moist.      Pharynx: No oropharyngeal exudate.   Eyes:      Extraocular Movements: Extraocular movements intact.      Conjunctiva/sclera: Conjunctivae normal.   Neck:      Thyroid: No thyromegaly.   Cardiovascular:      Rate and Rhythm: Normal rate  and regular rhythm.      Pulses: Normal pulses.      Heart sounds: No murmur heard.  Pulmonary:      Effort: Pulmonary effort is normal. No respiratory distress.      Breath sounds: No wheezing or rales.   Abdominal:      General: Bowel sounds are normal. There is no distension.      Palpations: Abdomen is soft.      Tenderness: There is generalized abdominal tenderness. There is no guarding.   Musculoskeletal:         General: Normal range of motion.      Cervical back: Normal range of motion and neck supple.      Right lower leg: No edema.      Left lower leg: No edema.   Lymphadenopathy:      Cervical: No cervical adenopathy.   Skin:     General: Skin is warm and dry.   Neurological:      Mental Status: He is alert.   Psychiatric:         Mood and Affect: Mood normal.         Recent Labs   Lab Test 05/08/23  0813 04/27/23  0941 04/25/23  0521 04/22/23  0606 04/21/23  1052 04/18/23  1219 01/31/23  0752 11/03/22  1320   HGB  --  11.6* 10.3*   < >  --    < >  --  14.0   PLT  --  394 343   < >  --    < >  --  286   INR  --   --   --   --  1.19*  --   --  1.19*   NA  --  140 143   < >  --    < > 142 134*   POTASSIUM  --  4.1 3.9   < >  --    < > 4.4 4.9   CR  --  1.24* 1.23*   < >  --    < > 1.12 0.98   A1C 7.2*  --   --   --   --   --  6.9*  --     < > = values in this interval not displayed.        Diagnostics:  Recent Results (from the past 24 hour(s))   CBC with platelets    Collection Time: 06/16/23  8:26 AM   Result Value Ref Range    WBC Count 9.9 4.0 - 11.0 10e3/uL    RBC Count 4.13 (L) 4.40 - 5.90 10e6/uL    Hemoglobin 12.3 (L) 13.3 - 17.7 g/dL    Hematocrit 37.7 (L) 40.0 - 53.0 %    MCV 91 78 - 100 fL    MCH 29.8 26.5 - 33.0 pg    MCHC 32.6 31.5 - 36.5 g/dL    RDW 12.4 10.0 - 15.0 %    Platelet Count 375 150 - 450 10e3/uL   Basic metabolic panel    Collection Time: 06/16/23  8:26 AM   Result Value Ref Range    Sodium 139 136 - 145 mmol/L    Potassium 4.4 3.4 - 5.3 mmol/L    Chloride 103 98 - 107 mmol/L     Carbon Dioxide (CO2) 27 22 - 29 mmol/L    Anion Gap 9 7 - 15 mmol/L    Urea Nitrogen 25.9 (H) 8.0 - 23.0 mg/dL    Creatinine 1.42 (H) 0.67 - 1.17 mg/dL    Calcium 9.1 8.6 - 10.0 mg/dL    Glucose 142 (H) 70 - 99 mg/dL    GFR Estimate 57 (L) >60 mL/min/1.73m2       No EKG required for low risk surgery (cataract, skin procedure, breast biopsy, etc).    Revised Cardiac Risk Index (RCRI):  The patient has the following serious cardiovascular risks for perioperative complications:   - Diabetes Mellitus (on Insulin) = 1 point     RCRI Interpretation: 1 point: Class II (low risk - 0.9% complication rate)           Signed Electronically by: Natacha Alvarez MD  Copy of this evaluation report is provided to requesting physician.

## 2023-06-13 NOTE — H&P (VIEW-ONLY)
[facility] - [place]  [address]  [place] [address]  Phone: [phone]  Primary Provider: Natacha Alvarez  Pre-op Performing Provider: NATACHA ALVAREZ      PREOPERATIVE EVALUATION:  Today's date: 6/16/2023    Richard Harvey is a 58 year old male who presents for a preoperative evaluation.    Surgical Information:  Surgery/Procedure: Diagnostic colonoscopy, possible biopsy, possible polypectomy  Surgery Location: [facility]  Surgeon: Dr. Alcaraz  Surgery Date: 06/27/2023  Time of Surgery: TBD  Where patient plans to recover: At home alone  Fax number for surgical facility: Note does not need to be faxed, will be available electronically in Epic.    Assessment & Plan     The proposed surgical procedure is considered LOW risk.    Preop general physical exam  No concerning lab or exam findings. Richard is active. No issues with completing 4METs w/o symptoms  - CBC with platelets; Future  - Basic metabolic panel; Future    Abnormal CT scan, pelvis  Reason for the procedure     Type 1 diabetes mellitus with hyperglycemia (H)  A1c (5/8/2023) 7.2  - on insulin pump    Hyperlipidemia, unspecified hyperlipidemia type  Controlled  - c/w simvastatin     Primary hypertension  BP at goal  - c/w lisinopril     Chronic b/l low back pain w/ right sided sciatica   - MRI completed  - appt with Dr. Pugh in August  - c/w gabapentin    CKD  Increased today, most likely d/t dehydration, check at next visit      Implanted Device:   - Type of device: CGM Patient advised to bring device information on day of surgery.      Risks and Recommendations:  The patient has the following additional risks and recommendations for perioperative complications:  Diabetes:  - Patient is on insulin therapy; diabetic NPO guidelines provided and discussed.    Antiplatelet or Anticoagulation Medication Instructions:   - Patient is on no antiplatelet or anticoagulation medications.    Additional Medication Instructions:  Patient is to  take all scheduled medications on the day of surgery   - Insulin pump: Continue basal rate of insulin up until the time of surgery.   - Continuous Glucose Monitor (CGM): Patient was made aware on the day of surgery, they should be prepared to remove the Continuous Glucose Monitor (CGM) prior to the operation in order to avoid damage to the equipment during the procedure. The CGM will not be the source of glucose monitoring during the operation.    RECOMMENDATION:  APPROVAL GIVEN to proceed with proposed procedure, without further diagnostic evaluation.      Subjective       HPI related to upcoming procedure: Richard will be undergoing colonoscopy for possible soft tissue irregularity in his rectum which was noted on CT sacn          6/16/2023     8:33 AM   Preop Questions   1. Have you ever had a heart attack or stroke? No   2. Have you ever had surgery on your heart or blood vessels, such as a stent placement, a coronary artery bypass, or surgery on an artery in your head, neck, heart, or legs? No   3. Do you have chest pain with activity? No   4. Do you have a history of  heart failure? No   5. Do you currently have a cold, bronchitis or symptoms of other infection? No   6. Do you have a cough, shortness of breath, or wheezing? No   7. Do you or anyone in your family have previous history of blood clots? No   8. Do you or does anyone in your family have a serious bleeding problem such as prolonged bleeding following surgeries or cuts? No   9. Have you ever had problems with anemia or been told to take iron pills? No   10. Have you had any abnormal blood loss such as black, tarry or bloody stools? No   11. Have you ever had a blood transfusion? No   12. Are you willing to have a blood transfusion if it is medically needed before, during, or after your surgery? Yes   13. Have you or any of your relatives ever had problems with anesthesia? No   14. Do you have sleep apnea, excessive snoring or daytime drowsiness? No    15. Do you have any artifical heart valves or other implanted medical devices like a pacemaker, defibrillator, or continuous glucose monitor? YES - continuous glucose monitor   15a. What type of device do you have? continuous glucose monitor   15b. Name of the clinic that manages your device:  [facility] in [place]   16. Do you have artificial joints? No   17. Are you allergic to latex? No     Health Care Directive:  Patient does not have a Health Care Directive or Living Will: Discussed advance care planning with patient; information given to patient to review.    Preoperative Review of :   reviewed - controlled substances reflected in medication list.      Status of Chronic Conditions:  See problem list for active medical problems.  Problems all longstanding and stable, except as noted/documented.  See ROS for pertinent symptoms related to these conditions.    DIABETES - Patient has a longstanding history of DiabetesType Type I . Patient is being treated with insulin pump and denies significant side effects. Control has been good. Complicating factors include but are not limited to: hypertension and hyperlipidemia.   A1c (5/8/2023) 7.2    HYPERLIPIDEMIA - Patient has a long history of significant Hyperlipidemia requiring medication for treatment with recent good control. Patient reports no problems or side effects with the medication.   LDL (7/21/2022) 36    HYPERTENSION - Patient has longstanding history of HTN , currently denies any symptoms referable to elevated blood pressure. Specifically denies chest pain, palpitations, dyspnea, orthopnea, PND or peripheral edema. Blood pressure readings have been in normal range. Current medication regimen is as listed below. Patient denies any side effects of medication.     # Back   - appt with Dr. Pugh in August    Answers for HPI/ROS submitted by the patient on 6/16/2023  If you checked off any problems, how difficult have these problems made it  for you to do your work, take care of things at home, or get along with other people?: Not difficult at all  PHQ9 TOTAL SCORE: 2      # Cardiovascular: able to walk up a flight of stairs w/o cardiac or pulmonary issues    # Pulmonary: never smoker    # Bleeding/Clotting risk: no personal or family h/o bleeding or clotting issues    # Previous surgical history: no issues with anesthesia      Review of Systems   Constitutional: Negative for chills and fever.   HENT: Negative for congestion.    Respiratory: Negative for shortness of breath and wheezing.    Cardiovascular: Negative for chest pain, palpitations and peripheral edema.   Gastrointestinal: Negative for abdominal pain.   Genitourinary: Positive for difficulty urinating (w/ self cath intermittent).   Musculoskeletal: Positive for back pain.   Psychiatric/Behavioral: Negative for mood changes.         Patient Active Problem List    Diagnosis Date Noted     ELMA (acute kidney injury) (H) 04/22/2023     Priority: Medium     Pyelonephritis, acute 04/22/2023     Priority: Medium     Benign prostatic hyperplasia with urinary retention 04/22/2023     Priority: Medium     Acute cystitis without hematuria 04/18/2023     Priority: Medium     Sepsis, due to unspecified organism, unspecified whether acute organ dysfunction present (H) 04/18/2023     Priority: Medium     Chronic bilateral low back pain with right-sided sciatica 12/29/2022     Priority: Medium     Gastroesophageal reflux disease without esophagitis 11/30/2020     Priority: Medium     Gastroparesis 09/29/2020     Priority: Medium     Positive gastric emptying study on 9/28/2020 w/ 20% retention at 4 hours.        Obesity (BMI 35.0-39.9) with comorbidity (H) 08/15/2019     Priority: Medium     Diabetic polyneuropathy associated with type 1 diabetes mellitus (H) 06/05/2019     Priority: Medium     Class 2 obesity in adult 06/05/2019     Priority: Medium     ACP (advance care planning) 01/18/2017     Priority:  Medium     Advance Care Planning 1/18/2017: ACP Review of Chart / Resources Provided:  Reviewed chart for advance care plan.  Richard Harvey has no plan or code status on file. Discussed available resources and provided with information. Confirmed code status reflects current choices pending further ACP discussions.  Confirmed/documented legally designated decision makers.  Added by Janiya Rocha           DM type 1 (diabetes mellitus, type 1) (H) 08/15/2013     Priority: Medium     HTN (hypertension) 08/15/2013     Priority: Medium     Hyperlipidemia 08/15/2013     Priority: Medium      Past Medical History:   Diagnosis Date     BPH (benign prostatic hyperplasia)      Diabetes mellitus type 1, controlled (H)      HLD (hyperlipidemia)      HTN (hypertension)      Neuropathy      Type 1 diabetes (H)      Past Surgical History:   Procedure Laterality Date     COLONOSCOPY - HIM SCAN N/A 09/19/2016    Procedure: COLONOSCOPY SCREENING; Surgeon: Rudy Rojo MD; Location: [facility] OR     ESOPHAGOSCOPY, GASTROSCOPY, DUODENOSCOPY (EGD), COMBINED  08/13/2019     IR PICC PLACEMENT > 5 YRS OF AGE  4/21/2023     Current Outpatient Medications   Medication Sig Dispense Refill     acetone, Urine, test STRP Use as directed 50 each 3     bisacodyl (DULCOLAX) 5 MG EC tablet Take 2 tabs at bedtime 2 nights prior to procedure. Take 2 tabs at 3pm the day before procedure. 4 tablet 0     CALCIUM-VITAMIN D PO Take 1 tablet by mouth daily       Continuous Blood Gluc  (DEXCOM G6 ) DAYAN 1 each continuous To be used per manufacture's recommendation 1 each 2     Continuous Blood Gluc Sensor (DEXCOM G6 SENSOR) MISC CHANGE EVERY 10 DAYS 3 each 11     Continuous Blood Gluc Transmit (DEXCOM G6 TRANSMITTER) MISC CHANGE EVERY 3 MONTHS 1 each 4     CONTOUR NEXT TEST test strip TEST 6 TIMES DAILY, OR AS DIRECTED. 200 strip 5     diclofenac (VOLTAREN) 1 % topical gel Apply 4 g topically 4 times daily as needed for moderate pain  (4-6) 50 g 2     gabapentin (NEURONTIN) 300 MG capsule Take 1 capsule (300 mg) by mouth 3 times daily 90 capsule 4     Glucagon (GVOKE HYPOPEN 2-PACK) 1 MG/0.2ML SOAJ Inject 1 mg Subcutaneous as needed (for low blood glucose) 0.4 mL 3     Insulin Disposable Pump (OMNIPOD 5 G6 INTRO, GEN 5,) KIT 1 kit continuous 1 kit 0     Insulin Disposable Pump (OMNIPOD 5 G6 POD, GEN 5,) MISC 1 each every 3 days 10 each 5     INSULIN PUMP - OUTPATIENT Insulin pump settings Updated: 5/23/23 Omnipod 5 Basal:  0000 1.1 0200 1.05 0600 0.95 1020 0.7 1530 0.9 1800 1.15 Total bolus: 23.3 CR:  0000 12 1200 13 1600 14 ISF:  0000 65 1200 60 1800 55 Target: 110       Lidocaine (LIDOCARE) 4 % Patch Place 1 patch onto the skin every 24 hours To prevent lidocaine toxicity, patient should be patch free for 12 hrs daily. (Patient taking differently: Place 1 patch onto the skin daily as needed To prevent lidocaine toxicity, patient should be patch free for 12 hrs daily.) 20 patch 1     lisinopril (ZESTRIL) 10 MG tablet Take 1 tablet by mouth once daily 90 tablet 3     loperamide (IMODIUM A-D) 2 MG tablet Take 1 tablet (2 mg) by mouth 4 times daily as needed for diarrhea 30 tablet 0     Multiple Vitamin (MULTI-VITAMIN DAILY PO) Take 1 tablet by mouth daily       nitroGLYcerin (NITROSTAT) 0.4 MG sublingual tablet Place 0.4 mg under the tongue every 5 minutes as needed for chest pain . Do not crush; maximum of 3 doses in 15 minutes.       NOVOLOG VIAL 100 UNIT/ML soln USE WITH INSULIN PUMP FOR A TOTAL DAILY USAGE OF 70 UNITS 30 mL 3     ondansetron (ZOFRAN) 4 MG tablet Take 1 tablet (4 mg) by mouth every 8 hours as needed for nausea 30 tablet 4     order for DME Equipment being ordered:     1.  [employer] insulin pump supplies--insertion sets and reserviors  Disp: 1 month  Refill: 11 months 30 days 11     pantoprazole (PROTONIX) 40 MG EC tablet Take 1 tablet by mouth twice daily (Patient taking differently: Take 40 mg by mouth daily) 60 tablet 9      polyethylene glycol (GOLYTELY) 236 g suspension Drink 8 ounces every 10 minutes until the jug is half-empty at 6pm the night before procedure. Refrigerate. Repeat 6 hours prior to procedure. 4000 mL 0     polyethylene glycol-electrolytes (NULYTELY) 420 g solution AT 6 PM THE NIGHT BEFORE PROCEDURE, DRINK 8 OZ OF MIXED PREP EVERY 10 MINUTES UNTIL THE JUG IS HALF-EMPTY, REFRIGERATE. REPEAT 6 HOURS PRIOR TO PROCEDURE.       simvastatin (ZOCOR) 40 MG tablet TAKE 1 TABLET BY MOUTH AT BEDTIME 90 tablet 0     tamsulosin (FLOMAX) 0.4 MG capsule Take 1 capsule (0.4 mg) by mouth every evening 90 capsule 3     UREA 10 HYDRATING 10 % external cream APPLY  CREAM TOPICALLY TO AFFECTED AREA AS NEEDED FOR CALLOUSED SKIN 85 g 1       No Known Allergies     Social History     Tobacco Use     Smoking status: Never     Passive exposure: Never     Smokeless tobacco: Never   Vaping Use     Vaping status: Never Used   Substance Use Topics     Alcohol use: No     Alcohol/week: 0.0 standard drinks of alcohol     Family History   Problem Relation Age of Onset     No Known Problems Mother      Hypertension Father      Hypertension Brother      History   Drug Use No         Objective     /76 (BP Location: Left arm, Patient Position: Sitting, Cuff Size: Adult Regular)   Pulse 76   Temp 97.5  F (36.4  C) (Tympanic)   Resp 18   Ht 1.524 m (5')   Wt 87.1 kg (192 lb)   SpO2 94%   BMI 37.50 kg/m      Physical Exam  Constitutional:       General: He is not in acute distress.     Appearance: He is well-developed.   HENT:      Head: Normocephalic and atraumatic.      Right Ear: Tympanic membrane normal.      Left Ear: Tympanic membrane normal.      Mouth/Throat:      Mouth: Mucous membranes are moist.      Pharynx: No oropharyngeal exudate.   Eyes:      Extraocular Movements: Extraocular movements intact.      Conjunctiva/sclera: Conjunctivae normal.   Neck:      Thyroid: No thyromegaly.   Cardiovascular:      Rate and Rhythm: Normal rate  and regular rhythm.      Pulses: Normal pulses.      Heart sounds: No murmur heard.  Pulmonary:      Effort: Pulmonary effort is normal. No respiratory distress.      Breath sounds: No wheezing or rales.   Abdominal:      General: Bowel sounds are normal. There is no distension.      Palpations: Abdomen is soft.      Tenderness: There is generalized abdominal tenderness. There is no guarding.   Musculoskeletal:         General: Normal range of motion.      Cervical back: Normal range of motion and neck supple.      Right lower leg: No edema.      Left lower leg: No edema.   Lymphadenopathy:      Cervical: No cervical adenopathy.   Skin:     General: Skin is warm and dry.   Neurological:      Mental Status: He is alert.   Psychiatric:         Mood and Affect: Mood normal.         Recent Labs   Lab Test 05/08/23  0813 04/27/23  0941 04/25/23  0521 04/22/23  0606 04/21/23  1052 04/18/23  1219 01/31/23  0752 11/03/22  1320   HGB  --  11.6* 10.3*   < >  --    < >  --  14.0   PLT  --  394 343   < >  --    < >  --  286   INR  --   --   --   --  1.19*  --   --  1.19*   NA  --  140 143   < >  --    < > 142 134*   POTASSIUM  --  4.1 3.9   < >  --    < > 4.4 4.9   CR  --  1.24* 1.23*   < >  --    < > 1.12 0.98   A1C 7.2*  --   --   --   --   --  6.9*  --     < > = values in this interval not displayed.        Diagnostics:  Recent Results (from the past 24 hour(s))   CBC with platelets    Collection Time: 06/16/23  8:26 AM   Result Value Ref Range    WBC Count 9.9 4.0 - 11.0 10e3/uL    RBC Count 4.13 (L) 4.40 - 5.90 10e6/uL    Hemoglobin 12.3 (L) 13.3 - 17.7 g/dL    Hematocrit 37.7 (L) 40.0 - 53.0 %    MCV 91 78 - 100 fL    MCH 29.8 26.5 - 33.0 pg    MCHC 32.6 31.5 - 36.5 g/dL    RDW 12.4 10.0 - 15.0 %    Platelet Count 375 150 - 450 10e3/uL   Basic metabolic panel    Collection Time: 06/16/23  8:26 AM   Result Value Ref Range    Sodium 139 136 - 145 mmol/L    Potassium 4.4 3.4 - 5.3 mmol/L    Chloride 103 98 - 107 mmol/L     Carbon Dioxide (CO2) 27 22 - 29 mmol/L    Anion Gap 9 7 - 15 mmol/L    Urea Nitrogen 25.9 (H) 8.0 - 23.0 mg/dL    Creatinine 1.42 (H) 0.67 - 1.17 mg/dL    Calcium 9.1 8.6 - 10.0 mg/dL    Glucose 142 (H) 70 - 99 mg/dL    GFR Estimate 57 (L) >60 mL/min/1.73m2       No EKG required for low risk surgery (cataract, skin procedure, breast biopsy, etc).    Revised Cardiac Risk Index (RCRI):  The patient has the following serious cardiovascular risks for perioperative complications:   - Diabetes Mellitus (on Insulin) = 1 point     RCRI Interpretation: 1 point: Class II (low risk - 0.9% complication rate)           Signed Electronically by: Natacha Alvarez MD  Copy of this evaluation report is provided to requesting physician.

## 2023-06-13 NOTE — PATIENT INSTRUCTIONS
Keep your insulin pump on basal while you are fasting.     Monitor your BG every 4 hours   If BG high, take corrective dose (not meal dose) sliding scale insulin if that is what you are used to doing   IF BG is <100 or symptoms of hypoglycemia follow the following guidelines:   - Drink 4oz of fruit juice without pulp or 4oz of regular soda   - Eat 3 glucose gels or 5 sugar cubes or packets and monitor BG q15min until stable BG   - Repeat the treatment as needed and monitor BG until >100     For informational purposes only. Not to replace the advice of your health care provider. Copyright   2003,  [employer]. All rights reserved. Clinically reviewed by Adriana Cao MD. [facility] [id number] - REV .  Preparing for Your Surgery  Getting started  A nurse will call you to review your health history and instructions. They will give you an arrival time based on your scheduled surgery time. Please be ready to share:  Your doctor's clinic name and phone number  Your medical, surgical, and anesthesia history  A list of allergies and sensitivities  A list of medicines, including herbal treatments and over-the-counter drugs  Whether the patient has a legal guardian (ask how to send us the papers in advance)  Please tell us if you're pregnant--or if there's any chance you might be pregnant. Some surgeries may injure a fetus (unborn baby), so they require a pregnancy test. Surgeries that are safe for a fetus don't always need a test, and you can choose whether to have one.   If you have a child who's having surgery, please ask for a copy of Preparing for Your Child's Surgery.    Preparing for surgery  Within 10 to 30 days of surgery: Have a pre-op exam (sometimes called an H&P, or History and Physical). This can be done at a clinic or pre-operative center.  If you're having a , you may not need this exam. Talk to your care team.  At your pre-op exam, talk to your care team about all medicines you take.  If you need to stop any medicines before surgery, ask when to start taking them again.  We do this for your safety. Many medicines can make you bleed too much during surgery. Some change how well surgery (anesthesia) drugs work.  Call your insurance company to let them know you're having surgery. (If you don't have insurance, call [phone].)  Call your clinic if there's any change in your health. This includes signs of a cold or flu (sore throat, runny nose, cough, rash, fever). It also includes a scrape or scratch near the surgery site.  If you have questions on the day of surgery, call your hospital or surgery center.  Eating and drinking guidelines  For your safety: Unless your surgeon tells you otherwise, follow the guidelines below.  Eat and drink as usual until 8 hours before you arrive for surgery. After that, no food or milk.  Drink clear liquids until 2 hours before you arrive. These are liquids you can see through, like water, Gatorade, and Propel Water. They also include plain black coffee and tea (no cream or milk), candy, and breath mints. You can spit out gum when you arrive.  If you drink alcohol: Stop drinking it the night before surgery.  If your care team tells you to take medicine on the morning of surgery, it's okay to take it with a sip of water.  Preventing infection  Shower or bathe the night before and morning of your surgery. Follow the instructions your clinic gave you. (If no instructions, use regular soap.)  Don't shave or clip hair near your surgery site. We'll remove the hair if needed.  Don't smoke or vape the morning of surgery. You may chew nicotine gum up to 2 hours before surgery. A nicotine patch is okay.  Note: Some surgeries require you to completely quit smoking and nicotine. Check with your surgeon.  Your care team will make every effort to keep you safe from infection. We will:  Clean our hands often with soap and water (or an alcohol-based hand rub).  Clean the skin at  your surgery site with a special soap that kills germs.  Give you a special gown to keep you warm. (Cold raises the risk of infection.)  Wear special hair covers, masks, gowns and gloves during surgery.  Give antibiotic medicine, if prescribed. Not all surgeries need antibiotics.  What to bring on the day of surgery  Photo ID and insurance card  Copy of your health care directive, if you have one  Glasses and hearing aids (bring cases)  You can't wear contacts during surgery  Inhaler and eye drops, if you use them (tell us about these when you arrive)  CPAP machine or breathing device, if you use them  A few personal items, if spending the night  If you have . . .  A pacemaker, ICD (cardiac defibrillator) or other implant: Bring the ID card.  An implanted stimulator: Bring the remote control.  A legal guardian: Bring a copy of the certified (court-stamped) guardianship papers.  Please remove any jewelry, including body piercings. Leave jewelry and other valuables at home.  If you're going home the day of surgery  You must have a responsible adult drive you home. They should stay with you overnight as well.  If you don't have someone to stay with you, and you aren't safe to go home alone, we may keep you overnight. Insurance often won't pay for this.  After surgery  If it's hard to control your pain or you need more pain medicine, please call your surgeon's office.  Questions?   If you have any questions for your care team, list them here: _________________________________________________________________________________________________________________________________________________________________________ ____________________________________ ____________________________________ ____________________________________    Instructions About Your Continuous Glucose Monitor  You should be prepared to remove the Continuous Glucose Monitor prior to the operation in order to avoid damage to the equipment during the procedure.  Your Continuous Glucose Monitor will not be the source of glucose monitoring during the operation.    How to Manage Your Diabetes Before Surgery  If you use insulin for your diabetes, follow these steps to keep your blood sugar in a safe range before surgery, when you ve been told not to eat or drink:   Check your blood sugar every 4 hours   If your blood sugar is high, take a corrective dose (not a meal dose) of sliding-scale insulin, if that is what you re used to doing  If your blood sugar is below 100, or you have symptoms of hypoglycemia, follow these steps:   Have 1 item from this list:  4 oz (1/2 cup) of fruit juice without pulp    4 oz (1/2 cup) of regular soda (not diet soda)    3 glucose gels    5 sugar cubes or sugar packets   Check your blood sugar again after 15 minutes  Repeat steps 1 and 2 again until your blood sugar is greater than 100

## 2023-06-16 ENCOUNTER — OFFICE VISIT (OUTPATIENT)
Dept: FAMILY MEDICINE | Facility: OTHER | Age: 58
End: 2023-06-16
Attending: FAMILY MEDICINE
Payer: COMMERCIAL

## 2023-06-16 ENCOUNTER — ALLIED HEALTH/NURSE VISIT (OUTPATIENT)
Dept: EDUCATION SERVICES | Facility: OTHER | Age: 58
End: 2023-06-16
Attending: NURSE PRACTITIONER
Payer: COMMERCIAL

## 2023-06-16 ENCOUNTER — LAB (OUTPATIENT)
Dept: LAB | Facility: OTHER | Age: 58
End: 2023-06-16
Attending: NURSE PRACTITIONER
Payer: COMMERCIAL

## 2023-06-16 VITALS
SYSTOLIC BLOOD PRESSURE: 126 MMHG | TEMPERATURE: 97.5 F | HEART RATE: 76 BPM | RESPIRATION RATE: 18 BRPM | BODY MASS INDEX: 37.69 KG/M2 | HEIGHT: 60 IN | WEIGHT: 192 LBS | OXYGEN SATURATION: 94 % | DIASTOLIC BLOOD PRESSURE: 76 MMHG

## 2023-06-16 DIAGNOSIS — Z01.818 PREOP GENERAL PHYSICAL EXAM: ICD-10-CM

## 2023-06-16 DIAGNOSIS — E10.65 TYPE 1 DIABETES MELLITUS WITH HYPERGLYCEMIA (H): ICD-10-CM

## 2023-06-16 DIAGNOSIS — Z01.818 PREOP GENERAL PHYSICAL EXAM: Primary | ICD-10-CM

## 2023-06-16 DIAGNOSIS — R93.5 ABNORMAL CT SCAN, PELVIS: ICD-10-CM

## 2023-06-16 DIAGNOSIS — E78.5 HYPERLIPIDEMIA, UNSPECIFIED HYPERLIPIDEMIA TYPE: ICD-10-CM

## 2023-06-16 DIAGNOSIS — I10 PRIMARY HYPERTENSION: ICD-10-CM

## 2023-06-16 DIAGNOSIS — E10.65 TYPE 1 DIABETES MELLITUS WITH HYPERGLYCEMIA (H): Primary | ICD-10-CM

## 2023-06-16 DIAGNOSIS — N18.31 STAGE 3A CHRONIC KIDNEY DISEASE (H): ICD-10-CM

## 2023-06-16 LAB
ANION GAP SERPL CALCULATED.3IONS-SCNC: 9 MMOL/L (ref 7–15)
BUN SERPL-MCNC: 25.9 MG/DL (ref 8–23)
CALCIUM SERPL-MCNC: 9.1 MG/DL (ref 8.6–10)
CHLORIDE SERPL-SCNC: 103 MMOL/L (ref 98–107)
CREAT SERPL-MCNC: 1.42 MG/DL (ref 0.67–1.17)
DEPRECATED HCO3 PLAS-SCNC: 27 MMOL/L (ref 22–29)
ERYTHROCYTE [DISTWIDTH] IN BLOOD BY AUTOMATED COUNT: 12.4 % (ref 10–15)
GFR SERPL CREATININE-BSD FRML MDRD: 57 ML/MIN/1.73M2
GLUCOSE SERPL-MCNC: 142 MG/DL (ref 70–99)
HCT VFR BLD AUTO: 37.7 % (ref 40–53)
HGB BLD-MCNC: 12.3 G/DL (ref 13.3–17.7)
MCH RBC QN AUTO: 29.8 PG (ref 26.5–33)
MCHC RBC AUTO-ENTMCNC: 32.6 G/DL (ref 31.5–36.5)
MCV RBC AUTO: 91 FL (ref 78–100)
PLATELET # BLD AUTO: 375 10E3/UL (ref 150–450)
POTASSIUM SERPL-SCNC: 4.4 MMOL/L (ref 3.4–5.3)
RBC # BLD AUTO: 4.13 10E6/UL (ref 4.4–5.9)
SODIUM SERPL-SCNC: 139 MMOL/L (ref 136–145)
WBC # BLD AUTO: 9.9 10E3/UL (ref 4–11)

## 2023-06-16 PROCEDURE — 85014 HEMATOCRIT: CPT | Mod: ZL

## 2023-06-16 PROCEDURE — 36415 COLL VENOUS BLD VENIPUNCTURE: CPT | Mod: ZL

## 2023-06-16 PROCEDURE — G0463 HOSPITAL OUTPT CLINIC VISIT: HCPCS

## 2023-06-16 PROCEDURE — 99214 OFFICE O/P EST MOD 30 MIN: CPT | Performed by: FAMILY MEDICINE

## 2023-06-16 PROCEDURE — 80048 BASIC METABOLIC PNL TOTAL CA: CPT | Mod: ZL

## 2023-06-16 ASSESSMENT — ENCOUNTER SYMPTOMS
DIFFICULTY URINATING: 1
SHORTNESS OF BREATH: 0
WHEEZING: 0
PALPITATIONS: 0
CHILLS: 0
BACK PAIN: 1
FEVER: 0
ABDOMINAL PAIN: 0

## 2023-06-16 ASSESSMENT — PAIN SCALES - GENERAL: PAINLEVEL: SEVERE PAIN (6)

## 2023-06-16 ASSESSMENT — PATIENT HEALTH QUESTIONNAIRE - PHQ9
SUM OF ALL RESPONSES TO PHQ QUESTIONS 1-9: 2
10. IF YOU CHECKED OFF ANY PROBLEMS, HOW DIFFICULT HAVE THESE PROBLEMS MADE IT FOR YOU TO DO YOUR WORK, TAKE CARE OF THINGS AT HOME, OR GET ALONG WITH OTHER PEOPLE: NOT DIFFICULT AT ALL
SUM OF ALL RESPONSES TO PHQ QUESTIONS 1-9: 2

## 2023-06-16 NOTE — PROGRESS NOTES
Pt came in for an Omnipod download today. This was completed and given to Dr Alvarez and Macrina Dixon.    Lianna Brewster

## 2023-06-21 ENCOUNTER — TRANSFERRED RECORDS (OUTPATIENT)
Dept: HEALTH INFORMATION MANAGEMENT | Facility: CLINIC | Age: 58
End: 2023-06-21

## 2023-06-21 LAB — RETINOPATHY: NEGATIVE

## 2023-06-22 ENCOUNTER — ANESTHESIA EVENT (OUTPATIENT)
Dept: SURGERY | Facility: HOSPITAL | Age: 58
End: 2023-06-22
Payer: COMMERCIAL

## 2023-06-22 NOTE — ANESTHESIA PREPROCEDURE EVALUATION
Anesthesia Pre-Procedure Evaluation    Patient: Richard Harvey   MRN: [id number] : 1965        Procedure : Procedure(s):  Diagnostic colonoscopy, possible biopsy, possible polypectomy          Past Medical History:   Diagnosis Date     BPH (benign prostatic hyperplasia)      Diabetes mellitus type 1, controlled (H)      HLD (hyperlipidemia)      HTN (hypertension)      Neuropathy      Type 1 diabetes (H)       Past Surgical History:   Procedure Laterality Date     COLONOSCOPY - HIM SCAN N/A 2016    Procedure: COLONOSCOPY SCREENING; Surgeon: Rudy Rojo MD; Location: [facility] OR     ESOPHAGOSCOPY, GASTROSCOPY, DUODENOSCOPY (EGD), COMBINED  2019     IR PICC PLACEMENT > 5 YRS OF AGE  2023      No Known Allergies   Social History     Tobacco Use     Smoking status: Never     Passive exposure: Never     Smokeless tobacco: Never   Substance Use Topics     Alcohol use: No     Alcohol/week: 0.0 standard drinks of alcohol      Wt Readings from Last 1 Encounters:   23 87.1 kg (192 lb)        Anesthesia Evaluation   Pt has had prior anesthetic.     No history of anesthetic complications       ROS/MED HX  ENT/Pulmonary:     (+) SAM risk factors, snores loudly, hypertension, obese,     Neurologic:  - neg neurologic ROS     Cardiovascular:     (+) Dyslipidemia hypertension-----Previous cardiac testing   Echo: Date: 2023 Results:  No vegetation or mass identified, however this does not exclude endocarditis.  Very technically difficult study and valvular evaluation is suboptimal. Left  ventricular function is decreased. The ejection fraction is 45-50% (mildly  reduced).  Global right ventricular function is normal.  Both atria appear normal.  Trace mitral insufficiency is present.  Trace tricuspid insufficiency is present.  Trace pulmonic insufficiency is present.  Very technically difficult study and valvular evaluation is suboptimal.  Stress Test: Date: Results:    ECG Reviewed: Date:  Results:    Cath: Date: Results:      METS/Exercise Tolerance: >4 METS    Hematologic:  - neg hematologic  ROS     Musculoskeletal: Comment: Uses lido kalina patch and heat      GI/Hepatic:     (+) GERD (reports nausea today from prep, denies vomiting during prep), Asymptomatic on medication, bowel prep,     Renal/Genitourinary: Comment: Kidney infection was hospitalized here for 11 days  Now straight cath 2x per day and voids as well, patient not emptying their bladder.  Sees Urologist next month    (+) renal disease, type: CRI and ARF, BPH,     Endo:     (+) type I DM, Last HgA1c: 7.2, date: 5/8/23, Using insulin, - using insulin pump. Diabetic complications: neuropathy gastroparesis. Obesity,     Psychiatric/Substance Use:  - neg psychiatric ROS     Infectious Disease:  - neg infectious disease ROS     Malignancy:  - neg malignancy ROS     Other:  - neg other ROS    (+) , H/O Chronic Pain (6/10 low back pain),        Physical Exam    Airway        Mallampati: II   TM distance: < 3 FB   Neck ROM: full   Mouth opening: > 3 cm    Respiratory Devices and Support         Dental       (+) Modest Abnormalities - crowns, retainers, 1 or 2 missing teeth      Cardiovascular   cardiovascular exam normal       Rhythm and rate: regular and normal     Pulmonary   pulmonary exam normal        breath sounds clear to auscultation           OUTSIDE LABS:  CBC:   Lab Results   Component Value Date    WBC 9.9 06/16/2023    WBC 11.3 (H) 04/27/2023    HGB 12.3 (L) 06/16/2023    HGB 11.6 (L) 04/27/2023    HCT 37.7 (L) 06/16/2023    HCT 36.1 (L) 04/27/2023     06/16/2023     04/27/2023     BMP:   Lab Results   Component Value Date     06/16/2023     04/27/2023    POTASSIUM 4.4 06/16/2023    POTASSIUM 4.1 04/27/2023    CHLORIDE 103 06/16/2023    CHLORIDE 102 04/27/2023    CO2 27 06/16/2023    CO2 28 04/27/2023    BUN 25.9 (H) 06/16/2023    BUN 11.8 04/27/2023    CR 1.42 (H) 06/16/2023    CR 1.24 (H) 04/27/2023      (H) 06/16/2023     (H) 04/27/2023     COAGS:   Lab Results   Component Value Date    INR 1.19 (H) 04/21/2023     POC: No results found for: BGM, HCG, HCGS  HEPATIC:   Lab Results   Component Value Date    ALBUMIN 3.6 04/18/2023    PROTTOTAL 6.9 04/18/2023    ALT 21 04/18/2023    AST 24 04/18/2023    ALKPHOS 87 04/18/2023    BILITOTAL 0.6 04/18/2023     OTHER:   Lab Results   Component Value Date    LACT 0.5 (L) 04/23/2023    A1C 7.2 (H) 05/08/2023    ANGELINA 9.1 06/16/2023    LIPASE 77 04/05/2021    TSH 1.94 05/05/2022       Anesthesia Plan    ASA Status:  3   NPO Status:  NPO Appropriate    Anesthesia Type: MAC.     - Reason for MAC: straight local not clinically adequate              Consents    Anesthesia Plan(s) and associated risks, benefits, and realistic alternatives discussed. Questions answered and patient/representative(s) expressed understanding.    - Discussed:     - Discussed with:  Patient         Postoperative Care            Comments:    Other Comments:  6/16/23    Will order zofran preop  Last time drank prep 10pm yesterday   Had sandwich 1400 yesterday    Risks and benefits of MAC anesthetic discussed including dental damage, aspiration, loss of airway, conversion to general anesthetic, CV complications, MI, stroke, death. Pt wishes to proceed.             Luis Antonio Quan, CINTIA CRNA

## 2023-06-22 NOTE — PROGRESS NOTES
Called Richard and left the following message;    Has a colonoscopy on Tuesday  Wondering what he should do with the insulin pump    Left the following message:  My understanding is that he can wear his insulin pump (as there's imaging) and Dexcom G6  I would change the insulin pump to activity mode (BG ave 150 vs 110)  Told him if he does have a low BG, it's okay to correct it using glucose tablets (no red or purple)  Please call if he has any questions.     Macrina CHAMBERLAIN FNP-BC  Diabetes and Wound Care

## 2023-06-27 ENCOUNTER — HOSPITAL ENCOUNTER (OUTPATIENT)
Facility: HOSPITAL | Age: 58
Discharge: HOME | End: 2023-06-27
Attending: SURGERY | Admitting: SURGERY
Payer: COMMERCIAL

## 2023-06-27 ENCOUNTER — ANESTHESIA (OUTPATIENT)
Dept: SURGERY | Facility: HOSPITAL | Age: 58
End: 2023-06-27
Payer: COMMERCIAL

## 2023-06-27 VITALS
RESPIRATION RATE: 16 BRPM | DIASTOLIC BLOOD PRESSURE: 62 MMHG | TEMPERATURE: 97.8 F | BODY MASS INDEX: 36.32 KG/M2 | HEART RATE: 77 BPM | OXYGEN SATURATION: 99 % | WEIGHT: 185 LBS | HEIGHT: 60 IN | SYSTOLIC BLOOD PRESSURE: 115 MMHG

## 2023-06-27 LAB — GLUCOSE BLDC GLUCOMTR-MCNC: 146 MG/DL (ref 70–99)

## 2023-06-27 PROCEDURE — 88305 TISSUE EXAM BY PATHOLOGIST: CPT | Mod: 26 | Performed by: PATHOLOGY

## 2023-06-27 PROCEDURE — 45380 COLONOSCOPY AND BIOPSY: CPT | Performed by: NURSE ANESTHETIST, CERTIFIED REGISTERED

## 2023-06-27 PROCEDURE — 360N000075 HC SURGERY LEVEL 2, PER MIN: Performed by: SURGERY

## 2023-06-27 PROCEDURE — 999N000141 HC STATISTIC PRE-PROCEDURE NURSING ASSESSMENT: Performed by: SURGERY

## 2023-06-27 PROCEDURE — 82962 GLUCOSE BLOOD TEST: CPT

## 2023-06-27 PROCEDURE — 250N000009 HC RX 250: Performed by: NURSE ANESTHETIST, CERTIFIED REGISTERED

## 2023-06-27 PROCEDURE — 250N000011 HC RX IP 250 OP 636: Performed by: NURSE ANESTHETIST, CERTIFIED REGISTERED

## 2023-06-27 PROCEDURE — 45380 COLONOSCOPY AND BIOPSY: CPT | Performed by: SURGERY

## 2023-06-27 PROCEDURE — 258N000003 HC RX IP 258 OP 636: Performed by: NURSE ANESTHETIST, CERTIFIED REGISTERED

## 2023-06-27 PROCEDURE — 88305 TISSUE EXAM BY PATHOLOGIST: CPT | Mod: TC | Performed by: SURGERY

## 2023-06-27 PROCEDURE — 370N000017 HC ANESTHESIA TECHNICAL FEE, PER MIN: Performed by: SURGERY

## 2023-06-27 PROCEDURE — 250N000011 HC RX IP 250 OP 636: Mod: JZ | Performed by: NURSE ANESTHETIST, CERTIFIED REGISTERED

## 2023-06-27 PROCEDURE — 710N000012 HC RECOVERY PHASE 2, PER MINUTE: Performed by: SURGERY

## 2023-06-27 RX ORDER — NALOXONE HYDROCHLORIDE 0.4 MG/ML
0.4 INJECTION, SOLUTION INTRAMUSCULAR; INTRAVENOUS; SUBCUTANEOUS
Status: DISCONTINUED | OUTPATIENT
Start: 2023-06-27 | End: 2023-06-27 | Stop reason: HOSPADM

## 2023-06-27 RX ORDER — PROPOFOL 10 MG/ML
INJECTION, EMULSION INTRAVENOUS PRN
Status: DISCONTINUED | OUTPATIENT
Start: 2023-06-27 | End: 2023-06-27

## 2023-06-27 RX ORDER — LIDOCAINE HYDROCHLORIDE 20 MG/ML
INJECTION, SOLUTION INFILTRATION; PERINEURAL PRN
Status: DISCONTINUED | OUTPATIENT
Start: 2023-06-27 | End: 2023-06-27

## 2023-06-27 RX ORDER — HYDRALAZINE HYDROCHLORIDE 20 MG/ML
2.5-5 INJECTION INTRAMUSCULAR; INTRAVENOUS EVERY 10 MIN PRN
Status: DISCONTINUED | OUTPATIENT
Start: 2023-06-27 | End: 2023-06-27 | Stop reason: HOSPADM

## 2023-06-27 RX ORDER — ONDANSETRON 4 MG/1
4 TABLET, ORALLY DISINTEGRATING ORAL EVERY 30 MIN PRN
Status: DISCONTINUED | OUTPATIENT
Start: 2023-06-27 | End: 2023-06-27 | Stop reason: HOSPADM

## 2023-06-27 RX ORDER — ONDANSETRON 2 MG/ML
4 INJECTION INTRAMUSCULAR; INTRAVENOUS EVERY 30 MIN PRN
Status: DISCONTINUED | OUTPATIENT
Start: 2023-06-27 | End: 2023-06-27 | Stop reason: HOSPADM

## 2023-06-27 RX ORDER — FENTANYL CITRATE 50 UG/ML
50 INJECTION, SOLUTION INTRAMUSCULAR; INTRAVENOUS EVERY 5 MIN PRN
Status: DISCONTINUED | OUTPATIENT
Start: 2023-06-27 | End: 2023-06-27 | Stop reason: HOSPADM

## 2023-06-27 RX ORDER — NALOXONE HYDROCHLORIDE 0.4 MG/ML
0.2 INJECTION, SOLUTION INTRAMUSCULAR; INTRAVENOUS; SUBCUTANEOUS
Status: DISCONTINUED | OUTPATIENT
Start: 2023-06-27 | End: 2023-06-27 | Stop reason: HOSPADM

## 2023-06-27 RX ORDER — FLUMAZENIL 0.1 MG/ML
0.2 INJECTION, SOLUTION INTRAVENOUS
Status: CANCELLED | OUTPATIENT
Start: 2023-06-27 | End: 2023-06-28

## 2023-06-27 RX ORDER — LIDOCAINE 40 MG/G
CREAM TOPICAL
Status: DISCONTINUED | OUTPATIENT
Start: 2023-06-27 | End: 2023-06-27 | Stop reason: HOSPADM

## 2023-06-27 RX ORDER — ONDANSETRON 2 MG/ML
4 INJECTION INTRAMUSCULAR; INTRAVENOUS ONCE
Status: COMPLETED | OUTPATIENT
Start: 2023-06-27 | End: 2023-06-27

## 2023-06-27 RX ORDER — LABETALOL 20 MG/4 ML (5 MG/ML) INTRAVENOUS SYRINGE
10
Status: DISCONTINUED | OUTPATIENT
Start: 2023-06-27 | End: 2023-06-27 | Stop reason: HOSPADM

## 2023-06-27 RX ORDER — SODIUM CHLORIDE, SODIUM LACTATE, POTASSIUM CHLORIDE, CALCIUM CHLORIDE 600; 310; 30; 20 MG/100ML; MG/100ML; MG/100ML; MG/100ML
INJECTION, SOLUTION INTRAVENOUS CONTINUOUS
Status: DISCONTINUED | OUTPATIENT
Start: 2023-06-27 | End: 2023-06-27 | Stop reason: HOSPADM

## 2023-06-27 RX ADMIN — PROPOFOL 50 MG: 10 INJECTION, EMULSION INTRAVENOUS at 12:50

## 2023-06-27 RX ADMIN — PROPOFOL 50 MG: 10 INJECTION, EMULSION INTRAVENOUS at 12:47

## 2023-06-27 RX ADMIN — LIDOCAINE HYDROCHLORIDE 40 MG: 20 INJECTION, SOLUTION INFILTRATION; PERINEURAL at 12:44

## 2023-06-27 RX ADMIN — PROPOFOL 50 MG: 10 INJECTION, EMULSION INTRAVENOUS at 12:45

## 2023-06-27 RX ADMIN — PROPOFOL 50 MG: 10 INJECTION, EMULSION INTRAVENOUS at 12:54

## 2023-06-27 RX ADMIN — PROPOFOL 50 MG: 10 INJECTION, EMULSION INTRAVENOUS at 12:59

## 2023-06-27 RX ADMIN — PROPOFOL 50 MG: 10 INJECTION, EMULSION INTRAVENOUS at 12:44

## 2023-06-27 RX ADMIN — SODIUM CHLORIDE, POTASSIUM CHLORIDE, SODIUM LACTATE AND CALCIUM CHLORIDE: 600; 310; 30; 20 INJECTION, SOLUTION INTRAVENOUS at 10:58

## 2023-06-27 RX ADMIN — ONDANSETRON 4 MG: 2 INJECTION INTRAMUSCULAR; INTRAVENOUS at 11:27

## 2023-06-27 ASSESSMENT — ACTIVITIES OF DAILY LIVING (ADL)
ADLS_ACUITY_SCORE: 35
ADLS_ACUITY_SCORE: 35

## 2023-06-27 NOTE — OR NURSING
Patient and responsible adult given discharge instructions with no questions regarding instructions. Gold score 18. Pain level 0/10.  Discharged from unit via ambulation. Patient discharged to home with brother.Tolerated crackers and soda.

## 2023-06-27 NOTE — INTERVAL H&P NOTE
I have reviewed the surgical (or preoperative) H&P that is linked to this encounter, and examined the patient. There are no significant changes    Clinical Conditions Present on Arrival:  Clinically Significant Risk Factors Present on Admission                  # Obesity: Estimated body mass index is 37.5 kg/m  as calculated from the following:    Height as of 6/16/23: 1.524 m (5').    Weight as of 6/16/23: 87.1 kg (192 lb).

## 2023-06-27 NOTE — DISCHARGE INSTRUCTIONS
You had three colon polyps removed today.  Dr. Alcaraz's office will call you with the pathology results when they become available.      INSTRUCTIONS AFTER COLONOSCOPY    WHEN YOU ARE BACK HOME:  Plan to rest for an hour or two after you get home.  You may have some cramping or pressure until you pass gas.  You may resume your regular medications.  Eat a small, light meal at first, and then gradually return to normal meal sizes.  You will be contacted the next day to see how you are doing.  If you had a polyp removed:  Slight bleeding may occur.  You may have a slight blood stain on the toilet paper after a bowel movement.  To lessen the chance of bleeding, avoid heavy exercise for ONE WEEK.  This includes heavy lifting, vigorous sport activities, and heavy physical labor.  You may resume your normal sexual activity.    Avoid aspirin or aspirin products if instructed by your doctor.  If there is a polyp or biopsy, you will be contacted with results within one week.     WHAT TO WATCH FOR:  Problems rarely occur after the exam; however, it is important for you to watch for early signs of possible problems.  If you have   Unusual pain in your abdomen  Nausea and vomiting that persists  Excessive bleeding  Black or bloody bowel movements  Fever or temperature above 100.6 F  Please call your doctor ([facility] [phone]) or go to the nearest hospital emergency room.    Post-Anesthesia Patient Instructions    IMMEDIATELY FOLLOWING SURGERY:  Do not drive or operate machinery for the first twenty four hours after surgery.  Do not make any important decisions for twenty four hours after surgery or while taking narcotic pain medications or sedatives.  If you develop intractable nausea and vomiting or a severe headache please notify your doctor immediately.    FOLLOW-UP:  Please make an appointment with your surgeon as instructed. You do not need to follow up with anesthesia unless specifically instructed to do  so.    WOUND CARE INSTRUCTIONS (if applicable):  Keep a dry clean dressing on the anesthesia/puncture wound site if there is drainage.  Once the wound has quit draining you may leave it open to air.  Generally you should leave the bandage intact for twenty four hours unless there is drainage.  If the epidural site drains for more than 36-48 hours please call the anesthesia department.    QUESTIONS?:  Please feel free to call your physician or the hospital  if you have any questions, and they will be happy to assist you.

## 2023-06-27 NOTE — OP NOTE
Richard Harvey MRN# [id number]   YOB: 1965 Age: 58 year old      Date of Admission:  6/27/2023  Date of Service:   6/27/23    Primary care provider: Natacha Alvarez    PREOPERATIVE DIAGNOSIS:  Rectal wall thickening        POSTOPERATIVE DIAGNOSIS:  Colon polyps x 3, normal rectal mucosa          PROCEDURE:  Colonoscopy with polypectomy            INDICATIONS:  Screening colonoscopy.      Specimen:   ID Type Source Tests Collected by Time Destination   1 :  Polyp Large Intestine, Colon, Ascending SURGICAL PATHOLOGY EXAM Raúl Alcaraz MD 6/27/2023 12:49 PM    2 :  Polyp Large Intestine, Colon, Hepatic Flexure SURGICAL PATHOLOGY EXAM Raúl Alcaraz MD 6/27/2023 12:52 PM    3 : Colon polyp at 60 cm  Polyp Other SURGICAL PATHOLOGY EXAM Raúl Alcaraz MD 6/27/2023 12:57 PM        SURGEON: Raúl Alcaraz MD    DESCRIPTION OF PROCEDURE: Richard Harvey was brought into the endoscopy suite and placed in the left lateral decubitus position. After preprocedural pause and attended monitored anesthesia was administered, the external anus was inspected and was normal. Digital rectal exam was normal. The colonoscope was inserted and advanced under direct visualization to the level of the cecum which was identified by the appendiceal orifice and the ileocecal valve. The prep was fair.. Upon slow withdrawal of the colonoscope, approximately 95% of the mucosa was directly visualized. There were small polyps in the above locations removed with biopsy forceps. The rest of the colon and especially the rectum was without mucosal abnormality. There was no evidence of further polyps, inflammation, bleeding or AVMs. Retroflexion of the rectum was normal. The extra air was removed from the colon, and the colonoscope withdrawn. The patient tolerated the procedure well and was taken to postanesthesia care unit.     We invite the patient to return in 3-10 years for follow up screening evaluation, pending pathology  related to polyps.    Raúl Alcaraz MD

## 2023-06-27 NOTE — ANESTHESIA CARE TRANSFER NOTE
Patient: Richard Harvey    Procedure: Procedure(s):  Colonoscopy       Diagnosis: Colonic thickening [K63.9]  Diagnosis Additional Information: No value filed.    Anesthesia Type:   MAC     Note:    Oropharynx: oropharynx clear of all foreign objects  Level of Consciousness: drowsy  Oxygen Supplementation: room air    Independent Airway: airway patency satisfactory and stable  Dentition: dentition unchanged  Vital Signs Stable: post-procedure vital signs reviewed and stable  Report to RN Given: handoff report given  Patient transferred to: Phase II    Handoff Report: Identifed the Patient, Identified the Reponsible Provider, Reviewed the pertinent medical history, Discussed the surgical course, Reviewed Intra-OP anesthesia mangement and issues during anesthesia, Set expectations for post-procedure period and Allowed opportunity for questions and acknowledgement of understanding      Vitals:  Vitals Value Taken Time   BP     Temp     Pulse     Resp     SpO2         Electronically Signed By: CINTIA Amato CRNA  June 27, 2023  1:01 PM

## 2023-06-27 NOTE — ANESTHESIA POSTPROCEDURE EVALUATION
Patient: Richard Harvey    Procedure: Procedure(s):  Colonoscopy       Anesthesia Type:  MAC    Note:  Disposition: Outpatient   Postop Pain Control: Uneventful            Sign Out: Well controlled pain   PONV: No   Neuro/Psych: Uneventful            Sign Out: Acceptable/Baseline neuro status   Airway/Respiratory: Uneventful            Sign Out: Acceptable/Baseline resp. status   CV/Hemodynamics: Uneventful            Sign Out: Acceptable CV status; No obvious hypovolemia; No obvious fluid overload   Other NRE: NONE   DID A NON-ROUTINE EVENT OCCUR? No           Last vitals:  Vitals Value Taken Time   /62 06/27/23 1345   Temp 97.8  F (36.6  C) 06/27/23 1345   Pulse 77 06/27/23 1345   Resp 16 06/27/23 1345   SpO2 97 % 06/27/23 1352   Vitals shown include unvalidated device data.    Electronically Signed By: CINTIA Amato CRNA  June 27, 2023  2:07 PM

## 2023-06-29 LAB
PATH REPORT.COMMENTS IMP SPEC: NORMAL
PATH REPORT.FINAL DX SPEC: NORMAL
PATH REPORT.GROSS SPEC: NORMAL
PATH REPORT.MICROSCOPIC SPEC OTHER STN: NORMAL
PATH REPORT.RELEVANT HX SPEC: NORMAL
PHOTO IMAGE: NORMAL

## 2023-07-01 ENCOUNTER — TRANSFERRED RECORDS (OUTPATIENT)
Dept: MULTI SPECIALTY CLINIC | Facility: CLINIC | Age: 58
End: 2023-07-01

## 2023-07-01 LAB — RETINOPATHY: NORMAL

## 2023-07-04 DIAGNOSIS — E10.65 TYPE 1 DIABETES MELLITUS WITH HYPERGLYCEMIA (H): ICD-10-CM

## 2023-07-06 ENCOUNTER — OFFICE VISIT (OUTPATIENT)
Dept: PODIATRY | Facility: OTHER | Age: 58
End: 2023-07-06
Attending: PODIATRIST
Payer: COMMERCIAL

## 2023-07-06 VITALS
HEART RATE: 80 BPM | HEIGHT: 60 IN | DIASTOLIC BLOOD PRESSURE: 80 MMHG | WEIGHT: 192 LBS | TEMPERATURE: 97.7 F | OXYGEN SATURATION: 90 % | SYSTOLIC BLOOD PRESSURE: 127 MMHG | BODY MASS INDEX: 37.69 KG/M2

## 2023-07-06 DIAGNOSIS — E10.65 TYPE 1 DIABETES MELLITUS WITH HYPERGLYCEMIA (H): ICD-10-CM

## 2023-07-06 DIAGNOSIS — L84 CALLUS OF FOOT: Primary | ICD-10-CM

## 2023-07-06 DIAGNOSIS — E10.42 DIABETIC POLYNEUROPATHY ASSOCIATED WITH TYPE 1 DIABETES MELLITUS (H): ICD-10-CM

## 2023-07-06 DIAGNOSIS — L60.3 ONYCHODYSTROPHY: ICD-10-CM

## 2023-07-06 PROCEDURE — G0463 HOSPITAL OUTPT CLINIC VISIT: HCPCS

## 2023-07-06 PROCEDURE — 11056 PARNG/CUTG B9 HYPRKR LES 2-4: CPT | Performed by: PODIATRIST

## 2023-07-06 PROCEDURE — 11721 DEBRIDE NAIL 6 OR MORE: CPT | Mod: XU | Performed by: PODIATRIST

## 2023-07-06 RX ORDER — UREA 10 %
LOTION (ML) TOPICAL
Qty: 85 G | Refills: 0 | Status: SHIPPED | OUTPATIENT
Start: 2023-07-06 | End: 2023-08-24
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
## 2023-07-06 NOTE — TELEPHONE ENCOUNTER
Urea 10 Hydrating 10 % External Cream        Last Written Prescription Date:  3/6/23  Last Fill Quantity: 85 g,   # refills: 1  Last Office Visit: 6/16/23  Future Office visit:    Next 5 appointments (look out 90 days)    Aug 16, 2023  8:00 AM  (Arrive by 7:45 AM)  SHORT with Natacha Alvarez MD  [facility] ([facility] [address] [place] [id number]  [phone]   Sep 12, 2023  8:15 AM  (Arrive by 8:00 AM)  Return Visit with Juliette Jarrett DPM  [facility] ([facility] [address] [place] [id number] [phone]           Routing refill request to provider for review/approval because:    Drug not on the [facility], [facility] or  Health refill protocol or controlled substance

## 2023-08-04 NOTE — PROGRESS NOTES
Assessment & Plan     Diabetic polyneuropathy associated with type 1 diabetes mellitus (H) / Type 1 diabetes mellitus with hyperglycemia (H)  A1c slight increase to 7.5 from 7.2  - Albumin Random Urine Quantitative with Creat Ratio; Future  - Hemoglobin A1c; Future  - on omnipod  - follows with [facility] diabetic education with next visit 8/24/2023    Hyperlipidemia, unspecified hyperlipidemia type  LDL today of 48  - Lipid Profile (Chol, Trig, HDL, LDL calc); Future  - c/w simvastatin    Primary hypertension  BP at goal  - c/w lisinopril 10mg     Stage 3a chronic kidney disease (H) / abnormal CT scan   Cr increased today. Richard was fasting. We will repeat BMP one week, hydrated  CT scan with asymmetric enhancement of kidneys. Cr 1.79 with GFR 43. Discussed with radiology with recommendations for CT urogram   - Basic metabolic panel; Future  - referral placed to nephrology d/t continued decrease of renal function   - order placed for CT urogram     Urinary retention due to benign prostatic hyperplasia  Okay for trial of stopping medication. Self caths ~ once per day  - tamsulosin (FLOMAX) 0.4 MG capsule; Take 1 capsule (0.4 mg) by mouth every evening    Chronic bilateral low back pain with right-sided sciatica  Follows with Dr. Shahab Pugh. Will be having injection done and PT    Nausea  Refilled zofran      See Patient Instructions    Return in about 3 months (around 11/16/2023) for Diabetic Visit, Lab Work.    Natacha Alvarez MD  [facility]   Richard is a 58 year old, presenting for the following health issues:  Diabetes, Hypertension, and Lipids    HPI     Diabetes Follow-up    How often are you checking your blood sugar? Continuous glucose monitor  What time of day are you checking your blood sugars (select all that apply)?   CGM  Have you had any blood sugars above 200?  Yes   Have you had any blood sugars below 70?  Yes occasionally   What symptoms do you notice when  your blood sugar is low?  Shaky, Dizzy, Weak, Lethargy, and Blurred vision  What concerns do you have today about your diabetes? None   Do you have any of these symptoms? (Select all that apply)  Numbness in feet, Burning in feet, Excessive thirst, and Weight loss 190 is lowest he's been  Have you had a diabetic eye exam in the last 12 months? Yes- Date of last eye exam: July 2023,  Location: [place] [facility]    Hyperlipidemia Follow-Up    Are you regularly taking any medication or supplement to lower your cholesterol?   Yes- Zocor   Are you having muscle aches or other side effects that you think could be caused by your cholesterol lowering medication?  Yes- on occasion     Hypertension Follow-up    Do you check your blood pressure regularly outside of the clinic? No   Are you following a low salt diet? No  Are your blood pressures ever more than 140 on the top number (systolic) OR more   than 90 on the bottom number (diastolic), for example 140/90? No    BP Readings from Last 2 Encounters:   08/16/23 112/62   07/06/23 127/80     Hemoglobin A1C (%)   Date Value   05/08/2023 7.2 (H)   01/31/2023 6.9 (H)   10/20/2021 7.7 (A)   06/03/2021 7.8 (A)     LDL Cholesterol Calculated (mg/dL)   Date Value   07/21/2022 36   06/14/2021 42   03/24/2020 40       # urinary retention: currently cath'ing once per day  - follows with Dr. Mcwilliams, [facility] urology with last visit 7/26/2023  1: Urinary retention due to atonic bladder  Continue CIC 5 times a day.  He can discontinue the Flomax as its not helping.     2: Abnormality on CT scan  He needs to get the CT scheduled and we discussed this and my staff helped him schedule it today.  I will review the results and let him know if further work-up is necessary.    - CT abd/pelvis -not scheduled      # Rectal wall thickening  - s/p colonoscopy (6/27/2023) with tubular adenoma x2. Repeat colonoscopy 5 years     # Back pain  - follow with Dr Shahab Pugh with appt last Wednesday  -  recommendation for PT and injections  - next visit in 6 weeks    Review of Systems   Constitutional:  Negative for chills and fever.   HENT:  Negative for congestion.    Respiratory:  Negative for shortness of breath and wheezing.    Cardiovascular:  Negative for chest pain and palpitations.   Gastrointestinal:  Negative for abdominal pain.   Endocrine:        Increased thirst (currently summertime)   Genitourinary:  Positive for difficulty urinating.          Objective    /62 (BP Location: Left arm, Patient Position: Sitting, Cuff Size: Adult Regular)   Pulse 82   Temp 97.3  F (36.3  C) (Tympanic)   Resp 16   Wt 86.2 kg (190 lb 1.6 oz)   SpO2 95%   BMI 37.13 kg/m    Body mass index is 37.13 kg/m .  Physical Exam  Constitutional:       General: He is not in acute distress.     Appearance: He is not ill-appearing.   Cardiovascular:      Rate and Rhythm: Normal rate and regular rhythm.      Pulses: Normal pulses.      Heart sounds: No murmur heard.  Pulmonary:      Effort: Pulmonary effort is normal. No respiratory distress.      Breath sounds: No wheezing or rales.   Neurological:      Mental Status: He is alert.   Psychiatric:         Mood and Affect: Mood normal.          Results for orders placed or performed in visit on 08/16/23 (from the past 24 hour(s))   Lipid Profile (Chol, Trig, HDL, LDL calc)   Result Value Ref Range    Cholesterol 101 <200 mg/dL    Triglycerides 56 <150 mg/dL    Direct Measure HDL 42 >=40 mg/dL    LDL Cholesterol Calculated 48 <=100 mg/dL    Non HDL Cholesterol 59 <130 mg/dL    Narrative    Cholesterol  Desirable:  <200 mg/dL    Triglycerides  Normal:  Less than 150 mg/dL  Borderline High:  150-199 mg/dL  High:  200-499 mg/dL  Very High:  Greater than or equal to 500 mg/dL    Direct Measure HDL  Female:  Greater than or equal to 50 mg/dL   Male:  Greater than or equal to 40 mg/dL    LDL Cholesterol  Desirable:  <100mg/dL  Above Desirable:  100-129 mg/dL   Borderline High:   130-159 mg/dL   High:  160-189 mg/dL   Very High:  >= 190 mg/dL    Non HDL Cholesterol  Desirable:  130 mg/dL  Above Desirable:  130-159 mg/dL  Borderline High:  160-189 mg/dL  High:  190-219 mg/dL  Very High:  Greater than or equal to 220 mg/dL   Albumin Random Urine Quantitative with Creat Ratio   Result Value Ref Range    Creatinine Urine mg/dL 80.6 mg/dL    Albumin Urine mg/L 71.3 mg/L    Albumin Urine mg/g Cr 88.46 (H) 0.00 - 17.00 mg/g Cr   Hemoglobin A1c   Result Value Ref Range    Estimated Average Glucose 169 mg/dL    Hemoglobin A1C 7.5 (H) <5.7 %   Basic metabolic panel   Result Value Ref Range    Sodium 139 136 - 145 mmol/L    Potassium 4.9 3.4 - 5.3 mmol/L    Chloride 104 98 - 107 mmol/L    Carbon Dioxide (CO2) 24 22 - 29 mmol/L    Anion Gap 11 7 - 15 mmol/L    Urea Nitrogen 25.3 (H) 8.0 - 23.0 mg/dL    Creatinine 1.79 (H) 0.67 - 1.17 mg/dL    Calcium 8.9 8.6 - 10.0 mg/dL    Glucose 92 70 - 99 mg/dL    GFR Estimate 43 (L) >60 mL/min/1.73m2

## 2023-08-10 ENCOUNTER — TRANSFERRED RECORDS (OUTPATIENT)
Dept: HEALTH INFORMATION MANAGEMENT | Facility: CLINIC | Age: 58
End: 2023-08-10
Payer: COMMERCIAL

## 2023-08-16 ENCOUNTER — OFFICE VISIT (OUTPATIENT)
Dept: FAMILY MEDICINE | Facility: OTHER | Age: 58
End: 2023-08-16
Attending: FAMILY MEDICINE
Payer: COMMERCIAL

## 2023-08-16 ENCOUNTER — LAB (OUTPATIENT)
Dept: LAB | Facility: OTHER | Age: 58
End: 2023-08-16
Attending: FAMILY MEDICINE
Payer: COMMERCIAL

## 2023-08-16 VITALS
SYSTOLIC BLOOD PRESSURE: 112 MMHG | DIASTOLIC BLOOD PRESSURE: 62 MMHG | TEMPERATURE: 97.3 F | WEIGHT: 190.1 LBS | BODY MASS INDEX: 37.13 KG/M2 | RESPIRATION RATE: 16 BRPM | HEART RATE: 82 BPM | OXYGEN SATURATION: 95 %

## 2023-08-16 DIAGNOSIS — E78.5 HYPERLIPIDEMIA, UNSPECIFIED HYPERLIPIDEMIA TYPE: ICD-10-CM

## 2023-08-16 DIAGNOSIS — R11.0 NAUSEA: ICD-10-CM

## 2023-08-16 DIAGNOSIS — R93.5 ABNORMAL CT SCAN, PELVIS: ICD-10-CM

## 2023-08-16 DIAGNOSIS — E10.65 TYPE 1 DIABETES MELLITUS WITH HYPERGLYCEMIA (H): ICD-10-CM

## 2023-08-16 DIAGNOSIS — I10 PRIMARY HYPERTENSION: ICD-10-CM

## 2023-08-16 DIAGNOSIS — R33.8 URINARY RETENTION DUE TO BENIGN PROSTATIC HYPERPLASIA: ICD-10-CM

## 2023-08-16 DIAGNOSIS — N18.31 STAGE 3A CHRONIC KIDNEY DISEASE (H): ICD-10-CM

## 2023-08-16 DIAGNOSIS — E10.42 DIABETIC POLYNEUROPATHY ASSOCIATED WITH TYPE 1 DIABETES MELLITUS (H): Primary | ICD-10-CM

## 2023-08-16 DIAGNOSIS — M54.41 CHRONIC BILATERAL LOW BACK PAIN WITH RIGHT-SIDED SCIATICA: ICD-10-CM

## 2023-08-16 DIAGNOSIS — E10.42 DIABETIC POLYNEUROPATHY ASSOCIATED WITH TYPE 1 DIABETES MELLITUS (H): ICD-10-CM

## 2023-08-16 DIAGNOSIS — G89.29 CHRONIC BILATERAL LOW BACK PAIN WITH RIGHT-SIDED SCIATICA: ICD-10-CM

## 2023-08-16 DIAGNOSIS — N40.1 URINARY RETENTION DUE TO BENIGN PROSTATIC HYPERPLASIA: ICD-10-CM

## 2023-08-16 LAB
ANION GAP SERPL CALCULATED.3IONS-SCNC: 11 MMOL/L (ref 7–15)
BUN SERPL-MCNC: 25.3 MG/DL (ref 8–23)
CALCIUM SERPL-MCNC: 8.9 MG/DL (ref 8.6–10)
CHLORIDE SERPL-SCNC: 104 MMOL/L (ref 98–107)
CHOLEST SERPL-MCNC: 101 MG/DL
CREAT SERPL-MCNC: 1.79 MG/DL (ref 0.67–1.17)
CREAT UR-MCNC: 80.6 MG/DL
DEPRECATED HCO3 PLAS-SCNC: 24 MMOL/L (ref 22–29)
EST. AVERAGE GLUCOSE BLD GHB EST-MCNC: 169 MG/DL
GFR SERPL CREATININE-BSD FRML MDRD: 43 ML/MIN/1.73M2
GLUCOSE SERPL-MCNC: 92 MG/DL (ref 70–99)
HBA1C MFR BLD: 7.5 %
HDLC SERPL-MCNC: 42 MG/DL
LDLC SERPL CALC-MCNC: 48 MG/DL
MICROALBUMIN UR-MCNC: 71.3 MG/L
MICROALBUMIN/CREAT UR: 88.46 MG/G CR (ref 0–17)
NONHDLC SERPL-MCNC: 59 MG/DL
POTASSIUM SERPL-SCNC: 4.9 MMOL/L (ref 3.4–5.3)
SODIUM SERPL-SCNC: 139 MMOL/L (ref 136–145)
TRIGL SERPL-MCNC: 56 MG/DL

## 2023-08-16 PROCEDURE — G0463 HOSPITAL OUTPT CLINIC VISIT: HCPCS

## 2023-08-16 PROCEDURE — 99214 OFFICE O/P EST MOD 30 MIN: CPT | Performed by: FAMILY MEDICINE

## 2023-08-16 PROCEDURE — 82310 ASSAY OF CALCIUM: CPT | Mod: ZL

## 2023-08-16 PROCEDURE — 36415 COLL VENOUS BLD VENIPUNCTURE: CPT | Mod: ZL

## 2023-08-16 PROCEDURE — 82570 ASSAY OF URINE CREATININE: CPT | Mod: ZL

## 2023-08-16 PROCEDURE — 80061 LIPID PANEL: CPT | Mod: ZL

## 2023-08-16 PROCEDURE — 83036 HEMOGLOBIN GLYCOSYLATED A1C: CPT | Mod: ZL

## 2023-08-16 RX ORDER — ONDANSETRON 4 MG/1
4 TABLET, FILM COATED ORAL EVERY 8 HOURS PRN
Qty: 90 TABLET | Refills: 3 | Status: SHIPPED | OUTPATIENT
Start: 2023-08-16

## 2023-08-16 RX ORDER — TAMSULOSIN HYDROCHLORIDE 0.4 MG/1
0.4 CAPSULE ORAL EVERY EVENING
Qty: 90 CAPSULE | Refills: 3 | Status: SHIPPED | OUTPATIENT
Start: 2023-08-16 | End: 2024-08-02

## 2023-08-16 ASSESSMENT — ENCOUNTER SYMPTOMS
FEVER: 0
PALPITATIONS: 0
SHORTNESS OF BREATH: 0
CHILLS: 0
WHEEZING: 0
ABDOMINAL PAIN: 0
DIFFICULTY URINATING: 1

## 2023-08-16 ASSESSMENT — PAIN SCALES - GENERAL: PAINLEVEL: SEVERE PAIN (7)

## 2023-08-17 ENCOUNTER — TELEPHONE (OUTPATIENT)
Dept: FAMILY MEDICINE | Facility: OTHER | Age: 58
End: 2023-08-17

## 2023-08-18 DIAGNOSIS — N18.31 STAGE 3A CHRONIC KIDNEY DISEASE (H): Primary | ICD-10-CM

## 2023-08-23 DIAGNOSIS — E78.5 HYPERLIPIDEMIA, UNSPECIFIED HYPERLIPIDEMIA TYPE: ICD-10-CM

## 2023-08-23 RX ORDER — SIMVASTATIN 40 MG
TABLET ORAL
Qty: 90 TABLET | Refills: 3 | Status: SHIPPED | OUTPATIENT
Start: 2023-08-23 | End: 2024-08-21

## 2023-08-23 NOTE — TELEPHONE ENCOUNTER
Simvastatin  Last Written Prescription Date: 5/24/23  Last Fill Quantity: 90 # of Refills: 0  Last Office Visit: 8/16/23

## 2023-08-24 ENCOUNTER — ALLIED HEALTH/NURSE VISIT (OUTPATIENT)
Dept: EDUCATION SERVICES | Facility: OTHER | Age: 58
End: 2023-08-24
Attending: NURSE PRACTITIONER
Payer: COMMERCIAL

## 2023-08-24 VITALS
HEIGHT: 61 IN | HEART RATE: 91 BPM | DIASTOLIC BLOOD PRESSURE: 61 MMHG | RESPIRATION RATE: 16 BRPM | BODY MASS INDEX: 34.57 KG/M2 | WEIGHT: 183.1 LBS | OXYGEN SATURATION: 97 % | SYSTOLIC BLOOD PRESSURE: 97 MMHG

## 2023-08-24 DIAGNOSIS — I10 ESSENTIAL HYPERTENSION: ICD-10-CM

## 2023-08-24 DIAGNOSIS — N39.0 URINARY TRACT INFECTION ASSOCIATED WITH CATHETERIZATION OF URINARY TRACT, UNSPECIFIED INDWELLING URINARY CATHETER TYPE, INITIAL ENCOUNTER (H): Primary | ICD-10-CM

## 2023-08-24 DIAGNOSIS — T83.511A URINARY TRACT INFECTION ASSOCIATED WITH CATHETERIZATION OF URINARY TRACT, UNSPECIFIED INDWELLING URINARY CATHETER TYPE, INITIAL ENCOUNTER (H): Primary | ICD-10-CM

## 2023-08-24 DIAGNOSIS — N18.31 STAGE 3A CHRONIC KIDNEY DISEASE (H): ICD-10-CM

## 2023-08-24 DIAGNOSIS — R42 DIZZINESS: Primary | ICD-10-CM

## 2023-08-24 DIAGNOSIS — E10.65 TYPE 1 DIABETES MELLITUS WITH HYPERGLYCEMIA (H): ICD-10-CM

## 2023-08-24 LAB
ALBUMIN UR-MCNC: 50 MG/DL
ANION GAP SERPL CALCULATED.3IONS-SCNC: 12 MMOL/L (ref 7–15)
APPEARANCE UR: ABNORMAL
BACTERIA #/AREA URNS HPF: ABNORMAL /HPF
BILIRUB UR QL STRIP: NEGATIVE
BUN SERPL-MCNC: 27.8 MG/DL (ref 8–23)
CALCIUM SERPL-MCNC: 9.3 MG/DL (ref 8.6–10)
CHLORIDE SERPL-SCNC: 101 MMOL/L (ref 98–107)
COLOR UR AUTO: YELLOW
CREAT SERPL-MCNC: 1.74 MG/DL (ref 0.67–1.17)
DEPRECATED HCO3 PLAS-SCNC: 23 MMOL/L (ref 22–29)
GFR SERPL CREATININE-BSD FRML MDRD: 45 ML/MIN/1.73M2
GLUCOSE SERPL-MCNC: 227 MG/DL (ref 70–99)
GLUCOSE UR STRIP-MCNC: 500 MG/DL
HGB UR QL STRIP: ABNORMAL
KETONES UR STRIP-MCNC: NEGATIVE MG/DL
LEUKOCYTE ESTERASE UR QL STRIP: ABNORMAL
MUCOUS THREADS #/AREA URNS LPF: PRESENT /LPF
NITRATE UR QL: NEGATIVE
PH UR STRIP: 5 [PH] (ref 4.7–8)
POTASSIUM SERPL-SCNC: 4.9 MMOL/L (ref 3.4–5.3)
RBC URINE: 4 /HPF
SODIUM SERPL-SCNC: 136 MMOL/L (ref 136–145)
SP GR UR STRIP: 1.01 (ref 1–1.03)
SQUAMOUS EPITHELIAL: 0 /HPF
UROBILINOGEN UR STRIP-MCNC: NORMAL MG/DL
WBC CLUMPS #/AREA URNS HPF: PRESENT /HPF
WBC URINE: >182 /HPF

## 2023-08-24 PROCEDURE — G0463 HOSPITAL OUTPT CLINIC VISIT: HCPCS

## 2023-08-24 PROCEDURE — 99214 OFFICE O/P EST MOD 30 MIN: CPT | Performed by: NURSE PRACTITIONER

## 2023-08-24 PROCEDURE — 87086 URINE CULTURE/COLONY COUNT: CPT | Mod: ZL | Performed by: NURSE PRACTITIONER

## 2023-08-24 PROCEDURE — 36415 COLL VENOUS BLD VENIPUNCTURE: CPT | Mod: ZL | Performed by: NURSE PRACTITIONER

## 2023-08-24 PROCEDURE — 80048 BASIC METABOLIC PNL TOTAL CA: CPT | Mod: ZL | Performed by: NURSE PRACTITIONER

## 2023-08-24 PROCEDURE — 95251 CONT GLUC MNTR ANALYSIS I&R: CPT | Performed by: NURSE PRACTITIONER

## 2023-08-24 PROCEDURE — 81001 URINALYSIS AUTO W/SCOPE: CPT | Mod: ZL | Performed by: NURSE PRACTITIONER

## 2023-08-24 RX ORDER — CIPROFLOXACIN 500 MG/1
500 TABLET, FILM COATED ORAL 2 TIMES DAILY
Qty: 14 TABLET | Refills: 0 | Status: SHIPPED | OUTPATIENT
Start: 2023-08-24 | End: 2023-08-25 | Stop reason: ALTCHOICE

## 2023-08-24 RX ORDER — UREA 10 %
LOTION (ML) TOPICAL
Qty: 85 G | Refills: 0 | Status: SHIPPED | OUTPATIENT
Start: 2023-08-24 | End: 2023-10-17

## 2023-08-24 RX ORDER — LISINOPRIL 5 MG/1
5 TABLET ORAL DAILY
Start: 2023-08-24 | End: 2024-02-20

## 2023-08-24 ASSESSMENT — PAIN SCALES - GENERAL: PAINLEVEL: WORST PAIN (10)

## 2023-08-24 NOTE — PROGRESS NOTES
SUBJECTIVE:  Richard Harvey, 58 year old, male presents with the following Chief Complaint(s) with HPI to follow:  Chief Complaint   Patient presents with    Diabetes        Diabetes Follow-up    Patient is checking blood sugars: >4x/day using his continuous glucose.  Results:    Date: 8/11 to 8/24/23  Glucose management indicator: n/a    Average glucose: 165    Glucose range  Very low (<54): 0  low (<70): 0  In target range (): 71%  High (>180): 20%  Very high (>250): 9%    Symptoms of hypoglycemia (low blood sugar): none in the past 2 weeks  Paresthesias (numbness or burning in feet) or sores: Yes; no  Diabetic eye exam within the last year: Yes  Breakfast eaten regularly: Yes  Patient counting carbs: trying       HPI:   Richard's here today for the follow up regarding his Diabetes mellitus, Type 1.    A1c trend:  Lab Results   Component Value Date    A1C 7.5 08/16/2023    A1C 7.2 05/08/2023    A1C 6.9 01/31/2023    A1C 7.3 10/24/2022    A1C 7.2 07/21/2022    A1C 7.7 10/20/2021    A1C 7.8 06/03/2021    A1C 7.5 11/30/2020    A1C 7.7 09/01/2020    A1C 8.1 03/24/2020     Current Diabetes medication:   1.  Insulin pump (Omnipod 5), uses Novolog   ASA use: yes, 325 mg daily  Statin use: yes, simvastatin 40 mg at bedime    Reports the following:  No issues with the insulin pump  No issues with the Dexcom G6    States he has a lot of medical problems currently  Back pain is not better  Working with Dr. Alvarez and [facility]  Referred to PT  Hasn't heard anything  Stopping by at [facility] today to ask    Has a CT tomorrow for his kidneys  Knows to remove his pump and CGM    Issues with dizziness at work  Has had issues with this in the past   Gotten worse  No pattern  Trying to stay hydrated at work  Had a home BP cuff system--not sure where it is.   No dizziness currently      Had higher BGs last night  Took an external shot  Still wearing the same pod    Weight trend:  Wt Readings from Last 4 Encounters:   08/24/23 83.1 kg (183 lb  1.6 oz)   08/16/23 86.2 kg (190 lb 1.6 oz)   07/06/23 87.1 kg (192 lb)   06/27/23 83.9 kg (185 lb)       Patient Active Problem List   Diagnosis    DM type 1 (diabetes mellitus, type 1) (H)    HTN (hypertension)    Hyperlipidemia    ACP (advance care planning)    Diabetic polyneuropathy associated with type 1 diabetes mellitus (H)    Class 2 obesity in adult    Obesity (BMI 35.0-39.9) with comorbidity (H)    Gastroparesis    Gastroesophageal reflux disease without esophagitis    Chronic bilateral low back pain with right-sided sciatica    Acute cystitis without hematuria    Sepsis, due to unspecified organism, unspecified whether acute organ dysfunction present (H)    ELMA (acute kidney injury) (H)    Pyelonephritis, acute    Benign prostatic hyperplasia with urinary retention    Stage 3a chronic kidney disease (H)       Past Medical History:   Diagnosis Date    BPH (benign prostatic hyperplasia)     Diabetes mellitus type 1, controlled (H)     HLD (hyperlipidemia)     HTN (hypertension)     Neuropathy     Type 1 diabetes (H)        Past Surgical History:   Procedure Laterality Date    COLONOSCOPY N/A 6/27/2023    Procedure: Colonoscopy;  Surgeon: Raúl Alcaraz MD;  Location: [facility] OR    COLONOSCOPY - HIM SCAN N/A 09/19/2016    Procedure: COLONOSCOPY SCREENING; Surgeon: Rudy Rojo MD; Location: [facility] OR    ESOPHAGOSCOPY, GASTROSCOPY, DUODENOSCOPY (EGD), COMBINED  08/13/2019    IR PICC PLACEMENT > 5 YRS OF AGE  4/21/2023       Family History   Problem Relation Age of Onset    No Known Problems Mother     Hypertension Father     Hypertension Brother        Social History     Tobacco Use    Smoking status: Never     Passive exposure: Never    Smokeless tobacco: Never   Substance Use Topics    Alcohol use: No     Alcohol/week: 0.0 standard drinks of alcohol       Current Outpatient Medications   Medication Sig Dispense Refill    bisacodyl (DULCOLAX) 5 MG EC tablet Take 2 tabs at bedtime 2 nights prior to  procedure. Take 2 tabs at 3pm the day before procedure. 4 tablet 0    CALCIUM-VITAMIN D PO Take 1 tablet by mouth daily      Continuous Blood Gluc  (DEXCOM G6 ) DAYAN 1 each continuous To be used per manufacture's recommendation 1 each 2    Continuous Blood Gluc Sensor (DEXCOM G6 SENSOR) MISC CHANGE EVERY 10 DAYS 3 each 11    Continuous Blood Gluc Transmit (DEXCOM G6 TRANSMITTER) MISC CHANGE EVERY 3 MONTHS 1 each 4    CONTOUR NEXT TEST test strip TEST 6 TIMES DAILY, OR AS DIRECTED. 200 strip 5    diclofenac (VOLTAREN) 1 % topical gel Apply 4 g topically 4 times daily as needed for moderate pain (4-6) 50 g 2    gabapentin (NEURONTIN) 300 MG capsule Take 1 capsule (300 mg) by mouth 3 times daily 90 capsule 4    Glucagon (GVOKE HYPOPEN 2-PACK) 1 MG/0.2ML SOAJ Inject 1 mg Subcutaneous as needed (for low blood glucose) 0.4 mL 3    Insulin Disposable Pump (OMNIPOD 5 G6 INTRO, GEN 5,) KIT 1 kit continuous 1 kit 0    Insulin Disposable Pump (OMNIPOD 5 G6 POD, GEN 5,) MISC 1 each every 3 days 10 each 5    INSULIN PUMP - OUTPATIENT Insulin pump settings Updated: 5/23/23 Omnipod 5 Basal:  0000 1.1 0200 1.05 0600 0.95 1020 0.7 1530 0.9 1800 1.15 Total bolus: 23.3 CR:  0000 12 1200 13 1600 14 ISF:  0000 65 1200 60 1800 55 Target: 110      Lidocaine (LIDOCARE) 4 % Patch Place 1 patch onto the skin every 24 hours To prevent lidocaine toxicity, patient should be patch free for 12 hrs daily. (Patient taking differently: Place 1 patch onto the skin daily as needed To prevent lidocaine toxicity, patient should be patch free for 12 hrs daily.) 20 patch 1    lisinopril (ZESTRIL) 5 MG tablet Take 1 tablet (5 mg) by mouth daily      loperamide (IMODIUM A-D) 2 MG tablet Take 1 tablet (2 mg) by mouth 4 times daily as needed for diarrhea 30 tablet 0    Multiple Vitamin (MULTI-VITAMIN DAILY PO) Take 1 tablet by mouth daily      nitroGLYcerin (NITROSTAT) 0.4 MG sublingual tablet Place 0.4 mg under the tongue every 5 minutes as  "needed for chest pain . Do not crush; maximum of 3 doses in 15 minutes.      NOVOLOG VIAL 100 UNIT/ML soln USE WITH INSULIN PUMP FOR A TOTAL DAILY USAGE OF 70 UNITS 30 mL 3    ondansetron (ZOFRAN) 4 MG tablet Take 1 tablet (4 mg) by mouth every 8 hours as needed for nausea 90 tablet 3    order for DME Equipment being ordered:     1.  Medtronic insulin pump supplies--insertion sets and reserviors  Disp: 1 month  Refill: 11 months 30 days 11    pantoprazole (PROTONIX) 40 MG EC tablet Take 1 tablet by mouth twice daily (Patient taking differently: Take 40 mg by mouth daily) 60 tablet 9    simvastatin (ZOCOR) 40 MG tablet TAKE 1 TABLET BY MOUTH AT BEDTIME 90 tablet 3    tamsulosin (FLOMAX) 0.4 MG capsule Take 1 capsule (0.4 mg) by mouth every evening 90 capsule 3    urea (UREA 10 HYDRATING) 10 % external cream APPLY TOPICALLY TO THE AFFECTED AREA AS NEEDED FOR CALLOUSED SKIN 85 g 0    acetone, Urine, test STRP Use as directed (Patient not taking: Reported on 8/24/2023) 50 each 3       No Known Allergies    REVIEW OF SYSTEMS  Skin: negative  Eyes: negative; wears glasses   Ears/Nose/Throat: negative; hx of allergies  Respiratory: No shortness of breath, dyspnea on exertion, cough, or hemoptysis  Cardiovascular: negative  Gastrointestinal: hx of nausea  Genitourinary: urinary retention   Musculoskeletal: + back pain; history of arthritis--hands--same, shoulders, knees--same   Neurologic: positive for numbness or tingling of hands and numbness or tingling of feet--worse   Psychiatric: negative  Hematologic/Lymphatic/Immunologic: negative  Endocrine: positive for diabetes    OBJECTIVE:  BP 97/61   Pulse 91   Resp 16   Ht 1.537 m (5' 0.5\")   Wt 83.1 kg (183 lb 1.6 oz)   SpO2 97%   BMI 35.17 kg/m    Constitutional: alert and no distress  Respiratory:  Good diaphragmatic excursion.   Musculoskeletal: extremities normal- no gross deformities noted and gait normal  Skin: no suspicious lesions and/or rashes to visible " skin  Psychiatric: mentation appears normal and affect normal/bright      LAB  No results found for any visits on 08/24/23.    ASSESSMENT / PLAN:.  (R42) Dizziness  (primary encounter diagnosis)  Comment: noted  Plan:   I did order an at home BP system  Spoke to Dr. Alvarez    Per Dr. Alvarez, reduce Lisinopril to 5 mg daily  Richard is aware    (E10.65) Type 1 diabetes mellitus with hyperglycemia (H)  Comment: noted insulin pump and CGM download    Insulin pump  Basal: 32.3 (61%)  Bolus: 20.9 (39%)  TDD: 70 units    Automated mode: 96%  Manual mode: 4%    Insulin pump: Omnipod 5  Update: 8/24/23  Basal  0000 1.1  0200 1.05  0600 0.95  1030 0.7  1530 0.9  1800 1.15  TDD: 23.325  CR  0000 12  1200 13  1600 14  ISF  0000 65  1200 60  1800 55  BG target range  0000 110  BG correction threshold  0000 140  0900 130  2200 140  Active insulin time: 4 hours    Plan: urea (UREA 10 HYDRATING) 10 % external cream,         Miscellaneous Order for DME - ONLY FOR DME,         GLUCOSE MONITOR, 72 HOUR, PHYS INTERP          TIR: 71%  No low BGS    Keep doing what he's doing  Reminded him if BGs are higher and corrections aren't working--change the pod    (N18.31) Stage 3a chronic kidney disease (H)  Comment: noted  Plan: Urine Culture          Labs per Dr. Alvarez    (I10) Essential hypertension  Comment: noted BP, no symptoms right now  Plan: lisinopril (ZESTRIL) 5 MG tablet          Decrease lisinopril  Start checking BP at the house  Please let us know if this dizziness worsens    Follow up  Download insulin pump whenever you see Dr. Alvarez  3 months for DM    Call sooner if any issues/concerns develop and/or continued issues with low BGs    Patient Instructions   Continue working on healthy eating and moving as best as you can (start low and slow, work up to 30 min, 5x/week)    BG goals:  Fasting and before meals <130, >70  2 hour after eating <180    We only need 1/2 of these numbers to be within target then your A1c will be within  target    Medication changes   None for now     Follow up   Download insulin pump whenever you see Dr. Alvarez  3 months for DM    Call me sooner if any problems/concerns and/or questions develop including consistent low BGs <70 or consistent high BGs >200  [phone] (Unit Coordinator)    [phone] (Tracey, Nurse)    Home BP cuff sent to [facility] checking BP daily  If it's lower, please let us know    Per Dr. Alvarez, reduce lisinopril to 5 mg daily      Time: 30 min + 5 min CGM review  Barrier: none  Willingness to learn: accepting    Macrina CHAMBERLAIN [facility]  Diabetes and Wound Care    30 minutes was spent with patient.    All of this time was spent on counseling patient regarding illness, medication and/or treatment options, coordinating further cares and follow ups that are needed along with resource material that will be helpful in the treatment of these issues.       With the electronic record, we can now more quickly and easily track our patient diabetic goals. Our diabetes clinical review is in progress and these are the indicators we are monitoring for good diabetes health.     1.) HbA1C less than 7 (measurement of your average blood sugars)  2.) Blood Pressure less than 140/80  3.) LDL less than 100 (bad cholesterol)  4.) HbA1C is checked in the last 6 months and below 7% (more frequently if not at goal or adjusting medications)  5.) LDL is checked in the last 12 months (more frequently if not at goal or adjusting medications)  6.) Taking one baby aspirin daily (unless otherwise instructed)  7.) No tobacco use  8) Statin use     You have achieved 7 out of 8 of these and I am encouraging you to come in and get tuned up to achieve 8 out of 8!  Here is what you have achieved so far in my goals for you:  1.) HbA1C  less than 7:                              NO  Your last  HbA1C :   Lab Results   Component Value Date    A1C 7.5 08/16/2023    A1C 7.2 05/08/2023    A1C 6.9 01/31/2023    A1C 7.3  "10/24/2022    A1C 7.2 07/21/2022    A1C 7.7 10/20/2021    A1C 7.8 06/03/2021    A1C 7.5 11/30/2020    A1C 7.7 09/01/2020    A1C 8.1 03/24/2020     2.) Blood Pressure less than 140/80:       YES      Your last    BP 97/61   Pulse 91   Resp 16   Ht 1.537 m (5' 0.5\")   Wt 83.1 kg (183 lb 1.6 oz)   SpO2 97%   BMI 35.17 kg/m      3.) LDL less than 100:                              YES      Your last   LDL         LDL Cholesterol Calculated   Date Value Ref Range Status   08/16/2023 48 <=100 mg/dL Final   06/14/2021 42 <100 mg/dL Final     Comment:     Desirable:       <100 mg/dl       4.) Checked HbA1C in the past 6 months: YES      5.) Checked LDL in the past 12 months:    YES    6.) Taking one aspirin daily:                       YES     7.) No tobacco use:                                        YES      8.) Statin use      YES       CGM requirements:   Patient has been seen in the clinic within the last 6 month  Diagnosis of Type 1 diabetes  Has been testing their BGs 4x/day using the Dexcom  Uses an insulin pump   Requires frequent adjustments to their treatment regimen based on BGM and/or CGM testing results.                           "

## 2023-08-24 NOTE — PATIENT INSTRUCTIONS
Continue working on healthy eating and moving as best as you can (start low and slow, work up to 30 min, 5x/week)    BG goals:  Fasting and before meals <130, >70  2 hour after eating <180    We only need 1/2 of these numbers to be within target then your A1c will be within target    Medication changes   None for now     Follow up   Download insulin pump whenever you see Dr. Alvarez  3 months for DM    Call me sooner if any problems/concerns and/or questions develop including consistent low BGs <70 or consistent high BGs >200  [phone] (Unit Coordinator)    [phone] (Tracey, Nurse)    Home BP cuff sent to [facility] checking BP daily  If it's lower, please let us know    Per Dr. Alvarez, reduce lisinopril to 5 mg daily

## 2023-08-25 ENCOUNTER — HOSPITAL ENCOUNTER (OUTPATIENT)
Dept: CT IMAGING | Facility: HOSPITAL | Age: 58
Discharge: HOME | End: 2023-08-25
Attending: FAMILY MEDICINE | Admitting: FAMILY MEDICINE
Payer: COMMERCIAL

## 2023-08-25 DIAGNOSIS — R93.5 ABNORMAL CT SCAN, PELVIS: ICD-10-CM

## 2023-08-25 DIAGNOSIS — N12 PYELONEPHRITIS: Primary | ICD-10-CM

## 2023-08-25 DIAGNOSIS — N18.31 STAGE 3A CHRONIC KIDNEY DISEASE (H): ICD-10-CM

## 2023-08-25 DIAGNOSIS — R33.8 URINARY RETENTION DUE TO BENIGN PROSTATIC HYPERPLASIA: ICD-10-CM

## 2023-08-25 DIAGNOSIS — E10.65 TYPE 1 DIABETES MELLITUS WITH HYPERGLYCEMIA (H): ICD-10-CM

## 2023-08-25 DIAGNOSIS — N40.1 URINARY RETENTION DUE TO BENIGN PROSTATIC HYPERPLASIA: ICD-10-CM

## 2023-08-25 LAB — BACTERIA UR CULT: ABNORMAL

## 2023-08-25 PROCEDURE — 250N000011 HC RX IP 250 OP 636: Performed by: STUDENT IN AN ORGANIZED HEALTH CARE EDUCATION/TRAINING PROGRAM

## 2023-08-25 PROCEDURE — 74178 CT ABD&PLV WO CNTR FLWD CNTR: CPT

## 2023-08-25 RX ORDER — CEFDINIR 300 MG/1
300 CAPSULE ORAL 2 TIMES DAILY
Qty: 14 CAPSULE | Refills: 0 | Status: SHIPPED | OUTPATIENT
Start: 2023-08-25 | End: 2023-09-01

## 2023-08-25 RX ORDER — IOPAMIDOL 755 MG/ML
87 INJECTION, SOLUTION INTRAVASCULAR ONCE
Status: COMPLETED | OUTPATIENT
Start: 2023-08-25 | End: 2023-08-25

## 2023-08-25 RX ADMIN — IOPAMIDOL 87 ML: 755 INJECTION, SOLUTION INTRAVENOUS at 07:57

## 2023-09-12 DIAGNOSIS — K21.9 GASTROESOPHAGEAL REFLUX DISEASE WITHOUT ESOPHAGITIS: ICD-10-CM

## 2023-09-12 DIAGNOSIS — K31.84 GASTROPARESIS: ICD-10-CM

## 2023-09-13 NOTE — TELEPHONE ENCOUNTER
Pantoprazole (Protonix) 40 MG EC tablet    Last Written Prescription Date:  07/12/2022  Last Fill Quantity: 60,   # refills: 0  Last Office Visit: 08/16/2023

## 2023-09-14 DIAGNOSIS — E10.65 TYPE 1 DIABETES MELLITUS WITH HYPERGLYCEMIA (H): ICD-10-CM

## 2023-09-14 RX ORDER — INSULIN ASPART 100 [IU]/ML
INJECTION, SOLUTION INTRAVENOUS; SUBCUTANEOUS
Qty: 30 ML | Refills: 3 | Status: SHIPPED | OUTPATIENT
Start: 2023-09-14 | End: 2023-11-30

## 2023-09-14 RX ORDER — PANTOPRAZOLE SODIUM 40 MG/1
TABLET, DELAYED RELEASE ORAL
Qty: 60 TABLET | Refills: 6 | Status: SHIPPED | OUTPATIENT
Start: 2023-09-14

## 2023-09-14 NOTE — TELEPHONE ENCOUNTER
Novolog      Last Written Prescription Date:  02/15/23  Last Fill Quantity: 30ml,   # refills: 3  Last Office Visit: 08/24/23  Future Office visit:    Next 5 appointments (look out 90 days)      Nov 21, 2023  8:00 AM  (Arrive by 7:45 AM)  SHORT with Natacha Alvarez MD  [facility] [place] ([facility] [place] ) [address]  [place] [address]  [phone]

## 2023-09-18 ENCOUNTER — OFFICE VISIT (OUTPATIENT)
Dept: PODIATRY | Facility: OTHER | Age: 58
End: 2023-09-18
Attending: PODIATRIST
Payer: COMMERCIAL

## 2023-09-18 VITALS
HEART RATE: 87 BPM | TEMPERATURE: 96.7 F | OXYGEN SATURATION: 90 % | DIASTOLIC BLOOD PRESSURE: 74 MMHG | SYSTOLIC BLOOD PRESSURE: 124 MMHG

## 2023-09-18 DIAGNOSIS — E10.65 TYPE 1 DIABETES MELLITUS WITH HYPERGLYCEMIA (H): ICD-10-CM

## 2023-09-18 DIAGNOSIS — L84 CALLUS OF FOOT: Primary | ICD-10-CM

## 2023-09-18 DIAGNOSIS — L60.3 ONYCHODYSTROPHY: ICD-10-CM

## 2023-09-18 DIAGNOSIS — Z13.89 SCREENING FOR DIABETIC PERIPHERAL NEUROPATHY: ICD-10-CM

## 2023-09-18 DIAGNOSIS — E10.42 DIABETIC POLYNEUROPATHY ASSOCIATED WITH TYPE 1 DIABETES MELLITUS (H): ICD-10-CM

## 2023-09-18 PROCEDURE — 11721 DEBRIDE NAIL 6 OR MORE: CPT | Mod: XS | Performed by: PODIATRIST

## 2023-09-18 PROCEDURE — 11056 PARNG/CUTG B9 HYPRKR LES 2-4: CPT | Performed by: PODIATRIST

## 2023-09-18 PROCEDURE — G0463 HOSPITAL OUTPT CLINIC VISIT: HCPCS

## 2023-09-18 ASSESSMENT — PAIN SCALES - GENERAL: PAINLEVEL: SEVERE PAIN (6)
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
## 2023-10-13 DIAGNOSIS — E10.65 TYPE 1 DIABETES MELLITUS WITH HYPERGLYCEMIA (H): ICD-10-CM

## 2023-10-15 DIAGNOSIS — G89.29 CHRONIC BILATERAL LOW BACK PAIN WITH RIGHT-SIDED SCIATICA: ICD-10-CM

## 2023-10-15 DIAGNOSIS — M54.41 CHRONIC BILATERAL LOW BACK PAIN WITH RIGHT-SIDED SCIATICA: ICD-10-CM

## 2023-10-16 NOTE — TELEPHONE ENCOUNTER
Urea      Last Written Prescription Date:  8/24/23  Last Fill Quantity: 85g,   # refills: 0  Last Office Visit: 8/16/23  Future Office visit:    Next 5 appointments (look out 90 days)      Nov 21, 2023  8:00 AM  (Arrive by 7:45 AM)  SHORT with Natacha Alvarez MD  [facility] ([facility] ) [address] [place] [id number]  [phone]     Nov 21, 2023  8:15 AM  (Arrive by 8:00 AM)  Return Visit with Juliette Jarrett DPM  [facility] ([facility] ) [address] [place] [id number] [phone]             Routing refill request to provider for review/approval because:

## 2023-10-16 NOTE — TELEPHONE ENCOUNTER
Lidocaine (Lidocare) 4 % patch    Last Written Prescription Date:  12/29/2022  Last Fill Quantity: 20,   # refills: 0  Last Office Visit: 08/16/2023

## 2023-10-17 RX ORDER — UREA 10 %
LOTION (ML) TOPICAL
Qty: 85 G | Refills: 1 | Status: SHIPPED | OUTPATIENT
Start: 2023-10-17 | End: 2024-03-26

## 2023-10-17 RX ORDER — LIDOCAINE 4 G/100G
PATCH TOPICAL
Qty: 20 PATCH | Refills: 3 | Status: SHIPPED | OUTPATIENT
Start: 2023-10-17

## 2023-10-20 ENCOUNTER — TELEPHONE (OUTPATIENT)
Dept: EDUCATION SERVICES | Facility: OTHER | Age: 58
End: 2023-10-20

## 2023-10-31 ENCOUNTER — TELEPHONE (OUTPATIENT)
Dept: FAMILY MEDICINE | Facility: OTHER | Age: 58
End: 2023-10-31

## 2023-10-31 DIAGNOSIS — E10.65 TYPE 1 DIABETES MELLITUS WITH HYPERGLYCEMIA (H): Primary | ICD-10-CM

## 2023-11-17 PROBLEM — E55.9 VITAMIN D DEFICIENCY: Status: ACTIVE | Noted: 2023-10-23

## 2023-11-17 NOTE — PROGRESS NOTES
Assessment & Plan     Type 1 diabetes mellitus with hyperglycemia (H)  Stable  Follows with Macrina diabetic education with next visit 11/30/2023    Primary hypertension  BP at goal  - c/w lisinopril 5mg     Stage 3a chronic kidney disease (H)  Follows with Dr Lombardi with last visit 10/23/2023, follow up in 6 months  - consideration for increase self cath'ing  - Richard has been cath'ing once per day, he will increase to twice per day    Benign prostatic hyperplasia with urinary retention  Follows with Dr. Mcwilliams, [facility] urology, with next visit 11/27/2023    Need for prophylactic vaccination and inoculation against influenza  Updated today    High priority for 2019-nCoV vaccine  Updated today    Impacted cerumen of both ears  Right TM with otitis media revealed after irrigation  - [place] REMOVAL IMPACTED CERUMEN IRRIGATION/LVG UNILAT (RN/MA)  - hydrogen peroxide 3 % solution 1 mL    URI  Most likely community URI. Richard has been doing his daily activities. Otitis media most likely contributing to symptoms   If symptoms do not improve soon, labs/imaging.  - try OTC mucinex     Otitis Media right  Estimated Creatinine Clearance: 42.2 mL/min (A) (based on SCr of 1.74 mg/dL (H)).  - augmentin bid x7d    See Patient Instructions    Return in about 3 months (around 2/21/2024) for CDM.    Natacha Alvarez MD  [facility]    Subjective   Richard is a 58 year old, presenting for the following health issues:  Lipids, Hypertension, Diabetes, Imm/Inj (Flu Shot), and Imm/Inj (COVID-19 VACCINE)      HPI     Diabetes Follow-up    How often are you checking your blood sugar? Continuous glucose monitor  What time of day are you checking your blood sugars (select all that apply)?  Before and after meals and At bedtime  Have you had any blood sugars above 200?  Yes 220  Have you had any blood sugars below 70?  Yes 60s  What symptoms do you notice when your blood sugar is low?  Shaky, Dizzy, Weak, and  Lethargy  What concerns do you have today about your diabetes? None   Do you have any of these symptoms? (Select all that apply)  Numbness in feet    - due for A1c 2/16/2024. Unless issues will update at diabetic visit   - follows with diabetic educationMacrina with next visit 11/30/2023      Hypertension Follow-up    Do you check your blood pressure regularly outside of the clinic? Yes   Are you following a low salt diet? Yes  Are your blood pressures ever more than 140 on the top number (systolic) OR more   than 90 on the bottom number (diastolic), for example 140/90? No    BP Readings from Last 2 Encounters:   11/21/23 110/68   09/18/23 124/74     Hemoglobin A1C (%)   Date Value   08/16/2023 7.5 (H)   05/08/2023 7.2 (H)   10/20/2021 7.7 (A)   06/03/2021 7.8 (A)     LDL Cholesterol Calculated (mg/dL)   Date Value   08/16/2023 48   07/21/2022 36   06/14/2021 42   03/24/2020 40         Chronic Kidney Disease Follow-up / BPH      Follows with Dr Lombardi with last visit 10/23/2023, follow up in 6 months  - consideration for increase self cath'ing    Follows with Dr. Mcwilliams, [facility] urology, with next visit 11/27/2023    # URI- 10 day duration  - symptoms are about the same  - has been going to work   - using OTC cough syrup     # Immunizations: influenza, covid - updating today  # Labs: UTD, consideration for POCT A1c with diabetic education visit     Review of Systems   Constitutional:  Positive for chills. Negative for fever.   HENT:  Positive for congestion, rhinorrhea and sore throat (resolved).    Respiratory:  Positive for cough. Negative for shortness of breath and wheezing.    Cardiovascular:  Negative for chest pain and palpitations.   Gastrointestinal:  Negative for abdominal pain.   Genitourinary:  Negative for dysuria.   Psychiatric/Behavioral:  Positive for mood changes (frustration with URI).           Objective    /68   Pulse 90   Temp 97.9  F (36.6  C) (Tympanic)   Resp 17   Wt 86.4 kg (190  lb 8 oz)   SpO2 95%   BMI 36.59 kg/m    Body mass index is 36.59 kg/m .  Physical Exam  Constitutional:       General: He is not in acute distress.     Appearance: He is well-developed.   HENT:      Head: Normocephalic and atraumatic.      Right Ear: A middle ear effusion is present. There is impacted cerumen. Tympanic membrane is erythematous and bulging.      Left Ear: There is impacted cerumen.      Mouth/Throat:      Mouth: Mucous membranes are moist.      Pharynx: No oropharyngeal exudate.   Eyes:      Extraocular Movements: Extraocular movements intact.      Conjunctiva/sclera: Conjunctivae normal.   Neck:      Thyroid: No thyromegaly.   Cardiovascular:      Rate and Rhythm: Normal rate and regular rhythm.      Pulses: Normal pulses.      Heart sounds: No murmur heard.  Pulmonary:      Effort: Pulmonary effort is normal. No respiratory distress.      Breath sounds: No wheezing or rales.   Abdominal:      General: Bowel sounds are normal. There is no distension.      Palpations: Abdomen is soft.      Tenderness: There is no abdominal tenderness. There is no guarding.   Musculoskeletal:         General: Normal range of motion.      Cervical back: Normal range of motion and neck supple.      Right lower leg: No edema.      Left lower leg: No edema.   Lymphadenopathy:      Cervical: No cervical adenopathy.   Skin:     General: Skin is warm and dry.   Neurological:      Mental Status: He is alert.   Psychiatric:         Mood and Affect: Mood normal.          [facility] labs (10/23/2023)   Cr 1.58 with gfr 50  Hgb 12.9

## 2023-11-21 ENCOUNTER — OFFICE VISIT (OUTPATIENT)
Dept: FAMILY MEDICINE | Facility: OTHER | Age: 58
End: 2023-11-21
Attending: FAMILY MEDICINE
Payer: COMMERCIAL

## 2023-11-21 ENCOUNTER — OFFICE VISIT (OUTPATIENT)
Dept: PODIATRY | Facility: OTHER | Age: 58
End: 2023-11-21
Attending: PODIATRIST
Payer: COMMERCIAL

## 2023-11-21 VITALS
BODY MASS INDEX: 36.59 KG/M2 | OXYGEN SATURATION: 95 % | TEMPERATURE: 97.9 F | SYSTOLIC BLOOD PRESSURE: 110 MMHG | RESPIRATION RATE: 17 BRPM | WEIGHT: 190.5 LBS | HEART RATE: 90 BPM | DIASTOLIC BLOOD PRESSURE: 68 MMHG

## 2023-11-21 VITALS
TEMPERATURE: 97.9 F | SYSTOLIC BLOOD PRESSURE: 110 MMHG | OXYGEN SATURATION: 95 % | HEART RATE: 90 BPM | DIASTOLIC BLOOD PRESSURE: 68 MMHG

## 2023-11-21 DIAGNOSIS — Z13.89 SCREENING FOR DIABETIC PERIPHERAL NEUROPATHY: ICD-10-CM

## 2023-11-21 DIAGNOSIS — E10.65 TYPE 1 DIABETES MELLITUS WITH HYPERGLYCEMIA (H): ICD-10-CM

## 2023-11-21 DIAGNOSIS — N40.1 BENIGN PROSTATIC HYPERPLASIA WITH URINARY RETENTION: ICD-10-CM

## 2023-11-21 DIAGNOSIS — J06.9 UPPER RESPIRATORY TRACT INFECTION, UNSPECIFIED TYPE: ICD-10-CM

## 2023-11-21 DIAGNOSIS — E10.42 DIABETIC POLYNEUROPATHY ASSOCIATED WITH TYPE 1 DIABETES MELLITUS (H): ICD-10-CM

## 2023-11-21 DIAGNOSIS — Z23 NEED FOR PROPHYLACTIC VACCINATION AND INOCULATION AGAINST INFLUENZA: ICD-10-CM

## 2023-11-21 DIAGNOSIS — Z23 HIGH PRIORITY FOR 2019-NCOV VACCINE: ICD-10-CM

## 2023-11-21 DIAGNOSIS — E10.65 TYPE 1 DIABETES MELLITUS WITH HYPERGLYCEMIA (H): Primary | ICD-10-CM

## 2023-11-21 DIAGNOSIS — H61.23 IMPACTED CERUMEN OF BOTH EARS: ICD-10-CM

## 2023-11-21 DIAGNOSIS — N18.31 STAGE 3A CHRONIC KIDNEY DISEASE (H): ICD-10-CM

## 2023-11-21 DIAGNOSIS — L60.3 ONYCHODYSTROPHY: ICD-10-CM

## 2023-11-21 DIAGNOSIS — R33.8 BENIGN PROSTATIC HYPERPLASIA WITH URINARY RETENTION: ICD-10-CM

## 2023-11-21 DIAGNOSIS — H66.90 ACUTE OTITIS MEDIA, UNSPECIFIED OTITIS MEDIA TYPE: ICD-10-CM

## 2023-11-21 DIAGNOSIS — L84 CALLUS OF FOOT: Primary | ICD-10-CM

## 2023-11-21 DIAGNOSIS — I10 PRIMARY HYPERTENSION: ICD-10-CM

## 2023-11-21 PROCEDURE — 11056 PARNG/CUTG B9 HYPRKR LES 2-4: CPT | Performed by: PODIATRIST

## 2023-11-21 PROCEDURE — 91320 SARSCV2 VAC 30MCG TRS-SUC IM: CPT

## 2023-11-21 PROCEDURE — 99214 OFFICE O/P EST MOD 30 MIN: CPT | Performed by: FAMILY MEDICINE

## 2023-11-21 PROCEDURE — 99207 PR FOOT EXAM NO CHARGE: CPT | Performed by: PODIATRIST

## 2023-11-21 PROCEDURE — G0463 HOSPITAL OUTPT CLINIC VISIT: HCPCS | Mod: 25,27

## 2023-11-21 PROCEDURE — 69209 REMOVE IMPACTED EAR WAX UNI: CPT | Performed by: FAMILY MEDICINE

## 2023-11-21 PROCEDURE — G0463 HOSPITAL OUTPT CLINIC VISIT: HCPCS | Mod: 25

## 2023-11-21 PROCEDURE — 11721 DEBRIDE NAIL 6 OR MORE: CPT | Mod: XS | Performed by: PODIATRIST

## 2023-11-21 PROCEDURE — 90686 IIV4 VACC NO PRSV 0.5 ML IM: CPT

## 2023-11-21 RX ORDER — ACETAMINOPHEN 160 MG
1 TABLET,DISINTEGRATING ORAL SEE ADMIN INSTRUCTIONS
Status: SHIPPED | OUTPATIENT
Start: 2023-11-28

## 2023-11-21 RX ORDER — AMOXICILLIN 500 MG/1
500 CAPSULE ORAL 2 TIMES DAILY
Status: CANCELLED | OUTPATIENT
Start: 2023-11-21

## 2023-11-21 ASSESSMENT — ENCOUNTER SYMPTOMS
SORE THROAT: 1
CHILLS: 1
RHINORRHEA: 1
PALPITATIONS: 0
SHORTNESS OF BREATH: 0
COUGH: 1
ABDOMINAL PAIN: 0
FEVER: 0
WHEEZING: 0
DYSURIA: 0

## 2023-11-21 ASSESSMENT — PAIN SCALES - GENERAL
PAINLEVEL: SEVERE PAIN (7)
PAINLEVEL: SEVERE PAIN (7)
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
## 2023-11-30 ENCOUNTER — ALLIED HEALTH/NURSE VISIT (OUTPATIENT)
Dept: EDUCATION SERVICES | Facility: OTHER | Age: 58
End: 2023-11-30
Attending: NURSE PRACTITIONER
Payer: COMMERCIAL

## 2023-11-30 VITALS
HEART RATE: 88 BPM | RESPIRATION RATE: 16 BRPM | WEIGHT: 191.8 LBS | BODY MASS INDEX: 36.84 KG/M2 | DIASTOLIC BLOOD PRESSURE: 78 MMHG | OXYGEN SATURATION: 92 % | SYSTOLIC BLOOD PRESSURE: 122 MMHG

## 2023-11-30 DIAGNOSIS — E10.65 TYPE 1 DIABETES MELLITUS WITH HYPERGLYCEMIA (H): Primary | ICD-10-CM

## 2023-11-30 LAB
EST. AVERAGE GLUCOSE BLD GHB EST-MCNC: 174 MG/DL
HBA1C MFR BLD: 7.7 %

## 2023-11-30 PROCEDURE — G0463 HOSPITAL OUTPT CLINIC VISIT: HCPCS

## 2023-11-30 PROCEDURE — 95251 CONT GLUC MNTR ANALYSIS I&R: CPT | Performed by: NURSE PRACTITIONER

## 2023-11-30 PROCEDURE — 83036 HEMOGLOBIN GLYCOSYLATED A1C: CPT | Mod: ZL | Performed by: NURSE PRACTITIONER

## 2023-11-30 PROCEDURE — 36415 COLL VENOUS BLD VENIPUNCTURE: CPT | Mod: ZL | Performed by: NURSE PRACTITIONER

## 2023-11-30 RX ORDER — INSULIN ASPART 100 [IU]/ML
INJECTION, SOLUTION INTRAVENOUS; SUBCUTANEOUS
Qty: 40 ML | Refills: 3 | Status: SHIPPED | OUTPATIENT
Start: 2023-11-30 | End: 2024-06-18

## 2023-11-30 ASSESSMENT — PAIN SCALES - GENERAL: PAINLEVEL: SEVERE PAIN (7)

## 2023-11-30 NOTE — PROGRESS NOTES
SUBJECTIVE:  Richard Harvey, 58 year old, male presents with the following Chief Complaint(s) with HPI to follow:  Chief Complaint   Patient presents with    Diabetes        Diabetes Follow-up    Patient is checking blood sugars: >4x/day using his continuous glucose.  Results:    Date: 11/17 to 11/30/23  Glucose management indicator: n/a    Average glucose: 157    Glucose range  Very low (<54): 0  low (<70): 1%  In target range (): 71%  High (>180): 24%  Very high (>250): 4%    Symptoms of hypoglycemia (low blood sugar): rare  Paresthesias (numbness or burning in feet) or sores: Yes; no  Diabetic eye exam within the last year: Yes  Breakfast eaten regularly: Yes  Patient counting carbs: trying       HPI:   Richard's here today for the follow up regarding his Diabetes mellitus, Type 1.    Lab Results   Component Value Date    A1C 7.7 11/30/2023    A1C 7.5 08/16/2023    A1C 7.2 05/08/2023    A1C 6.9 01/31/2023    A1C 7.3 10/24/2022    A1C 7.7 10/20/2021    A1C 7.8 06/03/2021    A1C 7.5 11/30/2020    A1C 7.7 09/01/2020    A1C 8.1 03/24/2020     Current Diabetes medication:   1.  Insulin pump (Omnipod 5), uses Novolog   ASA use: yes, 325 mg daily  Statin use: yes, simvastatin 40 mg at bedime    Reports the following:  No issues with the insulin pump  No issues with the Dexcom G6    Having a CT guided biopsy in a couple weeks.   Hx of kidney lesion  Working with Dr. Mcwilliams.      To note,   Omnipod 5 pods: [employer] pill   Dexcom G6: [facility]   Novolog: [facility]    Weight trend:  Wt Readings from Last 4 Encounters:   11/30/23 87 kg (191 lb 12.8 oz)   11/21/23 86.4 kg (190 lb 8 oz)   08/24/23 83.1 kg (183 lb 1.6 oz)   08/16/23 86.2 kg (190 lb 1.6 oz)       Patient Active Problem List   Diagnosis    DM type 1 (diabetes mellitus, type 1) (H)    HTN (hypertension)    Hyperlipidemia    ACP (advance care planning)    Diabetic polyneuropathy associated with type 1 diabetes mellitus (H)    Class 2 obesity in adult     Obesity (BMI 35.0-39.9) with comorbidity (H)    Gastroparesis    Gastroesophageal reflux disease without esophagitis    Chronic bilateral low back pain with right-sided sciatica    Acute cystitis without hematuria    Sepsis, due to unspecified organism, unspecified whether acute organ dysfunction present (H)    ELMA (acute kidney injury) (H24)    Pyelonephritis, acute    Benign prostatic hyperplasia with urinary retention    Stage 3a chronic kidney disease (H)    Vitamin D deficiency       Past Medical History:   Diagnosis Date    BPH (benign prostatic hyperplasia)     Diabetes mellitus type 1, controlled (H)     HLD (hyperlipidemia)     HTN (hypertension)     Neuropathy     Type 1 diabetes (H)        Past Surgical History:   Procedure Laterality Date    COLONOSCOPY N/A 6/27/2023    Procedure: Colonoscopy;  Surgeon: Raúl Alcaraz MD;  Location: [facility] OR    COLONOSCOPY - HIM SCAN N/A 09/19/2016    Procedure: COLONOSCOPY SCREENING; Surgeon: Rudy Rojo MD; Location: [facility] OR    ESOPHAGOSCOPY, GASTROSCOPY, DUODENOSCOPY (EGD), COMBINED  08/13/2019    IR PICC PLACEMENT > 5 YRS OF AGE  4/21/2023       Family History   Problem Relation Age of Onset    No Known Problems Mother     Hypertension Father     Hypertension Brother        Social History     Tobacco Use    Smoking status: Never     Passive exposure: Never    Smokeless tobacco: Never   Substance Use Topics    Alcohol use: No     Alcohol/week: 0.0 standard drinks of alcohol       Current Outpatient Medications   Medication Sig Dispense Refill    bisacodyl (DULCOLAX) 5 MG EC tablet Take 2 tabs at bedtime 2 nights prior to procedure. Take 2 tabs at 3pm the day before procedure. 4 tablet 0    CALCIUM-VITAMIN D PO Take 1 tablet by mouth daily      Continuous Blood Gluc  (DEXCOM G6 ) DAYAN 1 each continuous To be used per manufacture's recommendation 1 each 2    Continuous Blood Gluc Sensor (DEXCOM G6 SENSOR) MISC CHANGE EVERY 10 DAYS 3 each 11     Continuous Blood Gluc Transmit (DEXCOM G6 TRANSMITTER) MISC CHANGE EVERY 3 MONTHS 1 each 4    CONTOUR NEXT TEST test strip TEST 6 TIMES DAILY, OR AS DIRECTED. 200 strip 5    diclofenac (VOLTAREN) 1 % topical gel Apply 4 g topically 4 times daily as needed for moderate pain (4-6) 50 g 2    gabapentin (NEURONTIN) 300 MG capsule Take 1 capsule (300 mg) by mouth 3 times daily 90 capsule 4    Glucagon (GVOKE HYPOPEN 2-PACK) 1 MG/0.2ML SOAJ Inject 1 mg Subcutaneous as needed (for low blood glucose) 0.4 mL 3    insulin aspart (NOVOLOG VIAL) 100 UNITS/ML vial USE WITH INSULIN PUMP. TDD: 110 UNITS 40 mL 3    Insulin Disposable Pump (OMNIPOD 5 G6 INTRO, GEN 5,) KIT 1 kit continuous 1 kit 0    Insulin Disposable Pump (OMNIPOD 5 G6 POD, GEN 5,) MISC 1 each every 3 days 10 each 5    INSULIN PUMP - OUTPATIENT Insulin pump: Omnipod 5  Update: 8/24/23  Basal  0000 1.1  0200 1.05  0600 0.95  1030 0.7  1530 0.9  1800 1.15  TDD: 23.325  CR  0000 12  1200 13  1600 14  ISF  0000 65  1200 60  1800 55  BG target range  0000 110  BG correction threshold  0000 140  0900 130  2200 140  Active insulin time: 4 hours      Lidocaine (EQ LIDOCAINE PAIN RELIEVING) 4 % Patch APPLY 1  TOPICALLY EVERY 24 HOURS --  TO  PREVENT  LIDOCAINE  TOXICITY,  PATIENT  SHOULD  BE  PATCH  FREE  FOR  12  HOURS  DAILY 20 patch 3    lisinopril (ZESTRIL) 5 MG tablet Take 1 tablet (5 mg) by mouth daily      loperamide (IMODIUM A-D) 2 MG tablet Take 1 tablet (2 mg) by mouth 4 times daily as needed for diarrhea 30 tablet 0    Multiple Vitamin (MULTI-VITAMIN DAILY PO) Take 1 tablet by mouth daily      nitroGLYcerin (NITROSTAT) 0.4 MG sublingual tablet Place 0.4 mg under the tongue every 5 minutes as needed for chest pain . Do not crush; maximum of 3 doses in 15 minutes.      ondansetron (ZOFRAN) 4 MG tablet Take 1 tablet (4 mg) by mouth every 8 hours as needed for nausea 90 tablet 3    order for DME Equipment being ordered:     1.  [employer] insulin pump  supplies--insertion sets and reserviors  Disp: 1 month  Refill: 11 months 30 days 11    pantoprazole (PROTONIX) 40 MG EC tablet Take 1 tablet by mouth twice daily 60 tablet 6    simvastatin (ZOCOR) 40 MG tablet TAKE 1 TABLET BY MOUTH AT BEDTIME 90 tablet 3    tamsulosin (FLOMAX) 0.4 MG capsule Take 1 capsule (0.4 mg) by mouth every evening 90 capsule 3    urea (UREA 10 HYDRATING) 10 % external cream APPLY  CREAM TOPICALLY TO AFFECTED AREA AS NEEDED FOR CALLOUSED SKIN 85 g 1    acetone, Urine, test STRP Use as directed (Patient not taking: Reported on 8/24/2023) 50 each 3       No Known Allergies    REVIEW OF SYSTEMS  Skin: negative  Eyes: negative; wears glasses   Ears/Nose/Throat: negative; hx of allergies  Respiratory: No shortness of breath, dyspnea on exertion, cough, or hemoptysis  Cardiovascular: negative  Gastrointestinal: hx of nausea  Genitourinary: as noted above; hx of urinary retention--still self cathing   Musculoskeletal: + back pain; history of arthritis--hands--same, shoulders, knees--same   Neurologic: positive for numbness or tingling of hands and numbness or tingling of feet--worse   Psychiatric: negative  Hematologic/Lymphatic/Immunologic: negative  Endocrine: positive for diabetes    OBJECTIVE:  /78   Pulse 88   Resp 16   Wt 87 kg (191 lb 12.8 oz)   SpO2 92%   BMI 36.84 kg/m    Constitutional: alert and no distress  Respiratory:  Good diaphragmatic excursion.   Musculoskeletal: extremities normal- no gross deformities noted and gait normal  Skin: no suspicious lesions and/or rashes to visible skin  Psychiatric: mentation appears normal and affect normal/bright      LAB  Results for orders placed or performed in visit on 11/30/23   Hemoglobin A1c     Status: Abnormal   Result Value Ref Range    Estimated Average Glucose 174 mg/dL    Hemoglobin A1C 7.7 (H) <5.7 %       ASSESSMENT / PLAN:.  (E10.65) Type 1 diabetes mellitus with hyperglycemia (H)  (primary encounter diagnosis)  Comment:  noted insulin pump and CGM download    Insulin pump  Basal: 32.8 (62%)  Bolus: 20.2 (38%)  TDD: 70 units    Automated mode: 100%  Manual mode: 0    Insulin pump: Omnipod 5  Update: 11/30/2023  Basal  0000 1.1  0200 1.05  0600 0.95  1030 0.7  1530 0.9  1800 1.15  TDD: 23.325  CR  0000 12  1200 13  1600 14  ISF  0000 65  1200 60  1800 55  BG target range  0000 110  BG correction threshold  0000 140  0900 130  2200 140  Active insulin time: 4 hours    Plan: Hemoglobin A1c, insulin aspart (NOVOLOG VIAL)         100 UNITS/ML vial, GLUCOSE MONITOR, 72 HOUR,         PHYS INTERP                TIR: 71%  Rare low BGS    Keep doing what he's doing  Keep working on the timing of his insulin pump carb bolus--ideally before--some is better than none    Follow up  Download insulin pump whenever you see Dr. Alvarez  3 months for DM    Call sooner if any issues/concerns develop and/or continued issues with low BGs    Patient Instructions   Continue working on healthy eating and moving as best as you can (start low and slow, work up to 30 min, 5x/week)    BG goals:  Fasting and before meals <130, >70  2 hour after eating <180    We only need 1/2 of these numbers to be within target then your A1c will be within target    Medication changes   None for now     Follow up   Download insulin pump whenever you see Dr. Alvarez  3 months for DM    Call me sooner if any problems/concerns and/or questions develop including consistent low BGs <70 or consistent high BGs >200  [phone] (Unit Coordinator)    [phone] (Tracey, Nurse)    Time: 25 min + 5 min CGM review  Barrier: none  Willingness to learn: accepting    Macrina CHAMBERLAIN [facility]  Diabetes and Wound Care    With the electronic record, we can now more quickly and easily track our patient diabetic goals. Our diabetes clinical review is in progress and these are the indicators we are monitoring for good diabetes health.     1.) HbA1C less than 7 (measurement of your average blood  sugars)  2.) Blood Pressure less than 140/80  3.) LDL less than 100 (bad cholesterol)  4.) HbA1C is checked in the last 6 months and below 7% (more frequently if not at goal or adjusting medications)  5.) LDL is checked in the last 12 months (more frequently if not at goal or adjusting medications)  6.) Taking one baby aspirin daily (unless otherwise instructed)  7.) No tobacco use  8) Statin use     You have achieved 7 out of 8 of these and I am encouraging you to come in and get tuned up to achieve 8 out of 8!  Here is what you have achieved so far in my goals for you:  1.) HbA1C  less than 7:                              NO  Your last  HbA1C :   Lab Results   Component Value Date    A1C 7.7 11/30/2023    A1C 7.5 08/16/2023    A1C 7.2 05/08/2023    A1C 6.9 01/31/2023    A1C 7.3 10/24/2022    A1C 7.7 10/20/2021    A1C 7.8 06/03/2021    A1C 7.5 11/30/2020    A1C 7.7 09/01/2020    A1C 8.1 03/24/2020     2.) Blood Pressure less than 140/80:       YES      Your last    /78   Pulse 88   Resp 16   Wt 87 kg (191 lb 12.8 oz)   SpO2 92%   BMI 36.84 kg/m      3.) LDL less than 100:                              YES      Your last   LDL         LDL Cholesterol Calculated   Date Value Ref Range Status   08/16/2023 48 <=100 mg/dL Final   06/14/2021 42 <100 mg/dL Final     Comment:     Desirable:       <100 mg/dl       4.) Checked HbA1C in the past 6 months: YES      5.) Checked LDL in the past 12 months:    YES    6.) Taking one aspirin daily:                       YES     7.) No tobacco use:                                        YES      8.) Statin use      YES       CGM requirements:   Patient has been seen in the clinic within the last 6 month  Diagnosis of Type 1 diabetes  Has been testing their BGs 4x/day using the Dexcom  Uses an insulin pump   Requires frequent adjustments to their treatment regimen based on BGM and/or CGM testing results.

## 2023-12-07 NOTE — PATIENT INSTRUCTIONS
Continue working on healthy eating and moving as best as you can (start low and slow, work up to 30 min, 5x/week)    BG goals:  Fasting and before meals <130, >70  2 hour after eating <180    We only need 1/2 of these numbers to be within target then your A1c will be within target    Medication changes   None for now     Follow up   Download insulin pump whenever you see Dr. Alvarez  3 months for DM    Call me sooner if any problems/concerns and/or questions develop including consistent low BGs <70 or consistent high BGs >200  [phone] (Unit Coordinator)    [phone] (Tracey, Nurse)

## 2023-12-12 NOTE — PROGRESS NOTES
Richard called.   Having surgery tomorrow.   He's able to wear his insulin pump.   Rare low BGs due to increase activity    Plan:  No change  Keep it in automated mode  If he does have a low BG, okay to use glucose tablets as they are considered no residual and reduce insulin pump to 80% basal in manual mode      Call with questions      Macrina CHAMBERLAIN [facility]  Diabetes and Wound Care

## 2023-12-18 DIAGNOSIS — E10.65 TYPE 1 DIABETES MELLITUS WITH HYPERGLYCEMIA (H): ICD-10-CM

## 2023-12-18 RX ORDER — INSULIN PMP CART,AUT,G6/7,CNTR
1 EACH SUBCUTANEOUS
Qty: 10 EACH | Refills: 5 | Status: SHIPPED | OUTPATIENT
Start: 2023-12-18 | End: 2024-04-15

## 2024-01-15 ENCOUNTER — TELEPHONE (OUTPATIENT)
Dept: FAMILY MEDICINE | Facility: OTHER | Age: 59
End: 2024-01-15

## 2024-01-15 DIAGNOSIS — E10.649 TYPE 1 DIABETES MELLITUS WITH HYPOGLYCEMIA AND WITHOUT COMA (H): ICD-10-CM

## 2024-01-15 DIAGNOSIS — E10.649 TYPE 1 DIABETES MELLITUS WITH HYPOGLYCEMIA UNAWARENESS (H): ICD-10-CM

## 2024-01-15 NOTE — TELEPHONE ENCOUNTER
4:32 PM    Reason for Call: Phone Call    Description: pt is wanting to speak to a nurse about possibly getting a prescription refill   He would not give me any details since I am not a nurse  Was an appointment offered for this call? No  If yes : Appointment type              Date    Preferred method for responding to this message: Telephone Call  What is your phone number ?[phone]      If we cannot reach you directly, may we leave a detailed response at the number you provided? Yes    Can this message wait until your PCP/provider returns, if available today? Not applicable    Neyda Babin

## 2024-01-16 RX ORDER — PROCHLORPERAZINE 25 MG/1
SUPPOSITORY RECTAL
Qty: 3 EACH | Refills: 11 | Status: SHIPPED | OUTPATIENT
Start: 2024-01-16 | End: 2024-03-13

## 2024-01-25 ENCOUNTER — OFFICE VISIT (OUTPATIENT)
Dept: PODIATRY | Facility: OTHER | Age: 59
End: 2024-01-25
Attending: PODIATRIST
Payer: COMMERCIAL

## 2024-01-25 ENCOUNTER — TELEPHONE (OUTPATIENT)
Dept: PODIATRY | Facility: OTHER | Age: 59
End: 2024-01-25

## 2024-01-25 VITALS
TEMPERATURE: 97.7 F | SYSTOLIC BLOOD PRESSURE: 115 MMHG | HEART RATE: 91 BPM | OXYGEN SATURATION: 95 % | DIASTOLIC BLOOD PRESSURE: 76 MMHG

## 2024-01-25 DIAGNOSIS — E10.42 DIABETIC POLYNEUROPATHY ASSOCIATED WITH TYPE 1 DIABETES MELLITUS (H): ICD-10-CM

## 2024-01-25 DIAGNOSIS — Z13.89 SCREENING FOR DIABETIC PERIPHERAL NEUROPATHY: ICD-10-CM

## 2024-01-25 DIAGNOSIS — L60.3 ONYCHODYSTROPHY: ICD-10-CM

## 2024-01-25 DIAGNOSIS — E10.65 TYPE 1 DIABETES MELLITUS WITH HYPERGLYCEMIA (H): ICD-10-CM

## 2024-01-25 DIAGNOSIS — L84 CALLUS OF FOOT: Primary | ICD-10-CM

## 2024-01-25 PROCEDURE — 99207 PR FOOT EXAM NO CHARGE: CPT | Performed by: PODIATRIST

## 2024-01-25 PROCEDURE — G0463 HOSPITAL OUTPT CLINIC VISIT: HCPCS

## 2024-01-25 PROCEDURE — 11721 DEBRIDE NAIL 6 OR MORE: CPT | Mod: XS | Performed by: PODIATRIST

## 2024-01-25 PROCEDURE — 11056 PARNG/CUTG B9 HYPRKR LES 2-4: CPT | Performed by: PODIATRIST

## 2024-01-25 ASSESSMENT — PAIN SCALES - GENERAL: PAINLEVEL: SEVERE PAIN (6)

## 2024-01-25 NOTE — TELEPHONE ENCOUNTER
Faxed Referral, Demographics, Med Sheet, progress note to [facility] in [place], [place].     They will call patient to schedule.     Telephone: [phone]  Fax:[phone]
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
## 2024-02-02 ENCOUNTER — TELEPHONE (OUTPATIENT)
Dept: PODIATRY | Facility: OTHER | Age: 59
End: 2024-02-02

## 2024-02-02 NOTE — TELEPHONE ENCOUNTER
Faxed Referral, Demographics, Med Sheet, progress note to [facility] in [place], [place].     They will call patient to schedule.     Telephone: [phone]  Fax:[phone]

## 2024-02-02 NOTE — TELEPHONE ENCOUNTER
Patient can be seen at [facility] without an insurance referral.

## 2024-02-09 ENCOUNTER — MEDICAL CORRESPONDENCE (OUTPATIENT)
Dept: HEALTH INFORMATION MANAGEMENT | Facility: CLINIC | Age: 59
End: 2024-02-09
Payer: COMMERCIAL

## 2024-02-12 ENCOUNTER — MEDICAL CORRESPONDENCE (OUTPATIENT)
Dept: HEALTH INFORMATION MANAGEMENT | Facility: CLINIC | Age: 59
End: 2024-02-12
Payer: COMMERCIAL

## 2024-02-19 NOTE — PROGRESS NOTES
Assessment & Plan     Chronic bilateral low back pain with right-sided sciatica  Stable. Richard is not sure if he wants to pursue surgery. Injections w/ some success  - c/w gabapentin    Diabetic polyneuropathy associated with type 1 diabetes mellitus (H)  No changes. Due for A1c (5/30/2024)  On insulin pump  Follows with Macrina, with next visit 4/16/2024    Primary hypertension / Essential hypertension  BP at goal   - lisinopril (ZESTRIL) 5 MG tablet; Take 1 tablet (5 mg) by mouth daily    Stage 3a chronic kidney disease (H)  Richard will make a follow up appt with [facility] nephrology. Due 4/2024  Renal function stable to improved on November 2024, [facility] labs.     Benign prostatic hyperplasia with urinary retention / Abnormal CT scan of kidney  S/p bx of kidney with benign results. Follow up with [facility] urology prn  Self cath's twice per day    Constipation, unspecified constipation type  Consideration for constipation contributing to back pain while urinating   - polyethylene glycol (MIRALAX) 17 GM/Dose powder; Take 17 g (1 Capful) by mouth daily        See Patient Instructions    Return for Diabetic Visit, CDM, Lab Work.    Subjective   Richard is a 58 year old, presenting for the following health issues:  Diabetes, Hypertension, and Back Pain    HPI       Diabetes Follow-up    How often are you checking your blood sugar? Continuous glucose monitor  What time of day are you checking your blood sugars (select all that apply)?   Throughout the day  Have you had any blood sugars above 200?  Yes   Have you had any blood sugars below 70?  Yes - sometimes into th 40s   What symptoms do you notice when your blood sugar is low?  Shaky, Dizzy, Weak, and Lethargy  What concerns do you have today about your diabetes? None   Do you have any of these symptoms? (Select all that apply)  No numbness or tingling in feet.  No redness, sores or blisters on feet.  No complaints of excessive thirst.  No reports of blurry vision.  No  significant changes to weight.      - blood sugars are running okay    Hypertension Follow-up    Do you check your blood pressure regularly outside of the clinic? Yes   Are you following a low salt diet? Yes  Are your blood pressures ever more than 140 on the top number (systolic) OR more   than 90 on the bottom number (diastolic), for example 140/90? No    BP Readings from Last 2 Encounters:   02/20/24 112/70   01/25/24 115/76     Hemoglobin A1C (%)   Date Value   11/30/2023 7.7 (H)   08/16/2023 7.5 (H)   10/20/2021 7.7 (A)   06/03/2021 7.8 (A)     LDL Cholesterol Calculated (mg/dL)   Date Value   08/16/2023 48   07/21/2022 36   06/14/2021 42   03/24/2020 40         Chronic/Recurring Back Pain Follow Up    Where is your back pain located? (Select all that apply) middle of back bilateral  How would you describe your back pain?  sharp and stabbing  Where does your back pain spread? Right leg  Since your last clinic visit for back pain, how has your pain changed? always present, but gets better and worse  Does your back pain interfere with your job? Not applicable  Since your last visit, have you tried any new treatment? No    - on gabapentin   - s/p injections w/ some relief  - not sure if he want to proceed with surgery, follows with Dr. Pugh     # BPH:   Follows with [facility] urology, Dr Mcwilliams, with last visit 11/27/2023. No follow up required  58 year old male for followup of abnormalities in the kidney that persist. At this point without infectious cause to explain the CT findings I would recommend we pursue a biopsy. I will place a referral to IR for a biopsy. I think the right lower pole has a clear abnormality that would be easiest to biopsy but I will defer to interventional radiology.   - flow cytometry (12/13/2023, [facility]) negative  - Bx (12/13/2023, [facility]): benign   Marked lymphoplasmacytic interstitial infiltrate     - Occasional sclerosed or partially sclerotic glomeruli with mild glomerular  hypercellularity     - Moderate arteriosclerosis    - back pain is worse with urinating, does not occur every time. Occurs at random   - self cath'ing twice per day   - some issues with hard stools to greasy stools     Follows with Dr Lombardi with last visit 10/23/2023, follow up in 6 months. Richard will make arrangements           Objective    /70   Pulse 71   Temp 97.2  F (36.2  C) (Tympanic)   Resp 17   Wt 89 kg (196 lb 3.2 oz)   SpO2 93%   BMI 37.69 kg/m    Body mass index is 37.69 kg/m .  Physical Exam  Constitutional:       General: He is not in acute distress.     Appearance: He is not ill-appearing.   Cardiovascular:      Rate and Rhythm: Normal rate and regular rhythm.      Pulses: Normal pulses.      Heart sounds: No murmur heard.  Pulmonary:      Effort: Pulmonary effort is normal. No respiratory distress.      Breath sounds: No wheezing or rales.   Neurological:      Mental Status: He is alert.   Psychiatric:         Mood and Affect: Mood normal.        Labs reviewed in Epic including [facility] pathology results        Signed Electronically by: Natacha Alvarez MD

## 2024-02-20 ENCOUNTER — OFFICE VISIT (OUTPATIENT)
Dept: FAMILY MEDICINE | Facility: OTHER | Age: 59
End: 2024-02-20
Attending: FAMILY MEDICINE
Payer: COMMERCIAL

## 2024-02-20 VITALS
DIASTOLIC BLOOD PRESSURE: 70 MMHG | OXYGEN SATURATION: 93 % | HEART RATE: 71 BPM | SYSTOLIC BLOOD PRESSURE: 112 MMHG | BODY MASS INDEX: 37.69 KG/M2 | WEIGHT: 196.2 LBS | TEMPERATURE: 97.2 F | RESPIRATION RATE: 17 BRPM

## 2024-02-20 DIAGNOSIS — R33.8 BENIGN PROSTATIC HYPERPLASIA WITH URINARY RETENTION: ICD-10-CM

## 2024-02-20 DIAGNOSIS — M54.41 CHRONIC BILATERAL LOW BACK PAIN WITH RIGHT-SIDED SCIATICA: Primary | ICD-10-CM

## 2024-02-20 DIAGNOSIS — I10 PRIMARY HYPERTENSION: ICD-10-CM

## 2024-02-20 DIAGNOSIS — K59.00 CONSTIPATION, UNSPECIFIED CONSTIPATION TYPE: ICD-10-CM

## 2024-02-20 DIAGNOSIS — N18.31 STAGE 3A CHRONIC KIDNEY DISEASE (H): ICD-10-CM

## 2024-02-20 DIAGNOSIS — G89.29 CHRONIC BILATERAL LOW BACK PAIN WITH RIGHT-SIDED SCIATICA: Primary | ICD-10-CM

## 2024-02-20 DIAGNOSIS — E10.42 DIABETIC POLYNEUROPATHY ASSOCIATED WITH TYPE 1 DIABETES MELLITUS (H): ICD-10-CM

## 2024-02-20 DIAGNOSIS — I10 ESSENTIAL HYPERTENSION: ICD-10-CM

## 2024-02-20 DIAGNOSIS — N40.1 BENIGN PROSTATIC HYPERPLASIA WITH URINARY RETENTION: ICD-10-CM

## 2024-02-20 DIAGNOSIS — R93.429 ABNORMAL CT SCAN, KIDNEY: ICD-10-CM

## 2024-02-20 PROCEDURE — 99214 OFFICE O/P EST MOD 30 MIN: CPT | Performed by: FAMILY MEDICINE

## 2024-02-20 PROCEDURE — G0463 HOSPITAL OUTPT CLINIC VISIT: HCPCS

## 2024-02-20 PROCEDURE — G0463 HOSPITAL OUTPT CLINIC VISIT: HCPCS | Mod: 25

## 2024-02-20 RX ORDER — POLYETHYLENE GLYCOL 3350 17 G/17G
1 POWDER, FOR SOLUTION ORAL DAILY
Qty: 850 G | Refills: 1 | Status: SHIPPED | OUTPATIENT
Start: 2024-02-20

## 2024-02-20 RX ORDER — LISINOPRIL 5 MG/1
5 TABLET ORAL DAILY
Qty: 90 TABLET | Refills: 3 | Status: SHIPPED | OUTPATIENT
Start: 2024-02-20

## 2024-02-20 ASSESSMENT — PAIN SCALES - GENERAL: PAINLEVEL: SEVERE PAIN (6)

## 2024-02-20 NOTE — PATIENT INSTRUCTIONS
Please make your follow up appointment with [facility] nephrology  Phone: [phone]       Try miralax every day for hard stool. If you get diarrhea, decrease to 1/2 capful every day

## 2024-03-06 DIAGNOSIS — E10.649 TYPE 1 DIABETES MELLITUS WITH HYPOGLYCEMIA UNAWARENESS (H): ICD-10-CM

## 2024-03-07 RX ORDER — GABAPENTIN 300 MG/1
300 CAPSULE ORAL 3 TIMES DAILY
Qty: 90 CAPSULE | Refills: 2 | Status: SHIPPED | OUTPATIENT
Start: 2024-03-07 | End: 2024-07-30

## 2024-03-07 NOTE — TELEPHONE ENCOUNTER
gabapentin (NEURONTIN) 300 MG capsule 90 capsule 4 5/8/2023     Last Office Visit: 02/20/2024  Future Office visit:    Next 5 appointments (look out 90 days)      Mar 28, 2024  8:30 AM  (Arrive by 8:15 AM)  Return Visit with Juliette Jarrett DPM  [facility] ([facility] ) [address] [place] [id number] [phone]     Jun 03, 2024  8:00 AM  (Arrive by 7:45 AM)  SHORT with Natacha Alvarez MD  [facility] ([facility] ) [address] [place] [id number]  [phone]             Routing refill request to provider for review/approval because:

## 2024-03-13 ENCOUNTER — TELEPHONE (OUTPATIENT)
Dept: EDUCATION SERVICES | Facility: OTHER | Age: 59
End: 2024-03-13

## 2024-03-13 DIAGNOSIS — E10.649 TYPE 1 DIABETES MELLITUS WITH HYPOGLYCEMIA UNAWARENESS (H): ICD-10-CM

## 2024-03-13 DIAGNOSIS — E10.649 TYPE 1 DIABETES MELLITUS WITH HYPOGLYCEMIA AND WITHOUT COMA (H): ICD-10-CM

## 2024-03-13 RX ORDER — PROCHLORPERAZINE 25 MG/1
SUPPOSITORY RECTAL
Qty: 3 EACH | Refills: 11 | Status: SHIPPED | OUTPATIENT
Start: 2024-03-13

## 2024-03-13 RX ORDER — PROCHLORPERAZINE 25 MG/1
SUPPOSITORY RECTAL
Qty: 1 EACH | Refills: 4 | Status: SHIPPED | OUTPATIENT
Start: 2024-03-13

## 2024-03-13 NOTE — TELEPHONE ENCOUNTER
Pt is requesting new rx for    DEXCOM G7 SENSORS    Did not see on active med list please verify and send new rx. Thank you!    [place] spec/mail pharmacy  [phone]

## 2024-03-13 NOTE — TELEPHONE ENCOUNTER
He's on the Omnipod 5 and it doesn't work with the Dexcom G7 (yet)    Order sent for his Dexcom G6 supplies.      Macrina CHAMBERLAIN FNP-BC  Diabetes and Wound Care

## 2024-03-19 ENCOUNTER — TELEPHONE (OUTPATIENT)
Dept: EDUCATION SERVICES | Facility: OTHER | Age: 59
End: 2024-03-19

## 2024-03-19 NOTE — TELEPHONE ENCOUNTER
Pt is requesting new rx for     DEXCOM G7 SENSORS     Did not see on active med list please verify and send new rx. Thank you!     [place] [facility] pharmacy  [phone]

## 2024-03-26 DIAGNOSIS — E10.65 TYPE 1 DIABETES MELLITUS WITH HYPERGLYCEMIA (H): ICD-10-CM

## 2024-03-26 RX ORDER — UREA 10 %
LOTION (ML) TOPICAL
Qty: 85 G | Refills: 0 | Status: SHIPPED | OUTPATIENT
Start: 2024-03-26 | End: 2024-05-01

## 2024-03-26 NOTE — TELEPHONE ENCOUNTER
Urea 10 hydrating 10% external cream      Last Written Prescription Date:  10-17-23  Last Fill Quantity: 85 G,   # refills: 1  Last Office Visit: 8-24-23  Future Office visit:    Next 5 appointments (look out 90 days)      Mar 28, 2024  8:30 AM  (Arrive by 8:15 AM)  Return Visit with Juliette Jarrett DPM  [facility] ([facility] ) [address] [place] [phone]     Jun 03, 2024  8:00 AM  (Arrive by 7:45 AM)  SHORT with Natacha Alvarez MD  [facility] ([facility] ) [address] [place] [id number]  [phone]

## 2024-03-28 ENCOUNTER — OFFICE VISIT (OUTPATIENT)
Dept: PODIATRY | Facility: OTHER | Age: 59
End: 2024-03-28
Attending: PODIATRIST
Payer: COMMERCIAL

## 2024-03-28 VITALS
TEMPERATURE: 97.5 F | DIASTOLIC BLOOD PRESSURE: 76 MMHG | SYSTOLIC BLOOD PRESSURE: 122 MMHG | OXYGEN SATURATION: 95 % | HEART RATE: 93 BPM

## 2024-03-28 DIAGNOSIS — L60.3 ONYCHODYSTROPHY: ICD-10-CM

## 2024-03-28 DIAGNOSIS — L84 CALLUS OF FOOT: Primary | ICD-10-CM

## 2024-03-28 DIAGNOSIS — E10.42 DIABETIC POLYNEUROPATHY ASSOCIATED WITH TYPE 1 DIABETES MELLITUS (H): ICD-10-CM

## 2024-03-28 DIAGNOSIS — E10.65 TYPE 1 DIABETES MELLITUS WITH HYPERGLYCEMIA (H): ICD-10-CM

## 2024-03-28 DIAGNOSIS — Z13.89 SCREENING FOR DIABETIC PERIPHERAL NEUROPATHY: ICD-10-CM

## 2024-03-28 PROCEDURE — 11056 PARNG/CUTG B9 HYPRKR LES 2-4: CPT | Performed by: PODIATRIST

## 2024-03-28 PROCEDURE — G0463 HOSPITAL OUTPT CLINIC VISIT: HCPCS

## 2024-03-28 PROCEDURE — 99207 PR FOOT EXAM NO CHARGE: CPT | Performed by: PODIATRIST

## 2024-03-28 PROCEDURE — 11721 DEBRIDE NAIL 6 OR MORE: CPT | Performed by: PODIATRIST

## 2024-03-28 ASSESSMENT — PAIN SCALES - GENERAL: PAINLEVEL: SEVERE PAIN (7)
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
## 2024-04-15 ENCOUNTER — ALLIED HEALTH/NURSE VISIT (OUTPATIENT)
Dept: EDUCATION SERVICES | Facility: OTHER | Age: 59
End: 2024-04-15
Attending: NURSE PRACTITIONER
Payer: COMMERCIAL

## 2024-04-15 DIAGNOSIS — E10.65 TYPE 1 DIABETES MELLITUS WITH HYPERGLYCEMIA (H): Primary | ICD-10-CM

## 2024-04-15 PROCEDURE — 99207 PR NO CHARGE NURSE ONLY: CPT

## 2024-04-15 RX ORDER — INSULIN PMP CART,AUT,G6/7,CNTR
1 EACH SUBCUTANEOUS
Qty: 15 EACH | Refills: 5 | Status: SHIPPED | OUTPATIENT
Start: 2024-04-15 | End: 2024-10-02

## 2024-04-15 NOTE — PROGRESS NOTES
Richard stopped by.     He's been running low of supplies each month due to his frequency of pod change--every 2.5 days.   Unfortunately, his last pod failed.   Only has 25 units left of this pod.   He's not sure when his pods are going to be delivered--hopefully tomorrow if not later this week     I don't have any samples.      Reviewed his emergency back up plan for insulin pump failure    Tresiba  20 units daily    2. Novolog  1:15 (carb ratio)+ 1:60 (correction), >130 before meals  1:15 before snacks      Plan:   Order sent for Omnipod 5 pods change every 48 hours  He's to keep his appt with me tomorrow    Macrina CHAMBERLAIN P-BC  Diabetes and Wound Care

## 2024-04-16 ENCOUNTER — ALLIED HEALTH/NURSE VISIT (OUTPATIENT)
Dept: EDUCATION SERVICES | Facility: OTHER | Age: 59
End: 2024-04-16
Attending: NURSE PRACTITIONER
Payer: COMMERCIAL

## 2024-04-16 VITALS
HEART RATE: 87 BPM | DIASTOLIC BLOOD PRESSURE: 72 MMHG | WEIGHT: 188.5 LBS | OXYGEN SATURATION: 96 % | BODY MASS INDEX: 35.59 KG/M2 | SYSTOLIC BLOOD PRESSURE: 116 MMHG | RESPIRATION RATE: 16 BRPM | HEIGHT: 61 IN

## 2024-04-16 DIAGNOSIS — E10.65 TYPE 1 DIABETES MELLITUS WITH HYPERGLYCEMIA (H): Primary | ICD-10-CM

## 2024-04-16 LAB — HBA1C MFR BLD: 6.8 % (ref 4.3–?)

## 2024-04-16 PROCEDURE — 95251 CONT GLUC MNTR ANALYSIS I&R: CPT | Performed by: NURSE PRACTITIONER

## 2024-04-16 PROCEDURE — G0463 HOSPITAL OUTPT CLINIC VISIT: HCPCS

## 2024-04-16 PROCEDURE — 99213 OFFICE O/P EST LOW 20 MIN: CPT | Mod: 25 | Performed by: NURSE PRACTITIONER

## 2024-04-16 PROCEDURE — 36416 COLLJ CAPILLARY BLOOD SPEC: CPT | Mod: ZL | Performed by: NURSE PRACTITIONER

## 2024-04-16 RX ORDER — INSULIN ASPART 100 [IU]/ML
INJECTION, SOLUTION INTRAVENOUS; SUBCUTANEOUS
Status: SHIPPED
Start: 2024-04-16

## 2024-04-16 RX ORDER — INSULIN DEGLUDEC 100 U/ML
20 INJECTION, SOLUTION SUBCUTANEOUS DAILY
Status: SHIPPED
Start: 2024-04-16

## 2024-04-16 ASSESSMENT — PAIN SCALES - GENERAL: PAINLEVEL: EXTREME PAIN (9)

## 2024-04-16 NOTE — PROGRESS NOTES
"SUBJECTIVE:  Richard Harvey, 58 year old, male presents with the following Chief Complaint(s) with HPI to follow:  Chief Complaint   Patient presents with    Diabetes        Diabetes Follow-up    Patient is checking blood sugars: >4x/day using his continuous glucose.  Results:    Date: 3/20 to 4/2/24  Glucose management indicator: 6.7%    Average glucose: 140    Glucose range  Very low (<54): <1%  low (<70): 1%  In target range (): 82%  High (>180): 15%  Very high (>250): 1%    Date: 4/3 to 4/16/24  Glucose management indicator: 7.2%    Average glucose: 161    Glucose range  Very low (<54): <1%  low (<70): 1%  In target range (): 68%  High (>180): 22%  Very high (>250): 8%    Symptoms of hypoglycemia (low blood sugar): rare  Paresthesias (numbness or burning in feet) or sores: Yes; no  Diabetic eye exam within the last year: Yes  Breakfast eaten regularly: Yes  Patient counting carbs: trying       HPI:   Richard's here today for the follow up regarding his Diabetes mellitus, Type 1.    A1c trend  Lab Results   Component Value Date    A1C 7.7 11/30/2023    A1C 7.5 08/16/2023    A1C 7.2 05/08/2023    A1C 6.9 01/31/2023    A1C 7.3 10/24/2022    A1C 7.7 10/20/2021    A1C 7.8 06/03/2021    A1C 7.5 11/30/2020    A1C 7.7 09/01/2020    A1C 8.1 03/24/2020     Current Diabetes medication:   1.  Insulin pump (Omnipod 5), uses Novolog--ran out of pods  ASA use: yes, 325 mg daily  Statin use: yes, simvastatin 40 mg at bedime    Richard reports the following:  Ran out of pods  Saw me yesterday  Reviewed his back up plan and gave him a paper copy of this    Richard states he took Tresiba 8 units last night (2300)  Woke up this morning with a \"HI\" BG  Took Novolog 12 units this morning for correction  BGs are decreasing, 270s    States he should be getting his pods today per [facility]    As stated in past appointments:  Omnipod 5 pods: [employer] pill   Dexcom G6: [facility]   Novolog: [facility]    Weight trend:  Wt " Readings from Last 4 Encounters:   04/16/24 85.5 kg (188 lb 8 oz)   02/20/24 89 kg (196 lb 3.2 oz)   11/30/23 87 kg (191 lb 12.8 oz)   11/21/23 86.4 kg (190 lb 8 oz)     No new changes to his health  Saw a urologist  Still self cathing twice a day    Patient Active Problem List   Diagnosis    DM type 1 (diabetes mellitus, type 1) (H)    HTN (hypertension)    Hyperlipidemia    ACP (advance care planning)    Diabetic polyneuropathy associated with type 1 diabetes mellitus (H)    Class 2 obesity in adult    Obesity (BMI 35.0-39.9) with comorbidity (H)    Gastroparesis    Gastroesophageal reflux disease without esophagitis    Chronic bilateral low back pain with right-sided sciatica    Acute cystitis without hematuria    Sepsis, due to unspecified organism, unspecified whether acute organ dysfunction present (H)    ELMA (acute kidney injury) (H24)    Pyelonephritis, acute    Benign prostatic hyperplasia with urinary retention    Stage 3a chronic kidney disease (H)    Vitamin D deficiency       Past Medical History:   Diagnosis Date    BPH (benign prostatic hyperplasia)     Diabetes mellitus type 1, controlled (H)     HLD (hyperlipidemia)     HTN (hypertension)     Neuropathy     Type 1 diabetes (H)        Past Surgical History:   Procedure Laterality Date    COLONOSCOPY N/A 6/27/2023    Procedure: Colonoscopy;  Surgeon: Raúl Alcaraz MD;  Location: [facility] OR    COLONOSCOPY - [facility] SCAN N/A 09/19/2016    Procedure: COLONOSCOPY SCREENING; Surgeon: Rudy Rojo MD; Location: [facility] OR    ESOPHAGOSCOPY, GASTROSCOPY, DUODENOSCOPY (EGD), COMBINED  08/13/2019    IR PICC PLACEMENT > 5 YRS OF AGE  4/21/2023       Family History   Problem Relation Age of Onset    No Known Problems Mother     Hypertension Father     Hypertension Brother        Social History     Tobacco Use    Smoking status: Never     Passive exposure: Never    Smokeless tobacco: Never   Substance Use Topics    Alcohol use: No     Alcohol/week: 0.0 standard  drinks of alcohol       Current Outpatient Medications   Medication Sig Dispense Refill    CALCIUM-VITAMIN D PO Take 1 tablet by mouth daily      Continuous Blood Gluc  (DEXCOM G6 ) DAYAN 1 each continuous To be used per manufacture's recommendation 1 each 2    Continuous Blood Gluc Sensor (DEXCOM G6 SENSOR) MISC CHANGE EVERY 10 DAYS 3 each 11    Continuous Blood Gluc Transmit (DEXCOM G6 TRANSMITTER) MISC CHANGE EVERY 3 MONTHS 1 each 4    CONTOUR NEXT TEST test strip TEST 6 TIMES DAILY, OR AS DIRECTED. 200 strip 5    gabapentin (NEURONTIN) 300 MG capsule TAKE 1 CAPSULE BY MOUTH THREE TIMES DAILY 90 capsule 2    Glucagon (GVOKE HYPOPEN 2-PACK) 1 MG/0.2ML SOAJ Inject 1 mg Subcutaneous as needed (for low blood glucose) 0.4 mL 3    insulin aspart (NOVOLOG VIAL) 100 UNITS/ML vial USE WITH INSULIN PUMP. TDD: 110 UNITS 40 mL 3    insulin degludec (TRESIBA FLEXTOUCH) 100 UNIT/ML pen Inject 20 Units Subcutaneous daily      Lidocaine (EQ LIDOCAINE PAIN RELIEVING) 4 % Patch APPLY 1  TOPICALLY EVERY 24 HOURS --  TO  PREVENT  LIDOCAINE  TOXICITY,  PATIENT  SHOULD  BE  PATCH  FREE  FOR  12  HOURS  DAILY 20 patch 3    lisinopril (ZESTRIL) 5 MG tablet Take 1 tablet (5 mg) by mouth daily 90 tablet 3    loperamide (IMODIUM A-D) 2 MG tablet Take 1 tablet (2 mg) by mouth 4 times daily as needed for diarrhea 30 tablet 0    Multiple Vitamin (MULTI-VITAMIN DAILY PO) Take 1 tablet by mouth daily      nitroGLYcerin (NITROSTAT) 0.4 MG sublingual tablet Place 0.4 mg under the tongue every 5 minutes as needed for chest pain . Do not crush; maximum of 3 doses in 15 minutes.      ondansetron (ZOFRAN) 4 MG tablet Take 1 tablet (4 mg) by mouth every 8 hours as needed for nausea 90 tablet 3    order for DME Equipment being ordered:     1.  [employer] insulin pump supplies--insertion sets and reserviors  Disp: 1 month  Refill: 11 months 30 days 11    pantoprazole (PROTONIX) 40 MG EC tablet Take 1 tablet by mouth twice daily 60 tablet 6  "   polyethylene glycol (MIRALAX) 17 GM/Dose powder Take 17 g (1 Capful) by mouth daily 850 g 1    simvastatin (ZOCOR) 40 MG tablet TAKE 1 TABLET BY MOUTH AT BEDTIME 90 tablet 3    tamsulosin (FLOMAX) 0.4 MG capsule Take 1 capsule (0.4 mg) by mouth every evening 90 capsule 3    UREA 10 HYDRATING 10 % external cream APPLY CREAM TOPICALLY TO AFFECTED AREA AS NEEDED FOR  CALLOUSED  SKIN 85 g 0    Insulin Disposable Pump (OMNIPOD 5 G6 INTRO, GEN 5,) KIT 1 kit continuous (Patient not taking: Reported on 4/16/2024) 1 kit 0    Insulin Disposable Pump (OMNIPOD 5 G6 PODS, GEN 5,) MISC 1 each every 48 hours (Patient not taking: Reported on 4/16/2024) 15 each 5    INSULIN PUMP - OUTPATIENT Insulin pump: Omnipod 5  Update: 11/30/2023  Basal  0000 1.1  0200 1.05  0600 0.95  1030 0.7  1530 0.9  1800 1.15  TDD: 23.325  CR  0000 12  1200 13  1600 14  ISF  0000 65  1200 60  1800 55  BG target range  0000 110  BG correction threshold  0000 140  0900 130  2200 140  Active insulin time: 4 hours (Patient not taking: Reported on 4/16/2024)         No Known Allergies    REVIEW OF SYSTEMS  Skin: negative  Eyes: negative; wears glasses   Ears/Nose/Throat: negative; hx of allergies  Respiratory: No shortness of breath, dyspnea on exertion, cough, or hemoptysis  Cardiovascular: negative  Gastrointestinal: hx of nausea  Genitourinary: as noted above; hx of urinary retention  Musculoskeletal: + hand arthritis; hx of back pain, shoulders, knees   Neurologic: positive for numbness or tingling of hands and numbness or tingling of feet--worse   Psychiatric: negative  Hematologic/Lymphatic/Immunologic: negative  Endocrine: positive for diabetes    OBJECTIVE:  /72   Pulse 87   Resp 16   Ht 1.537 m (5' 0.5\")   Wt 85.5 kg (188 lb 8 oz)   SpO2 96%   BMI 36.21 kg/m    Constitutional: alert and no distress  Respiratory:  Good diaphragmatic excursion.   Musculoskeletal: extremities normal- no gross deformities noted and gait normal  Skin: no " suspicious lesions and/or rashes to visible skin  Psychiatric: mentation appears normal and affect normal/bright      LAB  Results for orders placed or performed in visit on 04/16/24   Hemoglobin A1c POCT, Enter/Edit Result     Status: Abnormal   Result Value Ref Range    Hemoglobin A1C POCT 6.8 4.3 - <5.7 %       ASSESSMENT / PLAN:.  (E10.65) Type 1 diabetes mellitus with hyperglycemia (H)  (primary encounter diagnosis)  Comment: noted CGM download and A1c; unable to download PDM as he didn't bring it  Plan: GLUCOSE MONITOR, 72 HOUR, PHYS INTERP,         Hemoglobin A1c POCT, Enter/Edit Result, insulin        degludec (TRESIBA FLEXTOUCH) 100 UNIT/ML pen,         insulin aspart (NOVOLOG FLEXPEN) 100 UNIT/ML         pen          Wow!  A1c, 6.8%  Great job!!  No changes to his insulin pump  Keep working on the timing of his insulin pump carb bolus--ideally before--some is better than none    As for his back up  Tresiba  Take 12 units when you get home (a total of 20 units)    2. Novolog  As you took 12 units for a correction, only take the CR for food    Keep following your Emergency back up plan for insulin pump failure.    Will each out to see when the Dexcom G7 will be integrated with the Omnipod 5    Follow up  Let me know when you get your pods  3 months with me     Call sooner if any issues/concerns develop and/or continued issues with low BGs    Patient Instructions   Continue working on healthy eating and moving as best as you can (start low and slow, work up to 30 min, 5x/week)    BG goals:  Fasting and before meals <130, >70  2 hour after eating <180    We only need 1/2 of these numbers to be within target then your A1c will be within target    Medication changes   Tresiba  Take 12 units when you get home (a total of 20 units)    2. Novolog  As you took 12 units for a correction, only take the CR for food    Follow up   Let me know when you get your pods  3 months with me     Call me sooner if any  problems/concerns and/or questions develop including consistent low BGs <70 or consistent high BGs >200  [phone] (Unit Coordinator)    [phone] (Tracey, Nurse)    Time: 25 min + 5 min CGM review  Barrier: none  Willingness to learn: accepting    Macrina CHAMBERLAIN [facility]  Diabetes and Wound Care    With the electronic record, we can now more quickly and easily track our patient diabetic goals. Our diabetes clinical review is in progress and these are the indicators we are monitoring for good diabetes health.     1.) HbA1C less than 7 (measurement of your average blood sugars)  2.) Blood Pressure less than 140/80  3.) LDL less than 100 (bad cholesterol)  4.) HbA1C is checked in the last 6 months and below 7% (more frequently if not at goal or adjusting medications)  5.) LDL is checked in the last 12 months (more frequently if not at goal or adjusting medications)  6.) Taking one baby aspirin daily (unless otherwise instructed)  7.) No tobacco use  8) Statin use     You have achieved 8 out of 8 of these and I am encouraging you to come in and get tuned up to achieve 8 out of 8!  Here is what you have achieved so far in my goals for you:  1.) HbA1C  less than 7:                              YES  Your last  HbA1C :   Results for orders placed or performed in visit on 04/16/24   GLUCOSE MONITOR, 72 HOUR, PHYS INTERP     Status: None    Narrative    Date: 3/20 to 4/2/24  Glucose management indicator: 6.7%    Average glucose: 140    Glucose range  Very low (<54): <1%  low (<70): 1%  In target range (): 82%  High (>180): 15%  Very high (>250): 1%    Date: 4/3 to 4/16/24  Glucose management indicator: 7.2%    Average glucose: 161    Glucose range  Very low (<54): <1%  low (<70): 1%  In target range (): 68%  High (>180): 22%  Very high (>250): 8%   Hemoglobin A1c POCT, Enter/Edit Result     Status: Abnormal   Result Value Ref Range    Hemoglobin A1C POCT 6.8 4.3 - <5.7 %     Lab Results   Component Value  "Date    A1C 7.7 11/30/2023    A1C 7.5 08/16/2023    A1C 7.2 05/08/2023    A1C 6.9 01/31/2023    A1C 7.3 10/24/2022    A1C 7.7 10/20/2021    A1C 7.8 06/03/2021    A1C 7.5 11/30/2020    A1C 7.7 09/01/2020    A1C 8.1 03/24/2020     2.) Blood Pressure less than 140/80:       YES      Your last    /72   Pulse 87   Resp 16   Ht 1.537 m (5' 0.5\")   Wt 85.5 kg (188 lb 8 oz)   SpO2 96%   BMI 36.21 kg/m      3.) LDL less than 100:                              YES      Your last   LDL         LDL Cholesterol Calculated   Date Value Ref Range Status   08/16/2023 48 <=100 mg/dL Final   06/14/2021 42 <100 mg/dL Final     Comment:     Desirable:       <100 mg/dl       4.) Checked HbA1C in the past 6 months: YES      5.) Checked LDL in the past 12 months:    YES    6.) Taking one aspirin daily:                       YES     7.) No tobacco use:                                        YES      8.) Statin use      YES       CGM requirements:   Patient has been seen in the clinic within the last 6 month  Diagnosis of Type 1 diabetes  Has been testing their BGs 4x/day using the Dexcom  Uses an insulin pump   Requires frequent adjustments to their treatment regimen based on BGM and/or CGM testing results.                             "

## 2024-04-16 NOTE — PATIENT INSTRUCTIONS
Continue working on healthy eating and moving as best as you can (start low and slow, work up to 30 min, 5x/week)    BG goals:  Fasting and before meals <130, >70  2 hour after eating <180    We only need 1/2 of these numbers to be within target then your A1c will be within target    Medication changes   Tresiba  Take 12 units when you get home (a total of 20 units)    2. Novolog  As you took 12 units for a correction, only take the CR for food    Follow up   Let me know when you get your pods  3 months with me     Call me sooner if any problems/concerns and/or questions develop including consistent low BGs <70 or consistent high BGs >200  [phone] (Unit Coordinator)    [phone] (Tracey, Nurse)

## 2024-05-01 DIAGNOSIS — E10.65 TYPE 1 DIABETES MELLITUS WITH HYPERGLYCEMIA (H): ICD-10-CM

## 2024-05-01 RX ORDER — UREA 10 %
LOTION (ML) TOPICAL
Qty: 85 G | Refills: 1 | Status: SHIPPED | OUTPATIENT
Start: 2024-05-01 | End: 2024-09-19

## 2024-05-01 NOTE — TELEPHONE ENCOUNTER
Urea      Last Written Prescription Date:  3/26/24  Last Fill Quantity: 85g,   # refills: 0  Last Office Visit: 2/20/24  Future Office visit:    Next 5 appointments (look out 90 days)      May 29, 2024  8:15 AM  (Arrive by 8:00 AM)  Return Visit with Juliette Jarrett DPM  [facility] ([facility] ) [address] [place] [id number] [phone]     Jun 03, 2024  8:00 AM  (Arrive by 7:45 AM)  Provider Visit with Natacha Alvarez MD  [facility] ([facility] ) [address] [place] [id number]  [phone]             Routing refill request to provider for review/approval because:

## 2024-05-29 ENCOUNTER — OFFICE VISIT (OUTPATIENT)
Dept: PODIATRY | Facility: OTHER | Age: 59
End: 2024-05-29
Attending: PODIATRIST
Payer: COMMERCIAL

## 2024-05-29 VITALS
DIASTOLIC BLOOD PRESSURE: 73 MMHG | HEART RATE: 84 BPM | TEMPERATURE: 97.5 F | OXYGEN SATURATION: 93 % | SYSTOLIC BLOOD PRESSURE: 122 MMHG

## 2024-05-29 DIAGNOSIS — E10.65 TYPE 1 DIABETES MELLITUS WITH HYPERGLYCEMIA (H): ICD-10-CM

## 2024-05-29 DIAGNOSIS — Z13.89 SCREENING FOR DIABETIC PERIPHERAL NEUROPATHY: ICD-10-CM

## 2024-05-29 DIAGNOSIS — E10.42 DIABETIC POLYNEUROPATHY ASSOCIATED WITH TYPE 1 DIABETES MELLITUS (H): ICD-10-CM

## 2024-05-29 DIAGNOSIS — L84 CALLUS OF FOOT: ICD-10-CM

## 2024-05-29 DIAGNOSIS — L60.3 ONYCHODYSTROPHY: ICD-10-CM

## 2024-05-29 PROCEDURE — 11056 PARNG/CUTG B9 HYPRKR LES 2-4: CPT | Performed by: PODIATRIST

## 2024-05-29 PROCEDURE — 99207 PR FOOT EXAM NO CHARGE: CPT | Performed by: PODIATRIST

## 2024-05-29 PROCEDURE — 11721 DEBRIDE NAIL 6 OR MORE: CPT | Mod: XS | Performed by: PODIATRIST

## 2024-05-29 PROCEDURE — G0463 HOSPITAL OUTPT CLINIC VISIT: HCPCS

## 2024-05-29 ASSESSMENT — PAIN SCALES - GENERAL: PAINLEVEL: SEVERE PAIN (6)
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
## 2024-06-14 DIAGNOSIS — E10.65 TYPE 1 DIABETES MELLITUS WITH HYPERGLYCEMIA (H): ICD-10-CM

## 2024-06-14 NOTE — TELEPHONE ENCOUNTER
Novolog      Last Written Prescription Date:  4/16/24  Last Fill Quantity: -,   # refills: -  Last Office Visit: 4/16/24  Future Office visit:    Next 5 appointments (look out 90 days)      Jul 15, 2024  8:30 AM  (Arrive by 8:15 AM)  Adult Preventative Visit with Natacha Alvarez MD  [facility] ([facility] ) [address] [place] [id number]  [phone]     Jul 31, 2024  8:15 AM  (Arrive by 8:00 AM)  Return Visit with Juliette Jarrett DPM  [facility] ([facility] ) [address] [place] [id number] [phone]             Routing refill request to provider for review/approval because:

## 2024-06-18 RX ORDER — INSULIN ASPART 100 [IU]/ML
INJECTION, SOLUTION INTRAVENOUS; SUBCUTANEOUS
Qty: 30 ML | Refills: 4 | Status: SHIPPED | OUTPATIENT
Start: 2024-06-18

## 2024-07-10 NOTE — PATIENT INSTRUCTIONS
We will call or MyChart you with your xray results.       Patient Education   Preventive Care Advice   This is general advice given by our system to help you stay healthy. However, your care team may have specific advice just for you. Please talk to your care team about your preventive care needs.  Nutrition  Eat 5 or more servings of fruits and vegetables each day.  Try wheat bread, brown rice and whole grain pasta (instead of white bread, rice, and pasta).  Get enough calcium and vitamin D. Check the label on foods and aim for 100% of the RDA (recommended daily allowance).  Lifestyle  Exercise at least 150 minutes each week  (30 minutes a day, 5 days a week).  Do muscle strengthening activities 2 days a week. These help control your weight and prevent disease.  No smoking.  Wear sunscreen to prevent skin cancer.  Have a dental exam and cleaning every 6 months.  Yearly exams  See your health care team every year to talk about:  Any changes in your health.  Any medicines your care team has prescribed.  Preventive care, family planning, and ways to prevent chronic diseases.  Shots (vaccines)   HPV shots (up to age 26), if you've never had them before.  Hepatitis B shots (up to age 59), if you've never had them before.  COVID-19 shot: Get this shot when it's due.  Flu shot: Get a flu shot every year.  Tetanus shot: Get a tetanus shot every 10 years.  Pneumococcal, hepatitis A, and RSV shots: Ask your care team if you need these based on your risk.  Shingles shot (for age 50 and up)  General health tests  Diabetes screening:  Starting at age 35, Get screened for diabetes at least every 3 years.  If you are younger than age 35, ask your care team if you should be screened for diabetes.  Cholesterol test: At age 39, start having a cholesterol test every 5 years, or more often if advised.  Bone density scan (DEXA): At age 50, ask your care team if you should have this scan for osteoporosis (brittle bones).  Hepatitis C:  Get tested at least once in your life.  STIs (sexually transmitted infections)  Before age 24: Ask your care team if you should be screened for STIs.  After age 24: Get screened for STIs if you're at risk. You are at risk for STIs (including HIV) if:  You are sexually active with more than one person.  You don't use condoms every time.  You or a partner was diagnosed with a sexually transmitted infection.  If you are at risk for HIV, ask about PrEP medicine to prevent HIV.  Get tested for HIV at least once in your life, whether you are at risk for HIV or not.  Cancer screening tests  Cervical cancer screening: If you have a cervix, begin getting regular cervical cancer screening tests starting at age 21.  Breast cancer scan (mammogram): If you've ever had breasts, begin having regular mammograms starting at age 40. This is a scan to check for breast cancer.  Colon cancer screening: It is important to start screening for colon cancer at age 45.  Have a colonoscopy test every 10 years (or more often if you're at risk) Or, ask your provider about stool tests like a FIT test every year or Cologuard test every 3 years.  To learn more about your testing options, visit:   .  For help making a decision, visit:   https://bit.ly/cw82790.  Prostate cancer screening test: If you have a prostate, ask your care team if a prostate cancer screening test (PSA) at age 55 is right for you.  Lung cancer screening: If you are a current or former smoker ages 50 to 80, ask your care team if ongoing lung cancer screenings are right for you.  For informational purposes only. Not to replace the advice of your health care provider. Copyright   2023 [employer]. All rights reserved. Clinically reviewed by the [facility] Transitions Program. [employer] 594209 - REV 01/24.

## 2024-07-15 ENCOUNTER — ANCILLARY PROCEDURE (OUTPATIENT)
Dept: GENERAL RADIOLOGY | Facility: OTHER | Age: 59
End: 2024-07-15
Attending: FAMILY MEDICINE
Payer: COMMERCIAL

## 2024-07-15 ENCOUNTER — OFFICE VISIT (OUTPATIENT)
Dept: FAMILY MEDICINE | Facility: OTHER | Age: 59
End: 2024-07-15
Attending: FAMILY MEDICINE
Payer: COMMERCIAL

## 2024-07-15 VITALS
WEIGHT: 196.9 LBS | BODY MASS INDEX: 37.82 KG/M2 | DIASTOLIC BLOOD PRESSURE: 52 MMHG | SYSTOLIC BLOOD PRESSURE: 106 MMHG | TEMPERATURE: 99.4 F | OXYGEN SATURATION: 93 % | RESPIRATION RATE: 19 BRPM | HEART RATE: 89 BPM

## 2024-07-15 DIAGNOSIS — J06.9 UPPER RESPIRATORY TRACT INFECTION, UNSPECIFIED TYPE: ICD-10-CM

## 2024-07-15 DIAGNOSIS — Z00.00 ROUTINE GENERAL MEDICAL EXAMINATION AT A HEALTH CARE FACILITY: Primary | ICD-10-CM

## 2024-07-15 DIAGNOSIS — E78.5 HYPERLIPIDEMIA, UNSPECIFIED HYPERLIPIDEMIA TYPE: ICD-10-CM

## 2024-07-15 DIAGNOSIS — M25.552 HIP PAIN, LEFT: ICD-10-CM

## 2024-07-15 DIAGNOSIS — Z20.822 COVID-19 RULED OUT: ICD-10-CM

## 2024-07-15 DIAGNOSIS — I10 PRIMARY HYPERTENSION: ICD-10-CM

## 2024-07-15 DIAGNOSIS — J01.00 ACUTE MAXILLARY SINUSITIS, RECURRENCE NOT SPECIFIED: ICD-10-CM

## 2024-07-15 DIAGNOSIS — E10.65 TYPE 1 DIABETES MELLITUS WITH HYPERGLYCEMIA (H): ICD-10-CM

## 2024-07-15 LAB
ANION GAP SERPL CALCULATED.3IONS-SCNC: 14 MMOL/L (ref 7–15)
BASOPHILS # BLD AUTO: 0.1 10E3/UL (ref 0–0.2)
BASOPHILS NFR BLD AUTO: 1 %
BUN SERPL-MCNC: 22.1 MG/DL (ref 8–23)
CALCIUM SERPL-MCNC: 9 MG/DL (ref 8.8–10.2)
CHLORIDE SERPL-SCNC: 104 MMOL/L (ref 98–107)
CREAT SERPL-MCNC: 1.64 MG/DL (ref 0.67–1.17)
CRP SERPL-MCNC: 40.4 MG/L
DEPRECATED HCO3 PLAS-SCNC: 23 MMOL/L (ref 22–29)
EGFRCR SERPLBLD CKD-EPI 2021: 48 ML/MIN/1.73M2
EOSINOPHIL # BLD AUTO: 0.3 10E3/UL (ref 0–0.7)
EOSINOPHIL NFR BLD AUTO: 2 %
ERYTHROCYTE [DISTWIDTH] IN BLOOD BY AUTOMATED COUNT: 12.5 % (ref 10–15)
FLUAV RNA SPEC QL NAA+PROBE: NEGATIVE
FLUBV RNA RESP QL NAA+PROBE: NEGATIVE
GLUCOSE SERPL-MCNC: 158 MG/DL (ref 70–99)
HCT VFR BLD AUTO: 38.4 % (ref 40–53)
HGB BLD-MCNC: 12.6 G/DL (ref 13.3–17.7)
IMM GRANULOCYTES # BLD: 0.1 10E3/UL
IMM GRANULOCYTES NFR BLD: 1 %
LYMPHOCYTES # BLD AUTO: 0.8 10E3/UL (ref 0.8–5.3)
LYMPHOCYTES NFR BLD AUTO: 6 %
MCH RBC QN AUTO: 29.9 PG (ref 26.5–33)
MCHC RBC AUTO-ENTMCNC: 32.8 G/DL (ref 31.5–36.5)
MCV RBC AUTO: 91 FL (ref 78–100)
MONOCYTES # BLD AUTO: 0.9 10E3/UL (ref 0–1.3)
MONOCYTES NFR BLD AUTO: 7 %
NEUTROPHILS # BLD AUTO: 11 10E3/UL (ref 1.6–8.3)
NEUTROPHILS NFR BLD AUTO: 84 %
NRBC # BLD AUTO: 0 10E3/UL
NRBC BLD AUTO-RTO: 0 /100
PLATELET # BLD AUTO: 277 10E3/UL (ref 150–450)
POTASSIUM SERPL-SCNC: 4.6 MMOL/L (ref 3.4–5.3)
PROCALCITONIN SERPL IA-MCNC: 0.12 NG/ML
RBC # BLD AUTO: 4.21 10E6/UL (ref 4.4–5.9)
RSV RNA SPEC NAA+PROBE: NEGATIVE
SARS-COV-2 RNA RESP QL NAA+PROBE: NEGATIVE
SODIUM SERPL-SCNC: 141 MMOL/L (ref 135–145)
WBC # BLD AUTO: 13 10E3/UL (ref 4–11)

## 2024-07-15 PROCEDURE — 86140 C-REACTIVE PROTEIN: CPT | Mod: ZL | Performed by: FAMILY MEDICINE

## 2024-07-15 PROCEDURE — 36415 COLL VENOUS BLD VENIPUNCTURE: CPT | Mod: ZL | Performed by: FAMILY MEDICINE

## 2024-07-15 PROCEDURE — 87637 SARSCOV2&INF A&B&RSV AMP PRB: CPT | Mod: ZL | Performed by: FAMILY MEDICINE

## 2024-07-15 PROCEDURE — 84145 PROCALCITONIN (PCT): CPT | Mod: ZL | Performed by: FAMILY MEDICINE

## 2024-07-15 PROCEDURE — G0463 HOSPITAL OUTPT CLINIC VISIT: HCPCS | Mod: 25

## 2024-07-15 PROCEDURE — 71046 X-RAY EXAM CHEST 2 VIEWS: CPT | Mod: TC

## 2024-07-15 PROCEDURE — 99396 PREV VISIT EST AGE 40-64: CPT | Performed by: FAMILY MEDICINE

## 2024-07-15 PROCEDURE — 80048 BASIC METABOLIC PNL TOTAL CA: CPT | Mod: ZL | Performed by: FAMILY MEDICINE

## 2024-07-15 PROCEDURE — 250N000009 HC RX 250: Performed by: FAMILY MEDICINE

## 2024-07-15 PROCEDURE — 99214 OFFICE O/P EST MOD 30 MIN: CPT | Mod: 25 | Performed by: FAMILY MEDICINE

## 2024-07-15 PROCEDURE — 85025 COMPLETE CBC W/AUTO DIFF WBC: CPT | Mod: ZL | Performed by: FAMILY MEDICINE

## 2024-07-15 PROCEDURE — 73502 X-RAY EXAM HIP UNI 2-3 VIEWS: CPT | Mod: TC

## 2024-07-15 RX ORDER — BENZONATATE 100 MG/1
100 CAPSULE ORAL 3 TIMES DAILY PRN
Qty: 45 CAPSULE | Refills: 0 | Status: SHIPPED | OUTPATIENT
Start: 2024-07-15

## 2024-07-15 RX ORDER — IPRATROPIUM BROMIDE AND ALBUTEROL SULFATE 2.5; .5 MG/3ML; MG/3ML
3 SOLUTION RESPIRATORY (INHALATION) ONCE
Status: COMPLETED | OUTPATIENT
Start: 2024-07-15 | End: 2024-07-15

## 2024-07-15 RX ORDER — IPRATROPIUM BROMIDE AND ALBUTEROL SULFATE 2.5; .5 MG/3ML; MG/3ML
1 SOLUTION RESPIRATORY (INHALATION) EVERY 6 HOURS PRN
Qty: 90 ML | Refills: 1 | Status: SHIPPED | OUTPATIENT
Start: 2024-07-15

## 2024-07-15 RX ADMIN — IPRATROPIUM BROMIDE AND ALBUTEROL SULFATE 3 ML: 2.5; .5 SOLUTION RESPIRATORY (INHALATION) at 09:04

## 2024-07-15 SDOH — HEALTH STABILITY: PHYSICAL HEALTH: ON AVERAGE, HOW MANY DAYS PER WEEK DO YOU ENGAGE IN MODERATE TO STRENUOUS EXERCISE (LIKE A BRISK WALK)?: 6 DAYS

## 2024-07-15 ASSESSMENT — SOCIAL DETERMINANTS OF HEALTH (SDOH): HOW OFTEN DO YOU GET TOGETHER WITH FRIENDS OR RELATIVES?: MORE THAN THREE TIMES A WEEK

## 2024-07-15 ASSESSMENT — PAIN SCALES - GENERAL: PAINLEVEL: EXTREME PAIN (8)

## 2024-07-15 NOTE — PROGRESS NOTES
Preventive Care Visit  [facility]  Natacha Alvarez MD, Family Medicine  Jul 15, 2024      Assessment & Plan     Routine general medical examination at a health care facility  [place] PDMP reviewed and appropriate      Type 1 diabetes mellitus with hyperglycemia (H)  A1c (4/16/2024) 6.8  On OmniPod    Primary hypertension  BP at goal  - c/w lisinopril    Hyperlipidemia, unspecified hyperlipidemia type  LDL (8/16/2023) 48    COVID-19 ruled out  Negative   - Symptomatic Influenza A/B, RSV, & SARS-CoV2 PCR (COVID-19) Nasopharyngeal; Future  - Symptomatic Influenza A/B, RSV, & SARS-CoV2 PCR (COVID-19) Nasopharyngeal  - XR CHEST 2 VW (Clinic Performed); Future    Upper respiratory tract infection, unspecified type / sinusitis   CRP elevated, WBC with left shift. CXR negative for pneumonia. No urinary symptoms. Procalcitonin negative   Due to progression of symptoms and Richard looks ill, will treat with Augmentin which would cover sinus and GI issues   UCx (4/18/2023) staph epidermidis resistant to penicillin  UCx (8/24/2023) strept agalactiae  - XR CHEST 2 VW (Clinic Performed); Future  - ipratropium - albuterol 0.5 mg/2.5 mg/3 mL (DUONEB) 0.5-2.5 (3) MG/3ML neb solution; Take 1 vial (3 mLs) by nebulization every 6 hours as needed for shortness of breath, wheezing or cough  - Nebulizer and Supplies Order for DME - ONLY FOR DME  - ipratropium - albuterol 0.5 mg/2.5 mg/3 mL (DUONEB) neb solution 3 mL  - CBC with Platelets & Differential  - Basic metabolic panel  - CRP, inflammation  - Procalcitonin  - benzonatate (TESSALON) 100 MG capsule; Take 1 capsule (100 mg) by mouth 3 times daily as needed  - recheck by nursing by phone call in two days .    Estimated Creatinine Clearance: 45 mL/min (A) (based on SCr of 1.64 mg/dL (H)).      Hip pain, left  - XR Hip Left 2-3 Views (Clinic Performed); Future  - Orthopedic  Referral; Future          BMI  Estimated body mass index is 37.82 kg/m  as calculated from the  "following:    Height as of 4/16/24: 1.537 m (5' 0.5\").    Weight as of this encounter: 89.3 kg (196 lb 14.4 oz).       Counseling  Appropriate preventive services were discussed with this patient, including applicable screening as appropriate for fall prevention, nutrition, physical activity, Tobacco-use cessation, weight loss and cognition.  Checklist reviewing preventive services available has been given to the patient.  Reviewed patient's diet, addressing concerns and/or questions.           Return in about 53 weeks (around 7/21/2025) for Annual Wellness Visit.    Jeremi Ramos is a 59 year old, presenting for the following:  Physical, Hypertension, Lipids, and Diabetes        7/15/2024     8:31 AM   Additional Questions   Roomed by Rosy Hightwoer   Accompanied by self        Health Care Directive  Patient does not have a Health Care Directive or Living Will: Patient states has Advance Directive and will bring in a copy to clinic.    HPI    Diabetes Follow-up    How often are you checking your blood sugar? Continuous glucose monitor  What time of day are you checking your blood sugars (select all that apply)?   Throughout the day  Have you had any blood sugars above 200?  Yes - increased d/t being sick  Have you had any blood sugars below 70?  Yes - two to three times per week  What symptoms do you notice when your blood sugar is low?  Shaky, Dizzy, Weak, and Lethargy  What concerns do you have today about your diabetes? None and Blood sugar is often over 200   Do you have any of these symptoms? (Select all that apply)  No numbness or tingling in feet.  No redness, sores or blisters on feet.  No complaints of excessive thirst.  No reports of blurry vision.  No significant changes to weight.  Have you had a diabetic eye exam in the last 12 months? No    # DM:   A1c (4/16/2024) 6.8      Hyperlipidemia Follow-Up    Are you regularly taking any medication or supplement to lower your cholesterol?   Yes- " simvastatin  Are you having muscle aches or other side effects that you think could be caused by your cholesterol lowering medication?  No    Hypertension Follow-up    Do you check your blood pressure regularly outside of the clinic? Yes   Are you following a low salt diet? Yes  Are your blood pressures ever more than 140 on the top number (systolic) OR more   than 90 on the bottom number (diastolic), for example 140/90? No    BP Readings from Last 2 Encounters:   07/15/24 106/52   05/29/24 122/73     Hemoglobin A1C (%)   Date Value   11/30/2023 7.7 (H)   08/16/2023 7.5 (H)   10/20/2021 7.7 (A)   06/03/2021 7.8 (A)     LDL Cholesterol Calculated (mg/dL)   Date Value   08/16/2023 48   07/21/2022 36   06/14/2021 42   03/24/2020 40     Acute Illness  Acute illness concerns: coughing congestion sore throat  Onset/Duration: Friday  Symptoms:  Fever: No  Chills/Sweats: YES- chills  Headache (location?): YES- frontal  Sinus Pressure: YES  Conjunctivitis:  No  Ear Pain: YES: bilateral  Rhinorrhea: YES  Congestion: YES  Sore Throat: YES  Cough: YES  Wheeze: YES  Decreased Appetite: YES  Nausea: No  Vomiting: No  Diarrhea: YES  Dysuria/Freq.: No  Dysuria or Hematuria: No  Fatigue/Achiness: YES  Sick/Strep Exposure: No  Therapies tried and outcome: Cough syrup    - getting worse over the weekend   - works at [employer]     # Left hip pain: duration couple of months  - getting worse        7/15/2024   General Health   How would you rate your overall physical health? (!) FAIR   Feel stress (tense, anxious, or unable to sleep) To some extent      (!) STRESS CONCERN      7/15/2024   Nutrition   Three or more servings of calcium each day? Yes   Diet: Low salt    Diabetic   How many servings of fruit and vegetables per day? (!) 0-1   How many sweetened beverages each day? 0-1       Multiple values from one day are sorted in reverse-chronological order         7/15/2024   Exercise   Days per week of moderate/strenous exercise 6  days            7/15/2024   Social Factors   Frequency of gathering with friends or relatives More than three times a week   Worry food won't last until get money to buy more No   Food not last or not have enough money for food? No   Do you have housing? (Housing is defined as stable permanent housing and does not include staying ouside in a car, in a tent, in an abandoned building, in an overnight shelter, or couch-surfing.) Yes   Are you worried about losing your housing? No   Lack of transportation? No   Unable to get utilities (heat,electricity)? No            7/15/2024   Fall Risk   Fallen 2 or more times in the past year? Yes   Trouble with walking or balance? Yes   Gait Speed Test (Document in seconds) 4   Gait Speed Test Interpretation Less than or equal to 5.00 seconds - PASS             7/15/2024   Dental   Dentist two times every year? Yes            7/15/2024   TB Screening   Were you born outside of the [place]? No              7/15/2024   Substance Use   Alcohol more than 3/day or more than 7/wk Not Applicable   Do you use any other substances recreationally? No        Social History     Tobacco Use    Smoking status: Never     Passive exposure: Never    Smokeless tobacco: Never   Vaping Use    Vaping status: Never Used   Substance Use Topics    Alcohol use: No     Alcohol/week: 0.0 standard drinks of alcohol    Drug use: No           7/15/2024   STI Screening   New sexual partner(s) since last STI/HIV test? (!) DECLINE      Last PSA:   PSA   Date Value Ref Range Status   06/14/2021 0.69 0 - 4 ug/L Final     Comment:     Assay Method:  Chemiluminescence using Siemens Vista analyzer     Prostate Specific Antigen Screen   Date Value Ref Range Status   01/31/2023 0.73 0.00 - 3.50 ng/mL Final     ASCVD Risk   The ASCVD Risk score (Alexia LAGUNAS, et al., 2019) failed to calculate for the following reasons:    The valid total cholesterol range is 130 to 320 mg/dL    # Wellness:  - Immunizations: UTD  -  "Lipids: due 8/16/2024  - Dexa scan:  - Colon cancer screening: colonoscopy (6/27/2023) tubular adenoma. Repeat 5 years   - PSA (1/31/2023): 0.73 - will defer to next visit   - Lung cancer screening: never smoker  - AAA screening:     Reviewed and updated as needed this visit by Provider   Tobacco  Allergies  Meds  Problems  Med Hx  Surg Hx  Fam Hx                Review of Systems  Constitutional, HEENT, cardiovascular, pulmonary, gi and gu systems are negative, except as otherwise noted.     Objective    Exam  /52   Pulse 89   Temp 99.4  F (37.4  C) (Tympanic)   Resp 19   Wt 89.3 kg (196 lb 14.4 oz)   SpO2 93%   BMI 37.82 kg/m     Estimated body mass index is 37.82 kg/m  as calculated from the following:    Height as of 4/16/24: 1.537 m (5' 0.5\").    Weight as of this encounter: 89.3 kg (196 lb 14.4 oz).    Physical Exam  Constitutional:       General: He is not in acute distress.     Appearance: He is well-developed. He is ill-appearing.   HENT:      Head: Normocephalic and atraumatic.      Right Ear: Tympanic membrane normal.      Left Ear: Tympanic membrane normal.      Mouth/Throat:      Mouth: Mucous membranes are moist.      Pharynx: No oropharyngeal exudate.   Eyes:      Extraocular Movements: Extraocular movements intact.      Conjunctiva/sclera: Conjunctivae normal.   Neck:      Thyroid: No thyromegaly.   Cardiovascular:      Rate and Rhythm: Normal rate and regular rhythm.      Pulses: Normal pulses.      Heart sounds: No murmur heard.  Pulmonary:      Effort: Pulmonary effort is normal. No respiratory distress.      Breath sounds: No wheezing or rales.   Abdominal:      General: Bowel sounds are normal. There is no distension.      Palpations: Abdomen is soft.      Tenderness: There is abdominal tenderness in the epigastric area. There is no guarding.   Musculoskeletal:         General: Normal range of motion.      Cervical back: Normal range of motion and neck supple.      Right lower " leg: No edema.      Left lower leg: No edema.      Comments: Left hip: TTP over greater trochanter    Lymphadenopathy:      Cervical: No cervical adenopathy.   Skin:     General: Skin is warm and dry.   Neurological:      Mental Status: He is alert.   Psychiatric:         Mood and Affect: Mood normal.         Results for orders placed or performed in visit on 07/15/24   XR Hip Left 2-3 Views (Clinic Performed)     Status: None    Narrative    PROCEDURE:  XR HIP LEFT 2-3 VIEWS    HISTORY: Hip pain, left    COMPARISON:  None.    TECHNIQUE:  2 views of the tip were obtained.    FINDINGS: There is severe joint space narrowing at the left hip with  degenerative osteophyte formation. There are healed fractures of the  ischium and pubic bones bilaterally. The acetabulum is intact.      Impression    IMPRESSION: Advanced degenerative arthritic changes of the left hip      MARII EMMANUEL MD         SYSTEM ID:  [id number]   Results for orders placed or performed in visit on 07/15/24   XR CHEST 2 VW (Clinic Performed)     Status: None    Narrative    PROCEDURE:  XR CHEST 2 VIEWS    HISTORY:  COVID-19 ruled out; Upper respiratory tract infection,  unspecified type.     COMPARISON:  4/19/2023    FINDINGS:   The cardiac silhouette is normal in size. The pulmonary vasculature is  normal.  The lungs are clear. No pleural effusion or pneumothorax.      Impression    IMPRESSION:  No acute cardiopulmonary disease.      MARII EMMANUEL MD         SYSTEM ID:  [id number]   Results for orders placed or performed in visit on 07/15/24   Symptomatic Influenza A/B, RSV, & SARS-CoV2 PCR (COVID-19) Nasopharyngeal     Status: Normal    Specimen: Nasopharyngeal; Swab   Result Value Ref Range    Influenza A PCR Negative Negative    Influenza B PCR Negative Negative    RSV PCR Negative Negative    SARS CoV2 PCR Negative Negative    Narrative    Testing was performed using the Xpert Xpress CoV2/Flu/RSV Assay on the Cepheid GeneXpert Instrument. This  test should be ordered for the detection of SARS-CoV-2, influenza, and RSV viruses in individuals who meet clinical and/or epidemiological criteria. Test performance is unknown in asymptomatic patients. This test is for in vitro diagnostic use under the FDA EUA for laboratories certified under CLIA to perform high or moderate complexity testing. This test has not been FDA cleared or approved. A negative result does not rule out the presence of PCR inhibitors in the specimen or target RNA in concentration below the limit of detection for the assay. If only one viral target is positive but coinfection with multiple targets is suspected, the sample should be re-tested with another FDA cleared, approved, or authorized test, if coinfection would change clinical management. This test was validated by the [facility] "[facility]". These laboratories are certified under the Clinical Laboratory Improvement Amendments of 1988 (CLIA-88) as qualified to perform high complexity laboratory testing.   Basic metabolic panel     Status: Abnormal   Result Value Ref Range    Sodium 141 135 - 145 mmol/L    Potassium 4.6 3.4 - 5.3 mmol/L    Chloride 104 98 - 107 mmol/L    Carbon Dioxide (CO2) 23 22 - 29 mmol/L    Anion Gap 14 7 - 15 mmol/L    Urea Nitrogen 22.1 8.0 - 23.0 mg/dL    Creatinine 1.64 (H) 0.67 - 1.17 mg/dL    GFR Estimate 48 (L) >60 mL/min/1.73m2    Calcium 9.0 8.8 - 10.2 mg/dL    Glucose 158 (H) 70 - 99 mg/dL   CRP, inflammation     Status: Abnormal   Result Value Ref Range    CRP Inflammation 40.40 (H) <5.00 mg/L   CBC with platelets and differential     Status: Abnormal   Result Value Ref Range    WBC Count 13.0 (H) 4.0 - 11.0 10e3/uL    RBC Count 4.21 (L) 4.40 - 5.90 10e6/uL    Hemoglobin 12.6 (L) 13.3 - 17.7 g/dL    Hematocrit 38.4 (L) 40.0 - 53.0 %    MCV 91 78 - 100 fL    MCH 29.9 26.5 - 33.0 pg    MCHC 32.8 31.5 - 36.5 g/dL    RDW 12.5 10.0 - 15.0 %    Platelet Count 277 150 - 450 10e3/uL    % Neutrophils 84 %     % Lymphocytes 6 %    % Monocytes 7 %    % Eosinophils 2 %    % Basophils 1 %    % Immature Granulocytes 1 %    NRBCs per 100 WBC 0 <1 /100    Absolute Neutrophils 11.0 (H) 1.6 - 8.3 10e3/uL    Absolute Lymphocytes 0.8 0.8 - 5.3 10e3/uL    Absolute Monocytes 0.9 0.0 - 1.3 10e3/uL    Absolute Eosinophils 0.3 0.0 - 0.7 10e3/uL    Absolute Basophils 0.1 0.0 - 0.2 10e3/uL    Absolute Immature Granulocytes 0.1 <=0.4 10e3/uL    Absolute NRBCs 0.0 10e3/uL   Procalcitonin     Status: Normal   Result Value Ref Range    Procalcitonin 0.12 <0.50 ng/mL   CBC with Platelets & Differential     Status: Abnormal    Narrative    The following orders were created for panel order CBC with Platelets & Differential.  Procedure                               Abnormality         Status                     ---------                               -----------         ------                     CBC with platelets and d...[[id number]]  Abnormal            Final result                 Please view results for these tests on the individual orders.           Signed Electronically by: Natacha Alvarez MD

## 2024-07-16 ENCOUNTER — TELEPHONE (OUTPATIENT)
Dept: FAMILY MEDICINE | Facility: OTHER | Age: 59
End: 2024-07-16

## 2024-07-16 NOTE — TELEPHONE ENCOUNTER
Spoke with patient. He is feeling the same as when he was here yesterday. I also mentioned that PCP had sent in an RX for him

## 2024-07-16 NOTE — TELEPHONE ENCOUNTER
----- Message from Natacha Alvarez sent at 7/15/2024  6:43 PM CDT -----  Please give Richard a call and let me know that I sent a Rx for augmentin for possible sinus issues    Then, please give him a call on Thursday morning to see how he is doing

## 2024-07-18 ENCOUNTER — ALLIED HEALTH/NURSE VISIT (OUTPATIENT)
Dept: EDUCATION SERVICES | Facility: OTHER | Age: 59
End: 2024-07-18
Attending: NURSE PRACTITIONER
Payer: COMMERCIAL

## 2024-07-18 VITALS
DIASTOLIC BLOOD PRESSURE: 76 MMHG | WEIGHT: 197.85 LBS | BODY MASS INDEX: 37.35 KG/M2 | OXYGEN SATURATION: 96 % | HEART RATE: 80 BPM | RESPIRATION RATE: 16 BRPM | HEIGHT: 61 IN | SYSTOLIC BLOOD PRESSURE: 113 MMHG

## 2024-07-18 DIAGNOSIS — E10.65 TYPE 1 DIABETES MELLITUS WITH HYPERGLYCEMIA (H): Primary | ICD-10-CM

## 2024-07-18 PROCEDURE — G0463 HOSPITAL OUTPT CLINIC VISIT: HCPCS

## 2024-07-18 PROCEDURE — 99213 OFFICE O/P EST LOW 20 MIN: CPT | Mod: 25 | Performed by: NURSE PRACTITIONER

## 2024-07-18 PROCEDURE — 95251 CONT GLUC MNTR ANALYSIS I&R: CPT | Performed by: NURSE PRACTITIONER

## 2024-07-18 ASSESSMENT — PAIN SCALES - GENERAL: PAINLEVEL: WORST PAIN (10)

## 2024-07-18 NOTE — PROGRESS NOTES
SUBJECTIVE:  Richard Harvey, 59 year old, male presents with the following Chief Complaint(s) with HPI to follow:  Chief Complaint   Patient presents with    Diabetes        Diabetes Follow-up    Patient is checking blood sugars: >4x/day using his continuous glucose.  Results:    Date: 7/5 to 7/18/24  Glucose management indicator: n/a    Average glucose: 161    Glucose range  Very low (<54): 0  low (<70): 0  In target range (): 72%  High (>180): 22%  Very high (>250): 6%    Symptoms of hypoglycemia (low blood sugar): rare--none in the past 2 weeks  Paresthesias (numbness or burning in feet) or sores: Yes; no  Diabetic eye exam within the last year: DUE  Breakfast eaten regularly: Yes  Patient counting carbs: trying       HPI:   Richard's here today for the follow up regarding his Diabetes mellitus, Type 1.    A1c trend  Lab Results   Component Value Date    A1C 7.7 11/30/2023    A1C 7.5 08/16/2023    A1C 7.2 05/08/2023    A1C 6.9 01/31/2023    A1C 7.3 10/24/2022    A1C 7.7 10/20/2021    A1C 7.8 06/03/2021    A1C 7.5 11/30/2020    A1C 7.7 09/01/2020    A1C 8.1 03/24/2020 4/16/24 6.8%    Current Diabetes medication:   1.  Insulin pump (Omnipod 5), uses Novolog  ASA use: yes, 325 mg daily  Statin use: yes, simvastatin 40 mg at bedime    Richard reports the following:  Saw Dr. Alvarez on Monday  Really bad URI  Take his prescribed medications  Feeling better    States he has been selfing cathing up to 3x/day  Note sure who ordered them.    Gets them from 1-800-medical     No issues with the insulin pump  No issues with the Dexcom G6  Wondering when he should go to the Dexcom G7    As stated in past appointments:  Omnipod 5 pods: [employer] [facility]   Dexcom G6: [facility]   Novolog: [facility]    Weight trend:  Wt Readings from Last 4 Encounters:   07/18/24 89.7 kg (197 lb 13.6 oz)   07/15/24 89.3 kg (196 lb 14.4 oz)   04/16/24 85.5 kg (188 lb 8 oz)   02/20/24 89 kg (196 lb 3.2 oz)     Patient Active Problem List    Diagnosis    DM type 1 (diabetes mellitus, type 1) (H)    HTN (hypertension)    Hyperlipidemia    ACP (advance care planning)    Diabetic polyneuropathy associated with type 1 diabetes mellitus (H)    Class 2 obesity in adult    Obesity (BMI 35.0-39.9) with comorbidity (H)    Gastroparesis    Gastroesophageal reflux disease without esophagitis    Chronic bilateral low back pain with right-sided sciatica    Acute cystitis without hematuria    Sepsis, due to unspecified organism, unspecified whether acute organ dysfunction present (H)    ELMA (acute kidney injury) (H24)    Pyelonephritis, acute    Benign prostatic hyperplasia with urinary retention    Stage 3a chronic kidney disease (H)    Vitamin D deficiency       Past Medical History:   Diagnosis Date    BPH (benign prostatic hyperplasia)     Diabetes mellitus type 1, controlled (H)     HLD (hyperlipidemia)     HTN (hypertension)     Neuropathy     Type 1 diabetes (H)        Past Surgical History:   Procedure Laterality Date    COLONOSCOPY N/A 6/27/2023    Procedure: Colonoscopy;  Surgeon: Raúl Alcaraz MD;  Location: [facility] OR    COLONOSCOPY - HIM SCAN N/A 09/19/2016    Procedure: COLONOSCOPY SCREENING; Surgeon: Rudy Rojo MD; Location: [facility] OR    ESOPHAGOSCOPY, GASTROSCOPY, DUODENOSCOPY (EGD), COMBINED  08/13/2019    IR PICC PLACEMENT > 5 YRS OF AGE  4/21/2023       Family History   Problem Relation Age of Onset    No Known Problems Mother     Hypertension Father     Hypertension Brother        Social History     Tobacco Use    Smoking status: Never     Passive exposure: Never    Smokeless tobacco: Never   Substance Use Topics    Alcohol use: No     Alcohol/week: 0.0 standard drinks of alcohol       Current Outpatient Medications   Medication Sig Dispense Refill    amoxicillin-clavulanate (AUGMENTIN) 875-125 MG tablet Take 1 tablet by mouth 2 times daily for 7 days 14 tablet 0    benzonatate (TESSALON) 100 MG capsule Take 1 capsule (100 mg) by mouth 3  times daily as needed 45 capsule 0    CALCIUM-VITAMIN D PO Take 1 tablet by mouth daily      Continuous Blood Gluc  (DEXCOM G6 ) DAYAN 1 each continuous To be used per manufacture's recommendation 1 each 2    Continuous Blood Gluc Sensor (DEXCOM G6 SENSOR) MISC CHANGE EVERY 10 DAYS 3 each 11    Continuous Blood Gluc Transmit (DEXCOM G6 TRANSMITTER) MISC CHANGE EVERY 3 MONTHS 1 each 4    CONTOUR NEXT TEST test strip TEST 6 TIMES DAILY, OR AS DIRECTED. 200 strip 5    gabapentin (NEURONTIN) 300 MG capsule TAKE 1 CAPSULE BY MOUTH THREE TIMES DAILY 90 capsule 2    Glucagon (GVOKE HYPOPEN 2-PACK) 1 MG/0.2ML SOAJ Inject 1 mg Subcutaneous as needed (for low blood glucose) 0.4 mL 3    insulin aspart (NOVOLOG FLEXPEN) 100 UNIT/ML pen 1 unit for every 15 grams of carbs consumed + 1 unit for every 60 points, >130 before meals. 1:15 before snacks      insulin aspart (NOVOLOG VIAL) 100 UNITS/ML vial USE WITH INSULIN PUMP FOR A TOTAL DAILY USAGE  UNITS. 30 mL 4    insulin degludec (TRESIBA FLEXTOUCH) 100 UNIT/ML pen Inject 20 Units Subcutaneous daily      Insulin Disposable Pump (OMNIPOD 5 G6 INTRO, GEN 5,) KIT 1 kit continuous 1 kit 0    Insulin Disposable Pump (OMNIPOD 5 G6 PODS, GEN 5,) MISC 1 each every 48 hours 15 each 5    INSULIN PUMP - OUTPATIENT Insulin pump: Omnipod 5  Update: 11/30/2023  Basal  0000 1.1  0200 1.05  0600 0.95  1030 0.7  1530 0.9  1800 1.15  TDD: 23.325  CR  0000 12  1200 13  1600 14  ISF  0000 65  1200 60  1800 55  BG target range  0000 110  BG correction threshold  0000 140  0900 130  2200 140  Active insulin time: 4 hours      ipratropium - albuterol 0.5 mg/2.5 mg/3 mL (DUONEB) 0.5-2.5 (3) MG/3ML neb solution Take 1 vial (3 mLs) by nebulization every 6 hours as needed for shortness of breath, wheezing or cough 90 mL 1    Lidocaine (EQ LIDOCAINE PAIN RELIEVING) 4 % Patch APPLY 1  TOPICALLY EVERY 24 HOURS --  TO  PREVENT  LIDOCAINE  TOXICITY,  PATIENT  SHOULD  BE  PATCH  FREE  FOR   "12  HOURS  DAILY 20 patch 3    lisinopril (ZESTRIL) 5 MG tablet Take 1 tablet (5 mg) by mouth daily 90 tablet 3    loperamide (IMODIUM A-D) 2 MG tablet Take 1 tablet (2 mg) by mouth 4 times daily as needed for diarrhea 30 tablet 0    Multiple Vitamin (MULTI-VITAMIN DAILY PO) Take 1 tablet by mouth daily      nitroGLYcerin (NITROSTAT) 0.4 MG sublingual tablet Place 0.4 mg under the tongue every 5 minutes as needed for chest pain . Do not crush; maximum of 3 doses in 15 minutes.      ondansetron (ZOFRAN) 4 MG tablet Take 1 tablet (4 mg) by mouth every 8 hours as needed for nausea 90 tablet 3    order for DME Equipment being ordered:     1.  [employer] insulin pump supplies--insertion sets and reserviors  Disp: 1 month  Refill: 11 months 30 days 11    pantoprazole (PROTONIX) 40 MG EC tablet Take 1 tablet by mouth twice daily 60 tablet 6    polyethylene glycol (MIRALAX) 17 GM/Dose powder Take 17 g (1 Capful) by mouth daily 850 g 1    simvastatin (ZOCOR) 40 MG tablet TAKE 1 TABLET BY MOUTH AT BEDTIME 90 tablet 3    tamsulosin (FLOMAX) 0.4 MG capsule Take 1 capsule (0.4 mg) by mouth every evening 90 capsule 3    urea (UREA 10 HYDRATING) 10 % external cream APPLY  CREAM TOPICALLY TO AFFECTED AREA AS NEEDED FOR  CALLOUSED  SKIN 85 g 1       No Known Allergies    REVIEW OF SYSTEMS  Skin: negative  Eyes: negative; wears glasses   Ears/Nose/Throat: sore throat--better; hx of allergies  Respiratory: + cough and SOB--better; dyspnea on exertion, cough, or hemoptysis  Cardiovascular: negative  Gastrointestinal: hx of nausea--comes and goes  Genitourinary: + self cath; hx of urinary retention  Musculoskeletal: + hand arthritis; hx of back pain, shoulders, knees   Neurologic: positive for numbness or tingling of hands and numbness or tingling of feet--worse   Psychiatric: negative  Hematologic/Lymphatic/Immunologic: negative  Endocrine: positive for diabetes    OBJECTIVE:  /76   Pulse 80   Resp 16   Ht 1.537 m (5' 0.5\")   " Wt 89.7 kg (197 lb 13.6 oz)   SpO2 96%   BMI 38.00 kg/m    Constitutional: alert and no distress  Respiratory:  Good diaphragmatic excursion.   Musculoskeletal: extremities normal- no gross deformities noted and gait normal  Skin: no suspicious lesions and/or rashes to visible skin  Psychiatric: mentation appears normal and affect normal/bright      LAB  No results found for any visits on 07/18/24.      ASSESSMENT / PLAN:.  (E10.65) Type 1 diabetes mellitus with hyperglycemia (H)  (primary encounter diagnosis)  Comment: noted insulin pump and CGM download    Insulin   Basal: 34.9 (60%)  Bolus: 22.8 (40%)  TDD: 65    Automated mode: 100%    Insulin pump: Omnipod 5  Updated 7/18/24  Basal:   0000 1.1  0200 1.05  0600 0.95  1030 0.7  1530 0.9  1800 1.15  TDD: 23.325  CR:  0000 12  1200 13  1600 14  ISF  0000 65  1200 60  1800 55  BG target: 110  BG correction  0000 140  0900 130  2000 140    Plan: GLUCOSE MONITOR, 72 HOUR, PHYS INTERP          TIR the past 2 weeks (with recent illness), 72%  BG ave: 161  Great job!    Spoke to Dr. Alvarez--she's run labs when he sees her in October.     For now, keep working on the timing of his carb bolus--ideally before    Sierra from an endocrinologist at [facility] that there some hiccups with AID insulin pumps and Dexcom G7  Plus, it's a new pod that's compatible with both so we have to wait for his next month's prescription  He would like to wait for now.    When he does switch, one step that needs to be done--after inserting, pressing down on the Dexcom G7 sensor    I couldn't find the order for his self cathing information  Encouraged him to call his urologist office--I gave him the information    Follow up  Dr. Alvarez in October  Me in 3-4 months    Call sooner if any issues/concerns develop and/or continued issues with low BGs    Let me know if you change your mind about the Dexcom G7    Patient Instructions   Continue working on healthy eating and moving as best as you can  (start low and slow, work up to 30 min, 5x/week)    BG goals:  Fasting and before meals <130, >70  2 hour after eating <180    We only need 1/2 of these numbers to be within target then your A1c will be within target    Medication changes  None today    Keep working on the timing of your carb bolus--ideally, before     Follow up   Keep appt with Dr. Alvarez--she will check all of your labs  4 months with me    Call me sooner if any problems/concerns and/or questions develop including consistent low BGs <70 or consistent high BGs >200  [phone] (Unit Coordinator)    [phone] (Tracey, Nurse)    Let me know when you want to switch to the Dexcom G7    Time: 25 min + 5 min CGM review  Barrier: none  Willingness to learn: accepting    Macrina CHAMBERLAIN [facility]  Diabetes and Wound Care    With the electronic record, we can now more quickly and easily track our patient diabetic goals. Our diabetes clinical review is in progress and these are the indicators we are monitoring for good diabetes health.     1.) HbA1C less than 7 (measurement of your average blood sugars)  2.) Blood Pressure less than 140/80  3.) LDL less than 100 (bad cholesterol)  4.) HbA1C is checked in the last 6 months and below 7% (more frequently if not at goal or adjusting medications)  5.) LDL is checked in the last 12 months (more frequently if not at goal or adjusting medications)  6.) Taking one baby aspirin daily (unless otherwise instructed)  7.) No tobacco use  8) Statin use     You have achieved 8 out of 8 of these and I am encouraging you to come in and get tuned up to achieve 8 out of 8!  Here is what you have achieved so far in my goals for you:  1.) HbA1C  less than 7:                              YES  Your last  HbA1C :     Lab Results   Component Value Date    A1C 7.7 11/30/2023    A1C 7.5 08/16/2023    A1C 7.2 05/08/2023    A1C 6.9 01/31/2023    A1C 7.3 10/24/2022    A1C 7.7 10/20/2021    A1C 7.8 06/03/2021    A1C 7.5 11/30/2020    A1C  "7.7 09/01/2020    A1C 8.1 03/24/2020 4/18/24 76.8%    2.) Blood Pressure less than 140/80:       YES      Your last    /76   Pulse 80   Resp 16   Ht 1.537 m (5' 0.5\")   Wt 89.7 kg (197 lb 13.6 oz)   SpO2 96%   BMI 38.00 kg/m      3.) LDL less than 100:                              YES      Your last   LDL         LDL Cholesterol Calculated   Date Value Ref Range Status   08/16/2023 48 <=100 mg/dL Final   06/14/2021 42 <100 mg/dL Final     Comment:     Desirable:       <100 mg/dl       4.) Checked HbA1C in the past 6 months: YES      5.) Checked LDL in the past 12 months:    YES    6.) Taking one aspirin daily:                       YES     7.) No tobacco use:                                        YES      8.) Statin use      YES       CGM requirements:   Patient has been seen in the clinic within the last 6 month  Diagnosis of Type 1 diabetes  Has been testing their BGs 4x/day using the Dexcom  Uses an insulin pump   Requires frequent adjustments to their treatment regimen based on BGM and/or CGM testing results.                             "

## 2024-07-18 NOTE — PATIENT INSTRUCTIONS
Continue working on healthy eating and moving as best as you can (start low and slow, work up to 30 min, 5x/week)    BG goals:  Fasting and before meals <130, >70  2 hour after eating <180    We only need 1/2 of these numbers to be within target then your A1c will be within target    Medication changes  None today    Keep working on the timing of your carb bolus--ideally, before     Follow up   Keep appt with Dr. Alvarez--she will check all of your labs  4 months with me    Call me sooner if any problems/concerns and/or questions develop including consistent low BGs <70 or consistent high BGs >200  [phone] (Unit Coordinator)    [phone] (Tracey, Nurse)    Let me know when you want to switch to the Dexcom G7

## 2024-07-30 DIAGNOSIS — E10.649 TYPE 1 DIABETES MELLITUS WITH HYPOGLYCEMIA UNAWARENESS (H): ICD-10-CM

## 2024-07-30 RX ORDER — GABAPENTIN 300 MG/1
300 CAPSULE ORAL 3 TIMES DAILY
Qty: 90 CAPSULE | Refills: 5 | Status: SHIPPED | OUTPATIENT
Start: 2024-07-30

## 2024-07-30 NOTE — TELEPHONE ENCOUNTER
Gabapentin      Last Written Prescription Date:  3/7/24  Last Fill Quantity: 90,   # refills: 2  Last Office Visit: 7/15/24  Future Office visit:    Next 5 appointments (look out 90 days)      Jul 31, 2024 8:15 AM  (Arrive by 8:00 AM)  Return Visit with Juliette Jarrett DPM  [facility] ([facility] ) [address] [place] [id number] [phone]     Oct 22, 2024 8:00 AM  (Arrive by 7:45 AM)  Provider Visit with Natacha Alvarez MD  [facility] ([facility] ) [address] [place] [id number]  [phone]             Routing refill request to provider for review/approval because:

## 2024-07-31 ENCOUNTER — OFFICE VISIT (OUTPATIENT)
Dept: PODIATRY | Facility: OTHER | Age: 59
End: 2024-07-31
Attending: PODIATRIST
Payer: COMMERCIAL

## 2024-07-31 VITALS
DIASTOLIC BLOOD PRESSURE: 68 MMHG | OXYGEN SATURATION: 91 % | HEART RATE: 80 BPM | SYSTOLIC BLOOD PRESSURE: 106 MMHG | TEMPERATURE: 97.3 F

## 2024-07-31 DIAGNOSIS — Z13.89 SCREENING FOR DIABETIC PERIPHERAL NEUROPATHY: ICD-10-CM

## 2024-07-31 DIAGNOSIS — L84 CALLUS OF FOOT: ICD-10-CM

## 2024-07-31 DIAGNOSIS — E10.42 DIABETIC POLYNEUROPATHY ASSOCIATED WITH TYPE 1 DIABETES MELLITUS (H): ICD-10-CM

## 2024-07-31 DIAGNOSIS — L60.3 ONYCHODYSTROPHY: Primary | ICD-10-CM

## 2024-07-31 DIAGNOSIS — E10.65 TYPE 1 DIABETES MELLITUS WITH HYPERGLYCEMIA (H): ICD-10-CM

## 2024-07-31 PROCEDURE — 11056 PARNG/CUTG B9 HYPRKR LES 2-4: CPT | Performed by: PODIATRIST

## 2024-07-31 PROCEDURE — 11721 DEBRIDE NAIL 6 OR MORE: CPT | Mod: XS | Performed by: PODIATRIST

## 2024-07-31 PROCEDURE — 99207 PR FOOT EXAM NO CHARGE: CPT | Performed by: PODIATRIST

## 2024-07-31 PROCEDURE — G0463 HOSPITAL OUTPT CLINIC VISIT: HCPCS

## 2024-07-31 ASSESSMENT — PAIN SCALES - GENERAL: PAINLEVEL: SEVERE PAIN (7)
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
## 2024-08-02 ENCOUNTER — TRANSFERRED RECORDS (OUTPATIENT)
Dept: HEALTH INFORMATION MANAGEMENT | Facility: CLINIC | Age: 59
End: 2024-08-02
Payer: COMMERCIAL

## 2024-08-02 DIAGNOSIS — E10.65 TYPE 1 DIABETES MELLITUS WITH HYPERGLYCEMIA (H): ICD-10-CM

## 2024-08-02 DIAGNOSIS — N40.1 URINARY RETENTION DUE TO BENIGN PROSTATIC HYPERPLASIA: ICD-10-CM

## 2024-08-02 DIAGNOSIS — R33.8 URINARY RETENTION DUE TO BENIGN PROSTATIC HYPERPLASIA: ICD-10-CM

## 2024-08-02 RX ORDER — TAMSULOSIN HYDROCHLORIDE 0.4 MG/1
CAPSULE ORAL
Qty: 90 CAPSULE | Refills: 3 | Status: SHIPPED | OUTPATIENT
Start: 2024-08-02

## 2024-08-15 ENCOUNTER — TRANSFERRED RECORDS (OUTPATIENT)
Dept: HEALTH INFORMATION MANAGEMENT | Facility: CLINIC | Age: 59
End: 2024-08-15

## 2024-08-15 LAB — RETINOPATHY: NEGATIVE

## 2024-08-16 ENCOUNTER — APPOINTMENT (OUTPATIENT)
Dept: CT IMAGING | Facility: HOSPITAL | Age: 59
End: 2024-08-16
Attending: NURSE PRACTITIONER
Payer: COMMERCIAL

## 2024-08-16 ENCOUNTER — HOSPITAL ENCOUNTER (EMERGENCY)
Facility: HOSPITAL | Age: 59
Discharge: HOME | End: 2024-08-16
Attending: NURSE PRACTITIONER | Admitting: NURSE PRACTITIONER
Payer: COMMERCIAL

## 2024-08-16 VITALS
DIASTOLIC BLOOD PRESSURE: 68 MMHG | SYSTOLIC BLOOD PRESSURE: 125 MMHG | RESPIRATION RATE: 16 BRPM | OXYGEN SATURATION: 93 % | TEMPERATURE: 98.4 F | HEART RATE: 77 BPM

## 2024-08-16 DIAGNOSIS — G89.29 CHRONIC MIDLINE LOW BACK PAIN WITHOUT SCIATICA: ICD-10-CM

## 2024-08-16 DIAGNOSIS — M54.50 CHRONIC MIDLINE LOW BACK PAIN WITHOUT SCIATICA: ICD-10-CM

## 2024-08-16 PROCEDURE — 96374 THER/PROPH/DIAG INJ IV PUSH: CPT

## 2024-08-16 PROCEDURE — 99285 EMERGENCY DEPT VISIT HI MDM: CPT | Mod: 25

## 2024-08-16 PROCEDURE — 250N000013 HC RX MED GY IP 250 OP 250 PS 637: Performed by: NURSE PRACTITIONER

## 2024-08-16 PROCEDURE — 72131 CT LUMBAR SPINE W/O DYE: CPT

## 2024-08-16 PROCEDURE — 99284 EMERGENCY DEPT VISIT MOD MDM: CPT | Performed by: NURSE PRACTITIONER

## 2024-08-16 PROCEDURE — 72192 CT PELVIS W/O DYE: CPT

## 2024-08-16 PROCEDURE — 250N000011 HC RX IP 250 OP 636: Performed by: NURSE PRACTITIONER

## 2024-08-16 RX ORDER — CYCLOBENZAPRINE HCL 10 MG
10 TABLET ORAL 3 TIMES DAILY PRN
Qty: 15 TABLET | Refills: 0 | Status: SHIPPED | OUTPATIENT
Start: 2024-08-16 | End: 2024-08-26

## 2024-08-16 RX ORDER — CYCLOBENZAPRINE HCL 10 MG
10 TABLET ORAL ONCE
Status: COMPLETED | OUTPATIENT
Start: 2024-08-16 | End: 2024-08-16

## 2024-08-16 RX ORDER — KETOROLAC TROMETHAMINE 15 MG/ML
15 INJECTION, SOLUTION INTRAMUSCULAR; INTRAVENOUS ONCE
Status: COMPLETED | OUTPATIENT
Start: 2024-08-16 | End: 2024-08-16

## 2024-08-16 RX ADMIN — CYCLOBENZAPRINE 10 MG: 10 TABLET, FILM COATED ORAL at 13:25

## 2024-08-16 RX ADMIN — KETOROLAC TROMETHAMINE 15 MG: 15 INJECTION, SOLUTION INTRAMUSCULAR; INTRAVENOUS at 11:56

## 2024-08-16 ASSESSMENT — ENCOUNTER SYMPTOMS
RESPIRATORY NEGATIVE: 1
ALLERGIC/IMMUNOLOGIC NEGATIVE: 1
WEAKNESS: 1
ENDOCRINE NEGATIVE: 1
EYES NEGATIVE: 1
HEMATOLOGIC/LYMPHATIC NEGATIVE: 1
CARDIOVASCULAR NEGATIVE: 1
GASTROINTESTINAL NEGATIVE: 1
PSYCHIATRIC NEGATIVE: 1
CONSTITUTIONAL NEGATIVE: 1
DIFFICULTY URINATING: 1
BACK PAIN: 1

## 2024-08-16 ASSESSMENT — COLUMBIA-SUICIDE SEVERITY RATING SCALE - C-SSRS
1. IN THE PAST MONTH, HAVE YOU WISHED YOU WERE DEAD OR WISHED YOU COULD GO TO SLEEP AND NOT WAKE UP?: NO
6. HAVE YOU EVER DONE ANYTHING, STARTED TO DO ANYTHING, OR PREPARED TO DO ANYTHING TO END YOUR LIFE?: NO
2. HAVE YOU ACTUALLY HAD ANY THOUGHTS OF KILLING YOURSELF IN THE PAST MONTH?: NO

## 2024-08-16 ASSESSMENT — ACTIVITIES OF DAILY LIVING (ADL)
ADLS_ACUITY_SCORE: 37
ADLS_ACUITY_SCORE: 37

## 2024-08-16 NOTE — ED TRIAGE NOTES
Pt in for evaluation of worsening back pain over the last few days. Reports he has chronic low back pain, uses lidocaine patches which normally helps. No new injury reported. Was given 50mcg IV fentalyl en-route by EMS with no improvement.
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
## 2024-08-16 NOTE — DISCHARGE INSTRUCTIONS
Back Pain:   Do back stretching multiple times each day as you were shown in office.  This helps keep the muscles loose in the back.  When the muscles get stiff, the pain will worsen.     IF you were prescribed a muscle relaxer, use this before bed to help with the muscle stiffness.  It is very important to do this right away in the morning to help with muscle stiffness.     DO NOT stay inactive during this time.  Being sedentary will cause muscle guarding of the back, which means you will be in more pain.  Walking is a good treatment to help keep the back muscles loose.  You will need to pay attention to how your back feels.  Do not over exert yourself, but do not remain sedentary.     Use warm compresses to the back, 20 minutes at a time, every 3 hours as needed for pain.  Make sure the compresses are so hot that the cause burning to the skin.  Also use ibuprofen and acetaminophen for pain relief if not contraindicated.       Medication Warnings (cyclobenzaprine):   Do not drive, operate machinery, or do work that could harm people when under the influence of this medication.  It can impair perception, reaction time, motor skills, and attention in ways that make it dangerous to drive, operate machinery, or engage in any activity at home or at work that could harm others or cause professional malpractice.  Just how long such impairments will last for a particular individual taking a particular type and dose is unknown, but it is at least several hours.  Drinking alcohol while taking this medication makes impairment much worse, so abstain for alcohol use.        Follow-up with your primary care provider for reevaluation.  Contact your primary care provider if you have any questions or concerns.  Do not hesitate to return to the ER if any new or worsening symptoms.     Please read the attached instructions (if any).  They highlight more specific treatments and interventions for you at home.              Thank you for  letting me participate in your care and wish you a fast and uneventful recovery,    Alessandro Ventura APRN, CNP    Do not hesitate to contact me with questions or concerns.  [email or web address]

## 2024-08-16 NOTE — ED NOTES
Bed: ED08  Expected date: 8/16/24  Expected time: 11:40 AM  Means of arrival:   Comments:  Hib Back Pain

## 2024-08-19 NOTE — PROGRESS NOTES
Preoperative Evaluation  [facility] [place]  [address]  [place] [address]  Phone: [phone]  Primary Provider: Natacha Alvarez MD  Pre-op Performing Provider: Natacha Alvarez MD  Aug 26, 2024             8/26/2024   Surgical Information   What procedure is being done? Left Total Hip Replacement   Facility or Hospital where procedure/surgery will be performed: [facility]   Who is doing the procedure / surgery? Zamzow   Date of surgery / procedure: 09/18/2024   Time of surgery / procedure: TBD   Where do you plan to recover after surgery? at home with family      Fax number for surgical facility: please fax to [facility]    Assessment & Plan     The proposed surgical procedure is considered INTERMEDIATE risk.    Preop general physical exam  No concerning exam, EKG, or lab findings.  Richard is able to tolerate 4 METS without issues.  Works at [employer]  - EKG 12-lead complete w/read - (Clinic Performed)    Hip pain, left  Reason for the procedure   - cyclobenzaprine (FLEXERIL) 10 MG tablet; Take 1 tablet (10 mg) by mouth 3 times daily as needed for muscle spasms.    Diabetic polyneuropathy associated with type 1 diabetes mellitus (H)  A1c today of 6.9  - Hemoglobin A1c; Future  - on OmniPod 5    Hyperlipidemia, unspecified hyperlipidemia type  LDL today of 48  - Lipid Profile; Future  - c/w simvastatin     Primary hypertension  BP at goal   - Basic metabolic panel; Future  - hold lisinopril morning of procedure     Heart failure with mid-range ejection fraction (H)  Echo (4/18/2023) EF 45 to 50%  No cardiac symptoms     Gastroparesis / Gastroesophageal reflux disease without esophagitis  Stable and managed  - c/w protonix    Chronic bilateral low back pain with right-sided sciatica  Flexeril helps with back pain   - cyclobenzaprine (FLEXERIL) 10 MG tablet; Take 1 tablet (10 mg) by mouth 3 times daily as needed for muscle spasms.    Stage 3a chronic kidney  disease (H)  stable  - CBC with platelets; Future  - Albumin Random Urine Quantitative with Creat Ratio; Future    Benign prostatic hyperplasia with urinary retention  Self cath's    Snoring  - Adult Sleep Eval & Management  Referral; Future    Leukocytosis, unspecified type  Chronic and stable, but will check UTI and CXR d/t joint replacement   - WBC and differential  - CXR negative  - UA consistent with UTI, cipro sent       Implanted Device   - Type of device: OmniPod and CGM Patient advised to bring device information on day of surgery.      Risks and Recommendations  The patient has the following additional risks and recommendations for perioperative complications:  Diabetes:  - Patient is on insulin therapy; diabetic NPO guidelines provided and discussed.    Preoperative Medication Instructions  Antiplatelet or Anticoagulation Medication Instructions   - Patient is on no antiplatelet or anticoagulation medications.    Additional Medication Instructions  Take all scheduled medications on the day of surgery EXCEPT for modifications listed below:   - ACE/ARB: DO NOT TAKE on day of surgery (minimum 11 hours for general anesthesia).   - Insulin pump: Continue basal rate of insulin up until the time of surgery.   - Continuous Glucose Monitor (CGM): Patient was made aware on the day of surgery, they should be prepared to remove the Continuous Glucose Monitor (CGM) prior to the operation in order to avoid damage to the equipment during the procedure. The CGM will not be the source of glucose monitoring during the operation.      - Herbal medications and vitamins: DO NOT TAKE 14 days prior to surgery.    Recommendation  Approval  given to proceed with proposed procedure, without further diagnostic evaluation.    Jeremi Ramos is a 59 year old, presenting for the following:  Pre-Op Exam          8/26/2024     9:03 AM   Additional Questions   Roomed by Rosy Hightower   Accompanied by self     HPI related to  upcoming procedure: Richard has a history of left hip pain which has failed conservative treatment. He will be undergoing total left hip replacement.            8/26/2024   Pre-Op Questionnaire   Have you ever had a heart attack or stroke? No   Have you ever had surgery on your heart or blood vessels, such as a stent placement, a coronary artery bypass, or surgery on an artery in your head, neck, heart, or legs? No   Do you have chest pain with activity? No   Do you have a history of heart failure?  Yes Echo (4/18/2023) EF 45 to 50%   Do you currently have a cold, bronchitis or symptoms of other infection? No   Do you have a cough, shortness of breath, or wheezing? No   Do you or anyone in your family have previous history of blood clots? No   Do you or does anyone in your family have a serious bleeding problem such as prolonged bleeding following surgeries or cuts? No   Have you ever had problems with anemia or been told to take iron pills? No   Have you had any abnormal blood loss such as black, tarry or bloody stools? No   Have you ever had a blood transfusion? No   Are you willing to have a blood transfusion if it is medically needed before, during, or after your surgery? Yes   Have you or any of your relatives ever had problems with anesthesia? No   Do you have sleep apnea, excessive snoring or daytime drowsiness? (!) YES   Do you have a CPAP machine? (!) NO - might be interested   Do you have any artifical heart valves or other implanted medical devices like a pacemaker, defibrillator, or continuous glucose monitor? (!) YES   What type of device do you have? CGM   Name of the clinic that manages your device [place]   Do you have artificial joints? No   Are you allergic to latex? No      Health Care Directive  Patient does not have a Health Care Directive or Living Will: Discussed advance care planning with patient; information given to patient to review.    Preoperative Review of    reviewed - controlled  substances reflected in medication list.    Status of Chronic Conditions:  See problem list for active medical problems.  Problems all longstanding and stable, except as noted/documented.  See ROS for pertinent symptoms related to these conditions.    DIABETES - Patient has a longstanding history of DiabetesType Type I . Patient is being treated with insulin pump and denies significant side effects. Control has been good. Complicating factors include but are not limited to: hypertension, hyperlipidemia, neuropathy, and chronic kidney disease.     HYPERLIPIDEMIA - Patient has a long history of significant Hyperlipidemia requiring medication for treatment with recent good control. Patient reports no problems or side effects with the medication.     HYPERTENSION - Patient has longstanding history of HTN , currently denies any symptoms referable to elevated blood pressure. Specifically denies chest pain, palpitations, dyspnea, orthopnea, PND or peripheral edema. Blood pressure readings have been in normal range. Current medication regimen is as listed below. Patient denies any side effects of medication.     RENAL INSUFFICIENCY - Patient has a longstanding history of moderate-severe chronic renal insufficiency. Last Cr 1.55.     Patient Active Problem List    Diagnosis Date Noted    Vitamin D deficiency 10/23/2023     Priority: Medium    Stage 3a chronic kidney disease (H) 06/16/2023     Priority: Medium    ELMA (acute kidney injury) (H24) 04/22/2023     Priority: Medium    Pyelonephritis, acute 04/22/2023     Priority: Medium    Benign prostatic hyperplasia with urinary retention 04/22/2023     Priority: Medium    Acute cystitis without hematuria 04/18/2023     Priority: Medium    Sepsis, due to unspecified organism, unspecified whether acute organ dysfunction present (H) 04/18/2023     Priority: Medium    Chronic bilateral low back pain with right-sided sciatica 12/29/2022     Priority: Medium    Gastroesophageal  reflux disease without esophagitis 11/30/2020     Priority: Medium    Gastroparesis 09/29/2020     Priority: Medium     Positive gastric emptying study on 9/28/2020 w/ 20% retention at 4 hours.       Obesity (BMI 35.0-39.9) with comorbidity (H) 08/15/2019     Priority: Medium    Diabetic polyneuropathy associated with type 1 diabetes mellitus (H) 06/05/2019     Priority: Medium    Class 2 obesity in adult 06/05/2019     Priority: Medium    ACP (advance care planning) 01/18/2017     Priority: Medium     Advance Care Planning 1/18/2017: ACP Review of Chart / Resources Provided:  Reviewed chart for advance care plan.  Richard Harvey has no plan or code status on file. Discussed available resources and provided with information. Confirmed code status reflects current choices pending further ACP discussions.  Confirmed/documented legally designated decision makers.  Added by Janiya Rocha          DM type 1 (diabetes mellitus, type 1) (H) 08/15/2013     Priority: Medium    HTN (hypertension) 08/15/2013     Priority: Medium    Hyperlipidemia 08/15/2013     Priority: Medium      Past Medical History:   Diagnosis Date    BPH (benign prostatic hyperplasia)     Diabetes mellitus type 1, controlled (H)     HLD (hyperlipidemia)     HTN (hypertension)     Neuropathy     Type 1 diabetes (H)      Past Surgical History:   Procedure Laterality Date    COLONOSCOPY N/A 6/27/2023    Procedure: Colonoscopy;  Surgeon: Raúl Alcaraz MD;  Location: [facility] OR    COLONOSCOPY - HIM SCAN N/A 09/19/2016    Procedure: COLONOSCOPY SCREENING; Surgeon: Rudy Rojo MD; Location: [facility] OR    ESOPHAGOSCOPY, GASTROSCOPY, DUODENOSCOPY (EGD), COMBINED  08/13/2019    IR PICC PLACEMENT > 5 YRS OF AGE  4/21/2023     Current Outpatient Medications   Medication Sig Dispense Refill    benzonatate (TESSALON) 100 MG capsule Take 1 capsule (100 mg) by mouth 3 times daily as needed 45 capsule 0    CALCIUM-VITAMIN D PO Take 1 tablet by mouth daily       Continuous Blood Gluc  (DEXCOM G6 ) DAYAN 1 each continuous To be used per manufacture's recommendation 1 each 2    Continuous Blood Gluc Sensor (DEXCOM G6 SENSOR) MISC CHANGE EVERY 10 DAYS 3 each 11    Continuous Blood Gluc Transmit (DEXCOM G6 TRANSMITTER) MISC CHANGE EVERY 3 MONTHS 1 each 4    CONTOUR NEXT TEST test strip TEST 6 TIMES DAILY, OR AS DIRECTED. 200 strip 5    gabapentin (NEURONTIN) 300 MG capsule TAKE 1 CAPSULE BY MOUTH THREE TIMES DAILY 90 capsule 5    Glucagon (GVOKE HYPOPEN 2-PACK) 1 MG/0.2ML SOAJ Inject 1 mg Subcutaneous as needed (for low blood glucose) 0.4 mL 3    insulin aspart (NOVOLOG FLEXPEN) 100 UNIT/ML pen 1 unit for every 15 grams of carbs consumed + 1 unit for every 60 points, >130 before meals. 1:15 before snacks      insulin aspart (NOVOLOG VIAL) 100 UNITS/ML vial USE WITH INSULIN PUMP FOR A TOTAL DAILY USAGE  UNITS. 30 mL 4    insulin degludec (TRESIBA FLEXTOUCH) 100 UNIT/ML pen Inject 20 Units Subcutaneous daily      Insulin Disposable Pump (OMNIPOD 5 G6 INTRO, GEN 5,) KIT 1 kit continuous 1 kit 0    Insulin Disposable Pump (OMNIPOD 5 G6 PODS, GEN 5,) MISC 1 each every 48 hours 15 each 5    INSULIN PUMP - OUTPATIENT Insulin pump: Omnipod 5  Update: 7/18/24  Basal  0000 1.1  0200 1.05  0600 0.95  1030 0.7  1530 0.9  1800 1.15  TDD: 23.325  CR  0000 12  1200 13  1600 14  ISF  0000 65  1200 60  1800 55  BG target range  0000 110  BG correction threshold  0000 140  0900 130  2200 140  Active insulin time: 4 hours      ipratropium - albuterol 0.5 mg/2.5 mg/3 mL (DUONEB) 0.5-2.5 (3) MG/3ML neb solution Take 1 vial (3 mLs) by nebulization every 6 hours as needed for shortness of breath, wheezing or cough 90 mL 1    Lidocaine (EQ LIDOCAINE PAIN RELIEVING) 4 % Patch APPLY 1  TOPICALLY EVERY 24 HOURS --  TO  PREVENT  LIDOCAINE  TOXICITY,  PATIENT  SHOULD  BE  PATCH  FREE  FOR  12  HOURS  DAILY 20 patch 3    lisinopril (ZESTRIL) 5 MG tablet Take 1 tablet (5 mg) by mouth  daily 90 tablet 3    loperamide (IMODIUM A-D) 2 MG tablet Take 1 tablet (2 mg) by mouth 4 times daily as needed for diarrhea 30 tablet 0    Multiple Vitamin (MULTI-VITAMIN DAILY PO) Take 1 tablet by mouth daily      nitroGLYcerin (NITROSTAT) 0.4 MG sublingual tablet Place 0.4 mg under the tongue every 5 minutes as needed for chest pain . Do not crush; maximum of 3 doses in 15 minutes.      ondansetron (ZOFRAN) 4 MG tablet Take 1 tablet (4 mg) by mouth every 8 hours as needed for nausea 90 tablet 3    order for DME Equipment being ordered:     1.  TBS insulin pump supplies--insertion sets and reserviors  Disp: 1 month  Refill: 11 months 30 days 11    pantoprazole (PROTONIX) 40 MG EC tablet Take 1 tablet by mouth twice daily 60 tablet 6    polyethylene glycol (MIRALAX) 17 GM/Dose powder Take 17 g (1 Capful) by mouth daily 850 g 1    simvastatin (ZOCOR) 40 MG tablet TAKE 1 TABLET BY MOUTH AT BEDTIME 6 tablet 0    tamsulosin (FLOMAX) 0.4 MG capsule TAKE 1 CAPSULE BY MOUTH IN THE EVENING 90 capsule 3    urea (UREA 10 HYDRATING) 10 % external cream APPLY  CREAM TOPICALLY TO AFFECTED AREA AS NEEDED FOR  CALLOUSED  SKIN 85 g 1       No Known Allergies     Social History     Tobacco Use    Smoking status: Never     Passive exposure: Never    Smokeless tobacco: Never   Substance Use Topics    Alcohol use: No     Alcohol/week: 0.0 standard drinks of alcohol       History   Drug Use No             Review of Systems   Constitutional:  Negative for chills and fever.   HENT:  Negative for congestion.    Respiratory:  Negative for shortness of breath.    Cardiovascular:  Negative for chest pain and palpitations.   Gastrointestinal:  Positive for abdominal pain (gerd).   Genitourinary:  Negative for difficulty urinating (self caths).   Musculoskeletal:  Positive for arthralgias and back pain.   Psychiatric/Behavioral:  Positive for dysphoric mood.          Objective    /66   Pulse 95   Temp 98.8  F (37.1  C) (Tympanic)    Resp 17   Ht 1.524 m (5')   Wt 89.3 kg (196 lb 12.8 oz)   SpO2 96%   BMI 38.43 kg/m     Estimated body mass index is 38.43 kg/m  as calculated from the following:    Height as of this encounter: 1.524 m (5').    Weight as of this encounter: 89.3 kg (196 lb 12.8 oz).  Physical Exam  Constitutional:       General: He is not in acute distress.     Appearance: He is well-developed.   HENT:      Head: Normocephalic and atraumatic.      Right Ear: Tympanic membrane normal.      Left Ear: Tympanic membrane normal.      Mouth/Throat:      Mouth: Mucous membranes are moist.      Pharynx: No oropharyngeal exudate.   Eyes:      Extraocular Movements: Extraocular movements intact.      Conjunctiva/sclera: Conjunctivae normal.   Neck:      Thyroid: No thyromegaly.   Cardiovascular:      Rate and Rhythm: Normal rate and regular rhythm.      Pulses: Normal pulses.      Heart sounds: No murmur heard.  Pulmonary:      Effort: Pulmonary effort is normal. No respiratory distress.      Breath sounds: No wheezing or rales.   Abdominal:      General: Bowel sounds are normal. There is no distension.      Palpations: Abdomen is soft.      Tenderness: There is no abdominal tenderness. There is no guarding.   Musculoskeletal:         General: Normal range of motion.      Cervical back: Normal range of motion and neck supple.      Right lower leg: No edema.      Left lower leg: No edema.   Lymphadenopathy:      Cervical: No cervical adenopathy.   Skin:     General: Skin is warm and dry.   Neurological:      Mental Status: He is alert.   Psychiatric:         Mood and Affect: Mood normal.         Recent Labs   Lab Test 07/15/24  0933 11/30/23  0910 08/24/23  0908   HGB 12.6*  --   --      --   --      --  136   POTASSIUM 4.6  --  4.9   CR 1.64*  --  1.74*   A1C  --  7.7*  --         Diagnostics  Recent Results (from the past 24 hour(s))   Hemoglobin A1c    Collection Time: 08/26/24  8:59 AM   Result Value Ref Range     Estimated Average Glucose 151 mg/dL    Hemoglobin A1C 6.9 (H) <5.7 %   Lipid Profile    Collection Time: 08/26/24  8:59 AM   Result Value Ref Range    Cholesterol 111 <200 mg/dL    Triglycerides 79 <150 mg/dL    Direct Measure HDL 47 >=40 mg/dL    LDL Cholesterol Calculated 48 <=100 mg/dL    Non HDL Cholesterol 64 <130 mg/dL    Patient Fasting > 8hrs? No    Basic metabolic panel    Collection Time: 08/26/24  8:59 AM   Result Value Ref Range    Sodium 139 135 - 145 mmol/L    Potassium 4.3 3.4 - 5.3 mmol/L    Chloride 102 98 - 107 mmol/L    Carbon Dioxide (CO2) 22 22 - 29 mmol/L    Anion Gap 15 7 - 15 mmol/L    Urea Nitrogen 19.6 8.0 - 23.0 mg/dL    Creatinine 1.55 (H) 0.67 - 1.17 mg/dL    GFR Estimate 51 (L) >60 mL/min/1.73m2    Calcium 9.1 8.8 - 10.4 mg/dL    Glucose 153 (H) 70 - 99 mg/dL    Patient Fasting > 8hrs? No    CBC with platelets    Collection Time: 08/26/24  8:59 AM   Result Value Ref Range    WBC Count 12.7 (H) 4.0 - 11.0 10e3/uL    RBC Count 4.43 4.40 - 5.90 10e6/uL    Hemoglobin 13.2 (L) 13.3 - 17.7 g/dL    Hematocrit 39.9 (L) 40.0 - 53.0 %    MCV 90 78 - 100 fL    MCH 29.8 26.5 - 33.0 pg    MCHC 33.1 31.5 - 36.5 g/dL    RDW 11.9 10.0 - 15.0 %    Platelet Count 341 150 - 450 10e3/uL   WBC and Differential    Collection Time: 08/26/24  8:59 AM   Result Value Ref Range    WBC Count 12.2 (H) 4.0 - 11.0 10e3/uL    % Neutrophils 75 %    % Lymphocytes 16 %    % Monocytes 7 %    % Eosinophils 1 %    % Basophils 0 %    % Immature Granulocytes 0 %    NRBCs per 100 WBC 0 <1 /100    Absolute Neutrophils 9.1 (H) 1.6 - 8.3 10e3/uL    Absolute Lymphocytes 2.0 0.8 - 5.3 10e3/uL    Absolute Monocytes 0.9 0.0 - 1.3 10e3/uL    Absolute Eosinophils 0.2 0.0 - 0.7 10e3/uL    Absolute Basophils 0.1 0.0 - 0.2 10e3/uL    Absolute Immature Granulocytes 0.0 <=0.4 10e3/uL    Absolute NRBCs 0.0 10e3/uL   EKG 12-lead complete w/read - (Clinic Performed)    Collection Time: 08/26/24  9:19 AM   Result Value Ref Range    Systolic  Blood Pressure  mmHg    Diastolic Blood Pressure  mmHg    Ventricular Rate 88 BPM    Atrial Rate 88 BPM    PA Interval 144 ms    QRS Duration 76 ms     ms    QTc 442 ms    P Axis 60 degrees    R AXIS 14 degrees    T Axis 38 degrees    Interpretation ECG       Sinus rhythm  Normal ECG  No previous ECGs available            UA RESULTS:  Recent Labs   Lab Test 08/27/24  1410   COLOR Yellow   APPEARANCE Slightly Cloudy*   URINEGLC Negative   URINEBILI Negative   URINEKETONE Negative   SG 1.017   UBLD Negative   URINEPH 5.5   PROTEIN 20*   NITRITE Negative   LEUKEST Large*   RBCU 3*   WBCU >182*       XR CHEST 2 VW (Clinic Performed)    Result Date: 8/27/2024  PROCEDURE: XR CHEST 2 VIEWS 8/27/2024 2:19 PM HISTORY: Leukocytosis, unspecified type COMPARISONS: 7/15/2024. TECHNIQUE: 2 views. FINDINGS: Heart size is stable. There is some ectasia of the aorta. Lungs are clear and no pleural effusion is seen. There is some elevation of the anterior hemidiaphragms. Mild degenerative change is seen in the spine.        IMPRESSION: No acute infiltrate. BERE TATE MD   SYSTEM ID:  [id number]         EKG (8/26/2024): Normal Sinus Rhythm, normal axis, normal intervals, no acute ST/T changes c/w ischemia, no LVH by voltage criteria, unchanged from previous tracings (11/3/2022)    Echo (4/18/2023)   Interpretation Summary  No vegetation or mass identified, however this does not exclude endocarditis.  Very technically difficult study and valvular evaluation is suboptimal. Left  ventricular function is decreased. The ejection fraction is 45-50% (mildlyreduced).  Global right ventricular function is normal.  Both atria appear normal.  Trace mitral insufficiency is present.  Trace tricuspid insufficiency is present.  Trace pulmonic insufficiency is present.  Very technically difficult study and valvular evaluation is suboptimal.    Revised Cardiac Risk Index (RCRI)  The patient has the following serious cardiovascular risks  for perioperative complications:   - Congestive Heart Failure (pulmonary edema, PND, s3 vincent, CXR with pulmonary congestion, basilar rales) = 1 point   - Diabetes Mellitus (on Insulin) = 1 point     RCRI Interpretation: 2 points: Class III (moderate risk - 6.6% complication rate)     Estimated Functional Capacity: Performs 4 METS exercise without symptoms (e.g., light housework, stairs, 4 mph walk, 7 mph bike, slow step dance)       The longitudinal plan of care for the diagnosis(es)/condition(s) as documented were addressed during this visit. Due to the added complexity in care, I will continue to support Richard in the subsequent management and with ongoing continuity of care.    Signed Electronically by: Natacha Alvarez MD  A copy of this evaluation report is provided to the requesting physician.

## 2024-08-19 NOTE — PATIENT INSTRUCTIONS
How to Take Your Medication Before Surgery  Preoperative Medication Instructions   Antiplatelet or Anticoagulation Medication Instructions   - Patient is on no antiplatelet or anticoagulation medications.    Additional Medication Instructions  Take all scheduled medications on the day of surgery EXCEPT for modifications listed below:   - ACE/ARB: DO NOT TAKE on day of surgery (minimum 11 hours for general anesthesia).   - Insulin pump: Continue basal rate of insulin up until the time of surgery.   - Continuous Glucose Monitor (CGM): Patient was made aware on the day of surgery, they should be prepared to remove the Continuous Glucose Monitor (CGM) prior to the operation in order to avoid damage to the equipment during the procedure. The CGM will not be the source of glucose monitoring during the operation.      - Herbal medications and vitamins: DO NOT TAKE 14 days prior to surgery.         Referral placed for sleep study    Patient Education   Preparing for Your Surgery  Getting started  A nurse will call you to review your health history and instructions. They will give you an arrival time based on your scheduled surgery time. Please be ready to share:  Your doctor's clinic name and phone number  Your medical, surgical, and anesthesia history  A list of allergies and sensitivities  A list of medicines, including herbal treatments and over-the-counter drugs  Whether the patient has a legal guardian (ask how to send us the papers in advance)  Please tell us if you're pregnant--or if there's any chance you might be pregnant. Some surgeries may injure a fetus (unborn baby), so they require a pregnancy test. Surgeries that are safe for a fetus don't always need a test, and you can choose whether to have one.   If you have a child who's having surgery, please ask for a copy of Preparing for Your Child's Surgery.    Preparing for surgery  Within 10 to 30 days of surgery: Have a pre-op exam (sometimes called an H&P, or  History and Physical). This can be done at a clinic or pre-operative center.  If you're having a , you may not need this exam. Talk to your care team.  At your pre-op exam, talk to your care team about all medicines you take. If you need to stop any medicines before surgery, ask when to start taking them again.  We do this for your safety. Many medicines can make you bleed too much during surgery. Some change how well surgery (anesthesia) drugs work.  Call your insurance company to let them know you're having surgery. (If you don't have insurance, call [phone].)  Call your clinic if there's any change in your health. This includes signs of a cold or flu (sore throat, runny nose, cough, rash, fever). It also includes a scrape or scratch near the surgery site.  If you have questions on the day of surgery, call your hospital or surgery center.  Eating and drinking guidelines  For your safety: Unless your surgeon tells you otherwise, follow the guidelines below.  Eat and drink as usual until 8 hours before you arrive for surgery. After that, no food or milk.  Drink clear liquids until 2 hours before you arrive. These are liquids you can see through, like water, Gatorade, and Propel Water. They also include plain black coffee and tea (no cream or milk), candy, and breath mints. You can spit out gum when you arrive.  If you drink alcohol: Stop drinking it the night before surgery.  If your care team tells you to take medicine on the morning of surgery, it's okay to take it with a sip of water.  Preventing infection  Shower or bathe the night before and morning of your surgery. Follow the instructions your clinic gave you. (If no instructions, use regular soap.)  Don't shave or clip hair near your surgery site. We'll remove the hair if needed.  Don't smoke or vape the morning of surgery. You may chew nicotine gum up to 2 hours before surgery. A nicotine patch is okay.  Note: Some surgeries require you to  completely quit smoking and nicotine. Check with your surgeon.  Your care team will make every effort to keep you safe from infection. We will:  Clean our hands often with soap and water (or an alcohol-based hand rub).  Clean the skin at your surgery site with a special soap that kills germs.  Give you a special gown to keep you warm. (Cold raises the risk of infection.)  Wear special hair covers, masks, gowns and gloves during surgery.  Give antibiotic medicine, if prescribed. Not all surgeries need antibiotics.  What to bring on the day of surgery  Photo ID and insurance card  Copy of your health care directive, if you have one  Glasses and hearing aids (bring cases)  You can't wear contacts during surgery  Inhaler and eye drops, if you use them (tell us about these when you arrive)  CPAP machine or breathing device, if you use them  A few personal items, if spending the night  If you have . . .  A pacemaker, ICD (cardiac defibrillator) or other implant: Bring the ID card.  An implanted stimulator: Bring the remote control.  A legal guardian: Bring a copy of the certified (court-stamped) guardianship papers.  Please remove any jewelry, including body piercings. Leave jewelry and other valuables at home.  If you're going home the day of surgery  You must have a responsible adult drive you home. They should stay with you overnight as well.  If you don't have someone to stay with you, and you aren't safe to go home alone, we may keep you overnight. Insurance often won't pay for this.  After surgery  If it's hard to control your pain or you need more pain medicine, please call your surgeon's office.  Questions?   If you have any questions for your care team, list them here: _________________________________________________________________________________________________________________________________________________________________________ ____________________________________ ____________________________________  ____________________________________  For informational purposes only. Not to replace the advice of your health care provider. Copyright   2003, 2019 [facility]. All rights reserved. Clinically reviewed by Adriana Cao MD. [employer] [id number] - REV 12/22.    Instructions About Your Continuous Glucose Monitor  You should be prepared to remove the Continuous Glucose Monitor prior to the operation in order to avoid damage to the equipment during the procedure. Your Continuous Glucose Monitor will not be the source of glucose monitoring during the operation.    How to Manage Your Diabetes Before Surgery  If you use insulin for your diabetes, follow these steps to keep your blood sugar in a safe range before surgery, when you ve been told not to eat or drink:   Check your blood sugar every 4 hours   If your blood sugar is high, take a corrective dose (not a meal dose) of sliding-scale insulin, if that is what you re used to doing  If your blood sugar is below 100, or you have symptoms of hypoglycemia, follow these steps:   Have 1 item from this list:  4 oz (1/2 cup) of fruit juice without pulp    4 oz (1/2 cup) of regular soda (not diet soda)    3 glucose gels    5 sugar cubes or sugar packets   Check your blood sugar again after 15 minutes  Repeat steps 1 and 2 again until your blood sugar is greater than 100

## 2024-08-21 DIAGNOSIS — E78.5 HYPERLIPIDEMIA, UNSPECIFIED HYPERLIPIDEMIA TYPE: ICD-10-CM

## 2024-08-21 RX ORDER — SIMVASTATIN 40 MG
TABLET ORAL
Qty: 6 TABLET | Refills: 0 | Status: SHIPPED | OUTPATIENT
Start: 2024-08-21 | End: 2024-08-28

## 2024-08-26 ENCOUNTER — LAB (OUTPATIENT)
Dept: LAB | Facility: OTHER | Age: 59
End: 2024-08-26
Attending: FAMILY MEDICINE
Payer: COMMERCIAL

## 2024-08-26 ENCOUNTER — OFFICE VISIT (OUTPATIENT)
Dept: FAMILY MEDICINE | Facility: OTHER | Age: 59
End: 2024-08-26
Attending: FAMILY MEDICINE
Payer: COMMERCIAL

## 2024-08-26 VITALS
DIASTOLIC BLOOD PRESSURE: 66 MMHG | HEART RATE: 95 BPM | WEIGHT: 196.8 LBS | HEIGHT: 60 IN | TEMPERATURE: 98.8 F | OXYGEN SATURATION: 96 % | BODY MASS INDEX: 38.64 KG/M2 | RESPIRATION RATE: 17 BRPM | SYSTOLIC BLOOD PRESSURE: 118 MMHG

## 2024-08-26 DIAGNOSIS — R06.83 SNORING: ICD-10-CM

## 2024-08-26 DIAGNOSIS — M25.552 HIP PAIN, LEFT: ICD-10-CM

## 2024-08-26 DIAGNOSIS — G89.29 CHRONIC BILATERAL LOW BACK PAIN WITH RIGHT-SIDED SCIATICA: ICD-10-CM

## 2024-08-26 DIAGNOSIS — D72.829 LEUKOCYTOSIS, UNSPECIFIED TYPE: ICD-10-CM

## 2024-08-26 DIAGNOSIS — K21.9 GASTROESOPHAGEAL REFLUX DISEASE WITHOUT ESOPHAGITIS: ICD-10-CM

## 2024-08-26 DIAGNOSIS — I50.22 HEART FAILURE WITH MID-RANGE EJECTION FRACTION (H): ICD-10-CM

## 2024-08-26 DIAGNOSIS — M54.41 CHRONIC BILATERAL LOW BACK PAIN WITH RIGHT-SIDED SCIATICA: ICD-10-CM

## 2024-08-26 DIAGNOSIS — R33.8 BENIGN PROSTATIC HYPERPLASIA WITH URINARY RETENTION: ICD-10-CM

## 2024-08-26 DIAGNOSIS — N18.31 STAGE 3A CHRONIC KIDNEY DISEASE (H): ICD-10-CM

## 2024-08-26 DIAGNOSIS — I10 PRIMARY HYPERTENSION: ICD-10-CM

## 2024-08-26 DIAGNOSIS — Z01.818 PREOP GENERAL PHYSICAL EXAM: Primary | ICD-10-CM

## 2024-08-26 DIAGNOSIS — E10.42 DIABETIC POLYNEUROPATHY ASSOCIATED WITH TYPE 1 DIABETES MELLITUS (H): ICD-10-CM

## 2024-08-26 DIAGNOSIS — E78.5 HYPERLIPIDEMIA, UNSPECIFIED HYPERLIPIDEMIA TYPE: ICD-10-CM

## 2024-08-26 DIAGNOSIS — K31.84 GASTROPARESIS: ICD-10-CM

## 2024-08-26 DIAGNOSIS — N40.1 BENIGN PROSTATIC HYPERPLASIA WITH URINARY RETENTION: ICD-10-CM

## 2024-08-26 LAB
ANION GAP SERPL CALCULATED.3IONS-SCNC: 15 MMOL/L (ref 7–15)
ATRIAL RATE - MUSE: 88 BPM
BASOPHILS # BLD AUTO: 0.1 10E3/UL (ref 0–0.2)
BASOPHILS NFR BLD AUTO: 0 %
BUN SERPL-MCNC: 19.6 MG/DL (ref 8–23)
CALCIUM SERPL-MCNC: 9.1 MG/DL (ref 8.8–10.4)
CHLORIDE SERPL-SCNC: 102 MMOL/L (ref 98–107)
CHOLEST SERPL-MCNC: 111 MG/DL
CREAT SERPL-MCNC: 1.55 MG/DL (ref 0.67–1.17)
DIASTOLIC BLOOD PRESSURE - MUSE: NORMAL MMHG
EGFRCR SERPLBLD CKD-EPI 2021: 51 ML/MIN/1.73M2
EOSINOPHIL # BLD AUTO: 0.2 10E3/UL (ref 0–0.7)
EOSINOPHIL NFR BLD AUTO: 1 %
ERYTHROCYTE [DISTWIDTH] IN BLOOD BY AUTOMATED COUNT: 11.9 % (ref 10–15)
EST. AVERAGE GLUCOSE BLD GHB EST-MCNC: 151 MG/DL
FASTING STATUS PATIENT QL REPORTED: NO
FASTING STATUS PATIENT QL REPORTED: NO
GLUCOSE SERPL-MCNC: 153 MG/DL (ref 70–99)
HBA1C MFR BLD: 6.9 %
HCO3 SERPL-SCNC: 22 MMOL/L (ref 22–29)
HCT VFR BLD AUTO: 39.9 % (ref 40–53)
HDLC SERPL-MCNC: 47 MG/DL
HGB BLD-MCNC: 13.2 G/DL (ref 13.3–17.7)
IMM GRANULOCYTES # BLD: 0 10E3/UL
IMM GRANULOCYTES NFR BLD: 0 %
INTERPRETATION ECG - MUSE: NORMAL
LDLC SERPL CALC-MCNC: 48 MG/DL
LYMPHOCYTES # BLD AUTO: 2 10E3/UL (ref 0.8–5.3)
LYMPHOCYTES NFR BLD AUTO: 16 %
MCH RBC QN AUTO: 29.8 PG (ref 26.5–33)
MCHC RBC AUTO-ENTMCNC: 33.1 G/DL (ref 31.5–36.5)
MCV RBC AUTO: 90 FL (ref 78–100)
MONOCYTES # BLD AUTO: 0.9 10E3/UL (ref 0–1.3)
MONOCYTES NFR BLD AUTO: 7 %
NEUTROPHILS # BLD AUTO: 9.1 10E3/UL (ref 1.6–8.3)
NEUTROPHILS NFR BLD AUTO: 75 %
NONHDLC SERPL-MCNC: 64 MG/DL
NRBC # BLD AUTO: 0 10E3/UL
NRBC BLD AUTO-RTO: 0 /100
P AXIS - MUSE: 60 DEGREES
PLATELET # BLD AUTO: 341 10E3/UL (ref 150–450)
POTASSIUM SERPL-SCNC: 4.3 MMOL/L (ref 3.4–5.3)
PR INTERVAL - MUSE: 144 MS
QRS DURATION - MUSE: 76 MS
QT - MUSE: 366 MS
QTC - MUSE: 442 MS
R AXIS - MUSE: 14 DEGREES
RBC # BLD AUTO: 4.43 10E6/UL (ref 4.4–5.9)
SODIUM SERPL-SCNC: 139 MMOL/L (ref 135–145)
SYSTOLIC BLOOD PRESSURE - MUSE: NORMAL MMHG
T AXIS - MUSE: 38 DEGREES
TRIGL SERPL-MCNC: 79 MG/DL
VENTRICULAR RATE- MUSE: 88 BPM
WBC # BLD AUTO: 12.2 10E3/UL (ref 4–11)
WBC # BLD AUTO: 12.7 10E3/UL (ref 4–11)

## 2024-08-26 PROCEDURE — 99214 OFFICE O/P EST MOD 30 MIN: CPT | Performed by: FAMILY MEDICINE

## 2024-08-26 PROCEDURE — 80061 LIPID PANEL: CPT | Mod: ZL

## 2024-08-26 PROCEDURE — 83036 HEMOGLOBIN GLYCOSYLATED A1C: CPT | Mod: ZL

## 2024-08-26 PROCEDURE — 80048 BASIC METABOLIC PNL TOTAL CA: CPT | Mod: ZL

## 2024-08-26 PROCEDURE — 36415 COLL VENOUS BLD VENIPUNCTURE: CPT | Mod: ZL

## 2024-08-26 PROCEDURE — 93010 ELECTROCARDIOGRAM REPORT: CPT | Performed by: INTERNAL MEDICINE

## 2024-08-26 PROCEDURE — G0463 HOSPITAL OUTPT CLINIC VISIT: HCPCS | Mod: 25

## 2024-08-26 PROCEDURE — 93005 ELECTROCARDIOGRAM TRACING: CPT | Performed by: FAMILY MEDICINE

## 2024-08-26 PROCEDURE — G2211 COMPLEX E/M VISIT ADD ON: HCPCS | Performed by: FAMILY MEDICINE

## 2024-08-26 PROCEDURE — 85041 AUTOMATED RBC COUNT: CPT | Mod: ZL

## 2024-08-26 RX ORDER — CYCLOBENZAPRINE HCL 10 MG
10 TABLET ORAL 3 TIMES DAILY PRN
Qty: 60 TABLET | Refills: 3 | Status: SHIPPED | OUTPATIENT
Start: 2024-08-26

## 2024-08-26 ASSESSMENT — PAIN SCALES - GENERAL: PAINLEVEL: EXTREME PAIN (8)

## 2024-08-26 ASSESSMENT — ENCOUNTER SYMPTOMS
DYSPHORIC MOOD: 1
BACK PAIN: 1
FEVER: 0
ARTHRALGIAS: 1
SHORTNESS OF BREATH: 0
ABDOMINAL PAIN: 1
PALPITATIONS: 0
CHILLS: 0
DIFFICULTY URINATING: 0

## 2024-08-27 ENCOUNTER — ANCILLARY PROCEDURE (OUTPATIENT)
Dept: GENERAL RADIOLOGY | Facility: OTHER | Age: 59
End: 2024-08-27
Attending: FAMILY MEDICINE
Payer: COMMERCIAL

## 2024-08-27 ENCOUNTER — LAB (OUTPATIENT)
Dept: LAB | Facility: OTHER | Age: 59
End: 2024-08-27
Attending: FAMILY MEDICINE
Payer: COMMERCIAL

## 2024-08-27 ENCOUNTER — CARE COORDINATION (OUTPATIENT)
Dept: FAMILY MEDICINE | Facility: OTHER | Age: 59
End: 2024-08-27

## 2024-08-27 DIAGNOSIS — N39.0 URINARY TRACT INFECTION ASSOCIATED WITH CATHETERIZATION OF URINARY TRACT, UNSPECIFIED INDWELLING URINARY CATHETER TYPE, INITIAL ENCOUNTER (H): Primary | ICD-10-CM

## 2024-08-27 DIAGNOSIS — T83.511A URINARY TRACT INFECTION ASSOCIATED WITH CATHETERIZATION OF URINARY TRACT, UNSPECIFIED INDWELLING URINARY CATHETER TYPE, INITIAL ENCOUNTER (H): Primary | ICD-10-CM

## 2024-08-27 DIAGNOSIS — D72.829 LEUKOCYTOSIS, UNSPECIFIED TYPE: ICD-10-CM

## 2024-08-27 DIAGNOSIS — N18.31 STAGE 3A CHRONIC KIDNEY DISEASE (H): ICD-10-CM

## 2024-08-27 LAB
ALBUMIN UR-MCNC: 20 MG/DL
APPEARANCE UR: ABNORMAL
BILIRUB UR QL STRIP: NEGATIVE
COLOR UR AUTO: YELLOW
CREAT UR-MCNC: 159.6 MG/DL
GLUCOSE UR STRIP-MCNC: NEGATIVE MG/DL
HGB UR QL STRIP: NEGATIVE
HYALINE CASTS: 10 /LPF
KETONES UR STRIP-MCNC: NEGATIVE MG/DL
LEUKOCYTE ESTERASE UR QL STRIP: ABNORMAL
MICROALBUMIN UR-MCNC: 35.2 MG/L
MICROALBUMIN/CREAT UR: 22.06 MG/G CR (ref 0–17)
MUCOUS THREADS #/AREA URNS LPF: PRESENT /LPF
NITRATE UR QL: NEGATIVE
PH UR STRIP: 5.5 [PH] (ref 4.7–8)
RBC URINE: 3 /HPF
SP GR UR STRIP: 1.02 (ref 1–1.03)
SQUAMOUS EPITHELIAL: 0 /HPF
UROBILINOGEN UR STRIP-MCNC: NORMAL MG/DL
WBC CLUMPS #/AREA URNS HPF: PRESENT /HPF
WBC URINE: >182 /HPF

## 2024-08-27 PROCEDURE — 81001 URINALYSIS AUTO W/SCOPE: CPT | Mod: ZL

## 2024-08-27 PROCEDURE — 71046 X-RAY EXAM CHEST 2 VIEWS: CPT | Mod: TC

## 2024-08-27 PROCEDURE — 87086 URINE CULTURE/COLONY COUNT: CPT | Mod: ZL

## 2024-08-27 PROCEDURE — 82570 ASSAY OF URINE CREATININE: CPT | Mod: ZL

## 2024-08-27 RX ORDER — CIPROFLOXACIN 500 MG/1
500 TABLET, FILM COATED ORAL 2 TIMES DAILY
Qty: 14 TABLET | Refills: 0 | Status: SHIPPED | OUTPATIENT
Start: 2024-08-27 | End: 2024-09-03

## 2024-08-27 NOTE — PROGRESS NOTES
Met with patient and his mother to see if either of them would meet income criteria for [facility] as they live together.  Patient may qualify for SNAP benefits as he is a type 1 diabetic and has special diet needs.  That paperwork was filled out.  Spoke to a  at [facility] today before the appointment to see what patient may qualify for.  She informed this SW since patient and his mother live together, patient would like not qualify for [place] while he is out of work having surgery, but because patient is a diabetic and has special diet considerations, he could be looked upon as a single for the SNAP program.  Also filled out an application for the NAPS Program as patient qualifies based on income.  Also gave patient $80 in [employer] coupons to spend.  Gave patient the telephone number to [employer] transportation should he need it for follow-up appointments down in [place] after his surgery.  Told patient to be on the lookout for a [employer] voucher in the mail - that this  would mail one out as soon as thy arrived.  Told patient and his mother to reach out if they had any questions

## 2024-08-28 DIAGNOSIS — E78.5 HYPERLIPIDEMIA, UNSPECIFIED HYPERLIPIDEMIA TYPE: ICD-10-CM

## 2024-08-28 RX ORDER — SIMVASTATIN 40 MG
TABLET ORAL
Qty: 90 TABLET | Refills: 3 | Status: SHIPPED | OUTPATIENT
Start: 2024-08-28

## 2024-08-28 NOTE — TELEPHONE ENCOUNTER
Simvastatin 40 MG Oral Tablet       Last Written Prescription Date:  08/21/2024  Last Fill Quantity: 6,   # refills: 0  Last Office Visit: 08/26/2024  Future Office visit:    Next 5 appointments (look out 90 days)      Aug 29, 2024 3:00 PM  MA Visit with  FP NURSE  [facility] ([facility] ) [address] [address]  [phone]     Oct 03, 2024 8:00 AM  (Arrive by 7:45 AM)  Return Visit with Juliette Jarrett DPM  [facility] ([facility] ) [address] [id number] [phone]     Oct 22, 2024 8:00 AM  (Arrive by 7:45 AM)  Provider Visit with Natacha Alvarez MD  [facility] ([facility] ) [address] [address]  [phone]             Routing refill request to provider for review/approval because:    Kimberly Boecker, RN

## 2024-08-29 LAB — BACTERIA UR CULT: ABNORMAL

## 2024-08-30 ENCOUNTER — TELEPHONE (OUTPATIENT)
Dept: FAMILY MEDICINE | Facility: OTHER | Age: 59
End: 2024-08-30

## 2024-09-19 DIAGNOSIS — E10.65 TYPE 1 DIABETES MELLITUS WITH HYPERGLYCEMIA (H): ICD-10-CM

## 2024-09-19 RX ORDER — UREA 8.5 G/85G
CREAM TOPICAL
Qty: 85 G | Refills: 3 | Status: SHIPPED | OUTPATIENT
Start: 2024-09-19

## 2024-09-19 NOTE — TELEPHONE ENCOUNTER
Urea  Last Written Prescription Date: 5/1/24  Last Fill Quantity: 85 g # of Refills: 1  Last Office Visit: 7/18/24

## 2024-09-19 NOTE — PROGRESS NOTES
Richard called   BGs are in the 400s after hip surgery yesterday  He took an external shot, 12 units about 20 minutes ago  Asking what he should do  Denies symptoms of DKA    Plan  Deactivate current pod  And change to another pod  If BGs are still >400 in 2 hours, take another external shot--5 units of Novolog (using a different vial if able)  Push the fluids--no carbs, ideally with electrolytes   Watch for symptoms of DKA  Let me know how it goes.    Call with any questions/concerns    Macrina CHAMBERLAIN FNP-BC  Diabetes and Wound Care

## 2024-09-23 ENCOUNTER — APPOINTMENT (OUTPATIENT)
Dept: ULTRASOUND IMAGING | Facility: HOSPITAL | Age: 59
End: 2024-09-23
Attending: NURSE PRACTITIONER
Payer: COMMERCIAL

## 2024-09-23 ENCOUNTER — HOSPITAL ENCOUNTER (EMERGENCY)
Facility: HOSPITAL | Age: 59
Discharge: HOME | End: 2024-09-23
Attending: NURSE PRACTITIONER | Admitting: NURSE PRACTITIONER
Payer: COMMERCIAL

## 2024-09-23 VITALS
RESPIRATION RATE: 18 BRPM | SYSTOLIC BLOOD PRESSURE: 165 MMHG | HEART RATE: 95 BPM | OXYGEN SATURATION: 98 % | TEMPERATURE: 98.6 F | DIASTOLIC BLOOD PRESSURE: 85 MMHG

## 2024-09-23 DIAGNOSIS — Z98.890 HISTORY OF HIP SURGERY: ICD-10-CM

## 2024-09-23 DIAGNOSIS — M79.89 LEFT LEG SWELLING: Primary | ICD-10-CM

## 2024-09-23 DIAGNOSIS — M79.605 LEG PAIN, LEFT: ICD-10-CM

## 2024-09-23 PROCEDURE — G0463 HOSPITAL OUTPT CLINIC VISIT: HCPCS | Mod: 25

## 2024-09-23 PROCEDURE — 99213 OFFICE O/P EST LOW 20 MIN: CPT | Performed by: NURSE PRACTITIONER

## 2024-09-23 PROCEDURE — 93971 EXTREMITY STUDY: CPT | Mod: LT

## 2024-09-23 ASSESSMENT — COLUMBIA-SUICIDE SEVERITY RATING SCALE - C-SSRS
6. HAVE YOU EVER DONE ANYTHING, STARTED TO DO ANYTHING, OR PREPARED TO DO ANYTHING TO END YOUR LIFE?: NO
2. HAVE YOU ACTUALLY HAD ANY THOUGHTS OF KILLING YOURSELF IN THE PAST MONTH?: NO
1. IN THE PAST MONTH, HAVE YOU WISHED YOU WERE DEAD OR WISHED YOU COULD GO TO SLEEP AND NOT WAKE UP?: NO

## 2024-09-23 ASSESSMENT — ACTIVITIES OF DAILY LIVING (ADL): ADLS_ACUITY_SCORE: 37

## 2024-09-23 ASSESSMENT — ENCOUNTER SYMPTOMS
COLOR CHANGE: 0
SHORTNESS OF BREATH: 0
PALPITATIONS: 0

## 2024-09-23 NOTE — ED TRIAGE NOTES
PT presents today with c/o left leg pain for 7 days. Worried about a clot. Recently had surgery on hip and it has hurt since the surgery .

## 2024-09-23 NOTE — ED PROVIDER NOTES
History     Chief Complaint   Patient presents with    Leg Pain     HPI  Richard Harvey is a 59 year old male who is s/p left hip surgery that was done on 9/18/2024 and presents today with concerns of a DVT.  Patient tells me that he is had swelling to his left leg since 1 day after the surgery.  This is accompanied by calf pain.  He denies any significant skin color changes.  He tells me [facility] and after they [facility] his calf they told him that he needed to come here for an ultrasound to rule out a DVT.  No chest pain or shortness of breath.  No current blood thinner use.  He has never been on blood thinners.  He denies history of blood clots.  Taking oxycodone as needed for pain.    Allergies:  No Known Allergies    Problem List:    Patient Active Problem List    Diagnosis Date Noted    Heart failure with mid-range ejection fraction (H) 08/26/2024     Priority: Medium    Vitamin D deficiency 10/23/2023     Priority: Medium    Stage 3a chronic kidney disease (H) 06/16/2023     Priority: Medium    ELMA (acute kidney injury) (H24) 04/22/2023     Priority: Medium    Pyelonephritis, acute 04/22/2023     Priority: Medium    Benign prostatic hyperplasia with urinary retention 04/22/2023     Priority: Medium    Acute cystitis without hematuria 04/18/2023     Priority: Medium    Sepsis, due to unspecified organism, unspecified whether acute organ dysfunction present (H) 04/18/2023     Priority: Medium    Chronic bilateral low back pain with right-sided sciatica 12/29/2022     Priority: Medium    Gastroesophageal reflux disease without esophagitis 11/30/2020     Priority: Medium    Gastroparesis 09/29/2020     Priority: Medium     Positive gastric emptying study on 9/28/2020 w/ 20% retention at 4 hours.       Obesity (BMI 35.0-39.9) with comorbidity (H) 08/15/2019     Priority: Medium    Diabetic polyneuropathy associated with type 1 diabetes mellitus (H) 06/05/2019     Priority: Medium     Class 2 obesity in adult 06/05/2019     Priority: Medium    ACP (advance care planning) 01/18/2017     Priority: Medium     Advance Care Planning 1/18/2017: ACP Review of Chart / Resources Provided:  Reviewed chart for advance care plan.  Richard Harvey has no plan or code status on file. Discussed available resources and provided with information. Confirmed code status reflects current choices pending further ACP discussions.  Confirmed/documented legally designated decision makers.  Added by Janiya Rocha          DM type 1 (diabetes mellitus, type 1) (H) 08/15/2013     Priority: Medium    HTN (hypertension) 08/15/2013     Priority: Medium    Hyperlipidemia 08/15/2013     Priority: Medium        Past Medical History:    Past Medical History:   Diagnosis Date    BPH (benign prostatic hyperplasia)     Diabetes mellitus type 1, controlled (H)     HLD (hyperlipidemia)     HTN (hypertension)     Neuropathy     Type 1 diabetes (H)        Past Surgical History:    Past Surgical History:   Procedure Laterality Date    COLONOSCOPY N/A 6/27/2023    Procedure: Colonoscopy;  Surgeon: Raúl Alcarza MD;  Location: [facility] OR    COLONOSCOPY - HIM SCAN N/A 09/19/2016    Procedure: COLONOSCOPY SCREENING; Surgeon: Rudy Rojo MD; Location: [facility] OR    ESOPHAGOSCOPY, GASTROSCOPY, DUODENOSCOPY (EGD), COMBINED  08/13/2019    IR PICC PLACEMENT > 5 YRS OF AGE  4/21/2023       Family History:    Family History   Problem Relation Age of Onset    No Known Problems Mother     Hypertension Father     Hypertension Brother        Social History:  Marital Status:  Single [1]  Social History     Tobacco Use    Smoking status: Never     Passive exposure: Never    Smokeless tobacco: Never   Vaping Use    Vaping status: Never Used   Substance Use Topics    Alcohol use: No     Alcohol/week: 0.0 standard drinks of alcohol    Drug use: No        Medications:    benzonatate (TESSALON) 100 MG capsule  CALCIUM-VITAMIN D PO  Continuous Blood Gluc   (DEXCOM G6 ) DAYAN  Continuous Blood Gluc Sensor (DEXCOM G6 SENSOR) MISC  Continuous Blood Gluc Transmit (DEXCOM G6 TRANSMITTER) MISC  CONTOUR NEXT TEST test strip  cyclobenzaprine (FLEXERIL) 10 MG tablet  gabapentin (NEURONTIN) 300 MG capsule  Glucagon (GVOKE HYPOPEN 2-PACK) 1 MG/0.2ML SOAJ  insulin aspart (NOVOLOG FLEXPEN) 100 UNIT/ML pen  insulin aspart (NOVOLOG VIAL) 100 UNITS/ML vial  insulin degludec (TRESIBA FLEXTOUCH) 100 UNIT/ML pen  Insulin Disposable Pump (OMNIPOD 5 G6 INTRO, GEN 5,) KIT  Insulin Disposable Pump (OMNIPOD 5 G6 PODS, GEN 5,) MISC  INSULIN PUMP - OUTPATIENT  ipratropium - albuterol 0.5 mg/2.5 mg/3 mL (DUONEB) 0.5-2.5 (3) MG/3ML neb solution  Lidocaine (EQ LIDOCAINE PAIN RELIEVING) 4 % Patch  lisinopril (ZESTRIL) 5 MG tablet  loperamide (IMODIUM A-D) 2 MG tablet  Multiple Vitamin (MULTI-VITAMIN DAILY PO)  nitroGLYcerin (NITROSTAT) 0.4 MG sublingual tablet  ondansetron (ZOFRAN) 4 MG tablet  order for DME  pantoprazole (PROTONIX) 40 MG EC tablet  polyethylene glycol (MIRALAX) 17 GM/Dose powder  simvastatin (ZOCOR) 40 MG tablet  tamsulosin (FLOMAX) 0.4 MG capsule  urea (UREA 10 HYDRATING) 10 % external cream          Review of Systems   Respiratory:  Negative for shortness of breath.    Cardiovascular:  Positive for leg swelling. Negative for chest pain and palpitations.   Skin:  Negative for color change.   All other systems reviewed and are negative.      Physical Exam   BP: 165/85  Pulse: 95  Temp: 98.6  F (37  C)  Resp: 18  SpO2: 98 %      Physical Exam  Vitals and nursing note reviewed.   Constitutional:       Appearance: Normal appearance.   HENT:      Head: Atraumatic.      Mouth/Throat:      Mouth: Mucous membranes are moist.   Eyes:      Pupils: Pupils are equal, round, and reactive to light.   Cardiovascular:      Rate and Rhythm: Normal rate.   Pulmonary:      Effort: Pulmonary effort is normal. No respiratory distress.   Musculoskeletal:         General: Swelling  present. No deformity.      Comments: Swelling to left leg in comparison to right.  Tenderness to palpation to left calf and anterior lower leg.  No bruising or erythema.  Cap refills less than 2 seconds.   Skin:     Capillary Refill: Capillary refill takes less than 2 seconds.      Coloration: Skin is not jaundiced.      Findings: No bruising, erythema, lesion or rash.             Comments: Dressing in place to the lateral left hip region.   Neurological:      Mental Status: He is alert and oriented to person, place, and time.         ED Course        Procedures       Results for orders placed or performed during the hospital encounter of 09/23/24 (from the past 24 hour(s))   US Lower Extremity Venous Duplex Left    Narrative    Exam: US LOWER EXTREMITY VENOUS DUPLEX LEFT    Exam reason: left leg swelling and calf tenderness x 4 days, had hip  surgery on 9/18/24    Comparison: None.    TECHNIQUE: Deep veins were evaluated using B-mode, color flow Doppler  and pulse wave spectral Doppler.      FINDINGS:    The common femoral, saphenofemoral junction, femoral and popliteal  veins are patent. There is no evidence of venous thrombus. These  vessels exhibited normal compressibility.    The posterior tibial and peroneal veins are compressible.     There is subcutaneous edema in the lower leg.      Impression    IMPRESSION:    No acute deep venous thrombosis.         SHRUTI MARTE MD         SYSTEM ID:  [id number]       Medications - No data to display    Assessments & Plan (with Medical Decision Making)   59-year-old male that is s/p hip surgery that was [facility] and presented today for with concerns of DVT.  The skin is pink and warm.  Left leg is swollen in comparison to right.  Ultrasound is negative for DVT today.  Discussed these findings with patient.  Ace wrap applied to help with the swelling.  Recommended elevating the legs at rest, continuing with pain medication as  prescribed.  Advised him to follow-up with primary doctor or orthopedic surgeon as needed.  Return to urgent care Emergency Department for any worsening or concerning symptoms.    I have reviewed the nursing notes.    I have reviewed the findings, diagnosis, plan and need for follow up with the patient.  This document was prepared using a combination of typing and voice generated software.  While every attempt was made for accuracy, spelling and grammatical errors may exist.         New Prescriptions    No medications on file       Final diagnoses:   Left leg swelling   Leg pain, left   History of hip surgery       9/23/2024   [facility]       Mpofu, Prudence, CNP  09/23/24 [id number]

## 2024-09-23 NOTE — DISCHARGE INSTRUCTIONS
There is no blood clot to your leg.  I recommend using the Ace wrap to your leg, elevating your leg at rest and continuing with the pain medication as you have been prescribed.    Follow-up with your doctor or orthopedic surgeon for evaluation as needed.  Return to urgent care or emergency room for any worsening or concerning symptoms.

## 2024-09-24 ENCOUNTER — TELEPHONE (OUTPATIENT)
Dept: FAMILY MEDICINE | Facility: OTHER | Age: 59
End: 2024-09-24

## 2024-09-24 DIAGNOSIS — M79.89 LEG SWELLING: ICD-10-CM

## 2024-09-24 DIAGNOSIS — M25.552 HIP PAIN, LEFT: Primary | ICD-10-CM

## 2024-09-24 NOTE — TELEPHONE ENCOUNTER
Results requested: Ultrasound results / patient was told by the ultrasound tech to get a hold of his primary care provider     Best number to reach patient at: [phone]     Best time to call patient: Sooner the better     Send to care team in basket.

## 2024-09-25 NOTE — TELEPHONE ENCOUNTER
Gave results to patient from US. He is requesting railing for getting in and out of shower. Also an ace wrap to keep swelling in he leg down. Pended chair

## 2024-09-27 ENCOUNTER — TELEPHONE (OUTPATIENT)
Dept: FAMILY MEDICINE | Facility: OTHER | Age: 59
End: 2024-09-27

## 2024-09-27 NOTE — TELEPHONE ENCOUNTER
3:16 PM    Reason for Call: Phone Call    Description: Patient stopped by at the front registration desk he needs a new disability parking certificate his current one will  the end of 2025 the DMV told him to come to his doctors office for the doctor to send in a new application for him     Was an appointment offered for this call? No  If yes : Appointment type              Date    Preferred method for responding to this message: Telephone Call  What is your phone number ? [phone]    If we cannot reach you directly, may we leave a detailed response at the number you provided? Yes    Can this message wait until your PCP/provider returns, if available today? YES, No

## 2024-10-02 ENCOUNTER — TELEPHONE (OUTPATIENT)
Dept: FAMILY MEDICINE | Facility: OTHER | Age: 59
End: 2024-10-02

## 2024-10-02 ENCOUNTER — HOSPITAL ENCOUNTER (EMERGENCY)
Facility: HOSPITAL | Age: 59
Discharge: HOME | End: 2024-10-03
Attending: EMERGENCY MEDICINE | Admitting: EMERGENCY MEDICINE
Payer: COMMERCIAL

## 2024-10-02 DIAGNOSIS — N10 PYELONEPHRITIS, ACUTE: ICD-10-CM

## 2024-10-02 DIAGNOSIS — E10.65 TYPE 1 DIABETES MELLITUS WITH HYPERGLYCEMIA (H): ICD-10-CM

## 2024-10-02 LAB
ALBUMIN UR-MCNC: NEGATIVE MG/DL
APPEARANCE UR: ABNORMAL
BILIRUB UR QL STRIP: NEGATIVE
COLOR UR AUTO: ABNORMAL
GLUCOSE UR STRIP-MCNC: NEGATIVE MG/DL
HGB UR QL STRIP: NEGATIVE
KETONES UR STRIP-MCNC: NEGATIVE MG/DL
LEUKOCYTE ESTERASE UR QL STRIP: ABNORMAL
NITRATE UR QL: NEGATIVE
PH UR STRIP: 7 [PH] (ref 4.7–8)
RBC URINE: 11 /HPF
SP GR UR STRIP: 1.01 (ref 1–1.03)
SQUAMOUS EPITHELIAL: 0 /HPF
UROBILINOGEN UR STRIP-MCNC: NORMAL MG/DL
WBC URINE: 101 /HPF

## 2024-10-02 PROCEDURE — 87186 SC STD MICRODIL/AGAR DIL: CPT | Performed by: EMERGENCY MEDICINE

## 2024-10-02 PROCEDURE — 99284 EMERGENCY DEPT VISIT MOD MDM: CPT | Performed by: EMERGENCY MEDICINE

## 2024-10-02 PROCEDURE — 81001 URINALYSIS AUTO W/SCOPE: CPT | Performed by: EMERGENCY MEDICINE

## 2024-10-02 PROCEDURE — 99284 EMERGENCY DEPT VISIT MOD MDM: CPT

## 2024-10-02 RX ORDER — CEFTRIAXONE SODIUM 1 G/50ML
1 INJECTION, SOLUTION INTRAVENOUS ONCE
Status: COMPLETED | OUTPATIENT
Start: 2024-10-02 | End: 2024-10-03

## 2024-10-02 RX ORDER — ACETAMINOPHEN 325 MG/1
975 TABLET ORAL ONCE
Status: COMPLETED | OUTPATIENT
Start: 2024-10-02 | End: 2024-10-03

## 2024-10-02 ASSESSMENT — COLUMBIA-SUICIDE SEVERITY RATING SCALE - C-SSRS
6. HAVE YOU EVER DONE ANYTHING, STARTED TO DO ANYTHING, OR PREPARED TO DO ANYTHING TO END YOUR LIFE?: NO
1. IN THE PAST MONTH, HAVE YOU WISHED YOU WERE DEAD OR WISHED YOU COULD GO TO SLEEP AND NOT WAKE UP?: NO
2. HAVE YOU ACTUALLY HAD ANY THOUGHTS OF KILLING YOURSELF IN THE PAST MONTH?: NO

## 2024-10-02 ASSESSMENT — ACTIVITIES OF DAILY LIVING (ADL): ADLS_ACUITY_SCORE: 35

## 2024-10-02 NOTE — TELEPHONE ENCOUNTER
Omnipod       Last Written Prescription Date:  4/15/2024  Last Fill Quantity: 15,   # refills: 5  Last Office Visit: 8/26/2024  Future Office visit:    Next 5 appointments (look out 90 days)      Oct 03, 2024 8:00 AM  (Arrive by 7:45 AM)  Return Visit with Juliette Jarrett DPM  [facility] ([facility] ) [address] [place] [id number] [phone]     Oct 22, 2024 8:00 AM  (Arrive by 7:45 AM)  Provider Visit with Natacha Alvarez MD  [facility] ([facility] ) [address] [place] [id number]  [phone]

## 2024-10-02 NOTE — TELEPHONE ENCOUNTER
Form received [place] cert ,placed in provider's wall bin.   After form is completed patient would like form to be picked up.

## 2024-10-03 VITALS
OXYGEN SATURATION: 94 % | TEMPERATURE: 99.8 F | SYSTOLIC BLOOD PRESSURE: 111 MMHG | RESPIRATION RATE: 24 BRPM | HEART RATE: 95 BPM | DIASTOLIC BLOOD PRESSURE: 57 MMHG

## 2024-10-03 LAB
ALBUMIN SERPL BCG-MCNC: 3.3 G/DL (ref 3.5–5.2)
ALP SERPL-CCNC: 99 U/L (ref 40–150)
ALT SERPL W P-5'-P-CCNC: 19 U/L (ref 0–70)
ANION GAP SERPL CALCULATED.3IONS-SCNC: 10 MMOL/L (ref 7–15)
AST SERPL W P-5'-P-CCNC: 17 U/L (ref 0–45)
BASOPHILS # BLD AUTO: 0 10E3/UL (ref 0–0.2)
BASOPHILS NFR BLD AUTO: 0 %
BILIRUB SERPL-MCNC: 0.3 MG/DL
BUN SERPL-MCNC: 18.8 MG/DL (ref 8–23)
CALCIUM SERPL-MCNC: 8.6 MG/DL (ref 8.8–10.4)
CHLORIDE SERPL-SCNC: 102 MMOL/L (ref 98–107)
CREAT SERPL-MCNC: 1.56 MG/DL (ref 0.67–1.17)
EGFRCR SERPLBLD CKD-EPI 2021: 51 ML/MIN/1.73M2
EOSINOPHIL # BLD AUTO: 0 10E3/UL (ref 0–0.7)
EOSINOPHIL NFR BLD AUTO: 0 %
ERYTHROCYTE [DISTWIDTH] IN BLOOD BY AUTOMATED COUNT: 12.3 % (ref 10–15)
GLUCOSE SERPL-MCNC: 130 MG/DL (ref 70–99)
HCO3 SERPL-SCNC: 24 MMOL/L (ref 22–29)
HCT VFR BLD AUTO: 28.3 % (ref 40–53)
HGB BLD-MCNC: 9.2 G/DL (ref 13.3–17.7)
IMM GRANULOCYTES # BLD: 0.1 10E3/UL
IMM GRANULOCYTES NFR BLD: 0 %
LACTATE SERPL-SCNC: 0.7 MMOL/L (ref 0.7–2)
LYMPHOCYTES # BLD AUTO: 0.9 10E3/UL (ref 0.8–5.3)
LYMPHOCYTES NFR BLD AUTO: 6 %
MAGNESIUM SERPL-MCNC: 1.7 MG/DL (ref 1.7–2.3)
MCH RBC QN AUTO: 29.4 PG (ref 26.5–33)
MCHC RBC AUTO-ENTMCNC: 32.5 G/DL (ref 31.5–36.5)
MCV RBC AUTO: 90 FL (ref 78–100)
MONOCYTES # BLD AUTO: 1 10E3/UL (ref 0–1.3)
MONOCYTES NFR BLD AUTO: 6 %
NEUTROPHILS # BLD AUTO: 13.7 10E3/UL (ref 1.6–8.3)
NEUTROPHILS NFR BLD AUTO: 87 %
NRBC # BLD AUTO: 0 10E3/UL
NRBC BLD AUTO-RTO: 0 /100
PLATELET # BLD AUTO: 378 10E3/UL (ref 150–450)
POTASSIUM SERPL-SCNC: 4.7 MMOL/L (ref 3.4–5.3)
PROT SERPL-MCNC: 6.3 G/DL (ref 6.4–8.3)
RBC # BLD AUTO: 3.13 10E6/UL (ref 4.4–5.9)
SODIUM SERPL-SCNC: 136 MMOL/L (ref 135–145)
WBC # BLD AUTO: 15.7 10E3/UL (ref 4–11)

## 2024-10-03 PROCEDURE — 250N000013 HC RX MED GY IP 250 OP 250 PS 637: Performed by: EMERGENCY MEDICINE

## 2024-10-03 PROCEDURE — 258N000003 HC RX IP 258 OP 636: Performed by: EMERGENCY MEDICINE

## 2024-10-03 PROCEDURE — 96365 THER/PROPH/DIAG IV INF INIT: CPT

## 2024-10-03 PROCEDURE — 85025 COMPLETE CBC W/AUTO DIFF WBC: CPT | Performed by: EMERGENCY MEDICINE

## 2024-10-03 PROCEDURE — 36415 COLL VENOUS BLD VENIPUNCTURE: CPT | Performed by: EMERGENCY MEDICINE

## 2024-10-03 PROCEDURE — 80053 COMPREHEN METABOLIC PANEL: CPT | Performed by: EMERGENCY MEDICINE

## 2024-10-03 PROCEDURE — 83605 ASSAY OF LACTIC ACID: CPT | Performed by: EMERGENCY MEDICINE

## 2024-10-03 PROCEDURE — 250N000011 HC RX IP 250 OP 636: Performed by: EMERGENCY MEDICINE

## 2024-10-03 PROCEDURE — 83735 ASSAY OF MAGNESIUM: CPT | Performed by: EMERGENCY MEDICINE

## 2024-10-03 RX ORDER — SULFAMETHOXAZOLE AND TRIMETHOPRIM 800; 160 MG/1; MG/1
1 TABLET ORAL 2 TIMES DAILY
Qty: 28 TABLET | Refills: 0 | Status: SHIPPED | OUTPATIENT
Start: 2024-10-03 | End: 2024-10-22

## 2024-10-03 RX ADMIN — ACETAMINOPHEN 975 MG: 325 TABLET ORAL at 00:05

## 2024-10-03 RX ADMIN — CEFTRIAXONE SODIUM 1 G: 1 INJECTION, SOLUTION INTRAVENOUS at 00:04

## 2024-10-03 RX ADMIN — SODIUM CHLORIDE 500 ML: 9 INJECTION, SOLUTION INTRAVENOUS at 00:05

## 2024-10-03 ASSESSMENT — ACTIVITIES OF DAILY LIVING (ADL): ADLS_ACUITY_SCORE: 37

## 2024-10-03 NOTE — ED NOTES
Patient to ED room 3 with mom accompanying. Patient reports that he straight-caths 3x/daily. Patient has had similar symptoms with previous UTIs. Patient also reports left hip replacement on 9/18/24 at [facility]. Patient reports no concerns post surgery and initial bandage in place. Follow up this Friday. No redness or swelling noted to left hip. This writer obtained urine sample via quick cath, patient tolerated well.

## 2024-10-03 NOTE — ED PROVIDER NOTES
History     Chief Complaint   Patient presents with    Rule out Urinary Tract Infection     HPI  Richard Harvey is a 59 year old male PMH CHF EF 45-50%, BPH, straight cath TID, DM, HTN, HLD who presents with concerns of UTI. Patient states he has been having lightheadedness x 1 day, which he states is his typical symptom of UTI. Patient states he straight caths 3x per day, has not noticed cloudy urine. Patient has had tactile fever at home and chills. Patient denies nausea, vomiting, chest pain, dyspnea, abdominal pain, flank pain, changes in BM, hematuria.     Allergies:  No Known Allergies    Problem List:    Patient Active Problem List    Diagnosis Date Noted    Heart failure with mid-range ejection fraction (H) 08/26/2024     Priority: Medium    Vitamin D deficiency 10/23/2023     Priority: Medium    Stage 3a chronic kidney disease (H) 06/16/2023     Priority: Medium    ELMA (acute kidney injury) (H) 04/22/2023     Priority: Medium    Pyelonephritis, acute 04/22/2023     Priority: Medium    Benign prostatic hyperplasia with urinary retention 04/22/2023     Priority: Medium    Acute cystitis without hematuria 04/18/2023     Priority: Medium    Sepsis, due to unspecified organism, unspecified whether acute organ dysfunction present (H) 04/18/2023     Priority: Medium    Chronic bilateral low back pain with right-sided sciatica 12/29/2022     Priority: Medium    Gastroesophageal reflux disease without esophagitis 11/30/2020     Priority: Medium    Gastroparesis 09/29/2020     Priority: Medium     Positive gastric emptying study on 9/28/2020 w/ 20% retention at 4 hours.       Obesity (BMI 35.0-39.9) with comorbidity (H) 08/15/2019     Priority: Medium    Diabetic polyneuropathy associated with type 1 diabetes mellitus (H) 06/05/2019     Priority: Medium    Class 2 obesity in adult 06/05/2019     Priority: Medium    ACP (advance care planning) 01/18/2017     Priority: Medium     Advance Care Planning 1/18/2017: ACP  Review of Chart / Resources Provided:  Reviewed chart for advance care plan.  Richard Harvey has no plan or code status on file. Discussed available resources and provided with information. Confirmed code status reflects current choices pending further ACP discussions.  Confirmed/documented legally designated decision makers.  Added by Janiya Rocha          DM type 1 (diabetes mellitus, type 1) (H) 08/15/2013     Priority: Medium    HTN (hypertension) 08/15/2013     Priority: Medium    Hyperlipidemia 08/15/2013     Priority: Medium        Past Medical History:    Past Medical History:   Diagnosis Date    BPH (benign prostatic hyperplasia)     Diabetes mellitus type 1, controlled (H)     HLD (hyperlipidemia)     HTN (hypertension)     Neuropathy     Type 1 diabetes (H)        Past Surgical History:    Past Surgical History:   Procedure Laterality Date    COLONOSCOPY N/A 6/27/2023    Procedure: Colonoscopy;  Surgeon: Raúl Alcaraz MD;  Location: [facility] OR    COLONOSCOPY - HIM SCAN N/A 09/19/2016    Procedure: COLONOSCOPY SCREENING; Surgeon: Rudy Rojo MD; Location: [facility] OR    ESOPHAGOSCOPY, GASTROSCOPY, DUODENOSCOPY (EGD), COMBINED  08/13/2019    IR PICC PLACEMENT > 5 YRS OF AGE  4/21/2023       Family History:    Family History   Problem Relation Age of Onset    No Known Problems Mother     Hypertension Father     Hypertension Brother        Social History:  Marital Status:  Single [1]  Social History     Tobacco Use    Smoking status: Never     Passive exposure: Never    Smokeless tobacco: Never   Vaping Use    Vaping status: Never Used   Substance Use Topics    Alcohol use: No     Alcohol/week: 0.0 standard drinks of alcohol    Drug use: No        Medications:    sulfamethoxazole-trimethoprim (BACTRIM DS) 800-160 MG tablet  benzonatate (TESSALON) 100 MG capsule  CALCIUM-VITAMIN D PO  Continuous Blood Gluc  (DEXCOM G6 ) DAYAN  Continuous Blood Gluc Sensor (DEXCOM G6 SENSOR) MISC  Continuous  Blood Gluc Transmit (DEXCOM G6 TRANSMITTER) MISC  CONTOUR NEXT TEST test strip  cyclobenzaprine (FLEXERIL) 10 MG tablet  gabapentin (NEURONTIN) 300 MG capsule  Glucagon (GVOKE HYPOPEN 2-PACK) 1 MG/0.2ML SOAJ  insulin aspart (NOVOLOG FLEXPEN) 100 UNIT/ML pen  insulin aspart (NOVOLOG VIAL) 100 UNITS/ML vial  insulin degludec (TRESIBA FLEXTOUCH) 100 UNIT/ML pen  Insulin Disposable Pump (OMNIPOD 5 G6 INTRO, GEN 5,) KIT  Insulin Disposable Pump (OMNIPOD 5 G6 PODS, GEN 5,) MISC  INSULIN PUMP - OUTPATIENT  ipratropium - albuterol 0.5 mg/2.5 mg/3 mL (DUONEB) 0.5-2.5 (3) MG/3ML neb solution  Lidocaine (EQ LIDOCAINE PAIN RELIEVING) 4 % Patch  lisinopril (ZESTRIL) 5 MG tablet  loperamide (IMODIUM A-D) 2 MG tablet  Multiple Vitamin (MULTI-VITAMIN DAILY PO)  nitroGLYcerin (NITROSTAT) 0.4 MG sublingual tablet  ondansetron (ZOFRAN) 4 MG tablet  order for DME  pantoprazole (PROTONIX) 40 MG EC tablet  polyethylene glycol (MIRALAX) 17 GM/Dose powder  simvastatin (ZOCOR) 40 MG tablet  tamsulosin (FLOMAX) 0.4 MG capsule  urea (UREA 10 HYDRATING) 10 % external cream          Review of Systems    All systems reviewed and negative with exception of that stated in the HPI      Physical Exam   BP: 97/63  Pulse: 119  Temp: (!) 101.9  F (38.8  C)  Resp: 24  SpO2: 96 %      Physical Exam    INITIAL VITAL SIGNS: Reviewed by me  GENERAL: Alert and interactive. No acute distress  HEAD: Head is normocephalic and atraumatic  EYES: No scleral icterus. No conjunctival injection  ENT: Moist mucous membranes.  RESPIRATORY: No tachypnea. Clear breath sounds bilaterally. No wheezing, rales, or rhonchi  CV: Regular rate and rhythm. No murmurs, rubs, or gallops  ABDOMEN: Soft, non-distended, non-tender. No guarding. No rebound. No masses.  No CVAT  EXTREMITIES: No deformity. No cyanosis. No edema.  SKIN: Warm and dry. No obvious rashes.  NEUROLOGIC: Alert and oriented. Face is symmetric. Speech is normal.     ED Course        Procedures        Results for  orders placed or performed during the hospital encounter of 10/02/24 (from the past 24 hour(s))   UA with Microscopic reflex to Culture    Specimen: Urine, Catheter   Result Value Ref Range    Color Urine Light Yellow Colorless, Straw, Light Yellow, Yellow    Appearance Urine Slightly Cloudy (A) Clear    Glucose Urine Negative Negative mg/dL    Bilirubin Urine Negative Negative    Ketones Urine Negative Negative mg/dL    Specific Gravity Urine 1.011 1.003 - 1.035    Blood Urine Negative Negative    pH Urine 7.0 4.7 - 8.0    Protein Albumin Urine Negative Negative mg/dL    Urobilinogen Urine Normal Normal, 2.0 mg/dL    Nitrite Urine Negative Negative    Leukocyte Esterase Urine Large (A) Negative    RBC Urine 11 (H) <=2 /HPF    WBC Urine 101 (H) <=5 /HPF    Squamous Epithelials Urine 0 <=1 /HPF    Narrative    Urine Culture ordered based on laboratory criteria   CBC with platelets differential    Narrative    The following orders were created for panel order CBC with platelets differential.  Procedure                               Abnormality         Status                     ---------                               -----------         ------                     CBC with platelets and d...[[id number]]  Abnormal            Final result                 Please view results for these tests on the individual orders.   Comprehensive metabolic panel   Result Value Ref Range    Sodium 136 135 - 145 mmol/L    Potassium 4.7 3.4 - 5.3 mmol/L    Carbon Dioxide (CO2) 24 22 - 29 mmol/L    Anion Gap 10 7 - 15 mmol/L    Urea Nitrogen 18.8 8.0 - 23.0 mg/dL    Creatinine 1.56 (H) 0.67 - 1.17 mg/dL    GFR Estimate 51 (L) >60 mL/min/1.73m2    Calcium 8.6 (L) 8.8 - 10.4 mg/dL    Chloride 102 98 - 107 mmol/L    Glucose 130 (H) 70 - 99 mg/dL    Alkaline Phosphatase 99 40 - 150 U/L    AST 17 0 - 45 U/L    ALT 19 0 - 70 U/L    Protein Total 6.3 (L) 6.4 - 8.3 g/dL    Albumin 3.3 (L) 3.5 - 5.2 g/dL    Bilirubin Total 0.3 <=1.2 mg/dL   Magnesium    Result Value Ref Range    Magnesium 1.7 1.7 - 2.3 mg/dL   Lactic acid whole blood with 1x repeat in 2 hr when >2   Result Value Ref Range    Lactic Acid, Initial 0.7 0.7 - 2.0 mmol/L   CBC with platelets and differential   Result Value Ref Range    WBC Count 15.7 (H) 4.0 - 11.0 10e3/uL    RBC Count 3.13 (L) 4.40 - 5.90 10e6/uL    Hemoglobin 9.2 (L) 13.3 - 17.7 g/dL    Hematocrit 28.3 (L) 40.0 - 53.0 %    MCV 90 78 - 100 fL    MCH 29.4 26.5 - 33.0 pg    MCHC 32.5 31.5 - 36.5 g/dL    RDW 12.3 10.0 - 15.0 %    Platelet Count 378 150 - 450 10e3/uL    % Neutrophils 87 %    % Lymphocytes 6 %    % Monocytes 6 %    % Eosinophils 0 %    % Basophils 0 %    % Immature Granulocytes 0 %    NRBCs per 100 WBC 0 <1 /100    Absolute Neutrophils 13.7 (H) 1.6 - 8.3 10e3/uL    Absolute Lymphocytes 0.9 0.8 - 5.3 10e3/uL    Absolute Monocytes 1.0 0.0 - 1.3 10e3/uL    Absolute Eosinophils 0.0 0.0 - 0.7 10e3/uL    Absolute Basophils 0.0 0.0 - 0.2 10e3/uL    Absolute Immature Granulocytes 0.1 <=0.4 10e3/uL    Absolute NRBCs 0.0 10e3/uL       Medications   cefTRIAXone in d5w (ROCEPHIN) intermittent infusion 1 g (0 g Intravenous Stopped 10/3/24 0030)   acetaminophen (TYLENOL) tablet 975 mg (975 mg Oral $Given 10/3/24 0005)   sodium chloride 0.9% BOLUS 500 mL (0 mLs Intravenous Stopped 10/3/24 0059)       Assessments & Plan (with Medical Decision Making)     I have reviewed the nursing notes.    I have reviewed the findings, diagnosis, plan and need for follow up with the patient.    Medical Decision Making  The patient's presentation was of moderate complexity (an acute illness with systemic symptoms).    The patient's evaluation involved:  ordering and/or review of 3+ test(s) in this encounter (see separate area of note for details)    The patient's management necessitated moderate risk (prescription drug management including medications given in the ED).    Richard Harvey is a 59 year old male PMH CHF EF 45-50%, BPH, straight cath TID, DM,  HTN, HLD who presents with concerns of UTI, typically has lightheadedness with UTI; has had lightheadedness, tactile fever and chills x 1 day.   VS febrile 101.9, tachycardic 119, 97/63  UA c/w UTI, however concern for pyelo given fever.    Labs obtained  IVF 500mL given, gentle hydration due to hx CHF  Ceftriaxone 1g IV  APAP for fever    WBC 15  UA c/w UTI  Patient feels well enough to go home  Normal vital signs on discharge    Rx bactrim  Follow-up with PCP  Return precautions given        Discharge Medication List as of 10/3/2024  1:07 AM        START taking these medications    Details   sulfamethoxazole-trimethoprim (BACTRIM DS) 800-160 MG tablet Take 1 tablet by mouth 2 times daily for 14 days., Disp-28 tablet, R-0, E-Prescribe             Final diagnoses:   Pyelonephritis, acute       10/2/2024   [facility]       Franchesca Jones MD  10/03/24 0457

## 2024-10-03 NOTE — ED TRIAGE NOTES
"\"Here for a urinary tract infections.  Symptoms started this afternoon.  I am dizzy and lightheaded.  I have had UTIs when being dizzy and lightheaded in the past.  I self cath myself three times a day.\"         "

## 2024-10-03 NOTE — DISCHARGE INSTRUCTIONS
You have a urinary tract infection.  We have given you IV antibiotics and some fluids.  Please continue to control your fever with acetaminophen (tylenol).  Oral antibiotics have been sent to your pharmacy - please take as directed.  Please follow-up with your PCP within 3-5 days.  Return to the ER if you have new, worsening or any concerning symptoms.

## 2024-10-04 ENCOUNTER — TRANSFERRED RECORDS (OUTPATIENT)
Dept: HEALTH INFORMATION MANAGEMENT | Facility: CLINIC | Age: 59
End: 2024-10-04
Payer: COMMERCIAL

## 2024-10-05 LAB — BACTERIA UR CULT: ABNORMAL

## 2024-10-05 RX ORDER — NITROFURANTOIN 25; 75 MG/1; MG/1
100 CAPSULE ORAL 2 TIMES DAILY
Qty: 10 CAPSULE | Refills: 0 | Status: SHIPPED | OUTPATIENT
Start: 2024-10-05 | End: 2024-10-10

## 2024-10-07 ENCOUNTER — LAB (OUTPATIENT)
Dept: LAB | Facility: OTHER | Age: 59
End: 2024-10-07
Payer: COMMERCIAL

## 2024-10-07 DIAGNOSIS — N18.31 STAGE 3A CHRONIC KIDNEY DISEASE (H): Primary | ICD-10-CM

## 2024-10-07 LAB
ALBUMIN SERPL BCG-MCNC: 3.7 G/DL (ref 3.5–5.2)
ANION GAP SERPL CALCULATED.3IONS-SCNC: 15 MMOL/L (ref 7–15)
BUN SERPL-MCNC: 22.3 MG/DL (ref 8–23)
CALCIUM SERPL-MCNC: 9.1 MG/DL (ref 8.8–10.4)
CHLORIDE SERPL-SCNC: 103 MMOL/L (ref 98–107)
CREAT SERPL-MCNC: 1.97 MG/DL (ref 0.67–1.17)
EGFRCR SERPLBLD CKD-EPI 2021: 38 ML/MIN/1.73M2
GLUCOSE SERPL-MCNC: 165 MG/DL (ref 70–99)
HCO3 SERPL-SCNC: 22 MMOL/L (ref 22–29)
HGB BLD-MCNC: 9.9 G/DL (ref 13.3–17.7)
HOLD SPECIMEN: NORMAL
PHOSPHATE SERPL-MCNC: 3.9 MG/DL (ref 2.5–4.5)
POTASSIUM SERPL-SCNC: 4.9 MMOL/L (ref 3.4–5.3)
SODIUM SERPL-SCNC: 140 MMOL/L (ref 135–145)

## 2024-10-07 PROCEDURE — 36415 COLL VENOUS BLD VENIPUNCTURE: CPT | Mod: ZL

## 2024-10-07 PROCEDURE — 83970 ASSAY OF PARATHORMONE: CPT | Mod: ZL

## 2024-10-07 PROCEDURE — 82947 ASSAY GLUCOSE BLOOD QUANT: CPT | Mod: ZL

## 2024-10-07 PROCEDURE — 85018 HEMOGLOBIN: CPT | Mod: ZL

## 2024-10-07 RX ORDER — INSULIN PMP CART,AUT,G6/7,CNTR
1 EACH SUBCUTANEOUS
Qty: 15 EACH | Refills: 5 | Status: SHIPPED | OUTPATIENT
Start: 2024-10-07

## 2024-10-08 LAB — PTH-INTACT SERPL-MCNC: 23 PG/ML (ref 15–65)

## 2024-10-18 PROBLEM — D64.9 ANEMIA, UNSPECIFIED TYPE: Status: ACTIVE | Noted: 2024-10-14

## 2024-10-18 PROBLEM — R42 DIZZINESS: Status: ACTIVE | Noted: 2024-10-14

## 2024-10-18 PROBLEM — N31.9 NEUROGENIC BLADDER: Status: ACTIVE | Noted: 2024-10-14

## 2024-10-18 RX ORDER — HYDROMORPHONE HYDROCHLORIDE 2 MG/1
TABLET ORAL
COMMUNITY
Start: 2024-09-18 | End: 2024-10-22

## 2024-10-18 RX ORDER — RIVAROXABAN 10 MG/1
10 TABLET, FILM COATED ORAL
COMMUNITY
Start: 2024-09-13

## 2024-10-18 RX ORDER — OXYCODONE HYDROCHLORIDE 5 MG/1
5 TABLET ORAL
COMMUNITY
Start: 2024-10-04

## 2024-10-18 RX ORDER — FERROUS SULFATE 325(65) MG
1 TABLET ORAL DAILY
COMMUNITY
Start: 2024-10-14

## 2024-10-18 RX ORDER — KETOROLAC TROMETHAMINE 10 MG/1
1 TABLET, FILM COATED ORAL EVERY 6 HOURS
COMMUNITY
Start: 2024-09-13

## 2024-10-18 NOTE — PROGRESS NOTES
Assessment & Plan     Diabetic polyneuropathy associated with type 1 diabetes mellitus (H)  BG are improving  On insulin pump  Follows with Macrina with next visit 11/19/2024    Hyperlipidemia, unspecified hyperlipidemia type  Has not used nitroglycerin but would like to have a fresh rx  - nitroGLYcerin (NITROSTAT) 0.4 MG sublingual tablet; Place 1 tablet (0.4 mg) under the tongue every 5 minutes as needed for chest pain. . Do not crush; maximum of 3 doses in 15 minutes.  - on simvastatin     Primary hypertension  BP at goal  Lisinopril has been stopped d/t issues with orthostatic hypotension    Heart failure with mid-range ejection fraction (H)  EF recovered to EF 60 to 65%, G1DD  - Echocardiogram Complete; Future    Neurogenic bladder / Stage 3a chronic kidney disease (H)  Follows with Dr Lombardi with last visit 10/14/2024. Note reviewed. Follow up in one year  - stable  - stopped lisinopril d/t issues with orthostatic hypotension  - started iron supplements w/o issues   - c/w flomax    Need for prophylactic vaccination and inoculation against influenza  Updated today     High priority for 2019-nCoV vaccine  Updated today     The longitudinal plan of care for the diagnosis(es)/condition(s) as documented were addressed during this visit. Due to the added complexity in care, I will continue to support Richard in the subsequent management and with ongoing continuity of care.      See Patient Instructions    Return in about 3 months (around 1/22/2025) for Lab Work, CDM.    Subjective   Richard is a 59 year old, presenting for the following health issues:  Diabetes, Hypertension, Lipids, Heart Failure, Imm/Inj (Flu Shot), and Imm/Inj (COVID-19 VACCINE)        10/22/2024     8:03 AM   Additional Questions   Roomed by Rosy Hightower   Accompanied by self     HPI     Diabetes Follow-up    How often are you checking your blood sugar? Continuous glucose monitor  What time of day are you checking your blood sugars (select all that  apply)?  Before and after meals and At bedtime  Have you had any blood sugars above 200?  Yes   Have you had any blood sugars below 70?  Yes   What symptoms do you notice when your blood sugar is low?  Shaky, Dizzy, Weak, Lethargy, and Confusion  What concerns do you have today about your diabetes? Blood sugar is often over 200   Do you have any of these symptoms? (Select all that apply)  No numbness or tingling in feet.  No redness, sores or blisters on feet.  No complaints of excessive thirst.  No reports of blurry vision.  No significant changes to weight.  Have you had a diabetic eye exam in the last 12 months? Yes- Date of last eye exam: 07/31/2024    - doing okay, resolving after back injection     Hypertension Follow-up    Do you check your blood pressure regularly outside of the clinic? Yes   Are you following a low salt diet? Yes  Are your blood pressures ever more than 140 on the top number (systolic) OR more   than 90 on the bottom number (diastolic), for example 140/90? Yes    - has never had to use nitroglycerin      BP Readings from Last 2 Encounters:   10/22/24 132/72   10/03/24 111/57     Hemoglobin A1C (%)   Date Value   08/26/2024 6.9 (H)   11/30/2023 7.7 (H)   10/20/2021 7.7 (A)   06/03/2021 7.8 (A)     LDL Cholesterol Calculated (mg/dL)   Date Value   08/26/2024 48   08/16/2023 48   06/14/2021 42   03/24/2020 40         Heart Failure Follow-up   Are you experiencing any shortness of breath? No  Are you experiencing any swelling in your legs or feet?  Stable  Are you using more pillows than usual? No  Do you cough at night?  No  Do you check your weight daily?  No  Have you had a weight change recently?  No  Are you having any of the following side effects from your medications? (Select all that apply)  The patient does not report symptoms of dizziness, fatigue, cough, swelling, or slow heart beat.  Since your last visit, how many times have you gone to the cardiologist, urgent care, emergency  room, or hospital because of your heart failure?   None  Last Echo:   Echo result w/o MOPS: Interpretation SummaryNo vegetation or mass identified, however this does not exclude endocarditis.Very technically difficult study and valvular evaluation is suboptimal. Leftventricular function is decreased. The ejection fraction is 45-50% (mildlyreduced).Global right ventricular function is normal.Both atria appear normal.Trace mitral insufficiency is present.Trace tricuspid insufficiency is present.Trace pulmonic insufficiency is present.Very technically difficult study and valvular evaluation is suboptimal.    Wt Readings from Last 4 Encounters:   10/22/24 90.1 kg (198 lb 9.6 oz)   08/26/24 89.3 kg (196 lb 12.8 oz)   07/18/24 89.7 kg (197 lb 13.6 oz)   07/15/24 89.3 kg (196 lb 14.4 oz)        Chronic Kidney Disease Follow-up  Do you take any over the counter pain medicine?: No      Follows with Dr Lombardi with last visit 10/14/2024. Note reviewed. 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 And has had one episode since stopping lisinopril         Review of Systems   Constitutional:  Positive for chills (chronic issue). Negative for fever.   HENT:  Negative for congestion and rhinorrhea.    Respiratory:   Negative for cough and shortness of breath.    Cardiovascular:  Negative for chest pain and palpitations.   Genitourinary:  Positive for difficulty urinating (self cath).   Neurological:  Positive for dizziness (improved since stopping lisinopril) and light-headedness.           Objective    /72   Pulse 85   Temp 98.2  F (36.8  C) (Tympanic)   Resp 17   Ht 1.524 m (5')   Wt 90.1 kg (198 lb 9.6 oz)   SpO2 92%   BMI 38.79 kg/m    Body mass index is 38.79 kg/m .  Physical Exam  Constitutional:       General: He is not in acute distress.     Appearance: He is not ill-appearing.   Cardiovascular:      Rate and Rhythm: Normal rate and regular rhythm.      Heart sounds: No murmur heard.  Pulmonary:      Effort: Pulmonary effort is normal. No respiratory distress.      Breath sounds: No wheezing or rales.   Musculoskeletal:      Right lower leg: No edema.      Left lower leg: No edema.      Comments: Antalgic gait d/t hip replacement    Neurological:      Mental Status: He is alert.   Psychiatric:         Mood and Affect: Mood normal.            Echo (today)  Interpretation Summary  Global and regional left ventricular function is normal with an EF of 60-65%.  Grade I or early diastolic dysfunction.  The left ventricular function has improved.  Global right ventricular function is normal.  Mild left atrial enlargement is present.  Trace mitral insufficiency is present.  Trace aortic insufficiency is present.  No aortic stenosis is present.  Trace tricuspid insufficiency is present.        Signed Electronically by: Natacha Alvarez MD

## 2024-10-22 ENCOUNTER — OFFICE VISIT (OUTPATIENT)
Dept: FAMILY MEDICINE | Facility: OTHER | Age: 59
End: 2024-10-22
Attending: FAMILY MEDICINE
Payer: COMMERCIAL

## 2024-10-22 ENCOUNTER — HOSPITAL ENCOUNTER (OUTPATIENT)
Dept: CARDIOLOGY | Facility: HOSPITAL | Age: 59
Discharge: HOME | End: 2024-10-22
Attending: FAMILY MEDICINE | Admitting: INTERNAL MEDICINE
Payer: COMMERCIAL

## 2024-10-22 VITALS
HEIGHT: 60 IN | TEMPERATURE: 98.2 F | OXYGEN SATURATION: 92 % | SYSTOLIC BLOOD PRESSURE: 132 MMHG | BODY MASS INDEX: 38.99 KG/M2 | HEART RATE: 85 BPM | DIASTOLIC BLOOD PRESSURE: 72 MMHG | WEIGHT: 198.6 LBS | RESPIRATION RATE: 17 BRPM

## 2024-10-22 DIAGNOSIS — E78.5 HYPERLIPIDEMIA, UNSPECIFIED HYPERLIPIDEMIA TYPE: ICD-10-CM

## 2024-10-22 DIAGNOSIS — I10 PRIMARY HYPERTENSION: ICD-10-CM

## 2024-10-22 DIAGNOSIS — I50.22 HEART FAILURE WITH MID-RANGE EJECTION FRACTION (H): ICD-10-CM

## 2024-10-22 DIAGNOSIS — N18.31 STAGE 3A CHRONIC KIDNEY DISEASE (H): ICD-10-CM

## 2024-10-22 DIAGNOSIS — Z23 NEED FOR PROPHYLACTIC VACCINATION AND INOCULATION AGAINST INFLUENZA: ICD-10-CM

## 2024-10-22 DIAGNOSIS — Z23 HIGH PRIORITY FOR 2019-NCOV VACCINE: ICD-10-CM

## 2024-10-22 DIAGNOSIS — E10.42 DIABETIC POLYNEUROPATHY ASSOCIATED WITH TYPE 1 DIABETES MELLITUS (H): Primary | ICD-10-CM

## 2024-10-22 DIAGNOSIS — N31.9 NEUROGENIC BLADDER: ICD-10-CM

## 2024-10-22 LAB — LVEF ECHO: NORMAL

## 2024-10-22 PROCEDURE — 255N000002 HC RX 255 OP 636: Performed by: FAMILY MEDICINE

## 2024-10-22 PROCEDURE — 93306 TTE W/DOPPLER COMPLETE: CPT | Mod: 26 | Performed by: INTERNAL MEDICINE

## 2024-10-22 PROCEDURE — 91320 SARSCV2 VAC 30MCG TRS-SUC IM: CPT

## 2024-10-22 PROCEDURE — 99214 OFFICE O/P EST MOD 30 MIN: CPT | Performed by: FAMILY MEDICINE

## 2024-10-22 PROCEDURE — G0008 ADMIN INFLUENZA VIRUS VAC: HCPCS

## 2024-10-22 PROCEDURE — G2211 COMPLEX E/M VISIT ADD ON: HCPCS | Performed by: FAMILY MEDICINE

## 2024-10-22 PROCEDURE — G0463 HOSPITAL OUTPT CLINIC VISIT: HCPCS | Mod: 25

## 2024-10-22 PROCEDURE — 90673 RIV3 VACCINE NO PRESERV IM: CPT

## 2024-10-22 PROCEDURE — 90480 ADMN SARSCOV2 VAC 1/ONLY CMP: CPT

## 2024-10-22 PROCEDURE — 999N000208 ECHOCARDIOGRAM COMPLETE

## 2024-10-22 RX ORDER — NITROGLYCERIN 0.4 MG/1
0.4 TABLET SUBLINGUAL EVERY 5 MIN PRN
Qty: 25 TABLET | Refills: 0 | Status: SHIPPED | OUTPATIENT
Start: 2024-10-22

## 2024-10-22 RX ADMIN — PERFLUTREN 2 ML: 6.52 INJECTION, SUSPENSION INTRAVENOUS at 14:09

## 2024-10-22 ASSESSMENT — ENCOUNTER SYMPTOMS
COUGH: 0
LIGHT-HEADEDNESS: 1
SHORTNESS OF BREATH: 0
DIZZINESS: 1
FEVER: 0
PALPITATIONS: 0
DIFFICULTY URINATING: 1
RHINORRHEA: 0
CHILLS: 1

## 2024-10-22 ASSESSMENT — PAIN SCALES - GENERAL: PAINLEVEL: MODERATE PAIN (5)

## 2024-10-22 NOTE — PATIENT INSTRUCTIONS
If you get constipated because of iron supplements, okay to use miralax     Order placed echocardiogram

## 2024-10-30 ENCOUNTER — OFFICE VISIT (OUTPATIENT)
Dept: PODIATRY | Facility: OTHER | Age: 59
End: 2024-10-30
Attending: PODIATRIST
Payer: COMMERCIAL

## 2024-10-30 ENCOUNTER — HOSPITAL ENCOUNTER (EMERGENCY)
Facility: HOSPITAL | Age: 59
Discharge: HOME | End: 2024-10-30
Attending: STUDENT IN AN ORGANIZED HEALTH CARE EDUCATION/TRAINING PROGRAM | Admitting: STUDENT IN AN ORGANIZED HEALTH CARE EDUCATION/TRAINING PROGRAM
Payer: COMMERCIAL

## 2024-10-30 ENCOUNTER — APPOINTMENT (OUTPATIENT)
Dept: GENERAL RADIOLOGY | Facility: HOSPITAL | Age: 59
End: 2024-10-30
Attending: STUDENT IN AN ORGANIZED HEALTH CARE EDUCATION/TRAINING PROGRAM
Payer: COMMERCIAL

## 2024-10-30 VITALS
TEMPERATURE: 97.8 F | HEART RATE: 89 BPM | SYSTOLIC BLOOD PRESSURE: 145 MMHG | DIASTOLIC BLOOD PRESSURE: 85 MMHG | OXYGEN SATURATION: 94 % | RESPIRATION RATE: 20 BRPM

## 2024-10-30 VITALS
DIASTOLIC BLOOD PRESSURE: 86 MMHG | OXYGEN SATURATION: 95 % | SYSTOLIC BLOOD PRESSURE: 143 MMHG | TEMPERATURE: 98 F | HEART RATE: 89 BPM

## 2024-10-30 DIAGNOSIS — M25.552 HIP PAIN, LEFT: ICD-10-CM

## 2024-10-30 DIAGNOSIS — Z13.89 SCREENING FOR DIABETIC PERIPHERAL NEUROPATHY: ICD-10-CM
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
## 2024-10-30 DIAGNOSIS — L84 CALLUS OF FOOT: ICD-10-CM

## 2024-10-30 DIAGNOSIS — E10.65 TYPE 1 DIABETES MELLITUS WITH HYPERGLYCEMIA (H): ICD-10-CM

## 2024-10-30 DIAGNOSIS — E10.42 DIABETIC POLYNEUROPATHY ASSOCIATED WITH TYPE 1 DIABETES MELLITUS (H): ICD-10-CM

## 2024-10-30 DIAGNOSIS — L60.3 ONYCHODYSTROPHY: Primary | ICD-10-CM

## 2024-10-30 PROCEDURE — 11721 DEBRIDE NAIL 6 OR MORE: CPT | Performed by: PODIATRIST

## 2024-10-30 PROCEDURE — 11056 PARNG/CUTG B9 HYPRKR LES 2-4: CPT | Performed by: PODIATRIST

## 2024-10-30 PROCEDURE — 99283 EMERGENCY DEPT VISIT LOW MDM: CPT | Performed by: STUDENT IN AN ORGANIZED HEALTH CARE EDUCATION/TRAINING PROGRAM

## 2024-10-30 PROCEDURE — 99207 PR FOOT EXAM NO CHARGE: CPT | Performed by: PODIATRIST

## 2024-10-30 PROCEDURE — 99213 OFFICE O/P EST LOW 20 MIN: CPT | Mod: 25 | Performed by: PODIATRIST

## 2024-10-30 PROCEDURE — 99283 EMERGENCY DEPT VISIT LOW MDM: CPT

## 2024-10-30 PROCEDURE — G0463 HOSPITAL OUTPT CLINIC VISIT: HCPCS | Mod: 25

## 2024-10-30 PROCEDURE — 73502 X-RAY EXAM HIP UNI 2-3 VIEWS: CPT

## 2024-10-30 PROCEDURE — G0463 HOSPITAL OUTPT CLINIC VISIT: HCPCS

## 2024-10-30 ASSESSMENT — ACTIVITIES OF DAILY LIVING (ADL): ADLS_ACUITY_SCORE: 18.25

## 2024-10-30 ASSESSMENT — PAIN SCALES - GENERAL: PAINLEVEL_OUTOF10: EXTREME PAIN (8)

## 2024-10-30 NOTE — ED NOTES
Had a fall x30 minutes ago, tripped on his feet fell heaf first to the ground. Hit his head, no LOC, denies dizziness, headache, neck pain. PERRLA. Endorses left hip pain, CMS intact, no deformity noted.

## 2024-10-30 NOTE — ED PROVIDER NOTES
[facility]  ED Provider Note    Chief Complaint   Patient presents with    Fall     History:  Richard Harvey is a 59 year old male with history as detailed elsewhere in epic but most pertinent for recent left hip replacement by Dr. Stephens.  He was at podiatry took a bad step fell and landed on his left hip and has pain.  He did strike his head but no loss of consciousness no headache no nausea.  He struck the front of his head.  No numbness tingling or weakness.  Denies neck pain.  Does not think he hurt his head and requested to focus on the left hip    Review of Systems   Performed; see HPI for pertinent positives and negatives.     Medical history, surgical history, and social history was reviewed.  Nursing documentation, triage note, and vitals were reviewed.    Vitals:  BP: 145/85  Pulse: 89  Temp: 97.8  F (36.6  C)  Resp: 18  SpO2: 94 %    Physical Exam:  Constitutional: Alert and conversant. NAD   HENT: NCAT   Eyes: Normal pupils   Neck: supple   CV: No pallor, palpable DP pulse left lower extremity  Pulmonary/Chest: Non-labored respirations  Abdominal: non-distended   MSK: MAO.   Neuro: Alert and appropriate symmetrical upper body strength,   Skin: Warm and dry. No diaphoresis. No rashes on exposed skin    Psych: Appropriate mood and affect       MDM:      ED Course as of 10/30/24 0939   Wed Oct 30, 2024   0909 59-year-old male here with recent hip surgery took a bad step had a fall greatest concern is for some potential damage to the hardware.  Fracture dislocation also considered.  Foot is warm and well-perfused with palpable pulses doubt vascular injury.  With regard to head strike.  Patient would not like a head CT and does not meet [place] head CT criteria.  C-spine cleared clinically.  No evidence of central cord on exam   0934 I did note on his chart that he is reportedly on Xarelto.  He states he has been off for weeks because of concern about potential falls.  Looks like a timely  decision on the part of his primary care provider.   0935 XR Pelvis and Hip Left 2 Views  On independent review hardware looks like it is in the appropriate place with no concerning lucencies no evidence of fracture or dislocation   0935 XR Pelvis and Hip Left 2 Views  MPRESSION: No acute fracture of the pelvis and left hip   0935 Patient ambulatory.  Overall reassuring evaluation.  Reasonable for further outpatient management, discharged stable condition all questions answered and return precautions given       Procedures:  Procedures        Impression:  Final diagnoses:   Fall from slip, trip, or stumble, initial encounter   Hip pain, left            Westley Andrea MD  10/30/24 5255

## 2024-10-30 NOTE — ED TRIAGE NOTES
Fell about thirty minutes walking into podiatry.  Hit head, denies LOC. Denies head pain or vision changes. 8/10 Left hip pain, recent hip surgery.

## 2024-10-30 NOTE — DISCHARGE INSTRUCTIONS
Return to the emergency department for worsening symptoms any concerning symptoms.  Follow-up with your surgeon as needed for ongoing pain.
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
## 2024-10-31 DIAGNOSIS — K31.84 GASTROPARESIS: ICD-10-CM

## 2024-10-31 DIAGNOSIS — E10.65 TYPE 1 DIABETES MELLITUS WITH HYPERGLYCEMIA (H): ICD-10-CM

## 2024-10-31 DIAGNOSIS — K21.9 GASTROESOPHAGEAL REFLUX DISEASE WITHOUT ESOPHAGITIS: ICD-10-CM

## 2024-10-31 RX ORDER — PANTOPRAZOLE SODIUM 40 MG/1
TABLET, DELAYED RELEASE ORAL
Qty: 60 TABLET | Refills: 0 | Status: SHIPPED | OUTPATIENT
Start: 2024-10-31

## 2024-11-01 ENCOUNTER — DOCUMENTATION ONLY (OUTPATIENT)
Dept: FAMILY MEDICINE | Facility: OTHER | Age: 59
End: 2024-11-01

## 2024-11-01 DIAGNOSIS — M79.89 LEG SWELLING: Primary | ICD-10-CM

## 2024-11-05 ENCOUNTER — TELEPHONE (OUTPATIENT)
Dept: FAMILY MEDICINE | Facility: OTHER | Age: 59
End: 2024-11-05

## 2024-11-05 ENCOUNTER — TRANSFERRED RECORDS (OUTPATIENT)
Dept: HEALTH INFORMATION MANAGEMENT | Facility: CLINIC | Age: 59
End: 2024-11-05
Payer: COMMERCIAL

## 2024-11-05 DIAGNOSIS — E10.649 TYPE 1 DIABETES MELLITUS WITH HYPOGLYCEMIA AND WITHOUT COMA (H): Primary | ICD-10-CM

## 2024-11-19 ENCOUNTER — ALLIED HEALTH/NURSE VISIT (OUTPATIENT)
Dept: EDUCATION SERVICES | Facility: OTHER | Age: 59
End: 2024-11-19
Attending: NURSE PRACTITIONER
Payer: COMMERCIAL

## 2024-11-19 VITALS
SYSTOLIC BLOOD PRESSURE: 134 MMHG | RESPIRATION RATE: 16 BRPM | WEIGHT: 196.4 LBS | HEIGHT: 61 IN | OXYGEN SATURATION: 95 % | HEART RATE: 82 BPM | BODY MASS INDEX: 37.08 KG/M2 | DIASTOLIC BLOOD PRESSURE: 76 MMHG

## 2024-11-19 DIAGNOSIS — E10.65 TYPE 1 DIABETES MELLITUS WITH HYPERGLYCEMIA (H): Primary | ICD-10-CM

## 2024-11-19 PROCEDURE — G0463 HOSPITAL OUTPT CLINIC VISIT: HCPCS

## 2024-11-19 RX ORDER — ACYCLOVIR 400 MG/1
TABLET ORAL
Qty: 9 EACH | Refills: 1 | Status: SHIPPED | OUTPATIENT
Start: 2024-11-19

## 2024-11-19 ASSESSMENT — PAIN SCALES - GENERAL: PAINLEVEL_OUTOF10: SEVERE PAIN (6)

## 2024-11-19 NOTE — PATIENT INSTRUCTIONS
Continue working on healthy eating and moving as best as you can (start low and slow, work up to 30 min, 5x/week)    BG goals:  Fasting and before meals <130, >70  2 hour after eating <180    We only need 1/2 of these numbers to be within target then your A1c will be within target    Medication changes  None today    Keep working on the timing of your carb bolus--ideally, before     Follow up   Keep appt with Dr. Alvarez--she will check all of your labs  4 months with me    Call me sooner if any problems/concerns and/or questions develop including consistent low BGs <70 or consistent high BGs >200  [phone] (Unit Coordinator)    [phone] (Tracey, Nurse)    Let me know if there's issues getting the Dexcom G7 AND if your Omnipod 5 pods say G6 or G7 or G6/G7

## 2024-11-19 NOTE — PROGRESS NOTES
SUBJECTIVE:  Richard Harvey, 59 year old, male presents with the following Chief Complaint(s) with HPI to follow:  Chief Complaint   Patient presents with    Diabetes        Diabetes Follow-up    Patient is checking blood sugars: >4x/day using his continuous glucose (Dexcom G6).  Results:    Date: 11/6 to 11/19/24  Glucose management indicator: 6.9%    Average glucose: 149    Glucose range  Very low (<54): 0  low (<70): 1%  In target range (): 76%  High (>180): 20%  Very high (>250): 3%    Symptoms of hypoglycemia (low blood sugar): rare  Paresthesias (numbness or burning in feet) or sores: yes, no sores  Diabetic eye exam within the last year: DUE  Breakfast eaten regularly: Yes  Patient counting carbs: trying       HPI:   Richard's here today for the follow up regarding his Diabetes mellitus, Type 1.    A1c trend  Lab Results   Component Value Date    A1C 6.9 08/26/2024    A1C 7.7 11/30/2023    A1C 7.5 08/16/2023    A1C 7.2 05/08/2023    A1C 6.9 01/31/2023    A1C 7.7 10/20/2021    A1C 7.8 06/03/2021    A1C 7.5 11/30/2020    A1C 7.7 09/01/2020    A1C 8.1 03/24/2020 4/16/24 6.8%    Current Diabetes medication:   1.  Insulin pump (Omnipod 5), uses Novolog  ASA use: no, on Xarleto  Statin use: yes    Richard reports the following:  No issues with the insulin pump  No issues with the Dexcom G6  Would like to upgrade to the Dexcom G7    Recently saw Dr. Lombardi for his kidneys  Started on iron supplements (due to his Hb)  States this causes him to be constipated  Taking them every 3 days  Wondering about iron enriched food    Has an appt with Dr. Alvarez in January     Of note,   Omnipod 5 pods: [employer] [facility]   Dexcom G6: [facility]   Novolog: [facility]    Weight trend:  Wt Readings from Last 4 Encounters:   11/19/24 89.1 kg (196 lb 6.4 oz)   10/22/24 90.1 kg (198 lb 9.6 oz)   08/26/24 89.3 kg (196 lb 12.8 oz)   07/18/24 89.7 kg (197 lb 13.6 oz)     Patient Active Problem List   Diagnosis    DM type 1 (diabetes  mellitus, type 1) (H)    HTN (hypertension)    Hyperlipidemia    ACP (advance care planning)    Diabetic polyneuropathy associated with type 1 diabetes mellitus (H)    Class 2 obesity in adult    Obesity (BMI 35.0-39.9) with comorbidity (H)    Gastroparesis    Gastroesophageal reflux disease without esophagitis    Chronic bilateral low back pain with right-sided sciatica    Acute cystitis without hematuria    Sepsis, due to unspecified organism, unspecified whether acute organ dysfunction present (H)    ELMA (acute kidney injury) (H)    Pyelonephritis, acute    Benign prostatic hyperplasia with urinary retention    Stage 3a chronic kidney disease (H)    Vitamin D deficiency    Heart failure with mid-range ejection fraction (H) - resolved    Anemia, unspecified type    Dizziness    Neurogenic bladder       Past Medical History:   Diagnosis Date    BPH (benign prostatic hyperplasia)     Diabetes mellitus type 1, controlled (H)     HLD (hyperlipidemia)     HTN (hypertension)     Neuropathy     Type 1 diabetes (H)        Past Surgical History:   Procedure Laterality Date    COLONOSCOPY N/A 06/27/2023    Procedure: Colonoscopy;  Surgeon: Raúl Alcaraz MD;  Location: [facility] OR    COLONOSCOPY - HIM SCAN N/A 09/19/2016    Procedure: COLONOSCOPY SCREENING; Surgeon: Rudy Rojo MD; Location: [facility]    ESOPHAGOSCOPY, GASTROSCOPY, DUODENOSCOPY (EGD), COMBINED  08/13/2019    IR PICC PLACEMENT > 5 YRS OF AGE  04/21/2023    JOINT REPLACEMENT, HIP RT/LT Left 09/18/2024       Family History   Problem Relation Age of Onset    No Known Problems Mother     Hypertension Father     Hypertension Brother        Social History     Tobacco Use    Smoking status: Never     Passive exposure: Never    Smokeless tobacco: Never   Substance Use Topics    Alcohol use: No     Alcohol/week: 0.0 standard drinks of alcohol       Current Outpatient Medications   Medication Sig Dispense Refill    Continuous Glucose Sensor (DEXCOM G7 SENSOR) MISC  Change every 10 days. 9 each 1    blood glucose (CONTOUR NEXT TEST) test strip TEST 6 TIMES DAILY, OR AS DIRECTED. 200 strip 3    CALCIUM-VITAMIN D PO Take 1 tablet by mouth daily      Continuous Blood Gluc  (DEXCOM G6 ) DAYAN 1 each continuous To be used per manufacture's recommendation 1 each 2    Continuous Blood Gluc Sensor (DEXCOM G6 SENSOR) MISC CHANGE EVERY 10 DAYS 3 each 11    Continuous Blood Gluc Transmit (DEXCOM G6 TRANSMITTER) MISC CHANGE EVERY 3 MONTHS 1 each 4    cyclobenzaprine (FLEXERIL) 10 MG tablet Take 1 tablet (10 mg) by mouth 3 times daily as needed for muscle spasms. 60 tablet 3    ferrous sulfate (FEROSUL) 325 (65 Fe) MG tablet Take 1 tablet by mouth daily.      gabapentin (NEURONTIN) 300 MG capsule TAKE 1 CAPSULE BY MOUTH THREE TIMES DAILY 90 capsule 5    Glucagon (GVOKE HYPOPEN 2-PACK) 1 MG/0.2ML SOAJ Inject 1 mg Subcutaneous as needed (for low blood glucose) 0.4 mL 3    insulin aspart (NOVOLOG FLEXPEN) 100 UNIT/ML pen 1 unit for every 15 grams of carbs consumed + 1 unit for every 60 points, >130 before meals. 1:15 before snacks      insulin aspart (NOVOLOG VIAL) 100 UNITS/ML vial USE WITH INSULIN PUMP FOR A TOTAL DAILY USAGE  UNITS. 30 mL 4    insulin degludec (TRESIBA) 100 UNIT/ML pen Inject 20 Units subcutaneously daily as needed for other. Uses when pump is not working      Insulin Disposable Pump (OMNIPOD 5 G6 INTRO, GEN 5,) KIT 1 kit continuous 1 kit 0    Insulin Disposable Pump (OMNIPOD 5 PODS, GEN 5,) MISC 1 each every 48 hours. 15 each 5    INSULIN PUMP - OUTPATIENT Insulin pump: Omnipod 5  Update: 7/18/24  Basal  0000 1.1  0200 1.05  0600 0.95  1030 0.7  1530 0.9  1800 1.15  TDD: 23.325  CR  0000 12  1200 13  1600 14  ISF  0000 65  1200 60  1800 55  BG target range  0000 110  BG correction threshold  0000 140  0900 130  2200 140  Active insulin time: 4 hours      ipratropium - albuterol 0.5 mg/2.5 mg/3 mL (DUONEB) 0.5-2.5 (3) MG/3ML neb solution Take 1 vial (3  mLs) by nebulization every 6 hours as needed for shortness of breath, wheezing or cough 90 mL 1    ketorolac (TORADOL) 10 MG tablet Take 1 tablet by mouth every 6 hours.      Lidocaine (EQ LIDOCAINE PAIN RELIEVING) 4 % Patch APPLY 1  TOPICALLY EVERY 24 HOURS --  TO  PREVENT  LIDOCAINE  TOXICITY,  PATIENT  SHOULD  BE  PATCH  FREE  FOR  12  HOURS  DAILY 20 patch 3    loperamide (IMODIUM A-D) 2 MG tablet Take 1 tablet (2 mg) by mouth 4 times daily as needed for diarrhea 30 tablet 0    Multiple Vitamin (MULTI-VITAMIN DAILY PO) Take 1 tablet by mouth daily      nitroGLYcerin (NITROSTAT) 0.4 MG sublingual tablet Place 1 tablet (0.4 mg) under the tongue every 5 minutes as needed for chest pain. . Do not crush; maximum of 3 doses in 15 minutes. 25 tablet 0    ondansetron (ZOFRAN) 4 MG tablet Take 1 tablet (4 mg) by mouth every 8 hours as needed for nausea 90 tablet 3    order for DME Equipment being ordered:     1.  [employer] insulin pump supplies--insertion sets and reserviors  Disp: 1 month  Refill: 11 months 30 days 11    oxyCODONE (ROXICODONE) 5 MG tablet Take 5 mg by mouth.      pantoprazole (PROTONIX) 40 MG EC tablet Take 1 tablet by mouth twice daily 60 tablet 0    simvastatin (ZOCOR) 40 MG tablet TAKE 1 TABLET BY MOUTH AT BEDTIME 90 tablet 3    tamsulosin (FLOMAX) 0.4 MG capsule TAKE 1 CAPSULE BY MOUTH IN THE EVENING 90 capsule 3    urea (UREA 10 HYDRATING) 10 % external cream APPLY  CREAM TOPICALLY TO AFFECTED AREA(S) AS NEEDED FOR CALLOUSED SKIN 85 g 3    XARELTO ANTICOAGULANT 10 MG TABS tablet Take 10 mg by mouth.         No Known Allergies    REVIEW OF SYSTEMS  Skin: negative  Eyes: negative; wears glasses   Ears/Nose/Throat:  hx of allergies  Respiratory: No SOB, dyspnea on exertion, cough, or hemoptysis  Cardiovascular: negative  Gastrointestinal: hx of nausea--same  Genitourinary: + self cath; hx of urinary retention--same  Musculoskeletal: + hand arthritis; hx of back pain, shoulders, knees   Neurologic:  "positive for numbness or tingling of hands and numbness or tingling of feet--worse   Psychiatric: negative  Hematologic/Lymphatic/Immunologic: negative  Endocrine: positive for diabetes    OBJECTIVE:  /76   Pulse 82   Resp 16   Ht 1.537 m (5' 0.5\")   Wt 89.1 kg (196 lb 6.4 oz)   SpO2 95%   BMI 37.73 kg/m    Constitutional: alert and no distress  Respiratory:  Good diaphragmatic excursion.   Musculoskeletal: extremities normal- no gross deformities noted and walks with a limp--uses a cane  Skin: no suspicious lesions and/or rashes to visible skin  Psychiatric: mentation appears normal and affect normal/bright      LAB  Results for orders placed or performed in visit on 11/19/24   GLUCOSE MONITOR, 72 HOUR, PHYS INTERP     Status: None    Narrative    Date: 11/6 to 11/19/24  Glucose management indicator: 6.9%    Average glucose: 149    Glucose range  Very low (<54): 0  low (<70): 1%  In target range (): 76%  High (>180): 20%  Very high (>250): 3%         ASSESSMENT / PLAN:.  (E10.65) Type 1 diabetes mellitus with hyperglycemia (H)  (primary encounter diagnosis)  Comment: noted insulin pump and CGM download    Insulin   Basal: 27.6 (58%)  Bolus: 19.6 (42%)  TDD: 55    Automated mode: 100%    Insulin pump: Omnipod 5  Updated 11/19/24  Basal:   0000 1.1  0200 1.05  0600 0.95  1030 0.7  1530 0.9  1800 1.15  TDD: 23.325  CR:  0000 12  1200 13  1600 14  ISF  0000 65  1200 60  1800 55  BG target: 110  BG correction  0000 140  0900 130  2000 140    Plan: Continuous Glucose Sensor (DEXCOM G7 SENSOR)         MISC, GLUCOSE MONITOR, 72 HOUR, PHYS INTERP          Order sent for Dexcom G7  Explained the process of switching to the Dexcom G7--new tiago; switch on PDM    Noted A1c, 6.9%--great job :)           TIR the past 2 weeks: 76%  BG ave: 149    Keep working on the timing of his carb bolus--ideally before    Follow up  Dr. Alvarez as scheduled  Me in 3-4 months    Call sooner if any issues/concerns develop and/or " continued issues with low BGs    Let me know if there's any issues getting the Dexcom G7 and/or need help setting it up    Patient Instructions   Continue working on healthy eating and moving as best as you can (start low and slow, work up to 30 min, 5x/week)    BG goals:  Fasting and before meals <130, >70  2 hour after eating <180    We only need 1/2 of these numbers to be within target then your A1c will be within target    Medication changes  None today    Keep working on the timing of your carb bolus--ideally, before     Follow up   Keep appt with Dr. Alvarez--she will check all of your labs  4 months with me    Call me sooner if any problems/concerns and/or questions develop including consistent low BGs <70 or consistent high BGs >200  [phone] (Unit Coordinator)    [phone] (Tracey, Nurse)    Let me know if there's issues getting the Dexcom G7 AND if your Omnipod 5 pods say G6 or G7 or G6/G7    Time: 25 min + 5 min CGM review  Barrier: none  Willingness to learn: accepting    Macrina Dixon [facility]  Diabetes and Wound Care    With the electronic record, we can now more quickly and easily track our patient diabetic goals. Our diabetes clinical review is in progress and these are the indicators we are monitoring for good diabetes health.     1.) HbA1C less than 7 (measurement of your average blood sugars)  2.) Blood Pressure less than 140/80  3.) LDL less than 100 (bad cholesterol)  4.) HbA1C is checked in the last 6 months and below 7% (more frequently if not at goal or adjusting medications)  5.) LDL is checked in the last 12 months (more frequently if not at goal or adjusting medications)  6.) Taking one baby aspirin daily (unless otherwise instructed)  7.) No tobacco use  8) Statin use     You have achieved 8 out of 8 of these and I am encouraging you to come in and get tuned up to achieve 8 out of 8!  Here is what you have achieved so far in my goals for you:  1.) HbA1C  less than 7:                  "             YES  Your last  HbA1C :     Lab Results   Component Value Date    A1C 6.9 08/26/2024    A1C 7.7 11/30/2023    A1C 7.5 08/16/2023    A1C 7.2 05/08/2023    A1C 6.9 01/31/2023    A1C 7.7 10/20/2021    A1C 7.8 06/03/2021    A1C 7.5 11/30/2020    A1C 7.7 09/01/2020    A1C 8.1 03/24/2020 4/18/24 76.8%    2.) Blood Pressure less than 140/80:       YES      Your last    /76   Pulse 82   Resp 16   Ht 1.537 m (5' 0.5\")   Wt 89.1 kg (196 lb 6.4 oz)   SpO2 95%   BMI 37.73 kg/m      3.) LDL less than 100:                              YES      Your last   LDL         LDL Cholesterol Calculated   Date Value Ref Range Status   08/26/2024 48 <=100 mg/dL Final   06/14/2021 42 <100 mg/dL Final     Comment:     Desirable:       <100 mg/dl       4.) Checked HbA1C in the past 6 months: YES      5.) Checked LDL in the past 12 months:    YES    6.) Taking one aspirin daily:                       NO--intentionally     7.) No tobacco use:                                        YES      8.) Statin use      YES       CGM requirements:   Patient has been seen in the clinic within the last 6 month  Diagnosis of Type 1 diabetes  Has been testing their BGs 4x/day using the Dexcom  Uses an insulin pump   Requires frequent adjustments to their treatment regimen based on BGM and/or CGM testing results.                             "

## 2024-12-21 NOTE — PATIENT INSTRUCTIONS
Continue working on healthy eating and moving as best as you can (start low and slow, work up to 30 min, 5x/week)    BG goals:  Fasting and before meals <130, >70  2 hour after eating <180    We only need 1/2 of these numbers to be within target then your A1c will be within target    Medication changes   Let me if you have continued issues with low BGs    Follow up   3 months    Call me sooner if any problems/concerns and/or questions develop including consistent low BGs <70 or consistent high BGs >200  [phone] (Unit Coordinator)    [phone] (Nurse)      A1c   Lab Results   Component Value Date    A1C 7.7 10/20/2021    A1C 7.8 06/03/2021    A1C 7.5 11/30/2020    A1C 7.7 09/01/2020    A1C 8.1 03/24/2020       
English

## 2024-12-31 DIAGNOSIS — K31.84 GASTROPARESIS: ICD-10-CM

## 2024-12-31 DIAGNOSIS — K21.9 GASTROESOPHAGEAL REFLUX DISEASE WITHOUT ESOPHAGITIS: ICD-10-CM

## 2024-12-31 RX ORDER — PANTOPRAZOLE SODIUM 40 MG/1
TABLET, DELAYED RELEASE ORAL
Qty: 60 TABLET | Refills: 11 | Status: SHIPPED | OUTPATIENT
Start: 2024-12-31

## 2025-01-02 ENCOUNTER — TELEPHONE (OUTPATIENT)
Dept: PODIATRY | Facility: OTHER | Age: 60
End: 2025-01-02

## 2025-01-02 ENCOUNTER — OFFICE VISIT (OUTPATIENT)
Dept: PODIATRY | Facility: OTHER | Age: 60
End: 2025-01-02
Attending: PODIATRIST
Payer: COMMERCIAL

## 2025-01-02 VITALS
HEART RATE: 94 BPM | SYSTOLIC BLOOD PRESSURE: 138 MMHG | TEMPERATURE: 98.2 F | OXYGEN SATURATION: 94 % | DIASTOLIC BLOOD PRESSURE: 70 MMHG

## 2025-01-02 DIAGNOSIS — L60.3 ONYCHODYSTROPHY: Primary | ICD-10-CM

## 2025-01-02 DIAGNOSIS — L84 CALLUS OF FOOT: ICD-10-CM

## 2025-01-02 DIAGNOSIS — E10.65 TYPE 1 DIABETES MELLITUS WITH HYPERGLYCEMIA (H): ICD-10-CM

## 2025-01-02 DIAGNOSIS — E10.42 DIABETIC POLYNEUROPATHY ASSOCIATED WITH TYPE 1 DIABETES MELLITUS (H): ICD-10-CM

## 2025-01-02 DIAGNOSIS — Z13.89 SCREENING FOR DIABETIC PERIPHERAL NEUROPATHY: ICD-10-CM

## 2025-01-02 PROCEDURE — 11721 DEBRIDE NAIL 6 OR MORE: CPT | Performed by: PODIATRIST

## 2025-01-02 PROCEDURE — G0463 HOSPITAL OUTPT CLINIC VISIT: HCPCS

## 2025-01-02 PROCEDURE — 11056 PARNG/CUTG B9 HYPRKR LES 2-4: CPT | Performed by: PODIATRIST

## 2025-01-02 ASSESSMENT — PAIN SCALES - GENERAL: PAINLEVEL_OUTOF10: SEVERE PAIN (6)
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
## 2025-01-16 DIAGNOSIS — G89.29 CHRONIC BILATERAL LOW BACK PAIN WITH RIGHT-SIDED SCIATICA: ICD-10-CM

## 2025-01-16 DIAGNOSIS — M54.41 CHRONIC BILATERAL LOW BACK PAIN WITH RIGHT-SIDED SCIATICA: ICD-10-CM

## 2025-01-16 RX ORDER — LIDOCAINE 4 G/100G
PATCH TOPICAL
Qty: 10 PATCH | Refills: 0 | Status: SHIPPED | OUTPATIENT
Start: 2025-01-16

## 2025-03-06 ENCOUNTER — OFFICE VISIT (OUTPATIENT)
Dept: PODIATRY | Facility: OTHER | Age: 60
End: 2025-03-06
Attending: PODIATRIST
Payer: COMMERCIAL

## 2025-03-06 VITALS
HEART RATE: 79 BPM | TEMPERATURE: 97.4 F | OXYGEN SATURATION: 93 % | DIASTOLIC BLOOD PRESSURE: 78 MMHG | SYSTOLIC BLOOD PRESSURE: 128 MMHG

## 2025-03-06 DIAGNOSIS — L60.0 INGROWN TOENAIL: ICD-10-CM

## 2025-03-06 DIAGNOSIS — Z13.89 SCREENING FOR DIABETIC PERIPHERAL NEUROPATHY: ICD-10-CM

## 2025-03-06 DIAGNOSIS — E10.65 TYPE 1 DIABETES MELLITUS WITH HYPERGLYCEMIA (H): ICD-10-CM

## 2025-03-06 DIAGNOSIS — L60.3 ONYCHODYSTROPHY: Primary | ICD-10-CM

## 2025-03-06 DIAGNOSIS — L84 CALLUS OF FOOT: ICD-10-CM

## 2025-03-06 DIAGNOSIS — E10.42 DIABETIC POLYNEUROPATHY ASSOCIATED WITH TYPE 1 DIABETES MELLITUS (H): ICD-10-CM

## 2025-03-06 PROCEDURE — 11056 PARNG/CUTG B9 HYPRKR LES 2-4: CPT | Performed by: PODIATRIST

## 2025-03-06 PROCEDURE — 11721 DEBRIDE NAIL 6 OR MORE: CPT | Performed by: PODIATRIST

## 2025-03-06 PROCEDURE — G0463 HOSPITAL OUTPT CLINIC VISIT: HCPCS

## 2025-03-06 ASSESSMENT — PAIN SCALES - GENERAL: PAINLEVEL_OUTOF10: MODERATE PAIN (6)
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
## 2025-03-07 ENCOUNTER — TELEPHONE (OUTPATIENT)
Dept: FAMILY MEDICINE | Facility: OTHER | Age: 60
End: 2025-03-07

## 2025-03-07 DIAGNOSIS — N31.9 NEUROGENIC BLADDER: Primary | ICD-10-CM

## 2025-03-07 DIAGNOSIS — R33.9 URINE RETENTION: ICD-10-CM

## 2025-03-07 NOTE — TELEPHONE ENCOUNTER
Reason for call:  Medication      Have you contacted your pharmacy? Yes    Medication: Catheters   What Pharmacy do you use? [facility]

## 2025-03-11 NOTE — TELEPHONE ENCOUNTER
Patient called scheduling line back with information. States they are needing Cure Ultra M16C catheters. States they believe the size is 16 [place]. Only needs catheters, does not need other supplies with catheters. Can reach patient at [phone] if needing to discuss further.

## 2025-03-13 ENCOUNTER — ALLIED HEALTH/NURSE VISIT (OUTPATIENT)
Dept: EDUCATION SERVICES | Facility: OTHER | Age: 60
End: 2025-03-13
Attending: NURSE PRACTITIONER
Payer: COMMERCIAL

## 2025-03-13 VITALS
HEIGHT: 61 IN | DIASTOLIC BLOOD PRESSURE: 77 MMHG | SYSTOLIC BLOOD PRESSURE: 145 MMHG | WEIGHT: 201.7 LBS | OXYGEN SATURATION: 94 % | BODY MASS INDEX: 38.08 KG/M2 | RESPIRATION RATE: 16 BRPM | HEART RATE: 86 BPM

## 2025-03-13 DIAGNOSIS — L20.9 ATOPIC DERMATITIS, UNSPECIFIED TYPE: Primary | ICD-10-CM

## 2025-03-13 DIAGNOSIS — I10 ESSENTIAL HYPERTENSION: ICD-10-CM

## 2025-03-13 DIAGNOSIS — E10.65 TYPE 1 DIABETES MELLITUS WITH HYPERGLYCEMIA (H): ICD-10-CM

## 2025-03-13 PROCEDURE — 1125F AMNT PAIN NOTED PAIN PRSNT: CPT | Performed by: NURSE PRACTITIONER

## 2025-03-13 PROCEDURE — G0463 HOSPITAL OUTPT CLINIC VISIT: HCPCS

## 2025-03-13 PROCEDURE — 3078F DIAST BP <80 MM HG: CPT | Performed by: NURSE PRACTITIONER

## 2025-03-13 PROCEDURE — 99213 OFFICE O/P EST LOW 20 MIN: CPT | Performed by: NURSE PRACTITIONER

## 2025-03-13 PROCEDURE — 3077F SYST BP >= 140 MM HG: CPT | Performed by: NURSE PRACTITIONER

## 2025-03-13 PROCEDURE — 95251 CONT GLUC MNTR ANALYSIS I&R: CPT | Performed by: NURSE PRACTITIONER

## 2025-03-13 RX ORDER — TRIAMCINOLONE ACETONIDE 1 MG/G
CREAM TOPICAL 2 TIMES DAILY
Qty: 454 G | Refills: 1 | Status: SHIPPED | OUTPATIENT
Start: 2025-03-13

## 2025-03-13 RX ORDER — ACYCLOVIR 400 MG/1
TABLET ORAL
Qty: 9 EACH | Refills: 1 | Status: SHIPPED | OUTPATIENT
Start: 2025-03-13

## 2025-03-13 ASSESSMENT — PAIN SCALES - GENERAL: PAINLEVEL_OUTOF10: SEVERE PAIN (7)

## 2025-03-13 NOTE — PATIENT INSTRUCTIONS
Continue working on healthy eating and moving as best as you can (start low and slow, work up to 30 min, 5x/week)    BG goals:  Fasting and before meals <130, >70  2 hour after eating <180    We only need 1/2 of these numbers to be within target then your A1c will be within target    Medication changes  None today    Keep working on the timing of your carb bolus--ideally, before     Follow up   Keep appt with Dr. Alvarez--she will check all of your labs  4 months with me    Call me sooner if any problems/concerns and/or questions develop including consistent low BGs <70 or consistent high BGs >200  [phone] (Unit Coordinator)    [phone] (Tracey, Nurse)    Let me know if there's issues getting the Dexcom G7 AND if your Omnipod 5 pods say G6 or G7 or G6/G7

## 2025-04-01 DIAGNOSIS — E10.65 TYPE 1 DIABETES MELLITUS WITH HYPERGLYCEMIA (H): ICD-10-CM

## 2025-04-01 RX ORDER — INSULIN PMP CART,AUT,G6/7,CNTR
1 EACH SUBCUTANEOUS
Qty: 15 EACH | Refills: 5 | Status: SHIPPED | OUTPATIENT
Start: 2025-04-01

## 2025-04-01 NOTE — PROGRESS NOTES
Assessment & Plan     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) / Type 1 diabetes mellitus with hyperglycemia (H)  A1c today 7.3. no change in high/lows  [place] PDMP reviewed and appropriate    - gabapentin (NEURONTIN) 300 MG capsule; Take 1 capsule (300 mg) by mouth 3 times daily.  - Hemoglobin A1c  - on insulin pump, follows with [facility] diabetic education, with next visit 6/17/2025      Urinary retention due to benign prostatic hyperplasia / Neurogenic bladder  Follows with [facility] urology with next visit 5/2/2025 for face to face for catheter supplies  - tamsulosin (FLOMAX) 0.4 MG capsule; Take 1 capsule (0.4 mg) by mouth daily.    Stage 3a chronic kidney disease (H)  Follows with Dr Lombardi with last visit 10/14/2024. Annual visits with next scheduled for 10/14/2025  Cr/gfr stable  - Basic metabolic panel  - not on lisinopril d/t issues with low blood pressures, not on SLGT2 d/t DM1    Anemia, unspecified type  Hgb 13 improved from 9.9.   - CBC with platelets  - Ferritin  - Iron & Iron Binding Capacity  - c/w iron supplements as able.   - will hold on IV iron for now       The longitudinal plan of care for the diagnosis(es)/condition(s) as documented were addressed during this visit. Due to the added complexity in care, I will continue to support Richard in the subsequent management and with ongoing continuity of care.          See Patient Instructions    Return in about 3 months (around 7/16/2025) for CDM, Physical Exam, Lab Work.    Subjective   Richard is a 59 year old, presenting for the following health issues:  Lipids, Hypertension, Diabetes, and Back Pain        4/7/2025     8:01 AM   Additional Questions   Roomed by Rosy Hightower   Accompanied by self     History of Present Illness       CKD: He is not using over the counter pain medicine.     Diabetes:   He presents for follow up of diabetes.   He is checking home blood glucose with a continuous glucose monitor.   He checks blood glucose  before meals and at bedtime.  Blood glucose is sometimes over 200 and sometimes under 70. He is aware of hypoglycemia symptoms including shakiness, dizziness, weakness, lethargy and blurred vision.    He has no concerns regarding his diabetes at this time.  He is having numbness in feet and burning in feet.            He eats 2-3 servings of fruits and vegetables daily.He consumes 0 sweetened beverage(s) daily.He exercises with enough effort to increase his heart rate 30 to 60 minutes per day.  He exercises with enough effort to increase his heart rate 5 days per week.   He is taking medications regularly.        Diabetes Follow-up    How often are you checking your blood sugar? Continuous glucose monitor  What time of day are you checking your blood sugars (select all that apply)?   Throughout the day  Have you had any blood sugars above 200?  Yes   Have you had any blood sugars below 70?  Yes. Lowest 58   What symptoms do you notice when your blood sugar is low?  Shaky, Dizzy, Weak, Lethargy, and Blurred vision  What concerns do you have today about your diabetes? None   Do you have any of these symptoms? (Select all that apply)  Burning in feet    - no changes   Glucose range  Very low (<54): 0  low (<70): 2%  In target range (): 72%  High (>180): 21%  Very high (>250): 5%    Hyperlipidemia Follow-Up    Are you regularly taking any medication or supplement to lower your cholesterol?   Yes- Simvastatin  Are you having muscle aches or other side effects that you think could be caused by your cholesterol lowering medication?  No    Hypertension Follow-up    Do you check your blood pressure regularly outside of the clinic? Yes   Are you following a low salt diet? Yes  Are your blood pressures ever more than 140 on the top number (systolic) OR more   than 90 on the bottom number (diastolic), for example 140/90? Yes    BP Readings from Last 2 Encounters:   04/07/25 110/72   03/13/25 (!) 145/77     Hemoglobin A1C  (%)   Date Value   08/26/2024 6.9 (H)   11/30/2023 7.7 (H)   10/20/2021 7.7 (A)   06/03/2021 7.8 (A)     LDL Cholesterol Calculated (mg/dL)   Date Value   08/26/2024 48   08/16/2023 48   06/14/2021 42   03/24/2020 40         Chronic/Recurring Back Pain Follow Up    Where is your back pain located? (Select all that apply) low back bilateral  How would you describe your back pain?  ache  Where does your back pain spread? Down the right leg  Since your last clinic visit for back pain, how has your pain changed? always present, but gets better and worse    - left hip is doing okay  - issues with back, especially end of the day   - s/p back injection which results in DM issues     # CKD  / anemia  - iron supplements - issues with constipation   - uses fruit / veggies for constipation   - does not like prune juice     Review of Systems  Constitutional, HEENT, cardiovascular, pulmonary, gi and gu systems are negative, except as otherwise noted.      Objective    /72   Pulse 79   Temp 98.3  F (36.8  C) (Tympanic)   Resp 17   Ht 1.524 m (5')   Wt 93 kg (205 lb)   SpO2 93%   BMI 40.04 kg/m    Body mass index is 40.04 kg/m .  Physical Exam  Constitutional:       General: He is not in acute distress.     Appearance: He is not ill-appearing.   Cardiovascular:      Rate and Rhythm: Normal rate and regular rhythm.      Heart sounds: No murmur heard.  Pulmonary:      Effort: Pulmonary effort is normal. No respiratory distress.      Breath sounds: No wheezing or rales.   Neurological:      Mental Status: He is alert.   Psychiatric:         Mood and Affect: Mood normal.            Results for orders placed or performed in visit on 04/07/25 (from the past 24 hours)   Hemoglobin A1c   Result Value Ref Range    Estimated Average Glucose 163 (H) <117 mg/dL    Hemoglobin A1C 7.3 (H) <5.7 %   Basic metabolic panel   Result Value Ref Range    Sodium 140 135 - 145 mmol/L    Potassium 4.7 3.4 - 5.3 mmol/L    Chloride 104 98 - 107  mmol/L    Carbon Dioxide (CO2) 25 22 - 29 mmol/L    Anion Gap 11 7 - 15 mmol/L    Urea Nitrogen 26.8 (H) 8.0 - 23.0 mg/dL    Creatinine 1.49 (H) 0.67 - 1.17 mg/dL    GFR Estimate 53 (L) >60 mL/min/1.73m2    Calcium 9.5 8.8 - 10.4 mg/dL    Glucose 143 (H) 70 - 99 mg/dL   CBC with platelets   Result Value Ref Range    WBC Count 11.2 (H) 4.0 - 11.0 10e3/uL    RBC Count 4.51 4.40 - 5.90 10e6/uL    Hemoglobin 13.0 (L) 13.3 - 17.7 g/dL    Hematocrit 40.0 40.0 - 53.0 %    MCV 89 78 - 100 fL    MCH 28.8 26.5 - 33.0 pg    MCHC 32.5 31.5 - 36.5 g/dL    RDW 13.5 10.0 - 15.0 %    Platelet Count 305 150 - 450 10e3/uL   Ferritin   Result Value Ref Range    Ferritin 157 31 - 409 ng/mL   Iron & Iron Binding Capacity   Result Value Ref Range    Iron 52 (L) 61 - 157 ug/dL    Iron Binding Capacity 238 (L) 240 - 430 ug/dL    Iron Sat Index 22 15 - 46 %           Signed Electronically by: Natacha Alvarez MD

## 2025-04-07 ENCOUNTER — OFFICE VISIT (OUTPATIENT)
Dept: FAMILY MEDICINE | Facility: OTHER | Age: 60
End: 2025-04-07
Attending: FAMILY MEDICINE
Payer: COMMERCIAL

## 2025-04-07 ENCOUNTER — APPOINTMENT (OUTPATIENT)
Dept: LAB | Facility: OTHER | Age: 60
End: 2025-04-07
Attending: FAMILY MEDICINE
Payer: COMMERCIAL

## 2025-04-07 VITALS
BODY MASS INDEX: 40.25 KG/M2 | DIASTOLIC BLOOD PRESSURE: 72 MMHG | OXYGEN SATURATION: 93 % | TEMPERATURE: 98.3 F | RESPIRATION RATE: 17 BRPM | HEIGHT: 60 IN | HEART RATE: 79 BPM | WEIGHT: 205 LBS | SYSTOLIC BLOOD PRESSURE: 110 MMHG

## 2025-04-07 DIAGNOSIS — R33.8 URINARY RETENTION DUE TO BENIGN PROSTATIC HYPERPLASIA: ICD-10-CM

## 2025-04-07 DIAGNOSIS — N40.1 URINARY RETENTION DUE TO BENIGN PROSTATIC HYPERPLASIA: ICD-10-CM

## 2025-04-07 DIAGNOSIS — N31.9 NEUROGENIC BLADDER: ICD-10-CM

## 2025-04-07 DIAGNOSIS — E10.649 TYPE 1 DIABETES MELLITUS WITH HYPOGLYCEMIA UNAWARENESS (H): Primary | ICD-10-CM

## 2025-04-07 DIAGNOSIS — N18.31 STAGE 3A CHRONIC KIDNEY DISEASE (H): ICD-10-CM

## 2025-04-07 DIAGNOSIS — D64.9 ANEMIA, UNSPECIFIED TYPE: ICD-10-CM

## 2025-04-07 DIAGNOSIS — E10.65 TYPE 1 DIABETES MELLITUS WITH HYPERGLYCEMIA (H): ICD-10-CM

## 2025-04-07 LAB
ANION GAP SERPL CALCULATED.3IONS-SCNC: 11 MMOL/L (ref 7–15)
BUN SERPL-MCNC: 26.8 MG/DL (ref 8–23)
CALCIUM SERPL-MCNC: 9.5 MG/DL (ref 8.8–10.4)
CHLORIDE SERPL-SCNC: 104 MMOL/L (ref 98–107)
CREAT SERPL-MCNC: 1.49 MG/DL (ref 0.67–1.17)
EGFRCR SERPLBLD CKD-EPI 2021: 53 ML/MIN/1.73M2
ERYTHROCYTE [DISTWIDTH] IN BLOOD BY AUTOMATED COUNT: 13.5 % (ref 10–15)
EST. AVERAGE GLUCOSE BLD GHB EST-MCNC: 163 MG/DL
FERRITIN SERPL-MCNC: 157 NG/ML (ref 31–409)
GLUCOSE SERPL-MCNC: 143 MG/DL (ref 70–99)
HBA1C MFR BLD: 7.3 %
HCO3 SERPL-SCNC: 25 MMOL/L (ref 22–29)
HCT VFR BLD AUTO: 40 % (ref 40–53)
HGB BLD-MCNC: 13 G/DL (ref 13.3–17.7)
IRON BINDING CAPACITY (ROCHE): 238 UG/DL (ref 240–430)
IRON SATN MFR SERPL: 22 % (ref 15–46)
IRON SERPL-MCNC: 52 UG/DL (ref 61–157)
MCH RBC QN AUTO: 28.8 PG (ref 26.5–33)
MCHC RBC AUTO-ENTMCNC: 32.5 G/DL (ref 31.5–36.5)
MCV RBC AUTO: 89 FL (ref 78–100)
PLATELET # BLD AUTO: 305 10E3/UL (ref 150–450)
POTASSIUM SERPL-SCNC: 4.7 MMOL/L (ref 3.4–5.3)
RBC # BLD AUTO: 4.51 10E6/UL (ref 4.4–5.9)
SODIUM SERPL-SCNC: 140 MMOL/L (ref 135–145)
WBC # BLD AUTO: 11.2 10E3/UL (ref 4–11)

## 2025-04-07 PROCEDURE — 83036 HEMOGLOBIN GLYCOSYLATED A1C: CPT | Mod: ZL | Performed by: FAMILY MEDICINE

## 2025-04-07 PROCEDURE — 99214 OFFICE O/P EST MOD 30 MIN: CPT | Performed by: FAMILY MEDICINE

## 2025-04-07 PROCEDURE — G0463 HOSPITAL OUTPT CLINIC VISIT: HCPCS

## 2025-04-07 PROCEDURE — G2211 COMPLEX E/M VISIT ADD ON: HCPCS | Performed by: FAMILY MEDICINE

## 2025-04-07 PROCEDURE — 82310 ASSAY OF CALCIUM: CPT | Mod: ZL | Performed by: FAMILY MEDICINE

## 2025-04-07 PROCEDURE — 36415 COLL VENOUS BLD VENIPUNCTURE: CPT | Mod: ZL | Performed by: FAMILY MEDICINE

## 2025-04-07 PROCEDURE — 83540 ASSAY OF IRON: CPT | Mod: ZL | Performed by: FAMILY MEDICINE

## 2025-04-07 PROCEDURE — 3078F DIAST BP <80 MM HG: CPT | Performed by: FAMILY MEDICINE

## 2025-04-07 PROCEDURE — 3074F SYST BP LT 130 MM HG: CPT | Performed by: FAMILY MEDICINE

## 2025-04-07 PROCEDURE — 83550 IRON BINDING TEST: CPT | Mod: ZL | Performed by: FAMILY MEDICINE

## 2025-04-07 PROCEDURE — 80048 BASIC METABOLIC PNL TOTAL CA: CPT | Mod: ZL | Performed by: FAMILY MEDICINE

## 2025-04-07 PROCEDURE — 82728 ASSAY OF FERRITIN: CPT | Mod: ZL | Performed by: FAMILY MEDICINE

## 2025-04-07 PROCEDURE — 1125F AMNT PAIN NOTED PAIN PRSNT: CPT | Performed by: FAMILY MEDICINE

## 2025-04-07 PROCEDURE — 85027 COMPLETE CBC AUTOMATED: CPT | Mod: ZL | Performed by: FAMILY MEDICINE

## 2025-04-07 RX ORDER — GABAPENTIN 300 MG/1
300 CAPSULE ORAL 3 TIMES DAILY
Qty: 90 CAPSULE | Refills: 5 | Status: SHIPPED | OUTPATIENT
Start: 2025-04-07

## 2025-04-07 RX ORDER — TAMSULOSIN HYDROCHLORIDE 0.4 MG/1
0.4 CAPSULE ORAL DAILY
Qty: 90 CAPSULE | Refills: 3 | Status: SHIPPED | OUTPATIENT
Start: 2025-04-07

## 2025-04-07 ASSESSMENT — PAIN SCALES - GENERAL: PAINLEVEL_OUTOF10: MODERATE PAIN (6)

## 2025-04-07 NOTE — PROGRESS NOTES
SUBJECTIVE:  Richard Harvey, 59 year old, male presents with the following Chief Complaint(s) with HPI to follow:  Chief Complaint   Patient presents with    Diabetes        Diabetes Follow-up    Patient is checking blood sugars: >4x/day using his continuous glucose (Dexcom G7).  Results:    Date: 2/28 to 3/13/25  Glucose management indicator: n/a    Average glucose: 153    Glucose range  Very low (<54): 0  low (<70): 2%  In target range (): 72%  High (>180): 21%  Very high (>250): 5%    Symptoms of hypoglycemia (low blood sugar): rare  Paresthesias (numbness or burning in feet) or sores: yes, no sores  Diabetic eye exam within the last year: UTD  Breakfast eaten regularly: Yes  Patient counting carbs: trying       HPI:   Richard's here today for the follow up regarding his Diabetes mellitus, Type 1.    A1c trend  Lab Results   Component Value Date    A1C 6.9 08/26/2024    A1C 7.7 11/30/2023    A1C 7.5 08/16/2023    A1C 7.2 05/08/2023    A1C 6.9 01/31/2023    A1C 7.7 10/20/2021    A1C 7.8 06/03/2021    A1C 7.5 11/30/2020    A1C 7.7 09/01/2020    A1C 8.1 03/24/2020 4/16/24 6.8%    Current Diabetes medication:   1.  Insulin pump (Omnipod 5), uses Novolog  ASA use: no, on Xarleto  Statin use: yes    Richard reports the following:  No issues with the insulin pump  No issues with the Dexcom G7  Needs help getting into the Omnipod tiago    Has a rash on his RLE and behind knee  Started about one week ago  Itches  No other changes to his health    Has an appointment with Dr. Alvarez at the end of the month    Weight trend:  Wt Readings from Last 4 Encounters:   03/13/25 91.5 kg (201 lb 11.2 oz)   11/19/24 89.1 kg (196 lb 6.4 oz)   10/22/24 90.1 kg (198 lb 9.6 oz)   08/26/24 89.3 kg (196 lb 12.8 oz)     Noted BP  Denies any symptoms     Patient Active Problem List   Diagnosis    DM type 1 (diabetes mellitus, type 1) (H)    HTN (hypertension)    Hyperlipidemia    ACP (advance care planning)    Diabetic polyneuropathy associated  with type 1 diabetes mellitus (H)    Class 2 obesity in adult    Obesity (BMI 35.0-39.9) with comorbidity (H)    Gastroparesis    Gastroesophageal reflux disease without esophagitis    Chronic bilateral low back pain with right-sided sciatica    Acute cystitis without hematuria    Sepsis, due to unspecified organism, unspecified whether acute organ dysfunction present (H)    ELMA (acute kidney injury)    Pyelonephritis, acute    Benign prostatic hyperplasia with urinary retention    Stage 3a chronic kidney disease (H)    Vitamin D deficiency    Heart failure with mid-range ejection fraction (H) - resolved    Anemia, unspecified type    Dizziness    Neurogenic bladder       Past Medical History:   Diagnosis Date    BPH (benign prostatic hyperplasia)     Diabetes mellitus type 1, controlled (H)     HLD (hyperlipidemia)     HTN (hypertension)     Neuropathy     Type 1 diabetes (H)        Past Surgical History:   Procedure Laterality Date    COLONOSCOPY N/A 06/27/2023    Procedure: Colonoscopy;  Surgeon: Raúl Alcaraz MD;  Location: [facility] OR    COLONOSCOPY - HIM SCAN N/A 09/19/2016    Procedure: COLONOSCOPY SCREENING; Surgeon: Rudy Rojo MD; Location: [facility] OR    ESOPHAGOSCOPY, GASTROSCOPY, DUODENOSCOPY (EGD), COMBINED  08/13/2019    IR PICC PLACEMENT > 5 YRS OF AGE  04/21/2023    JOINT REPLACEMENT, HIP RT/LT Left 09/18/2024       Family History   Problem Relation Age of Onset    No Known Problems Mother     Hypertension Father     Hypertension Brother        Social History     Tobacco Use    Smoking status: Never     Passive exposure: Never    Smokeless tobacco: Never   Substance Use Topics    Alcohol use: No     Alcohol/week: 0.0 standard drinks of alcohol       Current Outpatient Medications   Medication Sig Dispense Refill    blood glucose (CONTOUR NEXT TEST) test strip TEST 6 TIMES DAILY, OR AS DIRECTED. 200 strip 3    CALCIUM-VITAMIN D PO Take 1 tablet by mouth daily      Continuous Glucose Sensor (DEXCOM  G7 SENSOR) MISC Change every 10 days. 9 each 1    cyclobenzaprine (FLEXERIL) 10 MG tablet Take 1 tablet (10 mg) by mouth 3 times daily as needed for muscle spasms. 60 tablet 3    EQ LIDOCAINE PAIN RELIEVING 4 % Patch APPLY 1 PATCH TOPICALLY ONCE DAILY. TO PREVENT LIDOCAINE TOXICITY, PATIENT SHOULD BE PATCH FREE FOR 12 HOURS DAILY 10 patch 0    ferrous sulfate (FEROSUL) 325 (65 Fe) MG tablet Take 1 tablet by mouth daily.      gabapentin (NEURONTIN) 300 MG capsule TAKE 1 CAPSULE BY MOUTH THREE TIMES DAILY 90 capsule 5    Glucagon (GVOKE HYPOPEN 2-PACK) 1 MG/0.2ML SOAJ Inject 1 mg Subcutaneous as needed (for low blood glucose) 0.4 mL 3    insulin aspart (NOVOLOG FLEXPEN) 100 UNIT/ML pen 1 unit for every 15 grams of carbs consumed + 1 unit for every 60 points, >130 before meals. 1:15 before snacks      insulin degludec (TRESIBA) 100 UNIT/ML pen Inject 20 Units subcutaneously daily as needed for other. Uses when pump is not working      Insulin Disposable Pump (OMNIPOD 5 G6 INTRO, GEN 5,) KIT 1 kit continuous 1 kit 0    INSULIN PUMP - OUTPATIENT Inject subcutaneously. Insulin pump: Omnipod 5  Updated 11/19/24  Basal:   0000 1.1  0200 1.05  0600 0.95  1030 0.7  1530 0.9  1800 1.15  TDD: 23.325  CR:  0000 12  1200 13  1600 14  ISF  0000 65  1200 60  1800 55  BG target: 110  BG correction  0000 140  0900 130  2000 140  Active insulin time: 4 hours      ipratropium - albuterol 0.5 mg/2.5 mg/3 mL (DUONEB) 0.5-2.5 (3) MG/3ML neb solution Take 1 vial (3 mLs) by nebulization every 6 hours as needed for shortness of breath, wheezing or cough 90 mL 1    ketorolac (TORADOL) 10 MG tablet Take 1 tablet by mouth every 6 hours.      loperamide (IMODIUM A-D) 2 MG tablet Take 1 tablet (2 mg) by mouth 4 times daily as needed for diarrhea 30 tablet 0    Multiple Vitamin (MULTI-VITAMIN DAILY PO) Take 1 tablet by mouth daily      nitroGLYcerin (NITROSTAT) 0.4 MG sublingual tablet Place 1 tablet (0.4 mg) under the tongue every 5 minutes as  "needed for chest pain. . Do not crush; maximum of 3 doses in 15 minutes. 25 tablet 0    ondansetron (ZOFRAN) 4 MG tablet Take 1 tablet (4 mg) by mouth every 8 hours as needed for nausea 90 tablet 3    order for DME Equipment being ordered:     1.  [employer] insulin pump supplies--insertion sets and reserviors  Disp: 1 month  Refill: 11 months 30 days 11    pantoprazole (PROTONIX) 40 MG EC tablet Take 1 tablet by mouth twice daily 60 tablet 11    simvastatin (ZOCOR) 40 MG tablet TAKE 1 TABLET BY MOUTH AT BEDTIME 90 tablet 3    tamsulosin (FLOMAX) 0.4 MG capsule TAKE 1 CAPSULE BY MOUTH IN THE EVENING 90 capsule 3    triamcinolone (KENALOG) 0.1 % external cream Apply topically 2 times daily. 454 g 1    urea (UREA 10 HYDRATING) 10 % external cream APPLY  CREAM TOPICALLY TO AFFECTED AREA(S) AS NEEDED FOR CALLOUSED SKIN 85 g 3    XARELTO ANTICOAGULANT 10 MG TABS tablet Take 10 mg by mouth.      insulin aspart (NOVOLOG VIAL) 100 UNITS/ML vial USE WITH INSULIN PUMP FOR A TOTAL DAILY USAGE  UNITS. 30 mL 4    Insulin Disposable Pump (OMNIPOD 5 PODS, GEN 5,) MISC 1 each every 48 hours. 15 each 5       No Known Allergies    REVIEW OF SYSTEMS  Skin: negative  Eyes: negative; wears glasses   Ears/Nose/Throat:  hx of allergies  Respiratory: No SOB, dyspnea on exertion, cough, or hemoptysis  Cardiovascular: negative  Gastrointestinal: hx of nausea--same  Genitourinary: + self cath; hx of urinary retention--same  Musculoskeletal: + hand arthritis; hx of back pain, shoulders, knees   Neurologic: positive for numbness or tingling of hands and numbness or tingling of feet--worse   Psychiatric: negative  Hematologic/Lymphatic/Immunologic: negative  Endocrine: positive for diabetes    OBJECTIVE:  BP (!) 145/77   Pulse 86   Resp 16   Ht 1.537 m (5' 0.5\")   Wt 91.5 kg (201 lb 11.2 oz)   SpO2 94%   BMI 38.74 kg/m    Constitutional: alert and no distress  Respiratory:  Good diaphragmatic excursion.   Musculoskeletal: extremities " normal- no gross deformities noted and walks with a limp  Skin: right lower leg: maculopapular rash with areas of excoriation; otherwise, no suspicious lesions and/or rashes to visible skin  Psychiatric: mentation appears normal and affect normal/bright      LAB  Results for orders placed or performed in visit on 03/13/25   GLUCOSE MONITOR, 72 HOUR, PHYS INTERP     Status: None    Narrative    Date: 2/28 to 3/13/25  Glucose management indicator: n/a    Average glucose: 153    Glucose range  Very low (<54): 0  low (<70): 2%  In target range (): 72%  High (>180): 21%  Very high (>250): 5%       ASSESSMENT / PLAN:.  (L20.9) Atopic dermatitis, unspecified type  (primary encounter diagnosis)  Comment: noted  Plan: triamcinolone (KENALOG) 0.1 % external cream          Order sent  Use as followed  Twice a day for 1-2 weeks  Daily for 1-2 weeks  If rash is gone, continue for one week after--can use it every other day to ween off of it    Let me know if it gets worse    (E10.65) Type 1 diabetes mellitus with hyperglycemia (H)  Comment: noted insulin pump and CGM download    Insulin   Basal: 25.6 (55%)  Bolus: 20.6 (45%)  TDD: 55    Automated mode: 95%  Manual mode: 5%    Insulin pump: Omnipod 5  Updated 3/13/25  Basal:   0000 1.1  0200 1.05  0600 0.95  1030 0.7  1530 0.9  1800 1.15  TDD: 23.325  CR:  0000 12  1200 13  1600 14  ISF  0000 65  1200 60  1800 55  BG target: 110  BG correction  0000 140  0900 130  2000 140    Plan: Continuous Glucose Sensor (DEXCOM G7 SENSOR)         MISC, GLUCOSE MONITOR, 72 HOUR, PHYS INTERP               Helped Richard get into his tiago  Richardcleoalie  [id number]  1983    CGM data, the past 2 weeks, looks great  TIR: 72%  TBT: 2 % (which is still appropriate)  BG ave: 153  GMI: n/a    Keep working on the timing of his carb bolus--ideally before    (I10) Essential hypertension  Comment: noted, no symptoms  Plan:   If able, check BP at home  Reviewed symptoms to report     Follow up  Dr. Alvarez as  scheduled  Me in 3-4 months    Call sooner if any issues/concerns develop and/or continued issues with low BGs    Let me know if there's any issues getting the Dexcom G7 and/or need help setting it up    Patient Instructions   Continue working on healthy eating and moving as best as you can (start low and slow, work up to 30 min, 5x/week)    BG goals:  Fasting and before meals <130, >70  2 hour after eating <180    We only need 1/2 of these numbers to be within target then your A1c will be within target    Medication changes  None today    Keep working on the timing of your carb bolus--ideally, before     Follow up   Keep appt with Dr. Alvarez--she will check all of your labs  4 months with me    Call me sooner if any problems/concerns and/or questions develop including consistent low BGs <70 or consistent high BGs >200  [phone] (Unit Coordinator)    [phone] (Tracey, Nurse)    Let me know if there's issues getting the Dexcom G7 AND if your Omnipod 5 pods say G6 or G7 or G6/G7    Use cream as prescribed  Let me know if it gets worse    Time: 25 min + 5 min CGM review  Barrier: none  Willingness to learn: accepting    Macrina CHAMBERLAIN [facility]  Diabetes [facility] Wound [facility]    With the electronic record, we can now more quickly and easily track our patient diabetic goals. Our diabetes clinical review is in progress and these are the indicators we are monitoring for good diabetes health.     1.) HbA1C less than 7 (measurement of your average blood sugars)  2.) Blood Pressure less than 140/80  3.) LDL less than 100 (bad cholesterol)  4.) HbA1C is checked in the last 6 months and below 7% (more frequently if not at goal or adjusting medications)  5.) LDL is checked in the last 12 months (more frequently if not at goal or adjusting medications)  6.) Taking one baby aspirin daily (unless otherwise instructed)  7.) No tobacco use  8) Statin use     You have achieved 8 out of 8 of these and I am encouraging you to come in  "and get tuned up to achieve 8 out of 8!  Here is what you have achieved so far in my goals for you:  1.) HbA1C  less than 7:                              YES  Your last  HbA1C :     Lab Results   Component Value Date    A1C 6.9 08/26/2024    A1C 7.7 11/30/2023    A1C 7.5 08/16/2023    A1C 7.2 05/08/2023    A1C 6.9 01/31/2023    A1C 7.7 10/20/2021    A1C 7.8 06/03/2021    A1C 7.5 11/30/2020    A1C 7.7 09/01/2020    A1C 8.1 03/24/2020 4/18/24 76.8%    2.) Blood Pressure less than 140/80:       YES      Your last    BP (!) 145/77   Pulse 86   Resp 16   Ht 1.537 m (5' 0.5\")   Wt 91.5 kg (201 lb 11.2 oz)   SpO2 94%   BMI 38.74 kg/m      3.) LDL less than 100:                              YES      Your last   LDL         LDL Cholesterol Calculated   Date Value Ref Range Status   08/26/2024 48 <=100 mg/dL Final   06/14/2021 42 <100 mg/dL Final     Comment:     Desirable:       <100 mg/dl       4.) Checked HbA1C in the past 6 months: YES      5.) Checked LDL in the past 12 months:    YES    6.) Taking one aspirin daily:                       NO--intentionally     7.) No tobacco use:                                        YES      8.) Statin use      YES       CGM requirements:   Patient has been seen in the clinic within the last 6 month  Diagnosis of Type 1 diabetes  Has been testing their BGs 4x/day using the Dexcom  Uses an insulin pump   Requires frequent adjustments to their treatment regimen based on BGM and/or CGM testing results.                               "

## 2025-05-03 DIAGNOSIS — E10.65 TYPE 1 DIABETES MELLITUS WITH HYPERGLYCEMIA (H): ICD-10-CM

## 2025-05-05 NOTE — TELEPHONE ENCOUNTER
Urea cream      Last Written Prescription Date:  09/19/24  Last Fill Quantity: 85g,   # refills: 3  Last Office Visit: 03/13/25  Future Office visit:    Next 5 appointments (look out 90 days)      May 08, 2025 8:00 AM  (Arrive by 7:45 AM)  Return Visit with Juliette Jarrett DPM  [facility] ([facility] ) [address] [place] [address] [phone]     Jul 08, 2025 8:00 AM  (Arrive by 7:45 AM)  Provider Visit with Natacha Alvarez MD  [facility] ([facility] ) [address] [place] [address] [phone]

## 2025-05-07 RX ORDER — UREA 10 %
LOTION (ML) TOPICAL
Qty: 85 G | Refills: 2 | Status: SHIPPED | OUTPATIENT
Start: 2025-05-07

## 2025-05-08 ENCOUNTER — OFFICE VISIT (OUTPATIENT)
Dept: PODIATRY | Facility: OTHER | Age: 60
End: 2025-05-08
Attending: PODIATRIST
Payer: COMMERCIAL

## 2025-05-08 VITALS
HEART RATE: 81 BPM | TEMPERATURE: 96.7 F | SYSTOLIC BLOOD PRESSURE: 139 MMHG | OXYGEN SATURATION: 94 % | DIASTOLIC BLOOD PRESSURE: 88 MMHG

## 2025-05-08 DIAGNOSIS — E10.42 DIABETIC POLYNEUROPATHY ASSOCIATED WITH TYPE 1 DIABETES MELLITUS (H): Primary | ICD-10-CM

## 2025-05-08 DIAGNOSIS — L84 CALLUS OF FOOT: ICD-10-CM

## 2025-05-08 DIAGNOSIS — L60.0 INGROWN TOENAIL: ICD-10-CM

## 2025-05-08 DIAGNOSIS — Z13.89 SCREENING FOR DIABETIC PERIPHERAL NEUROPATHY: ICD-10-CM

## 2025-05-08 DIAGNOSIS — E10.65 TYPE 1 DIABETES MELLITUS WITH HYPERGLYCEMIA (H): ICD-10-CM

## 2025-05-08 DIAGNOSIS — L60.3 ONYCHODYSTROPHY: ICD-10-CM

## 2025-05-08 PROCEDURE — G0463 HOSPITAL OUTPT CLINIC VISIT: HCPCS | Mod: 25

## 2025-05-08 PROCEDURE — 11750 EXCISION NAIL&NAIL MATRIX: CPT | Performed by: PODIATRIST

## 2025-05-08 PROCEDURE — 11721 DEBRIDE NAIL 6 OR MORE: CPT | Performed by: PODIATRIST

## 2025-05-08 PROCEDURE — 11056 PARNG/CUTG B9 HYPRKR LES 2-4: CPT | Performed by: PODIATRIST

## 2025-05-08 ASSESSMENT — PAIN SCALES - GENERAL: PAINLEVEL_OUTOF10: MODERATE PAIN (6)
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
## 2025-05-08 NOTE — PATIENT INSTRUCTIONS
Nail procedure care:  -Start epsom salt soaks tomorrow. Soak the foot 1-2 times a day for 20 minutes.  -Apply an antibiotic cream, gauze and a bandaid over the toe for the first week after the procedure.  -After one week, continue the epsom salt soaks, but switch to a betadine dressing. Apply a small amount of betadine on gauze or dab the betadine over the toe with gauze and apply another dry gauze over the toe followed by a band aid or tape.  -Do not apply a band aid directly over the nail procedure site without gauze or it will trap too much moisture beneath the band aid.  Note: Continue the epsom salt soaks and dressings every day until the wound is fully healed. You should not see drainage on the bandage for at least two days in a row. Call the clinic if the wound is still moderately draining two weeks after the procedure.    Keep the toe covered at all times until it is completely healed.  -You may develop a black scab over the nail bed--let this fall off on its own and don't pick at it.  -The toe may drain for 2-3 weeks. It is normal for it to have a clear drainage.    Watching for signs of infection:  If the toe has a thick, white pus coming from the procedure site or if the the toe becomes red, swollen, painful, or you begin to feel sick (fever/chills/nausea/vomitting), return to the podiatry clinic immediately or to the Emergency Department room if after hours.      Podiatry can be reached directly at [phone]. Leave a voicemail if there is not an answer.

## 2025-05-20 ENCOUNTER — RESULTS FOLLOW-UP (OUTPATIENT)
Dept: EMERGENCY MEDICINE | Facility: HOSPITAL | Age: 60
End: 2025-05-20

## 2025-05-20 ENCOUNTER — HOSPITAL ENCOUNTER (EMERGENCY)
Facility: HOSPITAL | Age: 60
Discharge: HOME | End: 2025-05-20
Attending: STUDENT IN AN ORGANIZED HEALTH CARE EDUCATION/TRAINING PROGRAM
Payer: COMMERCIAL

## 2025-05-20 ENCOUNTER — APPOINTMENT (OUTPATIENT)
Dept: GENERAL RADIOLOGY | Facility: HOSPITAL | Age: 60
End: 2025-05-20
Attending: STUDENT IN AN ORGANIZED HEALTH CARE EDUCATION/TRAINING PROGRAM
Payer: COMMERCIAL

## 2025-05-20 VITALS
HEART RATE: 82 BPM | RESPIRATION RATE: 22 BRPM | SYSTOLIC BLOOD PRESSURE: 130 MMHG | TEMPERATURE: 98.2 F | DIASTOLIC BLOOD PRESSURE: 76 MMHG | OXYGEN SATURATION: 93 %

## 2025-05-20 DIAGNOSIS — J06.9 UPPER RESPIRATORY TRACT INFECTION, UNSPECIFIED TYPE: ICD-10-CM

## 2025-05-20 DIAGNOSIS — H10.33 ACUTE CONJUNCTIVITIS OF BOTH EYES, UNSPECIFIED ACUTE CONJUNCTIVITIS TYPE: ICD-10-CM

## 2025-05-20 LAB
FLUAV RNA SPEC QL NAA+PROBE: NEGATIVE
FLUBV RNA RESP QL NAA+PROBE: NEGATIVE
RSV RNA SPEC NAA+PROBE: NEGATIVE
SARS-COV-2 RNA RESP QL NAA+PROBE: NEGATIVE

## 2025-05-20 PROCEDURE — 99284 EMERGENCY DEPT VISIT MOD MDM: CPT | Performed by: STUDENT IN AN ORGANIZED HEALTH CARE EDUCATION/TRAINING PROGRAM

## 2025-05-20 PROCEDURE — 99284 EMERGENCY DEPT VISIT MOD MDM: CPT | Mod: 25

## 2025-05-20 PROCEDURE — 71046 X-RAY EXAM CHEST 2 VIEWS: CPT | Mod: 26 | Performed by: RADIOLOGY

## 2025-05-20 PROCEDURE — 87637 SARSCOV2&INF A&B&RSV AMP PRB: CPT | Performed by: STUDENT IN AN ORGANIZED HEALTH CARE EDUCATION/TRAINING PROGRAM

## 2025-05-20 PROCEDURE — 71046 X-RAY EXAM CHEST 2 VIEWS: CPT

## 2025-05-20 RX ORDER — ERYTHROMYCIN 5 MG/G
0.5 OINTMENT OPHTHALMIC 4 TIMES DAILY
Qty: 1 G | Refills: 0 | Status: SHIPPED | OUTPATIENT
Start: 2025-05-20 | End: 2025-05-22

## 2025-05-20 RX ORDER — DEXTROMETHORPHAN HBR. AND GUAIFENESIN 10; 100 MG/5ML; MG/5ML
5 SOLUTION ORAL 4 TIMES DAILY PRN
Qty: 236 ML | Refills: 0 | Status: SHIPPED | OUTPATIENT
Start: 2025-05-20 | End: 2025-05-22

## 2025-05-20 ASSESSMENT — COLUMBIA-SUICIDE SEVERITY RATING SCALE - C-SSRS
2. HAVE YOU ACTUALLY HAD ANY THOUGHTS OF KILLING YOURSELF IN THE PAST MONTH?: NO
1. IN THE PAST MONTH, HAVE YOU WISHED YOU WERE DEAD OR WISHED YOU COULD GO TO SLEEP AND NOT WAKE UP?: NO
6. HAVE YOU EVER DONE ANYTHING, STARTED TO DO ANYTHING, OR PREPARED TO DO ANYTHING TO END YOUR LIFE?: NO

## 2025-05-20 ASSESSMENT — ACTIVITIES OF DAILY LIVING (ADL): ADLS_ACUITY_SCORE: 60

## 2025-05-20 NOTE — PROGRESS NOTES
Assessment & Plan     Acute bacterial conjunctivitis of both eyes  Will do one refill of erythromycin. Eyes have improved. Having some issues with itchy / dry eyes, will Rx refresh eye drops   - erythromycin (ROMYCIN) 5 MG/GM ophthalmic ointment; Place 0.5 inches into both eyes 4 times daily for 7 days.  - carboxymethylcellulose (REFRESH PLUS) 0.5 % SOLN ophthalmic solution; Place 1 drop into both eyes 3 times daily as needed for dry eyes.    Upper respiratory tract infection, unspecified type  No fevers in 4 days. Issues with cough, will trial Tessalon Perles  - dextromethorphan-guaiFENesin (TUSSIN DM)  MG/5ML liquid; Take 5 mLs by mouth 4 times daily as needed (cough).  - benzonatate (TESSALON) 100 MG capsule; Take 1 capsule (100 mg) by mouth 3 times daily as needed for cough.    The longitudinal plan of care for the diagnosis(es)/condition(s) as documented were addressed during this visit. Due to the added complexity in care, I will continue to support Richard in the subsequent management and with ongoing continuity of care.      MED REC REQUIRED  Post Medication Reconciliation Status:  Discharge medications reconciled and changed, see notes/orders      Follow-up  No follow-ups on file.    Subjective   Richard is a 60 year old, presenting for the following health issues:  ER F/U        5/22/2025    12:55 PM   Additional Questions   Roomed by kt CORONADO        ED/UC Followup:    Facility:  [facility]  Date of visit: 5/20/2025  Reason for visit: cough, fever. Conjunctivitis -> erythromycin gtt. CXR negative.  Multiplex swab negative. CXR negative   Current Status: states is doing the same since being seen, is out of the cough medicine and erthromycin for eyes    - having some fevers (100degF)  - w/o issues of fever reducer   - last fever was Sunday   - not sleeping well d/t cough     # Left big toe         Review of Systems  Constitutional, HEENT, cardiovascular, pulmonary, gi and gu systems are negative,  except as otherwise noted.      Objective    BP (!) 150/82 (BP Location: Right arm, Patient Position: Sitting, Cuff Size: Adult Regular)   Pulse 83   Temp 97.7  F (36.5  C) (Tympanic)   Resp 20   Ht 1.524 m (5')   Wt 91.4 kg (201 lb 6.4 oz)   SpO2 96%   BMI 39.33 kg/m    Body mass index is 39.33 kg/m .  Physical Exam  Constitutional:       General: He is not in acute distress.     Appearance: He is not ill-appearing.   Eyes:      Conjunctiva/sclera: Conjunctivae normal.   Cardiovascular:      Rate and Rhythm: Normal rate and regular rhythm.      Heart sounds: No murmur heard.  Pulmonary:      Effort: Pulmonary effort is normal. No respiratory distress.      Breath sounds: No wheezing or rales.   Feet:      Comments: Left great toe: slant back on lateral side of  nail. No s/s of infection. Some bleeding. Some granulation tissue  Neurological:      Mental Status: He is alert.              Signed Electronically by: Natacha Alvarez MD

## 2025-05-20 NOTE — ED NOTES
Patient discharged at this time. AVS, Rx, and follow up care, discussed, patient verbalized understanding. Declines questions or concerns.

## 2025-05-20 NOTE — DISCHARGE INSTRUCTIONS
Return to the emergency department for worsening symptoms or new concerning symptoms.  Use good hygiene.  Use the erythromycin on your eyes 4 times daily for the next week.  Continue applying warm wet compresses to clean the eyes.  For comfort you can apply cool compresses.

## 2025-05-20 NOTE — ED PROVIDER NOTES
[facility]  ED Provider Note    Chief Complaint   Patient presents with    Cough    Fever     History:  Richard Harvey is a 60 year old male with history of type 1 diabetes who presents to the emergency department today complaining of cough and fever with a runny nose for about 10 days.  About 7 days ago he developed thin watery secretions from his left eye followed by his right eye a few days later.  About 3 days ago it became thick and opaque white.  He reports subjective fevers.  No blood in his cough.  No other complaints at this time    Review of Systems   Performed; see HPI for pertinent positives and negatives.     Medical history, surgical history, and social history was reviewed.  Nursing documentation, triage note, and vitals were reviewed.    Vitals:  BP: 123/80  Pulse: 95  Temp: 98.2  F (36.8  C)  Resp: 22  SpO2: 94 %    Physical Exam:  Constitutional: Alert and conversant. NAD   HENT: NCAT bilateral conjunctival injection, left eye with thick white purulent secretion,, YOGI limbic sparing bilaterally  Eyes: Normal pupils   Neck: supple   CV: No pallor  Pulmonary/Chest: Non-labored respirations  Abdominal: non-distended   MSK: MAO.   Neuro: Alert and appropriate   Skin: Warm and dry. No diaphoresis. No rashes on exposed skin    Psych: Appropriate mood and affect       MDM:      ED Course as of 05/20/25 1139   Tue May 20, 2025   1137 Differential includes but is not limited to PNA, CHF, COPD exacerbation, pulmonary edema, pulmonary fibrosis, rhinorrhea, strep pharyngitis, viral URI, Pertussis, bronchitis, GERD/LPreflux, environmental irritant, foreign body   Based on patient's history and presentation, duration of symptoms, most likely etiology is a viral upper respiratory infection.  Physical exam reassuring, patient is well-appearing, pulmonary exam acceptable and at baseline.  Imaging is indicated in this presentation due to the duration of symptoms. CXR negative on my independent  read consistnet with radiology read. Thus, doubt bacterial source at this time. No hx or exam fidings to suggest PNA, CHF, COPD exacerbation, pulmonary edema, fibrosis.  Pt offered viral testing and underway.   Antipyretic not indicated given symptoms and temp.  Pt appropriate for further outpatient management, discharged  in stable condition with all questions answered and return precautions given.   1138 W/r/t his yeeys, his exam is consistent with conjunctivitis.  Given the way it is changed into a opaque white mucopurulent discharge and the duration I think it is reasonable for trial of antibiotics.  Recommending close primary care follow-up.  Discharged in stable additional question answered and return precautions given       Procedures:  Procedures        Impression:  Final diagnoses:   Acute conjunctivitis of both eyes, unspecified acute conjunctivitis type   Upper respiratory tract infection, unspecified type            Westley Andrea MD  05/20/25 1145

## 2025-05-22 ENCOUNTER — OFFICE VISIT (OUTPATIENT)
Dept: FAMILY MEDICINE | Facility: OTHER | Age: 60
End: 2025-05-22
Attending: FAMILY MEDICINE
Payer: COMMERCIAL

## 2025-05-22 VITALS
HEART RATE: 83 BPM | WEIGHT: 201.4 LBS | RESPIRATION RATE: 20 BRPM | HEIGHT: 60 IN | OXYGEN SATURATION: 96 % | DIASTOLIC BLOOD PRESSURE: 82 MMHG | SYSTOLIC BLOOD PRESSURE: 150 MMHG | TEMPERATURE: 97.7 F | BODY MASS INDEX: 39.54 KG/M2

## 2025-05-22 DIAGNOSIS — J06.9 UPPER RESPIRATORY TRACT INFECTION, UNSPECIFIED TYPE: ICD-10-CM

## 2025-05-22 DIAGNOSIS — H10.33 ACUTE BACTERIAL CONJUNCTIVITIS OF BOTH EYES: Primary | ICD-10-CM

## 2025-05-22 PROCEDURE — G0463 HOSPITAL OUTPT CLINIC VISIT: HCPCS

## 2025-05-22 RX ORDER — BENZONATATE 100 MG/1
100 CAPSULE ORAL 3 TIMES DAILY PRN
Qty: 45 CAPSULE | Refills: 0 | Status: SHIPPED | OUTPATIENT
Start: 2025-05-22

## 2025-05-22 RX ORDER — CARBOXYMETHYLCELLULOSE SODIUM 5 MG/ML
1 SOLUTION/ DROPS OPHTHALMIC 3 TIMES DAILY PRN
Qty: 30 ML | Refills: 1 | Status: SHIPPED | OUTPATIENT
Start: 2025-05-22

## 2025-05-22 RX ORDER — ERYTHROMYCIN 5 MG/G
0.5 OINTMENT OPHTHALMIC 4 TIMES DAILY
Qty: 3.5 G | Refills: 0 | Status: SHIPPED | OUTPATIENT
Start: 2025-05-22 | End: 2025-05-29

## 2025-05-22 RX ORDER — DEXTROMETHORPHAN HBR. AND GUAIFENESIN 10; 100 MG/5ML; MG/5ML
5 SOLUTION ORAL 4 TIMES DAILY PRN
Qty: 473 ML | Refills: 1 | Status: SHIPPED | OUTPATIENT
Start: 2025-05-22

## 2025-05-22 NOTE — PATIENT INSTRUCTIONS
Try tessalon perles for your cough     Okay to use refresh eye drops in between antibiotic ointment

## 2025-06-16 ENCOUNTER — PATIENT OUTREACH (OUTPATIENT)
Dept: CARE COORDINATION | Facility: CLINIC | Age: 60
End: 2025-06-16
Payer: COMMERCIAL

## 2025-07-08 ENCOUNTER — OFFICE VISIT (OUTPATIENT)
Dept: FAMILY MEDICINE | Facility: OTHER | Age: 60
End: 2025-07-08
Attending: FAMILY MEDICINE
Payer: COMMERCIAL

## 2025-07-08 ENCOUNTER — LAB (OUTPATIENT)
Dept: LAB | Facility: OTHER | Age: 60
End: 2025-07-08
Attending: FAMILY MEDICINE
Payer: COMMERCIAL

## 2025-07-08 VITALS
SYSTOLIC BLOOD PRESSURE: 124 MMHG | TEMPERATURE: 97.7 F | HEART RATE: 78 BPM | BODY MASS INDEX: 39.85 KG/M2 | OXYGEN SATURATION: 95 % | WEIGHT: 203 LBS | HEIGHT: 60 IN | DIASTOLIC BLOOD PRESSURE: 70 MMHG

## 2025-07-08 DIAGNOSIS — N31.9 NEUROGENIC BLADDER: ICD-10-CM

## 2025-07-08 DIAGNOSIS — Z00.00 ROUTINE GENERAL MEDICAL EXAMINATION AT A HEALTH CARE FACILITY: Primary | ICD-10-CM

## 2025-07-08 DIAGNOSIS — I10 PRIMARY HYPERTENSION: ICD-10-CM

## 2025-07-08 DIAGNOSIS — E10.65 TYPE 1 DIABETES MELLITUS WITH HYPERGLYCEMIA (H): ICD-10-CM

## 2025-07-08 DIAGNOSIS — N18.31 STAGE 3A CHRONIC KIDNEY DISEASE (H): Primary | ICD-10-CM

## 2025-07-08 DIAGNOSIS — D72.829 LEUKOCYTOSIS, UNSPECIFIED TYPE: ICD-10-CM

## 2025-07-08 DIAGNOSIS — N18.31 STAGE 3A CHRONIC KIDNEY DISEASE (H): ICD-10-CM

## 2025-07-08 DIAGNOSIS — Z12.5 SCREENING FOR PROSTATE CANCER: ICD-10-CM

## 2025-07-08 DIAGNOSIS — K21.9 GASTROESOPHAGEAL REFLUX DISEASE WITHOUT ESOPHAGITIS: ICD-10-CM

## 2025-07-08 LAB
EST. AVERAGE GLUCOSE BLD GHB EST-MCNC: 163 MG/DL
HBA1C MFR BLD: 7.3 %
HOLD SPECIMEN: NORMAL
HOLD SPECIMEN: NORMAL
PSA SERPL DL<=0.01 NG/ML-MCNC: 1.67 NG/ML (ref 0–4.5)

## 2025-07-08 PROCEDURE — 83036 HEMOGLOBIN GLYCOSYLATED A1C: CPT | Mod: ZL | Performed by: FAMILY MEDICINE

## 2025-07-08 PROCEDURE — 36415 COLL VENOUS BLD VENIPUNCTURE: CPT | Mod: ZL

## 2025-07-08 PROCEDURE — G0103 PSA SCREENING: HCPCS | Mod: ZL | Performed by: FAMILY MEDICINE

## 2025-07-08 PROCEDURE — G0463 HOSPITAL OUTPT CLINIC VISIT: HCPCS

## 2025-07-08 SDOH — HEALTH STABILITY: PHYSICAL HEALTH: ON AVERAGE, HOW MANY DAYS PER WEEK DO YOU ENGAGE IN MODERATE TO STRENUOUS EXERCISE (LIKE A BRISK WALK)?: 7 DAYS

## 2025-07-08 SDOH — HEALTH STABILITY: PHYSICAL HEALTH: ON AVERAGE, HOW MANY MINUTES DO YOU ENGAGE IN EXERCISE AT THIS LEVEL?: 30 MIN

## 2025-07-08 ASSESSMENT — PAIN SCALES - GENERAL: PAINLEVEL_OUTOF10: MODERATE PAIN (6)

## 2025-07-08 ASSESSMENT — SOCIAL DETERMINANTS OF HEALTH (SDOH): HOW OFTEN DO YOU GET TOGETHER WITH FRIENDS OR RELATIVES?: MORE THAN THREE TIMES A WEEK

## 2025-07-08 NOTE — PATIENT INSTRUCTIONS
Consider getting your RSV vaccine at a local pharmacy or [facility]      Patient Education   Preventive Care Advice   This is general advice given by our system to help you stay healthy. However, your care team may have specific advice just for you. Please talk to your care team about your preventive care needs.  Nutrition  Eat 5 or more servings of fruits and vegetables each day.  Try wheat bread, brown rice and whole grain pasta (instead of white bread, rice, and pasta).  Get enough calcium and vitamin D. Check the label on foods and aim for 100% of the RDA (recommended daily allowance).  Lifestyle  Exercise at least 150 minutes each week  (30 minutes a day, 5 days a week).  Do muscle strengthening activities 2 days a week. These help control your weight and prevent disease.  No smoking.  Wear sunscreen to prevent skin cancer.  Have a dental exam and cleaning every 6 months.  Yearly exams  See your health care team every year to talk about:  Any changes in your health.  Any medicines your care team has prescribed.  Preventive care, family planning, and ways to prevent chronic diseases.  Shots (vaccines)   HPV shots (up to age 26), if you've never had them before.  Hepatitis B shots (up to age 59), if you've never had them before.  COVID-19 shot: Get this shot when it's due.  Flu shot: Get a flu shot every year.  Tetanus shot: Get a tetanus shot every 10 years.  Pneumococcal, hepatitis A, and RSV shots: Ask your care team if you need these based on your risk.  Shingles shot (for age 50 and up)  General health tests  Diabetes screening:  Starting at age 35, Get screened for diabetes at least every 3 years.  If you are younger than age 35, ask your care team if you should be screened for diabetes.  Cholesterol test: At age 39, start having a cholesterol test every 5 years, or more often if advised.  Bone density scan (DEXA): At age 50, ask your care team if you should have this scan for osteoporosis (brittle  bones).  Hepatitis C: Get tested at least once in your life.  STIs (sexually transmitted infections)  Before age 24: Ask your care team if you should be screened for STIs.  After age 24: Get screened for STIs if you're at risk. You are at risk for STIs (including HIV) if:  You are sexually active with more than one person.  You don't use condoms every time.  You or a partner was diagnosed with a sexually transmitted infection.  If you are at risk for HIV, ask about PrEP medicine to prevent HIV.  Get tested for HIV at least once in your life, whether you are at risk for HIV or not.  Cancer screening tests  Cervical cancer screening: If you have a cervix, begin getting regular cervical cancer screening tests starting at age 21.  Breast cancer scan (mammogram): If you've ever had breasts, begin having regular mammograms starting at age 40. This is a scan to check for breast cancer.  Colon cancer screening: It is important to start screening for colon cancer at age 45.  Have a colonoscopy test every 10 years (or more often if you're at risk) Or, ask your provider about stool tests like a FIT test every year or Cologuard test every 3 years.  To learn more about your testing options, visit:   .  For help making a decision, visit:   https://bit.ly/yl57466.  Prostate cancer screening test: If you have a prostate, ask your care team if a prostate cancer screening test (PSA) at age 55 is right for you.  Lung cancer screening: If you are a current or former smoker ages 50 to 80, ask your care team if ongoing lung cancer screenings are right for you.  For informational purposes only. Not to replace the advice of your health care provider. Copyright   2023 [employer]. All rights reserved. Clinically reviewed by the [facility]itions Program. garbs [id number] - REV 01/24.

## 2025-07-08 NOTE — PROGRESS NOTES
Preventive Care Visit  [facility]  Natacha Alvarez MD, Family Medicine  Jul 8, 2025      Assessment & Plan     Routine general medical examination at a health care facility  Discussed and updated preventive cares  Repeat wellness visit in one year     Type 1 diabetes mellitus with hyperglycemia (H)  getting more lows due to being more active in the heat of the summer  A1c today of 7.3  - Hemoglobin A1c  - on insulin pump  - follows with Macrina with next visit on 7/15/2025  - Richard is considering applying for disability     Neurogenic bladder  Stable  Follows with [facility] urology with last visit 5/2/2025. Note reviewed. No follow up scheduled   - recommendation for increase of CIC to TID  - c/w flomax     Stage 3a chronic kidney disease (H)  Follows with Dr Lombardi with next visit 10/14/2025  - on iron     Gastroesophageal reflux disease without esophagitis  No changes  - c/w protonix     Screening for prostate cancer  PSA 1.67 today which is an increase from 0.73. Noted.   - PSA, screen      The longitudinal plan of care for the diagnosis(es)/condition(s) as documented were addressed during this visit. Due to the added complexity in care, I will continue to support Richard in the subsequent management and with ongoing continuity of care.    Reviewed preventive health counseling, as reflected in patient instructions  Counseling  Appropriate preventive services were addressed with this patient via screening, questionnaire, or discussion as appropriate for fall prevention, nutrition, physical activity, Tobacco-use cessation, social engagement, weight loss and cognition.  Checklist reviewing preventive services available has been given to the patient.  Reviewed patient's diet, addressing concerns and/or questions.   He is at risk for psychosocial distress and has been provided with information to reduce risk.         Follow-up  Return in about 3 months (around 10/8/2025) for CDM, Lab Work.    Subjective    Richard is a 60 year old, presenting for the following:  Physical        7/8/2025     7:54 AM   Additional Questions   Roomed by Karmen CORONADO     Diabetes Follow-up    How often are you checking your blood sugar? Continuous glucose monitor  What time of day are you checking your blood sugars (select all that apply)?  Before and after meals  Have you had any blood sugars above 200?  Yes   Have you had any blood sugars below 70?  Yes   What symptoms do you notice when your blood sugar is low?  Shaky and Weak  What concerns do you have today about your diabetes? Blood sugar is often over 200 and Low blood sugar   Do you have any of these symptoms? (Select all that apply)  Numbness in feet    - follows with Macrina with next visit on 7/15/2025  - getting more lows. ?more active, heat of summertime         BP Readings from Last 2 Encounters:   07/08/25 124/70   05/22/25 (!) 150/82     Hemoglobin A1C (%)   Date Value   04/07/2025 7.3 (H)   08/26/2024 6.9 (H)   10/20/2021 7.7 (A)   06/03/2021 7.8 (A)     LDL Cholesterol Calculated (mg/dL)   Date Value   08/26/2024 48   08/16/2023 48   06/14/2021 42   03/24/2020 40       Advance Care Planning    Discussed advance care planning with patient; however, patient declined at this time.        7/8/2025   General Health   How would you rate your overall physical health? Good   Feel stress (tense, anxious, or unable to sleep) To some extent   (!) STRESS CONCERN      7/8/2025   Nutrition   Three or more servings of calcium each day? Yes   Diet: Low salt    Diabetic   How many servings of fruit and vegetables per day? (!) 2-3   How many sweetened beverages each day? 0-1       Multiple values from one day are sorted in reverse-chronological order         7/8/2025   Exercise   Days per week of moderate/strenous exercise 7 days   Average minutes spent exercising at this level 30 min         7/8/2025   Social Factors   Frequency of gathering with friends or relatives More than three times  a week   Worry food won't last until get money to buy more No   Food not last or not have enough money for food? No   Do you have housing? (Housing is defined as stable permanent housing and does not include staying outside in a car, in a tent, in an abandoned building, in an overnight shelter, or couch-surfing.) No   Are you worried about losing your housing? No   Lack of transportation? No   Unable to get utilities (heat,electricity)? No   Want help with housing or utility concern? No   (!) HOUSING CONCERN PRESENT      7/8/2025   Fall Risk   Fallen 2 or more times in the past year? Yes   Trouble with walking or balance? Yes         7/8/2025   Dental   Dentist two times every year? Yes           Today's PHQ-2 Score:       7/8/2025     8:06 AM   PHQ-2 ( 1999 [employer])   Q1: Little interest or pleasure in doing things 0   Q2: Feeling down, depressed or hopeless 0   PHQ-2 Score 0         7/8/2025   Substance Use   Alcohol more than 3/day or more than 7/wk No   Do you use any other substances recreationally? No     Social History     Tobacco Use    Smoking status: Never     Passive exposure: Never    Smokeless tobacco: Never   Vaping Use    Vaping status: Never Used   Substance Use Topics    Alcohol use: No     Alcohol/week: 0.0 standard drinks of alcohol    Drug use: No           7/8/2025   STI Screening   New sexual partner(s) since last STI/HIV test? No   Last PSA:   PSA   Date Value Ref Range Status   06/14/2021 0.69 0 - 4 ug/L Final     Comment:     Assay Method:  Chemiluminescence using Siemens Vista analyzer     Prostate Specific Antigen Screen   Date Value Ref Range Status   01/31/2023 0.73 0.00 - 3.50 ng/mL Final     ASCVD Risk   The ASCVD Risk score (Alexia LAGUNAS, et al., 2019) failed to calculate for the following reasons:    The valid total cholesterol range is 130 to 320 mg/dL    # Wellness:  - Immunizations: pharmacy: RSV  - Lipids: update next visit   - Dexa scan (4/26/2022): lowest T score of -2.0.  major 6.1%, hip 0.9%   - Colon cancer screening: colonoscopy (6/27/2023) tubular adenoma x2. Repeat 5 years  - PSA: updating today  - Lung cancer screening: never smoker  - AAA screening:     # thinking about going for disability     Reviewed and updated as needed this visit by Provider   Tobacco  Allergies  Meds  Problems  Med Hx  Surg Hx  Fam Hx              Review of Systems  Constitutional, HEENT, cardiovascular, pulmonary, gi and gu systems are negative, except as otherwise noted.     Objective    Exam  /70   Pulse 78   Temp 97.7  F (36.5  C) (Tympanic)   Ht 1.524 m (5')   Wt 92.1 kg (203 lb)   SpO2 95%   BMI 39.65 kg/m     Estimated body mass index is 39.65 kg/m  as calculated from the following:    Height as of this encounter: 1.524 m (5').    Weight as of this encounter: 92.1 kg (203 lb).    Physical Exam  Constitutional:       General: He is not in acute distress.     Appearance: He is not ill-appearing.   Cardiovascular:      Rate and Rhythm: Normal rate and regular rhythm.      Heart sounds: No murmur heard.  Pulmonary:      Effort: Pulmonary effort is normal. No respiratory distress.      Breath sounds: No wheezing or rales.   Abdominal:      General: Bowel sounds are normal.      Tenderness: There is abdominal tenderness in the epigastric area.   Neurological:      Mental Status: He is alert.   Psychiatric:         Mood and Affect: Mood normal.       Results for orders placed or performed in visit on 07/08/25   Extra Purple Top EDTA (LAB USE ONLY)     Status: None   Result Value Ref Range    Hold Specimen JIC    Extra Green Top Tube (LAB USE ONLY)     Status: None   Result Value Ref Range    Hold Specimen JIC    Results for orders placed or performed in visit on 07/08/25   Hemoglobin A1c     Status: Abnormal   Result Value Ref Range    Estimated Average Glucose 163 (H) <117 mg/dL    Hemoglobin A1C 7.3 (H) <5.7 %   PSA, screen     Status: Normal   Result Value Ref Range    Prostate  Specific Antigen Screen 1.67 0.00 - 4.50 ng/mL    Narrative    This result is obtained using the Roche Elecsys total PSA method on the tj [id number] immunoassay analyzer, which is an ultrasensitive method. Results obtained with different assay methods or kits cannot be used interchangeably.  This test is intended for initial prostate cancer screening. PSA values exceeding the age-specific limits are suspicious for prostate disease, but additional testing, such as prostate biopsy, is needed to diagnose prostate pathology. The American Cancer Society recommends annual examination with digital rectal examination and serum PSA beginning at age 50 and for men with a life expectancy of at least 10 years after detection of prostate cancer. For men in high-risk groups, such as  Americans or men with a first-degree relative diagnosed at a younger age, testing should begin at a younger age. It is generally recommended that information be provided to patients about the benefits and limitations of testing and treatment so they can make informed decisions.             Signed Electronically by: Natacha Alvarez MD

## 2025-07-15 ENCOUNTER — ALLIED HEALTH/NURSE VISIT (OUTPATIENT)
Dept: EDUCATION SERVICES | Facility: OTHER | Age: 60
End: 2025-07-15
Attending: NURSE PRACTITIONER
Payer: COMMERCIAL

## 2025-07-15 VITALS
DIASTOLIC BLOOD PRESSURE: 78 MMHG | RESPIRATION RATE: 16 BRPM | SYSTOLIC BLOOD PRESSURE: 125 MMHG | WEIGHT: 202.4 LBS | OXYGEN SATURATION: 94 % | HEIGHT: 60 IN | HEART RATE: 74 BPM | BODY MASS INDEX: 39.74 KG/M2

## 2025-07-15 DIAGNOSIS — E10.649 TYPE 1 DIABETES MELLITUS WITH HYPOGLYCEMIA AND WITHOUT COMA (H): ICD-10-CM

## 2025-07-15 PROCEDURE — G0463 HOSPITAL OUTPT CLINIC VISIT: HCPCS | Performed by: NURSE PRACTITIONER

## 2025-07-15 RX ORDER — GLUCAGON INJECTION, SOLUTION 1 MG/.2ML
1 INJECTION, SOLUTION SUBCUTANEOUS PRN
Qty: 0.4 ML | Refills: 3 | Status: SHIPPED | OUTPATIENT
Start: 2025-07-15

## 2025-07-15 ASSESSMENT — PAIN SCALES - GENERAL: PAINLEVEL_OUTOF10: SEVERE PAIN (8)

## 2025-07-15 NOTE — PATIENT INSTRUCTIONS
Continue working on healthy eating and moving as best as you can (start low and slow, work up to 30 min, 5x/week)    BG goals:  Fasting and before meals <130, >70  2 hour after eating <180    We only need 1/2 of these numbers to be within target then your A1c will be within target    Medication changes  None today    Keep working on the timing of your carb bolus--ideally, before     Follow up   Keep appt with Dr. Alvarez--she will check all of your labs  4 months with me    Call me sooner if any problems/concerns and/or questions develop including consistent low BGs <70 or consistent high BGs >200  [phone] (Unit Coordinator)    [phone] (Tracey, Nurse)    Try activity mode to see if this help with your lower BGs during work

## 2025-07-15 NOTE — PROGRESS NOTES
SUBJECTIVE:  Richard Harvey, 59 year old, male presents with the following Chief Complaint(s) with HPI to follow:  Chief Complaint   Patient presents with    Diabetes        Diabetes Follow-up    Patient is checking blood sugars: >4x/day using his continuous glucose (Dexcom G7).  Results:    Date: 2/28 to 3/13/25  Glucose management indicator: n/a    Average glucose: 153    Glucose range  Very low (<54): 0  low (<70): 2%  In target range (): 72%  High (>180): 21%  Very high (>250): 5%    Symptoms of hypoglycemia (low blood sugar): rare  Paresthesias (numbness or burning in feet) or sores: yes, no sores  Diabetic eye exam within the last year: UTD  Breakfast eaten regularly: Yes  Patient counting carbs: trying       HPI:   Richard's here today for the follow up regarding his Diabetes mellitus, Type 1.    A1c trend  Lab Results   Component Value Date    A1C 6.9 08/26/2024    A1C 7.7 11/30/2023    A1C 7.5 08/16/2023    A1C 7.2 05/08/2023    A1C 6.9 01/31/2023    A1C 7.7 10/20/2021    A1C 7.8 06/03/2021    A1C 7.5 11/30/2020    A1C 7.7 09/01/2020    A1C 8.1 03/24/2020 4/16/24 6.8%    Current Diabetes medication:   1.  Insulin pump (Omnipod 5), uses Novolog  ASA use: no, on Xarleto  Statin use: yes    Richard reports the following:  No issues with the insulin pump  No issues with the Dexcom G7  Needs help getting into the Omnipod tiago    Has a rash on his RLE and behind knee  Started about one week ago  Itches  No other changes to his health    Has an appointment with Dr. Alvarez at the end of the month    Weight trend:  Wt Readings from Last 4 Encounters:   07/15/25 91.8 kg (202 lb 6.4 oz)   07/08/25 92.1 kg (203 lb)   05/22/25 91.4 kg (201 lb 6.4 oz)   04/07/25 93 kg (205 lb)     Noted BP  Denies any symptoms     Patient Active Problem List   Diagnosis    DM type 1 (diabetes mellitus, type 1) (H)    HTN (hypertension)    Hyperlipidemia    ACP (advance care planning)    Diabetic polyneuropathy associated with type 1  diabetes mellitus (H)    Class 2 obesity in adult    Obesity (BMI 35.0-39.9) with comorbidity (H)    Gastroparesis    Gastroesophageal reflux disease without esophagitis    Chronic bilateral low back pain with right-sided sciatica    Acute cystitis without hematuria    Sepsis, due to unspecified organism, unspecified whether acute organ dysfunction present (H)    ELMA (acute kidney injury)    Pyelonephritis, acute    Benign prostatic hyperplasia with urinary retention    Stage 3a chronic kidney disease (H)    Vitamin D deficiency    Heart failure with mid-range ejection fraction (H) - resolved    Anemia, unspecified type    Dizziness    Neurogenic bladder       Past Medical History:   Diagnosis Date    BPH (benign prostatic hyperplasia)     Diabetes mellitus type 1, controlled (H)     HLD (hyperlipidemia)     HTN (hypertension)     Neuropathy     Type 1 diabetes (H)        Past Surgical History:   Procedure Laterality Date    COLONOSCOPY N/A 06/27/2023    Procedure: Colonoscopy;  Surgeon: Raúl Alcaraz MD;  Location: [facility] OR    COLONOSCOPY - HIM SCAN N/A 09/19/2016    Procedure: COLONOSCOPY SCREENING; Surgeon: Rudy Rojo MD; Location: [facility] OR    ESOPHAGOSCOPY, GASTROSCOPY, DUODENOSCOPY (EGD), COMBINED  08/13/2019    IR PICC PLACEMENT > 5 YRS OF AGE  04/21/2023    JOINT REPLACEMENT, HIP RT/LT Left 09/18/2024       Family History   Problem Relation Age of Onset    No Known Problems Mother     Hypertension Father     Hypertension Brother        Social History     Tobacco Use    Smoking status: Never     Passive exposure: Never    Smokeless tobacco: Never   Substance Use Topics    Alcohol use: No     Alcohol/week: 0.0 standard drinks of alcohol       Current Outpatient Medications   Medication Sig Dispense Refill    benzonatate (TESSALON) 100 MG capsule Take 1 capsule (100 mg) by mouth 3 times daily as needed for cough. 45 capsule 0    blood glucose (CONTOUR NEXT TEST) test strip TEST 6 TIMES DAILY, OR AS  DIRECTED. 200 strip 3    CALCIUM-VITAMIN D PO Take 1 tablet by mouth daily      carboxymethylcellulose (REFRESH PLUS) 0.5 % SOLN ophthalmic solution Place 1 drop into both eyes 3 times daily as needed for dry eyes. 30 mL 1    Continuous Glucose Sensor (DEXCOM G7 SENSOR) MISC Change every 10 days. 9 each 1    cyclobenzaprine (FLEXERIL) 10 MG tablet Take 1 tablet (10 mg) by mouth 3 times daily as needed for muscle spasms. 60 tablet 3    dextromethorphan-guaiFENesin (TUSSIN DM)  MG/5ML liquid Take 5 mLs by mouth 4 times daily as needed (cough). 473 mL 1    EQ LIDOCAINE PAIN RELIEVING 4 % Patch APPLY 1 PATCH TOPICALLY ONCE DAILY. TO PREVENT LIDOCAINE TOXICITY, PATIENT SHOULD BE PATCH FREE FOR 12 HOURS DAILY 10 patch 0    ferrous sulfate (FEROSUL) 325 (65 Fe) MG tablet Take 1 tablet by mouth daily.      gabapentin (NEURONTIN) 300 MG capsule Take 1 capsule (300 mg) by mouth 3 times daily. 90 capsule 5    Glucagon (GVOKE HYPOPEN 2-PACK) 1 MG/0.2ML SOAJ Inject 1 mg Subcutaneous as needed (for low blood glucose) 0.4 mL 3    insulin aspart (NOVOLOG FLEXPEN) 100 UNIT/ML pen 1 unit for every 15 grams of carbs consumed + 1 unit for every 60 points, >130 before meals. 1:15 before snacks      insulin aspart (NOVOLOG VIAL) 100 UNITS/ML vial USE WITH INSULIN PUMP FOR A TOTAL DAILY USAGE  UNITS. 30 mL 4    insulin degludec (TRESIBA) 100 UNIT/ML pen Inject 20 Units subcutaneously daily as needed for other. Uses when pump is not working      Insulin Disposable Pump (OMNIPOD 5 G6 INTRO, GEN 5,) KIT 1 kit continuous 1 kit 0    Insulin Disposable Pump (OMNIPOD 5 PODS, GEN 5,) MISC 1 each every 48 hours. 15 each 5    INSULIN PUMP - OUTPATIENT Inject subcutaneously. Insulin pump: Omnipod 5  Updated 3/13/25  Basal:   0000 1.1  0200 1.05  0600 0.95  1030 0.7  1530 0.9  1800 1.15  TDD: 23.325  CR:  0000 12  1200 13  1600 14  ISF  0000 65  1200 60  1800 55  BG target: 110  BG correction  0000 140  0900 130  2000 140      ipratropium  - albuterol 0.5 mg/2.5 mg/3 mL (DUONEB) 0.5-2.5 (3) MG/3ML neb solution Take 1 vial (3 mLs) by nebulization every 6 hours as needed for shortness of breath, wheezing or cough 90 mL 1    loperamide (IMODIUM A-D) 2 MG tablet Take 1 tablet (2 mg) by mouth 4 times daily as needed for diarrhea 30 tablet 0    Multiple Vitamin (MULTI-VITAMIN DAILY PO) Take 1 tablet by mouth daily      nitroGLYcerin (NITROSTAT) 0.4 MG sublingual tablet Place 1 tablet (0.4 mg) under the tongue every 5 minutes as needed for chest pain. . Do not crush; maximum of 3 doses in 15 minutes. 25 tablet 0    ondansetron (ZOFRAN) 4 MG tablet Take 1 tablet (4 mg) by mouth every 8 hours as needed for nausea 90 tablet 3    order for DME Equipment being ordered:     1.  [employer] insulin pump supplies--insertion sets and reserviors  Disp: 1 month  Refill: 11 months 30 days 11    pantoprazole (PROTONIX) 40 MG EC tablet Take 1 tablet by mouth twice daily 60 tablet 11    simvastatin (ZOCOR) 40 MG tablet TAKE 1 TABLET BY MOUTH AT BEDTIME 90 tablet 3    tamsulosin (FLOMAX) 0.4 MG capsule Take 1 capsule (0.4 mg) by mouth daily. 90 capsule 3    triamcinolone (KENALOG) 0.1 % external cream Apply topically 2 times daily. 454 g 1    urea (UREA 10 HYDRATING) 10 % external cream APPLY  CREAM TOPICALLY TO AFFECTED AREA AS NEEDED FOR  CALLOUSED  SKIN 85 g 2       No Known Allergies    REVIEW OF SYSTEMS  Skin: negative  Eyes: negative; wears glasses   Ears/Nose/Throat:  hx of allergies  Respiratory: No SOB, dyspnea on exertion, cough, or hemoptysis  Cardiovascular: negative  Gastrointestinal: hx of nausea--same  Genitourinary: + self cath; hx of urinary retention--same  Musculoskeletal: + hand arthritis; hx of back pain, shoulders, knees   Neurologic: positive for numbness or tingling of hands and numbness or tingling of feet--worse   Psychiatric: negative  Hematologic/Lymphatic/Immunologic: negative  Endocrine: positive for diabetes    OBJECTIVE:  /78   Pulse 74   " Resp 16   Ht 1.53 m (5' 0.25\")   Wt 91.8 kg (202 lb 6.4 oz)   SpO2 94%   BMI 39.20 kg/m    Constitutional: alert and no distress  Respiratory:  Good diaphragmatic excursion.   Musculoskeletal: extremities normal- no gross deformities noted and walks with a limp  Skin: right lower leg: maculopapular rash with areas of excoriation; otherwise, no suspicious lesions and/or rashes to visible skin  Psychiatric: mentation appears normal and affect normal/bright      LAB  No results found for any visits on 07/15/25.      ASSESSMENT / PLAN:.  (L20.9) Atopic dermatitis, unspecified type  (primary encounter diagnosis)  Comment: noted  Plan: triamcinolone (KENALOG) 0.1 % external cream          Order sent  Use as followed  Twice a day for 1-2 weeks  Daily for 1-2 weeks  If rash is gone, continue for one week after--can use it every other day to ween off of it    Let me know if it gets worse    (E10.65) Type 1 diabetes mellitus with hyperglycemia (H)  Comment: noted insulin pump and CGM download    Insulin   Basal: 25.6 (55%)  Bolus: 20.6 (45%)  TDD: 55    Automated mode: 95%  Manual mode: 5%    Insulin pump: Omnipod 5  Updated 3/13/25  Basal:   0000 1.1  0200 1.05  0600 0.95  1030 0.7  1530 0.9  1800 1.15  TDD: 23.325  CR:  0000 12  1200 13  1600 14  ISF  0000 65  1200 60  1800 55  BG target: 110  BG correction  0000 140  0900 130  2000 140    Plan: Continuous Glucose Sensor (DEXCOM G7 SENSOR)         MISC, GLUCOSE MONITOR, 72 HOUR, PHYS INTERP               Helped Richard get into his tiago  Footmarks  [id number]  1982    CGM data, the past 2 weeks, looks great  TIR: 72%  TBT: 2 % (which is still appropriate)  BG ave: 153  GMI: n/a    Keep working on the timing of his carb bolus--ideally before    (I10) Essential hypertension  Comment: noted, no symptoms  Plan:   If able, check BP at home  Reviewed symptoms to report     Follow up  Dr. Alvarez as scheduled  Me in 3-4 months    Call sooner if any issues/concerns develop and/or " "continued issues with low BGs    Let me know if there's any issues getting the Dexcom G7 and/or need help setting it up    There are no Patient Instructions on file for this visit.    Time: 25 min + 5 min CGM review  Barrier: none  Willingness to learn: accepting    Macrina CHAMBERLAIN [facility]  Diabetes and Wound Care    With the electronic record, we can now more quickly and easily track our patient diabetic goals. Our diabetes clinical review is in progress and these are the indicators we are monitoring for good diabetes health.     1.) HbA1C less than 7 (measurement of your average blood sugars)  2.) Blood Pressure less than 140/80  3.) LDL less than 100 (bad cholesterol)  4.) HbA1C is checked in the last 6 months and below 7% (more frequently if not at goal or adjusting medications)  5.) LDL is checked in the last 12 months (more frequently if not at goal or adjusting medications)  6.) Taking one baby aspirin daily (unless otherwise instructed)  7.) No tobacco use  8) Statin use     You have achieved 8 out of 8 of these and I am encouraging you to come in and get tuned up to achieve 8 out of 8!  Here is what you have achieved so far in my goals for you:  1.) HbA1C  less than 7:                              YES  Your last  HbA1C :     Lab Results   Component Value Date    A1C 6.9 08/26/2024    A1C 7.7 11/30/2023    A1C 7.5 08/16/2023    A1C 7.2 05/08/2023    A1C 6.9 01/31/2023    A1C 7.7 10/20/2021    A1C 7.8 06/03/2021    A1C 7.5 11/30/2020    A1C 7.7 09/01/2020    A1C 8.1 03/24/2020 4/18/24 76.8%    2.) Blood Pressure less than 140/80:       YES      Your last    /78   Pulse 74   Resp 16   Ht 1.53 m (5' 0.25\")   Wt 91.8 kg (202 lb 6.4 oz)   SpO2 94%   BMI 39.20 kg/m      3.) LDL less than 100:                              YES      Your last   LDL         LDL Cholesterol Calculated   Date Value Ref Range Status   08/26/2024 48 <=100 mg/dL Final   06/14/2021 42 <100 mg/dL Final     Comment:     " Desirable:       <100 mg/dl       4.) Checked HbA1C in the past 6 months: YES      5.) Checked LDL in the past 12 months:    YES    6.) Taking one aspirin daily:                       NO--intentionally     7.) No tobacco use:                                        YES      8.) Statin use      YES       CGM requirements:   Patient has been seen in the clinic within the last 6 month  Diagnosis of Type 1 diabetes  Has been testing their BGs 4x/day using the Dexcom  Uses an insulin pump   Requires frequent adjustments to their treatment regimen based on BGM and/or CGM testing results.

## 2025-07-24 ENCOUNTER — OFFICE VISIT (OUTPATIENT)
Dept: PODIATRY | Facility: OTHER | Age: 60
End: 2025-07-24
Attending: PODIATRIST
Payer: COMMERCIAL

## 2025-07-24 VITALS
RESPIRATION RATE: 15 BRPM | TEMPERATURE: 97.2 F | HEART RATE: 75 BPM | OXYGEN SATURATION: 95 % | DIASTOLIC BLOOD PRESSURE: 83 MMHG | SYSTOLIC BLOOD PRESSURE: 135 MMHG

## 2025-07-24 DIAGNOSIS — L60.3 ONYCHODYSTROPHY: ICD-10-CM

## 2025-07-24 DIAGNOSIS — L84 CALLUS OF FOOT: ICD-10-CM

## 2025-07-24 DIAGNOSIS — E10.65 TYPE 1 DIABETES MELLITUS WITH HYPERGLYCEMIA (H): ICD-10-CM

## 2025-07-24 DIAGNOSIS — E10.42 DIABETIC POLYNEUROPATHY ASSOCIATED WITH TYPE 1 DIABETES MELLITUS (H): Primary | ICD-10-CM

## 2025-07-24 DIAGNOSIS — Z13.89 SCREENING FOR DIABETIC PERIPHERAL NEUROPATHY: ICD-10-CM

## 2025-07-24 PROCEDURE — G0463 HOSPITAL OUTPT CLINIC VISIT: HCPCS

## 2025-07-24 PROCEDURE — 11721 DEBRIDE NAIL 6 OR MORE: CPT | Performed by: PODIATRIST

## 2025-07-24 PROCEDURE — 11056 PARNG/CUTG B9 HYPRKR LES 2-4: CPT | Performed by: PODIATRIST

## 2025-07-24 ASSESSMENT — PAIN SCALES - GENERAL: PAINLEVEL_OUTOF10: MODERATE PAIN (6)
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
## 2025-08-26 DIAGNOSIS — E78.5 HYPERLIPIDEMIA, UNSPECIFIED HYPERLIPIDEMIA TYPE: ICD-10-CM

## 2025-08-26 RX ORDER — SIMVASTATIN 40 MG
40 TABLET ORAL AT BEDTIME
Qty: 90 TABLET | Refills: 3 | Status: SHIPPED | OUTPATIENT
Start: 2025-08-26

## (undated) DEVICE — SOL WATER IRRIG 1000ML BOTTLE 2F7114

## (undated) DEVICE — CONNECTOR ERBEFLO 2 PORT 20325-215

## (undated) DEVICE — TUBING SUCTION 20FT N620A

## (undated) RX ORDER — PROPOFOL 10 MG/ML
INJECTION, EMULSION INTRAVENOUS
Status: DISPENSED
Start: 2023-06-27